# Patient Record
Sex: MALE | Race: WHITE | NOT HISPANIC OR LATINO | URBAN - METROPOLITAN AREA
[De-identification: names, ages, dates, MRNs, and addresses within clinical notes are randomized per-mention and may not be internally consistent; named-entity substitution may affect disease eponyms.]

---

## 2017-02-17 ENCOUNTER — OUTPATIENT (OUTPATIENT)
Dept: OUTPATIENT SERVICES | Facility: HOSPITAL | Age: 82
LOS: 1 days | Discharge: HOME | End: 2017-02-17

## 2017-06-27 DIAGNOSIS — R53.83 OTHER FATIGUE: ICD-10-CM

## 2017-06-27 DIAGNOSIS — A64 UNSPECIFIED SEXUALLY TRANSMITTED DISEASE: ICD-10-CM

## 2017-06-27 DIAGNOSIS — N41.0 ACUTE PROSTATITIS: ICD-10-CM

## 2017-06-27 DIAGNOSIS — E03.9 HYPOTHYROIDISM, UNSPECIFIED: ICD-10-CM

## 2017-06-27 DIAGNOSIS — D64.9 ANEMIA, UNSPECIFIED: ICD-10-CM

## 2017-06-27 DIAGNOSIS — E78.2 MIXED HYPERLIPIDEMIA: ICD-10-CM

## 2018-07-09 ENCOUNTER — OUTPATIENT (OUTPATIENT)
Dept: OUTPATIENT SERVICES | Facility: HOSPITAL | Age: 83
LOS: 1 days | Discharge: HOME | End: 2018-07-09

## 2018-07-09 DIAGNOSIS — J84.112 IDIOPATHIC PULMONARY FIBROSIS: ICD-10-CM

## 2019-02-15 ENCOUNTER — OUTPATIENT (OUTPATIENT)
Dept: OUTPATIENT SERVICES | Facility: HOSPITAL | Age: 84
LOS: 1 days | Discharge: HOME | End: 2019-02-15

## 2019-02-15 DIAGNOSIS — Z00.01 ENCOUNTER FOR GENERAL ADULT MEDICAL EXAMINATION WITH ABNORMAL FINDINGS: ICD-10-CM

## 2019-02-22 ENCOUNTER — OUTPATIENT (OUTPATIENT)
Dept: OUTPATIENT SERVICES | Facility: HOSPITAL | Age: 84
LOS: 1 days | Discharge: HOME | End: 2019-02-22

## 2019-02-22 DIAGNOSIS — N13.8 OTHER OBSTRUCTIVE AND REFLUX UROPATHY: ICD-10-CM

## 2019-02-22 DIAGNOSIS — N40.1 BENIGN PROSTATIC HYPERPLASIA WITH LOWER URINARY TRACT SYMPTOMS: ICD-10-CM

## 2019-09-19 ENCOUNTER — EMERGENCY (EMERGENCY)
Facility: HOSPITAL | Age: 84
LOS: 0 days | Discharge: LEFT BEFORE DISCHARGE | End: 2019-09-20
Attending: EMERGENCY MEDICINE | Admitting: EMERGENCY MEDICINE
Payer: MEDICARE

## 2019-09-19 VITALS
SYSTOLIC BLOOD PRESSURE: 136 MMHG | RESPIRATION RATE: 20 BRPM | TEMPERATURE: 98 F | HEART RATE: 99 BPM | DIASTOLIC BLOOD PRESSURE: 77 MMHG | OXYGEN SATURATION: 97 %

## 2019-09-19 VITALS
RESPIRATION RATE: 19 BRPM | SYSTOLIC BLOOD PRESSURE: 150 MMHG | OXYGEN SATURATION: 97 % | TEMPERATURE: 96 F | DIASTOLIC BLOOD PRESSURE: 77 MMHG | HEART RATE: 84 BPM

## 2019-09-19 DIAGNOSIS — S42.101A FRACTURE OF UNSPECIFIED PART OF SCAPULA, RIGHT SHOULDER, INITIAL ENCOUNTER FOR CLOSED FRACTURE: ICD-10-CM

## 2019-09-19 DIAGNOSIS — S20.411A ABRASION OF RIGHT BACK WALL OF THORAX, INITIAL ENCOUNTER: ICD-10-CM

## 2019-09-19 DIAGNOSIS — M25.519 PAIN IN UNSPECIFIED SHOULDER: ICD-10-CM
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
## 2019-09-19 DIAGNOSIS — J18.9 PNEUMONIA, UNSPECIFIED ORGANISM: ICD-10-CM
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
## 2019-09-19 DIAGNOSIS — S22.31XA FRACTURE OF ONE RIB, RIGHT SIDE, INITIAL ENCOUNTER FOR CLOSED FRACTURE: ICD-10-CM
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
## 2019-09-19 DIAGNOSIS — S50.312A ABRASION OF LEFT ELBOW, INITIAL ENCOUNTER: ICD-10-CM

## 2019-09-19 LAB
ALBUMIN SERPL ELPH-MCNC: 4.2 G/DL — SIGNIFICANT CHANGE UP (ref 3.5–5.2)
ALP SERPL-CCNC: 65 U/L — SIGNIFICANT CHANGE UP (ref 30–115)
ALT FLD-CCNC: 15 U/L — SIGNIFICANT CHANGE UP (ref 0–41)
ANION GAP SERPL CALC-SCNC: 11 MMOL/L — SIGNIFICANT CHANGE UP (ref 7–14)
APTT BLD: 26.7 SEC — LOW (ref 27–39.2)
AST SERPL-CCNC: 26 U/L — SIGNIFICANT CHANGE UP (ref 0–41)
BASOPHILS # BLD AUTO: 0.09 K/UL — SIGNIFICANT CHANGE UP (ref 0–0.2)
BASOPHILS NFR BLD AUTO: 0.5 % — SIGNIFICANT CHANGE UP (ref 0–1)
BILIRUB SERPL-MCNC: 0.4 MG/DL — SIGNIFICANT CHANGE UP (ref 0.2–1.2)
BUN SERPL-MCNC: 26 MG/DL — HIGH (ref 10–20)
CALCIUM SERPL-MCNC: 9.1 MG/DL — SIGNIFICANT CHANGE UP (ref 8.5–10.1)
CHLORIDE SERPL-SCNC: 97 MMOL/L — LOW (ref 98–110)
CO2 SERPL-SCNC: 23 MMOL/L — SIGNIFICANT CHANGE UP (ref 17–32)
CREAT SERPL-MCNC: 1.4 MG/DL — SIGNIFICANT CHANGE UP (ref 0.7–1.5)
EOSINOPHIL # BLD AUTO: 0.08 K/UL — SIGNIFICANT CHANGE UP (ref 0–0.7)
EOSINOPHIL NFR BLD AUTO: 0.4 % — SIGNIFICANT CHANGE UP (ref 0–8)
ETHANOL SERPL-MCNC: <10 MG/DL — SIGNIFICANT CHANGE UP
GLUCOSE SERPL-MCNC: 188 MG/DL — HIGH (ref 70–99)
HCT VFR BLD CALC: 34.7 % — LOW (ref 42–52)
HGB BLD-MCNC: 11.3 G/DL — LOW (ref 14–18)
IMM GRANULOCYTES NFR BLD AUTO: 0.9 % — HIGH (ref 0.1–0.3)
INR BLD: 1.1 RATIO — SIGNIFICANT CHANGE UP (ref 0.65–1.3)
LACTATE SERPL-SCNC: 2.3 MMOL/L — HIGH (ref 0.5–2.2)
LIDOCAIN IGE QN: 29 U/L — SIGNIFICANT CHANGE UP (ref 7–60)
LYMPHOCYTES # BLD AUTO: 0.67 K/UL — LOW (ref 1.2–3.4)
LYMPHOCYTES # BLD AUTO: 3.5 % — LOW (ref 20.5–51.1)
MCHC RBC-ENTMCNC: 27.3 PG — SIGNIFICANT CHANGE UP (ref 27–31)
MCHC RBC-ENTMCNC: 32.6 G/DL — SIGNIFICANT CHANGE UP (ref 32–37)
MCV RBC AUTO: 83.8 FL — SIGNIFICANT CHANGE UP (ref 80–94)
MONOCYTES # BLD AUTO: 1.09 K/UL — HIGH (ref 0.1–0.6)
MONOCYTES NFR BLD AUTO: 5.7 % — SIGNIFICANT CHANGE UP (ref 1.7–9.3)
NEUTROPHILS # BLD AUTO: 17.11 K/UL — HIGH (ref 1.4–6.5)
NEUTROPHILS NFR BLD AUTO: 89 % — HIGH (ref 42.2–75.2)
NRBC # BLD: 0 /100 WBCS — SIGNIFICANT CHANGE UP (ref 0–0)
PLATELET # BLD AUTO: 199 K/UL — SIGNIFICANT CHANGE UP (ref 130–400)
POTASSIUM SERPL-MCNC: 5.1 MMOL/L — HIGH (ref 3.5–5)
POTASSIUM SERPL-SCNC: 5.1 MMOL/L — HIGH (ref 3.5–5)
PROT SERPL-MCNC: 7.6 G/DL — SIGNIFICANT CHANGE UP (ref 6–8)
PROTHROM AB SERPL-ACNC: 12.6 SEC — SIGNIFICANT CHANGE UP (ref 9.95–12.87)
RBC # BLD: 4.14 M/UL — LOW (ref 4.7–6.1)
RBC # FLD: 15.7 % — HIGH (ref 11.5–14.5)
SODIUM SERPL-SCNC: 131 MMOL/L — LOW (ref 135–146)
TROPONIN T SERPL-MCNC: <0.01 NG/ML — SIGNIFICANT CHANGE UP
WBC # BLD: 19.21 K/UL — HIGH (ref 4.8–10.8)
WBC # FLD AUTO: 19.21 K/UL — HIGH (ref 4.8–10.8)

## 2019-09-19 PROCEDURE — 74177 CT ABD & PELVIS W/CONTRAST: CPT | Mod: 26

## 2019-09-19 PROCEDURE — 99282 EMERGENCY DEPT VISIT SF MDM: CPT

## 2019-09-19 PROCEDURE — 99284 EMERGENCY DEPT VISIT MOD MDM: CPT

## 2019-09-19 PROCEDURE — 71101 X-RAY EXAM UNILAT RIBS/CHEST: CPT | Mod: 26,RT

## 2019-09-19 PROCEDURE — 71260 CT THORAX DX C+: CPT | Mod: 26

## 2019-09-19 PROCEDURE — 72125 CT NECK SPINE W/O DYE: CPT | Mod: 26

## 2019-09-19 PROCEDURE — 70450 CT HEAD/BRAIN W/O DYE: CPT | Mod: 26

## 2019-09-19 PROCEDURE — 72170 X-RAY EXAM OF PELVIS: CPT | Mod: 26

## 2019-09-19 RX ORDER — AZITHROMYCIN 500 MG/1
500 TABLET, FILM COATED ORAL ONCE
Refills: 0 | Status: COMPLETED | OUTPATIENT
Start: 2019-09-19 | End: 2019-09-19

## 2019-09-19 RX ORDER — PROTHROMBIN COMPLEX CONCENTRATE (HUMAN) 25.5; 16.5; 24; 22; 22; 26 [IU]/ML; [IU]/ML; [IU]/ML; [IU]/ML; [IU]/ML; [IU]/ML
1500 POWDER, FOR SOLUTION INTRAVENOUS ONCE
Refills: 0 | Status: DISCONTINUED | OUTPATIENT
Start: 2019-09-19 | End: 2019-09-19

## 2019-09-19 RX ORDER — CEFEPIME 1 G/1
2000 INJECTION, POWDER, FOR SOLUTION INTRAMUSCULAR; INTRAVENOUS ONCE
Refills: 0 | Status: COMPLETED | OUTPATIENT
Start: 2019-09-19 | End: 2019-09-19

## 2019-09-19 RX ADMIN — AZITHROMYCIN 255 MILLIGRAM(S): 500 TABLET, FILM COATED ORAL at 20:40

## 2019-09-19 RX ADMIN — CEFEPIME 100 MILLIGRAM(S): 1 INJECTION, POWDER, FOR SOLUTION INTRAMUSCULAR; INTRAVENOUS at 20:32

## 2019-09-19 NOTE — ED PROVIDER NOTE - PROVIDER TOKENS
PROVIDER:[TOKEN:[[id number]],FOLLOWUP:[7-10 Days]],FREE:[LAST:[guanacoiner],FIRST:[yann],PHONE:[(   )    -],FAX:[(   )    -],FOLLOWUP:[7-10 Days]]

## 2019-09-19 NOTE — ED PROVIDER NOTE - REFUSAL OF SERVICE, MDM
I have discussed the risks, benefits and alternatives (including the possibility of worsening of disease, pain, permanent disability, and/or death) with the patient and his/her family (if available).  The patient voices understanding of these risks, benefits, and alternatives and still wishes to sign out against medical advice.

## 2019-09-19 NOTE — ED PROVIDER NOTE - NSFOLLOWUPINSTRUCTIONS_ED_ALL_ED_FT
Scapular Fracture  Image   A scapular fracture is a break in the large, triangular bone behind your shoulder (shoulder blade or scapula). This bone makes up the socket joint of your shoulder.    The scapula is well protected by muscles, so scapular fractures are unusual injuries. They often involve a lot of force. People who have a scapular fracture often have other injuries as well. These may be injuries to the lung, spine, head, shoulder, or ribs.    What are the causes?  Common causes of this condition include:  A fall from a great height.  A car or motorcycle accident.  A heavy, direct blow to the scapula.  What are the signs or symptoms?  The main symptom of a scapular fracture is severe pain when you try to move your arm. Other signs and symptoms include:  Swelling behind the shoulder.  Bruising.  Holding the arm still and close to the body.  How is this diagnosed?  This condition may be diagnosed based on:  Your symptoms and the details of a recent injury.  A physical exam.  X-ray or CT scan to confirm the diagnosis and to check for other injuries.  How is this treated?  This condition may be treated with:  Immobilization. Your arm is put in a sling. A support bandage may be wrapped around your chest. The health care provider will explain how to move your shoulder for the first week after your injury in order to prevent pain and stiffness. The sling can be removed as your movement increases and your pain decreases.  Physical therapy. A physical therapist will teach you exercises to stretch and strengthen your shoulder. The goal is to keep your shoulder from getting stiff or frozen. You may need to do these exercises for 6–12 months.  Surgery. You may need surgery if the bone pieces are out of place (displaced fracture). You may also need surgery if the fracture causes the bone to be deformed. In this case, the broken scapula will be put back into position and held in place with a surgical plate and screws. Surgery is rarely done for this condition.  Follow these instructions at home:  Image   Medicines     Take over-the-counter and prescription medicines only as told by your health care provider.  Do not drive or use heavy machinery while taking prescription pain medicine.  If you have a splint and a wrap:     Wear the splint and the wrap as told by your health care provider. Remove them only as told by your health care provider.  Loosen them if your fingers or toes tingle, become numb, or turn cold and blue.  Keep them clean.  If they are not waterproof:  Do not let them get wet.  Cover them with a watertight covering when you take a bath or a shower.  Managing pain, stiffness, and swelling     Apply ice to the back of your shoulder:  If you have a removable splint or wrap, remove it as told by your health care provider.  Put ice in a plastic bag.  Place a towel between your skin and the bag.  Leave the ice on for 20 minutes, 2–3 times per day.  Do not lift anything that is heavier than 10 lbs. (4.5 kg), or the limit that your health care provider tells you, until he or she says that it is safe.  Avoid activities that make your symptoms worse for 4–6 weeks, or as long as directed.  General instructions     Ask your health care provider when it is safe for you to drive.  Do not use any products that contain nicotine or tobacco, such as cigarettes and e-cigarettes. These can delay bone healing. If you need help quitting, ask your health care provider.  Drink enough fluid to keep your urine pale yellow.  Do physical therapy exercises as told by your health care provider.  Return to your normal activities as told by your health care provider. Ask your health care provider what activities are safe for you.  Keep all follow-up visits as told by your health care provider. This is important.  Contact a health care provider if:  You have pain that is not relieved by medicine.  You are unable to do your physical therapy because of pain or stiffness.  Get help right away if:  You are short of breath.  You cough up blood.  You cannot move your arm or your fingers.  Summary  A scapular fracture is a break in the large, triangular bone behind your shoulder (shoulder blade or scapula).  The scapula is well protected by muscles, so scapular fractures are unusual injuries. They often involve a lot of force.  The main symptom of a scapular fracture is severe pain when you try to move your arm.  Immobilization, physical therapy, and surgery are used to treat this injury. Surgery is rarely done.  Follow your health care provider's instructions on taking medicines, using a wrap and splint, putting ice on the injured area, and resting from regular activities.  This information is not intended to replace advice given to you by your health care provider. Make sure you discuss any questions you have with your health care provider.    Fall Prevention in the Home, Adult  Falls can cause injuries. They can happen to people of all ages. There are many things you can do to make your home safe and to help prevent falls. Ask for help when making these changes, if needed.    What actions can I take to prevent falls?  General Instructions     Use good lighting in all rooms. Replace any light bulbs that burn out.  Turn on the lights when you go into a dark area. Use night-lights.  Keep items that you use often in easy-to-reach places. Lower the shelves around your home if necessary.  Set up your furniture so you have a clear path. Avoid moving your furniture around.  Do not have throw rugs and other things on the floor that can make you trip.  Avoid walking on wet floors.  If any of your floors are uneven, fix them.  Add color or contrast paint or tape to clearly belinda and help you see:  Any grab bars or handrails.  First and last steps of stairways.  Where the edge of each step is.  If you use a stepladder:  Make sure that it is fully opened. Do not climb a closed stepladder.  Make sure that both sides of the stepladder are locked into place.  Ask someone to hold the stepladder for you while you use it.  If there are any pets around you, be aware of where they are.  What can I do in the bathroom?     Image Image   Keep the floor dry. Clean up any water that spills onto the floor as soon as it happens.  Remove soap buildup in the tub or shower regularly.  Use non-skid mats or decals on the floor of the tub or shower.  Attach bath mats securely with double-sided, non-slip rug tape.  If you need to sit down in the shower, use a plastic, non-slip stool.  Install grab bars by the toilet and in the tub and shower. Do not use towel bars as grab bars.  What can I do in the bedroom?     Make sure that you have a light by your bed that is easy to reach.  Do not use any sheets or blankets that are too big for your bed. They should not hang down onto the floor.  Have a firm chair that has side arms. You can use this for support while you get dressed.  What can I do in the kitchen?     Clean up any spills right away.  If you need to reach something above you, use a strong step stool that has a grab bar.  Keep electrical cords out of the way.  Do not use floor polish or wax that makes floors slippery. If you must use wax, use non-skid floor wax.  What can I do with my stairs?     Do not leave any items on the stairs.  Make sure that you have a light switch at the top of the stairs and the bottom of the stairs. If you do not have them, ask someone to add them for you.  Make sure that there are handrails on both sides of the stairs, and use them. Fix handrails that are broken or loose. Make sure that handrails are as long as the stairways.  Install non-slip stair treads on all stairs in your home.  Avoid having throw rugs at the top or bottom of the stairs. If you do have throw rugs, attach them to the floor with carpet tape.  Choose a carpet that does not hide the edge of the steps on the stairway.  Check any carpeting to make sure that it is firmly attached to the stairs. Fix any carpet that is loose or worn.  What can I do on the outside of my home?     Use bright outdoor lighting.  Regularly fix the edges of walkways and driveways and fix any cracks.  Remove anything that might make you trip as you walk through a door, such as a raised step or threshold.  Trim any bushes or trees on the path to your home.  Regularly check to see if handrails are loose or broken. Make sure that both sides of any steps have handrails.  Install guardrails along the edges of any raised decks and porches.  Clear walking paths of anything that might make someone trip, such as tools or rocks.  Have any leaves, snow, or ice cleared regularly.  Use sand or salt on walking paths during winter.  Clean up any spills in your garage right away. This includes grease or oil spills.  What other actions can I take?     Wear shoes that:  Have a low heel. Do not wear high heels.  Have rubber bottoms.  Are comfortable and fit you well.  Are closed at the toe. Do not wear open-toe sandals.  Use tools that help you move around (mobility aids) if they are needed. These include:  Canes.  Walkers.  Scooters.  Crutches.  Review your medicines with your doctor. Some medicines can make you feel dizzy. This can increase your chance of falling.  Ask your doctor what other things you can do to help prevent falls.    Where to find more information  Centers for Disease Control and PreventionGABE: https://cdc.gov  [employer] [place] [employer] Aging: https://ya1nlpe.mark.nih.gov  Contact a doctor if:  You are afraid of falling at home.  You feel weak, drowsy, or dizzy at home.  You fall at home.  Summary  There are many simple things that you can do to make your home safe and to help prevent falls.  Ways to make your home safe include removing tripping hazards and installing grab bars in the bathroom.  Ask for help when making these changes in your home.  This information is not intended to replace advice given to you by your health care provider. Make sure you discuss any questions you have with your health care provider.    Community-Acquired Pneumonia, Adult  ImagePneumonia is an infection of the lungs. One type of pneumonia can happen while a person is in a hospital. A different type can happen when a person is not in a hospital (community-acquired pneumonia). It is easy for this kind to spread from person to person. It can spread to you if you breathe near an infected person who coughs or sneezes. Some symptoms include:  A dry cough.  A wet (productive) cough.  Fever.  Sweating.  Chest pain.  Follow these instructions at home:  Take over-the-counter and prescription medicines only as told by your doctor.  Only take cough medicine if you are losing sleep.  If you were prescribed an antibiotic medicine, take it as told by your doctor. Do not stop taking the antibiotic even if you start to feel better.  Sleep with your head and neck raised (elevated). You can do this by putting a few pillows under your head, or you can sleep in a recliner.  Do not use tobacco products. These include cigarettes, chewing tobacco, and e-cigarettes. If you need help quitting, ask your doctor.  Drink enough water to keep your pee (urine) clear or pale yellow.  A shot (vaccine) can help prevent pneumonia. Shots are often suggested for:  People older than 65 years of age.  People older than 19 years of age:  Who are having cancer treatment.  Who have long-term (chronic) lung disease.  Who have problems with their body's defense system (immune system).  You may also prevent pneumonia if you take these actions:  Get the flu (influenza) shot every year.  Go to the dentist as often as told.  Wash your hands often. If soap and water are not available, use hand .  Contact a doctor if:  You have a fever.  You lose sleep because your cough medicine does not help.  Get help right away if:  You are short of breath and it gets worse.  You have more chest pain.  Your sickness gets worse. This is very serious if:  You are an older adult.  Your body's defense system is weak.  You cough up blood.  This information is not intended to replace advice given to you by your health care provider. Make sure you discuss any questions you have with your health care provider.

## 2019-09-19 NOTE — ED PROVIDER NOTE - ATTENDING CONTRIBUTION TO CARE
87m w HTN reports fall from ladder a few hours ago w injury to R scapula. Pain is sharp, moderate, constant, no radiating, worse w moving RUE. [facility] and had XR's showing scapula & rib fx so sent in for eval. No head/neck injury, no LOC. No use of blood thinners. Patient in usual state of health prior. No other injury, no other complaints.    Review of Systems  Constitutional:  No fever or chills.   Eyes:  Negative.   ENMT:  No nasal congestion, discharge, or throat pain.   Cardiac:  No chest pain, syncope, or edema.  Respiratory:  No dyspnea, wheezing, or cough. No hemoptysis.  GI:  No vomiting, diarrhea, or abdominal pain. No melena or hematochezia.  :  No dysuria or hematuria.   Musculoskeletal:  See HPI  Skin:  No skin rash, jaundice, or lesions.   Neuro:  No headache, dizziness, loss of sensation, or focal weakness.  No change in mental status.     Physical Exam  General: Awake, alert, NAD, WDWN, NCAT, no skull/facial tender, no step-offs, no raccoon/villa, non-toxic appearing  Eyes: PERRL, EOMI, no icterus, lids and conjunctivae are normal  ENT: External inspection normal, pink/moist membranes, pharynx normal  CV: S1S2, regular rate and rhythm, no murmur/gallops/rubs, no JVD, 2+ pulses b/l, no edema/cords/homans, warm/well-perfused  Respiratory: Normal respiratory rate/effort, no respiratory distress, normal voice, speaking full sentences, lungs clear to auscultation b/l, no wheezing/rales/rhonchi, no retractions, no stridor  Abdomen: Soft abdomen, no tender/distended/guarding/rebound, no CVA tender  Musculoskeletal: FROM all 4 extremities, N/V intact, pelvis stable, no TLS spinal tender/deform/step-offs, R upper back/scapula tender, no other latia tender/deform, stable gait  Neck: FROM neck, supple, no meningismus, trachea midline, no JVD, no cspine tender/step-offs  Integumentary: Color normal for race, warm and dry, no rash. L elbow abrasion. R upper back linear abrasion  Neuro: Oriented x3, CN 2-12 grossly intact, normal motor, normal sensory, normal gait  Psych: Oriented x3, mood normal, affect normal     87m w fall and scapular fracture. nontoxic appearing, n/v intact. No use of blood thinners. --Labs, EKG, XR's, CT's, will d/w Trauma, observe/re-assess 87m w HTN reports fall from ladder a few hours ago w injury to R scapula. Pain is sharp, moderate, constant, no radiating, worse w moving RUE. [facility] and had XR's showing scapula & rib fx so sent in for eval. No head/neck injury, no LOC. No use of blood thinners. Patient in usual state of health prior. No other injury, no other complaints.    Review of Systems  Constitutional:  No fever or chills.   Eyes:  Negative.   ENMT:  No nasal congestion, discharge, or throat pain.   Cardiac:  No chest pain, syncope, or edema.  Respiratory:  No dyspnea, wheezing, or cough. No hemoptysis.  GI:  No vomiting, diarrhea, or abdominal pain. No melena or hematochezia.  :  No dysuria or hematuria.   Musculoskeletal:  See HPI  Skin:  No skin rash, jaundice, or lesions.   Neuro:  No headache, dizziness, loss of sensation, or focal weakness.  No change in mental status.     Physical Exam  General: Awake, alert, NAD, WDWN, NCAT, no skull/facial tender, no step-offs, no raccoon/villa, non-toxic appearing  Eyes: PERRL, EOMI, no icterus, lids and conjunctivae are normal  ENT: External inspection normal, pink/moist membranes, pharynx normal  CV: S1S2, regular rate and rhythm, no murmur/gallops/rubs, no JVD, 2+ pulses b/l, no edema/cords/homans, warm/well-perfused  Respiratory: Normal respiratory rate/effort, no respiratory distress, normal voice, speaking full sentences, lungs clear to auscultation b/l, no wheezing/rales/rhonchi, no retractions, no stridor  Abdomen: Soft abdomen, no tender/distended/guarding/rebound, no CVA tender  Musculoskeletal: FROM all 4 extremities, N/V intact, pelvis stable, no TLS spinal tender/deform/step-offs, R upper back/scapula tender, no chest wall deform/flail/crepitus, no other latia tender/deform, stable gait  Neck: FROM neck, supple, no meningismus, trachea midline, no JVD, no cspine tender/step-offs  Integumentary: Color normal for race, warm and dry, no rash. L elbow abrasion. R upper back linear abrasion  Neuro: Oriented x3, CN 2-12 grossly intact, normal motor, normal sensory, normal gait  Psych: Oriented x3, mood normal, affect normal     87m w fall and scapular fracture. nontoxic appearing, n/v intact. No use of blood thinners. --Labs, EKG, XR's, CT's, will d/w Trauma, observe/re-assess

## 2019-09-19 NOTE — ED PROVIDER NOTE - CLINICAL SUMMARY MEDICAL DECISION MAKING FREE TEXT BOX
87m w fall and scapular fracture. nontoxic appearing, n/v intact. No use of blood thinners. Labs, EKG, & imaging reviewed. Sling applied. Abx given. Care d/w Ortho and Trauma, cleared for admit to Medicine for treatment of PNA. Pt requesting to go home and wishes to leave AMA. Patient is awake/alert/interactive with normal mental status and normal neurologic function. Patient reports no SI/HI and demonstrates normal thought processes w no evidence of intoxication, delirium, delusions, or hallucinations. Patient requesting to leave against medical advice at this time. Advised patient of potential risks of leaving AMA which include potential death or disability or worsening PNA. Attempted to convince patient to stay and continue work up/treatment and patient refused. Patient has capacity to make medical decisions at this time and will be signed out AMA. Pt advised regarding symptomatic/supportive care, importance of PMD/Ortho f/u, and symptoms to prompt ED return. Copy of results given to patient.

## 2019-09-19 NOTE — ED PROVIDER NOTE - PATIENT PORTAL LINK FT
You can access the FollowMyHealth Patient Portal offered by [facility] by registering at the following website: [email or web address]. By joining [email or web address]’s FollowMyHealth portal, you will also be able to view your health information using other applications (apps) compatible with our system.

## 2019-09-19 NOTE — ED PROVIDER NOTE - NS ED ROS FT
Constitutional:  See HPI.   Eyes:  No visual changes, eye pain or discharge.  ENMT:  No hearing changes, pain, discharge or infections. No neck pain or stiffness.  Cardiac:  +Right sided rib pain, No chest pain, SOB or edema. No chest pain with exertion.  Respiratory:  No cough or respiratory distress. No hemoptysis.  GI:  No nausea, vomiting, diarrhea, abdominal pain.  :  No dysuria, frequency, hematuria  MS:  +Right shoulder pain, No joint pain or back pain.  Neuro:  No LOC. No headache or weakness.    Skin:  No skin rash.  Except as in HPI, all other review of systems is negative

## 2019-09-19 NOTE — ED PROVIDER NOTE - PHYSICAL EXAMINATION
CONSTITUTIONAL: Well-developed; well-nourished; in no acute distress.   SKIN: warm, dry  HEAD: Normocephalic; atraumatic.  EYES: PERRL, EOMI, no conjunctival erythema  ENT: No nasal discharge; airway clear.  NECK: Supple; non tender.  CARD: Tenderness in right rib cage, Tenderness upon compression of the thoracic wall, S1, S2 normal; no murmurs, gallops, or rubs. Regular rate and rhythm.   RESP: No wheezes, rales or rhonchi.  ABD: soft ntnd  EXT: Tenderness of the rear right scapula, Decreased ROM of theright scapula, otherwise, Normal cap refill. Normal ROM.  No clubbing, cyanosis or edema.   LYMPH: No acute cervical adenopathy.  NEURO: Alert, oriented, grossly unremarkable  PSYCH: Cooperative, appropriate.

## 2019-09-19 NOTE — ED PROVIDER NOTE - CARE PLAN
Principal Discharge DX:	Scapula fracture  Secondary Diagnosis:	Fall  Secondary Diagnosis:	Pneumonia Principal Discharge DX:	Scapula fracture  Secondary Diagnosis:	Fall  Secondary Diagnosis:	Pneumonia  Secondary Diagnosis:	Closed fracture of one rib of right side, initial encounter

## 2019-09-19 NOTE — ED PROVIDER NOTE - PROGRESS NOTE DETAILS
surgery called when patient presented to the ed ortho evaluated patient, follow up with Dr. Tim outpatient Given infiltrate seen in the CXR, pt was advice admission for IV abx. Pt states that he would like to go home. The patient wishes to leave against medical advice.  I have discussed the risks, benefits and alternatives (including the possibility of worsening of disease, pain, permanent disability, and/or death) with the patient and his/her family (if available).  The patient voices understanding of these risks, benefits, and alternatives and still wishes to sign out against medical advice.  The patient is awake, alert, oriented  x 3 and has demonstrated capacity to refuse/direct care.  I have advised the patient that they can and should return immediately should they develop any worse/different/additional symptoms, or if they change their mind and want to continue their care. Discussed with patient that he will still need to follow up with PMD and Ortho. Pt understands and agrees and will sign AMA.

## 2019-09-19 NOTE — CONSULT NOTE ADULT - SUBJECTIVE AND OBJECTIVE BOX
TRAUMA ACTIVATION LEVEL:  CONSULT    MECHANISM OF INJURY:   [X] Fall	    GCS: 15 	E: 4	V: 5	M: 6    HPI:  87yM  s/p from ladder 4ft, onto back. -HT, -LOC, -AC. c/o right upper back pain and difficulty raising his right arm. patient went to urgent care center where Xray demonstrated a Rt scapular fx.     PAST MEDICAL & SURGICAL HISTORY:    Allergies: No Known Allergies    ROS: 10-system review is otherwise negative except HPI above.      Primary Survey:    A - airway intact  B - bilateral breath sounds and good chest rise  C - palpable pulses in all extremities  D - GCS 15 on arrival, GARCIA  Exposure obtained    Vital Signs Last 24 Hrs  T(C): 36.4 (19 Sep 2019 16:37), Max: 36.4 (19 Sep 2019 16:37)  T(F): 97.6 (19 Sep 2019 16:37), Max: 97.6 (19 Sep 2019 16:37)  HR: 99 (19 Sep 2019 16:37) (99 - 99)  BP: 136/77 (19 Sep 2019 16:37) (136/77 - 136/77)  RR: 20 (19 Sep 2019 16:37) (20 - 20)  SpO2: 97% (19 Sep 2019 16:37) (97% - 97%)    Secondary Survey:   General: NAD  HEENT: Normocephalic, atraumatic,, PEERLA. no scalp lacerations   Neck: Soft, midline trachea. no c-spine tenderness  Chest: No chest wall tenderness, no subcutaneous emphysema   Cardiac: S1, S2, RRR  Respiratory: Bilateral breath sounds, clear and equal bilaterally  Abdomen: Soft, non-distended, non-tender, no rebound.  Groin: Normal appearing, pelvis stable   Ext: Palpable Radial b/l UE, b/l DP palpable in LE. + difficulty raising Rt hand above head.  Back:  Rt upper back pain, linear bruise noted, no bleeding. No midline TTP, No palpable runoff/stepoff/deformity    Labs:  CAPILLARY BLOOD GLUCOSE               11.3   19.21 )-----------( 199      ( 19 Sep 2019 16:54 )             34.7       Auto Immature Granulocyte %: 0.9 % (09-19-19 @ 16:54)  Auto Neutrophil %: 89.0 % (09-19-19 @ 16:54)    09-19    131<L>  |  97<L>  |  26<H>  ----------------------------<  188<H>  5.1<H>   |  23  |  1.4    Calcium, Total Serum: 9.1 mg/dL (09-19-19 @ 16:54)    LFTs:             7.6  | 0.4  | 26       ------------------[65      ( 19 Sep 2019 16:54 )  4.2  | x    | 15          Lipase:29       Lactate, Blood: 2.3 mmol/L (09-19-19 @ 16:54)    Coags:     12.60  ----< 1.10    ( 19 Sep 2019 16:54 )     26.7     CARDIAC MARKERS ( 19 Sep 2019 16:54 )  x     / <0.01 ng/mL / x     / x     / x        Alcohol, Blood: <10 mg/dL (09-19-19 @ 16:54)  Alcohol, Blood: <10 mg/dL (09-19-19 @ 16:54)    RADIOLOGY & ADDITIONAL STUDIES:  Pending  ---------------------------------------------------------------------------------------  ASSESSMENT:  87yM  s/p from ladder 4ft, onto back. -HT, -LOC, -AC. c/o right upper back pain and difficulty raising his right arm. patient went to urgent care center where Xray demonstrated a Rt scapular fx.  Trauma assessment in ED: ABCs intact , GCS 15 , AAOx3 , with physical exam findings, imaging, and labs as documented above.    PLAN:   - CXR, Pelvic Xray, Xray of Right humerus elbow  - Trauma labs (CBC, BMP, Coags, T&S, UA)  - Pan Scan (CT head, C-spine, Chest, Abd/pelvis) due to RONALDO. TRAUMA ACTIVATION LEVEL:  CONSULT    MECHANISM OF INJURY:   [X] Fall	    GCS: 15 	E: 4	V: 5	M: 6    HPI:  87yM  s/p from ladder 4ft, onto back. -HT, -LOC, -AC. c/o right upper back pain and difficulty raising his right arm. patient went to urgent care center where Xray demonstrated a Rt scapular fx.     PAST MEDICAL & SURGICAL HISTORY:    Allergies: No Known Allergies    ROS: 10-system review is otherwise negative except HPI above.      Primary Survey:    A - airway intact  B - bilateral breath sounds and good chest rise  C - palpable pulses in all extremities  D - GCS 15 on arrival, GARCIA  Exposure obtained    Vital Signs Last 24 Hrs  T(C): 36.4 (19 Sep 2019 16:37), Max: 36.4 (19 Sep 2019 16:37)  T(F): 97.6 (19 Sep 2019 16:37), Max: 97.6 (19 Sep 2019 16:37)  HR: 99 (19 Sep 2019 16:37) (99 - 99)  BP: 136/77 (19 Sep 2019 16:37) (136/77 - 136/77)  RR: 20 (19 Sep 2019 16:37) (20 - 20)  SpO2: 97% (19 Sep 2019 16:37) (97% - 97%)    Secondary Survey:   General: NAD  HEENT: Normocephalic, atraumatic,, PEERLA. no scalp lacerations   Neck: Soft, midline trachea. no c-spine tenderness  Chest: No chest wall tenderness, no subcutaneous emphysema   Cardiac: S1, S2, RRR  Respiratory: Bilateral breath sounds, clear and equal bilaterally  Abdomen: Soft, non-distended, non-tender, no rebound.  Groin: Normal appearing, pelvis stable   Ext: Palpable Radial b/l UE, b/l DP palpable in LE. + difficulty raising Rt hand above head.  Back:  Rt upper back pain, linear bruise noted, no bleeding. No midline TTP, No palpable runoff/stepoff/deformity    Labs:  CAPILLARY BLOOD GLUCOSE               11.3   19.21 )-----------( 199      ( 19 Sep 2019 16:54 )             34.7       Auto Immature Granulocyte %: 0.9 % (09-19-19 @ 16:54)  Auto Neutrophil %: 89.0 % (09-19-19 @ 16:54)    09-19    131<L>  |  97<L>  |  26<H>  ----------------------------<  188<H>  5.1<H>   |  23  |  1.4    Calcium, Total Serum: 9.1 mg/dL (09-19-19 @ 16:54)    LFTs:             7.6  | 0.4  | 26       ------------------[65      ( 19 Sep 2019 16:54 )  4.2  | x    | 15          Lipase:29       Lactate, Blood: 2.3 mmol/L (09-19-19 @ 16:54)    Coags:     12.60  ----< 1.10    ( 19 Sep 2019 16:54 )     26.7     CARDIAC MARKERS ( 19 Sep 2019 16:54 )  x     / <0.01 ng/mL / x     / x     / x        Alcohol, Blood: <10 mg/dL (09-19-19 @ 16:54)  Alcohol, Blood: <10 mg/dL (09-19-19 @ 16:54)      RADIOLOGY & ADDITIONAL STUDIES:    < from: Xray Pelvis AP only (09.19.19 @ 18:32) >  IMPRESSION:  Osteopenia without acute distress fracture.  < end of copied text >    < from: CT Head No Cont (09.19.19 @ 19:40) >  IMPRESSION:  No CT evidence for acute intracranialpathology.  < end of copied text >    < from: CT Cervical Spine No Cont (09.19.19 @ 19:40) >  IMPRESSION:  No acute fracture or significant subluxation of the cervical spine.  < end of copied text >    < from: CT Chest w/ IV Cont (09.19.19 @ 19:41) >  IMPRESSION:  Complicated displaced right scapular fracture. Acute fracture of the   right posterior fifth rib.  Otherwise, no evidence of acute intra-abdominal/pelvic traumatic   injury/pathology.  < end of copied text >    < from: CT Abdomen and Pelvis w/ IV Cont (09.19.19 @ 19:41) >  IMPRESSION:  Complicated displaced right scapular fracture. Acute fracture of the   right posterior fifth rib.  Otherwise, no evidence of acute intra-abdominal/pelvic traumatic   injury/pathology.  < end of copied text >

## 2019-09-19 NOTE — ED PROVIDER NOTE - CARE PROVIDER_API CALL
Graham Tim)  Orthopaedic Surgery  [address] [place] [address]  Phone: [phone]  Fax: [phone]  Follow Up Time: 7-10 Days    yann jung  Phone: (   )    -  Fax: (   )    -  Follow Up Time: 7-10 Days

## 2019-09-19 NOTE — ED PROVIDER NOTE - CARE PROVIDERS DIRECT ADDRESSES
,[email or web address]

## 2019-09-19 NOTE — ED PROVIDER NOTE - OBJECTIVE STATEMENT
87 y.o. M with no significant displaced shoulder fracture and multiple rib fractures sent in from urgent care s/p fall 2 hours ago off a latter, denies LOC, no head trauma, no nausea/vomitting/chestpain/SOB. Pt + decreased ROM of right shoulder, no blood thinners.

## 2019-09-19 NOTE — ED ADULT NURSE REASSESSMENT NOTE - NS ED NURSE REASSESS COMMENT FT1
pt is aaox3, nad, respirations easy and regular, skin is warm, dry, and normal in color, pt right arm is in sling, family is @ bedside

## 2019-09-19 NOTE — ED ADULT NURSE NOTE - OBJECTIVE STATEMENT
Pt presents to the ED with c/o falling off ladder on his back x 3 hours ago. Pt states he was on the 4th step. Pt denies LOC.

## 2019-09-19 NOTE — CONSULT NOTE ADULT - ASSESSMENT
ASSESSMENT:  87yM  s/p from ladder 4ft, onto back. -HT, -LOC, -AC. c/o right upper back pain and difficulty raising his right arm. patient went to urgent care center where Xray demonstrated a Rt scapular fx.  Trauma assessment in ED: ABCs intact , GCS 15 , AAOx3 , with physical exam findings, imaging, and labs as documented above.    Trauma workup significant for Right scapula fracture and right 5th rib fracture  patient pulling >1500 on incentive spirometer    per orthopedics patient may go home with a sling and follow up with dr whaley in the office    no further trauma intervention  continue use of incentive spirometer    d/w Dr. Davis ASSESSMENT:  87yM  s/p from ladder 4ft, onto back. -HT, -LOC, -AC. c/o right upper back pain and difficulty raising his right arm. patient went to urgent care center where Xray demonstrated a Rt scapular fx.  Trauma assessment in ED: ABCs intact , GCS 15 , AAOx3 , with physical exam findings, imaging, and labs as documented above.    Trauma workup significant for Right scapula fracture and right 5th rib fracture  patient pulling >1500 on incentive spirometer    per orthopedics patient may go home with a sling and follow up with dr whaley in the office    no further trauma intervention  continue use of incentive spirometer    d/w Dr. Davis    Senior Trauma Resident Note  Airway intact  Bilateral Breath Sounds  Palpable pulses in 4 ext  GCS 15, PERRL, GARCIA  hemodynamicall stable  No Subq emphysema, abdominal tenderness,  or pelvic instability   FAST neg  Ct findings as above  RT scapular fx - ortho recs sling and o/p follow up, 5 th rib fx - 1500 IS, mild pain  pt can get OTC pain control  pt can be cleared from trauma perspective for outpatient f/u   Plan as above d/w Dr. aDvis

## 2019-09-19 NOTE — ED ADULT NURSE NOTE - CAS ED AMA FORM SIGNED YN
pt wants to leave AMA and follow up with Ortho, MD spoke with pt, explained risks of leaving AMA up to and including injury, illness, and death, pt understands these risks and still wants to leave, pt instructed to return to ed for new or worsening symptoms/Yes

## 2019-09-20 RX ORDER — CEFPODOXIME PROXETIL 100 MG
1 TABLET ORAL
Qty: 20 | Refills: 0
Start: 2019-09-20 | End: 2019-09-29

## 2019-09-20 RX ORDER — CLARITHROMYCIN 500 MG
1 TABLET ORAL
Qty: 4 | Refills: 0
Start: 2019-09-20 | End: 2019-09-23

## 2021-01-01 ENCOUNTER — APPOINTMENT (OUTPATIENT)
Age: 86
End: 2021-01-01
Payer: MEDICARE

## 2021-01-01 ENCOUNTER — TRANSCRIPTION ENCOUNTER (OUTPATIENT)
Age: 86
End: 2021-01-01

## 2021-01-01 VITALS
HEART RATE: 51 BPM | SYSTOLIC BLOOD PRESSURE: 110 MMHG | HEIGHT: 65 IN | DIASTOLIC BLOOD PRESSURE: 72 MMHG | OXYGEN SATURATION: 95 % | BODY MASS INDEX: 29.99 KG/M2 | RESPIRATION RATE: 14 BRPM | WEIGHT: 180 LBS

## 2021-01-01 PROCEDURE — 99213 OFFICE O/P EST LOW 20 MIN: CPT

## 2021-11-23 PROBLEM — I10 ESSENTIAL (PRIMARY) HYPERTENSION: Chronic | Status: ACTIVE | Noted: 2019-09-20

## 2021-12-16 PROBLEM — Z00.00 ENCOUNTER FOR PREVENTIVE HEALTH EXAMINATION: Status: ACTIVE | Noted: 2021-01-01

## 2021-12-18 NOTE — ASSESSMENT
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  Now reconsidering\par Now using home O2 with exertion

## 2021-12-18 NOTE — HISTORY OF PRESENT ILLNESS
[Follow-Up - Routine Clinic] : a routine clinic follow-up of [Dyspnea on Exertion] : dyspnea on exertion [Dyspnea at Rest] : no dyspnea at rest [Dry Cough] : dry cough [Productive Cough] : no productive cough [Wheezing] : no wheezing [Hemoptysis] : no hemoptysis [Currently Experiencing] : The patient is currently experiencing symptoms. [None] : The patient is not currently being treated for this problem [Shortness of Breath] : Shortness of Breath

## 2022-01-01 ENCOUNTER — APPOINTMENT (OUTPATIENT)
Age: 87
End: 2022-01-01
Payer: MEDICARE

## 2022-01-01 ENCOUNTER — TRANSCRIPTION ENCOUNTER (OUTPATIENT)
Age: 87
End: 2022-01-01

## 2022-01-01 ENCOUNTER — EMERGENCY (EMERGENCY)
Facility: HOSPITAL | Age: 87
LOS: 0 days | Discharge: NURSING, REHAB OR LONG-TERM CARE | End: 2022-05-14
Attending: EMERGENCY MEDICINE | Admitting: EMERGENCY MEDICINE
Payer: MEDICARE

## 2022-01-01 ENCOUNTER — RESULT CHARGE (OUTPATIENT)
Age: 87
End: 2022-01-01

## 2022-01-01 ENCOUNTER — INPATIENT (INPATIENT)
Facility: HOSPITAL | Age: 87
LOS: 7 days | Discharge: NURSING, REHAB OR LONG-TERM CARE | End: 2022-04-01
Attending: STUDENT IN AN ORGANIZED HEALTH CARE EDUCATION/TRAINING PROGRAM | Admitting: STUDENT IN AN ORGANIZED HEALTH CARE EDUCATION/TRAINING PROGRAM
Payer: MEDICARE

## 2022-01-01 ENCOUNTER — APPOINTMENT (OUTPATIENT)
Dept: CARDIOLOGY | Facility: CLINIC | Age: 87
End: 2022-01-01

## 2022-01-01 ENCOUNTER — INPATIENT (INPATIENT)
Facility: HOSPITAL | Age: 87
LOS: 17 days | Discharge: NURSING, REHAB OR LONG-TERM CARE | End: 2022-01-22
Attending: INTERNAL MEDICINE | Admitting: INTERNAL MEDICINE
Payer: MEDICARE

## 2022-01-01 ENCOUNTER — APPOINTMENT (OUTPATIENT)
Age: 87
End: 2022-01-01

## 2022-01-01 ENCOUNTER — INPATIENT (INPATIENT)
Facility: HOSPITAL | Age: 87
LOS: 11 days | Discharge: NURSING, REHAB OR LONG-TERM CARE | End: 2022-03-08
Attending: INTERNAL MEDICINE | Admitting: INTERNAL MEDICINE
Payer: MEDICARE

## 2022-01-01 ENCOUNTER — INPATIENT (INPATIENT)
Facility: HOSPITAL | Age: 87
LOS: 1 days | End: 2022-07-30
Attending: INTERNAL MEDICINE | Admitting: INTERNAL MEDICINE

## 2022-01-01 ENCOUNTER — APPOINTMENT (OUTPATIENT)
Dept: CARDIOLOGY | Facility: CLINIC | Age: 87
End: 2022-01-01
Payer: MEDICARE

## 2022-01-01 ENCOUNTER — INPATIENT (INPATIENT)
Facility: HOSPITAL | Age: 87
LOS: 12 days | Discharge: NURSING, REHAB OR LONG-TERM CARE | End: 2022-07-23
Attending: INTERNAL MEDICINE | Admitting: INTERNAL MEDICINE

## 2022-01-01 VITALS
WEIGHT: 160.06 LBS | OXYGEN SATURATION: 88 % | RESPIRATION RATE: 24 BRPM | SYSTOLIC BLOOD PRESSURE: 122 MMHG | DIASTOLIC BLOOD PRESSURE: 81 MMHG | TEMPERATURE: 95 F | HEIGHT: 64 IN | HEART RATE: 95 BPM

## 2022-01-01 VITALS
SYSTOLIC BLOOD PRESSURE: 102 MMHG | TEMPERATURE: 96 F | HEART RATE: 96 BPM | DIASTOLIC BLOOD PRESSURE: 63 MMHG | RESPIRATION RATE: 18 BRPM | OXYGEN SATURATION: 100 %

## 2022-01-01 VITALS
TEMPERATURE: 97 F | RESPIRATION RATE: 18 BRPM | SYSTOLIC BLOOD PRESSURE: 108 MMHG | HEIGHT: 64 IN | OXYGEN SATURATION: 100 % | WEIGHT: 160.06 LBS | HEART RATE: 86 BPM | DIASTOLIC BLOOD PRESSURE: 64 MMHG

## 2022-01-01 VITALS
WEIGHT: 180 LBS | OXYGEN SATURATION: 87 % | BODY MASS INDEX: 29.95 KG/M2 | HEART RATE: 91 BPM | SYSTOLIC BLOOD PRESSURE: 100 MMHG | RESPIRATION RATE: 14 BRPM | DIASTOLIC BLOOD PRESSURE: 60 MMHG

## 2022-01-01 VITALS
SYSTOLIC BLOOD PRESSURE: 164 MMHG | HEART RATE: 62 BPM | DIASTOLIC BLOOD PRESSURE: 71 MMHG | OXYGEN SATURATION: 90 % | RESPIRATION RATE: 21 BRPM

## 2022-01-01 VITALS
HEIGHT: 65 IN | SYSTOLIC BLOOD PRESSURE: 140 MMHG | DIASTOLIC BLOOD PRESSURE: 80 MMHG | HEART RATE: 72 BPM | OXYGEN SATURATION: 98 % | BODY MASS INDEX: 29.99 KG/M2 | RESPIRATION RATE: 12 BRPM | WEIGHT: 180 LBS

## 2022-01-01 VITALS
DIASTOLIC BLOOD PRESSURE: 64 MMHG | RESPIRATION RATE: 18 BRPM | OXYGEN SATURATION: 95 % | TEMPERATURE: 96 F | SYSTOLIC BLOOD PRESSURE: 129 MMHG | HEART RATE: 99 BPM

## 2022-01-01 VITALS
SYSTOLIC BLOOD PRESSURE: 123 MMHG | RESPIRATION RATE: 18 BRPM | HEART RATE: 95 BPM | OXYGEN SATURATION: 100 % | TEMPERATURE: 97 F | DIASTOLIC BLOOD PRESSURE: 79 MMHG

## 2022-01-01 VITALS
DIASTOLIC BLOOD PRESSURE: 81 MMHG | OXYGEN SATURATION: 97 % | RESPIRATION RATE: 22 BRPM | HEIGHT: 64 IN | WEIGHT: 160.06 LBS | TEMPERATURE: 98 F | HEART RATE: 107 BPM | SYSTOLIC BLOOD PRESSURE: 124 MMHG

## 2022-01-01 VITALS
SYSTOLIC BLOOD PRESSURE: 106 MMHG | RESPIRATION RATE: 18 BRPM | DIASTOLIC BLOOD PRESSURE: 71 MMHG | TEMPERATURE: 96 F | HEART RATE: 73 BPM | OXYGEN SATURATION: 98 %

## 2022-01-01 VITALS
HEART RATE: 122 BPM | WEIGHT: 154.98 LBS | DIASTOLIC BLOOD PRESSURE: 52 MMHG | HEIGHT: 64.02 IN | TEMPERATURE: 98 F | RESPIRATION RATE: 26 BRPM | OXYGEN SATURATION: 97 % | SYSTOLIC BLOOD PRESSURE: 102 MMHG

## 2022-01-01 VITALS
SYSTOLIC BLOOD PRESSURE: 109 MMHG | DIASTOLIC BLOOD PRESSURE: 57 MMHG | TEMPERATURE: 96 F | HEART RATE: 71 BPM | OXYGEN SATURATION: 97 % | RESPIRATION RATE: 18 BRPM

## 2022-01-01 VITALS
WEIGHT: 180 LBS | BODY MASS INDEX: 29.99 KG/M2 | HEART RATE: 67 BPM | HEIGHT: 65 IN | SYSTOLIC BLOOD PRESSURE: 120 MMHG | TEMPERATURE: 97.5 F | DIASTOLIC BLOOD PRESSURE: 70 MMHG

## 2022-01-01 VITALS
DIASTOLIC BLOOD PRESSURE: 85 MMHG | SYSTOLIC BLOOD PRESSURE: 147 MMHG | TEMPERATURE: 100 F | WEIGHT: 179.9 LBS | HEART RATE: 92 BPM | OXYGEN SATURATION: 99 % | RESPIRATION RATE: 24 BRPM

## 2022-01-01 DIAGNOSIS — I50.23 ACUTE ON CHRONIC SYSTOLIC (CONGESTIVE) HEART FAILURE: ICD-10-CM

## 2022-01-01 DIAGNOSIS — R06.02 SHORTNESS OF BREATH: ICD-10-CM

## 2022-01-01 DIAGNOSIS — N40.0 BENIGN PROSTATIC HYPERPLASIA WITHOUT LOWER URINARY TRACT SYMPTOMS: ICD-10-CM

## 2022-01-01 DIAGNOSIS — J96.21 ACUTE AND CHRONIC RESPIRATORY FAILURE WITH HYPOXIA: ICD-10-CM

## 2022-01-01 DIAGNOSIS — G89.11 ACUTE PAIN DUE TO TRAUMA: ICD-10-CM

## 2022-01-01 DIAGNOSIS — I11.0 HYPERTENSIVE HEART DISEASE WITH HEART FAILURE: ICD-10-CM

## 2022-01-01 DIAGNOSIS — E87.6 HYPOKALEMIA: ICD-10-CM

## 2022-01-01 DIAGNOSIS — I10 ESSENTIAL (PRIMARY) HYPERTENSION: ICD-10-CM

## 2022-01-01 DIAGNOSIS — I50.22 CHRONIC SYSTOLIC (CONGESTIVE) HEART FAILURE: ICD-10-CM

## 2022-01-01 DIAGNOSIS — H91.90 UNSPECIFIED HEARING LOSS, UNSPECIFIED EAR: ICD-10-CM

## 2022-01-01 DIAGNOSIS — K59.00 CONSTIPATION, UNSPECIFIED: ICD-10-CM

## 2022-01-01 DIAGNOSIS — R79.1 ABNORMAL COAGULATION PROFILE: ICD-10-CM

## 2022-01-01 DIAGNOSIS — J84.9 INTERSTITIAL PULMONARY DISEASE, UNSPECIFIED: ICD-10-CM

## 2022-01-01 DIAGNOSIS — I27.20 PULMONARY HYPERTENSION, UNSPECIFIED: ICD-10-CM

## 2022-01-01 DIAGNOSIS — K21.9 GASTRO-ESOPHAGEAL REFLUX DISEASE WITHOUT ESOPHAGITIS: ICD-10-CM

## 2022-01-01 DIAGNOSIS — R31.9 HEMATURIA, UNSPECIFIED: ICD-10-CM

## 2022-01-01 DIAGNOSIS — Z87.09 PERSONAL HISTORY OF OTHER DISEASES OF THE RESPIRATORY SYSTEM: ICD-10-CM

## 2022-01-01 DIAGNOSIS — D64.9 ANEMIA, UNSPECIFIED: ICD-10-CM

## 2022-01-01 DIAGNOSIS — G47.00 INSOMNIA, UNSPECIFIED: ICD-10-CM

## 2022-01-01 DIAGNOSIS — I13.0 HYPERTENSIVE HEART AND CHRONIC KIDNEY DISEASE WITH HEART FAILURE AND STAGE 1 THROUGH STAGE 4 CHRONIC KIDNEY DISEASE, OR UNSPECIFIED CHRONIC KIDNEY DISEASE: ICD-10-CM

## 2022-01-01 DIAGNOSIS — R91.1 SOLITARY PULMONARY NODULE: ICD-10-CM

## 2022-01-01 DIAGNOSIS — F41.9 ANXIETY DISORDER, UNSPECIFIED: ICD-10-CM

## 2022-01-01 DIAGNOSIS — N40.1 BENIGN PROSTATIC HYPERPLASIA WITH LOWER URINARY TRACT SYMPTOMS: ICD-10-CM

## 2022-01-01 DIAGNOSIS — Z99.81 DEPENDENCE ON SUPPLEMENTAL OXYGEN: ICD-10-CM

## 2022-01-01 DIAGNOSIS — Z71.89 OTHER SPECIFIED COUNSELING: ICD-10-CM

## 2022-01-01 DIAGNOSIS — I48.91 UNSPECIFIED ATRIAL FIBRILLATION: ICD-10-CM

## 2022-01-01 DIAGNOSIS — K92.1 MELENA: ICD-10-CM

## 2022-01-01 DIAGNOSIS — I50.20 UNSPECIFIED SYSTOLIC (CONGESTIVE) HEART FAILURE: ICD-10-CM

## 2022-01-01 DIAGNOSIS — M25.512 PAIN IN LEFT SHOULDER: ICD-10-CM

## 2022-01-01 DIAGNOSIS — Z51.5 ENCOUNTER FOR PALLIATIVE CARE: ICD-10-CM

## 2022-01-01 DIAGNOSIS — I08.1 RHEUMATIC DISORDERS OF BOTH MITRAL AND TRICUSPID VALVES: ICD-10-CM

## 2022-01-01 DIAGNOSIS — I95.9 HYPOTENSION, UNSPECIFIED: ICD-10-CM

## 2022-01-01 DIAGNOSIS — Z87.891 PERSONAL HISTORY OF NICOTINE DEPENDENCE: ICD-10-CM

## 2022-01-01 DIAGNOSIS — R00.0 TACHYCARDIA, UNSPECIFIED: ICD-10-CM

## 2022-01-01 DIAGNOSIS — J44.9 CHRONIC OBSTRUCTIVE PULMONARY DISEASE, UNSPECIFIED: ICD-10-CM

## 2022-01-01 DIAGNOSIS — R06.00 DYSPNEA, UNSPECIFIED: ICD-10-CM

## 2022-01-01 DIAGNOSIS — R59.0 LOCALIZED ENLARGED LYMPH NODES: ICD-10-CM

## 2022-01-01 DIAGNOSIS — E83.42 HYPOMAGNESEMIA: ICD-10-CM

## 2022-01-01 DIAGNOSIS — R45.1 RESTLESSNESS AND AGITATION: ICD-10-CM

## 2022-01-01 DIAGNOSIS — C34.30 MALIGNANT NEOPLASM OF LOWER LOBE, UNSPECIFIED BRONCHUS OR LUNG: ICD-10-CM

## 2022-01-01 DIAGNOSIS — E16.2 HYPOGLYCEMIA, UNSPECIFIED: ICD-10-CM

## 2022-01-01 DIAGNOSIS — Z79.01 LONG TERM (CURRENT) USE OF ANTICOAGULANTS: ICD-10-CM

## 2022-01-01 DIAGNOSIS — I25.10 ATHEROSCLEROTIC HEART DISEASE OF NATIVE CORONARY ARTERY W/OUT ANGINA PECTORIS: ICD-10-CM

## 2022-01-01 DIAGNOSIS — N18.9 CHRONIC KIDNEY DISEASE, UNSPECIFIED: ICD-10-CM

## 2022-01-01 DIAGNOSIS — Z86.16 PERSONAL HISTORY OF COVID-19: ICD-10-CM

## 2022-01-01 DIAGNOSIS — I48.20 CHRONIC ATRIAL FIBRILLATION, UNSPECIFIED: ICD-10-CM

## 2022-01-01 DIAGNOSIS — R14.0 ABDOMINAL DISTENSION (GASEOUS): ICD-10-CM
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
## 2022-01-01 DIAGNOSIS — U07.1 COVID-19: ICD-10-CM

## 2022-01-01 DIAGNOSIS — I48.0 PAROXYSMAL ATRIAL FIBRILLATION: ICD-10-CM

## 2022-01-01 DIAGNOSIS — D72.829 ELEVATED WHITE BLOOD CELL COUNT, UNSPECIFIED: ICD-10-CM
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
## 2022-01-01 DIAGNOSIS — I50.9 HEART FAILURE, UNSPECIFIED: ICD-10-CM

## 2022-01-01 DIAGNOSIS — R33.8 OTHER RETENTION OF URINE: ICD-10-CM

## 2022-01-01 DIAGNOSIS — I34.0 NONRHEUMATIC MITRAL (VALVE) INSUFFICIENCY: ICD-10-CM
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
## 2022-01-01 DIAGNOSIS — I77.819 AORTIC ECTASIA, UNSPECIFIED SITE: ICD-10-CM

## 2022-01-01 DIAGNOSIS — R55 SYNCOPE AND COLLAPSE: ICD-10-CM

## 2022-01-01 DIAGNOSIS — Z79.52 LONG TERM (CURRENT) USE OF SYSTEMIC STEROIDS: ICD-10-CM

## 2022-01-01 LAB
-  AMIKACIN: SIGNIFICANT CHANGE UP
-  AMPICILLIN: SIGNIFICANT CHANGE UP
-  AZTREONAM: SIGNIFICANT CHANGE UP
-  CEFEPIME: SIGNIFICANT CHANGE UP
-  CEFTAZIDIME/AVIBACTAM: SIGNIFICANT CHANGE UP
-  CEFTAZIDIME: SIGNIFICANT CHANGE UP
-  CEFTOLOZANE/TAZOBACTAM: SIGNIFICANT CHANGE UP
-  CIPROFLOXACIN: SIGNIFICANT CHANGE UP
-  CIPROFLOXACIN: SIGNIFICANT CHANGE UP
-  GENTAMICIN: SIGNIFICANT CHANGE UP
-  IMIPENEM: SIGNIFICANT CHANGE UP
-  LEVOFLOXACIN: SIGNIFICANT CHANGE UP
-  LEVOFLOXACIN: SIGNIFICANT CHANGE UP
-  MEROPENEM: SIGNIFICANT CHANGE UP
-  NITROFURANTOIN: SIGNIFICANT CHANGE UP
-  PIPERACILLIN/TAZOBACTAM: SIGNIFICANT CHANGE UP
-  TETRACYCLINE: SIGNIFICANT CHANGE UP
-  TOBRAMYCIN: SIGNIFICANT CHANGE UP
-  VANCOMYCIN: SIGNIFICANT CHANGE UP
A1C WITH ESTIMATED AVERAGE GLUCOSE RESULT: 5.3 % — SIGNIFICANT CHANGE UP (ref 4–5.6)
ALBUMIN SERPL ELPH-MCNC: 2.4 G/DL — LOW (ref 3.5–5.2)
ALBUMIN SERPL ELPH-MCNC: 2.5 G/DL — LOW (ref 3.5–5.2)
ALBUMIN SERPL ELPH-MCNC: 2.7 G/DL — LOW (ref 3.5–5.2)
ALBUMIN SERPL ELPH-MCNC: 2.8 G/DL — LOW (ref 3.5–5.2)
ALBUMIN SERPL ELPH-MCNC: 2.9 G/DL — LOW (ref 3.5–5.2)
ALBUMIN SERPL ELPH-MCNC: 3 G/DL — LOW (ref 3.5–5.2)
ALBUMIN SERPL ELPH-MCNC: 3.1 G/DL — LOW (ref 3.5–5.2)
ALBUMIN SERPL ELPH-MCNC: 3.2 G/DL — LOW (ref 3.5–5.2)
ALBUMIN SERPL ELPH-MCNC: 3.3 G/DL — LOW (ref 3.5–5.2)
ALBUMIN SERPL ELPH-MCNC: 3.4 G/DL — LOW (ref 3.5–5.2)
ALBUMIN SERPL ELPH-MCNC: 3.5 G/DL — SIGNIFICANT CHANGE UP (ref 3.5–5.2)
ALBUMIN SERPL ELPH-MCNC: 3.6 G/DL — SIGNIFICANT CHANGE UP (ref 3.5–5.2)
ALBUMIN SERPL ELPH-MCNC: 3.7 G/DL — SIGNIFICANT CHANGE UP (ref 3.5–5.2)
ALBUMIN SERPL ELPH-MCNC: 3.8 G/DL — SIGNIFICANT CHANGE UP (ref 3.5–5.2)
ALBUMIN SERPL ELPH-MCNC: 4.3 G/DL — SIGNIFICANT CHANGE UP (ref 3.5–5.2)
ALP SERPL-CCNC: 53 U/L — SIGNIFICANT CHANGE UP (ref 30–115)
ALP SERPL-CCNC: 55 U/L — SIGNIFICANT CHANGE UP (ref 30–115)
ALP SERPL-CCNC: 56 U/L — SIGNIFICANT CHANGE UP (ref 30–115)
ALP SERPL-CCNC: 56 U/L — SIGNIFICANT CHANGE UP (ref 30–115)
ALP SERPL-CCNC: 57 U/L — SIGNIFICANT CHANGE UP (ref 30–115)
ALP SERPL-CCNC: 57 U/L — SIGNIFICANT CHANGE UP (ref 30–115)
ALP SERPL-CCNC: 58 U/L — SIGNIFICANT CHANGE UP (ref 30–115)
ALP SERPL-CCNC: 59 U/L — SIGNIFICANT CHANGE UP (ref 30–115)
ALP SERPL-CCNC: 60 U/L — SIGNIFICANT CHANGE UP (ref 30–115)
ALP SERPL-CCNC: 60 U/L — SIGNIFICANT CHANGE UP (ref 30–115)
ALP SERPL-CCNC: 61 U/L — SIGNIFICANT CHANGE UP (ref 30–115)
ALP SERPL-CCNC: 62 U/L — SIGNIFICANT CHANGE UP (ref 30–115)
ALP SERPL-CCNC: 63 U/L — SIGNIFICANT CHANGE UP (ref 30–115)
ALP SERPL-CCNC: 64 U/L — SIGNIFICANT CHANGE UP (ref 30–115)
ALP SERPL-CCNC: 65 U/L — SIGNIFICANT CHANGE UP (ref 30–115)
ALP SERPL-CCNC: 66 U/L — SIGNIFICANT CHANGE UP (ref 30–115)
ALP SERPL-CCNC: 67 U/L — SIGNIFICANT CHANGE UP (ref 30–115)
ALP SERPL-CCNC: 67 U/L — SIGNIFICANT CHANGE UP (ref 30–115)
ALP SERPL-CCNC: 68 U/L — SIGNIFICANT CHANGE UP (ref 30–115)
ALP SERPL-CCNC: 69 U/L — SIGNIFICANT CHANGE UP (ref 30–115)
ALP SERPL-CCNC: 70 U/L — SIGNIFICANT CHANGE UP (ref 30–115)
ALP SERPL-CCNC: 70 U/L — SIGNIFICANT CHANGE UP (ref 30–115)
ALP SERPL-CCNC: 71 U/L — SIGNIFICANT CHANGE UP (ref 30–115)
ALP SERPL-CCNC: 72 U/L — SIGNIFICANT CHANGE UP (ref 30–115)
ALP SERPL-CCNC: 72 U/L — SIGNIFICANT CHANGE UP (ref 30–115)
ALP SERPL-CCNC: 73 U/L — SIGNIFICANT CHANGE UP (ref 30–115)
ALP SERPL-CCNC: 74 U/L — SIGNIFICANT CHANGE UP (ref 30–115)
ALP SERPL-CCNC: 75 U/L — SIGNIFICANT CHANGE UP (ref 30–115)
ALP SERPL-CCNC: 75 U/L — SIGNIFICANT CHANGE UP (ref 30–115)
ALP SERPL-CCNC: 76 U/L — SIGNIFICANT CHANGE UP (ref 30–115)
ALP SERPL-CCNC: 77 U/L — SIGNIFICANT CHANGE UP (ref 30–115)
ALP SERPL-CCNC: 78 U/L — SIGNIFICANT CHANGE UP (ref 30–115)
ALP SERPL-CCNC: 80 U/L — SIGNIFICANT CHANGE UP (ref 30–115)
ALT FLD-CCNC: 10 U/L — SIGNIFICANT CHANGE UP (ref 0–41)
ALT FLD-CCNC: 11 U/L — SIGNIFICANT CHANGE UP (ref 0–41)
ALT FLD-CCNC: 13 U/L — SIGNIFICANT CHANGE UP (ref 0–41)
ALT FLD-CCNC: 14 U/L — SIGNIFICANT CHANGE UP (ref 0–41)
ALT FLD-CCNC: 15 U/L — SIGNIFICANT CHANGE UP (ref 0–41)
ALT FLD-CCNC: 16 U/L — SIGNIFICANT CHANGE UP (ref 0–41)
ALT FLD-CCNC: 17 U/L — SIGNIFICANT CHANGE UP (ref 0–41)
ALT FLD-CCNC: 18 U/L — SIGNIFICANT CHANGE UP (ref 0–41)
ALT FLD-CCNC: 19 U/L — SIGNIFICANT CHANGE UP (ref 0–41)
ALT FLD-CCNC: 19 U/L — SIGNIFICANT CHANGE UP (ref 0–41)
ALT FLD-CCNC: 20 U/L — SIGNIFICANT CHANGE UP (ref 0–41)
ALT FLD-CCNC: 21 U/L — SIGNIFICANT CHANGE UP (ref 0–41)
ALT FLD-CCNC: 22 U/L — SIGNIFICANT CHANGE UP (ref 0–41)
ALT FLD-CCNC: 24 U/L — SIGNIFICANT CHANGE UP (ref 0–41)
ALT FLD-CCNC: 25 U/L — SIGNIFICANT CHANGE UP (ref 0–41)
ALT FLD-CCNC: 26 U/L — SIGNIFICANT CHANGE UP (ref 0–41)
ALT FLD-CCNC: 27 U/L — SIGNIFICANT CHANGE UP (ref 0–41)
ALT FLD-CCNC: 30 U/L — SIGNIFICANT CHANGE UP (ref 0–41)
ALT FLD-CCNC: 30 U/L — SIGNIFICANT CHANGE UP (ref 0–41)
ALT FLD-CCNC: 32 U/L — SIGNIFICANT CHANGE UP (ref 0–41)
ALT FLD-CCNC: 35 U/L — SIGNIFICANT CHANGE UP (ref 0–41)
ANION GAP SERPL CALC-SCNC: 10 MMOL/L — SIGNIFICANT CHANGE UP (ref 7–14)
ANION GAP SERPL CALC-SCNC: 11 MMOL/L — SIGNIFICANT CHANGE UP (ref 7–14)
ANION GAP SERPL CALC-SCNC: 12 MMOL/L — SIGNIFICANT CHANGE UP (ref 7–14)
ANION GAP SERPL CALC-SCNC: 13 MMOL/L — SIGNIFICANT CHANGE UP (ref 7–14)
ANION GAP SERPL CALC-SCNC: 14 MMOL/L — SIGNIFICANT CHANGE UP (ref 7–14)
ANION GAP SERPL CALC-SCNC: 15 MMOL/L — HIGH (ref 7–14)
ANION GAP SERPL CALC-SCNC: 16 MMOL/L — HIGH (ref 7–14)
ANION GAP SERPL CALC-SCNC: 19 MMOL/L — HIGH (ref 7–14)
ANION GAP SERPL CALC-SCNC: 4 MMOL/L — LOW (ref 7–14)
ANION GAP SERPL CALC-SCNC: 5 MMOL/L — LOW (ref 7–14)
ANION GAP SERPL CALC-SCNC: 6 MMOL/L — LOW (ref 7–14)
ANION GAP SERPL CALC-SCNC: 7 MMOL/L — SIGNIFICANT CHANGE UP (ref 7–14)
ANION GAP SERPL CALC-SCNC: 8 MMOL/L — SIGNIFICANT CHANGE UP (ref 7–14)
ANION GAP SERPL CALC-SCNC: 8 MMOL/L — SIGNIFICANT CHANGE UP (ref 7–14)
ANION GAP SERPL CALC-SCNC: 9 MMOL/L — SIGNIFICANT CHANGE UP (ref 7–14)
ANISOCYTOSIS BLD QL: SLIGHT — SIGNIFICANT CHANGE UP
ANISOCYTOSIS BLD QL: SLIGHT — SIGNIFICANT CHANGE UP
APPEARANCE UR: ABNORMAL
APPEARANCE UR: ABNORMAL
APPEARANCE UR: CLEAR — SIGNIFICANT CHANGE UP
APTT BLD: 19.2 SEC — CRITICAL LOW (ref 27–39.2)
APTT BLD: 28.4 SEC — SIGNIFICANT CHANGE UP (ref 27–39.2)
APTT BLD: 30.2 SEC — SIGNIFICANT CHANGE UP (ref 27–39.2)
APTT BLD: 30.7 SEC — SIGNIFICANT CHANGE UP (ref 27–39.2)
APTT BLD: 32.5 SEC — SIGNIFICANT CHANGE UP (ref 27–39.2)
APTT BLD: 39.2 SEC — SIGNIFICANT CHANGE UP (ref 27–39.2)
APTT BLD: 39.4 SEC — HIGH (ref 27–39.2)
APTT BLD: 39.4 SEC — HIGH (ref 27–39.2)
APTT BLD: 41.2 SEC — HIGH (ref 27–39.2)
APTT BLD: 44.1 SEC — HIGH (ref 27–39.2)
AST SERPL-CCNC: 11 U/L — SIGNIFICANT CHANGE UP (ref 0–41)
AST SERPL-CCNC: 11 U/L — SIGNIFICANT CHANGE UP (ref 0–41)
AST SERPL-CCNC: 13 U/L — SIGNIFICANT CHANGE UP (ref 0–41)
AST SERPL-CCNC: 14 U/L — SIGNIFICANT CHANGE UP (ref 0–41)
AST SERPL-CCNC: 15 U/L — SIGNIFICANT CHANGE UP (ref 0–41)
AST SERPL-CCNC: 16 U/L — SIGNIFICANT CHANGE UP (ref 0–41)
AST SERPL-CCNC: 17 U/L — SIGNIFICANT CHANGE UP (ref 0–41)
AST SERPL-CCNC: 17 U/L — SIGNIFICANT CHANGE UP (ref 0–41)
AST SERPL-CCNC: 18 U/L — SIGNIFICANT CHANGE UP (ref 0–41)
AST SERPL-CCNC: 20 U/L — SIGNIFICANT CHANGE UP (ref 0–41)
AST SERPL-CCNC: 21 U/L — SIGNIFICANT CHANGE UP (ref 0–41)
AST SERPL-CCNC: 21 U/L — SIGNIFICANT CHANGE UP (ref 0–41)
AST SERPL-CCNC: 25 U/L — SIGNIFICANT CHANGE UP (ref 0–41)
AST SERPL-CCNC: 25 U/L — SIGNIFICANT CHANGE UP (ref 0–41)
AST SERPL-CCNC: 28 U/L — SIGNIFICANT CHANGE UP (ref 0–41)
AST SERPL-CCNC: 28 U/L — SIGNIFICANT CHANGE UP (ref 0–41)
AST SERPL-CCNC: 30 U/L — SIGNIFICANT CHANGE UP (ref 0–41)
AST SERPL-CCNC: 30 U/L — SIGNIFICANT CHANGE UP (ref 0–41)
AST SERPL-CCNC: 39 U/L — SIGNIFICANT CHANGE UP (ref 0–41)
BACTERIA # UR AUTO: ABNORMAL
BACTERIA # UR AUTO: ABNORMAL
BACTERIA # UR AUTO: NEGATIVE — SIGNIFICANT CHANGE UP
BASE EXCESS BLDA CALC-SCNC: -0.3 MMOL/L — SIGNIFICANT CHANGE UP (ref -2–3)
BASE EXCESS BLDA CALC-SCNC: 1.7 MMOL/L — SIGNIFICANT CHANGE UP (ref -2–3)
BASE EXCESS BLDV CALC-SCNC: 0.8 MMOL/L — SIGNIFICANT CHANGE UP (ref -2–3)
BASE EXCESS BLDV CALC-SCNC: 2.4 MMOL/L — SIGNIFICANT CHANGE UP (ref -2–3)
BASE EXCESS BLDV CALC-SCNC: 2.5 MMOL/L — SIGNIFICANT CHANGE UP (ref -2–3)
BASE EXCESS BLDV CALC-SCNC: 4.2 MMOL/L — HIGH (ref -2–3)
BASE EXCESS BLDV CALC-SCNC: 6.7 MMOL/L — HIGH (ref -2–3)
BASE EXCESS BLDV CALC-SCNC: 7 MMOL/L — HIGH (ref -2–3)
BASOPHILS # BLD AUTO: 0 K/UL — SIGNIFICANT CHANGE UP (ref 0–0.2)
BASOPHILS # BLD AUTO: 0 K/UL — SIGNIFICANT CHANGE UP (ref 0–0.2)
BASOPHILS # BLD AUTO: 0.01 K/UL — SIGNIFICANT CHANGE UP (ref 0–0.2)
BASOPHILS # BLD AUTO: 0.01 K/UL — SIGNIFICANT CHANGE UP (ref 0–0.2)
BASOPHILS # BLD AUTO: 0.02 K/UL — SIGNIFICANT CHANGE UP (ref 0–0.2)
BASOPHILS # BLD AUTO: 0.03 K/UL — SIGNIFICANT CHANGE UP (ref 0–0.2)
BASOPHILS # BLD AUTO: 0.04 K/UL — SIGNIFICANT CHANGE UP (ref 0–0.2)
BASOPHILS # BLD AUTO: 0.04 K/UL — SIGNIFICANT CHANGE UP (ref 0–0.2)
BASOPHILS # BLD AUTO: 0.05 K/UL — SIGNIFICANT CHANGE UP (ref 0–0.2)
BASOPHILS # BLD AUTO: 0.07 K/UL — SIGNIFICANT CHANGE UP (ref 0–0.2)
BASOPHILS # BLD AUTO: 0.08 K/UL — SIGNIFICANT CHANGE UP (ref 0–0.2)
BASOPHILS # BLD AUTO: 0.09 K/UL — SIGNIFICANT CHANGE UP (ref 0–0.2)
BASOPHILS # BLD AUTO: 0.09 K/UL — SIGNIFICANT CHANGE UP (ref 0–0.2)
BASOPHILS # BLD AUTO: 0.1 K/UL — SIGNIFICANT CHANGE UP (ref 0–0.2)
BASOPHILS # BLD AUTO: 0.11 K/UL — SIGNIFICANT CHANGE UP (ref 0–0.2)
BASOPHILS # BLD AUTO: 0.11 K/UL — SIGNIFICANT CHANGE UP (ref 0–0.2)
BASOPHILS # BLD AUTO: 0.12 K/UL — SIGNIFICANT CHANGE UP (ref 0–0.2)
BASOPHILS # BLD AUTO: 0.13 K/UL — SIGNIFICANT CHANGE UP (ref 0–0.2)
BASOPHILS # BLD AUTO: 0.15 K/UL — SIGNIFICANT CHANGE UP (ref 0–0.2)
BASOPHILS # BLD AUTO: 0.16 K/UL — SIGNIFICANT CHANGE UP (ref 0–0.2)
BASOPHILS # BLD AUTO: 0.2 K/UL — SIGNIFICANT CHANGE UP (ref 0–0.2)
BASOPHILS NFR BLD AUTO: 0 % — SIGNIFICANT CHANGE UP (ref 0–1)
BASOPHILS NFR BLD AUTO: 0 % — SIGNIFICANT CHANGE UP (ref 0–1)
BASOPHILS NFR BLD AUTO: 0.1 % — SIGNIFICANT CHANGE UP (ref 0–1)
BASOPHILS NFR BLD AUTO: 0.2 % — SIGNIFICANT CHANGE UP (ref 0–1)
BASOPHILS NFR BLD AUTO: 0.3 % — SIGNIFICANT CHANGE UP (ref 0–1)
BASOPHILS NFR BLD AUTO: 0.3 % — SIGNIFICANT CHANGE UP (ref 0–1)
BASOPHILS NFR BLD AUTO: 0.7 % — SIGNIFICANT CHANGE UP (ref 0–1)
BASOPHILS NFR BLD AUTO: 0.9 % — SIGNIFICANT CHANGE UP (ref 0–1)
BASOPHILS NFR BLD AUTO: 0.9 % — SIGNIFICANT CHANGE UP (ref 0–1)
BASOPHILS NFR BLD AUTO: 1.2 % — HIGH (ref 0–1)
BASOPHILS NFR BLD AUTO: 1.2 % — HIGH (ref 0–1)
BASOPHILS NFR BLD AUTO: 1.3 % — HIGH (ref 0–1)
BASOPHILS NFR BLD AUTO: 1.3 % — HIGH (ref 0–1)
BASOPHILS NFR BLD AUTO: 1.4 % — HIGH (ref 0–1)
BASOPHILS NFR BLD AUTO: 1.5 % — HIGH (ref 0–1)
BASOPHILS NFR BLD AUTO: 1.5 % — HIGH (ref 0–1)
BASOPHILS NFR BLD AUTO: 1.6 % — HIGH (ref 0–1)
BASOPHILS NFR BLD AUTO: 1.7 % — HIGH (ref 0–1)
BASOPHILS NFR BLD AUTO: 1.8 % — HIGH (ref 0–1)
BASOPHILS NFR BLD AUTO: 1.9 % — HIGH (ref 0–1)
BASOPHILS NFR BLD AUTO: 1.9 % — HIGH (ref 0–1)
BASOPHILS NFR BLD AUTO: 2 % — HIGH (ref 0–1)
BASOPHILS NFR BLD AUTO: 2.1 % — HIGH (ref 0–1)
BASOPHILS NFR BLD AUTO: 2.1 % — HIGH (ref 0–1)
BILIRUB DIRECT SERPL-MCNC: 0.2 MG/DL — SIGNIFICANT CHANGE UP (ref 0–0.3)
BILIRUB INDIRECT FLD-MCNC: 0.5 MG/DL — SIGNIFICANT CHANGE UP (ref 0.2–1.2)
BILIRUB SERPL-MCNC: 0.3 MG/DL — SIGNIFICANT CHANGE UP (ref 0.2–1.2)
BILIRUB SERPL-MCNC: 0.5 MG/DL — SIGNIFICANT CHANGE UP (ref 0.2–1.2)
BILIRUB SERPL-MCNC: 0.6 MG/DL — SIGNIFICANT CHANGE UP (ref 0.2–1.2)
BILIRUB SERPL-MCNC: 0.7 MG/DL — SIGNIFICANT CHANGE UP (ref 0.2–1.2)
BILIRUB SERPL-MCNC: 0.8 MG/DL — SIGNIFICANT CHANGE UP (ref 0.2–1.2)
BILIRUB SERPL-MCNC: 0.9 MG/DL — SIGNIFICANT CHANGE UP (ref 0.2–1.2)
BILIRUB SERPL-MCNC: 0.9 MG/DL — SIGNIFICANT CHANGE UP (ref 0.2–1.2)
BILIRUB SERPL-MCNC: 1 MG/DL — SIGNIFICANT CHANGE UP (ref 0.2–1.2)
BILIRUB SERPL-MCNC: 1.1 MG/DL — SIGNIFICANT CHANGE UP (ref 0.2–1.2)
BILIRUB SERPL-MCNC: 1.2 MG/DL — SIGNIFICANT CHANGE UP (ref 0.2–1.2)
BILIRUB SERPL-MCNC: 1.3 MG/DL — HIGH (ref 0.2–1.2)
BILIRUB SERPL-MCNC: 1.3 MG/DL — HIGH (ref 0.2–1.2)
BILIRUB SERPL-MCNC: 1.5 MG/DL — HIGH (ref 0.2–1.2)
BILIRUB SERPL-MCNC: 1.7 MG/DL — HIGH (ref 0.2–1.2)
BILIRUB UR-MCNC: NEGATIVE — SIGNIFICANT CHANGE UP
BLD GP AB SCN SERPL QL: SIGNIFICANT CHANGE UP
BLD GP AB SCN SERPL QL: SIGNIFICANT CHANGE UP
BUN SERPL-MCNC: 17 MG/DL — SIGNIFICANT CHANGE UP (ref 10–20)
BUN SERPL-MCNC: 19 MG/DL — SIGNIFICANT CHANGE UP (ref 10–20)
BUN SERPL-MCNC: 21 MG/DL — HIGH (ref 10–20)
BUN SERPL-MCNC: 22 MG/DL — HIGH (ref 10–20)
BUN SERPL-MCNC: 22 MG/DL — HIGH (ref 10–20)
BUN SERPL-MCNC: 23 MG/DL — HIGH (ref 10–20)
BUN SERPL-MCNC: 24 MG/DL — HIGH (ref 10–20)
BUN SERPL-MCNC: 25 MG/DL — HIGH (ref 10–20)
BUN SERPL-MCNC: 26 MG/DL — HIGH (ref 10–20)
BUN SERPL-MCNC: 27 MG/DL — HIGH (ref 10–20)
BUN SERPL-MCNC: 27 MG/DL — HIGH (ref 10–20)
BUN SERPL-MCNC: 29 MG/DL — HIGH (ref 10–20)
BUN SERPL-MCNC: 30 MG/DL — HIGH (ref 10–20)
BUN SERPL-MCNC: 31 MG/DL — HIGH (ref 10–20)
BUN SERPL-MCNC: 32 MG/DL — HIGH (ref 10–20)
BUN SERPL-MCNC: 33 MG/DL — HIGH (ref 10–20)
BUN SERPL-MCNC: 33 MG/DL — HIGH (ref 10–20)
BUN SERPL-MCNC: 34 MG/DL — HIGH (ref 10–20)
BUN SERPL-MCNC: 34 MG/DL — HIGH (ref 10–20)
BUN SERPL-MCNC: 35 MG/DL — HIGH (ref 10–20)
BUN SERPL-MCNC: 36 MG/DL — HIGH (ref 10–20)
BUN SERPL-MCNC: 37 MG/DL — HIGH (ref 10–20)
BUN SERPL-MCNC: 38 MG/DL — HIGH (ref 10–20)
BUN SERPL-MCNC: 41 MG/DL — HIGH (ref 10–20)
BUN SERPL-MCNC: 42 MG/DL — HIGH (ref 10–20)
BUN SERPL-MCNC: 43 MG/DL — HIGH (ref 10–20)
BUN SERPL-MCNC: 44 MG/DL — HIGH (ref 10–20)
BUN SERPL-MCNC: 45 MG/DL — HIGH (ref 10–20)
BUN SERPL-MCNC: 45 MG/DL — HIGH (ref 10–20)
BUN SERPL-MCNC: 46 MG/DL — HIGH (ref 10–20)
BUN SERPL-MCNC: 47 MG/DL — HIGH (ref 10–20)
BUN SERPL-MCNC: 48 MG/DL — HIGH (ref 10–20)
BUN SERPL-MCNC: 50 MG/DL — HIGH (ref 10–20)
BUN SERPL-MCNC: 51 MG/DL — HIGH (ref 10–20)
CA-I SERPL-SCNC: 1.09 MMOL/L — LOW (ref 1.15–1.33)
CA-I SERPL-SCNC: 1.11 MMOL/L — LOW (ref 1.15–1.33)
CA-I SERPL-SCNC: 1.13 MMOL/L — LOW (ref 1.15–1.33)
CA-I SERPL-SCNC: 1.13 MMOL/L — LOW (ref 1.15–1.33)
CA-I SERPL-SCNC: 1.15 MMOL/L — SIGNIFICANT CHANGE UP (ref 1.15–1.33)
CA-I SERPL-SCNC: 1.17 MMOL/L — SIGNIFICANT CHANGE UP (ref 1.15–1.33)
CALCIUM SERPL-MCNC: 8.1 MG/DL — LOW (ref 8.5–10.1)
CALCIUM SERPL-MCNC: 8.2 MG/DL — LOW (ref 8.5–10.1)
CALCIUM SERPL-MCNC: 8.2 MG/DL — LOW (ref 8.5–10.1)
CALCIUM SERPL-MCNC: 8.3 MG/DL — LOW (ref 8.5–10.1)
CALCIUM SERPL-MCNC: 8.4 MG/DL — LOW (ref 8.5–10.1)
CALCIUM SERPL-MCNC: 8.5 MG/DL — SIGNIFICANT CHANGE UP (ref 8.5–10.1)
CALCIUM SERPL-MCNC: 8.6 MG/DL — SIGNIFICANT CHANGE UP (ref 8.5–10.1)
CALCIUM SERPL-MCNC: 8.7 MG/DL — SIGNIFICANT CHANGE UP (ref 8.5–10.1)
CALCIUM SERPL-MCNC: 8.8 MG/DL — SIGNIFICANT CHANGE UP (ref 8.5–10.1)
CALCIUM SERPL-MCNC: 8.8 MG/DL — SIGNIFICANT CHANGE UP (ref 8.5–10.1)
CALCIUM SERPL-MCNC: 8.9 MG/DL — SIGNIFICANT CHANGE UP (ref 8.5–10.1)
CALCIUM SERPL-MCNC: 8.9 MG/DL — SIGNIFICANT CHANGE UP (ref 8.5–10.1)
CALCIUM SERPL-MCNC: 9 MG/DL — SIGNIFICANT CHANGE UP (ref 8.5–10.1)
CALCIUM SERPL-MCNC: 9.1 MG/DL — SIGNIFICANT CHANGE UP (ref 8.5–10.1)
CALCIUM SERPL-MCNC: 9.2 MG/DL — SIGNIFICANT CHANGE UP (ref 8.5–10.1)
CHLORIDE SERPL-SCNC: 100 MMOL/L — SIGNIFICANT CHANGE UP (ref 98–110)
CHLORIDE SERPL-SCNC: 101 MMOL/L — SIGNIFICANT CHANGE UP (ref 98–110)
CHLORIDE SERPL-SCNC: 102 MMOL/L — SIGNIFICANT CHANGE UP (ref 98–110)
CHLORIDE SERPL-SCNC: 88 MMOL/L — LOW (ref 98–110)
CHLORIDE SERPL-SCNC: 90 MMOL/L — LOW (ref 98–110)
CHLORIDE SERPL-SCNC: 90 MMOL/L — LOW (ref 98–110)
CHLORIDE SERPL-SCNC: 91 MMOL/L — LOW (ref 98–110)
CHLORIDE SERPL-SCNC: 91 MMOL/L — LOW (ref 98–110)
CHLORIDE SERPL-SCNC: 92 MMOL/L — LOW (ref 98–110)
CHLORIDE SERPL-SCNC: 93 MMOL/L — LOW (ref 98–110)
CHLORIDE SERPL-SCNC: 94 MMOL/L — LOW (ref 98–110)
CHLORIDE SERPL-SCNC: 95 MMOL/L — LOW (ref 98–110)
CHLORIDE SERPL-SCNC: 96 MMOL/L — LOW (ref 98–110)
CHLORIDE SERPL-SCNC: 97 MMOL/L — LOW (ref 98–110)
CHLORIDE SERPL-SCNC: 98 MMOL/L — SIGNIFICANT CHANGE UP (ref 98–110)
CHLORIDE SERPL-SCNC: 99 MMOL/L — SIGNIFICANT CHANGE UP (ref 98–110)
CHOLEST SERPL-MCNC: 127 MG/DL — SIGNIFICANT CHANGE UP
CK MB CFR SERPL CALC: 4.9 NG/ML — SIGNIFICANT CHANGE UP (ref 0.6–6.3)
CK SERPL-CCNC: 46 U/L — SIGNIFICANT CHANGE UP (ref 0–225)
CO2 SERPL-SCNC: 20 MMOL/L — SIGNIFICANT CHANGE UP (ref 17–32)
CO2 SERPL-SCNC: 21 MMOL/L — SIGNIFICANT CHANGE UP (ref 17–32)
CO2 SERPL-SCNC: 22 MMOL/L — SIGNIFICANT CHANGE UP (ref 17–32)
CO2 SERPL-SCNC: 23 MMOL/L — SIGNIFICANT CHANGE UP (ref 17–32)
CO2 SERPL-SCNC: 24 MMOL/L — SIGNIFICANT CHANGE UP (ref 17–32)
CO2 SERPL-SCNC: 25 MMOL/L — SIGNIFICANT CHANGE UP (ref 17–32)
CO2 SERPL-SCNC: 26 MMOL/L — SIGNIFICANT CHANGE UP (ref 17–32)
CO2 SERPL-SCNC: 27 MMOL/L — SIGNIFICANT CHANGE UP (ref 17–32)
CO2 SERPL-SCNC: 28 MMOL/L — SIGNIFICANT CHANGE UP (ref 17–32)
CO2 SERPL-SCNC: 29 MMOL/L — SIGNIFICANT CHANGE UP (ref 17–32)
CO2 SERPL-SCNC: 29 MMOL/L — SIGNIFICANT CHANGE UP (ref 17–32)
CO2 SERPL-SCNC: 30 MMOL/L — SIGNIFICANT CHANGE UP (ref 17–32)
CO2 SERPL-SCNC: 31 MMOL/L — SIGNIFICANT CHANGE UP (ref 17–32)
CO2 SERPL-SCNC: 32 MMOL/L — SIGNIFICANT CHANGE UP (ref 17–32)
CO2 SERPL-SCNC: 32 MMOL/L — SIGNIFICANT CHANGE UP (ref 17–32)
CO2 SERPL-SCNC: 33 MMOL/L — HIGH (ref 17–32)
CO2 SERPL-SCNC: 34 MMOL/L — HIGH (ref 17–32)
CO2 SERPL-SCNC: 34 MMOL/L — HIGH (ref 17–32)
CO2 SERPL-SCNC: 35 MMOL/L — HIGH (ref 17–32)
CO2 SERPL-SCNC: 36 MMOL/L — HIGH (ref 17–32)
CO2 SERPL-SCNC: 37 MMOL/L — HIGH (ref 17–32)
CO2 SERPL-SCNC: 40 MMOL/L — HIGH (ref 17–32)
COLOR SPEC: SIGNIFICANT CHANGE UP
COLOR SPEC: YELLOW — SIGNIFICANT CHANGE UP
COLOR SPEC: YELLOW — SIGNIFICANT CHANGE UP
COMMENT - URINE: SIGNIFICANT CHANGE UP
CORTIS AM PEAK SERPL-MCNC: 6.5 UG/DL — SIGNIFICANT CHANGE UP (ref 6–18.4)
CREAT SERPL-MCNC: 0.9 MG/DL — SIGNIFICANT CHANGE UP (ref 0.7–1.5)
CREAT SERPL-MCNC: 0.9 MG/DL — SIGNIFICANT CHANGE UP (ref 0.7–1.5)
CREAT SERPL-MCNC: 1 MG/DL — SIGNIFICANT CHANGE UP (ref 0.7–1.5)
CREAT SERPL-MCNC: 1.1 MG/DL — SIGNIFICANT CHANGE UP (ref 0.7–1.5)
CREAT SERPL-MCNC: 1.2 MG/DL — SIGNIFICANT CHANGE UP (ref 0.7–1.5)
CREAT SERPL-MCNC: 1.3 MG/DL — SIGNIFICANT CHANGE UP (ref 0.7–1.5)
CREAT SERPL-MCNC: 1.4 MG/DL — SIGNIFICANT CHANGE UP (ref 0.7–1.5)
CREAT SERPL-MCNC: 1.5 MG/DL — SIGNIFICANT CHANGE UP (ref 0.7–1.5)
CREAT SERPL-MCNC: 1.6 MG/DL — HIGH (ref 0.7–1.5)
CRP SERPL-MCNC: 50 MG/L — HIGH
CRP SERPL-MCNC: 53 MG/L — HIGH
CULTURE RESULTS: SIGNIFICANT CHANGE UP
D DIMER BLD IA.RAPID-MCNC: 191 NG/ML DDU — SIGNIFICANT CHANGE UP (ref 0–230)
D DIMER BLD IA.RAPID-MCNC: 278 NG/ML DDU — HIGH (ref 0–230)
D DIMER BLD IA.RAPID-MCNC: 293 NG/ML DDU — HIGH (ref 0–230)
D DIMER BLD IA.RAPID-MCNC: 735 NG/ML DDU — HIGH (ref 0–230)
DIFF PNL FLD: ABNORMAL
EGFR: 41 ML/MIN/1.73M2 — LOW
EGFR: 44 ML/MIN/1.73M2 — LOW
EGFR: 48 ML/MIN/1.73M2 — LOW
EGFR: 53 ML/MIN/1.73M2 — LOW
EGFR: 58 ML/MIN/1.73M2 — LOW
EGFR: 64 ML/MIN/1.73M2 — SIGNIFICANT CHANGE UP
EGFR: 72 ML/MIN/1.73M2 — SIGNIFICANT CHANGE UP
EGFR: 82 ML/MIN/1.73M2 — SIGNIFICANT CHANGE UP
EOSINOPHIL # BLD AUTO: 0 K/UL — SIGNIFICANT CHANGE UP (ref 0–0.7)
EOSINOPHIL # BLD AUTO: 0.01 K/UL — SIGNIFICANT CHANGE UP (ref 0–0.7)
EOSINOPHIL # BLD AUTO: 0.01 K/UL — SIGNIFICANT CHANGE UP (ref 0–0.7)
EOSINOPHIL # BLD AUTO: 0.02 K/UL — SIGNIFICANT CHANGE UP (ref 0–0.7)
EOSINOPHIL # BLD AUTO: 0.02 K/UL — SIGNIFICANT CHANGE UP (ref 0–0.7)
EOSINOPHIL # BLD AUTO: 0.04 K/UL — SIGNIFICANT CHANGE UP (ref 0–0.7)
EOSINOPHIL # BLD AUTO: 0.07 K/UL — SIGNIFICANT CHANGE UP (ref 0–0.7)
EOSINOPHIL # BLD AUTO: 0.09 K/UL — SIGNIFICANT CHANGE UP (ref 0–0.7)
EOSINOPHIL # BLD AUTO: 0.1 K/UL — SIGNIFICANT CHANGE UP (ref 0–0.7)
EOSINOPHIL # BLD AUTO: 0.17 K/UL — SIGNIFICANT CHANGE UP (ref 0–0.7)
EOSINOPHIL # BLD AUTO: 0.19 K/UL — SIGNIFICANT CHANGE UP (ref 0–0.7)
EOSINOPHIL # BLD AUTO: 0.21 K/UL — SIGNIFICANT CHANGE UP (ref 0–0.7)
EOSINOPHIL # BLD AUTO: 0.23 K/UL — SIGNIFICANT CHANGE UP (ref 0–0.7)
EOSINOPHIL # BLD AUTO: 0.26 K/UL — SIGNIFICANT CHANGE UP (ref 0–0.7)
EOSINOPHIL # BLD AUTO: 0.26 K/UL — SIGNIFICANT CHANGE UP (ref 0–0.7)
EOSINOPHIL # BLD AUTO: 0.27 K/UL — SIGNIFICANT CHANGE UP (ref 0–0.7)
EOSINOPHIL # BLD AUTO: 0.27 K/UL — SIGNIFICANT CHANGE UP (ref 0–0.7)
EOSINOPHIL # BLD AUTO: 0.28 K/UL — SIGNIFICANT CHANGE UP (ref 0–0.7)
EOSINOPHIL # BLD AUTO: 0.31 K/UL — SIGNIFICANT CHANGE UP (ref 0–0.7)
EOSINOPHIL # BLD AUTO: 0.35 K/UL — SIGNIFICANT CHANGE UP (ref 0–0.7)
EOSINOPHIL # BLD AUTO: 0.36 K/UL — SIGNIFICANT CHANGE UP (ref 0–0.7)
EOSINOPHIL # BLD AUTO: 0.4 K/UL — SIGNIFICANT CHANGE UP (ref 0–0.7)
EOSINOPHIL # BLD AUTO: 0.4 K/UL — SIGNIFICANT CHANGE UP (ref 0–0.7)
EOSINOPHIL # BLD AUTO: 0.41 K/UL — SIGNIFICANT CHANGE UP (ref 0–0.7)
EOSINOPHIL # BLD AUTO: 0.44 K/UL — SIGNIFICANT CHANGE UP (ref 0–0.7)
EOSINOPHIL # BLD AUTO: 0.44 K/UL — SIGNIFICANT CHANGE UP (ref 0–0.7)
EOSINOPHIL # BLD AUTO: 0.46 K/UL — SIGNIFICANT CHANGE UP (ref 0–0.7)
EOSINOPHIL # BLD AUTO: 0.53 K/UL — SIGNIFICANT CHANGE UP (ref 0–0.7)
EOSINOPHIL # BLD AUTO: 0.56 K/UL — SIGNIFICANT CHANGE UP (ref 0–0.7)
EOSINOPHIL # BLD AUTO: 0.56 K/UL — SIGNIFICANT CHANGE UP (ref 0–0.7)
EOSINOPHIL # BLD AUTO: 0.57 K/UL — SIGNIFICANT CHANGE UP (ref 0–0.7)
EOSINOPHIL # BLD AUTO: 0.57 K/UL — SIGNIFICANT CHANGE UP (ref 0–0.7)
EOSINOPHIL # BLD AUTO: 0.59 K/UL — SIGNIFICANT CHANGE UP (ref 0–0.7)
EOSINOPHIL # BLD AUTO: 0.6 K/UL — SIGNIFICANT CHANGE UP (ref 0–0.7)
EOSINOPHIL # BLD AUTO: 0.67 K/UL — SIGNIFICANT CHANGE UP (ref 0–0.7)
EOSINOPHIL NFR BLD AUTO: 0 % — SIGNIFICANT CHANGE UP (ref 0–8)
EOSINOPHIL NFR BLD AUTO: 0.1 % — SIGNIFICANT CHANGE UP (ref 0–8)
EOSINOPHIL NFR BLD AUTO: 0.2 % — SIGNIFICANT CHANGE UP (ref 0–8)
EOSINOPHIL NFR BLD AUTO: 0.6 % — SIGNIFICANT CHANGE UP (ref 0–8)
EOSINOPHIL NFR BLD AUTO: 0.6 % — SIGNIFICANT CHANGE UP (ref 0–8)
EOSINOPHIL NFR BLD AUTO: 0.8 % — SIGNIFICANT CHANGE UP (ref 0–8)
EOSINOPHIL NFR BLD AUTO: 1.7 % — SIGNIFICANT CHANGE UP (ref 0–8)
EOSINOPHIL NFR BLD AUTO: 1.7 % — SIGNIFICANT CHANGE UP (ref 0–8)
EOSINOPHIL NFR BLD AUTO: 1.8 % — SIGNIFICANT CHANGE UP (ref 0–8)
EOSINOPHIL NFR BLD AUTO: 1.9 % — SIGNIFICANT CHANGE UP (ref 0–8)
EOSINOPHIL NFR BLD AUTO: 1.9 % — SIGNIFICANT CHANGE UP (ref 0–8)
EOSINOPHIL NFR BLD AUTO: 2.1 % — SIGNIFICANT CHANGE UP (ref 0–8)
EOSINOPHIL NFR BLD AUTO: 2.2 % — SIGNIFICANT CHANGE UP (ref 0–8)
EOSINOPHIL NFR BLD AUTO: 2.2 % — SIGNIFICANT CHANGE UP (ref 0–8)
EOSINOPHIL NFR BLD AUTO: 2.5 % — SIGNIFICANT CHANGE UP (ref 0–8)
EOSINOPHIL NFR BLD AUTO: 2.9 % — SIGNIFICANT CHANGE UP (ref 0–8)
EOSINOPHIL NFR BLD AUTO: 3.2 % — SIGNIFICANT CHANGE UP (ref 0–8)
EOSINOPHIL NFR BLD AUTO: 3.6 % — SIGNIFICANT CHANGE UP (ref 0–8)
EOSINOPHIL NFR BLD AUTO: 4.1 % — SIGNIFICANT CHANGE UP (ref 0–8)
EOSINOPHIL NFR BLD AUTO: 5.3 % — SIGNIFICANT CHANGE UP (ref 0–8)
EOSINOPHIL NFR BLD AUTO: 6.6 % — SIGNIFICANT CHANGE UP (ref 0–8)
EOSINOPHIL NFR BLD AUTO: 6.8 % — SIGNIFICANT CHANGE UP (ref 0–8)
EOSINOPHIL NFR BLD AUTO: 7 % — SIGNIFICANT CHANGE UP (ref 0–8)
EOSINOPHIL NFR BLD AUTO: 7 % — SIGNIFICANT CHANGE UP (ref 0–8)
EOSINOPHIL NFR BLD AUTO: 7.4 % — SIGNIFICANT CHANGE UP (ref 0–8)
EOSINOPHIL NFR BLD AUTO: 7.6 % — SIGNIFICANT CHANGE UP (ref 0–8)
EOSINOPHIL NFR BLD AUTO: 8.1 % — HIGH (ref 0–8)
EOSINOPHIL NFR BLD AUTO: 8.2 % — HIGH (ref 0–8)
EOSINOPHIL NFR BLD AUTO: 8.7 % — HIGH (ref 0–8)
EOSINOPHIL NFR BLD AUTO: 9.1 % — HIGH (ref 0–8)
EOSINOPHIL NFR BLD AUTO: 9.3 % — HIGH (ref 0–8)
EOSINOPHIL NFR BLD AUTO: 9.5 % — HIGH (ref 0–8)
EPI CELLS # UR: 0 /HPF — SIGNIFICANT CHANGE UP (ref 0–5)
EPI CELLS # UR: 1 /HPF — SIGNIFICANT CHANGE UP (ref 0–5)
EPI CELLS # UR: 9 /HPF — HIGH (ref 0–5)
ESTIMATED AVERAGE GLUCOSE: 105 MG/DL — SIGNIFICANT CHANGE UP (ref 68–114)
FERRITIN SERPL-MCNC: 228 NG/ML — SIGNIFICANT CHANGE UP (ref 30–400)
FERRITIN SERPL-MCNC: 354 NG/ML — SIGNIFICANT CHANGE UP (ref 30–400)
FLUAV AG NPH QL: SIGNIFICANT CHANGE UP
FLUAV AG NPH QL: SIGNIFICANT CHANGE UP
FLUBV AG NPH QL: SIGNIFICANT CHANGE UP
FLUBV AG NPH QL: SIGNIFICANT CHANGE UP
FOLATE SERPL-MCNC: 5.9 NG/ML — SIGNIFICANT CHANGE UP
FUNGITELL: <31 PG/ML — SIGNIFICANT CHANGE UP
GALACTOMANNAN AG SERPL-ACNC: 0.04 INDEX — SIGNIFICANT CHANGE UP (ref 0–0.49)
GAS PNL BLDA: SIGNIFICANT CHANGE UP
GAS PNL BLDA: SIGNIFICANT CHANGE UP
GAS PNL BLDV: 125 MMOL/L — LOW (ref 136–145)
GAS PNL BLDV: 128 MMOL/L — LOW (ref 136–145)
GAS PNL BLDV: 130 MMOL/L — LOW (ref 136–145)
GAS PNL BLDV: 130 MMOL/L — LOW (ref 136–145)
GAS PNL BLDV: 132 MMOL/L — LOW (ref 136–145)
GAS PNL BLDV: 139 MMOL/L — SIGNIFICANT CHANGE UP (ref 136–145)
GAS PNL BLDV: SIGNIFICANT CHANGE UP
GIANT PLATELETS BLD QL SMEAR: PRESENT — SIGNIFICANT CHANGE UP
GLUCOSE BLDC GLUCOMTR-MCNC: 137 MG/DL — HIGH (ref 70–99)
GLUCOSE BLDC GLUCOMTR-MCNC: 186 MG/DL — HIGH (ref 70–99)
GLUCOSE BLDC GLUCOMTR-MCNC: 273 MG/DL — HIGH (ref 70–99)
GLUCOSE SERPL-MCNC: 100 MG/DL — HIGH (ref 70–99)
GLUCOSE SERPL-MCNC: 100 MG/DL — HIGH (ref 70–99)
GLUCOSE SERPL-MCNC: 103 MG/DL — HIGH (ref 70–99)
GLUCOSE SERPL-MCNC: 104 MG/DL — HIGH (ref 70–99)
GLUCOSE SERPL-MCNC: 109 MG/DL — HIGH (ref 70–99)
GLUCOSE SERPL-MCNC: 110 MG/DL — HIGH (ref 70–99)
GLUCOSE SERPL-MCNC: 112 MG/DL — HIGH (ref 70–99)
GLUCOSE SERPL-MCNC: 113 MG/DL — HIGH (ref 70–99)
GLUCOSE SERPL-MCNC: 117 MG/DL — HIGH (ref 70–99)
GLUCOSE SERPL-MCNC: 117 MG/DL — HIGH (ref 70–99)
GLUCOSE SERPL-MCNC: 119 MG/DL — HIGH (ref 70–99)
GLUCOSE SERPL-MCNC: 125 MG/DL — HIGH (ref 70–99)
GLUCOSE SERPL-MCNC: 127 MG/DL — HIGH (ref 70–99)
GLUCOSE SERPL-MCNC: 128 MG/DL — HIGH (ref 70–99)
GLUCOSE SERPL-MCNC: 130 MG/DL — HIGH (ref 70–99)
GLUCOSE SERPL-MCNC: 130 MG/DL — HIGH (ref 70–99)
GLUCOSE SERPL-MCNC: 133 MG/DL — HIGH (ref 70–99)
GLUCOSE SERPL-MCNC: 136 MG/DL — HIGH (ref 70–99)
GLUCOSE SERPL-MCNC: 138 MG/DL — HIGH (ref 70–99)
GLUCOSE SERPL-MCNC: 141 MG/DL — HIGH (ref 70–99)
GLUCOSE SERPL-MCNC: 141 MG/DL — HIGH (ref 70–99)
GLUCOSE SERPL-MCNC: 145 MG/DL — HIGH (ref 70–99)
GLUCOSE SERPL-MCNC: 148 MG/DL — HIGH (ref 70–99)
GLUCOSE SERPL-MCNC: 158 MG/DL — HIGH (ref 70–99)
GLUCOSE SERPL-MCNC: 158 MG/DL — HIGH (ref 70–99)
GLUCOSE SERPL-MCNC: 162 MG/DL — HIGH (ref 70–99)
GLUCOSE SERPL-MCNC: 162 MG/DL — HIGH (ref 70–99)
GLUCOSE SERPL-MCNC: 164 MG/DL — HIGH (ref 70–99)
GLUCOSE SERPL-MCNC: 165 MG/DL — HIGH (ref 70–99)
GLUCOSE SERPL-MCNC: 166 MG/DL — HIGH (ref 70–99)
GLUCOSE SERPL-MCNC: 169 MG/DL — HIGH (ref 70–99)
GLUCOSE SERPL-MCNC: 184 MG/DL — HIGH (ref 70–99)
GLUCOSE SERPL-MCNC: 68 MG/DL — LOW (ref 70–99)
GLUCOSE SERPL-MCNC: 70 MG/DL — SIGNIFICANT CHANGE UP (ref 70–99)
GLUCOSE SERPL-MCNC: 73 MG/DL — SIGNIFICANT CHANGE UP (ref 70–99)
GLUCOSE SERPL-MCNC: 75 MG/DL — SIGNIFICANT CHANGE UP (ref 70–99)
GLUCOSE SERPL-MCNC: 76 MG/DL — SIGNIFICANT CHANGE UP (ref 70–99)
GLUCOSE SERPL-MCNC: 78 MG/DL — SIGNIFICANT CHANGE UP (ref 70–99)
GLUCOSE SERPL-MCNC: 83 MG/DL — SIGNIFICANT CHANGE UP (ref 70–99)
GLUCOSE SERPL-MCNC: 85 MG/DL — SIGNIFICANT CHANGE UP (ref 70–99)
GLUCOSE SERPL-MCNC: 85 MG/DL — SIGNIFICANT CHANGE UP (ref 70–99)
GLUCOSE SERPL-MCNC: 86 MG/DL — SIGNIFICANT CHANGE UP (ref 70–99)
GLUCOSE SERPL-MCNC: 87 MG/DL — SIGNIFICANT CHANGE UP (ref 70–99)
GLUCOSE SERPL-MCNC: 88 MG/DL — SIGNIFICANT CHANGE UP (ref 70–99)
GLUCOSE SERPL-MCNC: 88 MG/DL — SIGNIFICANT CHANGE UP (ref 70–99)
GLUCOSE SERPL-MCNC: 89 MG/DL — SIGNIFICANT CHANGE UP (ref 70–99)
GLUCOSE SERPL-MCNC: 90 MG/DL — SIGNIFICANT CHANGE UP (ref 70–99)
GLUCOSE SERPL-MCNC: 91 MG/DL — SIGNIFICANT CHANGE UP (ref 70–99)
GLUCOSE SERPL-MCNC: 92 MG/DL — SIGNIFICANT CHANGE UP (ref 70–99)
GLUCOSE SERPL-MCNC: 93 MG/DL — SIGNIFICANT CHANGE UP (ref 70–99)
GLUCOSE SERPL-MCNC: 96 MG/DL — SIGNIFICANT CHANGE UP (ref 70–99)
GLUCOSE UR QL: NEGATIVE — SIGNIFICANT CHANGE UP
GLUCOSE UR QL: NEGATIVE — SIGNIFICANT CHANGE UP
GLUCOSE UR QL: SIGNIFICANT CHANGE UP
HAPTOGLOB SERPL-MCNC: 221 MG/DL — HIGH (ref 34–200)
HCO3 BLDA-SCNC: 24 MMOL/L — SIGNIFICANT CHANGE UP (ref 21–28)
HCO3 BLDA-SCNC: 27 MMOL/L — SIGNIFICANT CHANGE UP (ref 21–28)
HCO3 BLDV-SCNC: 27 MMOL/L — SIGNIFICANT CHANGE UP (ref 22–29)
HCO3 BLDV-SCNC: 28 MMOL/L — SIGNIFICANT CHANGE UP (ref 22–29)
HCO3 BLDV-SCNC: 28 MMOL/L — SIGNIFICANT CHANGE UP (ref 22–29)
HCO3 BLDV-SCNC: 30 MMOL/L — HIGH (ref 22–29)
HCO3 BLDV-SCNC: 32 MMOL/L — HIGH (ref 22–29)
HCO3 BLDV-SCNC: 34 MMOL/L — HIGH (ref 22–29)
HCT VFR BLD CALC: 22.6 % — LOW (ref 42–52)
HCT VFR BLD CALC: 29.2 % — LOW (ref 42–52)
HCT VFR BLD CALC: 29.7 % — LOW (ref 42–52)
HCT VFR BLD CALC: 30 % — LOW (ref 42–52)
HCT VFR BLD CALC: 30.1 % — LOW (ref 42–52)
HCT VFR BLD CALC: 30.2 % — LOW (ref 42–52)
HCT VFR BLD CALC: 30.9 % — LOW (ref 42–52)
HCT VFR BLD CALC: 31.1 % — LOW (ref 42–52)
HCT VFR BLD CALC: 31.2 % — LOW (ref 42–52)
HCT VFR BLD CALC: 32.2 % — LOW (ref 42–52)
HCT VFR BLD CALC: 32.4 % — LOW (ref 42–52)
HCT VFR BLD CALC: 32.9 % — LOW (ref 42–52)
HCT VFR BLD CALC: 32.9 % — LOW (ref 42–52)
HCT VFR BLD CALC: 33.1 % — LOW (ref 42–52)
HCT VFR BLD CALC: 33.3 % — LOW (ref 42–52)
HCT VFR BLD CALC: 33.4 % — LOW (ref 42–52)
HCT VFR BLD CALC: 33.5 % — LOW (ref 42–52)
HCT VFR BLD CALC: 33.6 % — LOW (ref 42–52)
HCT VFR BLD CALC: 33.8 % — LOW (ref 42–52)
HCT VFR BLD CALC: 34 % — LOW (ref 42–52)
HCT VFR BLD CALC: 34.6 % — LOW (ref 42–52)
HCT VFR BLD CALC: 34.6 % — LOW (ref 42–52)
HCT VFR BLD CALC: 34.8 % — LOW (ref 42–52)
HCT VFR BLD CALC: 34.9 % — LOW (ref 42–52)
HCT VFR BLD CALC: 34.9 % — LOW (ref 42–52)
HCT VFR BLD CALC: 35 % — LOW (ref 42–52)
HCT VFR BLD CALC: 35 % — LOW (ref 42–52)
HCT VFR BLD CALC: 35.2 % — LOW (ref 42–52)
HCT VFR BLD CALC: 35.3 % — LOW (ref 42–52)
HCT VFR BLD CALC: 35.3 % — LOW (ref 42–52)
HCT VFR BLD CALC: 35.4 % — LOW (ref 42–52)
HCT VFR BLD CALC: 35.4 % — LOW (ref 42–52)
HCT VFR BLD CALC: 35.6 % — LOW (ref 42–52)
HCT VFR BLD CALC: 35.8 % — LOW (ref 42–52)
HCT VFR BLD CALC: 35.9 % — LOW (ref 42–52)
HCT VFR BLD CALC: 36.3 % — LOW (ref 42–52)
HCT VFR BLD CALC: 36.4 % — LOW (ref 42–52)
HCT VFR BLD CALC: 36.4 % — LOW (ref 42–52)
HCT VFR BLD CALC: 36.5 % — LOW (ref 42–52)
HCT VFR BLD CALC: 36.5 % — LOW (ref 42–52)
HCT VFR BLD CALC: 36.6 % — LOW (ref 42–52)
HCT VFR BLD CALC: 36.7 % — LOW (ref 42–52)
HCT VFR BLD CALC: 36.9 % — LOW (ref 42–52)
HCT VFR BLD CALC: 37.4 % — LOW (ref 42–52)
HCT VFR BLD CALC: 37.5 % — LOW (ref 42–52)
HCT VFR BLD CALC: 37.6 % — LOW (ref 42–52)
HCT VFR BLD CALC: 37.7 % — LOW (ref 42–52)
HCT VFR BLD CALC: 37.8 % — LOW (ref 42–52)
HCT VFR BLD CALC: 37.9 % — LOW (ref 42–52)
HCT VFR BLD CALC: 38.8 % — LOW (ref 42–52)
HCT VFR BLD CALC: 39.1 % — LOW (ref 42–52)
HCT VFR BLD CALC: 39.2 % — LOW (ref 42–52)
HCT VFR BLD CALC: 40.3 % — LOW (ref 42–52)
HCT VFR BLD CALC: 41.6 % — LOW (ref 42–52)
HCT VFR BLDA CALC: 29 % — LOW (ref 39–51)
HCT VFR BLDA CALC: 31 % — LOW (ref 39–51)
HCT VFR BLDA CALC: 36 % — LOW (ref 39–51)
HCT VFR BLDA CALC: 38 % — LOW (ref 39–51)
HCT VFR BLDA CALC: 49 % — SIGNIFICANT CHANGE UP (ref 39–51)
HDLC SERPL-MCNC: 45 MG/DL — SIGNIFICANT CHANGE UP
HEPARIN-PF4 AB RESULT: <0.6 U/ML — SIGNIFICANT CHANGE UP (ref 0–0.9)
HGB BLD CALC-MCNC: 10.3 G/DL — LOW (ref 12.6–17.4)
HGB BLD CALC-MCNC: 12.1 G/DL — LOW (ref 12.6–17.4)
HGB BLD CALC-MCNC: 12.8 G/DL — SIGNIFICANT CHANGE UP (ref 12.6–17.4)
HGB BLD CALC-MCNC: 16.3 G/DL — SIGNIFICANT CHANGE UP (ref 12.6–17.4)
HGB BLD CALC-MCNC: 9.6 G/DL — LOW (ref 12.6–17.4)
HGB BLD-MCNC: 10.2 G/DL — LOW (ref 14–18)
HGB BLD-MCNC: 10.2 G/DL — LOW (ref 14–18)
HGB BLD-MCNC: 10.3 G/DL — LOW (ref 14–18)
HGB BLD-MCNC: 10.4 G/DL — LOW (ref 14–18)
HGB BLD-MCNC: 10.5 G/DL — LOW (ref 14–18)
HGB BLD-MCNC: 10.7 G/DL — LOW (ref 14–18)
HGB BLD-MCNC: 10.7 G/DL — LOW (ref 14–18)
HGB BLD-MCNC: 10.8 G/DL — LOW (ref 14–18)
HGB BLD-MCNC: 10.8 G/DL — LOW (ref 14–18)
HGB BLD-MCNC: 10.9 G/DL — LOW (ref 14–18)
HGB BLD-MCNC: 10.9 G/DL — LOW (ref 14–18)
HGB BLD-MCNC: 11 G/DL — LOW (ref 14–18)
HGB BLD-MCNC: 11.1 G/DL — LOW (ref 14–18)
HGB BLD-MCNC: 11.2 G/DL — LOW (ref 14–18)
HGB BLD-MCNC: 11.3 G/DL — LOW (ref 14–18)
HGB BLD-MCNC: 11.3 G/DL — LOW (ref 14–18)
HGB BLD-MCNC: 11.4 G/DL — LOW (ref 14–18)
HGB BLD-MCNC: 11.4 G/DL — LOW (ref 14–18)
HGB BLD-MCNC: 11.5 G/DL — LOW (ref 14–18)
HGB BLD-MCNC: 11.6 G/DL — LOW (ref 14–18)
HGB BLD-MCNC: 11.7 G/DL — LOW (ref 14–18)
HGB BLD-MCNC: 11.7 G/DL — LOW (ref 14–18)
HGB BLD-MCNC: 11.8 G/DL — LOW (ref 14–18)
HGB BLD-MCNC: 11.8 G/DL — LOW (ref 14–18)
HGB BLD-MCNC: 11.9 G/DL — LOW (ref 14–18)
HGB BLD-MCNC: 12.1 G/DL — LOW (ref 14–18)
HGB BLD-MCNC: 12.2 G/DL — LOW (ref 14–18)
HGB BLD-MCNC: 12.5 G/DL — LOW (ref 14–18)
HGB BLD-MCNC: 12.7 G/DL — LOW (ref 14–18)
HGB BLD-MCNC: 12.8 G/DL — LOW (ref 14–18)
HGB BLD-MCNC: 12.9 G/DL — LOW (ref 14–18)
HGB BLD-MCNC: 13.1 G/DL — LOW (ref 14–18)
HGB BLD-MCNC: 6.8 G/DL — CRITICAL LOW (ref 14–18)
HGB BLD-MCNC: 9.4 G/DL — LOW (ref 14–18)
HGB BLD-MCNC: 9.4 G/DL — LOW (ref 14–18)
HGB BLD-MCNC: 9.6 G/DL — LOW (ref 14–18)
HGB BLD-MCNC: 9.7 G/DL — LOW (ref 14–18)
HGB BLD-MCNC: 9.8 G/DL — LOW (ref 14–18)
HGB BLD-MCNC: 9.8 G/DL — LOW (ref 14–18)
HGB BLD-MCNC: 9.9 G/DL — LOW (ref 14–18)
HOROWITZ INDEX BLDA+IHG-RTO: 100 — SIGNIFICANT CHANGE UP
HYALINE CASTS # UR AUTO: 0 /LPF — SIGNIFICANT CHANGE UP (ref 0–7)
HYALINE CASTS # UR AUTO: 11 /LPF — HIGH (ref 0–7)
HYALINE CASTS # UR AUTO: 52 /LPF — HIGH (ref 0–7)
HYPOCHROMIA BLD QL: SLIGHT — SIGNIFICANT CHANGE UP
IMM GRANULOCYTES NFR BLD AUTO: 0.3 % — SIGNIFICANT CHANGE UP (ref 0.1–0.3)
IMM GRANULOCYTES NFR BLD AUTO: 0.5 % — HIGH (ref 0.1–0.3)
IMM GRANULOCYTES NFR BLD AUTO: 0.5 % — HIGH (ref 0.1–0.3)
IMM GRANULOCYTES NFR BLD AUTO: 0.6 % — HIGH (ref 0.1–0.3)
IMM GRANULOCYTES NFR BLD AUTO: 0.6 % — HIGH (ref 0.1–0.3)
IMM GRANULOCYTES NFR BLD AUTO: 0.7 % — HIGH (ref 0.1–0.3)
IMM GRANULOCYTES NFR BLD AUTO: 0.8 % — HIGH (ref 0.1–0.3)
IMM GRANULOCYTES NFR BLD AUTO: 0.9 % — HIGH (ref 0.1–0.3)
IMM GRANULOCYTES NFR BLD AUTO: 1 % — HIGH (ref 0.1–0.3)
IMM GRANULOCYTES NFR BLD AUTO: 1.1 % — HIGH (ref 0.1–0.3)
IMM GRANULOCYTES NFR BLD AUTO: 1.2 % — HIGH (ref 0.1–0.3)
IMM GRANULOCYTES NFR BLD AUTO: 1.3 % — HIGH (ref 0.1–0.3)
IMM GRANULOCYTES NFR BLD AUTO: 1.3 % — HIGH (ref 0.1–0.3)
IMM GRANULOCYTES NFR BLD AUTO: 1.4 % — HIGH (ref 0.1–0.3)
IMM GRANULOCYTES NFR BLD AUTO: 1.5 % — HIGH (ref 0.1–0.3)
IMM GRANULOCYTES NFR BLD AUTO: 1.6 % — HIGH (ref 0.1–0.3)
IMM GRANULOCYTES NFR BLD AUTO: 1.7 % — HIGH (ref 0.1–0.3)
IMM GRANULOCYTES NFR BLD AUTO: 1.7 % — HIGH (ref 0.1–0.3)
IMM GRANULOCYTES NFR BLD AUTO: 1.8 % — HIGH (ref 0.1–0.3)
IMM GRANULOCYTES NFR BLD AUTO: 2 % — HIGH (ref 0.1–0.3)
IMM GRANULOCYTES NFR BLD AUTO: 2.1 % — HIGH (ref 0.1–0.3)
IMM GRANULOCYTES NFR BLD AUTO: 2.2 % — HIGH (ref 0.1–0.3)
IMM GRANULOCYTES NFR BLD AUTO: 2.2 % — HIGH (ref 0.1–0.3)
INR BLD: 1.39 RATIO — HIGH (ref 0.65–1.3)
INR BLD: 1.56 RATIO — HIGH (ref 0.65–1.3)
INR BLD: 1.69 RATIO — HIGH (ref 0.65–1.3)
INR BLD: 1.73 RATIO — HIGH (ref 0.65–1.3)
INR BLD: 1.77 RATIO — HIGH (ref 0.65–1.3)
INR BLD: 1.79 RATIO — HIGH (ref 0.65–1.3)
INR BLD: 2.12 RATIO — HIGH (ref 0.65–1.3)
INR BLD: 2.16 RATIO — HIGH (ref 0.65–1.3)
INR BLD: 2.18 RATIO — HIGH (ref 0.65–1.3)
INR BLD: 3.07 RATIO — HIGH (ref 0.65–1.3)
INR BLD: 3.15 RATIO — HIGH (ref 0.65–1.3)
INR BLD: 3.28 RATIO — HIGH (ref 0.65–1.3)
INR BLD: 3.29 RATIO — HIGH (ref 0.65–1.3)
INR BLD: 3.3 RATIO — HIGH (ref 0.65–1.3)
INR BLD: 3.9 RATIO — HIGH (ref 0.65–1.3)
INR BLD: 4 RATIO — HIGH (ref 0.65–1.3)
INR BLD: 4.36 RATIO — HIGH (ref 0.65–1.3)
KETONES UR-MCNC: NEGATIVE — SIGNIFICANT CHANGE UP
LACTATE BLDV-MCNC: 1.2 MMOL/L — SIGNIFICANT CHANGE UP (ref 0.5–2)
LACTATE BLDV-MCNC: 1.7 MMOL/L — SIGNIFICANT CHANGE UP (ref 0.5–2)
LACTATE BLDV-MCNC: 1.9 MMOL/L — SIGNIFICANT CHANGE UP (ref 0.5–2)
LACTATE BLDV-MCNC: 2.3 MMOL/L — HIGH (ref 0.5–2)
LACTATE BLDV-MCNC: 3.6 MMOL/L — HIGH (ref 0.5–2)
LACTATE BLDV-MCNC: 4.6 MMOL/L — CRITICAL HIGH (ref 0.5–2)
LACTATE SERPL-SCNC: 1.5 MMOL/L — SIGNIFICANT CHANGE UP (ref 0.7–2)
LACTATE SERPL-SCNC: 1.9 MMOL/L — SIGNIFICANT CHANGE UP (ref 0.7–2)
LACTATE SERPL-SCNC: 2.4 MMOL/L — HIGH (ref 0.7–2)
LDH SERPL L TO P-CCNC: 226 U/L — SIGNIFICANT CHANGE UP (ref 50–242)
LEUKOCYTE ESTERASE UR-ACNC: ABNORMAL
LEUKOCYTE ESTERASE UR-ACNC: ABNORMAL
LEUKOCYTE ESTERASE UR-ACNC: NEGATIVE — SIGNIFICANT CHANGE UP
LIPID PNL WITH DIRECT LDL SERPL: 69 MG/DL — SIGNIFICANT CHANGE UP
LYMPHOCYTES # BLD AUTO: 0.31 K/UL — LOW (ref 1.2–3.4)
LYMPHOCYTES # BLD AUTO: 0.64 K/UL — LOW (ref 1.2–3.4)
LYMPHOCYTES # BLD AUTO: 0.77 K/UL — LOW (ref 1.2–3.4)
LYMPHOCYTES # BLD AUTO: 0.77 K/UL — LOW (ref 1.2–3.4)
LYMPHOCYTES # BLD AUTO: 0.78 K/UL — LOW (ref 1.2–3.4)
LYMPHOCYTES # BLD AUTO: 0.89 K/UL — LOW (ref 1.2–3.4)
LYMPHOCYTES # BLD AUTO: 0.92 K/UL — LOW (ref 1.2–3.4)
LYMPHOCYTES # BLD AUTO: 0.94 K/UL — LOW (ref 1.2–3.4)
LYMPHOCYTES # BLD AUTO: 0.95 K/UL — LOW (ref 1.2–3.4)
LYMPHOCYTES # BLD AUTO: 0.96 K/UL — LOW (ref 1.2–3.4)
LYMPHOCYTES # BLD AUTO: 0.99 K/UL — LOW (ref 1.2–3.4)
LYMPHOCYTES # BLD AUTO: 0.99 K/UL — LOW (ref 1.2–3.4)
LYMPHOCYTES # BLD AUTO: 1 K/UL — LOW (ref 1.2–3.4)
LYMPHOCYTES # BLD AUTO: 1.04 K/UL — LOW (ref 1.2–3.4)
LYMPHOCYTES # BLD AUTO: 1.05 K/UL — LOW (ref 1.2–3.4)
LYMPHOCYTES # BLD AUTO: 1.06 K/UL — LOW (ref 1.2–3.4)
LYMPHOCYTES # BLD AUTO: 1.07 K/UL — LOW (ref 1.2–3.4)
LYMPHOCYTES # BLD AUTO: 1.16 K/UL — LOW (ref 1.2–3.4)
LYMPHOCYTES # BLD AUTO: 1.21 K/UL — SIGNIFICANT CHANGE UP (ref 1.2–3.4)
LYMPHOCYTES # BLD AUTO: 1.27 K/UL — SIGNIFICANT CHANGE UP (ref 1.2–3.4)
LYMPHOCYTES # BLD AUTO: 1.3 K/UL — SIGNIFICANT CHANGE UP (ref 1.2–3.4)
LYMPHOCYTES # BLD AUTO: 1.3 K/UL — SIGNIFICANT CHANGE UP (ref 1.2–3.4)
LYMPHOCYTES # BLD AUTO: 1.33 K/UL — SIGNIFICANT CHANGE UP (ref 1.2–3.4)
LYMPHOCYTES # BLD AUTO: 1.35 K/UL — SIGNIFICANT CHANGE UP (ref 1.2–3.4)
LYMPHOCYTES # BLD AUTO: 1.39 K/UL — SIGNIFICANT CHANGE UP (ref 1.2–3.4)
LYMPHOCYTES # BLD AUTO: 1.43 K/UL — SIGNIFICANT CHANGE UP (ref 1.2–3.4)
LYMPHOCYTES # BLD AUTO: 1.46 K/UL — SIGNIFICANT CHANGE UP (ref 1.2–3.4)
LYMPHOCYTES # BLD AUTO: 1.46 K/UL — SIGNIFICANT CHANGE UP (ref 1.2–3.4)
LYMPHOCYTES # BLD AUTO: 1.47 K/UL — SIGNIFICANT CHANGE UP (ref 1.2–3.4)
LYMPHOCYTES # BLD AUTO: 1.51 K/UL — SIGNIFICANT CHANGE UP (ref 1.2–3.4)
LYMPHOCYTES # BLD AUTO: 1.52 K/UL — SIGNIFICANT CHANGE UP (ref 1.2–3.4)
LYMPHOCYTES # BLD AUTO: 1.54 K/UL — SIGNIFICANT CHANGE UP (ref 1.2–3.4)
LYMPHOCYTES # BLD AUTO: 1.56 K/UL — SIGNIFICANT CHANGE UP (ref 1.2–3.4)
LYMPHOCYTES # BLD AUTO: 1.59 K/UL — SIGNIFICANT CHANGE UP (ref 1.2–3.4)
LYMPHOCYTES # BLD AUTO: 1.69 K/UL — SIGNIFICANT CHANGE UP (ref 1.2–3.4)
LYMPHOCYTES # BLD AUTO: 11.6 % — LOW (ref 20.5–51.1)
LYMPHOCYTES # BLD AUTO: 11.7 % — LOW (ref 20.5–51.1)
LYMPHOCYTES # BLD AUTO: 11.8 % — LOW (ref 20.5–51.1)
LYMPHOCYTES # BLD AUTO: 12.1 % — LOW (ref 20.5–51.1)
LYMPHOCYTES # BLD AUTO: 12.7 % — LOW (ref 20.5–51.1)
LYMPHOCYTES # BLD AUTO: 13.1 % — LOW (ref 20.5–51.1)
LYMPHOCYTES # BLD AUTO: 13.4 % — LOW (ref 20.5–51.1)
LYMPHOCYTES # BLD AUTO: 13.7 % — LOW (ref 20.5–51.1)
LYMPHOCYTES # BLD AUTO: 14 % — LOW (ref 20.5–51.1)
LYMPHOCYTES # BLD AUTO: 14.6 % — LOW (ref 20.5–51.1)
LYMPHOCYTES # BLD AUTO: 15.1 % — LOW (ref 20.5–51.1)
LYMPHOCYTES # BLD AUTO: 15.3 % — LOW (ref 20.5–51.1)
LYMPHOCYTES # BLD AUTO: 15.6 % — LOW (ref 20.5–51.1)
LYMPHOCYTES # BLD AUTO: 15.7 % — LOW (ref 20.5–51.1)
LYMPHOCYTES # BLD AUTO: 16 % — LOW (ref 20.5–51.1)
LYMPHOCYTES # BLD AUTO: 16 % — LOW (ref 20.5–51.1)
LYMPHOCYTES # BLD AUTO: 16.3 % — LOW (ref 20.5–51.1)
LYMPHOCYTES # BLD AUTO: 16.4 % — LOW (ref 20.5–51.1)
LYMPHOCYTES # BLD AUTO: 16.6 % — LOW (ref 20.5–51.1)
LYMPHOCYTES # BLD AUTO: 16.8 % — LOW (ref 20.5–51.1)
LYMPHOCYTES # BLD AUTO: 17.5 % — LOW (ref 20.5–51.1)
LYMPHOCYTES # BLD AUTO: 2.1 % — LOW (ref 20.5–51.1)
LYMPHOCYTES # BLD AUTO: 2.3 % — LOW (ref 20.5–51.1)
LYMPHOCYTES # BLD AUTO: 2.39 K/UL — SIGNIFICANT CHANGE UP (ref 1.2–3.4)
LYMPHOCYTES # BLD AUTO: 2.41 K/UL — SIGNIFICANT CHANGE UP (ref 1.2–3.4)
LYMPHOCYTES # BLD AUTO: 2.43 K/UL — SIGNIFICANT CHANGE UP (ref 1.2–3.4)
LYMPHOCYTES # BLD AUTO: 20.6 % — SIGNIFICANT CHANGE UP (ref 20.5–51.1)
LYMPHOCYTES # BLD AUTO: 23.6 % — SIGNIFICANT CHANGE UP (ref 20.5–51.1)
LYMPHOCYTES # BLD AUTO: 24 % — SIGNIFICANT CHANGE UP (ref 20.5–51.1)
LYMPHOCYTES # BLD AUTO: 28.2 % — SIGNIFICANT CHANGE UP (ref 20.5–51.1)
LYMPHOCYTES # BLD AUTO: 3.4 % — LOW (ref 20.5–51.1)
LYMPHOCYTES # BLD AUTO: 3.5 % — LOW (ref 20.5–51.1)
LYMPHOCYTES # BLD AUTO: 3.7 % — LOW (ref 20.5–51.1)
LYMPHOCYTES # BLD AUTO: 3.8 % — LOW (ref 20.5–51.1)
LYMPHOCYTES # BLD AUTO: 4.4 % — LOW (ref 20.5–51.1)
LYMPHOCYTES # BLD AUTO: 6.3 % — LOW (ref 20.5–51.1)
LYMPHOCYTES # BLD AUTO: 7.2 % — LOW (ref 20.5–51.1)
LYMPHOCYTES # BLD AUTO: 8.4 % — LOW (ref 20.5–51.1)
LYMPHOCYTES # BLD AUTO: 8.9 % — LOW (ref 20.5–51.1)
LYMPHOCYTES # BLD AUTO: 9 % — LOW (ref 20.5–51.1)
LYMPHOCYTES # BLD AUTO: 9.7 % — LOW (ref 20.5–51.1)
LYMPHOCYTES # BLD AUTO: 9.8 % — LOW (ref 20.5–51.1)
LYMPHOCYTES # BLD AUTO: 9.9 % — LOW (ref 20.5–51.1)
MACROCYTES BLD QL: SLIGHT — SIGNIFICANT CHANGE UP
MAGNESIUM SERPL-MCNC: 1.5 MG/DL — LOW (ref 1.8–2.4)
MAGNESIUM SERPL-MCNC: 1.5 MG/DL — LOW (ref 1.8–2.4)
MAGNESIUM SERPL-MCNC: 1.6 MG/DL — LOW (ref 1.8–2.4)
MAGNESIUM SERPL-MCNC: 1.7 MG/DL — LOW (ref 1.8–2.4)
MAGNESIUM SERPL-MCNC: 1.8 MG/DL — SIGNIFICANT CHANGE UP (ref 1.8–2.4)
MAGNESIUM SERPL-MCNC: 1.9 MG/DL — SIGNIFICANT CHANGE UP (ref 1.8–2.4)
MAGNESIUM SERPL-MCNC: 2 MG/DL — SIGNIFICANT CHANGE UP (ref 1.8–2.4)
MAGNESIUM SERPL-MCNC: 2.1 MG/DL — SIGNIFICANT CHANGE UP (ref 1.8–2.4)
MAGNESIUM SERPL-MCNC: 2.2 MG/DL — SIGNIFICANT CHANGE UP (ref 1.8–2.4)
MANUAL SMEAR VERIFICATION: SIGNIFICANT CHANGE UP
MANUAL SMEAR VERIFICATION: SIGNIFICANT CHANGE UP
MCHC RBC-ENTMCNC: 27.6 PG — SIGNIFICANT CHANGE UP (ref 27–31)
MCHC RBC-ENTMCNC: 27.7 PG — SIGNIFICANT CHANGE UP (ref 27–31)
MCHC RBC-ENTMCNC: 27.7 PG — SIGNIFICANT CHANGE UP (ref 27–31)
MCHC RBC-ENTMCNC: 27.8 PG — SIGNIFICANT CHANGE UP (ref 27–31)
MCHC RBC-ENTMCNC: 27.8 PG — SIGNIFICANT CHANGE UP (ref 27–31)
MCHC RBC-ENTMCNC: 27.9 PG — SIGNIFICANT CHANGE UP (ref 27–31)
MCHC RBC-ENTMCNC: 27.9 PG — SIGNIFICANT CHANGE UP (ref 27–31)
MCHC RBC-ENTMCNC: 28 PG — SIGNIFICANT CHANGE UP (ref 27–31)
MCHC RBC-ENTMCNC: 28.2 PG — SIGNIFICANT CHANGE UP (ref 27–31)
MCHC RBC-ENTMCNC: 28.3 PG — SIGNIFICANT CHANGE UP (ref 27–31)
MCHC RBC-ENTMCNC: 28.4 PG — SIGNIFICANT CHANGE UP (ref 27–31)
MCHC RBC-ENTMCNC: 28.5 PG — SIGNIFICANT CHANGE UP (ref 27–31)
MCHC RBC-ENTMCNC: 28.5 PG — SIGNIFICANT CHANGE UP (ref 27–31)
MCHC RBC-ENTMCNC: 28.6 PG — SIGNIFICANT CHANGE UP (ref 27–31)
MCHC RBC-ENTMCNC: 28.7 PG — SIGNIFICANT CHANGE UP (ref 27–31)
MCHC RBC-ENTMCNC: 28.7 PG — SIGNIFICANT CHANGE UP (ref 27–31)
MCHC RBC-ENTMCNC: 28.8 PG — SIGNIFICANT CHANGE UP (ref 27–31)
MCHC RBC-ENTMCNC: 28.9 PG — SIGNIFICANT CHANGE UP (ref 27–31)
MCHC RBC-ENTMCNC: 29.1 PG — SIGNIFICANT CHANGE UP (ref 27–31)
MCHC RBC-ENTMCNC: 29.2 PG — SIGNIFICANT CHANGE UP (ref 27–31)
MCHC RBC-ENTMCNC: 29.3 PG — SIGNIFICANT CHANGE UP (ref 27–31)
MCHC RBC-ENTMCNC: 29.5 PG — SIGNIFICANT CHANGE UP (ref 27–31)
MCHC RBC-ENTMCNC: 29.6 PG — SIGNIFICANT CHANGE UP (ref 27–31)
MCHC RBC-ENTMCNC: 29.7 PG — SIGNIFICANT CHANGE UP (ref 27–31)
MCHC RBC-ENTMCNC: 29.8 PG — SIGNIFICANT CHANGE UP (ref 27–31)
MCHC RBC-ENTMCNC: 30.1 G/DL — LOW (ref 32–37)
MCHC RBC-ENTMCNC: 30.3 PG — SIGNIFICANT CHANGE UP (ref 27–31)
MCHC RBC-ENTMCNC: 30.4 PG — SIGNIFICANT CHANGE UP (ref 27–31)
MCHC RBC-ENTMCNC: 31 G/DL — LOW (ref 32–37)
MCHC RBC-ENTMCNC: 31 G/DL — LOW (ref 32–37)
MCHC RBC-ENTMCNC: 31.2 G/DL — LOW (ref 32–37)
MCHC RBC-ENTMCNC: 31.4 G/DL — LOW (ref 32–37)
MCHC RBC-ENTMCNC: 31.5 G/DL — LOW (ref 32–37)
MCHC RBC-ENTMCNC: 31.6 G/DL — LOW (ref 32–37)
MCHC RBC-ENTMCNC: 31.7 G/DL — LOW (ref 32–37)
MCHC RBC-ENTMCNC: 31.8 G/DL — LOW (ref 32–37)
MCHC RBC-ENTMCNC: 31.9 G/DL — LOW (ref 32–37)
MCHC RBC-ENTMCNC: 32 G/DL — SIGNIFICANT CHANGE UP (ref 32–37)
MCHC RBC-ENTMCNC: 32.1 G/DL — SIGNIFICANT CHANGE UP (ref 32–37)
MCHC RBC-ENTMCNC: 32.2 G/DL — SIGNIFICANT CHANGE UP (ref 32–37)
MCHC RBC-ENTMCNC: 32.3 G/DL — SIGNIFICANT CHANGE UP (ref 32–37)
MCHC RBC-ENTMCNC: 32.4 G/DL — SIGNIFICANT CHANGE UP (ref 32–37)
MCHC RBC-ENTMCNC: 32.5 G/DL — SIGNIFICANT CHANGE UP (ref 32–37)
MCHC RBC-ENTMCNC: 32.5 G/DL — SIGNIFICANT CHANGE UP (ref 32–37)
MCHC RBC-ENTMCNC: 32.6 G/DL — SIGNIFICANT CHANGE UP (ref 32–37)
MCHC RBC-ENTMCNC: 32.7 G/DL — SIGNIFICANT CHANGE UP (ref 32–37)
MCHC RBC-ENTMCNC: 32.7 G/DL — SIGNIFICANT CHANGE UP (ref 32–37)
MCHC RBC-ENTMCNC: 33 G/DL — SIGNIFICANT CHANGE UP (ref 32–37)
MCHC RBC-ENTMCNC: 33 G/DL — SIGNIFICANT CHANGE UP (ref 32–37)
MCHC RBC-ENTMCNC: 33.8 G/DL — SIGNIFICANT CHANGE UP (ref 32–37)
MCV RBC AUTO: 87.1 FL — SIGNIFICANT CHANGE UP (ref 80–94)
MCV RBC AUTO: 87.6 FL — SIGNIFICANT CHANGE UP (ref 80–94)
MCV RBC AUTO: 87.7 FL — SIGNIFICANT CHANGE UP (ref 80–94)
MCV RBC AUTO: 87.8 FL — SIGNIFICANT CHANGE UP (ref 80–94)
MCV RBC AUTO: 87.8 FL — SIGNIFICANT CHANGE UP (ref 80–94)
MCV RBC AUTO: 87.9 FL — SIGNIFICANT CHANGE UP (ref 80–94)
MCV RBC AUTO: 87.9 FL — SIGNIFICANT CHANGE UP (ref 80–94)
MCV RBC AUTO: 88 FL — SIGNIFICANT CHANGE UP (ref 80–94)
MCV RBC AUTO: 88 FL — SIGNIFICANT CHANGE UP (ref 80–94)
MCV RBC AUTO: 88.1 FL — SIGNIFICANT CHANGE UP (ref 80–94)
MCV RBC AUTO: 88.3 FL — SIGNIFICANT CHANGE UP (ref 80–94)
MCV RBC AUTO: 88.6 FL — SIGNIFICANT CHANGE UP (ref 80–94)
MCV RBC AUTO: 88.7 FL — SIGNIFICANT CHANGE UP (ref 80–94)
MCV RBC AUTO: 88.9 FL — SIGNIFICANT CHANGE UP (ref 80–94)
MCV RBC AUTO: 89 FL — SIGNIFICANT CHANGE UP (ref 80–94)
MCV RBC AUTO: 89.1 FL — SIGNIFICANT CHANGE UP (ref 80–94)
MCV RBC AUTO: 89.1 FL — SIGNIFICANT CHANGE UP (ref 80–94)
MCV RBC AUTO: 89.2 FL — SIGNIFICANT CHANGE UP (ref 80–94)
MCV RBC AUTO: 89.6 FL — SIGNIFICANT CHANGE UP (ref 80–94)
MCV RBC AUTO: 89.7 FL — SIGNIFICANT CHANGE UP (ref 80–94)
MCV RBC AUTO: 90.1 FL — SIGNIFICANT CHANGE UP (ref 80–94)
MCV RBC AUTO: 90.1 FL — SIGNIFICANT CHANGE UP (ref 80–94)
MCV RBC AUTO: 90.3 FL — SIGNIFICANT CHANGE UP (ref 80–94)
MCV RBC AUTO: 90.3 FL — SIGNIFICANT CHANGE UP (ref 80–94)
MCV RBC AUTO: 90.4 FL — SIGNIFICANT CHANGE UP (ref 80–94)
MCV RBC AUTO: 90.4 FL — SIGNIFICANT CHANGE UP (ref 80–94)
MCV RBC AUTO: 90.6 FL — SIGNIFICANT CHANGE UP (ref 80–94)
MCV RBC AUTO: 90.8 FL — SIGNIFICANT CHANGE UP (ref 80–94)
MCV RBC AUTO: 90.9 FL — SIGNIFICANT CHANGE UP (ref 80–94)
MCV RBC AUTO: 91.1 FL — SIGNIFICANT CHANGE UP (ref 80–94)
MCV RBC AUTO: 91.2 FL — SIGNIFICANT CHANGE UP (ref 80–94)
MCV RBC AUTO: 91.2 FL — SIGNIFICANT CHANGE UP (ref 80–94)
MCV RBC AUTO: 91.3 FL — SIGNIFICANT CHANGE UP (ref 80–94)
MCV RBC AUTO: 91.3 FL — SIGNIFICANT CHANGE UP (ref 80–94)
MCV RBC AUTO: 91.5 FL — SIGNIFICANT CHANGE UP (ref 80–94)
MCV RBC AUTO: 91.6 FL — SIGNIFICANT CHANGE UP (ref 80–94)
MCV RBC AUTO: 91.7 FL — SIGNIFICANT CHANGE UP (ref 80–94)
MCV RBC AUTO: 91.7 FL — SIGNIFICANT CHANGE UP (ref 80–94)
MCV RBC AUTO: 91.8 FL — SIGNIFICANT CHANGE UP (ref 80–94)
MCV RBC AUTO: 92 FL — SIGNIFICANT CHANGE UP (ref 80–94)
MCV RBC AUTO: 92.1 FL — SIGNIFICANT CHANGE UP (ref 80–94)
MCV RBC AUTO: 92.2 FL — SIGNIFICANT CHANGE UP (ref 80–94)
MCV RBC AUTO: 92.3 FL — SIGNIFICANT CHANGE UP (ref 80–94)
MCV RBC AUTO: 92.7 FL — SIGNIFICANT CHANGE UP (ref 80–94)
MCV RBC AUTO: 93.5 FL — SIGNIFICANT CHANGE UP (ref 80–94)
MCV RBC AUTO: 93.8 FL — SIGNIFICANT CHANGE UP (ref 80–94)
MCV RBC AUTO: 94.3 FL — HIGH (ref 80–94)
METHOD TYPE: SIGNIFICANT CHANGE UP
METHOD TYPE: SIGNIFICANT CHANGE UP
MONOCYTES # BLD AUTO: 0.29 K/UL — SIGNIFICANT CHANGE UP (ref 0.1–0.6)
MONOCYTES # BLD AUTO: 0.39 K/UL — SIGNIFICANT CHANGE UP (ref 0.1–0.6)
MONOCYTES # BLD AUTO: 0.4 K/UL — SIGNIFICANT CHANGE UP (ref 0.1–0.6)
MONOCYTES # BLD AUTO: 0.41 K/UL — SIGNIFICANT CHANGE UP (ref 0.1–0.6)
MONOCYTES # BLD AUTO: 0.44 K/UL — SIGNIFICANT CHANGE UP (ref 0.1–0.6)
MONOCYTES # BLD AUTO: 0.51 K/UL — SIGNIFICANT CHANGE UP (ref 0.1–0.6)
MONOCYTES # BLD AUTO: 0.59 K/UL — SIGNIFICANT CHANGE UP (ref 0.1–0.6)
MONOCYTES # BLD AUTO: 0.59 K/UL — SIGNIFICANT CHANGE UP (ref 0.1–0.6)
MONOCYTES # BLD AUTO: 0.66 K/UL — HIGH (ref 0.1–0.6)
MONOCYTES # BLD AUTO: 0.67 K/UL — HIGH (ref 0.1–0.6)
MONOCYTES # BLD AUTO: 0.7 K/UL — HIGH (ref 0.1–0.6)
MONOCYTES # BLD AUTO: 0.74 K/UL — HIGH (ref 0.1–0.6)
MONOCYTES # BLD AUTO: 0.75 K/UL — HIGH (ref 0.1–0.6)
MONOCYTES # BLD AUTO: 0.75 K/UL — HIGH (ref 0.1–0.6)
MONOCYTES # BLD AUTO: 0.78 K/UL — HIGH (ref 0.1–0.6)
MONOCYTES # BLD AUTO: 0.79 K/UL — HIGH (ref 0.1–0.6)
MONOCYTES # BLD AUTO: 0.83 K/UL — HIGH (ref 0.1–0.6)
MONOCYTES # BLD AUTO: 0.83 K/UL — HIGH (ref 0.1–0.6)
MONOCYTES # BLD AUTO: 0.84 K/UL — HIGH (ref 0.1–0.6)
MONOCYTES # BLD AUTO: 0.85 K/UL — HIGH (ref 0.1–0.6)
MONOCYTES # BLD AUTO: 0.91 K/UL — HIGH (ref 0.1–0.6)
MONOCYTES # BLD AUTO: 0.94 K/UL — HIGH (ref 0.1–0.6)
MONOCYTES # BLD AUTO: 0.95 K/UL — HIGH (ref 0.1–0.6)
MONOCYTES # BLD AUTO: 0.97 K/UL — HIGH (ref 0.1–0.6)
MONOCYTES # BLD AUTO: 0.98 K/UL — HIGH (ref 0.1–0.6)
MONOCYTES # BLD AUTO: 1 K/UL — HIGH (ref 0.1–0.6)
MONOCYTES # BLD AUTO: 1.01 K/UL — HIGH (ref 0.1–0.6)
MONOCYTES # BLD AUTO: 1.07 K/UL — HIGH (ref 0.1–0.6)
MONOCYTES # BLD AUTO: 1.09 K/UL — HIGH (ref 0.1–0.6)
MONOCYTES # BLD AUTO: 1.09 K/UL — HIGH (ref 0.1–0.6)
MONOCYTES # BLD AUTO: 1.1 K/UL — HIGH (ref 0.1–0.6)
MONOCYTES # BLD AUTO: 1.1 K/UL — HIGH (ref 0.1–0.6)
MONOCYTES # BLD AUTO: 1.15 K/UL — HIGH (ref 0.1–0.6)
MONOCYTES # BLD AUTO: 1.16 K/UL — HIGH (ref 0.1–0.6)
MONOCYTES # BLD AUTO: 1.19 K/UL — HIGH (ref 0.1–0.6)
MONOCYTES # BLD AUTO: 1.23 K/UL — HIGH (ref 0.1–0.6)
MONOCYTES # BLD AUTO: 1.26 K/UL — HIGH (ref 0.1–0.6)
MONOCYTES # BLD AUTO: 1.34 K/UL — HIGH (ref 0.1–0.6)
MONOCYTES NFR BLD AUTO: 1.4 % — LOW (ref 1.7–9.3)
MONOCYTES NFR BLD AUTO: 1.7 % — SIGNIFICANT CHANGE UP (ref 1.7–9.3)
MONOCYTES NFR BLD AUTO: 10 % — HIGH (ref 1.7–9.3)
MONOCYTES NFR BLD AUTO: 10.3 % — HIGH (ref 1.7–9.3)
MONOCYTES NFR BLD AUTO: 11.1 % — HIGH (ref 1.7–9.3)
MONOCYTES NFR BLD AUTO: 11.7 % — HIGH (ref 1.7–9.3)
MONOCYTES NFR BLD AUTO: 12.2 % — HIGH (ref 1.7–9.3)
MONOCYTES NFR BLD AUTO: 12.4 % — HIGH (ref 1.7–9.3)
MONOCYTES NFR BLD AUTO: 12.6 % — HIGH (ref 1.7–9.3)
MONOCYTES NFR BLD AUTO: 12.7 % — HIGH (ref 1.7–9.3)
MONOCYTES NFR BLD AUTO: 13 % — HIGH (ref 1.7–9.3)
MONOCYTES NFR BLD AUTO: 13.2 % — HIGH (ref 1.7–9.3)
MONOCYTES NFR BLD AUTO: 14.3 % — HIGH (ref 1.7–9.3)
MONOCYTES NFR BLD AUTO: 14.4 % — HIGH (ref 1.7–9.3)
MONOCYTES NFR BLD AUTO: 14.6 % — HIGH (ref 1.7–9.3)
MONOCYTES NFR BLD AUTO: 14.7 % — HIGH (ref 1.7–9.3)
MONOCYTES NFR BLD AUTO: 15.5 % — HIGH (ref 1.7–9.3)
MONOCYTES NFR BLD AUTO: 16 % — HIGH (ref 1.7–9.3)
MONOCYTES NFR BLD AUTO: 2.2 % — SIGNIFICANT CHANGE UP (ref 1.7–9.3)
MONOCYTES NFR BLD AUTO: 2.7 % — SIGNIFICANT CHANGE UP (ref 1.7–9.3)
MONOCYTES NFR BLD AUTO: 2.9 % — SIGNIFICANT CHANGE UP (ref 1.7–9.3)
MONOCYTES NFR BLD AUTO: 2.9 % — SIGNIFICANT CHANGE UP (ref 1.7–9.3)
MONOCYTES NFR BLD AUTO: 3.5 % — SIGNIFICANT CHANGE UP (ref 1.7–9.3)
MONOCYTES NFR BLD AUTO: 3.5 % — SIGNIFICANT CHANGE UP (ref 1.7–9.3)
MONOCYTES NFR BLD AUTO: 4.8 % — SIGNIFICANT CHANGE UP (ref 1.7–9.3)
MONOCYTES NFR BLD AUTO: 5.2 % — SIGNIFICANT CHANGE UP (ref 1.7–9.3)
MONOCYTES NFR BLD AUTO: 5.8 % — SIGNIFICANT CHANGE UP (ref 1.7–9.3)
MONOCYTES NFR BLD AUTO: 6 % — SIGNIFICANT CHANGE UP (ref 1.7–9.3)
MONOCYTES NFR BLD AUTO: 6.3 % — SIGNIFICANT CHANGE UP (ref 1.7–9.3)
MONOCYTES NFR BLD AUTO: 6.6 % — SIGNIFICANT CHANGE UP (ref 1.7–9.3)
MONOCYTES NFR BLD AUTO: 7.3 % — SIGNIFICANT CHANGE UP (ref 1.7–9.3)
MONOCYTES NFR BLD AUTO: 7.4 % — SIGNIFICANT CHANGE UP (ref 1.7–9.3)
MONOCYTES NFR BLD AUTO: 7.9 % — SIGNIFICANT CHANGE UP (ref 1.7–9.3)
MONOCYTES NFR BLD AUTO: 8 % — SIGNIFICANT CHANGE UP (ref 1.7–9.3)
MONOCYTES NFR BLD AUTO: 8.2 % — SIGNIFICANT CHANGE UP (ref 1.7–9.3)
MONOCYTES NFR BLD AUTO: 8.3 % — SIGNIFICANT CHANGE UP (ref 1.7–9.3)
MONOCYTES NFR BLD AUTO: 8.8 % — SIGNIFICANT CHANGE UP (ref 1.7–9.3)
MONOCYTES NFR BLD AUTO: 8.8 % — SIGNIFICANT CHANGE UP (ref 1.7–9.3)
MONOCYTES NFR BLD AUTO: 9.9 % — HIGH (ref 1.7–9.3)
MONOCYTES NFR BLD AUTO: 9.9 % — HIGH (ref 1.7–9.3)
MRSA PCR RESULT.: NEGATIVE — SIGNIFICANT CHANGE UP
MRSA PCR RESULT.: NEGATIVE — SIGNIFICANT CHANGE UP
MYELOCYTES NFR BLD: 0.9 % — HIGH (ref 0–0)
NEUTROPHILS # BLD AUTO: 10.23 K/UL — HIGH (ref 1.4–6.5)
NEUTROPHILS # BLD AUTO: 10.51 K/UL — HIGH (ref 1.4–6.5)
NEUTROPHILS # BLD AUTO: 10.65 K/UL — HIGH (ref 1.4–6.5)
NEUTROPHILS # BLD AUTO: 10.94 K/UL — HIGH (ref 1.4–6.5)
NEUTROPHILS # BLD AUTO: 12.64 K/UL — HIGH (ref 1.4–6.5)
NEUTROPHILS # BLD AUTO: 12.66 K/UL — HIGH (ref 1.4–6.5)
NEUTROPHILS # BLD AUTO: 13.39 K/UL — HIGH (ref 1.4–6.5)
NEUTROPHILS # BLD AUTO: 17.45 K/UL — HIGH (ref 1.4–6.5)
NEUTROPHILS # BLD AUTO: 18.86 K/UL — HIGH (ref 1.4–6.5)
NEUTROPHILS # BLD AUTO: 19.62 K/UL — HIGH (ref 1.4–6.5)
NEUTROPHILS # BLD AUTO: 20.51 K/UL — HIGH (ref 1.4–6.5)
NEUTROPHILS # BLD AUTO: 21.04 K/UL — HIGH (ref 1.4–6.5)
NEUTROPHILS # BLD AUTO: 28.16 K/UL — HIGH (ref 1.4–6.5)
NEUTROPHILS # BLD AUTO: 28.2 K/UL — HIGH (ref 1.4–6.5)
NEUTROPHILS # BLD AUTO: 3.07 K/UL — SIGNIFICANT CHANGE UP (ref 1.4–6.5)
NEUTROPHILS # BLD AUTO: 3.14 K/UL — SIGNIFICANT CHANGE UP (ref 1.4–6.5)
NEUTROPHILS # BLD AUTO: 3.3 K/UL — SIGNIFICANT CHANGE UP (ref 1.4–6.5)
NEUTROPHILS # BLD AUTO: 3.68 K/UL — SIGNIFICANT CHANGE UP (ref 1.4–6.5)
NEUTROPHILS # BLD AUTO: 3.72 K/UL — SIGNIFICANT CHANGE UP (ref 1.4–6.5)
NEUTROPHILS # BLD AUTO: 3.76 K/UL — SIGNIFICANT CHANGE UP (ref 1.4–6.5)
NEUTROPHILS # BLD AUTO: 4.16 K/UL — SIGNIFICANT CHANGE UP (ref 1.4–6.5)
NEUTROPHILS # BLD AUTO: 4.28 K/UL — SIGNIFICANT CHANGE UP (ref 1.4–6.5)
NEUTROPHILS # BLD AUTO: 4.32 K/UL — SIGNIFICANT CHANGE UP (ref 1.4–6.5)
NEUTROPHILS # BLD AUTO: 4.33 K/UL — SIGNIFICANT CHANGE UP (ref 1.4–6.5)
NEUTROPHILS # BLD AUTO: 4.62 K/UL — SIGNIFICANT CHANGE UP (ref 1.4–6.5)
NEUTROPHILS # BLD AUTO: 4.66 K/UL — SIGNIFICANT CHANGE UP (ref 1.4–6.5)
NEUTROPHILS # BLD AUTO: 4.8 K/UL — SIGNIFICANT CHANGE UP (ref 1.4–6.5)
NEUTROPHILS # BLD AUTO: 5.51 K/UL — SIGNIFICANT CHANGE UP (ref 1.4–6.5)
NEUTROPHILS # BLD AUTO: 6.51 K/UL — HIGH (ref 1.4–6.5)
NEUTROPHILS # BLD AUTO: 7.12 K/UL — HIGH (ref 1.4–6.5)
NEUTROPHILS # BLD AUTO: 7.62 K/UL — HIGH (ref 1.4–6.5)
NEUTROPHILS # BLD AUTO: 7.74 K/UL — HIGH (ref 1.4–6.5)
NEUTROPHILS # BLD AUTO: 7.84 K/UL — HIGH (ref 1.4–6.5)
NEUTROPHILS # BLD AUTO: 7.91 K/UL — HIGH (ref 1.4–6.5)
NEUTROPHILS # BLD AUTO: 7.98 K/UL — HIGH (ref 1.4–6.5)
NEUTROPHILS # BLD AUTO: 8.53 K/UL — HIGH (ref 1.4–6.5)
NEUTROPHILS # BLD AUTO: 9.63 K/UL — HIGH (ref 1.4–6.5)
NEUTROPHILS # BLD AUTO: 9.81 K/UL — HIGH (ref 1.4–6.5)
NEUTROPHILS # BLD AUTO: 9.84 K/UL — HIGH (ref 1.4–6.5)
NEUTROPHILS # BLD AUTO: 9.84 K/UL — HIGH (ref 1.4–6.5)
NEUTROPHILS NFR BLD AUTO: 51.3 % — SIGNIFICANT CHANGE UP (ref 42.2–75.2)
NEUTROPHILS NFR BLD AUTO: 51.8 % — SIGNIFICANT CHANGE UP (ref 42.2–75.2)
NEUTROPHILS NFR BLD AUTO: 54.1 % — SIGNIFICANT CHANGE UP (ref 42.2–75.2)
NEUTROPHILS NFR BLD AUTO: 58.9 % — SIGNIFICANT CHANGE UP (ref 42.2–75.2)
NEUTROPHILS NFR BLD AUTO: 59.1 % — SIGNIFICANT CHANGE UP (ref 42.2–75.2)
NEUTROPHILS NFR BLD AUTO: 59.6 % — SIGNIFICANT CHANGE UP (ref 42.2–75.2)
NEUTROPHILS NFR BLD AUTO: 60.1 % — SIGNIFICANT CHANGE UP (ref 42.2–75.2)
NEUTROPHILS NFR BLD AUTO: 60.9 % — SIGNIFICANT CHANGE UP (ref 42.2–75.2)
NEUTROPHILS NFR BLD AUTO: 61.2 % — SIGNIFICANT CHANGE UP (ref 42.2–75.2)
NEUTROPHILS NFR BLD AUTO: 61.3 % — SIGNIFICANT CHANGE UP (ref 42.2–75.2)
NEUTROPHILS NFR BLD AUTO: 61.3 % — SIGNIFICANT CHANGE UP (ref 42.2–75.2)
NEUTROPHILS NFR BLD AUTO: 62.5 % — SIGNIFICANT CHANGE UP (ref 42.2–75.2)
NEUTROPHILS NFR BLD AUTO: 64 % — SIGNIFICANT CHANGE UP (ref 42.2–75.2)
NEUTROPHILS NFR BLD AUTO: 64.1 % — SIGNIFICANT CHANGE UP (ref 42.2–75.2)
NEUTROPHILS NFR BLD AUTO: 72 % — SIGNIFICANT CHANGE UP (ref 42.2–75.2)
NEUTROPHILS NFR BLD AUTO: 72.1 % — SIGNIFICANT CHANGE UP (ref 42.2–75.2)
NEUTROPHILS NFR BLD AUTO: 72.1 % — SIGNIFICANT CHANGE UP (ref 42.2–75.2)
NEUTROPHILS NFR BLD AUTO: 72.3 % — SIGNIFICANT CHANGE UP (ref 42.2–75.2)
NEUTROPHILS NFR BLD AUTO: 72.6 % — SIGNIFICANT CHANGE UP (ref 42.2–75.2)
NEUTROPHILS NFR BLD AUTO: 72.6 % — SIGNIFICANT CHANGE UP (ref 42.2–75.2)
NEUTROPHILS NFR BLD AUTO: 72.7 % — SIGNIFICANT CHANGE UP (ref 42.2–75.2)
NEUTROPHILS NFR BLD AUTO: 74.8 % — SIGNIFICANT CHANGE UP (ref 42.2–75.2)
NEUTROPHILS NFR BLD AUTO: 74.9 % — SIGNIFICANT CHANGE UP (ref 42.2–75.2)
NEUTROPHILS NFR BLD AUTO: 75.9 % — HIGH (ref 42.2–75.2)
NEUTROPHILS NFR BLD AUTO: 77.1 % — HIGH (ref 42.2–75.2)
NEUTROPHILS NFR BLD AUTO: 78 % — HIGH (ref 42.2–75.2)
NEUTROPHILS NFR BLD AUTO: 78.4 % — HIGH (ref 42.2–75.2)
NEUTROPHILS NFR BLD AUTO: 78.6 % — HIGH (ref 42.2–75.2)
NEUTROPHILS NFR BLD AUTO: 80.1 % — HIGH (ref 42.2–75.2)
NEUTROPHILS NFR BLD AUTO: 82.9 % — HIGH (ref 42.2–75.2)
NEUTROPHILS NFR BLD AUTO: 85.1 % — HIGH (ref 42.2–75.2)
NEUTROPHILS NFR BLD AUTO: 86.1 % — HIGH (ref 42.2–75.2)
NEUTROPHILS NFR BLD AUTO: 88.8 % — HIGH (ref 42.2–75.2)
NEUTROPHILS NFR BLD AUTO: 89.3 % — HIGH (ref 42.2–75.2)
NEUTROPHILS NFR BLD AUTO: 91.9 % — HIGH (ref 42.2–75.2)
NEUTROPHILS NFR BLD AUTO: 92 % — HIGH (ref 42.2–75.2)
NEUTROPHILS NFR BLD AUTO: 92.8 % — HIGH (ref 42.2–75.2)
NEUTROPHILS NFR BLD AUTO: 92.8 % — HIGH (ref 42.2–75.2)
NEUTROPHILS NFR BLD AUTO: 93.5 % — HIGH (ref 42.2–75.2)
NEUTROPHILS NFR BLD AUTO: 93.9 % — HIGH (ref 42.2–75.2)
NITRITE UR-MCNC: NEGATIVE — SIGNIFICANT CHANGE UP
NON HDL CHOLESTEROL: 82 MG/DL — SIGNIFICANT CHANGE UP
NRBC # BLD: 0 /100 WBCS — SIGNIFICANT CHANGE UP (ref 0–0)
NT-PROBNP SERPL-SCNC: 1708 PG/ML — HIGH (ref 0–300)
NT-PROBNP SERPL-SCNC: 2670 PG/ML — HIGH (ref 0–300)
NT-PROBNP SERPL-SCNC: 2711 PG/ML — HIGH (ref 0–300)
NT-PROBNP SERPL-SCNC: 2765 PG/ML — HIGH (ref 0–300)
NT-PROBNP SERPL-SCNC: 2892 PG/ML — HIGH (ref 0–300)
NT-PROBNP SERPL-SCNC: 5836 PG/ML — HIGH (ref 0–300)
NT-PROBNP SERPL-SCNC: 6168 PG/ML — HIGH (ref 0–300)
ORGANISM # SPEC MICROSCOPIC CNT: SIGNIFICANT CHANGE UP
OVALOCYTES BLD QL SMEAR: SLIGHT — SIGNIFICANT CHANGE UP
PCO2 BLDA: 38 MMHG — SIGNIFICANT CHANGE UP (ref 35–48)
PCO2 BLDA: 42 MMHG — SIGNIFICANT CHANGE UP (ref 35–48)
PCO2 BLDV: 46 MMHG — SIGNIFICANT CHANGE UP (ref 42–55)
PCO2 BLDV: 46 MMHG — SIGNIFICANT CHANGE UP (ref 42–55)
PCO2 BLDV: 47 MMHG — SIGNIFICANT CHANGE UP (ref 42–55)
PCO2 BLDV: 48 MMHG — SIGNIFICANT CHANGE UP (ref 42–55)
PCO2 BLDV: 51 MMHG — SIGNIFICANT CHANGE UP (ref 42–55)
PCO2 BLDV: 53 MMHG — SIGNIFICANT CHANGE UP (ref 42–55)
PF4 HEPARIN CMPLX AB SER-ACNC: NEGATIVE — SIGNIFICANT CHANGE UP
PH BLDA: 7.41 — SIGNIFICANT CHANGE UP (ref 7.35–7.45)
PH BLDA: 7.41 — SIGNIFICANT CHANGE UP (ref 7.35–7.45)
PH BLDV: 7.37 — SIGNIFICANT CHANGE UP (ref 7.32–7.43)
PH BLDV: 7.38 — SIGNIFICANT CHANGE UP (ref 7.32–7.43)
PH BLDV: 7.38 — SIGNIFICANT CHANGE UP (ref 7.32–7.43)
PH BLDV: 7.39 — SIGNIFICANT CHANGE UP (ref 7.32–7.43)
PH BLDV: 7.41 — SIGNIFICANT CHANGE UP (ref 7.32–7.43)
PH BLDV: 7.44 — HIGH (ref 7.32–7.43)
PH UR: 6 — SIGNIFICANT CHANGE UP (ref 5–8)
PH UR: 6 — SIGNIFICANT CHANGE UP (ref 5–8)
PH UR: 7.5 — SIGNIFICANT CHANGE UP (ref 5–8)
PHOSPHATE SERPL-MCNC: 2.4 MG/DL — SIGNIFICANT CHANGE UP (ref 2.1–4.9)
PHOSPHATE SERPL-MCNC: 2.7 MG/DL — SIGNIFICANT CHANGE UP (ref 2.1–4.9)
PHOSPHATE SERPL-MCNC: 4.2 MG/DL — SIGNIFICANT CHANGE UP (ref 2.1–4.9)
PLAT MORPH BLD: ABNORMAL
PLAT MORPH BLD: NORMAL — SIGNIFICANT CHANGE UP
PLATELET # BLD AUTO: 103 K/UL — LOW (ref 130–400)
PLATELET # BLD AUTO: 107 K/UL — LOW (ref 130–400)
PLATELET # BLD AUTO: 107 K/UL — LOW (ref 130–400)
PLATELET # BLD AUTO: 108 K/UL — LOW (ref 130–400)
PLATELET # BLD AUTO: 112 K/UL — LOW (ref 130–400)
PLATELET # BLD AUTO: 112 K/UL — LOW (ref 130–400)
PLATELET # BLD AUTO: 118 K/UL — LOW (ref 130–400)
PLATELET # BLD AUTO: 122 K/UL — LOW (ref 130–400)
PLATELET # BLD AUTO: 129 K/UL — LOW (ref 130–400)
PLATELET # BLD AUTO: 137 K/UL — SIGNIFICANT CHANGE UP (ref 130–400)
PLATELET # BLD AUTO: 138 K/UL — SIGNIFICANT CHANGE UP (ref 130–400)
PLATELET # BLD AUTO: 140 K/UL — SIGNIFICANT CHANGE UP (ref 130–400)
PLATELET # BLD AUTO: 141 K/UL — SIGNIFICANT CHANGE UP (ref 130–400)
PLATELET # BLD AUTO: 142 K/UL — SIGNIFICANT CHANGE UP (ref 130–400)
PLATELET # BLD AUTO: 142 K/UL — SIGNIFICANT CHANGE UP (ref 130–400)
PLATELET # BLD AUTO: 143 K/UL — SIGNIFICANT CHANGE UP (ref 130–400)
PLATELET # BLD AUTO: 145 K/UL — SIGNIFICANT CHANGE UP (ref 130–400)
PLATELET # BLD AUTO: 146 K/UL — SIGNIFICANT CHANGE UP (ref 130–400)
PLATELET # BLD AUTO: 147 K/UL — SIGNIFICANT CHANGE UP (ref 130–400)
PLATELET # BLD AUTO: 155 K/UL — SIGNIFICANT CHANGE UP (ref 130–400)
PLATELET # BLD AUTO: 155 K/UL — SIGNIFICANT CHANGE UP (ref 130–400)
PLATELET # BLD AUTO: 157 K/UL — SIGNIFICANT CHANGE UP (ref 130–400)
PLATELET # BLD AUTO: 157 K/UL — SIGNIFICANT CHANGE UP (ref 130–400)
PLATELET # BLD AUTO: 158 K/UL — SIGNIFICANT CHANGE UP (ref 130–400)
PLATELET # BLD AUTO: 162 K/UL — SIGNIFICANT CHANGE UP (ref 130–400)
PLATELET # BLD AUTO: 163 K/UL — SIGNIFICANT CHANGE UP (ref 130–400)
PLATELET # BLD AUTO: 165 K/UL — SIGNIFICANT CHANGE UP (ref 130–400)
PLATELET # BLD AUTO: 165 K/UL — SIGNIFICANT CHANGE UP (ref 130–400)
PLATELET # BLD AUTO: 167 K/UL — SIGNIFICANT CHANGE UP (ref 130–400)
PLATELET # BLD AUTO: 170 K/UL — SIGNIFICANT CHANGE UP (ref 130–400)
PLATELET # BLD AUTO: 175 K/UL — SIGNIFICANT CHANGE UP (ref 130–400)
PLATELET # BLD AUTO: 176 K/UL — SIGNIFICANT CHANGE UP (ref 130–400)
PLATELET # BLD AUTO: 177 K/UL — SIGNIFICANT CHANGE UP (ref 130–400)
PLATELET # BLD AUTO: 182 K/UL — SIGNIFICANT CHANGE UP (ref 130–400)
PLATELET # BLD AUTO: 192 K/UL — SIGNIFICANT CHANGE UP (ref 130–400)
PLATELET # BLD AUTO: 198 K/UL — SIGNIFICANT CHANGE UP (ref 130–400)
PLATELET # BLD AUTO: 202 K/UL — SIGNIFICANT CHANGE UP (ref 130–400)
PLATELET # BLD AUTO: 206 K/UL — SIGNIFICANT CHANGE UP (ref 130–400)
PLATELET # BLD AUTO: 210 K/UL — SIGNIFICANT CHANGE UP (ref 130–400)
PLATELET # BLD AUTO: 222 K/UL — SIGNIFICANT CHANGE UP (ref 130–400)
PLATELET # BLD AUTO: 228 K/UL — SIGNIFICANT CHANGE UP (ref 130–400)
PLATELET # BLD AUTO: 230 K/UL — SIGNIFICANT CHANGE UP (ref 130–400)
PLATELET # BLD AUTO: 233 K/UL — SIGNIFICANT CHANGE UP (ref 130–400)
PLATELET # BLD AUTO: 235 K/UL — SIGNIFICANT CHANGE UP (ref 130–400)
PLATELET # BLD AUTO: 238 K/UL — SIGNIFICANT CHANGE UP (ref 130–400)
PLATELET # BLD AUTO: 238 K/UL — SIGNIFICANT CHANGE UP (ref 130–400)
PLATELET # BLD AUTO: 244 K/UL — SIGNIFICANT CHANGE UP (ref 130–400)
PLATELET # BLD AUTO: 249 K/UL — SIGNIFICANT CHANGE UP (ref 130–400)
PLATELET # BLD AUTO: 250 K/UL — SIGNIFICANT CHANGE UP (ref 130–400)
PLATELET # BLD AUTO: 250 K/UL — SIGNIFICANT CHANGE UP (ref 130–400)
PLATELET # BLD AUTO: 252 K/UL — SIGNIFICANT CHANGE UP (ref 130–400)
PLATELET # BLD AUTO: 260 K/UL — SIGNIFICANT CHANGE UP (ref 130–400)
PLATELET # BLD AUTO: 265 K/UL — SIGNIFICANT CHANGE UP (ref 130–400)
PLATELET # BLD AUTO: 287 K/UL — SIGNIFICANT CHANGE UP (ref 130–400)
PLATELET # BLD AUTO: 292 K/UL — SIGNIFICANT CHANGE UP (ref 130–400)
PLATELET # BLD AUTO: 308 K/UL — SIGNIFICANT CHANGE UP (ref 130–400)
PO2 BLDA: 50 MMHG — CRITICAL LOW (ref 83–108)
PO2 BLDA: 82 MMHG — LOW (ref 83–108)
PO2 BLDV: 21 MMHG — SIGNIFICANT CHANGE UP
PO2 BLDV: 23 MMHG — SIGNIFICANT CHANGE UP
PO2 BLDV: 26 MMHG — SIGNIFICANT CHANGE UP
PO2 BLDV: 28 MMHG — SIGNIFICANT CHANGE UP
PO2 BLDV: 34 MMHG — SIGNIFICANT CHANGE UP
PO2 BLDV: 44 MMHG — SIGNIFICANT CHANGE UP
POIKILOCYTOSIS BLD QL AUTO: SLIGHT — SIGNIFICANT CHANGE UP
POLYCHROMASIA BLD QL SMEAR: SIGNIFICANT CHANGE UP
POLYCHROMASIA BLD QL SMEAR: SLIGHT — SIGNIFICANT CHANGE UP
POTASSIUM BLDV-SCNC: 3.3 MMOL/L — LOW (ref 3.5–5.1)
POTASSIUM BLDV-SCNC: 3.5 MMOL/L — SIGNIFICANT CHANGE UP (ref 3.5–5.1)
POTASSIUM BLDV-SCNC: 3.9 MMOL/L — SIGNIFICANT CHANGE UP (ref 3.5–5.1)
POTASSIUM BLDV-SCNC: 4 MMOL/L — SIGNIFICANT CHANGE UP (ref 3.5–5.1)
POTASSIUM BLDV-SCNC: 4.8 MMOL/L — SIGNIFICANT CHANGE UP (ref 3.5–5.1)
POTASSIUM BLDV-SCNC: 4.8 MMOL/L — SIGNIFICANT CHANGE UP (ref 3.5–5.1)
POTASSIUM SERPL-MCNC: 3 MMOL/L — LOW (ref 3.5–5)
POTASSIUM SERPL-MCNC: 3.1 MMOL/L — LOW (ref 3.5–5)
POTASSIUM SERPL-MCNC: 3.3 MMOL/L — LOW (ref 3.5–5)
POTASSIUM SERPL-MCNC: 3.4 MMOL/L — LOW (ref 3.5–5)
POTASSIUM SERPL-MCNC: 3.5 MMOL/L — SIGNIFICANT CHANGE UP (ref 3.5–5)
POTASSIUM SERPL-MCNC: 3.6 MMOL/L — SIGNIFICANT CHANGE UP (ref 3.5–5)
POTASSIUM SERPL-MCNC: 3.7 MMOL/L — SIGNIFICANT CHANGE UP (ref 3.5–5)
POTASSIUM SERPL-MCNC: 3.8 MMOL/L — SIGNIFICANT CHANGE UP (ref 3.5–5)
POTASSIUM SERPL-MCNC: 3.8 MMOL/L — SIGNIFICANT CHANGE UP (ref 3.5–5)
POTASSIUM SERPL-MCNC: 3.9 MMOL/L — SIGNIFICANT CHANGE UP (ref 3.5–5)
POTASSIUM SERPL-MCNC: 4 MMOL/L — SIGNIFICANT CHANGE UP (ref 3.5–5)
POTASSIUM SERPL-MCNC: 4 MMOL/L — SIGNIFICANT CHANGE UP (ref 3.5–5)
POTASSIUM SERPL-MCNC: 4.1 MMOL/L — SIGNIFICANT CHANGE UP (ref 3.5–5)
POTASSIUM SERPL-MCNC: 4.1 MMOL/L — SIGNIFICANT CHANGE UP (ref 3.5–5)
POTASSIUM SERPL-MCNC: 4.2 MMOL/L — SIGNIFICANT CHANGE UP (ref 3.5–5)
POTASSIUM SERPL-MCNC: 4.3 MMOL/L — SIGNIFICANT CHANGE UP (ref 3.5–5)
POTASSIUM SERPL-MCNC: 4.4 MMOL/L — SIGNIFICANT CHANGE UP (ref 3.5–5)
POTASSIUM SERPL-MCNC: 4.5 MMOL/L — SIGNIFICANT CHANGE UP (ref 3.5–5)
POTASSIUM SERPL-MCNC: 4.6 MMOL/L — SIGNIFICANT CHANGE UP (ref 3.5–5)
POTASSIUM SERPL-MCNC: 4.7 MMOL/L — SIGNIFICANT CHANGE UP (ref 3.5–5)
POTASSIUM SERPL-MCNC: 4.8 MMOL/L — SIGNIFICANT CHANGE UP (ref 3.5–5)
POTASSIUM SERPL-MCNC: 4.8 MMOL/L — SIGNIFICANT CHANGE UP (ref 3.5–5)
POTASSIUM SERPL-MCNC: 4.9 MMOL/L — SIGNIFICANT CHANGE UP (ref 3.5–5)
POTASSIUM SERPL-MCNC: 4.9 MMOL/L — SIGNIFICANT CHANGE UP (ref 3.5–5)
POTASSIUM SERPL-MCNC: 5.1 MMOL/L — HIGH (ref 3.5–5)
POTASSIUM SERPL-SCNC: 3 MMOL/L — LOW (ref 3.5–5)
POTASSIUM SERPL-SCNC: 3.1 MMOL/L — LOW (ref 3.5–5)
POTASSIUM SERPL-SCNC: 3.3 MMOL/L — LOW (ref 3.5–5)
POTASSIUM SERPL-SCNC: 3.4 MMOL/L — LOW (ref 3.5–5)
POTASSIUM SERPL-SCNC: 3.5 MMOL/L — SIGNIFICANT CHANGE UP (ref 3.5–5)
POTASSIUM SERPL-SCNC: 3.6 MMOL/L — SIGNIFICANT CHANGE UP (ref 3.5–5)
POTASSIUM SERPL-SCNC: 3.7 MMOL/L — SIGNIFICANT CHANGE UP (ref 3.5–5)
POTASSIUM SERPL-SCNC: 3.8 MMOL/L — SIGNIFICANT CHANGE UP (ref 3.5–5)
POTASSIUM SERPL-SCNC: 3.8 MMOL/L — SIGNIFICANT CHANGE UP (ref 3.5–5)
POTASSIUM SERPL-SCNC: 3.9 MMOL/L — SIGNIFICANT CHANGE UP (ref 3.5–5)
POTASSIUM SERPL-SCNC: 4 MMOL/L — SIGNIFICANT CHANGE UP (ref 3.5–5)
POTASSIUM SERPL-SCNC: 4 MMOL/L — SIGNIFICANT CHANGE UP (ref 3.5–5)
POTASSIUM SERPL-SCNC: 4.1 MMOL/L — SIGNIFICANT CHANGE UP (ref 3.5–5)
POTASSIUM SERPL-SCNC: 4.1 MMOL/L — SIGNIFICANT CHANGE UP (ref 3.5–5)
POTASSIUM SERPL-SCNC: 4.2 MMOL/L — SIGNIFICANT CHANGE UP (ref 3.5–5)
POTASSIUM SERPL-SCNC: 4.3 MMOL/L — SIGNIFICANT CHANGE UP (ref 3.5–5)
POTASSIUM SERPL-SCNC: 4.4 MMOL/L — SIGNIFICANT CHANGE UP (ref 3.5–5)
POTASSIUM SERPL-SCNC: 4.5 MMOL/L — SIGNIFICANT CHANGE UP (ref 3.5–5)
POTASSIUM SERPL-SCNC: 4.6 MMOL/L — SIGNIFICANT CHANGE UP (ref 3.5–5)
POTASSIUM SERPL-SCNC: 4.7 MMOL/L — SIGNIFICANT CHANGE UP (ref 3.5–5)
POTASSIUM SERPL-SCNC: 4.8 MMOL/L — SIGNIFICANT CHANGE UP (ref 3.5–5)
POTASSIUM SERPL-SCNC: 4.8 MMOL/L — SIGNIFICANT CHANGE UP (ref 3.5–5)
POTASSIUM SERPL-SCNC: 4.9 MMOL/L — SIGNIFICANT CHANGE UP (ref 3.5–5)
POTASSIUM SERPL-SCNC: 4.9 MMOL/L — SIGNIFICANT CHANGE UP (ref 3.5–5)
POTASSIUM SERPL-SCNC: 5.1 MMOL/L — HIGH (ref 3.5–5)
PROCALCITONIN SERPL-MCNC: 0.04 NG/ML — SIGNIFICANT CHANGE UP (ref 0.02–0.1)
PROCALCITONIN SERPL-MCNC: 0.04 NG/ML — SIGNIFICANT CHANGE UP (ref 0.02–0.1)
PROCALCITONIN SERPL-MCNC: 0.08 NG/ML — SIGNIFICANT CHANGE UP (ref 0.02–0.1)
PROCALCITONIN SERPL-MCNC: 0.13 NG/ML — HIGH (ref 0.02–0.1)
PROCALCITONIN SERPL-MCNC: 3.18 NG/ML — HIGH (ref 0.02–0.1)
PROT SERPL-MCNC: 4.9 G/DL — LOW (ref 6–8)
PROT SERPL-MCNC: 5 G/DL — LOW (ref 6–8)
PROT SERPL-MCNC: 5.1 G/DL — LOW (ref 6–8)
PROT SERPL-MCNC: 5.2 G/DL — LOW (ref 6–8)
PROT SERPL-MCNC: 5.2 G/DL — LOW (ref 6–8)
PROT SERPL-MCNC: 5.3 G/DL — LOW (ref 6–8)
PROT SERPL-MCNC: 5.4 G/DL — LOW (ref 6–8)
PROT SERPL-MCNC: 5.4 G/DL — LOW (ref 6–8)
PROT SERPL-MCNC: 5.5 G/DL — LOW (ref 6–8)
PROT SERPL-MCNC: 5.5 G/DL — LOW (ref 6–8)
PROT SERPL-MCNC: 5.6 G/DL — LOW (ref 6–8)
PROT SERPL-MCNC: 5.6 G/DL — LOW (ref 6–8)
PROT SERPL-MCNC: 5.7 G/DL — LOW (ref 6–8)
PROT SERPL-MCNC: 5.7 G/DL — LOW (ref 6–8)
PROT SERPL-MCNC: 5.8 G/DL — LOW (ref 6–8)
PROT SERPL-MCNC: 6 G/DL — SIGNIFICANT CHANGE UP (ref 6–8)
PROT SERPL-MCNC: 6.2 G/DL — SIGNIFICANT CHANGE UP (ref 6–8)
PROT SERPL-MCNC: 6.3 G/DL — SIGNIFICANT CHANGE UP (ref 6–8)
PROT SERPL-MCNC: 6.3 G/DL — SIGNIFICANT CHANGE UP (ref 6–8)
PROT SERPL-MCNC: 6.4 G/DL — SIGNIFICANT CHANGE UP (ref 6–8)
PROT SERPL-MCNC: 6.5 G/DL — SIGNIFICANT CHANGE UP (ref 6–8)
PROT SERPL-MCNC: 6.6 G/DL — SIGNIFICANT CHANGE UP (ref 6–8)
PROT SERPL-MCNC: 6.7 G/DL — SIGNIFICANT CHANGE UP (ref 6–8)
PROT SERPL-MCNC: 6.8 G/DL — SIGNIFICANT CHANGE UP (ref 6–8)
PROT SERPL-MCNC: 6.9 G/DL — SIGNIFICANT CHANGE UP (ref 6–8)
PROT SERPL-MCNC: 7 G/DL — SIGNIFICANT CHANGE UP (ref 6–8)
PROT SERPL-MCNC: 7.1 G/DL — SIGNIFICANT CHANGE UP (ref 6–8)
PROT SERPL-MCNC: 7.2 G/DL — SIGNIFICANT CHANGE UP (ref 6–8)
PROT SERPL-MCNC: 7.5 G/DL — SIGNIFICANT CHANGE UP (ref 6–8)
PROT SERPL-MCNC: 7.8 G/DL — SIGNIFICANT CHANGE UP (ref 6–8)
PROT UR-MCNC: ABNORMAL
PROT UR-MCNC: ABNORMAL
PROT UR-MCNC: NEGATIVE — SIGNIFICANT CHANGE UP
PROTHROM AB SERPL-ACNC: 15.9 SEC — HIGH (ref 9.95–12.87)
PROTHROM AB SERPL-ACNC: 17.9 SEC — HIGH (ref 9.95–12.87)
PROTHROM AB SERPL-ACNC: 19.3 SEC — HIGH (ref 9.95–12.87)
PROTHROM AB SERPL-ACNC: 19.8 SEC — HIGH (ref 9.95–12.87)
PROTHROM AB SERPL-ACNC: 20.2 SEC — HIGH (ref 9.95–12.87)
PROTHROM AB SERPL-ACNC: 20.5 SEC — HIGH (ref 9.95–12.87)
PROTHROM AB SERPL-ACNC: 24.2 SEC — HIGH (ref 9.95–12.87)
PROTHROM AB SERPL-ACNC: 24.6 SEC — HIGH (ref 9.95–12.87)
PROTHROM AB SERPL-ACNC: 24.9 SEC — HIGH (ref 9.95–12.87)
PROTHROM AB SERPL-ACNC: 34.9 SEC — HIGH (ref 9.95–12.87)
PROTHROM AB SERPL-ACNC: 35.8 SEC — HIGH (ref 9.95–12.87)
PROTHROM AB SERPL-ACNC: 37.3 SEC — HIGH (ref 9.95–12.87)
PROTHROM AB SERPL-ACNC: 37.4 SEC — HIGH (ref 9.95–12.87)
PROTHROM AB SERPL-ACNC: 37.5 SEC — HIGH (ref 9.95–12.87)
PROTHROM AB SERPL-ACNC: >40 SEC — HIGH (ref 9.95–12.87)
RAPID RVP RESULT: SIGNIFICANT CHANGE UP
RBC # BLD: 2.41 M/UL — LOW (ref 4.7–6.1)
RBC # BLD: 3.18 M/UL — LOW (ref 4.7–6.1)
RBC # BLD: 3.3 M/UL — LOW (ref 4.7–6.1)
RBC # BLD: 3.35 M/UL — LOW (ref 4.7–6.1)
RBC # BLD: 3.37 M/UL — LOW (ref 4.7–6.1)
RBC # BLD: 3.39 M/UL — LOW (ref 4.7–6.1)
RBC # BLD: 3.39 M/UL — LOW (ref 4.7–6.1)
RBC # BLD: 3.4 M/UL — LOW (ref 4.7–6.1)
RBC # BLD: 3.41 M/UL — LOW (ref 4.7–6.1)
RBC # BLD: 3.49 M/UL — LOW (ref 4.7–6.1)
RBC # BLD: 3.5 M/UL — LOW (ref 4.7–6.1)
RBC # BLD: 3.54 M/UL — LOW (ref 4.7–6.1)
RBC # BLD: 3.59 M/UL — LOW (ref 4.7–6.1)
RBC # BLD: 3.63 M/UL — LOW (ref 4.7–6.1)
RBC # BLD: 3.65 M/UL — LOW (ref 4.7–6.1)
RBC # BLD: 3.65 M/UL — LOW (ref 4.7–6.1)
RBC # BLD: 3.66 M/UL — LOW (ref 4.7–6.1)
RBC # BLD: 3.71 M/UL — LOW (ref 4.7–6.1)
RBC # BLD: 3.72 M/UL — LOW (ref 4.7–6.1)
RBC # BLD: 3.77 M/UL — LOW (ref 4.7–6.1)
RBC # BLD: 3.79 M/UL — LOW (ref 4.7–6.1)
RBC # BLD: 3.82 M/UL — LOW (ref 4.7–6.1)
RBC # BLD: 3.83 M/UL — LOW (ref 4.7–6.1)
RBC # BLD: 3.84 M/UL — LOW (ref 4.7–6.1)
RBC # BLD: 3.86 M/UL — LOW (ref 4.7–6.1)
RBC # BLD: 3.87 M/UL — LOW (ref 4.7–6.1)
RBC # BLD: 3.9 M/UL — LOW (ref 4.7–6.1)
RBC # BLD: 3.92 M/UL — LOW (ref 4.7–6.1)
RBC # BLD: 3.92 M/UL — LOW (ref 4.7–6.1)
RBC # BLD: 3.93 M/UL — LOW (ref 4.7–6.1)
RBC # BLD: 3.94 M/UL — LOW (ref 4.7–6.1)
RBC # BLD: 3.96 M/UL — LOW (ref 4.7–6.1)
RBC # BLD: 3.99 M/UL — LOW (ref 4.7–6.1)
RBC # BLD: 4.01 M/UL — LOW (ref 4.7–6.1)
RBC # BLD: 4.05 M/UL — LOW (ref 4.7–6.1)
RBC # BLD: 4.05 M/UL — LOW (ref 4.7–6.1)
RBC # BLD: 4.09 M/UL — LOW (ref 4.7–6.1)
RBC # BLD: 4.1 M/UL — LOW (ref 4.7–6.1)
RBC # BLD: 4.12 M/UL — LOW (ref 4.7–6.1)
RBC # BLD: 4.13 M/UL — LOW (ref 4.7–6.1)
RBC # BLD: 4.15 M/UL — LOW (ref 4.7–6.1)
RBC # BLD: 4.15 M/UL — LOW (ref 4.7–6.1)
RBC # BLD: 4.16 M/UL — LOW (ref 4.7–6.1)
RBC # BLD: 4.18 M/UL — LOW (ref 4.7–6.1)
RBC # BLD: 4.23 M/UL — LOW (ref 4.7–6.1)
RBC # BLD: 4.24 M/UL — LOW (ref 4.7–6.1)
RBC # BLD: 4.26 M/UL — LOW (ref 4.7–6.1)
RBC # BLD: 4.26 M/UL — LOW (ref 4.7–6.1)
RBC # BLD: 4.29 M/UL — LOW (ref 4.7–6.1)
RBC # BLD: 4.5 M/UL — LOW (ref 4.7–6.1)
RBC # BLD: 4.58 M/UL — LOW (ref 4.7–6.1)
RBC # BLD: 4.68 M/UL — LOW (ref 4.7–6.1)
RBC # FLD: 13.7 % — SIGNIFICANT CHANGE UP (ref 11.5–14.5)
RBC # FLD: 13.8 % — SIGNIFICANT CHANGE UP (ref 11.5–14.5)
RBC # FLD: 13.9 % — SIGNIFICANT CHANGE UP (ref 11.5–14.5)
RBC # FLD: 13.9 % — SIGNIFICANT CHANGE UP (ref 11.5–14.5)
RBC # FLD: 14 % — SIGNIFICANT CHANGE UP (ref 11.5–14.5)
RBC # FLD: 14.2 % — SIGNIFICANT CHANGE UP (ref 11.5–14.5)
RBC # FLD: 14.3 % — SIGNIFICANT CHANGE UP (ref 11.5–14.5)
RBC # FLD: 14.4 % — SIGNIFICANT CHANGE UP (ref 11.5–14.5)
RBC # FLD: 14.4 % — SIGNIFICANT CHANGE UP (ref 11.5–14.5)
RBC # FLD: 14.5 % — SIGNIFICANT CHANGE UP (ref 11.5–14.5)
RBC # FLD: 14.5 % — SIGNIFICANT CHANGE UP (ref 11.5–14.5)
RBC # FLD: 14.8 % — HIGH (ref 11.5–14.5)
RBC # FLD: 15.4 % — HIGH (ref 11.5–14.5)
RBC # FLD: 15.4 % — HIGH (ref 11.5–14.5)
RBC # FLD: 15.5 % — HIGH (ref 11.5–14.5)
RBC # FLD: 15.6 % — HIGH (ref 11.5–14.5)
RBC # FLD: 15.6 % — HIGH (ref 11.5–14.5)
RBC # FLD: 15.7 % — HIGH (ref 11.5–14.5)
RBC # FLD: 15.8 % — HIGH (ref 11.5–14.5)
RBC # FLD: 15.9 % — HIGH (ref 11.5–14.5)
RBC # FLD: 16 % — HIGH (ref 11.5–14.5)
RBC # FLD: 16 % — HIGH (ref 11.5–14.5)
RBC # FLD: 16.1 % — HIGH (ref 11.5–14.5)
RBC # FLD: 16.2 % — HIGH (ref 11.5–14.5)
RBC # FLD: 16.3 % — HIGH (ref 11.5–14.5)
RBC # FLD: 16.4 % — HIGH (ref 11.5–14.5)
RBC # FLD: 17.2 % — HIGH (ref 11.5–14.5)
RBC # FLD: 17.3 % — HIGH (ref 11.5–14.5)
RBC # FLD: 17.8 % — HIGH (ref 11.5–14.5)
RBC # FLD: 17.8 % — HIGH (ref 11.5–14.5)
RBC # FLD: 18 % — HIGH (ref 11.5–14.5)
RBC # FLD: 18.1 % — HIGH (ref 11.5–14.5)
RBC # FLD: 18.2 % — HIGH (ref 11.5–14.5)
RBC # FLD: 18.4 % — HIGH (ref 11.5–14.5)
RBC # FLD: 18.5 % — HIGH (ref 11.5–14.5)
RBC BLD AUTO: ABNORMAL
RBC BLD AUTO: ABNORMAL
RBC CASTS # UR COMP ASSIST: 12 /HPF — HIGH (ref 0–4)
RBC CASTS # UR COMP ASSIST: 4 /HPF — SIGNIFICANT CHANGE UP (ref 0–4)
RBC CASTS # UR COMP ASSIST: 8 /HPF — HIGH (ref 0–4)
RSV RNA NPH QL NAA+NON-PROBE: DETECTED
RSV RNA NPH QL NAA+NON-PROBE: SIGNIFICANT CHANGE UP
SAO2 % BLDA: 83 % — LOW (ref 94–98)
SAO2 % BLDA: 97 % — SIGNIFICANT CHANGE UP (ref 94–98)
SAO2 % BLDV: 27.2 % — SIGNIFICANT CHANGE UP
SAO2 % BLDV: 33.4 % — SIGNIFICANT CHANGE UP
SAO2 % BLDV: 33.9 % — SIGNIFICANT CHANGE UP
SAO2 % BLDV: 47.9 % — SIGNIFICANT CHANGE UP
SAO2 % BLDV: 67.6 % — SIGNIFICANT CHANGE UP
SARS-COV-2 RNA SPEC QL NAA+PROBE: DETECTED
SARS-COV-2 RNA SPEC QL NAA+PROBE: SIGNIFICANT CHANGE UP
SODIUM SERPL-SCNC: 128 MMOL/L — LOW (ref 135–146)
SODIUM SERPL-SCNC: 129 MMOL/L — LOW (ref 135–146)
SODIUM SERPL-SCNC: 131 MMOL/L — LOW (ref 135–146)
SODIUM SERPL-SCNC: 131 MMOL/L — LOW (ref 135–146)
SODIUM SERPL-SCNC: 132 MMOL/L — LOW (ref 135–146)
SODIUM SERPL-SCNC: 133 MMOL/L — LOW (ref 135–146)
SODIUM SERPL-SCNC: 134 MMOL/L — LOW (ref 135–146)
SODIUM SERPL-SCNC: 135 MMOL/L — SIGNIFICANT CHANGE UP (ref 135–146)
SODIUM SERPL-SCNC: 136 MMOL/L — SIGNIFICANT CHANGE UP (ref 135–146)
SODIUM SERPL-SCNC: 137 MMOL/L — SIGNIFICANT CHANGE UP (ref 135–146)
SODIUM SERPL-SCNC: 138 MMOL/L — SIGNIFICANT CHANGE UP (ref 135–146)
SODIUM SERPL-SCNC: 139 MMOL/L — SIGNIFICANT CHANGE UP (ref 135–146)
SODIUM SERPL-SCNC: 140 MMOL/L — SIGNIFICANT CHANGE UP (ref 135–146)
SODIUM SERPL-SCNC: 140 MMOL/L — SIGNIFICANT CHANGE UP (ref 135–146)
SODIUM SERPL-SCNC: 141 MMOL/L — SIGNIFICANT CHANGE UP (ref 135–146)
SODIUM SERPL-SCNC: 145 MMOL/L — SIGNIFICANT CHANGE UP (ref 135–146)
SP GR SPEC: 1.01 — SIGNIFICANT CHANGE UP (ref 1.01–1.03)
SP GR SPEC: 1.02 — SIGNIFICANT CHANGE UP (ref 1.01–1.03)
SP GR SPEC: 1.02 — SIGNIFICANT CHANGE UP (ref 1.01–1.03)
SPECIMEN SOURCE: SIGNIFICANT CHANGE UP
TRIGL SERPL-MCNC: 68 MG/DL — SIGNIFICANT CHANGE UP
TROPONIN T SERPL-MCNC: 0.02 NG/ML — HIGH
TROPONIN T SERPL-MCNC: 0.03 NG/ML — CRITICAL HIGH
TROPONIN T SERPL-MCNC: 0.03 NG/ML — CRITICAL HIGH
TROPONIN T SERPL-MCNC: 0.05 NG/ML — CRITICAL HIGH
TROPONIN T SERPL-MCNC: 0.09 NG/ML — CRITICAL HIGH
TROPONIN T SERPL-MCNC: <0.01 NG/ML — SIGNIFICANT CHANGE UP
TSH SERPL-MCNC: 0.34 UIU/ML — SIGNIFICANT CHANGE UP (ref 0.27–4.2)
TSH SERPL-MCNC: 0.7 UIU/ML — SIGNIFICANT CHANGE UP (ref 0.27–4.2)
UROBILINOGEN FLD QL: ABNORMAL
UROBILINOGEN FLD QL: SIGNIFICANT CHANGE UP
UROBILINOGEN FLD QL: SIGNIFICANT CHANGE UP
VARIANT LYMPHS # BLD: 0.9 % — SIGNIFICANT CHANGE UP (ref 0–5)
VIT B1 SERPL-MCNC: 122.4 NMOL/L — SIGNIFICANT CHANGE UP (ref 66.5–200)
VIT B12 SERPL-MCNC: 794 PG/ML — SIGNIFICANT CHANGE UP (ref 232–1245)
WBC # BLD: 10.17 K/UL — SIGNIFICANT CHANGE UP (ref 4.8–10.8)
WBC # BLD: 10.39 K/UL — SIGNIFICANT CHANGE UP (ref 4.8–10.8)
WBC # BLD: 10.57 K/UL — SIGNIFICANT CHANGE UP (ref 4.8–10.8)
WBC # BLD: 10.66 K/UL — SIGNIFICANT CHANGE UP (ref 4.8–10.8)
WBC # BLD: 10.78 K/UL — SIGNIFICANT CHANGE UP (ref 4.8–10.8)
WBC # BLD: 10.98 K/UL — HIGH (ref 4.8–10.8)
WBC # BLD: 11.16 K/UL — HIGH (ref 4.8–10.8)
WBC # BLD: 11.24 K/UL — HIGH (ref 4.8–10.8)
WBC # BLD: 11.87 K/UL — HIGH (ref 4.8–10.8)
WBC # BLD: 11.96 K/UL — HIGH (ref 4.8–10.8)
WBC # BLD: 12.48 K/UL — HIGH (ref 4.8–10.8)
WBC # BLD: 12.49 K/UL — HIGH (ref 4.8–10.8)
WBC # BLD: 12.53 K/UL — HIGH (ref 4.8–10.8)
WBC # BLD: 12.57 K/UL — HIGH (ref 4.8–10.8)
WBC # BLD: 12.62 K/UL — HIGH (ref 4.8–10.8)
WBC # BLD: 12.63 K/UL — HIGH (ref 4.8–10.8)
WBC # BLD: 12.64 K/UL — HIGH (ref 4.8–10.8)
WBC # BLD: 13.64 K/UL — HIGH (ref 4.8–10.8)
WBC # BLD: 13.79 K/UL — HIGH (ref 4.8–10.8)
WBC # BLD: 14.1 K/UL — HIGH (ref 4.8–10.8)
WBC # BLD: 14.57 K/UL — HIGH (ref 4.8–10.8)
WBC # BLD: 14.75 K/UL — HIGH (ref 4.8–10.8)
WBC # BLD: 15.08 K/UL — HIGH (ref 4.8–10.8)
WBC # BLD: 15.18 K/UL — HIGH (ref 4.8–10.8)
WBC # BLD: 15.28 K/UL — HIGH (ref 4.8–10.8)
WBC # BLD: 15.47 K/UL — HIGH (ref 4.8–10.8)
WBC # BLD: 15.77 K/UL — HIGH (ref 4.8–10.8)
WBC # BLD: 17.95 K/UL — HIGH (ref 4.8–10.8)
WBC # BLD: 18.56 K/UL — HIGH (ref 4.8–10.8)
WBC # BLD: 19.09 K/UL — HIGH (ref 4.8–10.8)
WBC # BLD: 20.34 K/UL — HIGH (ref 4.8–10.8)
WBC # BLD: 20.52 K/UL — HIGH (ref 4.8–10.8)
WBC # BLD: 20.99 K/UL — HIGH (ref 4.8–10.8)
WBC # BLD: 22.86 K/UL — HIGH (ref 4.8–10.8)
WBC # BLD: 22.94 K/UL — HIGH (ref 4.8–10.8)
WBC # BLD: 29.99 K/UL — HIGH (ref 4.8–10.8)
WBC # BLD: 30.71 K/UL — HIGH (ref 4.8–10.8)
WBC # BLD: 5.99 K/UL — SIGNIFICANT CHANGE UP (ref 4.8–10.8)
WBC # BLD: 6.06 K/UL — SIGNIFICANT CHANGE UP (ref 4.8–10.8)
WBC # BLD: 6.09 K/UL — SIGNIFICANT CHANGE UP (ref 4.8–10.8)
WBC # BLD: 6.25 K/UL — SIGNIFICANT CHANGE UP (ref 4.8–10.8)
WBC # BLD: 6.26 K/UL — SIGNIFICANT CHANGE UP (ref 4.8–10.8)
WBC # BLD: 6.3 K/UL — SIGNIFICANT CHANGE UP (ref 4.8–10.8)
WBC # BLD: 6.8 K/UL — SIGNIFICANT CHANGE UP (ref 4.8–10.8)
WBC # BLD: 6.85 K/UL — SIGNIFICANT CHANGE UP (ref 4.8–10.8)
WBC # BLD: 7.06 K/UL — SIGNIFICANT CHANGE UP (ref 4.8–10.8)
WBC # BLD: 7.23 K/UL — SIGNIFICANT CHANGE UP (ref 4.8–10.8)
WBC # BLD: 7.24 K/UL — SIGNIFICANT CHANGE UP (ref 4.8–10.8)
WBC # BLD: 7.4 K/UL — SIGNIFICANT CHANGE UP (ref 4.8–10.8)
WBC # BLD: 7.49 K/UL — SIGNIFICANT CHANGE UP (ref 4.8–10.8)
WBC # BLD: 7.58 K/UL — SIGNIFICANT CHANGE UP (ref 4.8–10.8)
WBC # BLD: 7.59 K/UL — SIGNIFICANT CHANGE UP (ref 4.8–10.8)
WBC # BLD: 8.62 K/UL — SIGNIFICANT CHANGE UP (ref 4.8–10.8)
WBC # BLD: 9.04 K/UL — SIGNIFICANT CHANGE UP (ref 4.8–10.8)
WBC # BLD: 9.34 K/UL — SIGNIFICANT CHANGE UP (ref 4.8–10.8)
WBC # BLD: 9.82 K/UL — SIGNIFICANT CHANGE UP (ref 4.8–10.8)
WBC # FLD AUTO: 10.17 K/UL — SIGNIFICANT CHANGE UP (ref 4.8–10.8)
WBC # FLD AUTO: 10.39 K/UL — SIGNIFICANT CHANGE UP (ref 4.8–10.8)
WBC # FLD AUTO: 10.57 K/UL — SIGNIFICANT CHANGE UP (ref 4.8–10.8)
WBC # FLD AUTO: 10.66 K/UL — SIGNIFICANT CHANGE UP (ref 4.8–10.8)
WBC # FLD AUTO: 10.78 K/UL — SIGNIFICANT CHANGE UP (ref 4.8–10.8)
WBC # FLD AUTO: 10.98 K/UL — HIGH (ref 4.8–10.8)
WBC # FLD AUTO: 11.16 K/UL — HIGH (ref 4.8–10.8)
WBC # FLD AUTO: 11.24 K/UL — HIGH (ref 4.8–10.8)
WBC # FLD AUTO: 11.87 K/UL — HIGH (ref 4.8–10.8)
WBC # FLD AUTO: 11.96 K/UL — HIGH (ref 4.8–10.8)
WBC # FLD AUTO: 12.48 K/UL — HIGH (ref 4.8–10.8)
WBC # FLD AUTO: 12.49 K/UL — HIGH (ref 4.8–10.8)
WBC # FLD AUTO: 12.53 K/UL — HIGH (ref 4.8–10.8)
WBC # FLD AUTO: 12.57 K/UL — HIGH (ref 4.8–10.8)
WBC # FLD AUTO: 12.62 K/UL — HIGH (ref 4.8–10.8)
WBC # FLD AUTO: 12.63 K/UL — HIGH (ref 4.8–10.8)
WBC # FLD AUTO: 12.64 K/UL — HIGH (ref 4.8–10.8)
WBC # FLD AUTO: 13.64 K/UL — HIGH (ref 4.8–10.8)
WBC # FLD AUTO: 13.79 K/UL — HIGH (ref 4.8–10.8)
WBC # FLD AUTO: 14.1 K/UL — HIGH (ref 4.8–10.8)
WBC # FLD AUTO: 14.57 K/UL — HIGH (ref 4.8–10.8)
WBC # FLD AUTO: 14.75 K/UL — HIGH (ref 4.8–10.8)
WBC # FLD AUTO: 15.08 K/UL — HIGH (ref 4.8–10.8)
WBC # FLD AUTO: 15.18 K/UL — HIGH (ref 4.8–10.8)
WBC # FLD AUTO: 15.28 K/UL — HIGH (ref 4.8–10.8)
WBC # FLD AUTO: 15.47 K/UL — HIGH (ref 4.8–10.8)
WBC # FLD AUTO: 15.77 K/UL — HIGH (ref 4.8–10.8)
WBC # FLD AUTO: 17.95 K/UL — HIGH (ref 4.8–10.8)
WBC # FLD AUTO: 18.56 K/UL — HIGH (ref 4.8–10.8)
WBC # FLD AUTO: 19.09 K/UL — HIGH (ref 4.8–10.8)
WBC # FLD AUTO: 20.34 K/UL — HIGH (ref 4.8–10.8)
WBC # FLD AUTO: 20.52 K/UL — HIGH (ref 4.8–10.8)
WBC # FLD AUTO: 20.99 K/UL — HIGH (ref 4.8–10.8)
WBC # FLD AUTO: 22.86 K/UL — HIGH (ref 4.8–10.8)
WBC # FLD AUTO: 22.94 K/UL — HIGH (ref 4.8–10.8)
WBC # FLD AUTO: 29.99 K/UL — HIGH (ref 4.8–10.8)
WBC # FLD AUTO: 30.71 K/UL — HIGH (ref 4.8–10.8)
WBC # FLD AUTO: 5.99 K/UL — SIGNIFICANT CHANGE UP (ref 4.8–10.8)
WBC # FLD AUTO: 6.06 K/UL — SIGNIFICANT CHANGE UP (ref 4.8–10.8)
WBC # FLD AUTO: 6.09 K/UL — SIGNIFICANT CHANGE UP (ref 4.8–10.8)
WBC # FLD AUTO: 6.25 K/UL — SIGNIFICANT CHANGE UP (ref 4.8–10.8)
WBC # FLD AUTO: 6.26 K/UL — SIGNIFICANT CHANGE UP (ref 4.8–10.8)
WBC # FLD AUTO: 6.3 K/UL — SIGNIFICANT CHANGE UP (ref 4.8–10.8)
WBC # FLD AUTO: 6.8 K/UL — SIGNIFICANT CHANGE UP (ref 4.8–10.8)
WBC # FLD AUTO: 6.85 K/UL — SIGNIFICANT CHANGE UP (ref 4.8–10.8)
WBC # FLD AUTO: 7.06 K/UL — SIGNIFICANT CHANGE UP (ref 4.8–10.8)
WBC # FLD AUTO: 7.23 K/UL — SIGNIFICANT CHANGE UP (ref 4.8–10.8)
WBC # FLD AUTO: 7.24 K/UL — SIGNIFICANT CHANGE UP (ref 4.8–10.8)
WBC # FLD AUTO: 7.4 K/UL — SIGNIFICANT CHANGE UP (ref 4.8–10.8)
WBC # FLD AUTO: 7.49 K/UL — SIGNIFICANT CHANGE UP (ref 4.8–10.8)
WBC # FLD AUTO: 7.58 K/UL — SIGNIFICANT CHANGE UP (ref 4.8–10.8)
WBC # FLD AUTO: 7.59 K/UL — SIGNIFICANT CHANGE UP (ref 4.8–10.8)
WBC # FLD AUTO: 8.62 K/UL — SIGNIFICANT CHANGE UP (ref 4.8–10.8)
WBC # FLD AUTO: 9.04 K/UL — SIGNIFICANT CHANGE UP (ref 4.8–10.8)
WBC # FLD AUTO: 9.34 K/UL — SIGNIFICANT CHANGE UP (ref 4.8–10.8)
WBC # FLD AUTO: 9.82 K/UL — SIGNIFICANT CHANGE UP (ref 4.8–10.8)
WBC UR QL: 0 /HPF — SIGNIFICANT CHANGE UP (ref 0–5)
WBC UR QL: 131 /HPF — HIGH (ref 0–5)
WBC UR QL: 31 /HPF — HIGH (ref 0–5)

## 2022-01-01 PROCEDURE — 71045 X-RAY EXAM CHEST 1 VIEW: CPT | Mod: 26,77

## 2022-01-01 PROCEDURE — 99233 SBSQ HOSP IP/OBS HIGH 50: CPT

## 2022-01-01 PROCEDURE — 99232 SBSQ HOSP IP/OBS MODERATE 35: CPT

## 2022-01-01 PROCEDURE — 99291 CRITICAL CARE FIRST HOUR: CPT

## 2022-01-01 PROCEDURE — 99221 1ST HOSP IP/OBS SF/LOW 40: CPT | Mod: GC

## 2022-01-01 PROCEDURE — 93010 ELECTROCARDIOGRAM REPORT: CPT

## 2022-01-01 PROCEDURE — 99223 1ST HOSP IP/OBS HIGH 75: CPT

## 2022-01-01 PROCEDURE — 93306 TTE W/DOPPLER COMPLETE: CPT | Mod: 26

## 2022-01-01 PROCEDURE — 93970 EXTREMITY STUDY: CPT | Mod: 26

## 2022-01-01 PROCEDURE — 71045 X-RAY EXAM CHEST 1 VIEW: CPT | Mod: 26

## 2022-01-01 PROCEDURE — 70450 CT HEAD/BRAIN W/O DYE: CPT | Mod: 26,MA

## 2022-01-01 PROCEDURE — 93000 ELECTROCARDIOGRAM COMPLETE: CPT

## 2022-01-01 PROCEDURE — 99214 OFFICE O/P EST MOD 30 MIN: CPT

## 2022-01-01 PROCEDURE — 99222 1ST HOSP IP/OBS MODERATE 55: CPT

## 2022-01-01 PROCEDURE — 99285 EMERGENCY DEPT VISIT HI MDM: CPT | Mod: FS,CS

## 2022-01-01 PROCEDURE — 73030 X-RAY EXAM OF SHOULDER: CPT | Mod: 26,LT

## 2022-01-01 PROCEDURE — 76604 US EXAM CHEST: CPT | Mod: 26,GC

## 2022-01-01 PROCEDURE — 99291 CRITICAL CARE FIRST HOUR: CPT | Mod: FT,CS,25

## 2022-01-01 PROCEDURE — 99239 HOSP IP/OBS DSCHRG MGMT >30: CPT

## 2022-01-01 PROCEDURE — 71250 CT THORAX DX C-: CPT | Mod: 26

## 2022-01-01 PROCEDURE — 99285 EMERGENCY DEPT VISIT HI MDM: CPT

## 2022-01-01 PROCEDURE — 99285 EMERGENCY DEPT VISIT HI MDM: CPT | Mod: CS,25,GC

## 2022-01-01 PROCEDURE — 99291 CRITICAL CARE FIRST HOUR: CPT | Mod: CS

## 2022-01-01 PROCEDURE — 95816 EEG AWAKE AND DROWSY: CPT | Mod: 26

## 2022-01-01 PROCEDURE — 93010 ELECTROCARDIOGRAM REPORT: CPT | Mod: 77

## 2022-01-01 PROCEDURE — 99284 EMERGENCY DEPT VISIT MOD MDM: CPT | Mod: FS

## 2022-01-01 PROCEDURE — 99213 OFFICE O/P EST LOW 20 MIN: CPT

## 2022-01-01 PROCEDURE — 36556 INSERT NON-TUNNEL CV CATH: CPT

## 2022-01-01 PROCEDURE — 99222 1ST HOSP IP/OBS MODERATE 55: CPT | Mod: GC

## 2022-01-01 RX ORDER — CHOLECALCIFEROL (VITAMIN D3) 125 MCG
1000 CAPSULE ORAL DAILY
Refills: 0 | Status: DISCONTINUED | OUTPATIENT
Start: 2022-01-01 | End: 2022-01-01

## 2022-01-01 RX ORDER — DILTIAZEM HCL 120 MG
120 CAPSULE, EXT RELEASE 24 HR ORAL DAILY
Refills: 0 | Status: DISCONTINUED | OUTPATIENT
Start: 2022-01-01 | End: 2022-01-01

## 2022-01-01 RX ORDER — METOPROLOL TARTRATE 50 MG
50 TABLET ORAL EVERY 8 HOURS
Refills: 0 | Status: DISCONTINUED | OUTPATIENT
Start: 2022-01-01 | End: 2022-01-01

## 2022-01-01 RX ORDER — METOPROLOL TARTRATE 50 MG
50 TABLET ORAL EVERY 12 HOURS
Refills: 0 | Status: DISCONTINUED | OUTPATIENT
Start: 2022-01-01 | End: 2022-01-01

## 2022-01-01 RX ORDER — IPRATROPIUM/ALBUTEROL SULFATE 18-103MCG
3 AEROSOL WITH ADAPTER (GRAM) INHALATION ONCE
Refills: 0 | Status: COMPLETED | OUTPATIENT
Start: 2022-01-01 | End: 2022-01-01

## 2022-01-01 RX ORDER — FERROUS SULFATE 325(65) MG
1 TABLET ORAL
Qty: 30 | Refills: 0
Start: 2022-01-01 | End: 2022-08-17

## 2022-01-01 RX ORDER — MIDAZOLAM HYDROCHLORIDE 1 MG/ML
2 INJECTION, SOLUTION INTRAMUSCULAR; INTRAVENOUS ONCE
Refills: 0 | Status: DISCONTINUED | OUTPATIENT
Start: 2022-01-01 | End: 2022-01-01

## 2022-01-01 RX ORDER — IPRATROPIUM/ALBUTEROL SULFATE 18-103MCG
3 AEROSOL WITH ADAPTER (GRAM) INHALATION EVERY 6 HOURS
Refills: 0 | Status: DISCONTINUED | OUTPATIENT
Start: 2022-01-01 | End: 2022-01-01

## 2022-01-01 RX ORDER — AMLODIPINE BESYLATE AND BENAZEPRIL HYDROCHLORIDE 10; 20 MG/1; MG/1
1 CAPSULE ORAL
Qty: 0 | Refills: 0 | DISCHARGE

## 2022-01-01 RX ORDER — HYDROMORPHONE HYDROCHLORIDE 2 MG/ML
0.5 INJECTION INTRAMUSCULAR; INTRAVENOUS; SUBCUTANEOUS ONCE
Refills: 0 | Status: DISCONTINUED | OUTPATIENT
Start: 2022-01-01 | End: 2022-01-01

## 2022-01-01 RX ORDER — LISINOPRIL 2.5 MG/1
10 TABLET ORAL DAILY
Refills: 0 | Status: DISCONTINUED | OUTPATIENT
Start: 2022-01-01 | End: 2022-01-01

## 2022-01-01 RX ORDER — FAMOTIDINE 10 MG/ML
20 INJECTION INTRAVENOUS DAILY
Refills: 0 | Status: DISCONTINUED | OUTPATIENT
Start: 2022-01-01 | End: 2022-01-01

## 2022-01-01 RX ORDER — MAGNESIUM SULFATE 500 MG/ML
2 VIAL (ML) INJECTION ONCE
Refills: 0 | Status: COMPLETED | OUTPATIENT
Start: 2022-01-01 | End: 2022-01-01

## 2022-01-01 RX ORDER — FUROSEMIDE 40 MG
1 TABLET ORAL
Qty: 30 | Refills: 0
Start: 2022-01-01 | End: 2022-08-17

## 2022-01-01 RX ORDER — METOPROLOL TARTRATE 50 MG
100 TABLET ORAL THREE TIMES A DAY
Refills: 0 | Status: DISCONTINUED | OUTPATIENT
Start: 2022-01-01 | End: 2022-01-01

## 2022-01-01 RX ORDER — APIXABAN 2.5 MG/1
5 TABLET, FILM COATED ORAL
Refills: 0 | Status: DISCONTINUED | OUTPATIENT
Start: 2022-01-01 | End: 2022-01-01

## 2022-01-01 RX ORDER — ENOXAPARIN SODIUM 100 MG/ML
70 INJECTION SUBCUTANEOUS EVERY 12 HOURS
Refills: 0 | Status: DISCONTINUED | OUTPATIENT
Start: 2022-01-01 | End: 2022-01-01

## 2022-01-01 RX ORDER — SENNA PLUS 8.6 MG/1
2 TABLET ORAL AT BEDTIME
Refills: 0 | Status: DISCONTINUED | OUTPATIENT
Start: 2022-01-01 | End: 2022-01-01

## 2022-01-01 RX ORDER — PANTOPRAZOLE SODIUM 20 MG/1
40 TABLET, DELAYED RELEASE ORAL
Refills: 0 | Status: DISCONTINUED | OUTPATIENT
Start: 2022-01-01 | End: 2022-01-01

## 2022-01-01 RX ORDER — FINASTERIDE 5 MG/1
5 TABLET, FILM COATED ORAL DAILY
Refills: 0 | Status: DISCONTINUED | OUTPATIENT
Start: 2022-01-01 | End: 2022-01-01

## 2022-01-01 RX ORDER — CHLORHEXIDINE GLUCONATE 213 G/1000ML
1 SOLUTION TOPICAL
Refills: 0 | Status: DISCONTINUED | OUTPATIENT
Start: 2022-01-01 | End: 2022-01-01

## 2022-01-01 RX ORDER — APIXABAN 2.5 MG/1
1 TABLET, FILM COATED ORAL
Qty: 60 | Refills: 0
Start: 2022-01-01 | End: 2022-01-01

## 2022-01-01 RX ORDER — POTASSIUM CHLORIDE 20 MEQ
40 PACKET (EA) ORAL ONCE
Refills: 0 | Status: COMPLETED | OUTPATIENT
Start: 2022-01-01 | End: 2022-01-01

## 2022-01-01 RX ORDER — CEFEPIME 1 G/1
2000 INJECTION, POWDER, FOR SOLUTION INTRAMUSCULAR; INTRAVENOUS EVERY 12 HOURS
Refills: 0 | Status: DISCONTINUED | OUTPATIENT
Start: 2022-01-01 | End: 2022-01-01

## 2022-01-01 RX ORDER — FUROSEMIDE 40 MG
40 TABLET ORAL EVERY 12 HOURS
Refills: 0 | Status: DISCONTINUED | OUTPATIENT
Start: 2022-01-01 | End: 2022-01-01

## 2022-01-01 RX ORDER — APIXABAN 2.5 MG/1
5 TABLET, FILM COATED ORAL EVERY 12 HOURS
Refills: 0 | Status: DISCONTINUED | OUTPATIENT
Start: 2022-01-01 | End: 2022-01-01

## 2022-01-01 RX ORDER — CEFEPIME 1 G/1
2000 INJECTION, POWDER, FOR SOLUTION INTRAMUSCULAR; INTRAVENOUS EVERY 24 HOURS
Refills: 0 | Status: DISCONTINUED | OUTPATIENT
Start: 2022-01-01 | End: 2022-01-01

## 2022-01-01 RX ORDER — PREGABALIN 225 MG/1
1000 CAPSULE ORAL DAILY
Refills: 0 | Status: DISCONTINUED | OUTPATIENT
Start: 2022-01-01 | End: 2022-01-01

## 2022-01-01 RX ORDER — LANOLIN ALCOHOL/MO/W.PET/CERES
10 CREAM (GRAM) TOPICAL AT BEDTIME
Refills: 0 | Status: DISCONTINUED | OUTPATIENT
Start: 2022-01-01 | End: 2022-01-01

## 2022-01-01 RX ORDER — WARFARIN SODIUM 2.5 MG/1
5 TABLET ORAL ONCE
Refills: 0 | Status: COMPLETED | OUTPATIENT
Start: 2022-01-01 | End: 2022-01-01

## 2022-01-01 RX ORDER — LANOLIN ALCOHOL/MO/W.PET/CERES
10 CREAM (GRAM) TOPICAL ONCE
Refills: 0 | Status: COMPLETED | OUTPATIENT
Start: 2022-01-01 | End: 2022-01-01

## 2022-01-01 RX ORDER — LISINOPRIL 2.5 MG/1
1 TABLET ORAL
Qty: 0 | Refills: 0 | DISCHARGE
Start: 2022-01-01

## 2022-01-01 RX ORDER — INFLUENZA VIRUS VACCINE 15; 15; 15; 15 UG/.5ML; UG/.5ML; UG/.5ML; UG/.5ML
0.7 SUSPENSION INTRAMUSCULAR ONCE
Refills: 0 | Status: DISCONTINUED | OUTPATIENT
Start: 2022-01-01 | End: 2022-01-01

## 2022-01-01 RX ORDER — SODIUM CHLORIDE 9 MG/ML
500 INJECTION INTRAMUSCULAR; INTRAVENOUS; SUBCUTANEOUS ONCE
Refills: 0 | Status: COMPLETED | OUTPATIENT
Start: 2022-01-01 | End: 2022-01-01

## 2022-01-01 RX ORDER — ZOLPIDEM TARTRATE 10 MG/1
5 TABLET ORAL ONCE
Refills: 0 | Status: DISCONTINUED | OUTPATIENT
Start: 2022-01-01 | End: 2022-01-01

## 2022-01-01 RX ORDER — ASCORBIC ACID 60 MG
500 TABLET,CHEWABLE ORAL DAILY
Refills: 0 | Status: DISCONTINUED | OUTPATIENT
Start: 2022-01-01 | End: 2022-01-01

## 2022-01-01 RX ORDER — SIMETHICONE 80 MG/1
80 TABLET, CHEWABLE ORAL EVERY 8 HOURS
Refills: 0 | Status: DISCONTINUED | OUTPATIENT
Start: 2022-01-01 | End: 2022-01-01

## 2022-01-01 RX ORDER — DIPHENHYDRAMINE HCL 50 MG
25 CAPSULE ORAL ONCE
Refills: 0 | Status: COMPLETED | OUTPATIENT
Start: 2022-01-01 | End: 2022-01-01

## 2022-01-01 RX ORDER — IPRATROPIUM/ALBUTEROL SULFATE 18-103MCG
3 AEROSOL WITH ADAPTER (GRAM) INHALATION
Refills: 0 | Status: DISCONTINUED | OUTPATIENT
Start: 2022-01-01 | End: 2022-01-01

## 2022-01-01 RX ORDER — POLYETHYLENE GLYCOL 3350 17 G/17G
17 POWDER, FOR SOLUTION ORAL AT BEDTIME
Refills: 0 | Status: DISCONTINUED | OUTPATIENT
Start: 2022-01-01 | End: 2022-01-01

## 2022-01-01 RX ORDER — WARFARIN SODIUM 2.5 MG/1
2.5 TABLET ORAL ONCE
Refills: 0 | Status: COMPLETED | OUTPATIENT
Start: 2022-01-01 | End: 2022-01-01

## 2022-01-01 RX ORDER — TAMSULOSIN HYDROCHLORIDE 0.4 MG/1
0.4 CAPSULE ORAL AT BEDTIME
Refills: 0 | Status: DISCONTINUED | OUTPATIENT
Start: 2022-01-01 | End: 2022-01-01

## 2022-01-01 RX ORDER — DILTIAZEM HCL 120 MG
1 CAPSULE, EXT RELEASE 24 HR ORAL
Qty: 30 | Refills: 0
Start: 2022-01-01 | End: 2022-08-17

## 2022-01-01 RX ORDER — FUROSEMIDE 40 MG
1 TABLET ORAL
Qty: 0 | Refills: 0 | DISCHARGE
Start: 2022-01-01

## 2022-01-01 RX ORDER — FUROSEMIDE 40 MG
20 TABLET ORAL DAILY
Refills: 0 | Status: DISCONTINUED | OUTPATIENT
Start: 2022-01-01 | End: 2022-01-01

## 2022-01-01 RX ORDER — SODIUM CHLORIDE 9 MG/ML
1000 INJECTION, SOLUTION INTRAVENOUS
Refills: 0 | Status: DISCONTINUED | OUTPATIENT
Start: 2022-01-01 | End: 2022-01-01

## 2022-01-01 RX ORDER — FUROSEMIDE 40 MG
40 TABLET ORAL ONCE
Refills: 0 | Status: COMPLETED | OUTPATIENT
Start: 2022-01-01 | End: 2022-01-01

## 2022-01-01 RX ORDER — SIMETHICONE 80 MG/1
80 TABLET, CHEWABLE ORAL ONCE
Refills: 0 | Status: COMPLETED | OUTPATIENT
Start: 2022-01-01 | End: 2022-01-01

## 2022-01-01 RX ORDER — HALOPERIDOL DECANOATE 100 MG/ML
5 INJECTION INTRAMUSCULAR ONCE
Refills: 0 | Status: DISCONTINUED | OUTPATIENT
Start: 2022-01-01 | End: 2022-01-01

## 2022-01-01 RX ORDER — WARFARIN SODIUM 2.5 MG/1
2 TABLET ORAL ONCE
Refills: 0 | Status: COMPLETED | OUTPATIENT
Start: 2022-01-01 | End: 2022-01-01

## 2022-01-01 RX ORDER — FUROSEMIDE 40 MG
40 TABLET ORAL DAILY
Refills: 0 | Status: DISCONTINUED | OUTPATIENT
Start: 2022-01-01 | End: 2022-01-01

## 2022-01-01 RX ORDER — BUMETANIDE 0.25 MG/ML
1 INJECTION INTRAMUSCULAR; INTRAVENOUS
Refills: 0 | Status: DISCONTINUED | OUTPATIENT
Start: 2022-01-01 | End: 2022-01-01

## 2022-01-01 RX ORDER — METOPROLOL TARTRATE 50 MG
50 TABLET ORAL
Refills: 0 | Status: DISCONTINUED | OUTPATIENT
Start: 2022-01-01 | End: 2022-01-01

## 2022-01-01 RX ORDER — LANOLIN ALCOHOL/MO/W.PET/CERES
5 CREAM (GRAM) TOPICAL AT BEDTIME
Refills: 0 | Status: DISCONTINUED | OUTPATIENT
Start: 2022-01-01 | End: 2022-01-01

## 2022-01-01 RX ORDER — CEFEPIME 1 G/1
2000 INJECTION, POWDER, FOR SOLUTION INTRAMUSCULAR; INTRAVENOUS ONCE
Refills: 0 | Status: COMPLETED | OUTPATIENT
Start: 2022-01-01 | End: 2022-01-01

## 2022-01-01 RX ORDER — METOPROLOL TARTRATE 50 MG
25 TABLET ORAL ONCE
Refills: 0 | Status: COMPLETED | OUTPATIENT
Start: 2022-01-01 | End: 2022-01-01

## 2022-01-01 RX ORDER — HYDROMORPHONE HYDROCHLORIDE 2 MG/ML
2 INJECTION INTRAMUSCULAR; INTRAVENOUS; SUBCUTANEOUS EVERY 6 HOURS
Refills: 0 | Status: DISCONTINUED | OUTPATIENT
Start: 2022-01-01 | End: 2022-01-01

## 2022-01-01 RX ORDER — METOPROLOL TARTRATE 50 MG
12.5 TABLET ORAL
Refills: 0 | Status: DISCONTINUED | OUTPATIENT
Start: 2022-01-01 | End: 2022-01-01

## 2022-01-01 RX ORDER — FERROUS SULFATE 325(65) MG
325 TABLET ORAL DAILY
Refills: 0 | Status: DISCONTINUED | OUTPATIENT
Start: 2022-01-01 | End: 2022-01-01

## 2022-01-01 RX ORDER — OMEPRAZOLE 10 MG/1
1 CAPSULE, DELAYED RELEASE ORAL
Qty: 0 | Refills: 0 | DISCHARGE

## 2022-01-01 RX ORDER — PREGABALIN 225 MG/1
0 CAPSULE ORAL
Qty: 0 | Refills: 1 | DISCHARGE

## 2022-01-01 RX ORDER — AZITHROMYCIN 500 MG/1
500 TABLET, FILM COATED ORAL EVERY 24 HOURS
Refills: 0 | Status: DISCONTINUED | OUTPATIENT
Start: 2022-01-01 | End: 2022-01-01

## 2022-01-01 RX ORDER — ACETAMINOPHEN 500 MG
650 TABLET ORAL EVERY 6 HOURS
Refills: 0 | Status: DISCONTINUED | OUTPATIENT
Start: 2022-01-01 | End: 2022-01-01

## 2022-01-01 RX ORDER — DILTIAZEM HCL 120 MG
1 CAPSULE, EXT RELEASE 24 HR ORAL
Qty: 0 | Refills: 0 | DISCHARGE
Start: 2022-01-01

## 2022-01-01 RX ORDER — FUROSEMIDE 40 MG
20 TABLET ORAL ONCE
Refills: 0 | Status: COMPLETED | OUTPATIENT
Start: 2022-01-01 | End: 2022-01-01

## 2022-01-01 RX ORDER — MIDODRINE HYDROCHLORIDE 2.5 MG/1
5 TABLET ORAL ONCE
Refills: 0 | Status: COMPLETED | OUTPATIENT
Start: 2022-01-01 | End: 2022-01-01

## 2022-01-01 RX ORDER — FINASTERIDE 5 MG/1
1 TABLET, FILM COATED ORAL
Qty: 30 | Refills: 0
Start: 2022-01-01 | End: 2022-08-21

## 2022-01-01 RX ORDER — ENOXAPARIN SODIUM 100 MG/ML
80 INJECTION SUBCUTANEOUS
Refills: 0 | Status: DISCONTINUED | OUTPATIENT
Start: 2022-01-01 | End: 2022-01-01

## 2022-01-01 RX ORDER — METOPROLOL TARTRATE 50 MG
12.5 TABLET ORAL ONCE
Refills: 0 | Status: COMPLETED | OUTPATIENT
Start: 2022-01-01 | End: 2022-01-01

## 2022-01-01 RX ORDER — LANOLIN ALCOHOL/MO/W.PET/CERES
5 CREAM (GRAM) TOPICAL ONCE
Refills: 0 | Status: COMPLETED | OUTPATIENT
Start: 2022-01-01 | End: 2022-01-01

## 2022-01-01 RX ORDER — AMOXICILLIN 250 MG/5ML
1 SUSPENSION, RECONSTITUTED, ORAL (ML) ORAL
Qty: 14 | Refills: 0
Start: 2022-01-01 | End: 2022-01-01

## 2022-01-01 RX ORDER — HEPARIN SODIUM 5000 [USP'U]/ML
5000 INJECTION INTRAVENOUS; SUBCUTANEOUS EVERY 8 HOURS
Refills: 0 | Status: DISCONTINUED | OUTPATIENT
Start: 2022-01-01 | End: 2022-01-01

## 2022-01-01 RX ORDER — OMEPRAZOLE 10 MG/1
0 CAPSULE, DELAYED RELEASE ORAL
Qty: 0 | Refills: 0 | DISCHARGE

## 2022-01-01 RX ORDER — ALPRAZOLAM 0.25 MG
0.5 TABLET ORAL ONCE
Refills: 0 | Status: DISCONTINUED | OUTPATIENT
Start: 2022-01-01 | End: 2022-01-01

## 2022-01-01 RX ORDER — POLYETHYLENE GLYCOL 3350 17 G/17G
17 POWDER, FOR SOLUTION ORAL DAILY
Refills: 0 | Status: DISCONTINUED | OUTPATIENT
Start: 2022-01-01 | End: 2022-01-01

## 2022-01-01 RX ORDER — RIVAROXABAN 15 MG-20MG
1 KIT ORAL
Qty: 30 | Refills: 0
Start: 2022-01-01 | End: 2022-01-01

## 2022-01-01 RX ORDER — SODIUM CHLORIDE 9 MG/ML
500 INJECTION, SOLUTION INTRAVENOUS ONCE
Refills: 0 | Status: COMPLETED | OUTPATIENT
Start: 2022-01-01 | End: 2022-01-01

## 2022-01-01 RX ORDER — METOPROLOL TARTRATE 50 MG
25 TABLET ORAL EVERY 8 HOURS
Refills: 0 | Status: DISCONTINUED | OUTPATIENT
Start: 2022-01-01 | End: 2022-01-01

## 2022-01-01 RX ORDER — FINASTERIDE 5 MG/1
1 TABLET, FILM COATED ORAL
Qty: 0 | Refills: 1 | DISCHARGE

## 2022-01-01 RX ORDER — CHLORHEXIDINE GLUCONATE 213 G/1000ML
1 SOLUTION TOPICAL DAILY
Refills: 0 | Status: DISCONTINUED | OUTPATIENT
Start: 2022-01-01 | End: 2022-01-01

## 2022-01-01 RX ORDER — MAGNESIUM SULFATE 500 MG/ML
2 VIAL (ML) INJECTION
Refills: 0 | Status: COMPLETED | OUTPATIENT
Start: 2022-01-01 | End: 2022-01-01

## 2022-01-01 RX ORDER — WARFARIN SODIUM 2.5 MG/1
5 TABLET ORAL ONCE
Refills: 0 | Status: DISCONTINUED | OUTPATIENT
Start: 2022-01-01 | End: 2022-01-01

## 2022-01-01 RX ORDER — METOPROLOL TARTRATE 50 MG
75 TABLET ORAL THREE TIMES A DAY
Refills: 0 | Status: DISCONTINUED | OUTPATIENT
Start: 2022-01-01 | End: 2022-01-01

## 2022-01-01 RX ORDER — METOPROLOL TARTRATE 50 MG
25 TABLET ORAL
Refills: 0 | Status: DISCONTINUED | OUTPATIENT
Start: 2022-01-01 | End: 2022-01-01

## 2022-01-01 RX ORDER — FUROSEMIDE 40 MG
60 TABLET ORAL
Refills: 0 | Status: DISCONTINUED | OUTPATIENT
Start: 2022-01-01 | End: 2022-01-01

## 2022-01-01 RX ORDER — FUROSEMIDE 40 MG
1 TABLET ORAL
Qty: 0 | Refills: 0 | DISCHARGE

## 2022-01-01 RX ORDER — TAMSULOSIN HYDROCHLORIDE 0.4 MG/1
1 CAPSULE ORAL
Qty: 30 | Refills: 0
Start: 2022-01-01 | End: 2022-08-21

## 2022-01-01 RX ORDER — ONDANSETRON 8 MG/1
4 TABLET, FILM COATED ORAL EVERY 8 HOURS
Refills: 0 | Status: DISCONTINUED | OUTPATIENT
Start: 2022-01-01 | End: 2022-01-01

## 2022-01-01 RX ORDER — FERROUS SULFATE 325(65) MG
0 TABLET ORAL
Qty: 0 | Refills: 0 | DISCHARGE

## 2022-01-01 RX ORDER — TAMSULOSIN HYDROCHLORIDE 0.4 MG/1
1 CAPSULE ORAL
Qty: 0 | Refills: 0 | DISCHARGE

## 2022-01-01 RX ORDER — METOPROLOL TARTRATE 50 MG
1 TABLET ORAL
Qty: 90 | Refills: 0
Start: 2022-01-01 | End: 2022-08-17

## 2022-01-01 RX ORDER — LACTULOSE 10 G/15ML
20 SOLUTION ORAL ONCE
Refills: 0 | Status: COMPLETED | OUTPATIENT
Start: 2022-01-01 | End: 2022-01-01

## 2022-01-01 RX ORDER — CEFEPIME 1 G/1
2000 INJECTION, POWDER, FOR SOLUTION INTRAMUSCULAR; INTRAVENOUS EVERY 8 HOURS
Refills: 0 | Status: DISCONTINUED | OUTPATIENT
Start: 2022-01-01 | End: 2022-01-01

## 2022-01-01 RX ORDER — BACITRACIN ZINC 500 UNIT/G
1 OINTMENT IN PACKET (EA) TOPICAL
Refills: 0 | Status: DISCONTINUED | OUTPATIENT
Start: 2022-01-01 | End: 2022-01-01

## 2022-01-01 RX ORDER — MIDODRINE HYDROCHLORIDE 2.5 MG/1
5 TABLET ORAL EVERY 8 HOURS
Refills: 0 | Status: DISCONTINUED | OUTPATIENT
Start: 2022-01-01 | End: 2022-01-01

## 2022-01-01 RX ORDER — FINASTERIDE 5 MG/1
1 TABLET, FILM COATED ORAL
Qty: 30 | Refills: 1
Start: 2022-01-01 | End: 2022-09-20

## 2022-01-01 RX ORDER — ALPRAZOLAM 0.25 MG
0.25 TABLET ORAL EVERY 6 HOURS
Refills: 0 | Status: DISCONTINUED | OUTPATIENT
Start: 2022-01-01 | End: 2022-01-01

## 2022-01-01 RX ORDER — FUROSEMIDE 40 MG
40 TABLET ORAL
Refills: 0 | Status: DISCONTINUED | OUTPATIENT
Start: 2022-01-01 | End: 2022-01-01

## 2022-01-01 RX ORDER — AMLODIPINE BESYLATE 2.5 MG/1
5 TABLET ORAL DAILY
Refills: 0 | Status: DISCONTINUED | OUTPATIENT
Start: 2022-01-01 | End: 2022-01-01

## 2022-01-01 RX ORDER — LANOLIN ALCOHOL/MO/W.PET/CERES
3 CREAM (GRAM) TOPICAL AT BEDTIME
Refills: 0 | Status: DISCONTINUED | OUTPATIENT
Start: 2022-01-01 | End: 2022-01-01

## 2022-01-01 RX ORDER — POTASSIUM CHLORIDE 20 MEQ
20 PACKET (EA) ORAL ONCE
Refills: 0 | Status: COMPLETED | OUTPATIENT
Start: 2022-01-01 | End: 2022-01-01

## 2022-01-01 RX ORDER — FUROSEMIDE 40 MG
60 TABLET ORAL ONCE
Refills: 0 | Status: COMPLETED | OUTPATIENT
Start: 2022-01-01 | End: 2022-01-01

## 2022-01-01 RX ORDER — MORPHINE SULFATE 50 MG/1
6 CAPSULE, EXTENDED RELEASE ORAL
Qty: 100 | Refills: 0 | Status: DISCONTINUED | OUTPATIENT
Start: 2022-01-01 | End: 2022-01-01

## 2022-01-01 RX ORDER — LIDOCAINE 4 G/100G
1 CREAM TOPICAL
Qty: 0 | Refills: 0 | DISCHARGE
Start: 2022-01-01

## 2022-01-01 RX ORDER — ALBUTEROL 90 UG/1
1 AEROSOL, METERED ORAL EVERY 8 HOURS
Refills: 0 | Status: DISCONTINUED | OUTPATIENT
Start: 2022-01-01 | End: 2022-01-01

## 2022-01-01 RX ORDER — FUROSEMIDE 40 MG
60 TABLET ORAL DAILY
Refills: 0 | Status: DISCONTINUED | OUTPATIENT
Start: 2022-01-01 | End: 2022-01-01

## 2022-01-01 RX ORDER — POTASSIUM CHLORIDE 20 MEQ
40 PACKET (EA) ORAL EVERY 4 HOURS
Refills: 0 | Status: COMPLETED | OUTPATIENT
Start: 2022-01-01 | End: 2022-01-01

## 2022-01-01 RX ORDER — NINTEDANIB 100 MG/1
100 CAPSULE ORAL TWICE DAILY
Qty: 60 | Refills: 3 | Status: ACTIVE | COMMUNITY
Start: 2022-01-01 | End: 1900-01-01

## 2022-01-01 RX ORDER — HYDROMORPHONE HYDROCHLORIDE 2 MG/ML
1 INJECTION INTRAMUSCULAR; INTRAVENOUS; SUBCUTANEOUS
Qty: 0 | Refills: 0 | DISCHARGE
Start: 2022-01-01

## 2022-01-01 RX ORDER — PREGABALIN 225 MG/1
1 CAPSULE ORAL
Qty: 0 | Refills: 1 | DISCHARGE

## 2022-01-01 RX ORDER — MINERAL OIL
30 OIL (ML) MISCELLANEOUS ONCE
Refills: 0 | Status: COMPLETED | OUTPATIENT
Start: 2022-01-01 | End: 2022-01-01

## 2022-01-01 RX ORDER — OMEPRAZOLE 10 MG/1
1 CAPSULE, DELAYED RELEASE ORAL
Qty: 30 | Refills: 0
Start: 2022-01-01 | End: 2022-08-17

## 2022-01-01 RX ORDER — APIXABAN 2.5 MG/1
1 TABLET, FILM COATED ORAL
Qty: 0 | Refills: 0 | DISCHARGE

## 2022-01-01 RX ORDER — POLYETHYLENE GLYCOL 3350 17 G/17G
17 POWDER, FOR SOLUTION ORAL
Refills: 0 | Status: DISCONTINUED | OUTPATIENT
Start: 2022-01-01 | End: 2022-01-01

## 2022-01-01 RX ORDER — VANCOMYCIN HCL 1 G
1000 VIAL (EA) INTRAVENOUS EVERY 24 HOURS
Refills: 0 | Status: DISCONTINUED | OUTPATIENT
Start: 2022-01-01 | End: 2022-01-01

## 2022-01-01 RX ORDER — FERROUS SULFATE 325(65) MG
1 TABLET ORAL
Qty: 0 | Refills: 0 | DISCHARGE

## 2022-01-01 RX ORDER — TAMSULOSIN HYDROCHLORIDE 0.4 MG/1
0 CAPSULE ORAL
Qty: 0 | Refills: 0 | DISCHARGE

## 2022-01-01 RX ORDER — AZITHROMYCIN 500 MG/1
500 TABLET, FILM COATED ORAL ONCE
Refills: 0 | Status: COMPLETED | OUTPATIENT
Start: 2022-01-01 | End: 2022-01-01

## 2022-01-01 RX ORDER — NOREPINEPHRINE BITARTRATE/D5W 8 MG/250ML
0.1 PLASTIC BAG, INJECTION (ML) INTRAVENOUS
Qty: 8 | Refills: 0 | Status: DISCONTINUED | OUTPATIENT
Start: 2022-01-01 | End: 2022-01-01

## 2022-01-01 RX ORDER — SODIUM CHLORIDE 9 MG/ML
250 INJECTION, SOLUTION INTRAVENOUS ONCE
Refills: 0 | Status: COMPLETED | OUTPATIENT
Start: 2022-01-01 | End: 2022-01-01

## 2022-01-01 RX ORDER — SODIUM CHLORIDE 0.65 %
1 AEROSOL, SPRAY (ML) NASAL
Refills: 0 | Status: DISCONTINUED | OUTPATIENT
Start: 2022-01-01 | End: 2022-01-01

## 2022-01-01 RX ORDER — LIDOCAINE 4 G/100G
1 CREAM TOPICAL DAILY
Refills: 0 | Status: DISCONTINUED | OUTPATIENT
Start: 2022-01-01 | End: 2022-01-01

## 2022-01-01 RX ORDER — DILTIAZEM HCL 120 MG
30 CAPSULE, EXT RELEASE 24 HR ORAL EVERY 6 HOURS
Refills: 0 | Status: DISCONTINUED | OUTPATIENT
Start: 2022-01-01 | End: 2022-01-01

## 2022-01-01 RX ORDER — DEXMEDETOMIDINE HYDROCHLORIDE IN 0.9% SODIUM CHLORIDE 4 UG/ML
0.2 INJECTION INTRAVENOUS
Qty: 400 | Refills: 0 | Status: DISCONTINUED | OUTPATIENT
Start: 2022-01-01 | End: 2022-01-01

## 2022-01-01 RX ORDER — TRAZODONE HCL 50 MG
25 TABLET ORAL ONCE
Refills: 0 | Status: COMPLETED | OUTPATIENT
Start: 2022-01-01 | End: 2022-01-01

## 2022-01-01 RX ORDER — WARFARIN SODIUM 2.5 MG/1
4 TABLET ORAL ONCE
Refills: 0 | Status: COMPLETED | OUTPATIENT
Start: 2022-01-01 | End: 2022-01-01

## 2022-01-01 RX ORDER — POTASSIUM CHLORIDE 20 MEQ
20 PACKET (EA) ORAL ONCE
Refills: 0 | Status: DISCONTINUED | OUTPATIENT
Start: 2022-01-01 | End: 2022-01-01

## 2022-01-01 RX ORDER — DILTIAZEM HCL 120 MG
60 CAPSULE, EXT RELEASE 24 HR ORAL ONCE
Refills: 0 | Status: DISCONTINUED | OUTPATIENT
Start: 2022-01-01 | End: 2022-01-01

## 2022-01-01 RX ORDER — ZOLPIDEM TARTRATE 10 MG/1
5 TABLET ORAL AT BEDTIME
Refills: 0 | Status: DISCONTINUED | OUTPATIENT
Start: 2022-01-01 | End: 2022-01-01

## 2022-01-01 RX ORDER — DILTIAZEM HCL 120 MG
120 CAPSULE, EXT RELEASE 24 HR ORAL ONCE
Refills: 0 | Status: COMPLETED | OUTPATIENT
Start: 2022-01-01 | End: 2022-01-01

## 2022-01-01 RX ORDER — ALPRAZOLAM 0.25 MG
0.25 TABLET ORAL ONCE
Refills: 0 | Status: DISCONTINUED | OUTPATIENT
Start: 2022-01-01 | End: 2022-01-01

## 2022-01-01 RX ORDER — METOPROLOL TARTRATE 50 MG
1 TABLET ORAL
Qty: 0 | Refills: 0 | DISCHARGE
Start: 2022-01-01

## 2022-01-01 RX ORDER — FUROSEMIDE 40 MG
10 TABLET ORAL
Qty: 500 | Refills: 0 | Status: DISCONTINUED | OUTPATIENT
Start: 2022-01-01 | End: 2022-01-01

## 2022-01-01 RX ORDER — FINASTERIDE 5 MG/1
0 TABLET, FILM COATED ORAL
Qty: 0 | Refills: 1 | DISCHARGE

## 2022-01-01 RX ORDER — MIDODRINE HYDROCHLORIDE 2.5 MG/1
5 TABLET ORAL THREE TIMES A DAY
Refills: 0 | Status: DISCONTINUED | OUTPATIENT
Start: 2022-01-01 | End: 2022-01-01

## 2022-01-01 RX ORDER — VANCOMYCIN HCL 1 G
1000 VIAL (EA) INTRAVENOUS ONCE
Refills: 0 | Status: COMPLETED | OUTPATIENT
Start: 2022-01-01 | End: 2022-01-01

## 2022-01-01 RX ORDER — FUROSEMIDE 40 MG
0 TABLET ORAL
Qty: 0 | Refills: 0 | DISCHARGE

## 2022-01-01 RX ORDER — MORPHINE SULFATE 50 MG/1
2 CAPSULE, EXTENDED RELEASE ORAL ONCE
Refills: 0 | Status: DISCONTINUED | OUTPATIENT
Start: 2022-01-01 | End: 2022-01-01

## 2022-01-01 RX ORDER — AMLODIPINE BESYLATE AND BENAZEPRIL HYDROCHLORIDE 10; 20 MG/1; MG/1
0 CAPSULE ORAL
Qty: 0 | Refills: 0 | DISCHARGE

## 2022-01-01 RX ORDER — CEFEPIME 1 G/1
INJECTION, POWDER, FOR SOLUTION INTRAMUSCULAR; INTRAVENOUS
Refills: 0 | Status: DISCONTINUED | OUTPATIENT
Start: 2022-01-01 | End: 2022-01-01

## 2022-01-01 RX ORDER — METOPROLOL TARTRATE 50 MG
50 TABLET ORAL ONCE
Refills: 0 | Status: COMPLETED | OUTPATIENT
Start: 2022-01-01 | End: 2022-01-01

## 2022-01-01 RX ORDER — MORPHINE SULFATE 50 MG/1
6 CAPSULE, EXTENDED RELEASE ORAL
Refills: 0 | Status: DISCONTINUED | OUTPATIENT
Start: 2022-01-01 | End: 2022-01-01

## 2022-01-01 RX ORDER — ROBINUL 0.2 MG/ML
0.2 INJECTION INTRAMUSCULAR; INTRAVENOUS EVERY 6 HOURS
Refills: 0 | Status: DISCONTINUED | OUTPATIENT
Start: 2022-01-01 | End: 2022-01-01

## 2022-01-01 RX ADMIN — Medication 60 MILLIGRAM(S): at 18:45

## 2022-01-01 RX ADMIN — PANTOPRAZOLE SODIUM 40 MILLIGRAM(S): 20 TABLET, DELAYED RELEASE ORAL at 05:50

## 2022-01-01 RX ADMIN — Medication 40 MILLIGRAM(S): at 13:07

## 2022-01-01 RX ADMIN — Medication 120 MILLIGRAM(S): at 06:03

## 2022-01-01 RX ADMIN — Medication 12.5 MILLIGRAM(S): at 06:37

## 2022-01-01 RX ADMIN — Medication 120 MILLIGRAM(S): at 05:59

## 2022-01-01 RX ADMIN — Medication 10 MILLIGRAM(S): at 22:09

## 2022-01-01 RX ADMIN — Medication 100 MILLIGRAM(S): at 21:48

## 2022-01-01 RX ADMIN — Medication 1 APPLICATION(S): at 05:32

## 2022-01-01 RX ADMIN — Medication 0.5 MILLIGRAM(S): at 02:32

## 2022-01-01 RX ADMIN — FINASTERIDE 5 MILLIGRAM(S): 5 TABLET, FILM COATED ORAL at 13:15

## 2022-01-01 RX ADMIN — Medication 100 MILLIGRAM(S): at 06:05

## 2022-01-01 RX ADMIN — PREGABALIN 1000 MICROGRAM(S): 225 CAPSULE ORAL at 12:32

## 2022-01-01 RX ADMIN — FINASTERIDE 5 MILLIGRAM(S): 5 TABLET, FILM COATED ORAL at 13:23

## 2022-01-01 RX ADMIN — PREGABALIN 1000 MICROGRAM(S): 225 CAPSULE ORAL at 11:03

## 2022-01-01 RX ADMIN — Medication 120 MILLIGRAM(S): at 05:47

## 2022-01-01 RX ADMIN — Medication 120 MILLIGRAM(S): at 05:51

## 2022-01-01 RX ADMIN — FINASTERIDE 5 MILLIGRAM(S): 5 TABLET, FILM COATED ORAL at 11:21

## 2022-01-01 RX ADMIN — POLYETHYLENE GLYCOL 3350 17 GRAM(S): 17 POWDER, FOR SOLUTION ORAL at 12:09

## 2022-01-01 RX ADMIN — FINASTERIDE 5 MILLIGRAM(S): 5 TABLET, FILM COATED ORAL at 12:01

## 2022-01-01 RX ADMIN — TAMSULOSIN HYDROCHLORIDE 0.4 MILLIGRAM(S): 0.4 CAPSULE ORAL at 21:46

## 2022-01-01 RX ADMIN — Medication 10 MILLIGRAM(S): at 21:51

## 2022-01-01 RX ADMIN — TAMSULOSIN HYDROCHLORIDE 0.4 MILLIGRAM(S): 0.4 CAPSULE ORAL at 22:44

## 2022-01-01 RX ADMIN — Medication 5 MILLIGRAM(S): at 22:34

## 2022-01-01 RX ADMIN — Medication 5 MILLIGRAM(S): at 21:58

## 2022-01-01 RX ADMIN — Medication 500 MILLIGRAM(S): at 13:06

## 2022-01-01 RX ADMIN — Medication 40 MILLIGRAM(S): at 01:00

## 2022-01-01 RX ADMIN — Medication 325 MILLIGRAM(S): at 11:46

## 2022-01-01 RX ADMIN — PANTOPRAZOLE SODIUM 40 MILLIGRAM(S): 20 TABLET, DELAYED RELEASE ORAL at 05:44

## 2022-01-01 RX ADMIN — Medication 100 MILLIGRAM(S): at 16:57

## 2022-01-01 RX ADMIN — Medication 50 MILLIGRAM(S): at 17:57

## 2022-01-01 RX ADMIN — CHLORHEXIDINE GLUCONATE 1 APPLICATION(S): 213 SOLUTION TOPICAL at 06:22

## 2022-01-01 RX ADMIN — APIXABAN 5 MILLIGRAM(S): 2.5 TABLET, FILM COATED ORAL at 17:33

## 2022-01-01 RX ADMIN — Medication 500 MILLIGRAM(S): at 13:15

## 2022-01-01 RX ADMIN — FINASTERIDE 5 MILLIGRAM(S): 5 TABLET, FILM COATED ORAL at 11:30

## 2022-01-01 RX ADMIN — Medication 10 MILLIGRAM(S): at 22:34

## 2022-01-01 RX ADMIN — Medication 10 MILLIGRAM(S): at 00:11

## 2022-01-01 RX ADMIN — Medication 30 MILLIGRAM(S): at 23:02

## 2022-01-01 RX ADMIN — Medication 5 MILLIGRAM(S): at 22:08

## 2022-01-01 RX ADMIN — Medication 50 MILLIGRAM(S): at 14:35

## 2022-01-01 RX ADMIN — LISINOPRIL 10 MILLIGRAM(S): 2.5 TABLET ORAL at 05:33

## 2022-01-01 RX ADMIN — SIMETHICONE 80 MILLIGRAM(S): 80 TABLET, CHEWABLE ORAL at 21:43

## 2022-01-01 RX ADMIN — Medication 100 MILLIGRAM(S): at 21:49

## 2022-01-01 RX ADMIN — Medication 40 MILLIGRAM(S): at 05:16

## 2022-01-01 RX ADMIN — Medication 3 MILLILITER(S): at 05:13

## 2022-01-01 RX ADMIN — Medication 100 MILLIGRAM(S): at 06:21

## 2022-01-01 RX ADMIN — Medication 40 MILLIGRAM(S): at 13:16

## 2022-01-01 RX ADMIN — FINASTERIDE 5 MILLIGRAM(S): 5 TABLET, FILM COATED ORAL at 11:25

## 2022-01-01 RX ADMIN — PANTOPRAZOLE SODIUM 40 MILLIGRAM(S): 20 TABLET, DELAYED RELEASE ORAL at 05:12

## 2022-01-01 RX ADMIN — FINASTERIDE 5 MILLIGRAM(S): 5 TABLET, FILM COATED ORAL at 14:20

## 2022-01-01 RX ADMIN — Medication 10 MILLIGRAM(S): at 21:52

## 2022-01-01 RX ADMIN — Medication 100 MILLIGRAM(S): at 13:55

## 2022-01-01 RX ADMIN — AMLODIPINE BESYLATE 5 MILLIGRAM(S): 2.5 TABLET ORAL at 05:37

## 2022-01-01 RX ADMIN — Medication 500 MILLIGRAM(S): at 11:46

## 2022-01-01 RX ADMIN — PANTOPRAZOLE SODIUM 40 MILLIGRAM(S): 20 TABLET, DELAYED RELEASE ORAL at 06:09

## 2022-01-01 RX ADMIN — Medication 325 MILLIGRAM(S): at 12:55

## 2022-01-01 RX ADMIN — Medication 40 MILLIGRAM(S): at 06:09

## 2022-01-01 RX ADMIN — CEFEPIME 100 MILLIGRAM(S): 1 INJECTION, POWDER, FOR SOLUTION INTRAMUSCULAR; INTRAVENOUS at 13:55

## 2022-01-01 RX ADMIN — Medication 40 MILLIGRAM(S): at 06:06

## 2022-01-01 RX ADMIN — LISINOPRIL 10 MILLIGRAM(S): 2.5 TABLET ORAL at 06:24

## 2022-01-01 RX ADMIN — Medication 1000 UNIT(S): at 11:42

## 2022-01-01 RX ADMIN — TAMSULOSIN HYDROCHLORIDE 0.4 MILLIGRAM(S): 0.4 CAPSULE ORAL at 22:07

## 2022-01-01 RX ADMIN — Medication 0.25 MILLIGRAM(S): at 14:15

## 2022-01-01 RX ADMIN — Medication 120 MILLIGRAM(S): at 06:47

## 2022-01-01 RX ADMIN — Medication 40 MILLIGRAM(S): at 05:38

## 2022-01-01 RX ADMIN — AMLODIPINE BESYLATE 5 MILLIGRAM(S): 2.5 TABLET ORAL at 05:46

## 2022-01-01 RX ADMIN — Medication 60 MILLIGRAM(S): at 17:24

## 2022-01-01 RX ADMIN — Medication 325 MILLIGRAM(S): at 11:24

## 2022-01-01 RX ADMIN — SENNA PLUS 2 TABLET(S): 8.6 TABLET ORAL at 21:32

## 2022-01-01 RX ADMIN — Medication 5 MILLIGRAM(S): at 23:14

## 2022-01-01 RX ADMIN — Medication 40 MILLIGRAM(S): at 05:12

## 2022-01-01 RX ADMIN — APIXABAN 5 MILLIGRAM(S): 2.5 TABLET, FILM COATED ORAL at 17:24

## 2022-01-01 RX ADMIN — Medication 60 MILLIGRAM(S): at 05:42

## 2022-01-01 RX ADMIN — AZITHROMYCIN 255 MILLIGRAM(S): 500 TABLET, FILM COATED ORAL at 03:35

## 2022-01-01 RX ADMIN — CHLORHEXIDINE GLUCONATE 1 APPLICATION(S): 213 SOLUTION TOPICAL at 05:24

## 2022-01-01 RX ADMIN — AMLODIPINE BESYLATE 5 MILLIGRAM(S): 2.5 TABLET ORAL at 06:17

## 2022-01-01 RX ADMIN — PREGABALIN 1000 MICROGRAM(S): 225 CAPSULE ORAL at 11:25

## 2022-01-01 RX ADMIN — Medication 10 MILLIGRAM(S): at 23:14

## 2022-01-01 RX ADMIN — Medication 1000 UNIT(S): at 11:45

## 2022-01-01 RX ADMIN — Medication 1 TABLET(S): at 11:43

## 2022-01-01 RX ADMIN — Medication 3 MILLILITER(S): at 08:42

## 2022-01-01 RX ADMIN — Medication 3 MILLILITER(S): at 08:21

## 2022-01-01 RX ADMIN — Medication 60 MILLIGRAM(S): at 23:07

## 2022-01-01 RX ADMIN — Medication 75 MILLIGRAM(S): at 14:45

## 2022-01-01 RX ADMIN — Medication 3 MILLILITER(S): at 13:43

## 2022-01-01 RX ADMIN — PREGABALIN 1000 MICROGRAM(S): 225 CAPSULE ORAL at 13:16

## 2022-01-01 RX ADMIN — AMLODIPINE BESYLATE 5 MILLIGRAM(S): 2.5 TABLET ORAL at 06:41

## 2022-01-01 RX ADMIN — Medication 10 MILLIGRAM(S): at 00:00

## 2022-01-01 RX ADMIN — Medication 10 MILLIGRAM(S): at 22:28

## 2022-01-01 RX ADMIN — Medication 100 MILLIGRAM(S): at 05:15

## 2022-01-01 RX ADMIN — POLYETHYLENE GLYCOL 3350 17 GRAM(S): 17 POWDER, FOR SOLUTION ORAL at 11:28

## 2022-01-01 RX ADMIN — Medication 25 GRAM(S): at 06:33

## 2022-01-01 RX ADMIN — Medication 30 MILLIGRAM(S): at 17:28

## 2022-01-01 RX ADMIN — Medication 60 MILLIGRAM(S): at 05:43

## 2022-01-01 RX ADMIN — Medication 100 MILLIGRAM(S): at 21:39

## 2022-01-01 RX ADMIN — FINASTERIDE 5 MILLIGRAM(S): 5 TABLET, FILM COATED ORAL at 14:04

## 2022-01-01 RX ADMIN — LISINOPRIL 10 MILLIGRAM(S): 2.5 TABLET ORAL at 06:25

## 2022-01-01 RX ADMIN — Medication 3 MILLILITER(S): at 08:36

## 2022-01-01 RX ADMIN — AMLODIPINE BESYLATE 5 MILLIGRAM(S): 2.5 TABLET ORAL at 05:49

## 2022-01-01 RX ADMIN — Medication 120 MILLIGRAM(S): at 05:45

## 2022-01-01 RX ADMIN — SIMETHICONE 80 MILLIGRAM(S): 80 TABLET, CHEWABLE ORAL at 14:15

## 2022-01-01 RX ADMIN — Medication 5 MG/HR: at 09:18

## 2022-01-01 RX ADMIN — Medication 100 MILLIGRAM(S): at 05:25

## 2022-01-01 RX ADMIN — FINASTERIDE 5 MILLIGRAM(S): 5 TABLET, FILM COATED ORAL at 11:02

## 2022-01-01 RX ADMIN — Medication 3 MILLILITER(S): at 16:10

## 2022-01-01 RX ADMIN — Medication 10 MILLIGRAM(S): at 23:45

## 2022-01-01 RX ADMIN — FINASTERIDE 5 MILLIGRAM(S): 5 TABLET, FILM COATED ORAL at 12:12

## 2022-01-01 RX ADMIN — LISINOPRIL 10 MILLIGRAM(S): 2.5 TABLET ORAL at 06:04

## 2022-01-01 RX ADMIN — Medication 100 MILLIGRAM(S): at 00:00

## 2022-01-01 RX ADMIN — SENNA PLUS 2 TABLET(S): 8.6 TABLET ORAL at 21:51

## 2022-01-01 RX ADMIN — Medication 325 MILLIGRAM(S): at 11:44

## 2022-01-01 RX ADMIN — WARFARIN SODIUM 5 MILLIGRAM(S): 2.5 TABLET ORAL at 21:51

## 2022-01-01 RX ADMIN — SIMETHICONE 80 MILLIGRAM(S): 80 TABLET, CHEWABLE ORAL at 05:44

## 2022-01-01 RX ADMIN — Medication 1 APPLICATION(S): at 17:14

## 2022-01-01 RX ADMIN — Medication 500 MILLIGRAM(S): at 13:17

## 2022-01-01 RX ADMIN — Medication 1 APPLICATION(S): at 18:09

## 2022-01-01 RX ADMIN — LISINOPRIL 10 MILLIGRAM(S): 2.5 TABLET ORAL at 06:00

## 2022-01-01 RX ADMIN — Medication 25 GRAM(S): at 09:54

## 2022-01-01 RX ADMIN — LISINOPRIL 10 MILLIGRAM(S): 2.5 TABLET ORAL at 05:37

## 2022-01-01 RX ADMIN — APIXABAN 5 MILLIGRAM(S): 2.5 TABLET, FILM COATED ORAL at 18:05

## 2022-01-01 RX ADMIN — APIXABAN 5 MILLIGRAM(S): 2.5 TABLET, FILM COATED ORAL at 06:04

## 2022-01-01 RX ADMIN — APIXABAN 5 MILLIGRAM(S): 2.5 TABLET, FILM COATED ORAL at 05:46

## 2022-01-01 RX ADMIN — Medication 1 APPLICATION(S): at 05:37

## 2022-01-01 RX ADMIN — APIXABAN 5 MILLIGRAM(S): 2.5 TABLET, FILM COATED ORAL at 18:14

## 2022-01-01 RX ADMIN — FAMOTIDINE 20 MILLIGRAM(S): 10 INJECTION INTRAVENOUS at 11:06

## 2022-01-01 RX ADMIN — Medication 120 MILLIGRAM(S): at 06:00

## 2022-01-01 RX ADMIN — Medication 100 MILLIGRAM(S): at 05:36

## 2022-01-01 RX ADMIN — Medication 1000 UNIT(S): at 11:21

## 2022-01-01 RX ADMIN — Medication 25 MILLIGRAM(S): at 18:22

## 2022-01-01 RX ADMIN — AMLODIPINE BESYLATE 5 MILLIGRAM(S): 2.5 TABLET ORAL at 05:10

## 2022-01-01 RX ADMIN — Medication 650 MILLIGRAM(S): at 00:30

## 2022-01-01 RX ADMIN — PREGABALIN 1000 MICROGRAM(S): 225 CAPSULE ORAL at 11:11

## 2022-01-01 RX ADMIN — FINASTERIDE 5 MILLIGRAM(S): 5 TABLET, FILM COATED ORAL at 13:18

## 2022-01-01 RX ADMIN — Medication 40 MILLIGRAM(S): at 13:44

## 2022-01-01 RX ADMIN — SENNA PLUS 2 TABLET(S): 8.6 TABLET ORAL at 21:42

## 2022-01-01 RX ADMIN — Medication 40 MILLIGRAM(S): at 06:21

## 2022-01-01 RX ADMIN — Medication 50 MILLIGRAM(S): at 06:16

## 2022-01-01 RX ADMIN — Medication 120 MILLIGRAM(S): at 05:43

## 2022-01-01 RX ADMIN — Medication 5 MILLIGRAM(S): at 00:10

## 2022-01-01 RX ADMIN — FINASTERIDE 5 MILLIGRAM(S): 5 TABLET, FILM COATED ORAL at 11:44

## 2022-01-01 RX ADMIN — APIXABAN 5 MILLIGRAM(S): 2.5 TABLET, FILM COATED ORAL at 17:14

## 2022-01-01 RX ADMIN — Medication 60 MILLIGRAM(S): at 17:47

## 2022-01-01 RX ADMIN — POLYETHYLENE GLYCOL 3350 17 GRAM(S): 17 POWDER, FOR SOLUTION ORAL at 17:33

## 2022-01-01 RX ADMIN — TAMSULOSIN HYDROCHLORIDE 0.4 MILLIGRAM(S): 0.4 CAPSULE ORAL at 21:49

## 2022-01-01 RX ADMIN — SIMETHICONE 80 MILLIGRAM(S): 80 TABLET, CHEWABLE ORAL at 22:00

## 2022-01-01 RX ADMIN — Medication 120 MILLIGRAM(S): at 05:33

## 2022-01-01 RX ADMIN — Medication 100 MILLIGRAM(S): at 13:12

## 2022-01-01 RX ADMIN — APIXABAN 5 MILLIGRAM(S): 2.5 TABLET, FILM COATED ORAL at 06:32

## 2022-01-01 RX ADMIN — PREGABALIN 1000 MICROGRAM(S): 225 CAPSULE ORAL at 14:12

## 2022-01-01 RX ADMIN — Medication 10 MILLIGRAM(S): at 22:27

## 2022-01-01 RX ADMIN — CHLORHEXIDINE GLUCONATE 1 APPLICATION(S): 213 SOLUTION TOPICAL at 14:41

## 2022-01-01 RX ADMIN — CEFEPIME 100 MILLIGRAM(S): 1 INJECTION, POWDER, FOR SOLUTION INTRAMUSCULAR; INTRAVENOUS at 06:03

## 2022-01-01 RX ADMIN — SENNA PLUS 2 TABLET(S): 8.6 TABLET ORAL at 22:08

## 2022-01-01 RX ADMIN — Medication 5 MILLIGRAM(S): at 01:11

## 2022-01-01 RX ADMIN — Medication 60 MILLIGRAM(S): at 12:15

## 2022-01-01 RX ADMIN — Medication 40 MILLIGRAM(S): at 06:25

## 2022-01-01 RX ADMIN — Medication 40 MILLIGRAM(S): at 17:52

## 2022-01-01 RX ADMIN — Medication 13.1 MICROGRAM(S)/KG/MIN: at 07:01

## 2022-01-01 RX ADMIN — ZOLPIDEM TARTRATE 5 MILLIGRAM(S): 10 TABLET ORAL at 00:20

## 2022-01-01 RX ADMIN — Medication 60 MILLIGRAM(S): at 05:06

## 2022-01-01 RX ADMIN — FAMOTIDINE 20 MILLIGRAM(S): 10 INJECTION INTRAVENOUS at 11:42

## 2022-01-01 RX ADMIN — TAMSULOSIN HYDROCHLORIDE 0.4 MILLIGRAM(S): 0.4 CAPSULE ORAL at 22:09

## 2022-01-01 RX ADMIN — APIXABAN 5 MILLIGRAM(S): 2.5 TABLET, FILM COATED ORAL at 18:00

## 2022-01-01 RX ADMIN — SIMETHICONE 80 MILLIGRAM(S): 80 TABLET, CHEWABLE ORAL at 22:26

## 2022-01-01 RX ADMIN — APIXABAN 5 MILLIGRAM(S): 2.5 TABLET, FILM COATED ORAL at 05:12

## 2022-01-01 RX ADMIN — FINASTERIDE 5 MILLIGRAM(S): 5 TABLET, FILM COATED ORAL at 11:42

## 2022-01-01 RX ADMIN — PREGABALIN 1000 MICROGRAM(S): 225 CAPSULE ORAL at 12:09

## 2022-01-01 RX ADMIN — Medication 325 MILLIGRAM(S): at 11:34

## 2022-01-01 RX ADMIN — PREGABALIN 1000 MICROGRAM(S): 225 CAPSULE ORAL at 13:05

## 2022-01-01 RX ADMIN — Medication 25 GRAM(S): at 12:33

## 2022-01-01 RX ADMIN — APIXABAN 5 MILLIGRAM(S): 2.5 TABLET, FILM COATED ORAL at 05:44

## 2022-01-01 RX ADMIN — Medication 25 GRAM(S): at 11:33

## 2022-01-01 RX ADMIN — Medication 10 MILLIGRAM(S): at 05:47

## 2022-01-01 RX ADMIN — Medication 60 MILLIGRAM(S): at 06:33

## 2022-01-01 RX ADMIN — Medication 50 MILLIGRAM(S): at 21:47

## 2022-01-01 RX ADMIN — Medication 100 MILLIGRAM(S): at 14:21

## 2022-01-01 RX ADMIN — PREGABALIN 1000 MICROGRAM(S): 225 CAPSULE ORAL at 14:20

## 2022-01-01 RX ADMIN — FINASTERIDE 5 MILLIGRAM(S): 5 TABLET, FILM COATED ORAL at 13:11

## 2022-01-01 RX ADMIN — Medication 40 MILLIGRAM(S): at 05:49

## 2022-01-01 RX ADMIN — Medication 120 MILLIGRAM(S): at 05:24

## 2022-01-01 RX ADMIN — Medication 325 MILLIGRAM(S): at 13:01

## 2022-01-01 RX ADMIN — SODIUM CHLORIDE 500 MILLILITER(S): 9 INJECTION INTRAMUSCULAR; INTRAVENOUS; SUBCUTANEOUS at 03:34

## 2022-01-01 RX ADMIN — APIXABAN 5 MILLIGRAM(S): 2.5 TABLET, FILM COATED ORAL at 17:51

## 2022-01-01 RX ADMIN — Medication 40 MILLIGRAM(S): at 06:08

## 2022-01-01 RX ADMIN — ZOLPIDEM TARTRATE 5 MILLIGRAM(S): 10 TABLET ORAL at 21:06

## 2022-01-01 RX ADMIN — Medication 1000 UNIT(S): at 12:50

## 2022-01-01 RX ADMIN — LISINOPRIL 10 MILLIGRAM(S): 2.5 TABLET ORAL at 06:16

## 2022-01-01 RX ADMIN — LISINOPRIL 10 MILLIGRAM(S): 2.5 TABLET ORAL at 06:43

## 2022-01-01 RX ADMIN — Medication 10 MILLIGRAM(S): at 21:38

## 2022-01-01 RX ADMIN — Medication 30 MILLIGRAM(S): at 00:56

## 2022-01-01 RX ADMIN — Medication 100 MILLIGRAM(S): at 16:15

## 2022-01-01 RX ADMIN — Medication 10 MILLIGRAM(S): at 19:37

## 2022-01-01 RX ADMIN — Medication 30 MILLIGRAM(S): at 18:26

## 2022-01-01 RX ADMIN — Medication 60 MILLIGRAM(S): at 06:01

## 2022-01-01 RX ADMIN — ZOLPIDEM TARTRATE 5 MILLIGRAM(S): 10 TABLET ORAL at 00:58

## 2022-01-01 RX ADMIN — PANTOPRAZOLE SODIUM 40 MILLIGRAM(S): 20 TABLET, DELAYED RELEASE ORAL at 06:05

## 2022-01-01 RX ADMIN — Medication 40 MILLIGRAM(S): at 21:13

## 2022-01-01 RX ADMIN — Medication 1000 UNIT(S): at 12:30

## 2022-01-01 RX ADMIN — Medication 20 MILLIGRAM(S): at 11:42

## 2022-01-01 RX ADMIN — Medication 5 MILLIGRAM(S): at 21:41

## 2022-01-01 RX ADMIN — Medication 0.5 MILLIGRAM(S): at 01:00

## 2022-01-01 RX ADMIN — Medication 325 MILLIGRAM(S): at 12:16

## 2022-01-01 RX ADMIN — APIXABAN 5 MILLIGRAM(S): 2.5 TABLET, FILM COATED ORAL at 05:50

## 2022-01-01 RX ADMIN — ENOXAPARIN SODIUM 80 MILLIGRAM(S): 100 INJECTION SUBCUTANEOUS at 17:16

## 2022-01-01 RX ADMIN — Medication 10 MILLIGRAM(S): at 06:22

## 2022-01-01 RX ADMIN — PANTOPRAZOLE SODIUM 40 MILLIGRAM(S): 20 TABLET, DELAYED RELEASE ORAL at 05:34

## 2022-01-01 RX ADMIN — Medication 10 MILLIGRAM(S): at 21:42

## 2022-01-01 RX ADMIN — SIMETHICONE 80 MILLIGRAM(S): 80 TABLET, CHEWABLE ORAL at 06:08

## 2022-01-01 RX ADMIN — FINASTERIDE 5 MILLIGRAM(S): 5 TABLET, FILM COATED ORAL at 11:58

## 2022-01-01 RX ADMIN — Medication 25 GRAM(S): at 15:23

## 2022-01-01 RX ADMIN — Medication 120 MILLIGRAM(S): at 06:08

## 2022-01-01 RX ADMIN — Medication 100 MILLIGRAM(S): at 17:50

## 2022-01-01 RX ADMIN — Medication 20 MILLIGRAM(S): at 05:54

## 2022-01-01 RX ADMIN — SENNA PLUS 2 TABLET(S): 8.6 TABLET ORAL at 21:38

## 2022-01-01 RX ADMIN — PANTOPRAZOLE SODIUM 40 MILLIGRAM(S): 20 TABLET, DELAYED RELEASE ORAL at 05:11

## 2022-01-01 RX ADMIN — PREGABALIN 1000 MICROGRAM(S): 225 CAPSULE ORAL at 11:12

## 2022-01-01 RX ADMIN — Medication 60 MILLIGRAM(S): at 06:47

## 2022-01-01 RX ADMIN — Medication 20 MILLIGRAM(S): at 06:36

## 2022-01-01 RX ADMIN — Medication 1 APPLICATION(S): at 17:19

## 2022-01-01 RX ADMIN — Medication 100 MILLIGRAM(S): at 05:34

## 2022-01-01 RX ADMIN — Medication 100 MILLIGRAM(S): at 14:56

## 2022-01-01 RX ADMIN — Medication 1 APPLICATION(S): at 17:22

## 2022-01-01 RX ADMIN — FAMOTIDINE 20 MILLIGRAM(S): 10 INJECTION INTRAVENOUS at 13:15

## 2022-01-01 RX ADMIN — Medication 100 MILLIGRAM(S): at 13:45

## 2022-01-01 RX ADMIN — CHLORHEXIDINE GLUCONATE 1 APPLICATION(S): 213 SOLUTION TOPICAL at 05:55

## 2022-01-01 RX ADMIN — PANTOPRAZOLE SODIUM 40 MILLIGRAM(S): 20 TABLET, DELAYED RELEASE ORAL at 06:37

## 2022-01-01 RX ADMIN — FINASTERIDE 5 MILLIGRAM(S): 5 TABLET, FILM COATED ORAL at 11:43

## 2022-01-01 RX ADMIN — Medication 100 MILLIGRAM(S): at 21:31

## 2022-01-01 RX ADMIN — Medication 100 MILLIGRAM(S): at 21:45

## 2022-01-01 RX ADMIN — Medication 1 APPLICATION(S): at 18:12

## 2022-01-01 RX ADMIN — LISINOPRIL 10 MILLIGRAM(S): 2.5 TABLET ORAL at 05:48

## 2022-01-01 RX ADMIN — Medication 40 MILLIGRAM(S): at 06:22

## 2022-01-01 RX ADMIN — POLYETHYLENE GLYCOL 3350 17 GRAM(S): 17 POWDER, FOR SOLUTION ORAL at 17:11

## 2022-01-01 RX ADMIN — Medication 325 MILLIGRAM(S): at 14:11

## 2022-01-01 RX ADMIN — Medication 1 APPLICATION(S): at 23:16

## 2022-01-01 RX ADMIN — Medication 10 MILLIGRAM(S): at 21:28

## 2022-01-01 RX ADMIN — Medication 500 MILLIGRAM(S): at 11:10

## 2022-01-01 RX ADMIN — Medication 25 MILLIGRAM(S): at 22:37

## 2022-01-01 RX ADMIN — POLYETHYLENE GLYCOL 3350 17 GRAM(S): 17 POWDER, FOR SOLUTION ORAL at 18:05

## 2022-01-01 RX ADMIN — PREGABALIN 1000 MICROGRAM(S): 225 CAPSULE ORAL at 12:16

## 2022-01-01 RX ADMIN — Medication 25 GRAM(S): at 22:50

## 2022-01-01 RX ADMIN — Medication 3 MILLILITER(S): at 20:57

## 2022-01-01 RX ADMIN — Medication 120 MILLIGRAM(S): at 06:25

## 2022-01-01 RX ADMIN — Medication 40 MILLIGRAM(S): at 05:58

## 2022-01-01 RX ADMIN — APIXABAN 5 MILLIGRAM(S): 2.5 TABLET, FILM COATED ORAL at 05:49

## 2022-01-01 RX ADMIN — Medication 40 MILLIGRAM(S): at 05:43

## 2022-01-01 RX ADMIN — PANTOPRAZOLE SODIUM 40 MILLIGRAM(S): 20 TABLET, DELAYED RELEASE ORAL at 05:49

## 2022-01-01 RX ADMIN — Medication 1000 UNIT(S): at 12:32

## 2022-01-01 RX ADMIN — Medication 50 MILLIGRAM(S): at 17:28

## 2022-01-01 RX ADMIN — Medication 100 MILLIGRAM(S): at 06:09

## 2022-01-01 RX ADMIN — CHLORHEXIDINE GLUCONATE 1 APPLICATION(S): 213 SOLUTION TOPICAL at 05:50

## 2022-01-01 RX ADMIN — Medication 100 MILLIGRAM(S): at 14:35

## 2022-01-01 RX ADMIN — Medication 30 MILLIGRAM(S): at 08:41

## 2022-01-01 RX ADMIN — Medication 120 MILLIGRAM(S): at 06:33

## 2022-01-01 RX ADMIN — Medication 40 MILLIGRAM(S): at 05:23

## 2022-01-01 RX ADMIN — MIDAZOLAM HYDROCHLORIDE 2 MILLIGRAM(S): 1 INJECTION, SOLUTION INTRAMUSCULAR; INTRAVENOUS at 03:16

## 2022-01-01 RX ADMIN — APIXABAN 5 MILLIGRAM(S): 2.5 TABLET, FILM COATED ORAL at 05:37

## 2022-01-01 RX ADMIN — Medication 40 MILLIGRAM(S): at 05:37

## 2022-01-01 RX ADMIN — Medication 10 MILLIGRAM(S): at 21:06

## 2022-01-01 RX ADMIN — Medication 100 MILLIGRAM(S): at 22:03

## 2022-01-01 RX ADMIN — APIXABAN 5 MILLIGRAM(S): 2.5 TABLET, FILM COATED ORAL at 17:02

## 2022-01-01 RX ADMIN — MORPHINE SULFATE 2 MILLIGRAM(S): 50 CAPSULE, EXTENDED RELEASE ORAL at 12:42

## 2022-01-01 RX ADMIN — LISINOPRIL 10 MILLIGRAM(S): 2.5 TABLET ORAL at 06:37

## 2022-01-01 RX ADMIN — Medication 1 APPLICATION(S): at 17:16

## 2022-01-01 RX ADMIN — Medication 20 MILLIGRAM(S): at 17:01

## 2022-01-01 RX ADMIN — FINASTERIDE 5 MILLIGRAM(S): 5 TABLET, FILM COATED ORAL at 21:52

## 2022-01-01 RX ADMIN — ROBINUL 0.2 MILLIGRAM(S): 0.2 INJECTION INTRAMUSCULAR; INTRAVENOUS at 14:21

## 2022-01-01 RX ADMIN — Medication 5 MILLIGRAM(S): at 21:45

## 2022-01-01 RX ADMIN — SIMETHICONE 80 MILLIGRAM(S): 80 TABLET, CHEWABLE ORAL at 05:49

## 2022-01-01 RX ADMIN — FINASTERIDE 5 MILLIGRAM(S): 5 TABLET, FILM COATED ORAL at 12:09

## 2022-01-01 RX ADMIN — Medication 20 MILLIGRAM(S): at 18:22

## 2022-01-01 RX ADMIN — Medication 40 MILLIGRAM(S): at 05:17

## 2022-01-01 RX ADMIN — Medication 60 MILLIGRAM(S): at 23:55

## 2022-01-01 RX ADMIN — Medication 100 MILLIGRAM(S): at 13:14

## 2022-01-01 RX ADMIN — Medication 100 MILLIGRAM(S): at 13:57

## 2022-01-01 RX ADMIN — Medication 1 APPLICATION(S): at 11:53

## 2022-01-01 RX ADMIN — SODIUM CHLORIDE 500 MILLILITER(S): 9 INJECTION INTRAMUSCULAR; INTRAVENOUS; SUBCUTANEOUS at 04:22

## 2022-01-01 RX ADMIN — Medication 1 TABLET(S): at 12:41

## 2022-01-01 RX ADMIN — Medication 325 MILLIGRAM(S): at 11:32

## 2022-01-01 RX ADMIN — Medication 100 MILLIGRAM(S): at 21:53

## 2022-01-01 RX ADMIN — TAMSULOSIN HYDROCHLORIDE 0.4 MILLIGRAM(S): 0.4 CAPSULE ORAL at 21:51

## 2022-01-01 RX ADMIN — Medication 40 MILLIGRAM(S): at 06:16

## 2022-01-01 RX ADMIN — Medication 1 APPLICATION(S): at 06:08

## 2022-01-01 RX ADMIN — PANTOPRAZOLE SODIUM 40 MILLIGRAM(S): 20 TABLET, DELAYED RELEASE ORAL at 06:06

## 2022-01-01 RX ADMIN — AMLODIPINE BESYLATE 5 MILLIGRAM(S): 2.5 TABLET ORAL at 06:37

## 2022-01-01 RX ADMIN — Medication 60 MILLIGRAM(S): at 05:48

## 2022-01-01 RX ADMIN — PREGABALIN 1000 MICROGRAM(S): 225 CAPSULE ORAL at 13:11

## 2022-01-01 RX ADMIN — FINASTERIDE 5 MILLIGRAM(S): 5 TABLET, FILM COATED ORAL at 12:36

## 2022-01-01 RX ADMIN — PANTOPRAZOLE SODIUM 40 MILLIGRAM(S): 20 TABLET, DELAYED RELEASE ORAL at 06:02

## 2022-01-01 RX ADMIN — APIXABAN 5 MILLIGRAM(S): 2.5 TABLET, FILM COATED ORAL at 06:01

## 2022-01-01 RX ADMIN — Medication 1 TABLET(S): at 12:29

## 2022-01-01 RX ADMIN — SENNA PLUS 2 TABLET(S): 8.6 TABLET ORAL at 22:44

## 2022-01-01 RX ADMIN — APIXABAN 5 MILLIGRAM(S): 2.5 TABLET, FILM COATED ORAL at 05:47

## 2022-01-01 RX ADMIN — Medication 100 MILLIGRAM(S): at 14:53

## 2022-01-01 RX ADMIN — Medication 3 MILLILITER(S): at 14:57

## 2022-01-01 RX ADMIN — FINASTERIDE 5 MILLIGRAM(S): 5 TABLET, FILM COATED ORAL at 12:41

## 2022-01-01 RX ADMIN — Medication 120 MILLIGRAM(S): at 06:13

## 2022-01-01 RX ADMIN — Medication 10 MILLIGRAM(S): at 22:01

## 2022-01-01 RX ADMIN — BUMETANIDE 1 MILLIGRAM(S): 0.25 INJECTION INTRAMUSCULAR; INTRAVENOUS at 06:22

## 2022-01-01 RX ADMIN — Medication 30 MILLIGRAM(S): at 20:17

## 2022-01-01 RX ADMIN — MIDODRINE HYDROCHLORIDE 5 MILLIGRAM(S): 2.5 TABLET ORAL at 17:11

## 2022-01-01 RX ADMIN — FAMOTIDINE 20 MILLIGRAM(S): 10 INJECTION INTRAVENOUS at 11:44

## 2022-01-01 RX ADMIN — CHLORHEXIDINE GLUCONATE 1 APPLICATION(S): 213 SOLUTION TOPICAL at 05:33

## 2022-01-01 RX ADMIN — TAMSULOSIN HYDROCHLORIDE 0.4 MILLIGRAM(S): 0.4 CAPSULE ORAL at 22:36

## 2022-01-01 RX ADMIN — ZOLPIDEM TARTRATE 5 MILLIGRAM(S): 10 TABLET ORAL at 00:35

## 2022-01-01 RX ADMIN — Medication 10 MILLIGRAM(S): at 21:31

## 2022-01-01 RX ADMIN — Medication 40 MILLIGRAM(S): at 05:10

## 2022-01-01 RX ADMIN — Medication 100 MILLIGRAM(S): at 13:07

## 2022-01-01 RX ADMIN — Medication 40 MILLIGRAM(S): at 17:43

## 2022-01-01 RX ADMIN — Medication 100 MILLIGRAM(S): at 05:50

## 2022-01-01 RX ADMIN — ENOXAPARIN SODIUM 80 MILLIGRAM(S): 100 INJECTION SUBCUTANEOUS at 06:38

## 2022-01-01 RX ADMIN — APIXABAN 5 MILLIGRAM(S): 2.5 TABLET, FILM COATED ORAL at 17:50

## 2022-01-01 RX ADMIN — LISINOPRIL 10 MILLIGRAM(S): 2.5 TABLET ORAL at 05:54

## 2022-01-01 RX ADMIN — FINASTERIDE 5 MILLIGRAM(S): 5 TABLET, FILM COATED ORAL at 11:03

## 2022-01-01 RX ADMIN — PANTOPRAZOLE SODIUM 40 MILLIGRAM(S): 20 TABLET, DELAYED RELEASE ORAL at 06:21

## 2022-01-01 RX ADMIN — APIXABAN 5 MILLIGRAM(S): 2.5 TABLET, FILM COATED ORAL at 17:21

## 2022-01-01 RX ADMIN — TAMSULOSIN HYDROCHLORIDE 0.4 MILLIGRAM(S): 0.4 CAPSULE ORAL at 22:40

## 2022-01-01 RX ADMIN — Medication 40 MILLIGRAM(S): at 06:01

## 2022-01-01 RX ADMIN — Medication 100 MILLIGRAM(S): at 05:18

## 2022-01-01 RX ADMIN — APIXABAN 5 MILLIGRAM(S): 2.5 TABLET, FILM COATED ORAL at 06:09

## 2022-01-01 RX ADMIN — Medication 325 MILLIGRAM(S): at 14:05

## 2022-01-01 RX ADMIN — ZOLPIDEM TARTRATE 5 MILLIGRAM(S): 10 TABLET ORAL at 23:16

## 2022-01-01 RX ADMIN — Medication 25 MILLIGRAM(S): at 05:15

## 2022-01-01 RX ADMIN — Medication 325 MILLIGRAM(S): at 11:57

## 2022-01-01 RX ADMIN — PREGABALIN 1000 MICROGRAM(S): 225 CAPSULE ORAL at 11:20

## 2022-01-01 RX ADMIN — Medication 40 MILLIGRAM(S): at 05:45

## 2022-01-01 RX ADMIN — Medication 5 MILLIGRAM(S): at 22:00

## 2022-01-01 RX ADMIN — Medication 500 MILLIGRAM(S): at 12:51

## 2022-01-01 RX ADMIN — Medication 325 MILLIGRAM(S): at 11:43

## 2022-01-01 RX ADMIN — Medication 100 MILLIGRAM(S): at 05:53

## 2022-01-01 RX ADMIN — CEFEPIME 100 MILLIGRAM(S): 1 INJECTION, POWDER, FOR SOLUTION INTRAMUSCULAR; INTRAVENOUS at 18:15

## 2022-01-01 RX ADMIN — Medication 1000 UNIT(S): at 11:09

## 2022-01-01 RX ADMIN — Medication 60 MILLIGRAM(S): at 06:42

## 2022-01-01 RX ADMIN — Medication 60 MILLIGRAM(S): at 05:44

## 2022-01-01 RX ADMIN — PREGABALIN 1000 MICROGRAM(S): 225 CAPSULE ORAL at 11:54

## 2022-01-01 RX ADMIN — Medication 120 MILLIGRAM(S): at 06:09

## 2022-01-01 RX ADMIN — APIXABAN 5 MILLIGRAM(S): 2.5 TABLET, FILM COATED ORAL at 17:12

## 2022-01-01 RX ADMIN — PREGABALIN 1000 MICROGRAM(S): 225 CAPSULE ORAL at 11:27

## 2022-01-01 RX ADMIN — TAMSULOSIN HYDROCHLORIDE 0.4 MILLIGRAM(S): 0.4 CAPSULE ORAL at 22:27

## 2022-01-01 RX ADMIN — Medication 500 MILLIGRAM(S): at 12:30

## 2022-01-01 RX ADMIN — Medication 100 MILLIGRAM(S): at 05:48

## 2022-01-01 RX ADMIN — POLYETHYLENE GLYCOL 3350 17 GRAM(S): 17 POWDER, FOR SOLUTION ORAL at 21:37

## 2022-01-01 RX ADMIN — Medication 125 MILLIGRAM(S): at 05:13

## 2022-01-01 RX ADMIN — Medication 75 MILLIGRAM(S): at 21:41

## 2022-01-01 RX ADMIN — PANTOPRAZOLE SODIUM 40 MILLIGRAM(S): 20 TABLET, DELAYED RELEASE ORAL at 05:47

## 2022-01-01 RX ADMIN — Medication 1 APPLICATION(S): at 11:26

## 2022-01-01 RX ADMIN — CHLORHEXIDINE GLUCONATE 1 APPLICATION(S): 213 SOLUTION TOPICAL at 06:25

## 2022-01-01 RX ADMIN — Medication 120 MILLIGRAM(S): at 05:44

## 2022-01-01 RX ADMIN — Medication 60 MILLIGRAM(S): at 17:35

## 2022-01-01 RX ADMIN — PREGABALIN 1000 MICROGRAM(S): 225 CAPSULE ORAL at 11:45

## 2022-01-01 RX ADMIN — Medication 120 MILLIGRAM(S): at 05:37

## 2022-01-01 RX ADMIN — Medication 100 MILLIGRAM(S): at 05:43

## 2022-01-01 RX ADMIN — Medication 40 MILLIGRAM(S): at 13:57

## 2022-01-01 RX ADMIN — Medication 325 MILLIGRAM(S): at 11:28

## 2022-01-01 RX ADMIN — PREGABALIN 1000 MICROGRAM(S): 225 CAPSULE ORAL at 11:06

## 2022-01-01 RX ADMIN — Medication 100 MILLIGRAM(S): at 21:06

## 2022-01-01 RX ADMIN — Medication 60 MILLIGRAM(S): at 18:14

## 2022-01-01 RX ADMIN — Medication 100 MILLIGRAM(S): at 22:45

## 2022-01-01 RX ADMIN — Medication 10 MILLIGRAM(S): at 23:38

## 2022-01-01 RX ADMIN — Medication 100 MILLIGRAM(S): at 21:47

## 2022-01-01 RX ADMIN — Medication 100 MILLIGRAM(S): at 22:01

## 2022-01-01 RX ADMIN — FINASTERIDE 5 MILLIGRAM(S): 5 TABLET, FILM COATED ORAL at 12:50

## 2022-01-01 RX ADMIN — Medication 100 MILLIGRAM(S): at 13:09

## 2022-01-01 RX ADMIN — Medication 20 MILLIGRAM(S): at 05:11

## 2022-01-01 RX ADMIN — Medication 3 MILLILITER(S): at 14:49

## 2022-01-01 RX ADMIN — Medication 100 MILLIGRAM(S): at 21:28

## 2022-01-01 RX ADMIN — CHLORHEXIDINE GLUCONATE 1 APPLICATION(S): 213 SOLUTION TOPICAL at 06:44

## 2022-01-01 RX ADMIN — Medication 0.5 MILLIGRAM(S): at 22:22

## 2022-01-01 RX ADMIN — POLYETHYLENE GLYCOL 3350 17 GRAM(S): 17 POWDER, FOR SOLUTION ORAL at 12:35

## 2022-01-01 RX ADMIN — Medication 12.5 MILLIGRAM(S): at 17:12

## 2022-01-01 RX ADMIN — Medication 1 APPLICATION(S): at 17:52

## 2022-01-01 RX ADMIN — Medication 100 MILLIGRAM(S): at 13:16

## 2022-01-01 RX ADMIN — Medication 100 MILLIGRAM(S): at 21:55

## 2022-01-01 RX ADMIN — CHLORHEXIDINE GLUCONATE 1 APPLICATION(S): 213 SOLUTION TOPICAL at 05:54

## 2022-01-01 RX ADMIN — LISINOPRIL 10 MILLIGRAM(S): 2.5 TABLET ORAL at 06:17

## 2022-01-01 RX ADMIN — Medication 25 MILLIGRAM(S): at 05:49

## 2022-01-01 RX ADMIN — POLYETHYLENE GLYCOL 3350 17 GRAM(S): 17 POWDER, FOR SOLUTION ORAL at 11:03

## 2022-01-01 RX ADMIN — CHLORHEXIDINE GLUCONATE 1 APPLICATION(S): 213 SOLUTION TOPICAL at 06:08

## 2022-01-01 RX ADMIN — Medication 1000 UNIT(S): at 13:06

## 2022-01-01 RX ADMIN — Medication 100 MILLIGRAM(S): at 06:15

## 2022-01-01 RX ADMIN — Medication 50 MILLIGRAM(S): at 05:22

## 2022-01-01 RX ADMIN — APIXABAN 5 MILLIGRAM(S): 2.5 TABLET, FILM COATED ORAL at 06:13

## 2022-01-01 RX ADMIN — Medication 120 MILLIGRAM(S): at 05:53

## 2022-01-01 RX ADMIN — Medication 1000 UNIT(S): at 13:16

## 2022-01-01 RX ADMIN — Medication 650 MILLIGRAM(S): at 23:13

## 2022-01-01 RX ADMIN — CHLORHEXIDINE GLUCONATE 1 APPLICATION(S): 213 SOLUTION TOPICAL at 05:15

## 2022-01-01 RX ADMIN — Medication 10 MILLIGRAM(S): at 18:17

## 2022-01-01 RX ADMIN — Medication 1000 UNIT(S): at 11:46

## 2022-01-01 RX ADMIN — PANTOPRAZOLE SODIUM 40 MILLIGRAM(S): 20 TABLET, DELAYED RELEASE ORAL at 05:16

## 2022-01-01 RX ADMIN — FINASTERIDE 5 MILLIGRAM(S): 5 TABLET, FILM COATED ORAL at 13:06

## 2022-01-01 RX ADMIN — Medication 1000 UNIT(S): at 13:11

## 2022-01-01 RX ADMIN — APIXABAN 5 MILLIGRAM(S): 2.5 TABLET, FILM COATED ORAL at 17:11

## 2022-01-01 RX ADMIN — Medication 20 MILLIGRAM(S): at 05:50

## 2022-01-01 RX ADMIN — APIXABAN 5 MILLIGRAM(S): 2.5 TABLET, FILM COATED ORAL at 18:03

## 2022-01-01 RX ADMIN — Medication 10 MILLIGRAM(S): at 23:04

## 2022-01-01 RX ADMIN — Medication 100 MILLIGRAM(S): at 21:50

## 2022-01-01 RX ADMIN — Medication 10 MILLIGRAM(S): at 21:47

## 2022-01-01 RX ADMIN — Medication 10 MILLIGRAM(S): at 21:37

## 2022-01-01 RX ADMIN — Medication 500 MILLIGRAM(S): at 11:21

## 2022-01-01 RX ADMIN — APIXABAN 5 MILLIGRAM(S): 2.5 TABLET, FILM COATED ORAL at 05:23

## 2022-01-01 RX ADMIN — FINASTERIDE 5 MILLIGRAM(S): 5 TABLET, FILM COATED ORAL at 12:52

## 2022-01-01 RX ADMIN — Medication 325 MILLIGRAM(S): at 16:12

## 2022-01-01 RX ADMIN — APIXABAN 5 MILLIGRAM(S): 2.5 TABLET, FILM COATED ORAL at 17:19

## 2022-01-01 RX ADMIN — Medication 100 MILLIGRAM(S): at 05:45

## 2022-01-01 RX ADMIN — WARFARIN SODIUM 2.5 MILLIGRAM(S): 2.5 TABLET ORAL at 22:44

## 2022-01-01 RX ADMIN — Medication 10 MILLIGRAM(S): at 22:14

## 2022-01-01 RX ADMIN — Medication 325 MILLIGRAM(S): at 12:01

## 2022-01-01 RX ADMIN — CHLORHEXIDINE GLUCONATE 1 APPLICATION(S): 213 SOLUTION TOPICAL at 06:37

## 2022-01-01 RX ADMIN — SIMETHICONE 80 MILLIGRAM(S): 80 TABLET, CHEWABLE ORAL at 16:57

## 2022-01-01 RX ADMIN — TAMSULOSIN HYDROCHLORIDE 0.4 MILLIGRAM(S): 0.4 CAPSULE ORAL at 22:03

## 2022-01-01 RX ADMIN — Medication 20 MILLIGRAM(S): at 05:59

## 2022-01-01 RX ADMIN — PREGABALIN 1000 MICROGRAM(S): 225 CAPSULE ORAL at 11:30

## 2022-01-01 RX ADMIN — Medication 100 MILLIGRAM(S): at 22:27

## 2022-01-01 RX ADMIN — LISINOPRIL 10 MILLIGRAM(S): 2.5 TABLET ORAL at 05:15

## 2022-01-01 RX ADMIN — PANTOPRAZOLE SODIUM 40 MILLIGRAM(S): 20 TABLET, DELAYED RELEASE ORAL at 05:23

## 2022-01-01 RX ADMIN — SIMETHICONE 80 MILLIGRAM(S): 80 TABLET, CHEWABLE ORAL at 13:53

## 2022-01-01 RX ADMIN — Medication 3 MILLILITER(S): at 00:21

## 2022-01-01 RX ADMIN — Medication 10 MILLIGRAM(S): at 00:08

## 2022-01-01 RX ADMIN — Medication 100 MILLIGRAM(S): at 05:39

## 2022-01-01 RX ADMIN — APIXABAN 5 MILLIGRAM(S): 2.5 TABLET, FILM COATED ORAL at 05:19

## 2022-01-01 RX ADMIN — Medication 40 MILLIGRAM(S): at 05:44

## 2022-01-01 RX ADMIN — Medication 100 MILLIGRAM(S): at 13:40

## 2022-01-01 RX ADMIN — Medication 100 MILLIGRAM(S): at 21:51

## 2022-01-01 RX ADMIN — Medication 100 MILLIGRAM(S): at 05:57

## 2022-01-01 RX ADMIN — POLYETHYLENE GLYCOL 3350 17 GRAM(S): 17 POWDER, FOR SOLUTION ORAL at 18:17

## 2022-01-01 RX ADMIN — Medication 120 MILLIGRAM(S): at 05:15

## 2022-01-01 RX ADMIN — SIMETHICONE 80 MILLIGRAM(S): 80 TABLET, CHEWABLE ORAL at 13:45

## 2022-01-01 RX ADMIN — LACTULOSE 20 GRAM(S): 10 SOLUTION ORAL at 10:15

## 2022-01-01 RX ADMIN — FINASTERIDE 5 MILLIGRAM(S): 5 TABLET, FILM COATED ORAL at 13:05

## 2022-01-01 RX ADMIN — Medication 5 MILLIGRAM(S): at 21:49

## 2022-01-01 RX ADMIN — Medication 40 MILLIGRAM(S): at 06:37

## 2022-01-01 RX ADMIN — FINASTERIDE 5 MILLIGRAM(S): 5 TABLET, FILM COATED ORAL at 11:34

## 2022-01-01 RX ADMIN — APIXABAN 5 MILLIGRAM(S): 2.5 TABLET, FILM COATED ORAL at 17:46

## 2022-01-01 RX ADMIN — Medication 40 MILLIEQUIVALENT(S): at 11:32

## 2022-01-01 RX ADMIN — PREGABALIN 1000 MICROGRAM(S): 225 CAPSULE ORAL at 11:42

## 2022-01-01 RX ADMIN — ENOXAPARIN SODIUM 70 MILLIGRAM(S): 100 INJECTION SUBCUTANEOUS at 05:06

## 2022-01-01 RX ADMIN — WARFARIN SODIUM 2.5 MILLIGRAM(S): 2.5 TABLET ORAL at 21:58

## 2022-01-01 RX ADMIN — Medication 250 MILLIGRAM(S): at 06:11

## 2022-01-01 RX ADMIN — FINASTERIDE 5 MILLIGRAM(S): 5 TABLET, FILM COATED ORAL at 12:16

## 2022-01-01 RX ADMIN — FINASTERIDE 5 MILLIGRAM(S): 5 TABLET, FILM COATED ORAL at 11:09

## 2022-01-01 RX ADMIN — Medication 120 MILLIGRAM(S): at 05:50

## 2022-01-01 RX ADMIN — Medication 1 APPLICATION(S): at 23:08

## 2022-01-01 RX ADMIN — APIXABAN 5 MILLIGRAM(S): 2.5 TABLET, FILM COATED ORAL at 06:22

## 2022-01-01 RX ADMIN — Medication 40 MILLIGRAM(S): at 05:47

## 2022-01-01 RX ADMIN — Medication 10 MILLIGRAM(S): at 22:31

## 2022-01-01 RX ADMIN — Medication 100 MILLIGRAM(S): at 05:59

## 2022-01-01 RX ADMIN — MORPHINE SULFATE 2 MILLIGRAM(S): 50 CAPSULE, EXTENDED RELEASE ORAL at 12:40

## 2022-01-01 RX ADMIN — Medication 120 MILLIGRAM(S): at 05:16

## 2022-01-01 RX ADMIN — SODIUM CHLORIDE 500 MILLILITER(S): 9 INJECTION, SOLUTION INTRAVENOUS at 05:14

## 2022-01-01 RX ADMIN — SENNA PLUS 2 TABLET(S): 8.6 TABLET ORAL at 22:32

## 2022-01-01 RX ADMIN — Medication 100 MILLIGRAM(S): at 06:04

## 2022-01-01 RX ADMIN — APIXABAN 5 MILLIGRAM(S): 2.5 TABLET, FILM COATED ORAL at 17:18

## 2022-01-01 RX ADMIN — Medication 40 MILLIGRAM(S): at 13:24

## 2022-01-01 RX ADMIN — LISINOPRIL 10 MILLIGRAM(S): 2.5 TABLET ORAL at 06:36

## 2022-01-01 RX ADMIN — Medication 40 MILLIGRAM(S): at 05:34

## 2022-01-01 RX ADMIN — TAMSULOSIN HYDROCHLORIDE 0.4 MILLIGRAM(S): 0.4 CAPSULE ORAL at 21:27

## 2022-01-01 RX ADMIN — TAMSULOSIN HYDROCHLORIDE 0.4 MILLIGRAM(S): 0.4 CAPSULE ORAL at 21:55

## 2022-01-01 RX ADMIN — APIXABAN 5 MILLIGRAM(S): 2.5 TABLET, FILM COATED ORAL at 17:27

## 2022-01-01 RX ADMIN — TAMSULOSIN HYDROCHLORIDE 0.4 MILLIGRAM(S): 0.4 CAPSULE ORAL at 21:38

## 2022-01-01 RX ADMIN — Medication 120 MILLIGRAM(S): at 06:15

## 2022-01-01 RX ADMIN — Medication 60 MILLIGRAM(S): at 06:41

## 2022-01-01 RX ADMIN — APIXABAN 5 MILLIGRAM(S): 2.5 TABLET, FILM COATED ORAL at 17:36

## 2022-01-01 RX ADMIN — APIXABAN 5 MILLIGRAM(S): 2.5 TABLET, FILM COATED ORAL at 12:13

## 2022-01-01 RX ADMIN — Medication 100 MILLIGRAM(S): at 13:00

## 2022-01-01 RX ADMIN — Medication 40 MILLIGRAM(S): at 15:58

## 2022-01-01 RX ADMIN — Medication 100 MILLIGRAM(S): at 22:41

## 2022-01-01 RX ADMIN — POLYETHYLENE GLYCOL 3350 17 GRAM(S): 17 POWDER, FOR SOLUTION ORAL at 06:23

## 2022-01-01 RX ADMIN — Medication 325 MILLIGRAM(S): at 13:23

## 2022-01-01 RX ADMIN — Medication 325 MILLIGRAM(S): at 12:51

## 2022-01-01 RX ADMIN — Medication 500 MILLIGRAM(S): at 13:11

## 2022-01-01 RX ADMIN — SIMETHICONE 80 MILLIGRAM(S): 80 TABLET, CHEWABLE ORAL at 13:05

## 2022-01-01 RX ADMIN — LISINOPRIL 10 MILLIGRAM(S): 2.5 TABLET ORAL at 05:22

## 2022-01-01 RX ADMIN — SIMETHICONE 80 MILLIGRAM(S): 80 TABLET, CHEWABLE ORAL at 22:09

## 2022-01-01 RX ADMIN — SODIUM CHLORIDE 150 MILLILITER(S): 9 INJECTION, SOLUTION INTRAVENOUS at 06:13

## 2022-01-01 RX ADMIN — PREGABALIN 1000 MICROGRAM(S): 225 CAPSULE ORAL at 11:09

## 2022-01-01 RX ADMIN — APIXABAN 5 MILLIGRAM(S): 2.5 TABLET, FILM COATED ORAL at 05:51

## 2022-01-01 RX ADMIN — Medication 100 MILLIGRAM(S): at 16:49

## 2022-01-01 RX ADMIN — Medication 100 MILLIGRAM(S): at 06:32

## 2022-01-01 RX ADMIN — Medication 100 MILLIGRAM(S): at 06:22

## 2022-01-01 RX ADMIN — Medication 500 MILLIGRAM(S): at 13:01

## 2022-01-01 RX ADMIN — Medication 100 MILLIGRAM(S): at 06:26

## 2022-01-01 RX ADMIN — Medication 60 MILLIGRAM(S): at 17:29

## 2022-01-01 RX ADMIN — PANTOPRAZOLE SODIUM 40 MILLIGRAM(S): 20 TABLET, DELAYED RELEASE ORAL at 05:51

## 2022-01-01 RX ADMIN — Medication 3 MILLILITER(S): at 11:07

## 2022-01-01 RX ADMIN — Medication 25 MILLIGRAM(S): at 22:28

## 2022-01-01 RX ADMIN — FAMOTIDINE 20 MILLIGRAM(S): 10 INJECTION INTRAVENOUS at 12:50

## 2022-01-01 RX ADMIN — FAMOTIDINE 20 MILLIGRAM(S): 10 INJECTION INTRAVENOUS at 12:31

## 2022-01-01 RX ADMIN — Medication 60 MILLIGRAM(S): at 17:19

## 2022-01-01 RX ADMIN — Medication 100 MILLIGRAM(S): at 05:51

## 2022-01-01 RX ADMIN — Medication 100 MILLIGRAM(S): at 06:01

## 2022-01-01 RX ADMIN — FINASTERIDE 5 MILLIGRAM(S): 5 TABLET, FILM COATED ORAL at 11:12

## 2022-01-01 RX ADMIN — Medication 5 MILLIGRAM(S): at 22:41

## 2022-01-01 RX ADMIN — Medication 100 MILLIGRAM(S): at 05:42

## 2022-01-01 RX ADMIN — TAMSULOSIN HYDROCHLORIDE 0.4 MILLIGRAM(S): 0.4 CAPSULE ORAL at 22:33

## 2022-01-01 RX ADMIN — Medication 100 MILLIGRAM(S): at 22:33

## 2022-01-01 RX ADMIN — CHLORHEXIDINE GLUCONATE 1 APPLICATION(S): 213 SOLUTION TOPICAL at 06:02

## 2022-01-01 RX ADMIN — FINASTERIDE 5 MILLIGRAM(S): 5 TABLET, FILM COATED ORAL at 13:02

## 2022-01-01 RX ADMIN — FINASTERIDE 5 MILLIGRAM(S): 5 TABLET, FILM COATED ORAL at 12:31

## 2022-01-01 RX ADMIN — Medication 1000 UNIT(S): at 12:51

## 2022-01-01 RX ADMIN — Medication 325 MILLIGRAM(S): at 12:42

## 2022-01-01 RX ADMIN — Medication 100 MILLIGRAM(S): at 21:52

## 2022-01-01 RX ADMIN — Medication 10 MILLIGRAM(S): at 05:22

## 2022-01-01 RX ADMIN — AMLODIPINE BESYLATE 5 MILLIGRAM(S): 2.5 TABLET ORAL at 06:18

## 2022-01-01 RX ADMIN — CHLORHEXIDINE GLUCONATE 1 APPLICATION(S): 213 SOLUTION TOPICAL at 05:37

## 2022-01-01 RX ADMIN — Medication 1 APPLICATION(S): at 18:06

## 2022-01-01 RX ADMIN — Medication 1 APPLICATION(S): at 06:01

## 2022-01-01 RX ADMIN — Medication 100 MILLIGRAM(S): at 22:07

## 2022-01-01 RX ADMIN — APIXABAN 5 MILLIGRAM(S): 2.5 TABLET, FILM COATED ORAL at 05:58

## 2022-01-01 RX ADMIN — Medication 100 MILLIGRAM(S): at 14:11

## 2022-01-01 RX ADMIN — Medication 40 MILLIGRAM(S): at 05:15

## 2022-01-01 RX ADMIN — Medication 40 MILLIGRAM(S): at 05:24

## 2022-01-01 RX ADMIN — Medication 40 MILLIGRAM(S): at 06:15

## 2022-01-01 RX ADMIN — Medication 5 MILLIGRAM(S): at 21:51

## 2022-01-01 RX ADMIN — Medication 40 MILLIEQUIVALENT(S): at 12:02

## 2022-01-01 RX ADMIN — Medication 12.5 MILLIGRAM(S): at 08:24

## 2022-01-01 RX ADMIN — PANTOPRAZOLE SODIUM 40 MILLIGRAM(S): 20 TABLET, DELAYED RELEASE ORAL at 05:25

## 2022-01-01 RX ADMIN — PANTOPRAZOLE SODIUM 40 MILLIGRAM(S): 20 TABLET, DELAYED RELEASE ORAL at 06:22

## 2022-01-01 RX ADMIN — Medication 10 MILLIGRAM(S): at 00:13

## 2022-01-01 RX ADMIN — APIXABAN 5 MILLIGRAM(S): 2.5 TABLET, FILM COATED ORAL at 05:16

## 2022-01-01 RX ADMIN — Medication 3 MILLILITER(S): at 21:49

## 2022-01-01 RX ADMIN — Medication 1 TABLET(S): at 11:34

## 2022-01-01 RX ADMIN — Medication 50 MILLIGRAM(S): at 15:42

## 2022-01-01 RX ADMIN — Medication 25 MILLIGRAM(S): at 22:06

## 2022-01-01 RX ADMIN — Medication 120 MILLIGRAM(S): at 06:23

## 2022-01-01 RX ADMIN — Medication 100 MILLIGRAM(S): at 22:08

## 2022-01-01 RX ADMIN — Medication 1 APPLICATION(S): at 17:42

## 2022-01-01 RX ADMIN — Medication 10 MILLIGRAM(S): at 21:48

## 2022-01-01 RX ADMIN — APIXABAN 5 MILLIGRAM(S): 2.5 TABLET, FILM COATED ORAL at 20:10

## 2022-01-01 RX ADMIN — ZOLPIDEM TARTRATE 5 MILLIGRAM(S): 10 TABLET ORAL at 21:57

## 2022-01-01 RX ADMIN — PANTOPRAZOLE SODIUM 40 MILLIGRAM(S): 20 TABLET, DELAYED RELEASE ORAL at 06:26

## 2022-01-01 RX ADMIN — APIXABAN 5 MILLIGRAM(S): 2.5 TABLET, FILM COATED ORAL at 06:40

## 2022-01-01 RX ADMIN — Medication 100 MILLIGRAM(S): at 20:11

## 2022-01-01 RX ADMIN — Medication 100 MILLIGRAM(S): at 13:41

## 2022-01-01 RX ADMIN — Medication 1 APPLICATION(S): at 05:55

## 2022-01-01 RX ADMIN — Medication 20 MILLIGRAM(S): at 06:24

## 2022-01-01 RX ADMIN — Medication 40 MILLIGRAM(S): at 06:31

## 2022-01-01 RX ADMIN — Medication 60 MILLIGRAM(S): at 05:49

## 2022-01-01 RX ADMIN — ENOXAPARIN SODIUM 80 MILLIGRAM(S): 100 INJECTION SUBCUTANEOUS at 18:22

## 2022-01-01 RX ADMIN — LISINOPRIL 10 MILLIGRAM(S): 2.5 TABLET ORAL at 05:43

## 2022-01-01 RX ADMIN — Medication 60 MILLIGRAM(S): at 04:44

## 2022-01-01 RX ADMIN — Medication 25 MILLIGRAM(S): at 17:44

## 2022-01-01 RX ADMIN — Medication 100 MILLIGRAM(S): at 05:37

## 2022-01-01 RX ADMIN — APIXABAN 5 MILLIGRAM(S): 2.5 TABLET, FILM COATED ORAL at 06:26

## 2022-01-01 RX ADMIN — Medication 40 MILLIGRAM(S): at 05:52

## 2022-01-01 RX ADMIN — Medication 100 MILLIGRAM(S): at 05:54

## 2022-01-01 RX ADMIN — PANTOPRAZOLE SODIUM 40 MILLIGRAM(S): 20 TABLET, DELAYED RELEASE ORAL at 05:15

## 2022-01-01 RX ADMIN — Medication 60 MILLIGRAM(S): at 17:51

## 2022-01-01 RX ADMIN — Medication 3 MILLILITER(S): at 15:45

## 2022-01-01 RX ADMIN — CHLORHEXIDINE GLUCONATE 1 APPLICATION(S): 213 SOLUTION TOPICAL at 05:45

## 2022-01-01 RX ADMIN — APIXABAN 5 MILLIGRAM(S): 2.5 TABLET, FILM COATED ORAL at 05:43

## 2022-01-01 RX ADMIN — Medication 60 MILLIGRAM(S): at 17:14

## 2022-01-01 RX ADMIN — Medication 5 MILLIGRAM(S): at 23:37

## 2022-01-01 RX ADMIN — AMLODIPINE BESYLATE 5 MILLIGRAM(S): 2.5 TABLET ORAL at 05:22

## 2022-01-01 RX ADMIN — Medication 3 MILLILITER(S): at 15:57

## 2022-01-01 RX ADMIN — Medication 40 MILLIGRAM(S): at 06:26

## 2022-01-01 RX ADMIN — APIXABAN 5 MILLIGRAM(S): 2.5 TABLET, FILM COATED ORAL at 17:57

## 2022-01-01 RX ADMIN — PREGABALIN 1000 MICROGRAM(S): 225 CAPSULE ORAL at 13:15

## 2022-01-01 RX ADMIN — CHLORHEXIDINE GLUCONATE 1 APPLICATION(S): 213 SOLUTION TOPICAL at 05:49

## 2022-01-01 RX ADMIN — FINASTERIDE 5 MILLIGRAM(S): 5 TABLET, FILM COATED ORAL at 12:55

## 2022-01-01 RX ADMIN — POLYETHYLENE GLYCOL 3350 17 GRAM(S): 17 POWDER, FOR SOLUTION ORAL at 11:05

## 2022-01-01 RX ADMIN — Medication 75 MILLIGRAM(S): at 06:19

## 2022-01-01 RX ADMIN — BUMETANIDE 1 MILLIGRAM(S): 0.25 INJECTION INTRAMUSCULAR; INTRAVENOUS at 16:11

## 2022-01-01 RX ADMIN — Medication 40 MILLIGRAM(S): at 14:00

## 2022-01-01 RX ADMIN — Medication 3 MILLILITER(S): at 08:41

## 2022-01-01 RX ADMIN — Medication 1 TABLET(S): at 12:01

## 2022-01-01 RX ADMIN — Medication 1 APPLICATION(S): at 06:23

## 2022-01-01 RX ADMIN — Medication 50 MILLIGRAM(S): at 21:58

## 2022-01-01 RX ADMIN — Medication 50 MILLIGRAM(S): at 13:26

## 2022-01-01 RX ADMIN — PREGABALIN 1000 MICROGRAM(S): 225 CAPSULE ORAL at 11:34

## 2022-01-01 RX ADMIN — FINASTERIDE 5 MILLIGRAM(S): 5 TABLET, FILM COATED ORAL at 11:11

## 2022-01-01 RX ADMIN — Medication 1 APPLICATION(S): at 17:08

## 2022-01-01 RX ADMIN — Medication 25 GRAM(S): at 03:40

## 2022-01-01 RX ADMIN — PANTOPRAZOLE SODIUM 40 MILLIGRAM(S): 20 TABLET, DELAYED RELEASE ORAL at 06:32

## 2022-01-01 RX ADMIN — Medication 60 MILLIGRAM(S): at 09:44

## 2022-01-01 RX ADMIN — APIXABAN 5 MILLIGRAM(S): 2.5 TABLET, FILM COATED ORAL at 17:56

## 2022-01-01 RX ADMIN — LISINOPRIL 10 MILLIGRAM(S): 2.5 TABLET ORAL at 05:55

## 2022-01-01 RX ADMIN — APIXABAN 5 MILLIGRAM(S): 2.5 TABLET, FILM COATED ORAL at 17:22

## 2022-01-01 RX ADMIN — Medication 50 MILLIGRAM(S): at 05:33

## 2022-01-01 RX ADMIN — PANTOPRAZOLE SODIUM 40 MILLIGRAM(S): 20 TABLET, DELAYED RELEASE ORAL at 08:50

## 2022-01-01 RX ADMIN — Medication 325 MILLIGRAM(S): at 11:01

## 2022-01-01 RX ADMIN — LISINOPRIL 10 MILLIGRAM(S): 2.5 TABLET ORAL at 06:18

## 2022-01-01 RX ADMIN — Medication 100 MILLIGRAM(S): at 13:01

## 2022-01-01 RX ADMIN — Medication 325 MILLIGRAM(S): at 11:12

## 2022-01-01 RX ADMIN — FINASTERIDE 5 MILLIGRAM(S): 5 TABLET, FILM COATED ORAL at 12:14

## 2022-01-01 RX ADMIN — Medication 325 MILLIGRAM(S): at 12:15

## 2022-01-01 RX ADMIN — Medication 40 MILLIGRAM(S): at 04:58

## 2022-01-01 RX ADMIN — APIXABAN 5 MILLIGRAM(S): 2.5 TABLET, FILM COATED ORAL at 17:41

## 2022-01-01 RX ADMIN — Medication 1000 UNIT(S): at 11:05

## 2022-01-01 RX ADMIN — PREGABALIN 1000 MICROGRAM(S): 225 CAPSULE ORAL at 14:05

## 2022-01-01 RX ADMIN — Medication 30 MILLIGRAM(S): at 16:58

## 2022-01-01 RX ADMIN — Medication 120 MILLIGRAM(S): at 06:18

## 2022-01-01 RX ADMIN — Medication 1 APPLICATION(S): at 13:23

## 2022-01-01 RX ADMIN — PANTOPRAZOLE SODIUM 40 MILLIGRAM(S): 20 TABLET, DELAYED RELEASE ORAL at 05:37

## 2022-01-01 RX ADMIN — Medication 100 MILLIGRAM(S): at 22:09

## 2022-01-01 RX ADMIN — Medication 3 MILLILITER(S): at 13:21

## 2022-01-01 RX ADMIN — FINASTERIDE 5 MILLIGRAM(S): 5 TABLET, FILM COATED ORAL at 12:29

## 2022-01-01 RX ADMIN — Medication 20 MILLIGRAM(S): at 20:10

## 2022-01-01 RX ADMIN — TAMSULOSIN HYDROCHLORIDE 0.4 MILLIGRAM(S): 0.4 CAPSULE ORAL at 21:45

## 2022-01-01 RX ADMIN — Medication 1 MILLIGRAM(S): at 14:21

## 2022-01-01 RX ADMIN — PANTOPRAZOLE SODIUM 40 MILLIGRAM(S): 20 TABLET, DELAYED RELEASE ORAL at 06:41

## 2022-01-01 RX ADMIN — Medication 60 MILLIGRAM(S): at 17:17

## 2022-01-01 RX ADMIN — MIDODRINE HYDROCHLORIDE 5 MILLIGRAM(S): 2.5 TABLET ORAL at 11:20

## 2022-01-01 RX ADMIN — Medication 60 MILLIGRAM(S): at 05:19

## 2022-01-01 RX ADMIN — TAMSULOSIN HYDROCHLORIDE 0.4 MILLIGRAM(S): 0.4 CAPSULE ORAL at 21:47

## 2022-01-01 RX ADMIN — Medication 40 MILLIEQUIVALENT(S): at 11:45

## 2022-01-01 RX ADMIN — Medication 30 MILLIGRAM(S): at 23:48

## 2022-01-01 RX ADMIN — Medication 0.25 MILLIGRAM(S): at 22:26

## 2022-01-01 RX ADMIN — APIXABAN 5 MILLIGRAM(S): 2.5 TABLET, FILM COATED ORAL at 17:47

## 2022-01-01 RX ADMIN — Medication 60 MILLIGRAM(S): at 17:45

## 2022-01-01 RX ADMIN — BUMETANIDE 1 MILLIGRAM(S): 0.25 INJECTION INTRAMUSCULAR; INTRAVENOUS at 14:43

## 2022-01-01 RX ADMIN — AMLODIPINE BESYLATE 5 MILLIGRAM(S): 2.5 TABLET ORAL at 05:33

## 2022-01-01 RX ADMIN — PANTOPRAZOLE SODIUM 40 MILLIGRAM(S): 20 TABLET, DELAYED RELEASE ORAL at 05:48

## 2022-01-01 RX ADMIN — Medication 100 MILLIGRAM(S): at 15:03

## 2022-01-01 RX ADMIN — Medication 10 MILLIGRAM(S): at 21:58

## 2022-01-01 RX ADMIN — Medication 5 MILLIGRAM(S): at 21:38

## 2022-01-01 RX ADMIN — Medication 100 MILLIGRAM(S): at 22:35

## 2022-01-01 RX ADMIN — Medication 40 MILLIGRAM(S): at 17:51

## 2022-01-01 RX ADMIN — PREGABALIN 1000 MICROGRAM(S): 225 CAPSULE ORAL at 12:37

## 2022-01-01 RX ADMIN — Medication 10 MILLIGRAM(S): at 23:51

## 2022-01-01 RX ADMIN — SENNA PLUS 2 TABLET(S): 8.6 TABLET ORAL at 22:28

## 2022-01-01 RX ADMIN — TAMSULOSIN HYDROCHLORIDE 0.4 MILLIGRAM(S): 0.4 CAPSULE ORAL at 21:48

## 2022-01-01 RX ADMIN — PREGABALIN 1000 MICROGRAM(S): 225 CAPSULE ORAL at 12:41

## 2022-01-01 RX ADMIN — Medication 50 MILLIGRAM(S): at 21:51

## 2022-01-01 RX ADMIN — Medication 100 MILLIGRAM(S): at 22:30

## 2022-01-01 RX ADMIN — Medication 100 MILLIGRAM(S): at 21:35

## 2022-01-01 RX ADMIN — Medication 60 MILLIGRAM(S): at 05:16

## 2022-01-01 RX ADMIN — TAMSULOSIN HYDROCHLORIDE 0.4 MILLIGRAM(S): 0.4 CAPSULE ORAL at 21:40

## 2022-01-01 RX ADMIN — FINASTERIDE 5 MILLIGRAM(S): 5 TABLET, FILM COATED ORAL at 12:43

## 2022-01-01 RX ADMIN — ZOLPIDEM TARTRATE 5 MILLIGRAM(S): 10 TABLET ORAL at 21:46

## 2022-01-01 RX ADMIN — Medication 40 MILLIGRAM(S): at 21:56

## 2022-01-01 RX ADMIN — Medication 325 MILLIGRAM(S): at 12:37

## 2022-01-01 RX ADMIN — Medication 500 MILLIGRAM(S): at 11:42

## 2022-01-01 RX ADMIN — PREGABALIN 1000 MICROGRAM(S): 225 CAPSULE ORAL at 11:37

## 2022-01-01 RX ADMIN — FAMOTIDINE 20 MILLIGRAM(S): 10 INJECTION INTRAVENOUS at 11:45

## 2022-01-01 RX ADMIN — TAMSULOSIN HYDROCHLORIDE 0.4 MILLIGRAM(S): 0.4 CAPSULE ORAL at 22:14

## 2022-01-01 RX ADMIN — Medication 120 MILLIGRAM(S): at 05:11

## 2022-01-01 RX ADMIN — Medication 1 APPLICATION(S): at 17:15

## 2022-01-01 RX ADMIN — FAMOTIDINE 20 MILLIGRAM(S): 10 INJECTION INTRAVENOUS at 11:21

## 2022-01-01 RX ADMIN — PREGABALIN 1000 MICROGRAM(S): 225 CAPSULE ORAL at 13:23

## 2022-01-01 RX ADMIN — PREGABALIN 1000 MICROGRAM(S): 225 CAPSULE ORAL at 12:56

## 2022-01-01 RX ADMIN — APIXABAN 5 MILLIGRAM(S): 2.5 TABLET, FILM COATED ORAL at 19:36

## 2022-01-01 RX ADMIN — ZOLPIDEM TARTRATE 5 MILLIGRAM(S): 10 TABLET ORAL at 23:45

## 2022-01-01 RX ADMIN — Medication 75 MILLIGRAM(S): at 14:15

## 2022-01-01 RX ADMIN — Medication 12.5 MILLIGRAM(S): at 09:54

## 2022-01-01 RX ADMIN — Medication 50 MILLIGRAM(S): at 14:29

## 2022-01-01 RX ADMIN — Medication 30 MILLIGRAM(S): at 05:46

## 2022-01-01 RX ADMIN — Medication 100 MILLIGRAM(S): at 13:59

## 2022-01-01 RX ADMIN — Medication 100 MILLIGRAM(S): at 14:06

## 2022-01-01 RX ADMIN — POLYETHYLENE GLYCOL 3350 17 GRAM(S): 17 POWDER, FOR SOLUTION ORAL at 05:23

## 2022-01-01 RX ADMIN — Medication 100 MILLIGRAM(S): at 05:11

## 2022-01-01 RX ADMIN — Medication 100 MILLIGRAM(S): at 05:32

## 2022-01-01 RX ADMIN — Medication 1000 UNIT(S): at 12:55

## 2022-01-01 RX ADMIN — ENOXAPARIN SODIUM 80 MILLIGRAM(S): 100 INJECTION SUBCUTANEOUS at 05:15

## 2022-01-01 RX ADMIN — TAMSULOSIN HYDROCHLORIDE 0.4 MILLIGRAM(S): 0.4 CAPSULE ORAL at 21:05

## 2022-01-01 RX ADMIN — Medication 3 MILLILITER(S): at 21:26

## 2022-01-01 RX ADMIN — PANTOPRAZOLE SODIUM 40 MILLIGRAM(S): 20 TABLET, DELAYED RELEASE ORAL at 06:23

## 2022-01-01 RX ADMIN — Medication 25 MILLIGRAM(S): at 14:08

## 2022-01-01 RX ADMIN — Medication 40 MILLIGRAM(S): at 16:49

## 2022-01-01 RX ADMIN — FINASTERIDE 5 MILLIGRAM(S): 5 TABLET, FILM COATED ORAL at 11:32

## 2022-01-01 RX ADMIN — WARFARIN SODIUM 4 MILLIGRAM(S): 2.5 TABLET ORAL at 21:41

## 2022-01-01 RX ADMIN — LISINOPRIL 10 MILLIGRAM(S): 2.5 TABLET ORAL at 06:33

## 2022-01-01 RX ADMIN — BUMETANIDE 1 MILLIGRAM(S): 0.25 INJECTION INTRAMUSCULAR; INTRAVENOUS at 13:41

## 2022-01-01 RX ADMIN — PANTOPRAZOLE SODIUM 40 MILLIGRAM(S): 20 TABLET, DELAYED RELEASE ORAL at 06:40

## 2022-01-01 RX ADMIN — PANTOPRAZOLE SODIUM 40 MILLIGRAM(S): 20 TABLET, DELAYED RELEASE ORAL at 08:46

## 2022-01-01 RX ADMIN — Medication 40 MILLIGRAM(S): at 05:46

## 2022-01-01 RX ADMIN — PANTOPRAZOLE SODIUM 40 MILLIGRAM(S): 20 TABLET, DELAYED RELEASE ORAL at 06:04

## 2022-01-01 RX ADMIN — Medication 100 MILLIGRAM(S): at 05:47

## 2022-01-01 RX ADMIN — ENOXAPARIN SODIUM 70 MILLIGRAM(S): 100 INJECTION SUBCUTANEOUS at 18:45

## 2022-01-01 RX ADMIN — Medication 325 MILLIGRAM(S): at 12:38

## 2022-01-01 RX ADMIN — FINASTERIDE 5 MILLIGRAM(S): 5 TABLET, FILM COATED ORAL at 11:06

## 2022-01-01 RX ADMIN — WARFARIN SODIUM 2.5 MILLIGRAM(S): 2.5 TABLET ORAL at 22:09

## 2022-01-01 RX ADMIN — TAMSULOSIN HYDROCHLORIDE 0.4 MILLIGRAM(S): 0.4 CAPSULE ORAL at 22:01

## 2022-01-01 RX ADMIN — Medication 1 APPLICATION(S): at 06:37

## 2022-01-01 RX ADMIN — Medication 40 MILLIGRAM(S): at 05:48

## 2022-01-01 RX ADMIN — Medication 60 MILLIGRAM(S): at 17:41

## 2022-01-01 RX ADMIN — Medication 100 MILLIGRAM(S): at 14:14

## 2022-01-01 RX ADMIN — Medication 10 MILLIGRAM(S): at 22:44

## 2022-01-01 RX ADMIN — PREGABALIN 1000 MICROGRAM(S): 225 CAPSULE ORAL at 13:06

## 2022-01-01 RX ADMIN — Medication 40 MILLIGRAM(S): at 06:03

## 2022-01-01 RX ADMIN — Medication 5 MILLIGRAM(S): at 00:13

## 2022-01-01 RX ADMIN — MIDODRINE HYDROCHLORIDE 5 MILLIGRAM(S): 2.5 TABLET ORAL at 13:06

## 2022-01-01 RX ADMIN — Medication 40 MILLIGRAM(S): at 05:51

## 2022-01-01 RX ADMIN — CHLORHEXIDINE GLUCONATE 1 APPLICATION(S): 213 SOLUTION TOPICAL at 06:15

## 2022-01-01 RX ADMIN — FAMOTIDINE 20 MILLIGRAM(S): 10 INJECTION INTRAVENOUS at 13:11

## 2022-01-01 RX ADMIN — Medication 100 MILLIGRAM(S): at 22:36

## 2022-01-01 RX ADMIN — AMLODIPINE BESYLATE 5 MILLIGRAM(S): 2.5 TABLET ORAL at 05:15

## 2022-01-01 RX ADMIN — Medication 120 MILLIGRAM(S): at 06:04

## 2022-01-01 RX ADMIN — Medication 5 MILLIGRAM(S): at 22:07

## 2022-01-01 RX ADMIN — Medication 100 MILLIGRAM(S): at 21:11

## 2022-01-01 RX ADMIN — Medication 100 MILLIGRAM(S): at 16:26

## 2022-01-01 RX ADMIN — Medication 10 MILLIGRAM(S): at 21:41

## 2022-01-01 RX ADMIN — APIXABAN 5 MILLIGRAM(S): 2.5 TABLET, FILM COATED ORAL at 05:24

## 2022-01-01 RX ADMIN — Medication 325 MILLIGRAM(S): at 12:41

## 2022-01-01 RX ADMIN — Medication 1 TABLET(S): at 11:37

## 2022-01-01 RX ADMIN — Medication 1 TABLET(S): at 11:01

## 2022-01-01 RX ADMIN — LISINOPRIL 10 MILLIGRAM(S): 2.5 TABLET ORAL at 06:41

## 2022-01-01 RX ADMIN — APIXABAN 5 MILLIGRAM(S): 2.5 TABLET, FILM COATED ORAL at 05:11

## 2022-01-01 RX ADMIN — PREGABALIN 1000 MICROGRAM(S): 225 CAPSULE ORAL at 12:30

## 2022-01-01 RX ADMIN — Medication 1 APPLICATION(S): at 11:20

## 2022-01-01 RX ADMIN — Medication 5 MILLIGRAM(S): at 21:48

## 2022-01-01 RX ADMIN — Medication 40 MILLIEQUIVALENT(S): at 14:00

## 2022-01-01 RX ADMIN — PREGABALIN 1000 MICROGRAM(S): 225 CAPSULE ORAL at 12:02

## 2022-01-01 RX ADMIN — Medication 120 MILLIGRAM(S): at 05:57

## 2022-01-01 RX ADMIN — Medication 1 APPLICATION(S): at 05:44

## 2022-01-01 RX ADMIN — Medication 1 APPLICATION(S): at 17:30

## 2022-01-01 RX ADMIN — Medication 1000 UNIT(S): at 13:01

## 2022-01-01 RX ADMIN — PANTOPRAZOLE SODIUM 40 MILLIGRAM(S): 20 TABLET, DELAYED RELEASE ORAL at 06:14

## 2022-01-01 RX ADMIN — Medication 120 MILLIGRAM(S): at 05:40

## 2022-01-01 RX ADMIN — Medication 1 TABLET(S): at 11:45

## 2022-01-01 RX ADMIN — Medication 40 MILLIGRAM(S): at 15:45

## 2022-01-01 RX ADMIN — Medication 30 MILLIGRAM(S): at 05:23

## 2022-01-01 RX ADMIN — Medication 40 MILLIGRAM(S): at 18:49

## 2022-01-01 RX ADMIN — Medication 25 GRAM(S): at 14:04

## 2022-01-01 RX ADMIN — PREGABALIN 1000 MICROGRAM(S): 225 CAPSULE ORAL at 11:58

## 2022-01-01 RX ADMIN — Medication 3 MILLILITER(S): at 04:23

## 2022-01-01 RX ADMIN — Medication 325 MILLIGRAM(S): at 13:11

## 2022-01-01 RX ADMIN — Medication 1000 UNIT(S): at 13:15

## 2022-01-01 RX ADMIN — PREGABALIN 1000 MICROGRAM(S): 225 CAPSULE ORAL at 11:44

## 2022-01-01 RX ADMIN — Medication 60 MILLIGRAM(S): at 10:10

## 2022-01-01 RX ADMIN — Medication 100 MILLIGRAM(S): at 14:08

## 2022-01-01 RX ADMIN — LISINOPRIL 10 MILLIGRAM(S): 2.5 TABLET ORAL at 05:10

## 2022-01-01 RX ADMIN — MORPHINE SULFATE 6 MILLIGRAM(S): 50 CAPSULE, EXTENDED RELEASE ORAL at 14:21

## 2022-01-01 RX ADMIN — PREGABALIN 1000 MICROGRAM(S): 225 CAPSULE ORAL at 12:51

## 2022-01-01 RX ADMIN — Medication 10 MILLIGRAM(S): at 23:24

## 2022-01-01 RX ADMIN — Medication 30 MILLILITER(S): at 22:32

## 2022-01-01 RX ADMIN — Medication 1 APPLICATION(S): at 17:11

## 2022-01-01 RX ADMIN — TAMSULOSIN HYDROCHLORIDE 0.4 MILLIGRAM(S): 0.4 CAPSULE ORAL at 23:06

## 2022-01-01 RX ADMIN — Medication 50 MILLIGRAM(S): at 17:02

## 2022-01-01 RX ADMIN — Medication 100 MILLIGRAM(S): at 06:36

## 2022-01-01 RX ADMIN — Medication 10 MILLIGRAM(S): at 21:32

## 2022-01-01 RX ADMIN — ZOLPIDEM TARTRATE 5 MILLIGRAM(S): 10 TABLET ORAL at 23:41

## 2022-01-01 RX ADMIN — PREGABALIN 1000 MICROGRAM(S): 225 CAPSULE ORAL at 13:02

## 2022-01-01 RX ADMIN — TAMSULOSIN HYDROCHLORIDE 0.4 MILLIGRAM(S): 0.4 CAPSULE ORAL at 22:12

## 2022-01-01 RX ADMIN — Medication 3 MILLILITER(S): at 12:16

## 2022-01-01 RX ADMIN — CEFEPIME 100 MILLIGRAM(S): 1 INJECTION, POWDER, FOR SOLUTION INTRAMUSCULAR; INTRAVENOUS at 03:34

## 2022-01-01 RX ADMIN — Medication 3 MILLILITER(S): at 13:27

## 2022-01-01 RX ADMIN — Medication 325 MILLIGRAM(S): at 14:21

## 2022-01-01 RX ADMIN — Medication 100 MILLIGRAM(S): at 06:14

## 2022-01-01 RX ADMIN — Medication 1 TABLET(S): at 13:15

## 2022-01-01 RX ADMIN — PANTOPRAZOLE SODIUM 40 MILLIGRAM(S): 20 TABLET, DELAYED RELEASE ORAL at 06:47

## 2022-01-01 RX ADMIN — LISINOPRIL 10 MILLIGRAM(S): 2.5 TABLET ORAL at 05:49

## 2022-01-01 RX ADMIN — FINASTERIDE 5 MILLIGRAM(S): 5 TABLET, FILM COATED ORAL at 12:35

## 2022-01-01 RX ADMIN — Medication 100 MILLIGRAM(S): at 06:24

## 2022-01-01 RX ADMIN — AMLODIPINE BESYLATE 5 MILLIGRAM(S): 2.5 TABLET ORAL at 05:52

## 2022-01-01 RX ADMIN — Medication 100 MILLIGRAM(S): at 12:13

## 2022-01-01 RX ADMIN — Medication 40 MILLIGRAM(S): at 05:50

## 2022-01-01 RX ADMIN — Medication 100 MILLIGRAM(S): at 05:23

## 2022-01-01 RX ADMIN — TAMSULOSIN HYDROCHLORIDE 0.4 MILLIGRAM(S): 0.4 CAPSULE ORAL at 22:30

## 2022-01-01 RX ADMIN — WARFARIN SODIUM 2.5 MILLIGRAM(S): 2.5 TABLET ORAL at 21:47

## 2022-01-01 RX ADMIN — FINASTERIDE 5 MILLIGRAM(S): 5 TABLET, FILM COATED ORAL at 11:46

## 2022-01-01 RX ADMIN — Medication 3 MILLILITER(S): at 07:32

## 2022-01-01 RX ADMIN — Medication 100 MILLIGRAM(S): at 15:19

## 2022-01-01 RX ADMIN — Medication 1 APPLICATION(S): at 06:17

## 2022-01-01 RX ADMIN — Medication 5 MILLIGRAM(S): at 21:47

## 2022-01-01 RX ADMIN — Medication 60 MILLIGRAM(S): at 05:45

## 2022-01-01 RX ADMIN — Medication 10 MILLIGRAM(S): at 22:13

## 2022-01-01 RX ADMIN — FINASTERIDE 5 MILLIGRAM(S): 5 TABLET, FILM COATED ORAL at 11:27

## 2022-01-01 RX ADMIN — Medication 3 MILLILITER(S): at 14:21

## 2022-01-01 RX ADMIN — APIXABAN 5 MILLIGRAM(S): 2.5 TABLET, FILM COATED ORAL at 18:17

## 2022-01-01 RX ADMIN — BUMETANIDE 1 MILLIGRAM(S): 0.25 INJECTION INTRAMUSCULAR; INTRAVENOUS at 05:47

## 2022-01-01 RX ADMIN — Medication 30 MILLIGRAM(S): at 11:12

## 2022-01-01 RX ADMIN — CEFEPIME 100 MILLIGRAM(S): 1 INJECTION, POWDER, FOR SOLUTION INTRAMUSCULAR; INTRAVENOUS at 17:38

## 2022-01-01 RX ADMIN — Medication 60 MILLIGRAM(S): at 02:29

## 2022-01-01 RX ADMIN — Medication 125 MILLIGRAM(S): at 04:22

## 2022-01-01 RX ADMIN — TAMSULOSIN HYDROCHLORIDE 0.4 MILLIGRAM(S): 0.4 CAPSULE ORAL at 21:50

## 2022-01-01 RX ADMIN — APIXABAN 5 MILLIGRAM(S): 2.5 TABLET, FILM COATED ORAL at 06:21

## 2022-01-01 RX ADMIN — PANTOPRAZOLE SODIUM 40 MILLIGRAM(S): 20 TABLET, DELAYED RELEASE ORAL at 05:42

## 2022-01-01 RX ADMIN — Medication 120 MILLIGRAM(S): at 05:23

## 2022-01-01 RX ADMIN — Medication 325 MILLIGRAM(S): at 12:13

## 2022-01-01 RX ADMIN — Medication 500 MILLIGRAM(S): at 11:07

## 2022-01-01 RX ADMIN — Medication 100 MILLIGRAM(S): at 06:43

## 2022-01-01 RX ADMIN — SIMETHICONE 80 MILLIGRAM(S): 80 TABLET, CHEWABLE ORAL at 06:10

## 2022-01-01 RX ADMIN — Medication 1 APPLICATION(S): at 05:34

## 2022-01-01 RX ADMIN — Medication 20 MILLIGRAM(S): at 06:15

## 2022-01-01 RX ADMIN — Medication 650 MILLIGRAM(S): at 00:46

## 2022-01-01 RX ADMIN — Medication 500 MILLIGRAM(S): at 12:02

## 2022-01-01 RX ADMIN — PREGABALIN 1000 MICROGRAM(S): 225 CAPSULE ORAL at 13:19

## 2022-01-01 RX ADMIN — FINASTERIDE 5 MILLIGRAM(S): 5 TABLET, FILM COATED ORAL at 11:01

## 2022-01-01 RX ADMIN — FINASTERIDE 5 MILLIGRAM(S): 5 TABLET, FILM COATED ORAL at 11:54

## 2022-01-01 RX ADMIN — FINASTERIDE 5 MILLIGRAM(S): 5 TABLET, FILM COATED ORAL at 11:37

## 2022-01-01 RX ADMIN — Medication 500 MILLIGRAM(S): at 11:44

## 2022-01-01 RX ADMIN — Medication 150 GRAM(S): at 14:57

## 2022-01-01 RX ADMIN — BUMETANIDE 1 MILLIGRAM(S): 0.25 INJECTION INTRAMUSCULAR; INTRAVENOUS at 14:56

## 2022-01-01 RX ADMIN — Medication 100 MILLIGRAM(S): at 14:37

## 2022-01-01 RX ADMIN — Medication 25 MILLIGRAM(S): at 00:01

## 2022-01-01 RX ADMIN — PREGABALIN 1000 MICROGRAM(S): 225 CAPSULE ORAL at 11:33

## 2022-01-01 RX ADMIN — Medication 30 MILLIGRAM(S): at 13:06

## 2022-01-01 RX ADMIN — LISINOPRIL 10 MILLIGRAM(S): 2.5 TABLET ORAL at 06:46

## 2022-01-01 RX ADMIN — Medication 5 MILLIGRAM(S): at 22:44

## 2022-01-01 RX ADMIN — LISINOPRIL 10 MILLIGRAM(S): 2.5 TABLET ORAL at 05:23

## 2022-01-01 RX ADMIN — FINASTERIDE 5 MILLIGRAM(S): 5 TABLET, FILM COATED ORAL at 12:32

## 2022-01-01 RX ADMIN — APIXABAN 5 MILLIGRAM(S): 2.5 TABLET, FILM COATED ORAL at 06:16

## 2022-01-01 RX ADMIN — Medication 10 MILLIGRAM(S): at 06:00

## 2022-01-01 RX ADMIN — CEFEPIME 100 MILLIGRAM(S): 1 INJECTION, POWDER, FOR SOLUTION INTRAMUSCULAR; INTRAVENOUS at 05:49

## 2022-01-01 RX ADMIN — Medication 40 MILLIGRAM(S): at 06:05

## 2022-01-01 RX ADMIN — PREGABALIN 1000 MICROGRAM(S): 225 CAPSULE ORAL at 12:13

## 2022-01-01 RX ADMIN — APIXABAN 5 MILLIGRAM(S): 2.5 TABLET, FILM COATED ORAL at 17:28

## 2022-01-01 RX ADMIN — Medication 120 MILLIGRAM(S): at 12:13

## 2022-01-01 RX ADMIN — Medication 40 MILLIGRAM(S): at 06:19

## 2022-01-01 RX ADMIN — Medication 60 MILLIGRAM(S): at 06:04

## 2022-01-01 RX ADMIN — Medication 50 MILLIGRAM(S): at 05:47

## 2022-01-01 RX ADMIN — Medication 12.5 MILLIGRAM(S): at 06:41

## 2022-01-01 RX ADMIN — Medication 60 MILLIGRAM(S): at 17:36

## 2022-01-01 RX ADMIN — Medication 120 MILLIGRAM(S): at 06:43

## 2022-01-01 RX ADMIN — PREGABALIN 1000 MICROGRAM(S): 225 CAPSULE ORAL at 11:01

## 2022-01-01 RX ADMIN — LISINOPRIL 10 MILLIGRAM(S): 2.5 TABLET ORAL at 05:32

## 2022-01-01 RX ADMIN — Medication 10 MILLIGRAM(S): at 23:06

## 2022-01-01 RX ADMIN — Medication 100 MILLIGRAM(S): at 21:44

## 2022-01-01 RX ADMIN — BUMETANIDE 1 MILLIGRAM(S): 0.25 INJECTION INTRAMUSCULAR; INTRAVENOUS at 05:23

## 2022-01-01 RX ADMIN — PREGABALIN 1000 MICROGRAM(S): 225 CAPSULE ORAL at 16:11

## 2022-01-01 RX ADMIN — Medication 50 MILLIGRAM(S): at 05:10

## 2022-01-01 RX ADMIN — LISINOPRIL 10 MILLIGRAM(S): 2.5 TABLET ORAL at 05:53

## 2022-01-01 RX ADMIN — TAMSULOSIN HYDROCHLORIDE 0.4 MILLIGRAM(S): 0.4 CAPSULE ORAL at 21:39

## 2022-01-01 RX ADMIN — Medication 50 MILLIEQUIVALENT(S): at 07:26

## 2022-01-01 RX ADMIN — Medication 10 MILLIGRAM(S): at 21:45

## 2022-01-01 RX ADMIN — Medication 100 MILLIGRAM(S): at 22:32

## 2022-01-01 RX ADMIN — FAMOTIDINE 20 MILLIGRAM(S): 10 INJECTION INTRAVENOUS at 13:02

## 2022-01-01 RX ADMIN — APIXABAN 5 MILLIGRAM(S): 2.5 TABLET, FILM COATED ORAL at 05:10

## 2022-01-01 RX ADMIN — Medication 100 MILLIGRAM(S): at 21:38

## 2022-01-01 RX ADMIN — CHLORHEXIDINE GLUCONATE 1 APPLICATION(S): 213 SOLUTION TOPICAL at 05:35

## 2022-01-01 RX ADMIN — CHLORHEXIDINE GLUCONATE 1 APPLICATION(S): 213 SOLUTION TOPICAL at 06:49

## 2022-01-01 RX ADMIN — APIXABAN 5 MILLIGRAM(S): 2.5 TABLET, FILM COATED ORAL at 06:00

## 2022-01-01 RX ADMIN — Medication 100 MILLIGRAM(S): at 13:15

## 2022-01-01 RX ADMIN — Medication 25 MILLIGRAM(S): at 12:14

## 2022-01-01 RX ADMIN — PREGABALIN 1000 MICROGRAM(S): 225 CAPSULE ORAL at 12:15

## 2022-01-01 RX ADMIN — Medication 40 MILLIGRAM(S): at 11:03

## 2022-01-01 RX ADMIN — Medication 325 MILLIGRAM(S): at 11:38

## 2022-01-01 RX ADMIN — Medication 100 MILLIGRAM(S): at 06:00

## 2022-01-01 RX ADMIN — FAMOTIDINE 20 MILLIGRAM(S): 10 INJECTION INTRAVENOUS at 11:16

## 2022-01-01 RX ADMIN — TAMSULOSIN HYDROCHLORIDE 0.4 MILLIGRAM(S): 0.4 CAPSULE ORAL at 22:08

## 2022-01-01 RX ADMIN — Medication 1 APPLICATION(S): at 06:09

## 2022-01-01 RX ADMIN — Medication 25 GRAM(S): at 13:56

## 2022-01-01 RX ADMIN — Medication 10 MILLIGRAM(S): at 16:49

## 2022-01-01 RX ADMIN — Medication 10 MILLIGRAM(S): at 00:54

## 2022-01-01 RX ADMIN — FINASTERIDE 5 MILLIGRAM(S): 5 TABLET, FILM COATED ORAL at 11:24

## 2022-01-01 RX ADMIN — PREGABALIN 1000 MICROGRAM(S): 225 CAPSULE ORAL at 12:50

## 2022-01-01 RX ADMIN — Medication 1 APPLICATION(S): at 13:06

## 2022-01-01 RX ADMIN — Medication 120 MILLIGRAM(S): at 06:35

## 2022-01-01 RX ADMIN — Medication 40 MILLIGRAM(S): at 05:25

## 2022-01-01 RX ADMIN — WARFARIN SODIUM 2 MILLIGRAM(S): 2.5 TABLET ORAL at 22:01

## 2022-01-01 RX ADMIN — FINASTERIDE 5 MILLIGRAM(S): 5 TABLET, FILM COATED ORAL at 18:16

## 2022-01-01 RX ADMIN — SIMETHICONE 80 MILLIGRAM(S): 80 TABLET, CHEWABLE ORAL at 22:27

## 2022-01-01 RX ADMIN — PREGABALIN 1000 MICROGRAM(S): 225 CAPSULE ORAL at 11:21

## 2022-01-01 RX ADMIN — Medication 10 MILLIGRAM(S): at 22:08

## 2022-01-01 RX ADMIN — Medication 40 MILLIGRAM(S): at 09:00

## 2022-01-01 RX ADMIN — Medication 100 MILLIGRAM(S): at 05:16

## 2022-01-01 RX ADMIN — Medication 120 MILLIGRAM(S): at 14:27

## 2022-01-01 RX ADMIN — Medication 500 MILLIGRAM(S): at 12:33

## 2022-01-01 RX ADMIN — AMLODIPINE BESYLATE 5 MILLIGRAM(S): 2.5 TABLET ORAL at 05:23

## 2022-01-01 RX ADMIN — DEXMEDETOMIDINE HYDROCHLORIDE IN 0.9% SODIUM CHLORIDE 3.5 MICROGRAM(S)/KG/HR: 4 INJECTION INTRAVENOUS at 19:36

## 2022-01-01 RX ADMIN — SODIUM CHLORIDE 750 MILLILITER(S): 9 INJECTION, SOLUTION INTRAVENOUS at 18:47

## 2022-01-01 RX ADMIN — APIXABAN 5 MILLIGRAM(S): 2.5 TABLET, FILM COATED ORAL at 06:03

## 2022-01-01 RX ADMIN — Medication 120 MILLIGRAM(S): at 06:24

## 2022-01-01 RX ADMIN — TAMSULOSIN HYDROCHLORIDE 0.4 MILLIGRAM(S): 0.4 CAPSULE ORAL at 21:37

## 2022-01-01 RX ADMIN — Medication 10 MILLIGRAM(S): at 13:57

## 2022-01-01 RX ADMIN — APIXABAN 5 MILLIGRAM(S): 2.5 TABLET, FILM COATED ORAL at 05:33

## 2022-01-01 RX ADMIN — APIXABAN 5 MILLIGRAM(S): 2.5 TABLET, FILM COATED ORAL at 05:41

## 2022-01-01 RX ADMIN — Medication 120 MILLIGRAM(S): at 06:21

## 2022-01-01 RX ADMIN — CHLORHEXIDINE GLUCONATE 1 APPLICATION(S): 213 SOLUTION TOPICAL at 06:34

## 2022-01-01 RX ADMIN — Medication 10 MILLIGRAM(S): at 05:52

## 2022-01-01 RX ADMIN — Medication 500 MILLIGRAM(S): at 12:55

## 2022-01-01 RX ADMIN — Medication 10 MILLIGRAM(S): at 21:39

## 2022-01-01 RX ADMIN — Medication 3 MILLILITER(S): at 09:52

## 2022-01-01 RX ADMIN — APIXABAN 5 MILLIGRAM(S): 2.5 TABLET, FILM COATED ORAL at 18:07

## 2022-01-01 RX ADMIN — TAMSULOSIN HYDROCHLORIDE 0.4 MILLIGRAM(S): 0.4 CAPSULE ORAL at 21:58

## 2022-01-04 NOTE — ED PROVIDER NOTE - NS ED ROS FT
CONSTITUTIONAL: Negatve   SKIN: Negatve   HEAD: Negatve   EYES: Negatve   ENT: Negatve   NECK: Negatve   CARD:Negatve   RESP:see hpi  ABD: Negatve   EXT: Negatve  LYMPH: Negatve   NEURO: Negatve   PSYCH: Negatve

## 2022-01-04 NOTE — ED ADULT TRIAGE NOTE - CHIEF COMPLAINT QUOTE
PT BIBA for difficulty breathing. PT c/o SOB especially while laying down & cough with bloody sputum, home O2 2L NC. PT denies runny nose, or fevers. PT states recent lung nodule dx.

## 2022-01-04 NOTE — ED ADULT NURSE NOTE - CHIEF COMPLAINT
[FreeTextEntry1] : Here for follow-up visit.  Has complaints of lower urinary tract symptoms including urinary frequency, urgency and weak urinary stream.  Has been on tamsulosin 0.4 mg once daily.  At the last visit oxybutynin ER 10 mg added.  He took the medication for some time but did not find it effective.  He continues have significant nocturia.  No gross hematuria, dysuria or signs symptoms of urinary tract infection.  Denies abdominal pain or flank pain.  No other significant changes medical history.
The patient is a 89y Male complaining of shortness of breath.

## 2022-01-04 NOTE — H&P ADULT - NSHPSOCIALHISTORY_GEN_ALL_CORE
Substance Use (street drugs): ( x ) never used  (  ) other:  Tobacco Usage:  (  ) never smoked   ( X  ) former smoker - quit [id number]   (   ) current smoker  (     ) pack year  Alcohol Usage: occasional

## 2022-01-04 NOTE — H&P ADULT - HISTORY OF PRESENT ILLNESS
88 yo M with PMH of ILD on home O2 at 2lpm, HTN, BPH, GERD, presented to the ED with c/o worsening shortness of breath and leg swelling for 1 week. Patient states that he saw his PCP recently who started him on lasix, but he hasn't started taking the meds yet. Today he experienced worsening of shortness of breath even at rest, he had to use upto 5lpm O2 at home, which prompted him to come to ED. He denies any fever, chills, abdominal pain, nausea, vomiting, diarrhea. He is not vaccinated against COVID. He recently had outpatient CT chest which showed a pulmonary nodule for which he is following up with Dr. Womack.   In the ED he was in afib with RVR, chest x-ray showed bilateral patchy opacities, BNP was 2765. He was given 40mg IV lasix in the ED with improvement in oxygenation.   Patient being admitted for management of new onset afib and fluid overload.

## 2022-01-04 NOTE — CONSULT NOTE ADULT - SUBJECTIVE AND OBJECTIVE BOX
Patient is a 89y old  Male who presents with a chief complaint of Shortness of breath (04 Jan 2022 04:50)    HPI:  88 yo M with PMH of ILD on home O2 at 2lpm, HTN, BPH, GERD, presented to the ED with c/o worsening shortness of breath and leg swelling for 1 week. Patient states that he saw his PCP recently who started him on lasix, but he hasn't started taking the meds yet. Today he experienced worsening of shortness of breath even at rest, he had to use upto 5lpm O2 at home, which prompted him to come to ED. He denies any fever, chills, abdominal pain, nausea, vomiting, diarrhea. He is not vaccinated against COVID. He recently had outpatient CT chest which showed a pulmonary nodule for which he is following up with Dr. Womack.   In the ED he was in afib with RVR, chest x-ray showed bilateral patchy opacities, BNP was 2765. He was given 40mg IV lasix in the ED with improvement in oxygenation.   Patient being admitted for management of new onset afib and fluid overload.  (04 Jan 2022 04:50)      EP consulted for new onset AF. Denies any palpiations, lightheadedness, dizziness.     REVIEW OF SYSTEMS    [x] A ten-point review of systems was otherwise negative except as noted.  [ ] Due to altered mental status/intubation, subjective information were not able to be obtained from the patient. History was obtained, to the extent possible, from review of the chart and collateral sources of information.      PAST MEDICAL & SURGICAL HISTORY:  Hypertension    GERD (gastroesophageal reflux disease)    Lung interstitial disease    BPH (benign prostatic hyperplasia)        Home Medications:  AMLODIPINE-BENAZEPRIL 5-10 MG:  (04 Jan 2022 05:09)  FERROUS SULFATE 325 MG TABLET:  (04 Jan 2022 05:09)  FINASTERIDE 5 MG TABLET:  (04 Jan 2022 05:09)  FUROSEMIDE 20MG TAB:  (04 Jan 2022 05:09)  OMEPRAZOLE DR 40MG CAP:  (04 Jan 2022 05:09)  TAMSULOSIN HCL 0.4 MG CAPSULE:  (04 Jan 2022 05:09)  VITAMIN B-12 1,000 MCG TABLET:  (04 Jan 2022 05:09)      Allergies:    No Known Allergies      FAMILY HISTORY:      SOCIAL HISTORY:  CIGARETTES: former  ALCOHOL: occasional    MEDICATIONS  (STANDING):  amLODIPine   Tablet 5 milliGRAM(s) Oral daily  cyanocobalamin 1000 MICROGram(s) Oral daily  enoxaparin Injectable 80 milliGRAM(s) SubCutaneous two times a day  ferrous    sulfate 325 milliGRAM(s) Oral daily  finasteride 5 milliGRAM(s) Oral daily  furosemide   Injectable 40 milliGRAM(s) IV Push daily  lisinopril 10 milliGRAM(s) Oral daily  metoprolol tartrate 12.5 milliGRAM(s) Oral two times a day  pantoprazole    Tablet 40 milliGRAM(s) Oral before breakfast  tamsulosin 0.4 milliGRAM(s) Oral at bedtime    MEDICATIONS  (PRN):      Vital Signs Last 24 Hrs  T(C): 36.5 (04 Jan 2022 06:39), Max: 37.5 (04 Jan 2022 00:05)  T(F): 97.7 (04 Jan 2022 06:39), Max: 99.5 (04 Jan 2022 00:05)  HR: 100 (04 Jan 2022 06:39) (92 - 109)  BP: 148/85 (04 Jan 2022 06:39) (123/80 - 148/85)  BP(mean): --  RR: 24 (04 Jan 2022 00:05) (24 - 24)  SpO2: 97% (04 Jan 2022 08:28) (89% - 99%)    PHYSICAL EXAM:    GENERAL: Elderly male, In no apparent distress, well nourished, and hydrated.  HEENT: [place], atruamatic  HEART: Irregular rate and rhythm; No murmurs, rubs, or gallops.  PULMONARY: Bibasilar rales  ABDOMEN: Soft, Nontender, Nondistended; Bowel sounds present  EXTREMITIES: 2+ pitting edema b/l LE up to knees 2+ Peripheral Pulses, No clubbing, cyanosis  NEUROLOGICAL: AO x3, GARCIA, speech clear    INTERPRETATION OF TELEMETRY:  BPM    ECG: < from: 12 Lead ECG (01.04.22 @ 02:38) >  Ventricular Rate 94 BPM    Atrial Rate 66 BPM    QRS Duration 76 ms    Q-T Interval 334 ms    QTC Calculation(Bazett) 417 ms    R Axis 91 degrees    T Axis -11 degrees    Diagnosis Line Atrial fibrillation  Rightward axis  Possible Inferior infarct, age undetermined  Cannot rule out Anterior infarct , age undetermined  Abnormal ECG    < end of copied text >      I&O's Detail      LABS:                        12.7   10.57 )-----------( 157      ( 04 Jan 2022 01:14 )             39.1     01-04    128<L>  |  91<L>  |  19  ----------------------------<  100<H>  4.9   |  21  |  1.0    Ca    9.1      04 Jan 2022 01:14    TPro  7.5  /  Alb  4.3  /  TBili  1.3<H>  /  DBili  x   /  AST  18  /  ALT  15  /  AlkPhos  78  01-04    CARDIAC MARKERS ( 04 Jan 2022 01:14 )  x     / <0.01 ng/mL / x     / x     / x          PT/INR - ( 04 Jan 2022 01:14 )   PT: 15.90 sec;   INR: 1.39 ratio         PTT - ( 04 Jan 2022 01:14 )  PTT:30.2 sec  BNPSerum Pro-Brain Natriuretic Peptide: 2765 pg/mL (01-04 @ 01:14)        I&O's Detail      RADIOLOGY:  < from: Xray Chest 1 View- PORTABLE-Urgent (Xray Chest 1 View- PORTABLE-Urgent .) (01.04.22 @ 01:59) >  Findings:    Support devices: None.    Cardiac/mediastinum/hilum: Magnified    Lung parenchyma/Pleura: Bilateral opacities. No pneumothorax is seen.    Skeleton/soft tissues: Degenerative changes of the axial skeleton with a   severe dextroscoliosis as well as degenerative changes of both shoulders.   Two metallic anchors overlie the right shoulder.    Impression:    Low lung volume.    Bilateral opacities.    Bones as above.    < end of copied text >

## 2022-01-04 NOTE — ED ADULT NURSE NOTE - NSIMPLEMENTINTERV_GEN_ALL_ED
Implemented All Fall with Harm Risk Interventions:  [place] to call system. Call bell, personal items and telephone within reach. Instruct patient to call for assistance. Room bathroom lighting operational. Non-slip footwear when patient is off stretcher. Physically safe environment: no spills, clutter or unnecessary equipment. Stretcher in lowest position, wheels locked, appropriate side rails in place. Provide visual cue, wrist band, yellow gown, etc. Monitor gait and stability. Monitor for mental status changes and reorient to person, place, and time. Review medications for side effects contributing to fall risk. Reinforce activity limits and safety measures with patient and family. Provide visual clues: red socks.

## 2022-01-04 NOTE — PATIENT PROFILE ADULT - FALL HARM RISK - HARM RISK INTERVENTIONS

## 2022-01-04 NOTE — H&P ADULT - NSHPPHYSICALEXAM_GEN_ALL_CORE
General: No acute distress, currently satting 96% on 3lpm; Pallor (-), Icterus (-), Cyanosis (-), Clubbing (-)  HEENT: Normocephalic, atraumatic, PERRLA, EOMI  PULM: Bilaterally equal and clear breath sounds, wheeze (-), rubs (-), crackles (+)  CVS: irregular, Normal S1 and S2, murmurs (-), rubs (-), gallops (-)   GI: Soft, nondistended, nontender, BS +  MSK: bilateral LE pitting Edema (+), no muscle, bone or joint tenderness noted  SKIN: Warm and well perfused  NEURO:  Alert and Oriented x 3, hearing impaired, No gross focal neurological deficit noted

## 2022-01-04 NOTE — H&P ADULT - NSICDXPASTMEDICALHX_GEN_ALL_CORE_FT
PAST MEDICAL HISTORY:  BPH (benign prostatic hyperplasia)     GERD (gastroesophageal reflux disease)     Hypertension     Lung interstitial disease

## 2022-01-04 NOTE — ED PROVIDER NOTE - CLINICAL SUMMARY MEDICAL DECISION MAKING FREE TEXT BOX
shortness of breath.   labs, imaging, O2, ekg, cardiac monitoring, supportive care shortness of breath.   labs, imaging, O2, ekg, cardiac monitoring, supportive care  new onset afib  admitted to hospital.

## 2022-01-04 NOTE — CONSULT NOTE ADULT - ASSESSMENT
88 yo M with PMH of ILD on home O2 at 2lpm, HTN, BPH, GERD, not vaccinated against COVID, pulm nodule - follows Jocy, who presented to the ED with c/o worsening shortness of breath and leg swelling for 1 week. In the ED he was in afib with RVR. Admitted with fluid overload and new onset AF.     Impression:  New onset AF RVR, CHADSVASC at least 3 (age, HTN, HF)  Hx HTN  Hx GERD  Hx lung nodule ; ILD (home O2)    Plan:  - Recommend EFREN/DCCV once euvolemic, Please c/s cardiology   - Recommend Eliquis 5 mg PO Q 12 for stroke prevention  - Increase Lopressor as BP tolerates, HR >100  - Hold off on any amio d/t underlying ILD   - Can consider cardizem PENDING echo  - Echo  - Check TSH  - Cont tele while admitted  - Will fu in AM

## 2022-01-04 NOTE — ED ADULT NURSE NOTE - OBJECTIVE STATEMENT
Pt using O2 machine at home c/o increasing SOB 2x days. Pt unvaccinated for Covid-19. On assessment pt has bl/le edema. Pt notes bloody sputum. Audible wheezing present. Pt reports he had mechanical fall 1 week ago. Bruising present on right arm.

## 2022-01-04 NOTE — ED PROVIDER NOTE - OBJECTIVE STATEMENT
88 yo m with interstitial lung disease, on home O2 (2-4LMP), c/o worsening sob, swollen legs and bloody cough. no fever or chills. not covid vaccinated. no cp, ab pain, n, v, d. pt had recent Ct showing pulmonary nodule and has f/u with Dr Sandra.

## 2022-01-04 NOTE — ED PROVIDER NOTE - PHYSICAL EXAMINATION
VITAL SIGNS: I have reviewed nursing notes and confirm.  CONSTITUTIONAL: Well-developed; well-nourished; in no acute distress.  SKIN: Skin exam is warm and dry, no acute rash.  HEAD: Normocephalic; atraumatic.  EYES: PERRL, EOM intact; conjunctiva and sclera clear.  ENT: No nasal discharge; airway clear.   NECK: Supple; non tender.  CARD: irreg/tachy.  RESP: b/l ronchi, moving air. tachypneic. no assessory use. speaking half sentences.  ABD: Normal bowel sounds; soft; non-distended; non-tender;  EXT: Normal ROM. No cyanosis .  b/l LE pitting edema.  LYMPH: No acute adenopathy.  NEURO: Alert. Grossly unremarkable. No focal deficits.  PSYCH: Cooperative, appropriate.

## 2022-01-04 NOTE — ED PROVIDER NOTE - NSICDXPASTMEDICALHX_GEN_ALL_CORE_FT
PAST MEDICAL HISTORY:  GERD (gastroesophageal reflux disease)     Hypertension     Lung interstitial disease

## 2022-01-04 NOTE — ED PROVIDER NOTE - PROGRESS NOTE DETAILS
HR on monitor 90s and steady. still in afib. afib on monitor 130s.  no hx in chart of afib, pt doesn't recall being dx (not a great historian). not on anti coag.

## 2022-01-04 NOTE — H&P ADULT - ASSESSMENT
88 yo M with PMH of ILD on home O2 at 2lpm, HTN, BPH, GERD, presented to the ED with c/o worsening shortness of breath and leg swelling for 1 week. Patient states that he saw his PCP recently who started him on lasix.     # Acute on chronic hypoxic respiratory failure  # Fluid overload  - Shortness of breath, LE swelling  - CXR - bilateral interstitial opacities  - BNP 2765  - BNP - 2765 (01-04-22 @ 01:14)  - Check TTE  - Lasix 40mg IV OD  - supplemental O2, titrate down as toelrated  - start on metoprolol tartarate 12.mg BID   - continue with ACEi  - Trend Trops   - Strict I/O, daily weight, fluid restriction  - Monitor electrolytes Keep K > 4, Mg >2    # New onset Afib  - CHADSVASC - 3  - Start on Therapeutic lovenox, Beta blocker  - EP eval  - Check Echo, TSH, Trend trops  - Telemetry monitoring      # ILD  # Pulmonary Nodule  - continue with supplemental O2  - Echo to check for pulmonary HTN  - OP pulm Follow up    # HTN  - on amlodipine-benazepril at home  - continue with amlodipine  - Lisinopril while inpatient    # BPH  - continue with tamsulosin, finasteride    # GERD  - continue with PPI    # Diet: DASH/TLC, fluid restriction  # Activity: IAT  # GI PPX: PPI  # DVT PPX: Lovenox  # Full Code

## 2022-01-04 NOTE — H&P ADULT - NSHPLABSRESULTS_GEN_ALL_CORE
(01-04 @ 01:14)                      12.7  10.57 )-----------( 157                 39.1    Neutrophils = 7.91 (74.8%)  Lymphocytes = 0.94 (8.9%)  Eosinophils = 0.23 (2.2%)  Basophils = 0.10 (0.9%)  Monocytes = 1.34 (12.7%)  Bands = --%    01-04    128<L>  |  91<L>  |  19  ----------------------------<  100<H>  4.9   |  21  |  1.0    Ca    9.1      04 Jan 2022 01:14    TPro  7.5  /  Alb  4.3  /  TBili  1.3<H>  /  DBili  x   /  AST  18  /  ALT  15  /  AlkPhos  78  01-04    ( 04 Jan 2022 01:14 )   PT: 15.90 sec;   INR: 1.39 ratio;       PTT:30.2 sec  CARDIAC MARKERS ( 04 Jan 2022 01:14 )  Trop <0.01 ng/mL / CK x     / CKMB x           RVP:    Venous Blood Gas:  01-04 @ 02:06  7.37/46/21/27/33.9  VBG Lactate: 1.20

## 2022-01-04 NOTE — H&P ADULT - ATTENDING COMMENTS
Pt seen and examined at bedside this morning.    Pt presented for SOB, progressing over several days. He has a history of ILD, unclear etiology, no occupational exposure. Pt found to be volume overloaded as well as in new Afib with RVR. Pt is planned for DCCV once euvolemic. HAs LE edema and is on amlodipine but suspect more CHF than medication side effect.  Follow echo, c/w metoprolol for rate control, adjust to keep HR <110. Pt requires continued telemetry. Obtain CT chest done last [facility]. Pt at episodes of blood streaked sputum over last few days. Could be due to nodule vs mucosal irritation. monitor for now. Check serum and urine osm for hyponatremia etiology. Has nodule so could be SIADH. Water restriction for now, c/w lasix.     #Progress Note Handoff:  Pending (specify):  CT scan record, euvolemia, DCCV  Family discussion: Spoke with pt and son at bedside regarding above.   Disposition: Home___/SNF___/Other________/Unknown at this time________

## 2022-01-05 NOTE — PROGRESS NOTE ADULT - SUBJECTIVE AND OBJECTIVE BOX
NNEKA JOSEPH 89y Male  MRN#: [id number]   CODE STATUS: FULL    Hospital Day: 1d    Pt is currently admitted with the primary diagnosis of acute on chronic respiratory failure.    SUBJECTIVE  HPI: 89 yo M with PMH of ILD on home O2 at 2lpm, HTN, BPH, GERD, presented to the ED with c/o worsening shortness of breath and leg swelling for 1 week. Patient states that he saw his PCP recently who started him on lasix, but he hasn't started taking the meds yet. Today he experienced worsening of shortness of breath even at rest, he had to use upto 5lpm O2 at home, which prompted him to come to ED. He denies any fever, chills, abdominal pain, nausea, vomiting, diarrhea. He is not vaccinated against COVID. He recently had outpatient CT chest which showed a pulmonary nodule for which he is following up with Dr. Womack.     ED Course: Pt was in afib with RVR, chest x-ray showed bilateral patchy opacities, BNP was 2765. He was given 40mg IV lasix in the ED with improvement in oxygenation. Patient being admitted for management of new onset afib and fluid overload.     Hospital Course:  Patient seen and evaluated by EP.  Recommend cardio consult for possible EFREN/DCCV.      Overnight events     Subjective complaints     Present Today:   - Joel:  No [  ], Yes [   ] : Indication:     - Type of IV Access:       .. CVC/Piccline:  No [  ], Yes [   ] : Indication:       .. Midline: No [  ], Yes [   ] : Indication:                                             ----------------------------------------------------------  OBJECTIVE  PAST MEDICAL & SURGICAL HISTORY  Hypertension    GERD (gastroesophageal reflux disease)    Lung interstitial disease    BPH (benign prostatic hyperplasia)    No significant past surgical history                                              -----------------------------------------------------------  ALLERGIES:  No Known Allergies                                            ------------------------------------------------------------    HOME MEDICATIONS  Home Medications:  AMLODIPINE-BENAZEPRIL 5-10 MG:  (04 Jan 2022 05:09)  FERROUS SULFATE 325 MG TABLET:  (04 Jan 2022 05:09)  FINASTERIDE 5 MG TABLET:  (04 Jan 2022 05:09)  FUROSEMIDE 20MG TAB:  (04 Jan 2022 05:09)  OMEPRAZOLE DR 40MG CAP:  (04 Jan 2022 05:09)  TAMSULOSIN HCL 0.4 MG CAPSULE:  (04 Jan 2022 05:09)  VITAMIN B-12 1,000 MCG TABLET:  (04 Jan 2022 05:09)                           MEDICATIONS:  STANDING MEDICATIONS  amLODIPine   Tablet 5 milliGRAM(s) Oral daily  ascorbic acid 500 milliGRAM(s) Oral daily  cholecalciferol 1000 Unit(s) Oral daily  cyanocobalamin 1000 MICROGram(s) Oral daily  enoxaparin Injectable 80 milliGRAM(s) SubCutaneous two times a day  ferrous    sulfate 325 milliGRAM(s) Oral daily  finasteride 5 milliGRAM(s) Oral daily  furosemide   Injectable 40 milliGRAM(s) IV Push daily  furosemide   Injectable 20 milliGRAM(s) IV Push once  lisinopril 10 milliGRAM(s) Oral daily  melatonin 10 milliGRAM(s) Oral at bedtime  melatonin 10 milliGRAM(s) Oral at bedtime  metoprolol tartrate 25 milliGRAM(s) Oral two times a day  pantoprazole    Tablet 40 milliGRAM(s) Oral before breakfast  tamsulosin 0.4 milliGRAM(s) Oral at bedtime    PRN MEDICATIONS                                            ------------------------------------------------------------  VITAL SIGNS: Last 24 Hours  T(C): 36.2 (05 Jan 2022 04:34), Max: 36.4 (04 Jan 2022 13:42)  T(F): 97.2 (05 Jan 2022 04:34), Max: 97.6 (04 Jan 2022 13:42)  HR: 107 (05 Jan 2022 04:34) (94 - 107)  BP: 139/72 (05 Jan 2022 04:34) (109/64 - 139/72)  BP(mean): --  RR: 18 (05 Jan 2022 04:34) (18 - 18)  SpO2: 95% (04 Jan 2022 19:43) (95% - 95%)      01-04-22 @ 07:01  -  01-05-22 @ 07:00  --------------------------------------------------------  IN: 780 mL / OUT: 900 mL / NET: -120 mL    01-05-22 @ 07:01  -  01-05-22 @ 13:39  --------------------------------------------------------  IN: 300 mL / OUT: 450 mL / NET: -150 mL                                             --------------------------------------------------------------  LABS:                        12.8   8.62  )-----------( 145      ( 05 Jan 2022 04:30 )             38.8     01-05    132<L>  |  93<L>  |  24<H>  ----------------------------<  83  4.7   |  20  |  1.1    Ca    8.6      05 Jan 2022 04:30  Mg     1.9     01-05    TPro  7.1  /  Alb  3.8  /  TBili  1.7<H>  /  DBili  x   /  AST  18  /  ALT  13  /  AlkPhos  72  01-05    PT/INR - ( 04 Jan 2022 01:14 )   PT: 15.90 sec;   INR: 1.39 ratio         PTT - ( 04 Jan 2022 01:14 )  PTT:30.2 sec      Troponin T, Serum: <0.01 ng/mL (01-04-22 @ 16:00)          CARDIAC MARKERS ( 04 Jan 2022 16:00 )  x     / <0.01 ng/mL / x     / x     / x      CARDIAC MARKERS ( 04 Jan 2022 11:00 )  x     / <0.01 ng/mL / x     / x     / x      CARDIAC MARKERS ( 04 Jan 2022 01:14 )  x     / <0.01 ng/mL / x     / x     / x                                                  -------------------------------------------------------------  RADIOLOGY:                                            --------------------------------------------------------------    PHYSICAL EXAM:  General:   HEENT:  LUNGS:  HEART:  ABDOMEN:  EXT:  NEURO:  SKIN:                                           --------------------------------------------------------------    ASSESSMENT & PLAN    Past medical history and hospital course                                                                                                           ----------------------------------------------------  # DVT prophylaxis     # GI prophylaxis     # Diet     # Activity Score (AM-PAC)    # Code status     # Disposition                                                                              --------------------------------------------------------    # Handoff      NNEKA JOSEPH 89y Male  MRN#: [id number]   CODE STATUS: FULL    Hospital Day: 1d    Pt is currently admitted with the primary diagnosis of acute on chronic respiratory failure.    SUBJECTIVE  HPI: 88 yo M with PMH of ILD on home O2 at 2lpm, HTN, BPH, GERD, presented to the ED with c/o worsening shortness of breath and leg swelling for 1 week. Patient states that he saw his PCP recently who started him on lasix, but he hasn't started taking the meds yet. Today he experienced worsening of shortness of breath even at rest, he had to use upto 5lpm O2 at home, which prompted him to come to ED. He denies any fever, chills, abdominal pain, nausea, vomiting, diarrhea. He is not vaccinated against COVID. He recently had outpatient CT chest which showed a pulmonary nodule for which he is following up with Dr. Womack.     ED Course: Pt was in afib with RVR, chest x-ray showed bilateral patchy opacities, BNP was 2765. He was given 40mg IV lasix in the ED with improvement in oxygenation. Patient being admitted for management of new onset afib and fluid overload.     Hospital Course:  Patient seen and evaluated by EP.  Recommend cardio consult for possible EFREN/DCCV.      Overnight events:  No acute events overnight.    Subjective complaints:  Patient seen and examined at bedside.  Sitting up independently having breakfast.  Denies any complaints this AM including SOB, chest pain, or palpitations.    Present Today:   - Joel:  No [ X ], Yes [   ] : Indication:     - Type of IV Access:       .. CVC/Piccline:  No [ X ], Yes [   ] : Indication:       .. Midline: No [ X ], Yes [   ] : Indication:                                             ----------------------------------------------------------  OBJECTIVE  PAST MEDICAL & SURGICAL HISTORY  Hypertension    GERD (gastroesophageal reflux disease)    Lung interstitial disease    BPH (benign prostatic hyperplasia)    No significant past surgical history                                              -----------------------------------------------------------  ALLERGIES:  No Known Allergies                                            ------------------------------------------------------------    HOME MEDICATIONS  Home Medications:  AMLODIPINE-BENAZEPRIL 5-10 MG:  (04 Jan 2022 05:09)  FERROUS SULFATE 325 MG TABLET:  (04 Jan 2022 05:09)  FINASTERIDE 5 MG TABLET:  (04 Jan 2022 05:09)  FUROSEMIDE 20MG TAB:  (04 Jan 2022 05:09)  OMEPRAZOLE DR 40MG CAP:  (04 Jan 2022 05:09)  TAMSULOSIN HCL 0.4 MG CAPSULE:  (04 Jan 2022 05:09)  VITAMIN B-12 1,000 MCG TABLET:  (04 Jan 2022 05:09)                           MEDICATIONS:  STANDING MEDICATIONS  amLODIPine   Tablet 5 milliGRAM(s) Oral daily  ascorbic acid 500 milliGRAM(s) Oral daily  cholecalciferol 1000 Unit(s) Oral daily  cyanocobalamin 1000 MICROGram(s) Oral daily  enoxaparin Injectable 80 milliGRAM(s) SubCutaneous two times a day  ferrous    sulfate 325 milliGRAM(s) Oral daily  finasteride 5 milliGRAM(s) Oral daily  furosemide   Injectable 40 milliGRAM(s) IV Push daily  furosemide   Injectable 20 milliGRAM(s) IV Push once  lisinopril 10 milliGRAM(s) Oral daily  melatonin 10 milliGRAM(s) Oral at bedtime  melatonin 10 milliGRAM(s) Oral at bedtime  metoprolol tartrate 25 milliGRAM(s) Oral two times a day  pantoprazole    Tablet 40 milliGRAM(s) Oral before breakfast  tamsulosin 0.4 milliGRAM(s) Oral at bedtime    PRN MEDICATIONS                                            ------------------------------------------------------------  VITAL SIGNS: Last 24 Hours  T(C): 36.2 (05 Jan 2022 04:34), Max: 36.4 (04 Jan 2022 13:42)  T(F): 97.2 (05 Jan 2022 04:34), Max: 97.6 (04 Jan 2022 13:42)  HR: 107 (05 Jan 2022 04:34) (94 - 107)  BP: 139/72 (05 Jan 2022 04:34) (109/64 - 139/72)  BP(mean): --  RR: 18 (05 Jan 2022 04:34) (18 - 18)  SpO2: 95% (04 Jan 2022 19:43) (95% - 95%)      01-04-22 @ 07:01  -  01-05-22 @ 07:00  --------------------------------------------------------  IN: 780 mL / OUT: 900 mL / NET: -120 mL    01-05-22 @ 07:01  -  01-05-22 @ 13:39  --------------------------------------------------------  IN: 300 mL / OUT: 450 mL / NET: -150 mL                                             --------------------------------------------------------------  LABS:                        12.8   8.62  )-----------( 145      ( 05 Jan 2022 04:30 )             38.8     01-05    132<L>  |  93<L>  |  24<H>  ----------------------------<  83  4.7   |  20  |  1.1    Ca    8.6      05 Jan 2022 04:30  Mg     1.9     01-05    TPro  7.1  /  Alb  3.8  /  TBili  1.7<H>  /  DBili  x   /  AST  18  /  ALT  13  /  AlkPhos  72  01-05    PT/INR - ( 04 Jan 2022 01:14 )   PT: 15.90 sec;   INR: 1.39 ratio         PTT - ( 04 Jan 2022 01:14 )  PTT:30.2 sec      Troponin T, Serum: <0.01 ng/mL (01-04-22 @ 16:00)          CARDIAC MARKERS ( 04 Jan 2022 16:00 )  x     / <0.01 ng/mL / x     / x     / x      CARDIAC MARKERS ( 04 Jan 2022 11:00 )  x     / <0.01 ng/mL / x     / x     / x      CARDIAC MARKERS ( 04 Jan 2022 01:14 )  x     / <0.01 ng/mL / x     / x     / x                                                  -------------------------------------------------------------  RADIOLOGY:  ACC: [id number] EXAM:  XR CHEST 1 VIEW, 01/04/2022    Impression:  Stable diffuse interstitial opacities, suggesting underlying interstitial lung disease. No pneumothorax.                                            --------------------------------------------------------------    PHYSICAL EXAM:  General: sitting up in bed independently, eating breakfast, NAD  HEENT: nontraumatic, neck supple, conjunctiva clear  LUNGS: CTA, no cough or increased WOB, on 3L NC   HEART: irregular rate and rhythm  ABDOMEN: soft, nontender, nondistended  EXT: moves all extremities  NEURO: AAOx3, CN grossly intact  SKIN: 1+ pitting LE edema, no erythema                                           --------------------------------------------------------------    ASSESSMENT & PLAN  88 yo M with PMH of ILD on home O2 at 2lpm, HTN, BPH, GERD, presented to the ED with c/o worsening shortness of breath and leg swelling for 1 week. Patient states that he saw his PCP recently who started him on lasix.     # Acute on chronic hypoxic respiratory failure  # Fluid overload  > presented with shortness of breath, LE swelling  > CXR - bilateral interstitial opacities  > BNP 2765  > TTE: EF 40-45%, mod TR and MR, severe pHTN  > 1/4: increased lopressor from 12.5mg BID to 25mg BID  > 1/5: gave 2nd dose of IV lasix  - c/w Lasix 40mg IV OD  - supplemental O2, titrate down as tolerated (on 3L, on 2L @ home)  - c/w metoprolol tartarate 25mg BID   - continue with ACEi  - Strict I/O, daily weight, fluid restriction  - Monitor electrolytes Keep K > 4, Mg >2    # New onset Afib  > CHADSVASC - 3  > per EP recs consult cardio for possible EFREN/DCCV  > TSH 0.70  - c/w therapeutic lovenox and metoprolol  - f/u pricing of eliquis at pharmacy ([facility])  - Telemetry monitoring    #left shoulder pain, 2/2 recent fall  > full active ROM of LUE on exam  - f/u left shoulder xray    # ILD  # Pulmonary Nodule  > CT chest prior to admission: 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, new mediastinal adenopathy, left pleural effusion, cardiomegaly with dilated ascending and descending aorta  > TTE: EF 40-45%, mod TR and MR, severe pHTN  - continue with supplemental O2  - OP pulm Follow up    # HTN  - on amlodipine-benazepril at home  - continue with amlodipine  - Lisinopril while inpatient    # BPH  - continue with tamsulosin, finasteride    # GERD  - continue with PPI                                                                              ----------------------------------------------------  # DVT prophylaxis: therapeutic Lovenox  # GI prophylaxis: protonix  # Diet: DASH/TLC  # Activity Score (AM-PAC): IAT  # Code status: FULL  # Disposition: from home, acute for now, pending cardio recs for possible EFREN/DCCV                                                                             --------------------------------------------------------    # Handoff   - f/u cardio recs for possible EFREN/DCCV  - f/u pricing of eliquis at pharmacy ([facility])  - f/u left shoulder xray  - OP pulm Follow up

## 2022-01-05 NOTE — CONSULT NOTE ADULT - ASSESSMENT
89 yo M with PMH of ILD on home O2 at 2lpm, HTN, BPH, GERD, not vaccinated against COVID, pulm nodule - follows [facility], who presented to the ED with c/o worsening shortness of breath and leg swelling for 1 week. In the ED he was in afib with RVR. Admitted with fluid overload and new onset AF.     #New onset Atrial Fibrillation in RVR   - CHADSVASC- 3   - Eliquis 5mg BID  - c/w lopressor 25mg BID, can increase if BP toelrated   - hold amnio due to underlying ILD  - hbA1c, lipid profile, TSH  - ECHO: EF 40-45% 89 yo M with PMH of ILD on home O2 at 2lpm, HTN, BPH, GERD, not vaccinated against COVID, pulm nodule - follows [facility], who presented to the ED with c/o worsening shortness of breath and leg swelling for 1 week. In the ED he was in afib with RVR. Admitted with fluid overload and new onset AF.     #New onset Atrial Fibrillation in RVR - Rate controlled   - CHADSVASC- 3   - Eliquis 5mg BID  - increase Metoprolol tartrate to 25mg TID, if BP allows   - hold amnio due to underlying ILD  - hbA1c, lipid profile, TSH  - ECHO: EF 40-45%  - Will consider EFREN when euvolemic   - Please consult pulmonary due to ILD requiring increased Oxygen, tachypneic  88 yo M with PMH of ILD on home O2, HTN, BPH, GERD, not vaccinated c/o worsening shortness of breath and leg swelling for 1 week. In the ED he was in afib with RVR. Admitted with fluid overload and new onset AF.     #New onset Atrial Fibrillation with RVR  - CHADSVASC- 3   - Eliquis 5mg BID  - increase Metoprolol tartrate to 25mg TID  - hold amnio due to underlying ILD  - Will consider EFREN when euvolemic   - Please consult pulmonary due to ILD requiring increased oxygen

## 2022-01-05 NOTE — CONSULT NOTE ADULT - SUBJECTIVE AND OBJECTIVE BOX
Date of Admission: 01-04-22    CHIEF COMPLAINT:     HISTORY OF PRESENT ILLNESS: 88 yo M with PMH of ILD on home O2 at 2lpm, HTN, BPH, GERD, presented to the ED with c/o worsening shortness of breath and leg swelling for 1 week. Patient states that he saw his PCP recently who started him on lasix, but he hasn't started taking the meds yet. Today he experienced worsening of shortness of breath even at rest, he had to use upto 5lpm O2 at home, which prompted him to come to ED. He denies any fever, chills, abdominal pain, nausea, vomiting, diarrhea. He is not vaccinated against COVID. He recently had outpatient CT chest which showed a pulmonary nodule for which he is following up with Dr. Womack.   In the ED he was in afib with RVR, chest x-ray showed bilateral patchy opacities, BNP was 2765. He was given 40mg IV lasix in the ED with improvement in oxygenation.   Patient being admitted for management of new onset afib and fluid overload.     Interval Hx  - admitted to tele unit   - EP on board   - Started on AC     PAST MEDICAL & SURGICAL HISTORY  Hypertension    GERD (gastroesophageal reflux disease)    Lung interstitial disease    BPH (benign prostatic hyperplasia)    No significant past surgical history        FAMILY HISTORY:  FAMILY HISTORY:  No pertinent family history in first degree relatives        SOCIAL HISTORY:  []smoker  []Alcohol  []Drug    ROS:  Negative except as mentioned in HPI    ALLERGIES:  No Known Allergies      MEDICATIONS:  MEDICATIONS  (STANDING):  amLODIPine   Tablet 5 milliGRAM(s) Oral daily  ascorbic acid 500 milliGRAM(s) Oral daily  cholecalciferol 1000 Unit(s) Oral daily  cyanocobalamin 1000 MICROGram(s) Oral daily  enoxaparin Injectable 80 milliGRAM(s) SubCutaneous two times a day  ferrous    sulfate 325 milliGRAM(s) Oral daily  finasteride 5 milliGRAM(s) Oral daily  furosemide   Injectable 40 milliGRAM(s) IV Push daily  furosemide   Injectable 20 milliGRAM(s) IV Push once  lisinopril 10 milliGRAM(s) Oral daily  melatonin 10 milliGRAM(s) Oral at bedtime  melatonin 10 milliGRAM(s) Oral at bedtime  metoprolol tartrate 25 milliGRAM(s) Oral two times a day  pantoprazole    Tablet 40 milliGRAM(s) Oral before breakfast  tamsulosin 0.4 milliGRAM(s) Oral at bedtime    MEDICATIONS  (PRN):      HOME MEDICATIONS:  Home Medications:  AMLODIPINE-BENAZEPRIL 5-10 MG:  (04 Jan 2022 05:09)  FERROUS SULFATE 325 MG TABLET:  (04 Jan 2022 05:09)  FINASTERIDE 5 MG TABLET:  (04 Jan 2022 05:09)  FUROSEMIDE 20MG TAB:  (04 Jan 2022 05:09)  OMEPRAZOLE DR 40MG CAP:  (04 Jan 2022 05:09)  TAMSULOSIN HCL 0.4 MG CAPSULE:  (04 Jan 2022 05:09)  VITAMIN B-12 1,000 MCG TABLET:  (04 Jan 2022 05:09)      VITALS:   T(F): 97.2 (01-05 @ 04:34), Max: 99.5 (01-04 @ 00:05)  HR: 107 (01-05 @ 04:34) (92 - 109)  BP: 139/72 (01-05 @ 04:34) (109/64 - 148/85)  BP(mean): --  RR: 18 (01-05 @ 04:34) (18 - 24)  SpO2: 95% (01-04 @ 19:43) (89% - 99%)    I&O's Summary    04 Jan 2022 07:01  -  05 Jan 2022 07:00  --------------------------------------------------------  IN: 780 mL / OUT: 900 mL / NET: -120 mL    05 Jan 2022 07:01  -  05 Jan 2022 13:58  --------------------------------------------------------  IN: 300 mL / OUT: 450 mL / NET: -150 mL        PHYSICAL EXAM:  GEN: Not in acute distress  HEENT: NCAT, PERRL, EOMI  LUNGS: Clear to auscultation bilaterally   CARDIOVASCULAR: RRR, S1/S2 present, no murmurs, rubs or gallops, no JVD, + PP bilaterally  ABD: Soft, non-tender, non-distended  EXT: No TONY  SKIN: Intact  NEURO: AAOx3    LABS:                        12.8   8.62  )-----------( 145      ( 05 Jan 2022 04:30 )             38.8     01-05    132<L>  |  93<L>  |  24<H>  ----------------------------<  83  4.7   |  20  |  1.1    Ca    8.6      05 Jan 2022 04:30  Mg     1.9     01-05    TPro  7.1  /  Alb  3.8  /  TBili  1.7<H>  /  DBili  x   /  AST  18  /  ALT  13  /  AlkPhos  72  01-05    PT/INR - ( 04 Jan 2022 01:14 )   PT: 15.90 sec;   INR: 1.39 ratio         PTT - ( 04 Jan 2022 01:14 )  PTT:30.2 sec  Troponin T, Serum: <0.01 ng/mL (01-04-22 @ 16:00)    Troponin <0.01, CKMB --, CK --/ 01-04-22 @ 16:00  Troponin <0.01, CKMB --, CK --/ 01-04-22 @ 11:00  Troponin <0.01, CKMB --, CK --/ 01-04-22 @ 01:14    Serum Pro-Brain Natriuretic Peptide: 2765 pg/mL (01-04-22 @ 01:14)      Hemoglobin A1C   Thyroid      RADIOLOGY:  -CXR:    < from: Xray Chest 1 View AP/PA (01.04.22 @ 08:30) >    Stable diffuse interstitial opacities, suggesting underlying interstitial   lung disease. No pneumothorax.    < end of copied text >    -TTE:  < from: TTE Echo Complete w/o Contrast w/ Doppler (01.04.22 @ 08:59) >   1. Left ventricular ejection fraction, by visual estimation, is 40 to   45%.   2. Mildly decreased global left ventricular systolic function.   3. Mildly increased LV wall thickness.   4. The left ventricular diastolic function could not be assessed in this   study.   5. Mild thickening of the anterior and posterior mitral valve leaflets.   6. Mild to moderate mitral valve regurgitation.   7. Mild-moderate tricuspid regurgitation.   8. Estimated pulmonary artery systolic pressure is 60.7 mmHg assuming a   right atrial pressure of 15 mmHg, which is consistent with severe   pulmonary hypertension.   9. Mildly enlarged left atrium.  10. LA volume Index is 39.9 ml/m² ml/m2.  11. Mildly enlarged right atrium.    < end of copied text >      ECG:  < from: 12 Lead ECG (01.04.22 @ 07:50) >  Diagnosis Line Atrial fibrillation with rapid ventricular response  PossibleInferior infarct , age undetermined  Cannot rule out Anterior infarct , age undetermined  Abnormal ECG    < end of copied text >     Date of Admission: 01-04-22    CHIEF COMPLAINT:     HISTORY OF PRESENT ILLNESS: 88 yo M with PMH of ILD on home O2 at 2lpm, HTN, BPH, GERD, presented to the ED with c/o worsening shortness of breath and leg swelling for 1 week. Patient states that he saw his PCP recently who started him on lasix, but he hasn't started taking the meds yet. Today he experienced worsening of shortness of breath even at rest, he had to use upto 5lpm O2 at home, which prompted him to come to ED. He denies any fever, chills, abdominal pain, nausea, vomiting, diarrhea. He is not vaccinated against COVID. He recently had outpatient CT chest which showed a pulmonary nodule for which he is following up with Dr. Womack.   In the ED he was in afib with RVR, chest x-ray showed bilateral patchy opacities, BNP was 2765. He was given 40mg IV lasix in the ED with improvement in oxygenation.   Patient being admitted for management of new onset afib and fluid overload.     Interval Hx  - admitted to tele unit   - EP on board   - Started on AC     PAST MEDICAL & SURGICAL HISTORY  Hypertension    GERD (gastroesophageal reflux disease)    Lung interstitial disease    BPH (benign prostatic hyperplasia)    No significant past surgical history        FAMILY HISTORY:  FAMILY HISTORY:  No pertinent family history in first degree relatives        SOCIAL HISTORY:  []smoker  []Alcohol  []Drug    ROS:  Negative except as mentioned in HPI    ALLERGIES:  No Known Allergies      MEDICATIONS:  MEDICATIONS  (STANDING):  amLODIPine   Tablet 5 milliGRAM(s) Oral daily  ascorbic acid 500 milliGRAM(s) Oral daily  cholecalciferol 1000 Unit(s) Oral daily  cyanocobalamin 1000 MICROGram(s) Oral daily  enoxaparin Injectable 80 milliGRAM(s) SubCutaneous two times a day  ferrous    sulfate 325 milliGRAM(s) Oral daily  finasteride 5 milliGRAM(s) Oral daily  furosemide   Injectable 40 milliGRAM(s) IV Push daily  furosemide   Injectable 20 milliGRAM(s) IV Push once  lisinopril 10 milliGRAM(s) Oral daily  melatonin 10 milliGRAM(s) Oral at bedtime  melatonin 10 milliGRAM(s) Oral at bedtime  metoprolol tartrate 25 milliGRAM(s) Oral two times a day  pantoprazole    Tablet 40 milliGRAM(s) Oral before breakfast  tamsulosin 0.4 milliGRAM(s) Oral at bedtime    MEDICATIONS  (PRN):      HOME MEDICATIONS:  Home Medications:  AMLODIPINE-BENAZEPRIL 5-10 MG:  (04 Jan 2022 05:09)  FERROUS SULFATE 325 MG TABLET:  (04 Jan 2022 05:09)  FINASTERIDE 5 MG TABLET:  (04 Jan 2022 05:09)  FUROSEMIDE 20MG TAB:  (04 Jan 2022 05:09)  OMEPRAZOLE DR 40MG CAP:  (04 Jan 2022 05:09)  TAMSULOSIN HCL 0.4 MG CAPSULE:  (04 Jan 2022 05:09)  VITAMIN B-12 1,000 MCG TABLET:  (04 Jan 2022 05:09)      VITALS:   T(F): 97.2 (01-05 @ 04:34), Max: 99.5 (01-04 @ 00:05)  HR: 107 (01-05 @ 04:34) (92 - 109)  BP: 139/72 (01-05 @ 04:34) (109/64 - 148/85)  BP(mean): --  RR: 18 (01-05 @ 04:34) (18 - 24)  SpO2: 95% (01-04 @ 19:43) (89% - 99%)    I&O's Summary    04 Jan 2022 07:01  -  05 Jan 2022 07:00  --------------------------------------------------------  IN: 780 mL / OUT: 900 mL / NET: -120 mL    05 Jan 2022 07:01  -  05 Jan 2022 13:58  --------------------------------------------------------  IN: 300 mL / OUT: 450 mL / NET: -150 mL        PHYSICAL EXAM:  GEN: Not in acute distress  HEENT: NCAT, PERRL, EOMI  LUNGS: Clear to auscultation bilaterally   CARDIOVASCULAR: RRR, S1/S2 present, no murmurs, rubs or gallops, no JVD, + PP bilaterally  ABD: Soft, non-tender, non-distended  EXT: 2+ edema LE B/L   SKIN: Intact  NEURO: AAOx3    LABS:                        12.8   8.62  )-----------( 145      ( 05 Jan 2022 04:30 )             38.8     01-05    132<L>  |  93<L>  |  24<H>  ----------------------------<  83  4.7   |  20  |  1.1    Ca    8.6      05 Jan 2022 04:30  Mg     1.9     01-05    TPro  7.1  /  Alb  3.8  /  TBili  1.7<H>  /  DBili  x   /  AST  18  /  ALT  13  /  AlkPhos  72  01-05    PT/INR - ( 04 Jan 2022 01:14 )   PT: 15.90 sec;   INR: 1.39 ratio         PTT - ( 04 Jan 2022 01:14 )  PTT:30.2 sec  Troponin T, Serum: <0.01 ng/mL (01-04-22 @ 16:00)    Troponin <0.01, CKMB --, CK --/ 01-04-22 @ 16:00  Troponin <0.01, CKMB --, CK --/ 01-04-22 @ 11:00  Troponin <0.01, CKMB --, CK --/ 01-04-22 @ 01:14    Serum Pro-Brain Natriuretic Peptide: 2765 pg/mL (01-04-22 @ 01:14)      Hemoglobin A1C   Thyroid      RADIOLOGY:  -CXR:    < from: Xray Chest 1 View AP/PA (01.04.22 @ 08:30) >    Stable diffuse interstitial opacities, suggesting underlying interstitial   lung disease. No pneumothorax.    < end of copied text >    -TTE:  < from: TTE Echo Complete w/o Contrast w/ Doppler (01.04.22 @ 08:59) >   1. Left ventricular ejection fraction, by visual estimation, is 40 to   45%.   2. Mildly decreased global left ventricular systolic function.   3. Mildly increased LV wall thickness.   4. The left ventricular diastolic function could not be assessed in this   study.   5. Mild thickening of the anterior and posterior mitral valve leaflets.   6. Mild to moderate mitral valve regurgitation.   7. Mild-moderate tricuspid regurgitation.   8. Estimated pulmonary artery systolic pressure is 60.7 mmHg assuming a   right atrial pressure of 15 mmHg, which is consistent with severe   pulmonary hypertension.   9. Mildly enlarged left atrium.  10. LA volume Index is 39.9 ml/m² ml/m2.  11. Mildly enlarged right atrium.    < end of copied text >      ECG:  < from: 12 Lead ECG (01.04.22 @ 07:50) >  Diagnosis Line Atrial fibrillation with rapid ventricular response  PossibleInferior infarct , age undetermined  Cannot rule out Anterior infarct , age undetermined  Abnormal ECG    < end of copied text >     HISTORY OF PRESENT ILLNESS: 89 yo M with PMH of ILD on home O2 at 2lpm, HTN, BPH, GERD, presented to the ED with c/o worsening shortness of breath and leg swelling for 1 week. Patient states that he saw his PCP recently who started him on lasix, but he hasn't started taking the meds yet. Today he experienced worsening of shortness of breath even at rest, he had to use upto 5lpm O2 at home, which prompted him to come to ED. He denies any fever, chills, abdominal pain, nausea, vomiting, diarrhea. He is not vaccinated against COVID. He recently had outpatient CT chest which showed a pulmonary nodule for which he is following up with Dr. Womack.   In the ED he was in afib with RVR, chest x-ray showed bilateral patchy opacities, BNP was 2765. He was given 40mg IV lasix in the ED with improvement in oxygenation.   Patient being admitted for management of new onset afib and fluid overload.     Interval Hx  - admitted to tele unit   - EP on board   - Started on AC       PAST MEDICAL & SURGICAL HISTORY  Hypertension    GERD (gastroesophageal reflux disease)    Lung interstitial disease    BPH (benign prostatic hyperplasia)    No significant past surgical history      No pertinent family history of premature CAD in first degree relatives  SOCIAL HISTORY: Denies EtOH, smoking or drug use      ALLERGIES:  No Known Allergies      MEDICATIONS:  MEDICATIONS  (STANDING):  amLODIPine   Tablet 5 milliGRAM(s) Oral daily  ascorbic acid 500 milliGRAM(s) Oral daily  cholecalciferol 1000 Unit(s) Oral daily  cyanocobalamin 1000 MICROGram(s) Oral daily  enoxaparin Injectable 80 milliGRAM(s) SubCutaneous two times a day  ferrous    sulfate 325 milliGRAM(s) Oral daily  finasteride 5 milliGRAM(s) Oral daily  furosemide   Injectable 40 milliGRAM(s) IV Push daily  furosemide   Injectable 20 milliGRAM(s) IV Push once  lisinopril 10 milliGRAM(s) Oral daily  melatonin 10 milliGRAM(s) Oral at bedtime  melatonin 10 milliGRAM(s) Oral at bedtime  metoprolol tartrate 25 milliGRAM(s) Oral two times a day  pantoprazole    Tablet 40 milliGRAM(s) Oral before breakfast  tamsulosin 0.4 milliGRAM(s) Oral at bedtime      HOME MEDICATIONS:  Home Medications:  AMLODIPINE-BENAZEPRIL 5-10 MG:  (04 Jan 2022 05:09)  FERROUS SULFATE 325 MG TABLET:  (04 Jan 2022 05:09)  FINASTERIDE 5 MG TABLET:  (04 Jan 2022 05:09)  FUROSEMIDE 20MG TAB:  (04 Jan 2022 05:09)  OMEPRAZOLE DR 40MG CAP:  (04 Jan 2022 05:09)  TAMSULOSIN HCL 0.4 MG CAPSULE:  (04 Jan 2022 05:09)  VITAMIN B-12 1,000 MCG TABLET:  (04 Jan 2022 05:09)      REVIEW OF SYSTEMS:  CONSTITUTIONAL: No fever, weight loss, fatigue  NECK: No pain or stiffness  RESPIRATORY: See HPI  CARDIOVASCULAR: See HPI  GASTROINTESTINAL: No abdominal/epigastric pain, nausea, vomiting, hematemesis, diarrhea, constipation, melena or hematochezia  GENITOURINARY: No dysuria, frequency, hematuria, incontinence  NEUROLOGICAL: No headaches, memory loss, loss of strength, numbness, tremors  SKIN: No itching, burning, rashes, lesions   ENDOCRINE: No heat/cold intolerance or hair loss  MUSCULOSKELETAL: No joint pain or swelling  HEME/LYMPH: No easy bruising or bleeding gums    VITALS:   T(F): 97.2 (01-05 @ 04:34), Max: 99.5 (01-04 @ 00:05)  HR: 107 (01-05 @ 04:34) (92 - 109)  BP: 139/72 (01-05 @ 04:34) (109/64 - 148/85)  BP(mean): --  RR: 18 (01-05 @ 04:34) (18 - 24)  SpO2: 95% (01-04 @ 19:43) (89% - 99%)    I&O's Summary    04 Jan 2022 07:01  -  05 Jan 2022 07:00  --------------------------------------------------------  IN: 780 mL / OUT: 900 mL / NET: -120 mL    05 Jan 2022 07:01  -  05 Jan 2022 13:58  --------------------------------------------------------  IN: 300 mL / OUT: 450 mL / NET: -150 mL        PHYSICAL EXAM:  GEN: tachypneic  HEENT: NCAT, EOMI  LUNGS: +crackles  CARDIOVASCULAR: irregular, tachycardic  ABD: Soft, non-tender, non-distended  EXT: 2+ LE edema  SKIN: Intact  NEURO: AAOx3      LABS:                        12.8   8.62  )-----------( 145      ( 05 Jan 2022 04:30 )             38.8     01-05    132<L>  |  93<L>  |  24<H>  ----------------------------<  83  4.7   |  20  |  1.1    Ca    8.6      05 Jan 2022 04:30  Mg     1.9     01-05    TPro  7.1  /  Alb  3.8  /  TBili  1.7<H>  /  DBili  x   /  AST  18  /  ALT  13  /  AlkPhos  72  01-05    PT/INR - ( 04 Jan 2022 01:14 )   PT: 15.90 sec;   INR: 1.39 ratio    PTT - ( 04 Jan 2022 01:14 )  PTT:30.2 sec    Troponin T, Serum: <0.01 ng/mL (01-04-22 @ 16:00)    Troponin <0.01, CKMB --, CK --/ 01-04-22 @ 16:00  Troponin <0.01, CKMB --, CK --/ 01-04-22 @ 11:00  Troponin <0.01, CKMB --, CK --/ 01-04-22 @ 01:14    Serum Pro-Brain Natriuretic Peptide: 2765 pg/mL (01-04-22 @ 01:14)        RADIOLOGY:    < from: Xray Chest 1 View AP/PA (01.04.22 @ 08:30) >    Stable diffuse interstitial opacities, suggesting underlying interstitial   lung disease. No pneumothorax.    < end of copied text >      < from: TTE Echo Complete w/o Contrast w/ Doppler (01.04.22 @ 08:59) >   1. Left ventricular ejection fraction, by visual estimation, is 40 to   45%.   2. Mildly decreased global left ventricular systolic function.   3. Mildly increased LV wall thickness.   4. The left ventricular diastolic function could not be assessed in this   study.   5. Mild thickening of the anterior and posterior mitral valve leaflets.   6. Mild to moderate mitral valve regurgitation.   7. Mild-moderate tricuspid regurgitation.   8. Estimated pulmonary artery systolic pressure is 60.7 mmHg assuming a   right atrial pressure of 15 mmHg, which is consistent with severe   pulmonary hypertension.   9. Mildly enlarged left atrium.  10. LA volume Index is 39.9 ml/m² ml/m2.  11. Mildly enlarged right atrium.    < end of copied text >        < from: 12 Lead ECG (01.04.22 @ 07:50) >  Diagnosis Line Atrial fibrillation with rapid ventricular response  Possible Inferior infarct , age undetermined  Cannot rule out Anterior infarct , age undetermined  Abnormal ECG

## 2022-01-05 NOTE — PROGRESS NOTE ADULT - ASSESSMENT
89 yo M with PMH of ILD on home O2 at 2lpm, HTN, BPH, GERD, presented to the ED with c/o worsening shortness of breath and leg swelling for 1 week. Patient states that he saw his PCP recently who started him on lasix.     A/P:   New Paroxysmal Atrial Fibrillation with Rapid Ventricular response:   Continue with Lopressor and Lovenox.   Continue with IV diuresis. Cardiology consult for possible cardioversion once euvolemic.     Acute on chronic hypoxic respiratory failure  Acute HFmrEF:   Severe Pulmonary HTN:   SOB, leg edema 1+, Pro-BNP 2765  CXR chronic looking bilateral interstitial opacities  Echo showed LVEF 40-45%, mild to mod MR, mild to mod TR, severe pulmonary HTN.   Continue Lasix 40mg IV daily, will give extra dose today and evaluate daily bases.   Low sodium diet,     Chronic Respiratory Failure:   Interstitial lung disease:     Left shoulder pain, 2/2 recent fall  full active ROM of LUE on exam  Send Left shoulder Xray.     HTN: Continue Amlodipine and Lisinopril while inpatient.  BPH: continue with tamsulosin, finasteride    GERD  continue with PPI    DVT prophylaxis: therapeutic Lovenox

## 2022-01-05 NOTE — DISCHARGE NOTE NURSING/CASE MANAGEMENT/SOCIAL WORK - PATIENT PORTAL LINK FT
You can access the FollowMyHealth Patient Portal offered by [facility] by registering at the following website: [email or web address]. By joining [facility]’s FollowMyHealth portal, you will also be able to view your health information using other applications (apps) compatible with our system.

## 2022-01-05 NOTE — PROGRESS NOTE ADULT - SUBJECTIVE AND OBJECTIVE BOX
OPAL NNEKA  89y  Male      Patient is a 89y old  Male who presents with a chief complaint of Shortness of breath (05 Jan 2022 13:58)      INTERVAL HPI/OVERNIGHT EVENTS:  He is still with SOB and tachycardia.   Vital Signs Last 24 Hrs  T(C): 35.1 (05 Jan 2022 14:44), Max: 36.2 (05 Jan 2022 04:34)  T(F): 95.2 (05 Jan 2022 14:44), Max: 97.2 (05 Jan 2022 04:34)  HR: 64 (05 Jan 2022 14:44) (64 - 107)  BP: 110/70 (05 Jan 2022 14:44) (110/70 - 139/72)  BP(mean): --  RR: 18 (05 Jan 2022 14:44) (18 - 18)  SpO2: 97% (05 Jan 2022 08:53) (95% - 97%)      01-04-22 @ 07:01  -  01-05-22 @ 07:00  --------------------------------------------------------  IN: 780 mL / OUT: 900 mL / NET: -120 mL    01-05-22 @ 07:01  -  01-05-22 @ 19:36  --------------------------------------------------------  IN: 850 mL / OUT: 575 mL / NET: 275 mL            Consultant(s) Notes Reviewed:  [x ] YES  [ ] NO          MEDICATIONS  (STANDING):  amLODIPine   Tablet 5 milliGRAM(s) Oral daily  ascorbic acid 500 milliGRAM(s) Oral daily  cholecalciferol 1000 Unit(s) Oral daily  cyanocobalamin 1000 MICROGram(s) Oral daily  enoxaparin Injectable 80 milliGRAM(s) SubCutaneous two times a day  ferrous    sulfate 325 milliGRAM(s) Oral daily  finasteride 5 milliGRAM(s) Oral daily  furosemide   Injectable 40 milliGRAM(s) IV Push daily  lisinopril 10 milliGRAM(s) Oral daily  melatonin 10 milliGRAM(s) Oral at bedtime  melatonin 10 milliGRAM(s) Oral at bedtime  metoprolol tartrate 25 milliGRAM(s) Oral two times a day  pantoprazole    Tablet 40 milliGRAM(s) Oral before breakfast  tamsulosin 0.4 milliGRAM(s) Oral at bedtime    MEDICATIONS  (PRN):      LABS                          12.8   8.62  )-----------( 145      ( 05 Jan 2022 04:30 )             38.8     01-05    132<L>  |  93<L>  |  24<H>  ----------------------------<  83  4.7   |  20  |  1.1    Ca    8.6      05 Jan 2022 04:30  Mg     1.9     01-05    TPro  7.1  /  Alb  3.8  /  TBili  1.7<H>  /  DBili  x   /  AST  18  /  ALT  13  /  AlkPhos  72  01-05        PT/INR - ( 04 Jan 2022 01:14 )   PT: 15.90 sec;   INR: 1.39 ratio         PTT - ( 04 Jan 2022 01:14 )  PTT:30.2 sec  Lactate Trend    CARDIAC MARKERS ( 04 Jan 2022 16:00 )  x     / <0.01 ng/mL / x     / x     / x      CARDIAC MARKERS ( 04 Jan 2022 11:00 )  x     / <0.01 ng/mL / x     / x     / x      CARDIAC MARKERS ( 04 Jan 2022 01:14 )  x     / <0.01 ng/mL / x     / x     / x          CAPILLARY BLOOD GLUCOSE            RADIOLOGY & ADDITIONAL TESTS:    Imaging Personally Reviewed:  [ ] YES  [ ] NO    HEALTH ISSUES - PROBLEM Dx:          PHYSICAL EXAM:  GENERAL: mild respiratory failure, well-developed.  HEAD:  Atraumatic, Normocephalic.  EYES: EOMI, PERRLA, conjunctiva and sclera clear.  NECK: Supple, No JVD.  CHEST/LUNG: bilateral fine rales.  HEART: Irregular rate and rhythm; S1 S2.   ABDOMEN: Soft, Nontender, Nondistended; Bowel sounds present.  EXTREMITIES:  2+ Peripheral Pulses, No clubbing, cyanosis, or edema.  PSYCH: AAOx3.  NEUROLOGY: non-focal.  SKIN: No rashes or lesions.

## 2022-01-05 NOTE — DISCHARGE NOTE NURSING/CASE MANAGEMENT/SOCIAL WORK - NSDCPEFALRISK_GEN_ALL_CORE
For information on Fall & Injury Prevention, visit: [email or web address] OR  [email or web address] OR  https://www.cdc.gov/steadi/patient.html

## 2022-01-06 NOTE — CONSULT NOTE ADULT - SUBJECTIVE AND OBJECTIVE BOX
Patient is a 89y old  Male who presents with a chief complaint of Shortness of breath (05 Jan 2022 17:20)      HPI:  88 yo M with PMH of ILD on home O2 at 2lpm, HTN, BPH, GERD, presented to the ED with c/o worsening shortness of breath and leg swelling for 1 week. Patient states that he saw his PCP recently who started him on lasix, but he hasn't started taking the meds yet. Today he experienced worsening of shortness of breath even at rest, he had to use upto 5lpm O2 at home, which prompted him to come to ED. He denies any fever, chills, abdominal pain, nausea, vomiting, diarrhea. He is not vaccinated against COVID. He recently had outpatient CT chest which showed a pulmonary nodule for which he is following up with Dr. Womack.   In the ED he was in afib with RVR, chest x-ray showed bilateral patchy opacities, BNP was 2765. He was given 40mg IV lasix in the ED with improvement in oxygenation.   Patient being admitted for management of new onset afib and fluid overload.  (04 Jan 2022 04:50)    Social Hx: ex smoker quitin 1985 40 py    PAST MEDICAL & SURGICAL HISTORY:  Hypertension    GERD (gastroesophageal reflux disease)    Lung interstitial disease    BPH (benign prostatic hyperplasia)    No significant past surgical history        FAMILY HISTORY:  No pertinent family history in first degree relatives    .  No cardiovascular or pulmonary family history     REVIEW OF SYSTEMS:    All ROS are negative except per HPI     Allergies    No Known Allergies    Intolerances          PHYSICAL EXAM  Vital Signs Last 24 Hrs  T(C): 35.7 (06 Jan 2022 04:57), Max: 37.5 (05 Jan 2022 20:15)  T(F): 96.3 (06 Jan 2022 04:57), Max: 99.5 (05 Jan 2022 20:15)  HR: 92 (06 Jan 2022 04:57) (64 - 106)  BP: 127/76 (06 Jan 2022 04:57) (110/70 - 127/76)  BP(mean): --  RR: 18 (06 Jan 2022 04:57) (18 - 20)  SpO2: 98% (06 Jan 2022 04:57) (97% - 98%)    CONSTITUTIONAL:  In NAD    ENT:   Airway patent,   No thrush    EYES:   Clear bilaterally,   pupils equal,   round and reactive to light.    CARDIAC:   Normal rate,   irregular rhythm.    2+ edema      CAROTID:   normal systolic impulse  no bruits    RESPIRATORY:   Bibasilar crackles  No wheezing  Normal chest expansion  Not tachypneic,  No use of accessory muscles    GASTROINTESTINAL:  Abdomen soft,   non-tender,   no guarding,   + BS    MUSCULOSKELETAL:   range of motion is not limited,  no clubbing, cyanosis    NEUROLOGICAL:   Alert and oriented   no motor deficits.    SKIN:   Skin normal color for race,   No evidence of rash.    PSYCHIATRIC:   normal mood and affect.   no apparent risk to self or others.          LABS:                          12.8   8.62  )-----------( 145      ( 05 Jan 2022 04:30 )             38.8                                               01-05    132<L>  |  93<L>  |  24<H>  ----------------------------<  83  4.7   |  20  |  1.1    Ca    8.6      05 Jan 2022 04:30  Mg     1.9     01-05    TPro  7.1  /  Alb  3.8  /  TBili  1.7<H>  /  DBili  x   /  AST  18  /  ALT  13  /  AlkPhos  72  01-05                                                 CARDIAC MARKERS ( 04 Jan 2022 16:00 )  x     / <0.01 ng/mL / x     / x     / x      CARDIAC MARKERS ( 04 Jan 2022 11:00 )  x     / <0.01 ng/mL / x     / x     / x                                                LIVER FUNCTIONS - ( 05 Jan 2022 04:30 )  Alb: 3.8 g/dL / Pro: 7.1 g/dL / ALK PHOS: 72 U/L / ALT: 13 U/L / AST: 18 U/L / GGT: x                                                                                                MEDICATIONS  (STANDING):  amLODIPine   Tablet 5 milliGRAM(s) Oral daily  apixaban 5 milliGRAM(s) Oral every 12 hours  ascorbic acid 500 milliGRAM(s) Oral daily  cholecalciferol 1000 Unit(s) Oral daily  cyanocobalamin 1000 MICROGram(s) Oral daily  ferrous    sulfate 325 milliGRAM(s) Oral daily  finasteride 5 milliGRAM(s) Oral daily  furosemide   Injectable 40 milliGRAM(s) IV Push daily  lisinopril 10 milliGRAM(s) Oral daily  melatonin 10 milliGRAM(s) Oral at bedtime  melatonin 10 milliGRAM(s) Oral at bedtime  metoprolol tartrate 25 milliGRAM(s) Oral every 8 hours  pantoprazole    Tablet 40 milliGRAM(s) Oral before breakfast  tamsulosin 0.4 milliGRAM(s) Oral at bedtime    MEDICATIONS  (PRN):      X-Rays reviewed:    CXR interpreted by me: Bilateral interstitial opacities

## 2022-01-06 NOTE — DIETITIAN INITIAL EVALUATION ADULT. - OTHER INFO
pertinent medical information:   --89 yo M with PMH of ILD on home O2 at 2lpm, HTN, BPH, GERD, presented to the ED with c/o worsening shortness of breath and leg swelling for 1 week. Patient being admitted for management of new onset afib and fluid overload.  #HFrEF in exacerbation - - Strict I/O, daily weight, fluid restriction  #New LLL lung mass with mediastinal LN on a recent outside CT     pertinent subjective information: Pt reports eating most of his meal tray. had almost 100% of BF. No chewing/swallowing difficulty reported.

## 2022-01-06 NOTE — DIETITIAN INITIAL EVALUATION ADULT. - OTHER CALCULATIONS
using ABW 81.6kg; energy: 1298kcal/day (MSJ 1.0 AF -- d/t age + obese BMI); Protein: 74-82g/day (.9-1.0g/kg); fluid: 1500mL per LIP ( pt on fluid rest)

## 2022-01-06 NOTE — PROGRESS NOTE ADULT - SUBJECTIVE AND OBJECTIVE BOX
NNEKA JOSEPH 89y Male  MRN#: [id number]   CODE STATUS: FULL    Hospital Day: 2d    Pt is currently admitted with the primary diagnosis of acute on chronic respiratory failure.    SUBJECTIVE  HPI: 88 yo M with PMH of ILD on home O2 at 2lpm, HTN, BPH, GERD, presented to the ED with c/o worsening shortness of breath and leg swelling for 1 week. Patient states that he saw his PCP recently who started him on lasix, but he hasn't started taking the meds yet. Today he experienced worsening of shortness of breath even at rest, he had to use upto 5lpm O2 at home, which prompted him to come to ED. He denies any fever, chills, abdominal pain, nausea, vomiting, diarrhea. He is not vaccinated against COVID. He recently had outpatient CT chest which showed a pulmonary nodule for which he is following up with Dr. Womack.     ED Course: Pt was in afib with RVR, chest x-ray showed bilateral patchy opacities, BNP was 2765. He was given 40mg IV lasix in the ED with improvement in oxygenation. Patient being admitted for management of new onset afib and fluid overload.     Hospital Course:  Patient seen and evaluated by EP.  Recommend cardio consult for possible EFREN/DCCV.  Pulmonary consulted for recs in setting of ILD.    Overnight events:  Patient had 4 beats of NSVT, asymptomatic.    Subjective complaints:  Patient seen and examined at bedside.  No new complains this AM.    Present Today:   - Joel:  No [ X ], Yes [   ] : Indication:     - Type of IV Access:       .. CVC/Piccline:  No [ X ], Yes [   ] : Indication:       .. Midline: No [ X ], Yes [   ] : Indication:                                             ----------------------------------------------------------  OBJECTIVE  PAST MEDICAL & SURGICAL HISTORY  Hypertension    GERD (gastroesophageal reflux disease)    Lung interstitial disease    BPH (benign prostatic hyperplasia)    No significant past surgical history                                              -----------------------------------------------------------  ALLERGIES:  No Known Allergies                                            ------------------------------------------------------------    HOME MEDICATIONS  Home Medications:  AMLODIPINE-BENAZEPRIL 5-10 MG:  (04 Jan 2022 05:09)  FERROUS SULFATE 325 MG TABLET:  (04 Jan 2022 05:09)  FINASTERIDE 5 MG TABLET:  (04 Jan 2022 05:09)  FUROSEMIDE 20MG TAB:  (04 Jan 2022 05:09)  OMEPRAZOLE DR 40MG CAP:  (04 Jan 2022 05:09)  TAMSULOSIN HCL 0.4 MG CAPSULE:  (04 Jan 2022 05:09)  VITAMIN B-12 1,000 MCG TABLET:  (04 Jan 2022 05:09)                           MEDICATIONS:  STANDING MEDICATIONS  amLODIPine   Tablet 5 milliGRAM(s) Oral daily  apixaban 5 milliGRAM(s) Oral every 12 hours  ascorbic acid 500 milliGRAM(s) Oral daily  cholecalciferol 1000 Unit(s) Oral daily  cyanocobalamin 1000 MICROGram(s) Oral daily  ferrous    sulfate 325 milliGRAM(s) Oral daily  finasteride 5 milliGRAM(s) Oral daily  furosemide   Injectable 40 milliGRAM(s) IV Push daily  lisinopril 10 milliGRAM(s) Oral daily  melatonin 10 milliGRAM(s) Oral at bedtime  melatonin 10 milliGRAM(s) Oral at bedtime  metoprolol tartrate 25 milliGRAM(s) Oral every 8 hours  pantoprazole    Tablet 40 milliGRAM(s) Oral before breakfast  tamsulosin 0.4 milliGRAM(s) Oral at bedtime    PRN MEDICATIONS                                            ------------------------------------------------------------  VITAL SIGNS: Last 24 Hours  T(C): 36.2 (06 Jan 2022 13:52), Max: 37.5 (05 Jan 2022 20:15)  T(F): 97.1 (06 Jan 2022 13:52), Max: 99.5 (05 Jan 2022 20:15)  HR: 99 (06 Jan 2022 13:52) (64 - 106)  BP: 110/62 (06 Jan 2022 13:52) (110/62 - 127/76)  BP(mean): --  RR: 18 (06 Jan 2022 13:52) (18 - 20)  SpO2: 98% (06 Jan 2022 04:57) (98% - 98%)      01-05-22 @ 07:01  -  01-06-22 @ 07:00  --------------------------------------------------------  IN: 1210 mL / OUT: 1245 mL / NET: -35 mL    01-06-22 @ 07:01  -  01-06-22 @ 14:03  --------------------------------------------------------  IN: 590 mL / OUT: 726 mL / NET: -136 mL                                             --------------------------------------------------------------  LABS:                        11.5   7.49  )-----------( 138      ( 06 Jan 2022 08:20 )             34.9     01-06    133<L>  |  97<L>  |  35<H>  ----------------------------<  91  4.2   |  22  |  1.3    Ca    8.4<L>      06 Jan 2022 08:20  Mg     1.8     01-06    TPro  6.5  /  Alb  3.6  /  TBili  1.1  /  DBili  x   /  AST  18  /  ALT  14  /  AlkPhos  64  01-06      CARDIAC MARKERS ( 04 Jan 2022 16:00 )  x     / <0.01 ng/mL / x     / x     / x                                                  -------------------------------------------------------------  RADIOLOGY:  ACC: [id number] EXAM:  XR CHEST 1 VIEW, 01/04/2022    Impression:  Stable diffuse interstitial opacities, suggesting underlying interstitial lung disease. No pneumothorax.                                            --------------------------------------------------------------    PHYSICAL EXAM:  General: sitting up in bed independently, eating breakfast, NAD  HEENT: nontraumatic, neck supple, conjunctiva clear  LUNGS: CTA, no cough or increased WOB, on 3L NC   HEART: irregular rate and rhythm  ABDOMEN: soft, nontender, nondistended  EXT: moves all extremities  NEURO: AAOx3, CN grossly intact  SKIN: 1+ pitting LE edema, no erythema                                           --------------------------------------------------------------    ASSESSMENT & PLAN  88 yo M with PMH of ILD on home O2 at 2lpm, HTN, BPH, GERD, presented to the ED with c/o worsening shortness of breath and leg swelling for 1 week. Patient states that he saw his PCP recently who started him on lasix.     # Acute on chronic hypoxic respiratory failure  # Fluid overload  > presented with shortness of breath, LE swelling  > CXR - bilateral interstitial opacities  > BNP 2765  > TTE: EF 40-45%, mod TR and MR, severe pHTN  > 1/4: increased lopressor from 12.5mg BID to 25mg BID  > 1/5: gave 2nd dose of IV lasix  - c/w Lasix 40mg IV OD  - supplemental O2, titrate down as tolerated (on 3L, on 2L @ home)  - c/w metoprolol tartarate 25mg BID   - continue with ACEi  - Strict I/O, daily weight, fluid restriction  - Monitor electrolytes Keep K > 4, Mg >2    # New onset Afib  > CHADSVASC - 3  > per EP recs, consult cardio for possible EFREN/DCCV  > per Cardio, c/w lasix 40mg IV qd, metoprolol 25mg q8h, eliquis 5mg BID, avoid amio, consult pulm for ILD  > TSH 0.70  > 1/6: dc'd Lovenox and started on eliquis per cardio recs  > Eliquis pricing at pt pharmacy ([facility]) is $286/month  - c/w eliquis for now, metoprolol, and lasix  - Telemetry monitoring  - possible EFREN/DCCV with cardio after diuresis    #left shoulder pain, 2/2 recent fall  > full active ROM of LUE on exam  > no acute fracture on xray  - lidocaine patch PRN for pain    # ILD  # Pulmonary Nodule  > CT chest prior to admission: 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, new mediastinal adenopathy, left pleural effusion, cardiomegaly with dilated ascending and descending aorta  > TTE: EF 40-45%, mod TR and MR, severe pHTN  > per pulm, SpO2 target >92%, HOB at 45 degrees, aspiration precautions  - continue with supplemental O2 and wean to home 2L NC as tolerated  - OP pulm Follow up    # HTN  - on amlodipine-benazepril at home  - continue with amlodipine  - Lisinopril while inpatient    # BPH  - continue with tamsulosin, finasteride    # GERD  - continue with PPI                                                                              ----------------------------------------------------  # DVT prophylaxis: therapeutic Lovenox  # GI prophylaxis: protonix  # Diet: DASH/TLC  # Activity Score (AM-PAC): IAT  # Code status: FULL  # Disposition: from home, acute for now, pending possible EFREN/DCCV with cardio s/p diuresis  #POC: Elkin (son) [phone]                                                                             --------------------------------------------------------    # Handoff   - cardio following for possible EFREN/DCCV s/p diuresis  - f/u AC recs on discharge  - OP pulm Follow up

## 2022-01-06 NOTE — DIETITIAN INITIAL EVALUATION ADULT. - PERSON TAUGHT/METHOD
Pt is very hard of hearing, attempted to educate pt n Heart healthy/CF diet however, wasn't very interested in the education and asked "is this some bullshit." However, tried explaining importance of low na + fluid rest and left pt w NCM handouts regarding diet/verbal instruction/patient instructed

## 2022-01-06 NOTE — DIETITIAN INITIAL EVALUATION ADULT. - PERTINENT MEDS FT
MEDICATIONS  (STANDING):  amLODIPine   Tablet 5 milliGRAM(s) Oral daily  apixaban 5 milliGRAM(s) Oral every 12 hours  ascorbic acid 500 milliGRAM(s) Oral daily  cholecalciferol 1000 Unit(s) Oral daily  cyanocobalamin 1000 MICROGram(s) Oral daily  ferrous    sulfate 325 milliGRAM(s) Oral daily  finasteride 5 milliGRAM(s) Oral daily  furosemide   Injectable 40 milliGRAM(s) IV Push daily  lisinopril 10 milliGRAM(s) Oral daily  melatonin 10 milliGRAM(s) Oral at bedtime  melatonin 10 milliGRAM(s) Oral at bedtime  metoprolol tartrate 25 milliGRAM(s) Oral every 8 hours  pantoprazole    Tablet 40 milliGRAM(s) Oral before breakfast  tamsulosin 0.4 milliGRAM(s) Oral at bedtime

## 2022-01-06 NOTE — CONSULT NOTE ADULT - ATTENDING COMMENTS
Diuresis  Cardio c/s  Echo  TSH  Rate control with BB for now  EFREN/DCCV when near-euvolemic  OAC
Impression:    HFrEF in exacerbation  New-onset Afib with RVR  Severe PHTN  Moderate MR  HO ILD on home O2 - not in exacerbation  New LLL lung mass with mediastinal LN on a recent outside CT    PLan as outlined above
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 Sandra  - Currently elevated risk for anesthesia/intubation  - Will consider EFREN/DCCV after adequate diuresis

## 2022-01-06 NOTE — CONSULT NOTE ADULT - ASSESSMENT
Impression:    HFrEF in exacerbation  New-onset Afib with RVR  Severe PHTN  Moderate MR  HO ILD on home O2 - not in exacerbation  LLL lung mass detected on CT     Plan:  Rate control  Cardiology considering electrical cardioversion  Maximize cardiac therapy for CHF  Supplemental O2 target Spo2 > 92  HOB at 45  Aspiration precautions  DVT ppx on Eliquis   Impression:    HFrEF in exacerbation  New-onset Afib with RVR  Severe PHTN  Moderate MR  HO ILD on home O2 - not in exacerbation  New LLL lung mass with mediastinal LN on a recent outside CT     Plan:  Rate control  Cardiology considering electrical cardioversion  Maximize cardiac therapy and volume status   Supplemental O2 target Spo2 > 92  HOB at 45  Aspiration precautions  DVT ppx on Eliquis  OP pulm follow up   Wean O2 as tolerated

## 2022-01-06 NOTE — DIETITIAN INITIAL EVALUATION ADULT. - NAME AND PHONE
Nutrition Intervention:meals and snacks, Nutrition education; Nutrition Monitoring:diet order,energy intake,body composition,NFPF

## 2022-01-06 NOTE — DIETITIAN INITIAL EVALUATION ADULT. - ORAL INTAKE PTA/DIET HISTORY
Pt reports good po/appetite PTA. Hard of hearing, RD had to repeat q's many times. No wt loss reported. UBW: 80kg vs. wt at admit: 81.6kg -- stable. Pt reports gaining wt, could be d/t volume overload. Takes Vit D, C, Fish oil to prevent common colds. NKFA. No cul/rel or personal food rest/prefs.

## 2022-01-06 NOTE — PROGRESS NOTE ADULT - ASSESSMENT
89 yo M with PMH of ILD on home O2 at 2lpm, HTN, BPH, GERD, presented to the ED with c/o worsening shortness of breath and leg swelling for 1 week. Patient states that he saw his PCP recently who started him on lasix.     A/P:   New Paroxysmal Atrial Fibrillation with Rapid Ventricular response:   Continue with Lopressor and Lovenox.   Continue with IV diuresis. Cardiology plan for possible cardioversion once euvolemic.     Acute on chronic hypoxic respiratory failure  Acute HFmrEF:   Severe Pulmonary HTN:   SOB, leg edema 1+, Pro-BNP 2765  CXR chronic looking bilateral interstitial opacities  Echo showed LVEF 40-45%, mild to mod MR, mild to mod TR, severe pulmonary HTN.   Continue Lasix 40mg IV daily.  Low sodium diet,     Chronic Respiratory Failure:   Interstitial lung disease:   continue nasal canula.  Pulmonary consult.     Left shoulder pain, 2/2 recent fall  full active ROM of LUE on exam  Left shoulder Xray showed no acute fracture.     HTN: Continue Amlodipine and Lisinopril.  BPH: continue with tamsulosin, finasteride    GERD  continue with PPI    DVT prophylaxis: therapeutic Lovenox  #Progress Note Handoff:  Pending (specify):  improving tachycardia, EFREN/cardioversion.   Family discussion:  Disposition: Home.

## 2022-01-06 NOTE — PROGRESS NOTE ADULT - SUBJECTIVE AND OBJECTIVE BOX
OPAL NNEKA  89y  Male      Patient is a 89y old  Male who presents with a chief complaint of Shortness of breath (06 Jan 2022 14:03)      INTERVAL HPI/OVERNIGHT EVENTS:  He is still with SOB, leg edema.   Vital Signs Last 24 Hrs  T(C): 36.2 (06 Jan 2022 13:52), Max: 37.5 (05 Jan 2022 20:15)  T(F): 97.1 (06 Jan 2022 13:52), Max: 99.5 (05 Jan 2022 20:15)  HR: 99 (06 Jan 2022 13:52) (92 - 106)  BP: 110/62 (06 Jan 2022 13:52) (110/62 - 127/76)  BP(mean): --  RR: 18 (06 Jan 2022 13:52) (18 - 20)  SpO2: 98% (06 Jan 2022 04:57) (98% - 98%)      01-05-22 @ 07:01  -  01-06-22 @ 07:00  --------------------------------------------------------  IN: 1210 mL / OUT: 1245 mL / NET: -35 mL    01-06-22 @ 07:01  -  01-06-22 @ 16:50  --------------------------------------------------------  IN: 590 mL / OUT: 726 mL / NET: -136 mL            Consultant(s) Notes Reviewed:  [x ] YES  [ ] NO          MEDICATIONS  (STANDING):  amLODIPine   Tablet 5 milliGRAM(s) Oral daily  apixaban 5 milliGRAM(s) Oral every 12 hours  ascorbic acid 500 milliGRAM(s) Oral daily  cholecalciferol 1000 Unit(s) Oral daily  cyanocobalamin 1000 MICROGram(s) Oral daily  ferrous    sulfate 325 milliGRAM(s) Oral daily  finasteride 5 milliGRAM(s) Oral daily  furosemide   Injectable 40 milliGRAM(s) IV Push daily  lisinopril 10 milliGRAM(s) Oral daily  melatonin 10 milliGRAM(s) Oral at bedtime  melatonin 10 milliGRAM(s) Oral at bedtime  metoprolol tartrate 25 milliGRAM(s) Oral every 8 hours  pantoprazole    Tablet 40 milliGRAM(s) Oral before breakfast  tamsulosin 0.4 milliGRAM(s) Oral at bedtime    MEDICATIONS  (PRN):  lidocaine   4% Patch 1 Patch Transdermal daily PRN pain      LABS                          11.5   7.49  )-----------( 138      ( 06 Jan 2022 08:20 )             34.9     01-06    133<L>  |  97<L>  |  35<H>  ----------------------------<  91  4.2   |  22  |  1.3    Ca    8.4<L>      06 Jan 2022 08:20  Mg     1.8     01-06    TPro  6.5  /  Alb  3.6  /  TBili  1.1  /  DBili  x   /  AST  18  /  ALT  14  /  AlkPhos  64  01-06          Lactate Trend        CAPILLARY BLOOD GLUCOSE            RADIOLOGY & ADDITIONAL TESTS:    Imaging Personally Reviewed:  [ ] YES  [ ] NO    HEALTH ISSUES - PROBLEM Dx:          PHYSICAL EXAM:  GENERAL: mild respiratory failure, well-developed.  HEAD:  Atraumatic, Normocephalic.  EYES: EOMI, PERRLA, conjunctiva and sclera clear.  NECK: Supple, No JVD.  CHEST/LUNG: bilateral fine rales.  HEART: Irregular rate and rhythm; S1 S2.   ABDOMEN: Soft, Nontender, Nondistended; Bowel sounds present.  EXTREMITIES:  2+ Peripheral Pulses, No clubbing, cyanosis, or edema.  PSYCH: AAOx3.  NEUROLOGY: non-focal.  SKIN: No rashes or lesions.

## 2022-01-07 NOTE — PROGRESS NOTE ADULT - ASSESSMENT
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  ILD with productive cough and severe Pulmonary HTN  LLL lung mass with mediastinal lymphadenopathy on outpatient CT  Elevated risk for anesthesia/intubation    HR 160s this morning now <110 at rest  Pulmonary consult appreciated  SOB improved    Recommend:  - Rate control strategy is preferred over rhythm control (cardioversion) due to advanced age, ILD and elevated risk of anesthesia/intubation  - Continue Metoprolol 50 mg BID and consider increasing to TID  - Recall EP for any further recommendations  - Consider changing to oral Lasix 40 mg daily tomorrow  - Avoid Amiodarone and Digoxin  - Eliquis 5 mg BID    Discussed with primary team.

## 2022-01-07 NOTE — PROGRESS NOTE ADULT - SUBJECTIVE AND OBJECTIVE BOX
OPAL NNEKA  89y  Male      Patient is a 89y old  Male who presents with a chief complaint of Shortness of breath (07 Jan 2022 16:18)      INTERVAL HPI/OVERNIGHT EVENTS:  He is still with SOB, no chest pain.   Vital Signs Last 24 Hrs  T(C): 36 (07 Jan 2022 13:09), Max: 36.5 (07 Jan 2022 04:22)  T(F): 96.8 (07 Jan 2022 13:09), Max: 97.7 (07 Jan 2022 04:22)  HR: 113 (07 Jan 2022 17:51) (95 - 113)  BP: 110/59 (07 Jan 2022 17:51) (108/64 - 124/63)  BP(mean): --  RR: 20 (07 Jan 2022 13:09) (18 - 20)  SpO2: 95% (06 Jan 2022 19:55) (95% - 95%)      01-06-22 @ 07:01  -  01-07-22 @ 07:00  --------------------------------------------------------  IN: 790 mL / OUT: 1556 mL / NET: -766 mL    01-07-22 @ 07:01  -  01-07-22 @ 19:27  --------------------------------------------------------  IN: 0 mL / OUT: 500 mL / NET: -500 mL            Consultant(s) Notes Reviewed:  [x ] YES  [ ] NO          MEDICATIONS  (STANDING):  amLODIPine   Tablet 5 milliGRAM(s) Oral daily  apixaban 5 milliGRAM(s) Oral every 12 hours  ascorbic acid 500 milliGRAM(s) Oral daily  cholecalciferol 1000 Unit(s) Oral daily  cyanocobalamin 1000 MICROGram(s) Oral daily  ferrous    sulfate 325 milliGRAM(s) Oral daily  finasteride 5 milliGRAM(s) Oral daily  furosemide    Tablet 40 milliGRAM(s) Oral daily  lisinopril 10 milliGRAM(s) Oral daily  melatonin 10 milliGRAM(s) Oral at bedtime  metoprolol tartrate 50 milliGRAM(s) Oral every 12 hours  pantoprazole    Tablet 40 milliGRAM(s) Oral before breakfast  tamsulosin 0.4 milliGRAM(s) Oral at bedtime    MEDICATIONS  (PRN):  lidocaine   4% Patch 1 Patch Transdermal daily PRN pain      LABS                          12.2   7.06  )-----------( 157      ( 07 Jan 2022 06:44 )             37.7     01-07    134<L>  |  97<L>  |  36<H>  ----------------------------<  100<H>  4.2   |  22  |  1.1    Ca    9.1      07 Jan 2022 06:44  Mg     2.0     01-07    TPro  7.1  /  Alb  3.7  /  TBili  1.2  /  DBili  x   /  AST  20  /  ALT  18  /  AlkPhos  71  01-07          Lactate Trend        CAPILLARY BLOOD GLUCOSE            RADIOLOGY & ADDITIONAL TESTS:    Imaging Personally Reviewed:  [ ] YES  [ ] NO    HEALTH ISSUES - PROBLEM Dx:          PHYSICAL EXAM:  GENERAL: mild respiratory failure, well-developed.  HEAD:  Atraumatic, Normocephalic.  EYES: EOMI, PERRLA, conjunctiva and sclera clear.  NECK: Supple, No JVD.  CHEST/LUNG: bilateral fine rales.  HEART: Irregular rate and rhythm; S1 S2.   ABDOMEN: Soft, Nontender, Nondistended; Bowel sounds present.  EXTREMITIES:  2+ Peripheral Pulses, No clubbing, cyanosis, or edema.  PSYCH: AAOx3.  NEUROLOGY: non-focal.  SKIN: No rashes or lesions.

## 2022-01-07 NOTE — PROGRESS NOTE ADULT - SUBJECTIVE AND OBJECTIVE BOX
NNEKA JOSEPH 89y Male  MRN#: [id number]   CODE STATUS: FULL    Hospital Day: 3d    Pt is currently admitted with the primary diagnosis of acute on chronic respiratory failure.    SUBJECTIVE  HPI: 88 yo M with PMH of ILD on home O2 at 2lpm, HTN, BPH, GERD, presented to the ED with c/o worsening shortness of breath and leg swelling for 1 week. Patient states that he saw his PCP recently who started him on lasix, but he hasn't started taking the meds yet. Today he experienced worsening of shortness of breath even at rest, he had to use upto 5lpm O2 at home, which prompted him to come to ED. He denies any fever, chills, abdominal pain, nausea, vomiting, diarrhea. He is not vaccinated against COVID. He recently had outpatient CT chest which showed a pulmonary nodule for which he is following up with Dr. Womack.     ED Course: Pt was in afib with RVR, chest x-ray showed bilateral patchy opacities, BNP was 2765. He was given 40mg IV lasix in the ED with improvement in oxygenation. Patient being admitted for management of new onset afib and fluid overload.     Hospital Course:  Patient seen and evaluated by EP.  Recommend cardio consult for possible EFREN/DCCV.  Pulmonary consulted for recs in setting of ILD.    Overnight events:  Patient tachycardic to 113 o/n.     Subjective complaints:  Patient seen and examined at bedside.  Endorses feeling like his "heart was racing" overnight.  States the feeling has resolved.  Denies any other complaints this AM.    Present Today:   - Joel:  No [ X ], Yes [   ] : Indication:     - Type of IV Access:       .. CVC/Piccline:  No [ X ], Yes [   ] : Indication:       .. Midline: No [ X ], Yes [   ] : Indication:                                             ----------------------------------------------------------  OBJECTIVE  PAST MEDICAL & SURGICAL HISTORY  Hypertension    GERD (gastroesophageal reflux disease)    Lung interstitial disease    BPH (benign prostatic hyperplasia)    No significant past surgical history                                              -----------------------------------------------------------  ALLERGIES:  No Known Allergies                                            ------------------------------------------------------------    HOME MEDICATIONS  Home Medications:  AMLODIPINE-BENAZEPRIL 5-10 MG:  (04 Jan 2022 05:09)  FERROUS SULFATE 325 MG TABLET:  (04 Jan 2022 05:09)  FINASTERIDE 5 MG TABLET:  (04 Jan 2022 05:09)  FUROSEMIDE 20MG TAB:  (04 Jan 2022 05:09)  OMEPRAZOLE DR 40MG CAP:  (04 Jan 2022 05:09)  TAMSULOSIN HCL 0.4 MG CAPSULE:  (04 Jan 2022 05:09)  VITAMIN B-12 1,000 MCG TABLET:  (04 Jan 2022 05:09)                           MEDICATIONS:  STANDING MEDICATIONS  amLODIPine   Tablet 5 milliGRAM(s) Oral daily  apixaban 5 milliGRAM(s) Oral every 12 hours  ascorbic acid 500 milliGRAM(s) Oral daily  cholecalciferol 1000 Unit(s) Oral daily  cyanocobalamin 1000 MICROGram(s) Oral daily  ferrous    sulfate 325 milliGRAM(s) Oral daily  finasteride 5 milliGRAM(s) Oral daily  furosemide   Injectable 40 milliGRAM(s) IV Push daily  lisinopril 10 milliGRAM(s) Oral daily  melatonin 10 milliGRAM(s) Oral at bedtime  metoprolol tartrate 25 milliGRAM(s) Oral once  metoprolol tartrate 50 milliGRAM(s) Oral every 12 hours  pantoprazole    Tablet 40 milliGRAM(s) Oral before breakfast  tamsulosin 0.4 milliGRAM(s) Oral at bedtime    PRN MEDICATIONS  lidocaine   4% Patch 1 Patch Transdermal daily PRN                                            ------------------------------------------------------------  VITAL SIGNS: Last 24 Hours  T(C): 36.5 (07 Jan 2022 04:22), Max: 36.5 (07 Jan 2022 04:22)  T(F): 97.7 (07 Jan 2022 04:22), Max: 97.7 (07 Jan 2022 04:22)  HR: 98 (07 Jan 2022 04:41) (95 - 113)  BP: 124/63 (07 Jan 2022 04:22) (110/62 - 124/73)  BP(mean): --  RR: 18 (07 Jan 2022 04:22) (18 - 18)  SpO2: 95% (06 Jan 2022 19:55) (95% - 95%)      01-06-22 @ 07:01  -  01-07-22 @ 07:00  --------------------------------------------------------  IN: 790 mL / OUT: 1556 mL / NET: -766 mL    01-07-22 @ 07:01  -  01-07-22 @ 12:52  --------------------------------------------------------  IN: 0 mL / OUT: 500 mL / NET: -500 mL                                             --------------------------------------------------------------  LABS:                        12.2   7.06  )-----------( 157      ( 07 Jan 2022 06:44 )             37.7     01-07    134<L>  |  97<L>  |  36<H>  ----------------------------<  100<H>  4.2   |  22  |  1.1    Ca    9.1      07 Jan 2022 06:44  Mg     2.0     01-07    TPro  7.1  /  Alb  3.7  /  TBili  1.2  /  DBili  x   /  AST  20  /  ALT  18  /  AlkPhos  71  01-07                                              -------------------------------------------------------------  RADIOLOGY:  ACC: [id number] EXAM:  XR CHEST 1 VIEW, 01/04/2022    Impression:  Stable diffuse interstitial opacities, suggesting underlying interstitial lung disease. No pneumothorax.                                            --------------------------------------------------------------    PHYSICAL EXAM:  General: lying in bed, NAD  HEENT: nontraumatic, neck supple, conjunctiva clear  LUNGS: CTA, no cough or increased WOB, on 4L NC   HEART: irregular rate and rhythm  ABDOMEN: soft, nontender, nondistended  EXT: moves all extremities  NEURO: AAOx3, CN grossly intact  SKIN: 1+ pitting LE edema, no erythema                                           --------------------------------------------------------------    ASSESSMENT & PLAN  88 yo M with PMH of ILD on home O2 at 2lpm, HTN, BPH, GERD, presented to the ED with c/o worsening shortness of breath and leg swelling for 1 week. Patient states that he saw his PCP recently who started him on lasix.     # Acute on chronic hypoxic respiratory failure  # Fluid overload  > presented with shortness of breath, LE swelling  > CXR - bilateral interstitial opacities  > BNP 2765, d-dimer 735  > TTE: EF 40-45%, mod TR and MR, severe pHTN  > 1/4: increased lopressor from 12.5mg BID to 25mg BID  > 1/5: gave 2nd dose of IV lasix  > 1/7: increased metoprolol to 50mg BID  - c/w Lasix 40mg IV OD  - supplemental O2, titrate down as tolerated (on 4L, on 2L @ home)  - c/w metoprolol tartrate 50mg BID   - f/u LE duplex  - continue with ACEi  - Strict I/O, daily weight, fluid restriction  - Monitor electrolytes Keep K > 4, Mg >2    # New onset Afib  > CHADSVASC - 3  > per EP recs, consult cardio for possible EFREN/DCCV  > per Cardio, c/w lasix 40mg IV qd, metoprolol 25mg q8h, eliquis 5mg BID, avoid amio, consult pulm for ILD  > TSH 0.70  > 1/6: dc'd Lovenox and started on eliquis per cardio recs  > Eliquis pricing at pt pharmacy ([facility]) is $286/month  - c/w eliquis for now, metoprolol, and lasix  - Telemetry monitoring  - possible EFREN/DCCV with cardio after diuresis    #left shoulder pain, 2/2 recent fall  > full active ROM of LUE on exam  > no acute fracture on xray  - lidocaine patch PRN for pain    # ILD  # Pulmonary Nodule  > CT chest prior to admission: 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, new mediastinal adenopathy, left pleural effusion, cardiomegaly with dilated ascending and descending aorta  > TTE: EF 40-45%, mod TR and MR, severe pHTN  > per pulm, SpO2 target >92%, HOB at 45 degrees, aspiration precautions  - continue with supplemental O2 and wean to home 2L NC as tolerated  - OP pulm Follow up    # HTN  - on amlodipine-benazepril at home  - continue with amlodipine  - Lisinopril while inpatient    # BPH  - continue with tamsulosin, finasteride    # GERD  - continue with PPI                                                                              ----------------------------------------------------  # DVT prophylaxis: therapeutic Lovenox  # GI prophylaxis: protonix  # Diet: DASH/TLC  # Activity Score (AM-PAC): IAT  # Code status: FULL  # Disposition: from home, acute for now, pending possible EFREN/DCCV with cardio s/p diuresis  # POC: Elkin (son) [phone]                                                                             --------------------------------------------------------    # Handoff   - cardio following for possible EFREN/DCCV s/p diuresis  - f/u LE duplex  - f/u AC recs on discharge  - OP pulm Follow up

## 2022-01-07 NOTE — PROGRESS NOTE ADULT - SUBJECTIVE AND OBJECTIVE BOX
Stable overnight      MEDICATIONS  (STANDING):  amLODIPine   Tablet 5 milliGRAM(s) Oral daily  apixaban 5 milliGRAM(s) Oral every 12 hours  ascorbic acid 500 milliGRAM(s) Oral daily  cholecalciferol 1000 Unit(s) Oral daily  cyanocobalamin 1000 MICROGram(s) Oral daily  ferrous    sulfate 325 milliGRAM(s) Oral daily  finasteride 5 milliGRAM(s) Oral daily  furosemide   Injectable 40 milliGRAM(s) IV Push daily  lisinopril 10 milliGRAM(s) Oral daily  melatonin 10 milliGRAM(s) Oral at bedtime  metoprolol tartrate 50 milliGRAM(s) Oral every 12 hours  pantoprazole    Tablet 40 milliGRAM(s) Oral before breakfast  tamsulosin 0.4 milliGRAM(s) Oral at bedtime        PHYSICAL EXAM:  Vital Signs Last 24 Hrs  T(C): 36 (07 Jan 2022 13:09), Max: 36.5 (07 Jan 2022 04:22)  T(F): 96.8 (07 Jan 2022 13:09), Max: 97.7 (07 Jan 2022 04:22)  HR: 99 (07 Jan 2022 13:09) (95 - 113)  BP: 108/64 (07 Jan 2022 13:09) (108/64 - 124/73)  BP(mean): --  RR: 20 (07 Jan 2022 13:09) (18 - 20)  SpO2: 95% (06 Jan 2022 19:55) (95% - 95%)    General: NAD, AAOx3  HEENT: NCAT, EOMI  Neck: supple, no JVD  CV: irregular, no murmurs   Lungs: CTA bilaterally, no wheeze, rales or rhonchi  Abdomen: soft, NT/ND, +BS  Extremities: warm, no clubbing, cyanosis or edema  Neurologic: Nonfocal                            12.2   7.06  )-----------( 157      ( 07 Jan 2022 06:44 )             37.7         01-07    134<L>  |  97<L>  |  36<H>  ----------------------------<  100<H>  4.2   |  22  |  1.1    Ca    9.1      07 Jan 2022 06:44  Mg     2.0     01-07    TPro  7.1  /  Alb  3.7  /  TBili  1.2  /  DBili  x   /  AST  20  /  ALT  18  /  AlkPhos  71  01-07

## 2022-01-07 NOTE — PROGRESS NOTE ADULT - ASSESSMENT
88 yo M with PMH of ILD on home O2 at 2lpm, HTN, BPH, GERD, presented to the ED with c/o worsening shortness of breath and leg swelling for 1 week. Patient states that he saw his PCP recently who started him on lasix.     A/P:   New Paroxysmal Atrial Fibrillation with Rapid Ventricular response:   Continue with Lopressor , dose increased o 50mg BID, continue Eliquis.   Continue with IV diuresis.     Acute on chronic hypoxic respiratory failure  Acute HFmrEF:   Severe Pulmonary HTN:   SOB, leg edema 1+, Pro-BNP 2765  CXR chronic looking bilateral interstitial opacities  Echo showed LVEF 40-45%, mild to mod MR, mild to mod TR, severe pulmonary HTN.   Continue Lasix 40mg IV daily.  Low sodium diet,     Chronic Respiratory Failure:   Interstitial lung disease:   continue nasal canula.  Pulmonary follow up.     Left shoulder pain, 2/2 recent fall  full active ROM of LUE on exam  Left shoulder Xray showed no acute fracture.     HTN: Continue Amlodipine and Lisinopril.  BPH: continue with tamsulosin, finasteride    GERD  continue with PPI    DVT prophylaxis: therapeutic Lovenox  #Progress Note Handoff:  Pending (specify):  improving tachycardia, volume overload.   Family discussion:  Disposition: Home.

## 2022-01-08 NOTE — PROGRESS NOTE ADULT - SUBJECTIVE AND OBJECTIVE BOX
OPALNNEKA  89y  Male      Patient is a 89y old  Male who presents with a chief complaint of Shortness of breath (07 Jan 2022 19:25)      INTERVAL HPI/OVERNIGHT EVENTS:  He is still with SOB, no chest pain.   Vital Signs Last 24 Hrs  T(C): 36.4 (08 Jan 2022 12:46), Max: 37 (07 Jan 2022 20:06)  T(F): 97.6 (08 Jan 2022 12:46), Max: 98.6 (07 Jan 2022 20:06)  HR: 90 (08 Jan 2022 18:13) (69 - 125)  BP: 125/63 (08 Jan 2022 17:00) (118/75 - 126/61)  BP(mean): --  RR: 20 (08 Jan 2022 17:00) (18 - 25)  SpO2: 94% (08 Jan 2022 08:28) (94% - 94%)      01-07-22 @ 07:01  -  01-08-22 @ 07:00  --------------------------------------------------------  IN: 350 mL / OUT: 1000 mL / NET: -650 mL    01-08-22 @ 07:01  -  01-08-22 @ 19:19  --------------------------------------------------------  IN: 540 mL / OUT: 1151 mL / NET: -611 mL            Consultant(s) Notes Reviewed:  [x ] YES  [ ] NO          MEDICATIONS  (STANDING):  amLODIPine   Tablet 5 milliGRAM(s) Oral daily  apixaban 5 milliGRAM(s) Oral every 12 hours  ascorbic acid 500 milliGRAM(s) Oral daily  cholecalciferol 1000 Unit(s) Oral daily  cyanocobalamin 1000 MICROGram(s) Oral daily  diltiazem    Tablet 30 milliGRAM(s) Oral every 6 hours  ferrous    sulfate 325 milliGRAM(s) Oral daily  finasteride 5 milliGRAM(s) Oral daily  furosemide    Tablet 40 milliGRAM(s) Oral daily  lisinopril 10 milliGRAM(s) Oral daily  melatonin 10 milliGRAM(s) Oral at bedtime  metoprolol tartrate 50 milliGRAM(s) Oral every 12 hours  pantoprazole    Tablet 40 milliGRAM(s) Oral before breakfast  tamsulosin 0.4 milliGRAM(s) Oral at bedtime    MEDICATIONS  (PRN):  lidocaine   4% Patch 1 Patch Transdermal daily PRN pain      LABS                          11.3   6.80  )-----------( 142      ( 08 Jan 2022 05:30 )             34.6     01-08    136  |  101  |  32<H>  ----------------------------<  104<H>  4.3   |  20  |  1.0    Ca    8.4<L>      08 Jan 2022 05:30  Mg     1.9     01-08    TPro  6.3  /  Alb  3.1<L>  /  TBili  1.0  /  DBili  x   /  AST  25  /  ALT  21  /  AlkPhos  68  01-08          Lactate Trend        CAPILLARY BLOOD GLUCOSE            RADIOLOGY & ADDITIONAL TESTS:    Imaging Personally Reviewed:  [ ] YES  [ ] NO    HEALTH ISSUES - PROBLEM Dx:          PHYSICAL EXAM:  GENERAL: mild respiratory failure, well-developed.  HEAD:  Atraumatic, Normocephalic.  EYES: EOMI, PERRLA, conjunctiva and sclera clear.  NECK: Supple, No JVD.  CHEST/LUNG: bilateral fine rales.  HEART: Irregular rate and rhythm; S1 S2.   ABDOMEN: Soft, Nontender, Nondistended; Bowel sounds present.  EXTREMITIES:  2+ Peripheral Pulses, No clubbing, cyanosis, or edema.  PSYCH: AAOx3.  NEUROLOGY: non-focal.  SKIN: No rashes or lesions.

## 2022-01-08 NOTE — PROGRESS NOTE ADULT - ASSESSMENT
89 yo M with PMH of ILD on home O2 at 2lpm, HTN, BPH, GERD, presented to the ED with c/o worsening shortness of breath and leg swelling for 1 week. Patient states that he saw his PCP recently who started him on lasix.     A/P:   New Paroxysmal Atrial Fibrillation with Rapid Ventricular response:   Continue with Lopressor , dose increased o 50mg BID, continue Eliquis.   Start on Cardizem 30mg 6 hrs. EP follow up.   Continue with IV diuresis.     Acute on chronic hypoxic respiratory failure  Acute HFmrEF:   Severe Pulmonary HTN:   SOB, leg edema 1+, Pro-BNP 2765  CXR chronic looking bilateral interstitial opacities  Echo showed LVEF 40-45%, mild to mod MR, mild to mod TR, severe pulmonary HTN.   Continue Lasix 40mg IV daily.  Low sodium diet,     Chronic Respiratory Failure:   Interstitial lung disease:   continue nasal canula.  Pulmonary follow up.     Left shoulder pain, 2/2 recent fall  full active ROM of LUE on exam  Left shoulder Xray showed no acute fracture.     HTN: Continue Amlodipine and Lisinopril.  BPH: continue with tamsulosin, finasteride    GERD  continue with PPI    DVT prophylaxis: therapeutic Lovenox  #Progress Note Handoff:  Pending (specify):  improving tachycardia, volume overload.   Family discussion:  Disposition: Home.

## 2022-01-09 NOTE — PROGRESS NOTE ADULT - ASSESSMENT
89 yo M with PMH of ILD on home O2 at 2lpm, HTN, BPH, GERD, presented to the ED with c/o worsening shortness of breath and leg swelling for 1 week. Patient states that he saw his PCP recently who started him on lasix.     A/P:   New Paroxysmal Atrial Fibrillation with Rapid Ventricular response:   Continue with Lopressor , dose increased o 50mg BID, continue Eliquis.   Start on Cardizem 30mg 6 hrs. EP follow up.     Acute on chronic hypoxic respiratory failure  Acute HFmrEF:   Severe Pulmonary HTN:   SOB, leg edema 1+, Pro-BNP 2765  CXR chronic looking bilateral interstitial opacities  Echo showed LVEF 40-45%, mild to mod MR, mild to mod TR, severe pulmonary HTN.   Continue Lasix 40mg po daily.  Low sodium diet,     Chronic Respiratory Failure:   Interstitial lung disease:   continue nasal canula.  Pulmonary follow up.     Left shoulder pain, 2/2 recent fall  full active ROM of LUE on exam  Left shoulder Xray showed no acute fracture.     HTN: Continue Amlodipine and Lisinopril.  BPH: continue with tamsulosin, finasteride    GERD  continue with PPI    DVT prophylaxis: therapeutic Lovenox  #Progress Note Handoff:  Pending (specify):  improving tachycardia, volume overload.   Family discussion:  Disposition: Home.

## 2022-01-09 NOTE — PROGRESS NOTE ADULT - SUBJECTIVE AND OBJECTIVE BOX
NNEKA JOSEPH 89y Male  MRN#: [id number]   CODE STATUS: FULL    Hospital Day: 5d    Pt is currently admitted with the primary diagnosis of acute on chronic respiratory failure.    SUBJECTIVE  HPI: 88 yo M with PMH of ILD on home O2 at 2lpm, HTN, BPH, GERD, presented to the ED with c/o worsening shortness of breath and leg swelling for 1 week. Patient states that he saw his PCP recently who started him on lasix, but he hasn't started taking the meds yet. Today he experienced worsening of shortness of breath even at rest, he had to use upto 5lpm O2 at home, which prompted him to come to ED. He denies any fever, chills, abdominal pain, nausea, vomiting, diarrhea. He is not vaccinated against COVID. He recently had outpatient CT chest which showed a pulmonary nodule for which he is following up with Dr. Womack.     ED Course: Pt was in afib with RVR, chest x-ray showed bilateral patchy opacities, BNP was 2765. He was given 40mg IV lasix in the ED with improvement in oxygenation. Patient being admitted for management of new onset afib and fluid overload.     Hospital Course:  Patient seen and evaluated by EP.  Recommend cardio consult for possible EFREN/DCCV.  Per cardio, recommend rate control strategy over rhythm control (cardioversion) due to advanced age, ILD, and elevated risk of anesthesia/intubation.  Pulmonary consulted for recs in setting of ILD.    Overnight events:  No acute events overnight.    Subjective complaints:  Patient seen and examined at bedside.    Present Today:   - Joel:  No [ X ], Yes [   ] : Indication:     - Type of IV Access:       .. CVC/Piccline:  No [ X ], Yes [   ] : Indication:       .. Midline: No [ X ], Yes [   ] : Indication:                                             ----------------------------------------------------------  OBJECTIVE  PAST MEDICAL & SURGICAL HISTORY  Hypertension    GERD (gastroesophageal reflux disease)    Lung interstitial disease    BPH (benign prostatic hyperplasia)    No significant past surgical history                                              -----------------------------------------------------------  ALLERGIES:  No Known Allergies                                            ------------------------------------------------------------    HOME MEDICATIONS  Home Medications:  AMLODIPINE-BENAZEPRIL 5-10 MG:  (04 Jan 2022 05:09)  FERROUS SULFATE 325 MG TABLET:  (04 Jan 2022 05:09)  FINASTERIDE 5 MG TABLET:  (04 Jan 2022 05:09)  FUROSEMIDE 20MG TAB:  (04 Jan 2022 05:09)  OMEPRAZOLE DR 40MG CAP:  (04 Jan 2022 05:09)  TAMSULOSIN HCL 0.4 MG CAPSULE:  (04 Jan 2022 05:09)  VITAMIN B-12 1,000 MCG TABLET:  (04 Jan 2022 05:09)                           MEDICATIONS:  STANDING MEDICATIONS  amLODIPine   Tablet 5 milliGRAM(s) Oral daily  apixaban 5 milliGRAM(s) Oral every 12 hours  ascorbic acid 500 milliGRAM(s) Oral daily  cholecalciferol 1000 Unit(s) Oral daily  cyanocobalamin 1000 MICROGram(s) Oral daily  diltiazem    Tablet 30 milliGRAM(s) Oral every 6 hours  ferrous    sulfate 325 milliGRAM(s) Oral daily  finasteride 5 milliGRAM(s) Oral daily  furosemide    Tablet 40 milliGRAM(s) Oral daily  lisinopril 10 milliGRAM(s) Oral daily  melatonin 10 milliGRAM(s) Oral at bedtime  metoprolol tartrate 50 milliGRAM(s) Oral every 12 hours  pantoprazole    Tablet 40 milliGRAM(s) Oral before breakfast  tamsulosin 0.4 milliGRAM(s) Oral at bedtime    PRN MEDICATIONS  lidocaine   4% Patch 1 Patch Transdermal daily PRN                                            ------------------------------------------------------------  VITAL SIGNS: Last 24 Hours  T(C): 36.7 (08 Jan 2022 20:17), Max: 36.7 (08 Jan 2022 20:17)  T(F): 98.1 (08 Jan 2022 20:17), Max: 98.1 (08 Jan 2022 20:17)  HR: 97 (09 Jan 2022 00:27) (69 - 125)  BP: 119/64 (09 Jan 2022 00:27) (117/73 - 126/61)  BP(mean): --  RR: 19 (08 Jan 2022 20:17) (18 - 20)  SpO2: 93% (08 Jan 2022 20:10) (93% - 94%)      01-07-22 @ 07:01  -  01-08-22 @ 07:00  --------------------------------------------------------  IN: 350 mL / OUT: 1000 mL / NET: -650 mL    01-08-22 @ 07:01  -  01-09-22 @ 01:24  --------------------------------------------------------  IN: 540 mL / OUT: 1876 mL / NET: -1336 mL                                             --------------------------------------------------------------  LABS:                        11.3   6.80  )-----------( 142      ( 08 Jan 2022 05:30 )             34.6     01-08    136  |  101  |  32<H>  ----------------------------<  104<H>  4.3   |  20  |  1.0    Ca    8.4<L>      08 Jan 2022 05:30  Mg     1.9     01-08    TPro  6.3  /  Alb  3.1<L>  /  TBili  1.0  /  DBili  x   /  AST  25  /  ALT  21  /  AlkPhos  68  01-08                                                -------------------------------------------------------------  RADIOLOGY:  ACC: [id number] EXAM:  DUPLEX SCAN EXT VEINS LOWER BI, 01/07/2022    ******PRELIMINARY REPORT******    Impression:  No evidence of deep venous thrombosis or superficial thrombophlebitis in the bilateral lower extremities.      ACC: [id number] EXAM:  XR SHOULDER COMP MIN 2V LT, 01/05/2022    FINDINGS/IMPRESSION:  No gross acute fracture or dislocation. Mild degenerative change in the acromioclavicular and glenohumeral joints. Osteopenia.  Diffuse interstitial opacities in the visualized lungs.      ACC: [id number] EXAM:  XR CHEST 1 VIEW, 01/04/2022    Impression:  Stable diffuse interstitial opacities, suggesting underlying interstitial lung disease. No pneumothorax.                                            --------------------------------------------------------------    PHYSICAL EXAM:  General: lying in bed, NAD  HEENT: nontraumatic, neck supple, conjunctiva clear  LUNGS: CTA, no cough or increased WOB, on 3L NC   HEART: irregular rate and rhythm  ABDOMEN: soft, nontender, nondistended  EXT: moves all extremities  NEURO: AAOx3, CN grossly intact  SKIN: 1+ pitting LE edema, no erythema                                           --------------------------------------------------------------    ASSESSMENT & PLAN  88 yo M with PMH of ILD on home O2 at 2lpm, HTN, BPH, GERD, presented to the ED with c/o worsening shortness of breath and leg swelling for 1 week. Patient states that he saw his PCP recently who started him on lasix.     # Acute on chronic hypoxic respiratory failure  # Fluid overload  > presented with shortness of breath, LE swelling  > CXR - bilateral interstitial opacities  > BNP 2765, d-dimer 735  > TTE: EF 40-45%, mod TR and MR, severe pHTN  > 1/4: increased lopressor from 12.5mg BID to 25mg BID  > 1/5: gave 2nd dose of IV lasix  > 1/7: increased metoprolol to 50mg BID  > 1/8: LE duplex negative - prelim read  - c/w Lasix 40mg PO qD  - supplemental O2, titrate down as tolerated (on 3L, on 2L @ home)  - c/w metoprolol tartrate 50mg BID - consider incr to TID per cardio  - continue with ACEi  - Strict I/O, daily weight, fluid restriction  - Monitor electrolytes Keep K > 4, Mg >2    # New onset Afib  > CHADSVASC - 3  > per EP recs, consult cardio for possible EFREN/DCCV  > per Cardio, c/w lasix 40mg PO qd, metoprolol 50mg BID (can incr to q8h), eliquis 5mg BID, avoid amio and digoxin, high risk for cardioversion, pursue medical management  > TSH 0.70  > 1/6: dc'd Lovenox and started on eliquis per cardio recs  > Eliquis pricing at pt pharmacy ([facility] is $286/month  - c/w eliquis for now, metoprolol, and lasix  - Telemetry monitoring  - f/u with EP for any further medication recs    #left shoulder pain, 2/2 recent fall  > full active ROM of LUE on exam  > no acute fracture on xray  - lidocaine patch PRN for pain    # ILD  # Pulmonary Nodule  > CT chest prior to admission: 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, new mediastinal adenopathy, left pleural effusion, cardiomegaly with dilated ascending and descending aorta  > TTE: EF 40-45%, mod TR and MR, severe pHTN  > per pulm, SpO2 target >92%, HOB at 45 degrees, aspiration precautions  - continue with supplemental O2 and wean to home 2L NC as tolerated  - OP pulm Follow up    # HTN  - on amlodipine-benazepril at home  - continue with amlodipine  - Lisinopril while inpatient    # BPH  - continue with tamsulosin, finasteride    # GERD  - continue with PPI                                                                              ----------------------------------------------------  # DVT prophylaxis: therapeutic eliquis  # GI prophylaxis: protonix  # Diet: DASH/TLC  # Activity Score (AM-PAC): IAT  # Code status: FULL  # Disposition: from home, acute for now, pending rate control and EP medication recs  # POC: Elkin (chavez) [phone]                                                                             --------------------------------------------------------    # Handoff   - f/u with EP for any further medication recs  - f/u LE duplex final read  - f/u AC recs on discharge  - OP pulm Follow up   NNEKA JOSEPH 89y Male  MRN#: [id number]   CODE STATUS: FULL    Hospital Day: 5d    Pt is currently admitted with the primary diagnosis of acute on chronic respiratory failure.    SUBJECTIVE  HPI: 88 yo M with PMH of ILD on home O2 at 2lpm, HTN, BPH, GERD, presented to the ED with c/o worsening shortness of breath and leg swelling for 1 week. Patient states that he saw his PCP recently who started him on lasix, but he hasn't started taking the meds yet. Today he experienced worsening of shortness of breath even at rest, he had to use upto 5lpm O2 at home, which prompted him to come to ED. He denies any fever, chills, abdominal pain, nausea, vomiting, diarrhea. He is not vaccinated against COVID. He recently had outpatient CT chest which showed a pulmonary nodule for which he is following up with Dr. Womack.     ED Course: Pt was in afib with RVR, chest x-ray showed bilateral patchy opacities, BNP was 2765. He was given 40mg IV lasix in the ED with improvement in oxygenation. Patient being admitted for management of new onset afib and fluid overload.     Hospital Course:  Patient seen and evaluated by EP.  Recommend cardio consult for possible EFREN/DCCV.  Per cardio, recommend rate control strategy over rhythm control (cardioversion) due to advanced age, ILD, and elevated risk of anesthesia/intubation.  Pulmonary consulted for recs in setting of ILD.    Overnight events:  No acute events overnight.    Subjective complaints:  Patient seen and examined at bedside.  Patient denies any complaints this AM.    Present Today:   - Joel:  No [ X ], Yes [   ] : Indication:     - Type of IV Access:       .. CVC/Piccline:  No [ X ], Yes [   ] : Indication:       .. Midline: No [ X ], Yes [   ] : Indication:                                             ----------------------------------------------------------  OBJECTIVE  PAST MEDICAL & SURGICAL HISTORY  Hypertension    GERD (gastroesophageal reflux disease)    Lung interstitial disease    BPH (benign prostatic hyperplasia)    No significant past surgical history                                              -----------------------------------------------------------  ALLERGIES:  No Known Allergies                                            ------------------------------------------------------------    HOME MEDICATIONS  Home Medications:  AMLODIPINE-BENAZEPRIL 5-10 MG:  (04 Jan 2022 05:09)  FERROUS SULFATE 325 MG TABLET:  (04 Jan 2022 05:09)  FINASTERIDE 5 MG TABLET:  (04 Jan 2022 05:09)  FUROSEMIDE 20MG TAB:  (04 Jan 2022 05:09)  OMEPRAZOLE DR 40MG CAP:  (04 Jan 2022 05:09)  TAMSULOSIN HCL 0.4 MG CAPSULE:  (04 Jan 2022 05:09)  VITAMIN B-12 1,000 MCG TABLET:  (04 Jan 2022 05:09)                           MEDICATIONS:  STANDING MEDICATIONS  amLODIPine   Tablet 5 milliGRAM(s) Oral daily  apixaban 5 milliGRAM(s) Oral every 12 hours  ascorbic acid 500 milliGRAM(s) Oral daily  cholecalciferol 1000 Unit(s) Oral daily  cyanocobalamin 1000 MICROGram(s) Oral daily  diltiazem    Tablet 30 milliGRAM(s) Oral every 6 hours  ferrous    sulfate 325 milliGRAM(s) Oral daily  finasteride 5 milliGRAM(s) Oral daily  furosemide    Tablet 40 milliGRAM(s) Oral daily  lisinopril 10 milliGRAM(s) Oral daily  melatonin 10 milliGRAM(s) Oral at bedtime  metoprolol tartrate 50 milliGRAM(s) Oral every 12 hours  pantoprazole    Tablet 40 milliGRAM(s) Oral before breakfast  tamsulosin 0.4 milliGRAM(s) Oral at bedtime    PRN MEDICATIONS  lidocaine   4% Patch 1 Patch Transdermal daily PRN                                            ------------------------------------------------------------  VITAL SIGNS: Last 24 Hours  T(C): 36.7 (08 Jan 2022 20:17), Max: 36.7 (08 Jan 2022 20:17)  T(F): 98.1 (08 Jan 2022 20:17), Max: 98.1 (08 Jan 2022 20:17)  HR: 97 (09 Jan 2022 00:27) (69 - 125)  BP: 119/64 (09 Jan 2022 00:27) (117/73 - 126/61)  BP(mean): --  RR: 19 (08 Jan 2022 20:17) (18 - 20)  SpO2: 93% (08 Jan 2022 20:10) (93% - 94%)      01-07-22 @ 07:01  -  01-08-22 @ 07:00  --------------------------------------------------------  IN: 350 mL / OUT: 1000 mL / NET: -650 mL    01-08-22 @ 07:01  -  01-09-22 @ 01:24  --------------------------------------------------------  IN: 540 mL / OUT: 1876 mL / NET: -1336 mL                                             --------------------------------------------------------------  LABS:                        11.3   6.80  )-----------( 142      ( 08 Jan 2022 05:30 )             34.6     01-08    136  |  101  |  32<H>  ----------------------------<  104<H>  4.3   |  20  |  1.0    Ca    8.4<L>      08 Jan 2022 05:30  Mg     1.9     01-08    TPro  6.3  /  Alb  3.1<L>  /  TBili  1.0  /  DBili  x   /  AST  25  /  ALT  21  /  AlkPhos  68  01-08                                                -------------------------------------------------------------  RADIOLOGY:  ACC: [id number] EXAM:  DUPLEX SCAN EXT VEINS LOWER BI, 01/07/2022    ******PRELIMINARY REPORT******    Impression:  No evidence of deep venous thrombosis or superficial thrombophlebitis in the bilateral lower extremities.      ACC: [id number] EXAM:  XR SHOULDER COMP MIN 2V LT, 01/05/2022    FINDINGS/IMPRESSION:  No gross acute fracture or dislocation. Mild degenerative change in the acromioclavicular and glenohumeral joints. Osteopenia.  Diffuse interstitial opacities in the visualized lungs.      ACC: [id number] EXAM:  XR CHEST 1 VIEW, 01/04/2022    Impression:  Stable diffuse interstitial opacities, suggesting underlying interstitial lung disease. No pneumothorax.                                            --------------------------------------------------------------    PHYSICAL EXAM:  General: lying in bed, NAD  HEENT: nontraumatic, neck supple, conjunctiva clear  LUNGS: CTA, no cough or increased WOB, on 3L NC   HEART: tachycardic, irregular rhythm  ABDOMEN: soft, nontender, nondistended  EXT: moves all extremities  NEURO: AAOx3, CN grossly intact  SKIN: 1+ pitting LE edema, no erythema                                           --------------------------------------------------------------    ASSESSMENT & PLAN  88 yo M with PMH of ILD on home O2 at 2lpm, HTN, BPH, GERD, presented to the ED with c/o worsening shortness of breath and leg swelling for 1 week. Patient states that he saw his PCP recently who started him on lasix.     # Acute on chronic hypoxic respiratory failure  # Fluid overload  > presented with shortness of breath, LE swelling  > CXR - bilateral interstitial opacities  > BNP 2765, d-dimer 735  > TTE: EF 40-45%, mod TR and MR, severe pHTN  > 1/4: increased lopressor from 12.5mg BID to 25mg BID  > 1/5: gave 2nd dose of IV lasix  > 1/7: increased metoprolol to 50mg BID  > 1/8: LE duplex negative - prelim read  - c/w Lasix 40mg PO qD  - supplemental O2, titrate down as tolerated (on 3L, on 2L @ home)  - c/w metoprolol tartrate 50mg BID - consider incr to TID per cardio  - continue with ACEi  - Strict I/O, daily weight, fluid restriction  - Monitor electrolytes Keep K > 4, Mg >2    # New onset Afib  > CHADSVASC - 3  > per EP recs, consult cardio for possible EFREN/DCCV  > per Cardio, c/w lasix 40mg PO qd, metoprolol 50mg BID (can incr to q8h), eliquis 5mg BID, avoid amio and digoxin, high risk for cardioversion, pursue medical management  > TSH 0.70  > 1/6: dc'd Lovenox and started on eliquis per cardio recs  > [facility] is [place]286[facility]month  - c/w eliquis for now, metoprolol, and lasix  - Telemetry monitoring  - f/u with EP for any further medication recs    #left shoulder pain, 2/2 recent fall  > full active ROM of LUE on exam  > no acute fracture on xray  - lidocaine patch PRN for pain    # ILD  # Pulmonary Nodule  > CT chest prior to admission: 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, new mediastinal adenopathy, left pleural effusion, cardiomegaly with dilated ascending and descending aorta  > TTE: EF 40-45%, mod TR and MR, severe pHTN  > per pulm, SpO2 target >92%, HOB at 45 degrees, aspiration precautions  - continue with supplemental O2 and wean to home 2L NC as tolerated  - OP pulm Follow up    # HTN  - on amlodipine-benazepril at home  - continue with amlodipine  - Lisinopril while inpatient    # BPH  - continue with tamsulosin, finasteride    # GERD  - continue with PPI                                                                              ----------------------------------------------------  # DVT prophylaxis: therapeutic eliquis  # GI prophylaxis: protonix  # Diet: DASH/TLC  # Activity Score (AM-PAC): IAT  # Code status: FULL  # Disposition: from home, acute for now, pending rate control and EP medication recs  # POC: Elkin (chavez) [phone]                                                                             --------------------------------------------------------    # Handoff   - f/u with EP for any further medication recs  - f/u LE duplex final read  - f/u AC recs on discharge  - OP pulm Follow up

## 2022-01-09 NOTE — PROGRESS NOTE ADULT - SUBJECTIVE AND OBJECTIVE BOX
NNEKA JOSEPH  89y  Male      Patient is a 89y old  Male who presents with a chief complaint of Shortness of breath (09 Jan 2022 01:23)      INTERVAL HPI/OVERNIGHT EVENTS:  he feels better,  Vital Signs Last 24 Hrs  T(C): 36.3 (09 Jan 2022 13:04), Max: 36.7 (08 Jan 2022 20:17)  T(F): 97.3 (09 Jan 2022 13:04), Max: 98.1 (08 Jan 2022 20:17)  HR: 101 (09 Jan 2022 13:04) (90 - 120)  BP: 109/56 (09 Jan 2022 13:04) (109/56 - 125/63)  BP(mean): --  RR: 18 (09 Jan 2022 13:04) (18 - 20)  SpO2: 93% (08 Jan 2022 20:10) (93% - 93%)      01-08-22 @ 07:01  -  01-09-22 @ 07:00  --------------------------------------------------------  IN: 540 mL / OUT: 1976 mL / NET: -1436 mL    01-09-22 @ 07:01  -  01-09-22 @ 13:39  --------------------------------------------------------  IN: 220 mL / OUT: 750 mL / NET: -530 mL            Consultant(s) Notes Reviewed:  [x ] YES  [ ] NO          MEDICATIONS  (STANDING):  amLODIPine   Tablet 5 milliGRAM(s) Oral daily  apixaban 5 milliGRAM(s) Oral every 12 hours  ascorbic acid 500 milliGRAM(s) Oral daily  cholecalciferol 1000 Unit(s) Oral daily  cyanocobalamin 1000 MICROGram(s) Oral daily  diltiazem    Tablet 30 milliGRAM(s) Oral every 6 hours  ferrous    sulfate 325 milliGRAM(s) Oral daily  finasteride 5 milliGRAM(s) Oral daily  furosemide    Tablet 40 milliGRAM(s) Oral daily  lisinopril 10 milliGRAM(s) Oral daily  melatonin 10 milliGRAM(s) Oral at bedtime  metoprolol tartrate 50 milliGRAM(s) Oral every 12 hours  pantoprazole    Tablet 40 milliGRAM(s) Oral before breakfast  tamsulosin 0.4 milliGRAM(s) Oral at bedtime    MEDICATIONS  (PRN):  lidocaine   4% Patch 1 Patch Transdermal daily PRN pain      LABS                          11.2   6.30  )-----------( 167      ( 09 Jan 2022 04:30 )             34.8     01-09    136  |  99  |  32<H>  ----------------------------<  90  4.2   |  22  |  0.9    Ca    8.6      09 Jan 2022 04:30  Mg     2.0     01-09    TPro  6.5  /  Alb  3.1<L>  /  TBili  1.0  /  DBili  x   /  AST  21  /  ALT  24  /  AlkPhos  70  01-09          Lactate Trend        CAPILLARY BLOOD GLUCOSE            RADIOLOGY & ADDITIONAL TESTS:    Imaging Personally Reviewed:  [ ] YES  [ ] NO    HEALTH ISSUES - PROBLEM Dx:          PHYSICAL EXAM:  GENERAL: mild respiratory failure, well-developed.  HEAD:  Atraumatic, Normocephalic.  EYES: EOMI, PERRLA, conjunctiva and sclera clear.  NECK: Supple, No JVD.  CHEST/LUNG: bilateral fine rales.  HEART: Irregular rate and rhythm; S1 S2.   ABDOMEN: Soft, Nontender, Nondistended; Bowel sounds present.  EXTREMITIES:  2+ Peripheral Pulses, No clubbing, cyanosis, or edema.  PSYCH: AAOx3.  NEUROLOGY: non-focal.  SKIN: No rashes or lesions.

## 2022-01-10 PROBLEM — J84.9 INTERSTITIAL PULMONARY DISEASE, UNSPECIFIED: Chronic | Status: ACTIVE | Noted: 2022-01-01

## 2022-01-10 PROBLEM — K21.9 GASTRO-ESOPHAGEAL REFLUX DISEASE WITHOUT ESOPHAGITIS: Chronic | Status: ACTIVE | Noted: 2022-01-01

## 2022-01-10 PROBLEM — N40.0 BENIGN PROSTATIC HYPERPLASIA WITHOUT LOWER URINARY TRACT SYMPTOMS: Chronic | Status: ACTIVE | Noted: 2022-01-01

## 2022-01-10 NOTE — OCCUPATIONAL THERAPY INITIAL EVALUATION ADULT - LIVES WITH, PROFILE
Pt lives in ranch home, son lives in basement, wife presently a resident in snf.  +shower stall with grab bars, +raised toilet seat with arms/children

## 2022-01-10 NOTE — PROGRESS NOTE ADULT - SUBJECTIVE AND OBJECTIVE BOX
NNEKA JOSEPH 89y Male  MRN#: [id number]   CODE STATUS: FULL    Hospital Day: 6d    Pt is currently admitted with the primary diagnosis of acute on chronic respiratory failure.    SUBJECTIVE  HPI: 89 yo M with PMH of ILD on home O2 at 2lpm, HTN, BPH, GERD, presented to the ED with c/o worsening shortness of breath and leg swelling for 1 week. Patient states that he saw his PCP recently who started him on lasix, but he hasn't started taking the meds yet. Today he experienced worsening of shortness of breath even at rest, he had to use upto 5lpm O2 at home, which prompted him to come to ED. He denies any fever, chills, abdominal pain, nausea, vomiting, diarrhea. He is not vaccinated against COVID. He recently had outpatient CT chest which showed a pulmonary nodule for which he is following up with Dr. Womack.     ED Course: Pt was in afib with RVR, chest x-ray showed bilateral patchy opacities, BNP was 2765. He was given 40mg IV lasix in the ED with improvement in oxygenation. Patient being admitted for management of new onset afib and fluid overload.     Hospital Course:  Patient seen and evaluated by EP.  Recommend cardio consult for possible EFREN/DCCV.  Per cardio, recommend rate control strategy over rhythm control (cardioversion) due to advanced age, ILD, and elevated risk of anesthesia/intubation.  Pulmonary consulted for recs in setting of ILD.    Overnight events:  Patient had 15 beats NSVT overnight.  Asymptomatic.    Subjective complaints:  Patient seen and examined at bedside.  No new complaints this AM.    Present Today:   - Joel:  No [ X ], Yes [   ] : Indication:     - Type of IV Access:       .. CVC/Piccline:  No [ X ], Yes [   ] : Indication:       .. Midline: No [ X ], Yes [   ] : Indication:                                             ----------------------------------------------------------  OBJECTIVE  PAST MEDICAL & SURGICAL HISTORY  Hypertension    GERD (gastroesophageal reflux disease)    Lung interstitial disease    BPH (benign prostatic hyperplasia)    No significant past surgical history                                              -----------------------------------------------------------  ALLERGIES:  No Known Allergies                                            ------------------------------------------------------------    HOME MEDICATIONS  Home Medications:  AMLODIPINE-BENAZEPRIL 5-10 MG:  (04 Jan 2022 05:09)  FERROUS SULFATE 325 MG TABLET:  (04 Jan 2022 05:09)  FINASTERIDE 5 MG TABLET:  (04 Jan 2022 05:09)  FUROSEMIDE 20MG TAB:  (04 Jan 2022 05:09)  OMEPRAZOLE DR 40MG CAP:  (04 Jan 2022 05:09)  TAMSULOSIN HCL 0.4 MG CAPSULE:  (04 Jan 2022 05:09)  VITAMIN B-12 1,000 MCG TABLET:  (04 Jan 2022 05:09)                           MEDICATIONS:  STANDING MEDICATIONS  amLODIPine   Tablet 5 milliGRAM(s) Oral daily  apixaban 5 milliGRAM(s) Oral every 12 hours  ascorbic acid 500 milliGRAM(s) Oral daily  cholecalciferol 1000 Unit(s) Oral daily  cyanocobalamin 1000 MICROGram(s) Oral daily  diltiazem    Tablet 30 milliGRAM(s) Oral every 6 hours  famotidine    Tablet 20 milliGRAM(s) Oral daily  ferrous    sulfate 325 milliGRAM(s) Oral daily  finasteride 5 milliGRAM(s) Oral daily  furosemide    Tablet 40 milliGRAM(s) Oral daily  lisinopril 10 milliGRAM(s) Oral daily  melatonin 10 milliGRAM(s) Oral at bedtime  melatonin 5 milliGRAM(s) Oral at bedtime  metoprolol tartrate 50 milliGRAM(s) Oral every 12 hours  tamsulosin 0.4 milliGRAM(s) Oral at bedtime    PRN MEDICATIONS  lidocaine   4% Patch 1 Patch Transdermal daily PRN                                            ------------------------------------------------------------  VITAL SIGNS: Last 24 Hours  T(C): 36.1 (10 Norris 2022 05:05), Max: 36.1 (10 Norris 2022 05:05)  T(F): 96.9 (10 Norris 2022 05:05), Max: 96.9 (10 Norris 2022 05:05)  HR: 91 (10 Norris 2022 08:19) (91 - 104)  BP: 117/69 (10 Norris 2022 05:05) (94/58 - 117/69)  BP(mean): --  RR: 19 (09 Jan 2022 22:53) (19 - 20)  SpO2: 100% (10 Norris 2022 08:19) (100% - 100%)      01-09-22 @ 07:01  -  01-10-22 @ 07:00  --------------------------------------------------------  IN: 340 mL / OUT: 1625 mL / NET: -1285 mL    01-10-22 @ 07:01  -  01-10-22 @ 13:08  --------------------------------------------------------  IN: 330 mL / OUT: 100 mL / NET: 230 mL                                             --------------------------------------------------------------  LABS:                        10.9   7.24  )-----------( 165      ( 10 Norris 2022 05:35 )             33.8     01-10    137  |  100  |  33<H>  ----------------------------<  91  4.2   |  21  |  1.1    Ca    8.8      10 Norris 2022 05:35  Mg     1.9     01-10    TPro  6.4  /  Alb  3.1<L>  /  TBili  0.7  /  DBili  x   /  AST  30  /  ALT  35  /  AlkPhos  70  01-10                                              -------------------------------------------------------------  RADIOLOGY:  ACC: [id number] EXAM:  DUPLEX SCAN EXT VEINS LOWER BI, 01/07/2022    Impression:  No evidence of deep venous thrombosis or superficial thrombophlebitis in the bilateral lower extremities.      ACC: [id number] EXAM:  XR SHOULDER COMP MIN 2V LT, 01/05/2022    FINDINGS/IMPRESSION:  No gross acute fracture or dislocation. Mild degenerative change in the acromioclavicular and glenohumeral joints. Osteopenia.  Diffuse interstitial opacities in the visualized lungs.      ACC: [id number] EXAM:  XR CHEST 1 VIEW, 01/04/2022    Impression:  Stable diffuse interstitial opacities, suggesting underlying interstitial lung disease. No pneumothorax.                                            --------------------------------------------------------------    PHYSICAL EXAM:  General: lying in bed, NAD  HEENT: nontraumatic, neck supple, conjunctiva clear  LUNGS: CTA, no cough or increased WOB, on 2L NC   HEART: tachycardic, irregular rhythm  ABDOMEN: soft, nontender, nondistended  EXT: moves all extremities  NEURO: AAOx3, CN grossly intact  SKIN: 1+ pitting LE edema, no erythema                                           --------------------------------------------------------------    ASSESSMENT & PLAN  89 yo M with PMH of ILD on home O2 at 2lpm, HTN, BPH, GERD, presented to the ED with c/o worsening shortness of breath and leg swelling for 1 week. Patient states that he saw his PCP recently who started him on lasix.     # Acute on chronic hypoxic respiratory failure  # Fluid overload  > presented with shortness of breath, LE swelling  > CXR - bilateral interstitial opacities  > BNP 2765, d-dimer 735  > TTE: EF 40-45%, mod TR and MR, severe pHTN  > 1/4: increased lopressor from 12.5mg BID to 25mg BID  > 1/5: gave 2nd dose of IV lasix  > 1/7: increased metoprolol to 50mg BID  > 1/8: LE duplex negative  > 1/10: increased metoprolol to 50mg q8h per cardio recs  - c/w Lasix 40mg PO qD  - on baseline supplemental O2 (2L NC)  - c/w metoprolol tartrate 50mg TID   - continue with ACEi  - Strict I/O, daily weight, fluid restriction  - Monitor electrolytes Keep K > 4, Mg >2    # New onset Afib  > CHADSVASC - 3  > per EP recs, consult cardio for possible EFREN/DCCV  > per Cardio, c/w lasix 40mg PO qd, metoprolol 50mg BID (can incr to q8h), eliquis 5mg BID, avoid amio and digoxin, high risk for cardioversion, pursue medical management  > TSH 0.70  > 1/6: dc'd Lovenox and started on eliquis per cardio recs  > 1/10: per EP rec metoprolol 50mg q8h  > Eliquis pricing at pt pharmacy ([facility]) is $286/month, pricing out xarelto  - c/w eliquis for now, metoprolol, and lasix  - Telemetry monitoring    #left shoulder pain, 2/2 recent fall  > full active ROM of LUE on exam  > no acute fracture on xray  - lidocaine patch PRN for pain  - OT following     # ILD  # Pulmonary Nodule  > CT chest prior to admission: 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, new mediastinal adenopathy, left pleural effusion, cardiomegaly with dilated ascending and descending aorta  > TTE: EF 40-45%, mod TR and MR, severe pHTN  > per pulm, SpO2 target >92%, HOB at 45 degrees, aspiration precautions  - continue with supplemental O2, on home 2L NC   - OP pulm Follow up    # HTN  - on amlodipine-benazepril at home  - continue with amlodipine  - Lisinopril while inpatient    # BPH  - continue with tamsulosin, finasteride    # GERD  - continue with PPI                                                                              ----------------------------------------------------  # DVT prophylaxis: therapeutic eliquis  # GI prophylaxis: protonix  # Diet: DASH/TLC  # Activity Score (AM-PAC): IAT  # Code status: FULL  # Disposition: from home, acute for now, pending rate control and EP medication recs  # POC: Elkin (chavez) [phone]                                                                             --------------------------------------------------------    # Handoff   - f/u AC recs on discharge  - OP pulm Follow up

## 2022-01-10 NOTE — OCCUPATIONAL THERAPY INITIAL EVALUATION ADULT - PATIENT PROFILE REVIEW, REHAB EVAL
OT reviewed pt's chart prior to evaluation. Pt agreeable to OT evaluation/yes
OT IE completed 10:00-10:30 am. Chart thoroughly reviewed prior to OT eval/yes

## 2022-01-10 NOTE — PROGRESS NOTE ADULT - ASSESSMENT
88 yo M with PMH of ILD on home O2 at 2lpm, HTN, BPH, GERD, presented to the ED with c/o worsening shortness of breath and leg swelling for 1 week. Patient states that he saw his PCP recently who started him on Lasix     A/P:   New Paroxysmal Atrial Fibrillation with Rapid Ventricular response:   Continue with Lopressor , dose increased o 50mg TID, .   Continue Cardizem 30mg 6 hrs. EP follow up.  Start on Coumadin, Eliquis is expensive for the patient.      Acute on chronic hypoxic respiratory failure  Acute HFmrEF:   Severe Pulmonary HTN:   SOB, leg edema 1+, Pro-BNP 2765  CXR chronic looking bilateral interstitial opacities  Echo showed LVEF 40-45%, mild to mod MR, mild to mod TR, severe pulmonary HTN.   Continue Lasix 40mg po daily.  Low sodium diet,     Chronic Respiratory Failure:   Interstitial lung disease:   continue nasal canula.  Pulmonary follow up.     Left shoulder pain, 2/2 recent fall  full active ROM of LUE on exam  Left shoulder Xray showed no acute fracture.     HTN: Continue Amlodipine and Lisinopril.  BPH: continue with tamsulosin, finasteride    GERD  continue with PPI    DVT prophylaxis: therapeutic Lovenox  #Progress Note Handoff:  Pending (specify):  improving tachycardia, volume overload.   Family discussion:  Disposition: Home vs STR.

## 2022-01-10 NOTE — OCCUPATIONAL THERAPY INITIAL EVALUATION ADULT - GENERAL OBSERVATIONS, REHAB EVAL
Pt encountered sitting in Bedside chair, +chair alarm, +2LO2 nc, +IV lock, +tele, +call bell, HR 72.  Pt left as received in NAD

## 2022-01-10 NOTE — OCCUPATIONAL THERAPY INITIAL EVALUATION ADULT - PERTINENT HX OF CURRENT PROBLEM, REHAB EVAL
88 yo M with PMH of ILD on home O2 at 2lpm, HTN, BPH, GERD, presented to the ED with c/o worsening shortness of breath and leg swelling for 1 week. Patient states that he saw his PCP recently who started him on lasix. Pt also reports recent fall where her hurt is L shoulder

## 2022-01-10 NOTE — OCCUPATIONAL THERAPY INITIAL EVALUATION ADULT - ADL RETRAINING, OT EVAL
Pt will peform LB ADL with AE and min assist by discharge.  Pt will perform UB dressing with min assist by discharge

## 2022-01-10 NOTE — PROGRESS NOTE ADULT - SUBJECTIVE AND OBJECTIVE BOX
NNEKA JOSEPH  89y  Male      Patient is a 89y old  Male who presents with a chief complaint of Shortness of breath (10 Norris 2022 13:07)      INTERVAL HPI/OVERNIGHT EVENTS:  still with AFIB with RVR on telemetry, no new complaints.   Vital Signs Last 24 Hrs  T(C): 36.1 (10 Norris 2022 14:47), Max: 36.1 (10 Norris 2022 05:05)  T(F): 97 (10 Norris 2022 14:47), Max: 97 (10 Norris 2022 14:47)  HR: 94 (10 Norris 2022 16:11) (80 - 104)  BP: 103/55 (10 Norris 2022 16:11) (89/51 - 117/69)  BP(mean): --  RR: 18 (10 Norris 2022 14:47) (18 - 20)  SpO2: 100% (10 Norris 2022 08:19) (100% - 100%)      01-09-22 @ 07:01  -  01-10-22 @ 07:00  --------------------------------------------------------  IN: 340 mL / OUT: 1625 mL / NET: -1285 mL    01-10-22 @ 07:01  -  01-10-22 @ 17:18  --------------------------------------------------------  IN: 570 mL / OUT: 200 mL / NET: 370 mL            Consultant(s) Notes Reviewed:  [x ] YES  [ ] NO          MEDICATIONS  (STANDING):  amLODIPine   Tablet 5 milliGRAM(s) Oral daily  ascorbic acid 500 milliGRAM(s) Oral daily  cholecalciferol 1000 Unit(s) Oral daily  cyanocobalamin 1000 MICROGram(s) Oral daily  diltiazem    Tablet 30 milliGRAM(s) Oral every 6 hours  famotidine    Tablet 20 milliGRAM(s) Oral daily  ferrous    sulfate 325 milliGRAM(s) Oral daily  finasteride 5 milliGRAM(s) Oral daily  furosemide    Tablet 40 milliGRAM(s) Oral daily  lisinopril 10 milliGRAM(s) Oral daily  melatonin 10 milliGRAM(s) Oral at bedtime  melatonin 5 milliGRAM(s) Oral at bedtime  metoprolol tartrate 50 milliGRAM(s) Oral every 8 hours  tamsulosin 0.4 milliGRAM(s) Oral at bedtime  warfarin 5 milliGRAM(s) Oral once    MEDICATIONS  (PRN):  lidocaine   4% Patch 1 Patch Transdermal daily PRN pain      LABS                          10.9   7.24  )-----------( 165      ( 10 Norris 2022 05:35 )             33.8     01-10    137  |  100  |  33<H>  ----------------------------<  91  4.2   |  21  |  1.1    Ca    8.8      10 Norris 2022 05:35  Mg     1.9     01-10    TPro  6.4  /  Alb  3.1<L>  /  TBili  0.7  /  DBili  x   /  AST  30  /  ALT  35  /  AlkPhos  70  01-10          Lactate Trend        CAPILLARY BLOOD GLUCOSE            RADIOLOGY & ADDITIONAL TESTS:    Imaging Personally Reviewed:  [ ] YES  [ ] NO    HEALTH ISSUES - PROBLEM Dx:          PHYSICAL EXAM:  GENERAL: mild respiratory failure, well-developed.  HEAD:  Atraumatic, Normocephalic.  EYES: EOMI, PERRLA, conjunctiva and sclera clear.  NECK: Supple, No JVD.  CHEST/LUNG: bilateral fine rales.  HEART: Irregular rate and rhythm; S1 S2.   ABDOMEN: Soft, Nontender, Nondistended; Bowel sounds present.  EXTREMITIES:  2+ Peripheral Pulses, No clubbing, cyanosis, or edema.  PSYCH: AAOx3.  NEUROLOGY: non-focal.  SKIN: No rashes or lesions.

## 2022-01-10 NOTE — OCCUPATIONAL THERAPY INITIAL EVALUATION ADULT - RANGE OF MOTION EXAMINATION, UPPER EXTREMITY
RUE AROM wfl's except shoulder ~20* flexion due to h/o rtc sx.  LUE AROM wfl's except shoulder ~10* flexion s/p recent fall and injury

## 2022-01-11 NOTE — PROGRESS NOTE ADULT - SUBJECTIVE AND OBJECTIVE BOX
OPALNNEKA  89y  Male      Patient is a 89y old  Male who presents with a chief complaint of Shortness of breath (11 Jan 2022 10:34)      INTERVAL HPI/OVERNIGHT EVENTS:  HR is better controlled.   Vital Signs Last 24 Hrs  T(C): 35.9 (11 Jan 2022 20:25), Max: 37.2 (11 Jan 2022 13:52)  T(F): 96.6 (11 Jan 2022 20:25), Max: 98.9 (11 Jan 2022 13:52)  HR: 77 (11 Jan 2022 20:25) (77 - 100)  BP: 106/69 (11 Jan 2022 20:25) (106/69 - 128/60)  BP(mean): --  RR: 18 (11 Jan 2022 20:25) (18 - 18)  SpO2: 96% (11 Jan 2022 07:56) (96% - 96%)      01-10-22 @ 07:01  - 01-11-22 @ 07:00  --------------------------------------------------------  IN: 1188 mL / OUT: 1820 mL / NET: -632 mL    01-11-22 @ 07:01  - 01-11-22 @ 20:38  --------------------------------------------------------  IN: 980 mL / OUT: 900 mL / NET: 80 mL            Consultant(s) Notes Reviewed:  [x ] YES  [ ] NO          MEDICATIONS  (STANDING):  amLODIPine   Tablet 5 milliGRAM(s) Oral daily  ascorbic acid 500 milliGRAM(s) Oral daily  chlorhexidine 4% Liquid 1 Application(s) Topical <User Schedule>  cholecalciferol 1000 Unit(s) Oral daily  cyanocobalamin 1000 MICROGram(s) Oral daily  famotidine    Tablet 20 milliGRAM(s) Oral daily  ferrous    sulfate 325 milliGRAM(s) Oral daily  finasteride 5 milliGRAM(s) Oral daily  furosemide    Tablet 40 milliGRAM(s) Oral daily  lisinopril 10 milliGRAM(s) Oral daily  melatonin 10 milliGRAM(s) Oral at bedtime  melatonin 5 milliGRAM(s) Oral at bedtime  metoprolol tartrate 50 milliGRAM(s) Oral every 8 hours  tamsulosin 0.4 milliGRAM(s) Oral at bedtime  warfarin 2.5 milliGRAM(s) Oral once    MEDICATIONS  (PRN):  lidocaine   4% Patch 1 Patch Transdermal daily PRN pain      LABS                          10.8   6.85  )-----------( 165      ( 11 Jan 2022 07:14 )             33.5     01-11    134<L>  |  100  |  35<H>  ----------------------------<  93  4.5   |  23  |  1.1    Ca    8.5      11 Jan 2022 07:14  Mg     1.7     01-11    TPro  6.5  /  Alb  3.1<L>  /  TBili  0.7  /  DBili  x   /  AST  21  /  ALT  30  /  AlkPhos  69  01-11        PT/INR - ( 11 Jan 2022 07:14 )   PT: 20.50 sec;   INR: 1.79 ratio           Lactate Trend        CAPILLARY BLOOD GLUCOSE            RADIOLOGY & ADDITIONAL TESTS:    Imaging Personally Reviewed:  [ ] YES  [ ] NO    HEALTH ISSUES - PROBLEM Dx:          PHYSICAL EXAM:  GENERAL: mild respiratory failure, well-developed.  HEAD:  Atraumatic, Normocephalic.  EYES: EOMI, PERRLA, conjunctiva and sclera clear.  NECK: Supple, No JVD.  CHEST/LUNG: bilateral fine rales.  HEART: Irregular rate and rhythm; S1 S2.   ABDOMEN: Soft, Nontender, Nondistended; Bowel sounds present.  EXTREMITIES:  2+ Peripheral Pulses, No clubbing, cyanosis, or edema.  PSYCH: AAOx3.  NEUROLOGY: non-focal.  SKIN: No rashes or lesions.

## 2022-01-11 NOTE — PROGRESS NOTE ADULT - ASSESSMENT
88 yo M with PMH of ILD on home O2 at 2lpm, HTN, BPH, GERD, presented to the ED with c/o worsening shortness of breath and leg swelling for 1 week. Patient states that he saw his PCP recently who started him on Lasix     A/P:   New Paroxysmal Atrial Fibrillation with Rapid Ventricular response:   Continue with Lopressor , dose increased o 50mg TID, .   Continue Cardizem 120mg daily. EP follow up.  Started on Coumadin, Eliquis is expensive for the patient.  No need for heparin bridging.     Acute on chronic hypoxic respiratory failure  Acute HFmrEF:   Severe Pulmonary HTN:   SOB, leg edema 1+, Pro-BNP 2765  CXR chronic looking bilateral interstitial opacities  Echo showed LVEF 40-45%, mild to mod MR, mild to mod TR, severe pulmonary HTN.   Continue Lasix 40mg po daily.  Low sodium diet,     Chronic Respiratory Failure:   Interstitial lung disease:   continue nasal canula.  Pulmonary follow up outpatient.      Left shoulder pain, 2/2 recent fall  full active ROM of LUE on exam  Left shoulder Xray showed no acute fracture. Continue OT.     HTN: Continue Amlodipine and Lisinopril.  BPH: continue with tamsulosin, finasteride    GERD  continue with PPI    DVT prophylaxis: therapeutic Lovenox  #Progress Note Handoff:  Pending (specify):  improving tachycardia.   Family discussion:  Disposition: STR in 24 hrs, pending authorization.

## 2022-01-11 NOTE — PHARMACOTHERAPY INTERVENTION NOTE - COMMENTS
Pt started on Eliquis for new onset Afib.  Due to cost switched to warfarin.  Recommended team have family contact insurance to see price after the medicare part D deductible is reached.    89yMale      Indication: Afib  INR Goal: 2-3  Home Dose: new start   Bridge Therapy: n/a      amLODIPine   Tablet 5 milliGRAM(s) Oral daily  ascorbic acid 500 milliGRAM(s) Oral daily  chlorhexidine 4% Liquid 1 Application(s) Topical <User Schedule>  cholecalciferol 1000 Unit(s) Oral daily  cyanocobalamin 1000 MICROGram(s) Oral daily  famotidine    Tablet 20 milliGRAM(s) Oral daily  ferrous    sulfate 325 milliGRAM(s) Oral daily  finasteride 5 milliGRAM(s) Oral daily  furosemide    Tablet 40 milliGRAM(s) Oral daily  lidocaine   4% Patch 1 Patch Transdermal daily PRN  lisinopril 10 milliGRAM(s) Oral daily  melatonin 10 milliGRAM(s) Oral at bedtime  melatonin 5 milliGRAM(s) Oral at bedtime  metoprolol tartrate 50 milliGRAM(s) Oral every 8 hours  tamsulosin 0.4 milliGRAM(s) Oral at bedtime  warfarin 5 milliGRAM(s) Oral once              Date---------------INR-----------------Dose  1/4-----------------1.39  on eliquis  1/10----------------X ------------------5 mg  1/10---------------1.79    For warfarin dosing based on patients age (89 yO) and baseline INR of 1.39 recommended reducing dose to a maximum of 2.5 mg PO x 1 and monitoring INR very closely.

## 2022-01-11 NOTE — PROGRESS NOTE ADULT - SUBJECTIVE AND OBJECTIVE BOX
NNEKA JOSEPH 89y Male  MRN#: [id number]   CODE STATUS: FULL    Hospital Day: 7d    Pt is currently admitted with the primary diagnosis of cute on chronic respiratory failure.    SUBJECTIVE  HPI: 88 yo M with PMH of ILD on home O2 at 2lpm, HTN, BPH, GERD, presented to the ED with c/o worsening shortness of breath and leg swelling for 1 week. Patient states that he saw his PCP recently who started him on lasix, but he hasn't started taking the meds yet. Today he experienced worsening of shortness of breath even at rest, he had to use upto 5lpm O2 at home, which prompted him to come to ED. He denies any fever, chills, abdominal pain, nausea, vomiting, diarrhea. He is not vaccinated against COVID. He recently had outpatient CT chest which showed a pulmonary nodule for which he is following up with Dr. Womack.     ED Course: Pt was in afib with RVR, chest x-ray showed bilateral patchy opacities, BNP was 2765. He was given 40mg IV lasix in the ED with improvement in oxygenation. Patient being admitted for management of new onset afib and fluid overload.     Hospital Course:  Patient seen and evaluated by EP.  Recommend cardio consult for possible EFREN/DCCV.  Per cardio, recommend rate control strategy over rhythm control (cardioversion) due to advanced age, ILD, and elevated risk of anesthesia/intubation.      Overnight events:   No acute events overnight.    Subjective complaints:  Patient seen and examined at bedside.  Denies any complaints this AM.  Saturating 100% on 2L NC (on 2L NC at home). Discussed wearing O2 PRN as he would at home.      Present Today:   - Joel:  No [ X ], Yes [   ] : Indication:     - Type of IV Access:       .. CVC/Piccline:  No [ X ], Yes [   ] : Indication:       .. Midline: No [ X ], Yes [   ] : Indication:                                             ----------------------------------------------------------  OBJECTIVE  PAST MEDICAL & SURGICAL HISTORY  Hypertension    GERD (gastroesophageal reflux disease)    Lung interstitial disease    BPH (benign prostatic hyperplasia)    No significant past surgical history                                              -----------------------------------------------------------  ALLERGIES:  No Known Allergies                                            ------------------------------------------------------------    HOME MEDICATIONS  Home Medications:  AMLODIPINE-BENAZEPRIL 5-10 MG:  (04 Jan 2022 05:09)  FERROUS SULFATE 325 MG TABLET:  (04 Jan 2022 05:09)  FINASTERIDE 5 MG TABLET:  (04 Jan 2022 05:09)  FUROSEMIDE 20MG TAB:  (04 Jan 2022 05:09)  OMEPRAZOLE DR 40MG CAP:  (04 Jan 2022 05:09)  TAMSULOSIN HCL 0.4 MG CAPSULE:  (04 Jan 2022 05:09)  VITAMIN B-12 1,000 MCG TABLET:  (04 Jan 2022 05:09)                           MEDICATIONS:  STANDING MEDICATIONS  amLODIPine   Tablet 5 milliGRAM(s) Oral daily  ascorbic acid 500 milliGRAM(s) Oral daily  chlorhexidine 4% Liquid 1 Application(s) Topical <User Schedule>  cholecalciferol 1000 Unit(s) Oral daily  cyanocobalamin 1000 MICROGram(s) Oral daily  diltiazem    milliGRAM(s) Oral once  famotidine    Tablet 20 milliGRAM(s) Oral daily  ferrous    sulfate 325 milliGRAM(s) Oral daily  finasteride 5 milliGRAM(s) Oral daily  furosemide    Tablet 40 milliGRAM(s) Oral daily  lisinopril 10 milliGRAM(s) Oral daily  melatonin 10 milliGRAM(s) Oral at bedtime  melatonin 5 milliGRAM(s) Oral at bedtime  metoprolol tartrate 50 milliGRAM(s) Oral every 8 hours  tamsulosin 0.4 milliGRAM(s) Oral at bedtime  warfarin 5 milliGRAM(s) Oral once    PRN MEDICATIONS  lidocaine   4% Patch 1 Patch Transdermal daily PRN                                            ------------------------------------------------------------  VITAL SIGNS: Last 24 Hours  T(C): 36.4 (11 Jan 2022 05:43), Max: 36.4 (11 Jan 2022 05:43)  T(F): 97.6 (11 Jan 2022 05:43), Max: 97.6 (11 Jan 2022 05:43)  HR: 82 (11 Jan 2022 07:56) (80 - 100)  BP: 112/69 (11 Jan 2022 05:43) (89/51 - 128/60)  BP(mean): --  RR: 18 (11 Jan 2022 05:43) (18 - 18)  SpO2: 96% (11 Jan 2022 07:56) (96% - 96%)      01-10-22 @ 07:01  -  01-11-22 @ 07:00  --------------------------------------------------------  IN: 1188 mL / OUT: 1820 mL / NET: -632 mL    01-11-22 @ 07:01  -  01-11-22 @ 10:34  --------------------------------------------------------  IN: 500 mL / OUT: 250 mL / NET: 250 mL                                             --------------------------------------------------------------  LABS:                        10.8   6.85  )-----------( 165      ( 11 Jan 2022 07:14 )             33.5     01-11    134<L>  |  100  |  35<H>  ----------------------------<  93  4.5   |  23  |  1.1    Ca    8.5      11 Jan 2022 07:14  Mg     1.7     01-11    TPro  6.5  /  Alb  3.1<L>  /  TBili  0.7  /  DBili  x   /  AST  21  /  ALT  30  /  AlkPhos  69  01-11    PT/INR - ( 11 Jan 2022 07:14 )   PT: 20.50 sec;   INR: 1.79 ratio                                                   -------------------------------------------------------------  RADIOLOGY:  ACC: [id number] EXAM:  DUPLEX SCAN EXT VEINS LOWER BI, 01/07/2022    Impression:  No evidence of deep venous thrombosis or superficial thrombophlebitis in the bilateral lower extremities.      ACC: [id number] EXAM:  XR SHOULDER COMP MIN 2V LT, 01/05/2022    FINDINGS/IMPRESSION:  No gross acute fracture or dislocation. Mild degenerative change in the acromioclavicular and glenohumeral joints. Osteopenia.  Diffuse interstitial opacities in the visualized lungs.      ACC: [id number] EXAM:  XR CHEST 1 VIEW, 01/04/2022    Impression:  Stable diffuse interstitial opacities, suggesting underlying interstitial lung disease. No pneumothorax.                                            --------------------------------------------------------------    PHYSICAL EXAM:  General: lying in bed, NAD  HEENT: nontraumatic, neck supple, conjunctiva clear  LUNGS: CTA, no cough or increased WOB, on 2L NC   HEART: tachycardic, irregular rhythm  ABDOMEN: soft, nontender, nondistended  EXT: moves all extremities  NEURO: AAOx3, CN grossly intact  SKIN: 1+ pitting LE edema, no erythema                                           --------------------------------------------------------------    ASSESSMENT & PLAN  88 yo M with PMH of ILD on home O2 at 2lpm, HTN, BPH, GERD, presented to the ED with c/o worsening shortness of breath and leg swelling for 1 week. Patient states that he saw his PCP recently who started him on lasix.     # Acute on chronic hypoxic respiratory failure  # Fluid overload  > presented with shortness of breath, LE swelling  > CXR - bilateral interstitial opacities  > BNP 2765, d-dimer 735  > TTE: EF 40-45%, mod TR and MR, severe pHTN  > 1/4: increased lopressor from 12.5mg BID to 25mg BID  > 1/5: gave 2nd dose of IV lasix  > 1/7: increased metoprolol to 50mg BID  > 1/8: LE duplex negative  > 1/10: increased metoprolol to 50mg q8h per cardio recs  - c/w Lasix 40mg PO qD  - on baseline supplemental O2 (2L NC)  - c/w metoprolol tartrate 50mg TID   - continue with ACEi  - Strict I/O, daily weight, fluid restriction  - Monitor electrolytes Keep K > 4, Mg >2    # New onset Afib  > CHADSVASC - 3  > per EP recs, consult cardio for possible EFREN/DCCV  > per Cardio, c/w lasix 40mg PO qd, metoprolol 50mg BID (can incr to q8h), eliquis 5mg BID, avoid amio and digoxin, high risk for cardioversion, pursue medical management  > TSH 0.70  > 1/6: dc'd Lovenox and started on eliquis per cardio recs  > 1/10: per EP rec metoprolol 50mg q8h, switched from eliquis to coumadin 5mg  > Eliquis and Xarelto pricing at pt pharmacy ([facility]) is $286/month  - c/w metoprolol and lasix  - c/w coumadin, check daily INR  - Telemetry monitoring    #left shoulder pain, 2/2 recent fall  > full active ROM of LUE on exam  > no acute fracture on xray  - lidocaine patch and heating packs PRN for pain   - OT following     # ILD  # Pulmonary Nodule  > CT chest prior to admission: 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, new mediastinal adenopathy, left pleural effusion, cardiomegaly with dilated ascending and descending aorta  > TTE: EF 40-45%, mod TR and MR, severe pHTN  > per pulm, SpO2 target >92%, HOB at 45 degrees, aspiration precautions  - continue with supplemental O2, on home 2L NC   - OP pulm Follow up    # HTN  - on amlodipine-benazepril at home  - continue with amlodipine  - Lisinopril while inpatient    # BPH  - continue with tamsulosin, finasteride    # GERD  - continue with PPI                                                                              ----------------------------------------------------  # DVT prophylaxis: therapeutic coumadin  # GI prophylaxis: protonix  # Diet: DASH/TLC  # Activity Score (AM-PAC): IAT  # Code status: FULL  # Disposition: from home, dc tomorrow to SNF pending placement and insur auth  # POC: Elkin teague) [phone] - updated 1/11                                                                             --------------------------------------------------------    # Handoff   - c/w coumadin, check daily INR  - OP pulm Follow up  - plan to dc on coumadin, cardizem 120mg qD, and metoprolol succinate 150mg qD

## 2022-01-12 NOTE — PROGRESS NOTE ADULT - ASSESSMENT
89 yo M PMHx of ILD on home O2 at 2lpm, HTN, BPH, GERD, presented to the ED with c/o worsening shortness of breath and leg swelling for 1 week prior to presentation. Patient states that he saw his PCP recently who started him on Lasix. Pt found to have new onset afib with RVR as well as lung mass on outpt CT scan.    New Paroxysmal Atrial Fibrillation with Rapid Ventricular response:   -rate controlled  -c/w cardizem, lopressor  -c/w coumadin,     Acute on chronic hypoxic respiratory failure  Acute HFmrEF  Severe Pulmonary HTN  -Pro-BNP 2765  -pt remains with LE edema. Restart IV lasix, elevate legs, compression socks/ace wraps  -CXR chronic looking bilateral interstitial opacities  -Echo showed LVEF 40-45%, mild to mod MR, mild to mod TR, severe pulmonary HTN.   -Low sodium diet,     Chronic Respiratory Failure:   Interstitial lung disease:   LLL mass, mediastinal lymphadenopathy  -continue nasal canula.  -Pulmonary follow up outpatient for biopsy/further work up    Left shoulder pain, 2/2 recent fall  full active ROM of LUE on exam  Left shoulder Xray showed no acute fracture. Continue OT.     HTN: Continue Amlodipine and Lisinopril.  BPH: continue with tamsulosin, finasteride    GERD  continue with PPI    DVT prophylaxis: therapeutic Lovenox  #Progress Note Handoff:  Pending (specify):  nj  Family discussion: discussed IV lasix with pt  Disposition: STR in 24 hrs, pending authorization.

## 2022-01-12 NOTE — PROGRESS NOTE ADULT - SUBJECTIVE AND OBJECTIVE BOX
CHIEF COMPLAINT:    Patient is a 89y old  Male who presents with a chief complaint of Shortness of breath     INTERVAL HPI/OVERNIGHT EVENTS:    Patient seen and examined at bedside. No acute overnight events occurred.    ROS: Reports LE edema. All other systems are negative.    Vital Signs:    T(F): 96.4 (22 @ 13:17), Max: 96.7 (22 @ 04:51)  HR: 94 (22 @ 13:17) (77 - 95)  BP: 114/64 (22 @ 13:17) (106/69 - 127/69)  RR: 17 (22 @ 13:17) (17 - 18)  SpO2: 96% (22 @ 09:07) (96% - 97%)  I&O's Summary    2022 07:01  -  2022 07:00  --------------------------------------------------------  IN: 1460 mL / OUT: 1400 mL / NET: 60 mL    2022 07:01  -  2022 15:44  --------------------------------------------------------  IN: 700 mL / OUT: 1125 mL / NET: -425 mL      Daily     Daily Weight in k.3 (2022 04:51)  CAPILLARY BLOOD GLUCOSE          PHYSICAL EXAM:  GENERAL:  NAD  SKIN: No rashes or lesions  HEENT: Atraumatic. Normocephalic. Anicteric  NECK:  No JVD.   PULMONARY: Clear to ausculation bilaterally. No wheezing. No rales  CVS: Normal S1, S2. Regular rate and rhythm. No murmurs.  ABDOMEN/GI: Soft, Nontender, Nondistended; Bowel sounds are present  EXTREMITIES:  2+ pitting edema to bilateral lower calves  NEUROLOGIC:  No motor deficit.  PSYCH: Alert & oriented x 3, normal affect      LABS:                        11.9   6.26  )-----------( 175      ( 2022 04:30 )             37.5         136  |  100  |  34<H>  ----------------------------<  85  4.9   |  26  |  1.2    Ca    8.9      2022 04:30  Mg     2.1         TPro  6.9  /  Alb  3.2<L>  /  TBili  0.6  /  DBili  x   /  AST  20  /  ALT  27  /  AlkPhos  72      PT/INR - ( 2022 04:30 )   PT: 19.30 sec;   INR: 1.69 ratio         Serum Pro-Brain Natriuretic Peptide: 1708 pg/mL (22 @ 08:20)    RADIOLOGY & ADDITIONAL TESTS:  Imaging or report Personally Reviewed:  [ ] YES  [ ] NO    Telemetry reviewed independently    Medications:  Standing  amLODIPine   Tablet 5 milliGRAM(s) Oral daily  ascorbic acid 500 milliGRAM(s) Oral daily  chlorhexidine 4% Liquid 1 Application(s) Topical <User Schedule>  cholecalciferol 1000 Unit(s) Oral daily  cyanocobalamin 1000 MICROGram(s) Oral daily  diltiazem    milliGRAM(s) Oral daily  famotidine    Tablet 20 milliGRAM(s) Oral daily  ferrous    sulfate 325 milliGRAM(s) Oral daily  finasteride 5 milliGRAM(s) Oral daily  furosemide   Injectable 40 milliGRAM(s) IV Push once  lisinopril 10 milliGRAM(s) Oral daily  melatonin 10 milliGRAM(s) Oral at bedtime  melatonin 5 milliGRAM(s) Oral at bedtime  metoprolol tartrate 50 milliGRAM(s) Oral every 8 hours  tamsulosin 0.4 milliGRAM(s) Oral at bedtime  warfarin 2.5 milliGRAM(s) Oral once    PRN Meds  acetaminophen     Tablet .. 650 milliGRAM(s) Oral every 6 hours PRN  lidocaine   4% Patch 1 Patch Transdermal daily PRN      Case discussed with resident  Care discussed with pt

## 2022-01-12 NOTE — PROGRESS NOTE ADULT - SUBJECTIVE AND OBJECTIVE BOX
NNEKA JOSEPH 89y Male  MRN#: [id number]   CODE STATUS: FULL    Hospital Day: 8d    Pt is currently admitted with the primary diagnosis of acute on chronic respiratory failure.    SUBJECTIVE  HPI: 89 yo M with PMH of ILD on home O2 at 2lpm, HTN, BPH, GERD, presented to the ED with c/o worsening shortness of breath and leg swelling for 1 week. Patient states that he saw his PCP recently who started him on lasix, but he hasn't started taking the meds yet. Today he experienced worsening of shortness of breath even at rest, he had to use upto 5lpm O2 at home, which prompted him to come to ED. He denies any fever, chills, abdominal pain, nausea, vomiting, diarrhea. He is not vaccinated against COVID. He recently had outpatient CT chest which showed a pulmonary nodule for which he is following up with Dr. Womack.     ED Course: Pt was in afib with RVR, chest x-ray showed bilateral patchy opacities, BNP was 2765. He was given 40mg IV lasix in the ED with improvement in oxygenation. Patient being admitted for management of new onset afib and fluid overload.     Hospital Course:  Patient seen and evaluated by EP.  Recommend cardio consult for possible EFREN/DCCV.  Per cardio, recommend rate control strategy over rhythm control (cardioversion) due to advanced age, ILD, and elevated risk of anesthesia/intubation.  Per pulmonology, patient to follow-up outpatient regarding recent LLL mass seen on CT chest.  HR now 70-90s on metoprolol 150mg qD and cardizem 120mg qD.      Overnight events:  No acute events overnight.    Subjective complaints:  Patient seen and examined at bedside. Patient anxious about being discharged.  He feels his condition is still acute and that he will not receive proper care at SNF.  He is also very distressed that he is not having the DCCV and that he now has to take rate control and blood thinner medications indefinitely    Present Today:   - Joel:  No [ X ], Yes [   ] : Indication:     - Type of IV Access:       .. CVC/Piccline:  No [ X ], Yes [   ] : Indication:       .. Midline: No [ X ], Yes [   ] : Indication:                                             ----------------------------------------------------------  OBJECTIVE  PAST MEDICAL & SURGICAL HISTORY  Hypertension    GERD (gastroesophageal reflux disease)    Lung interstitial disease    BPH (benign prostatic hyperplasia)    No significant past surgical history                                              -----------------------------------------------------------  ALLERGIES:  No Known Allergies                                            ------------------------------------------------------------    HOME MEDICATIONS  Home Medications:  AMLODIPINE-BENAZEPRIL 5-10 MG:  (04 Jan 2022 05:09)  FERROUS SULFATE 325 MG TABLET:  (04 Jan 2022 05:09)  FINASTERIDE 5 MG TABLET:  (04 Jan 2022 05:09)  FUROSEMIDE 20MG TAB:  (04 Jan 2022 05:09)  OMEPRAZOLE DR 40MG CAP:  (04 Jan 2022 05:09)  TAMSULOSIN HCL 0.4 MG CAPSULE:  (04 Jan 2022 05:09)  VITAMIN B-12 1,000 MCG TABLET:  (04 Jan 2022 05:09)                           MEDICATIONS:  STANDING MEDICATIONS  amLODIPine   Tablet 5 milliGRAM(s) Oral daily  ascorbic acid 500 milliGRAM(s) Oral daily  chlorhexidine 4% Liquid 1 Application(s) Topical <User Schedule>  cholecalciferol 1000 Unit(s) Oral daily  cyanocobalamin 1000 MICROGram(s) Oral daily  diltiazem    milliGRAM(s) Oral daily  famotidine    Tablet 20 milliGRAM(s) Oral daily  ferrous    sulfate 325 milliGRAM(s) Oral daily  finasteride 5 milliGRAM(s) Oral daily  furosemide    Tablet 40 milliGRAM(s) Oral daily  lisinopril 10 milliGRAM(s) Oral daily  melatonin 10 milliGRAM(s) Oral at bedtime  melatonin 5 milliGRAM(s) Oral at bedtime  metoprolol tartrate 50 milliGRAM(s) Oral every 8 hours  tamsulosin 0.4 milliGRAM(s) Oral at bedtime  warfarin 2.5 milliGRAM(s) Oral once    PRN MEDICATIONS  acetaminophen     Tablet .. 650 milliGRAM(s) Oral every 6 hours PRN  lidocaine   4% Patch 1 Patch Transdermal daily PRN                                            ------------------------------------------------------------  VITAL SIGNS: Last 24 Hours  T(C): 35.9 (12 Jan 2022 04:51), Max: 37.2 (11 Jan 2022 13:52)  T(F): 96.7 (12 Jan 2022 04:51), Max: 98.9 (11 Jan 2022 13:52)  HR: 95 (12 Jan 2022 04:51) (77 - 98)  BP: 127/69 (12 Jan 2022 04:51) (106/69 - 127/69)  BP(mean): --  RR: 18 (12 Jan 2022 04:51) (18 - 18)  SpO2: 96% (12 Jan 2022 09:07) (96% - 97%)      01-11-22 @ 07:01 - 01-12-22 @ 07:00  --------------------------------------------------------  IN: 1460 mL / OUT: 1400 mL / NET: 60 mL    01-12-22 @ 07:01  -  01-12-22 @ 11:17  --------------------------------------------------------  IN: 400 mL / OUT: 750 mL / NET: -350 mL                                             --------------------------------------------------------------  LABS:                        11.9   6.26  )-----------( 175      ( 12 Jan 2022 04:30 )             37.5     01-12    136  |  100  |  34<H>  ----------------------------<  85  4.9   |  26  |  1.2    Ca    8.9      12 Jan 2022 04:30  Mg     2.1     01-12    TPro  6.9  /  Alb  3.2<L>  /  TBili  0.6  /  DBili  x   /  AST  20  /  ALT  27  /  AlkPhos  72  01-12    PT/INR - ( 12 Jan 2022 04:30 )   PT: 19.30 sec;   INR: 1.69 ratio                                                   -------------------------------------------------------------  RADIOLOGY:  ACC: [id number] EXAM:  DUPLEX SCAN EXT VEINS LOWER BI, 01/07/2022    Impression:  No evidence of deep venous thrombosis or superficial thrombophlebitis in the bilateral lower extremities.      ACC: [id number] EXAM:  XR SHOULDER COMP MIN 2V LT, 01/05/2022    FINDINGS/IMPRESSION:  No gross acute fracture or dislocation. Mild degenerative change in the acromioclavicular and glenohumeral joints. Osteopenia.  Diffuse interstitial opacities in the visualized lungs.      ACC: [id number] EXAM:  XR CHEST 1 VIEW, 01/04/2022    Impression:  Stable diffuse interstitial opacities, suggesting underlying interstitial lung disease. No pneumothorax.                                            --------------------------------------------------------------    PHYSICAL EXAM:  General: lying in bed, anxious  HEENT: nontraumatic, neck supple, conjunctiva clear  LUNGS: CTA, no cough or increased WOB, on 2L NC   HEART: tachycardic, irregular rhythm  ABDOMEN: soft, nontender, nondistended  EXT: moves all extremities  NEURO: AAOx3, CN grossly intact  SKIN: no LE edema, no erythema                                           --------------------------------------------------------------    ASSESSMENT & PLAN  89 yo M with PMH of ILD on home O2 at 2lpm, HTN, BPH, GERD, presented to the ED with c/o worsening shortness of breath and leg swelling for 1 week. Patient states that he saw his PCP recently who started him on lasix.     # Acute on chronic hypoxic respiratory failure  # Fluid overload  > presented with shortness of breath, LE swelling  > CXR - bilateral interstitial opacities  > BNP 2765, d-dimer 735  > TTE: EF 40-45%, mod TR and MR, severe pHTN  > 1/4: increased lopressor from 12.5mg BID to 25mg BID  > 1/5: gave 2nd dose of IV lasix  > 1/7: increased metoprolol to 50mg BID  > 1/8: LE duplex negative  > 1/10: increased metoprolol to 50mg q8h per cardio recs   > 1/11: started on cardizem 120mg qD  - c/w Lasix 40mg PO qD  - on baseline supplemental O2 (2L NC)  - c/w metoprolol tartrate 50mg TID, plan to dc on metoprolol ER 150mg qD  - c/w cardizem daily  - continue with ACEi  - Strict I/O, daily weight, fluid restriction  - Monitor electrolytes Keep K > 4, Mg >2    # New onset Afib  > CHADSVASC - 3  > per EP recs, consult cardio for possible EFREN/DCCV  > per Cardio, c/w lasix 40mg PO qd, metoprolol 50mg BID (can incr to q8h), eliquis 5mg BID, avoid amio and digoxin, high risk for cardioversion, pursue medical management  > TSH 0.70  > 1/6: dc'd Lovenox and started on eliquis per cardio recs  > 1/10: per EP rec metoprolol 50mg q8h, switched from eliquis to coumadin 5mg  > 1/11: INR 1.79, given 2.5mg coumadin per clinic recs  > 1/12: INR 1.69, ordered 2.5mg coumadin  > Eliquis and Xarelto pricing at pt pharmacy ([facility]) is $286/month  - c/w metoprolol and lasix  - c/w coumadin, check daily INR (goal 2-3)  - Telemetry monitoring    #left shoulder pain, 2/2 recent fall  > full active ROM of LUE on exam  > no acute fracture on xray  - lidocaine patch and heating packs PRN for pain   - OT following     # ILD  # Pulmonary Nodule  > CT chest prior to admission: 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, new mediastinal adenopathy, left pleural effusion, cardiomegaly with dilated ascending and descending aorta  > TTE: EF 40-45%, mod TR and MR, severe pHTN  > per pulm, SpO2 target >92%, HOB at 45 degrees, aspiration precautions  - continue with supplemental O2, on home 2L NC   - OP pulm Follow up re: 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    # HTN  - on amlodipine-benazepril at home  - continue with amlodipine  - Lisinopril while inpatient    # BPH  - continue with tamsulosin, finasteride    # GERD  - continue with PPI                                                                              ----------------------------------------------------  # DVT prophylaxis: therapeutic coumadin  # GI prophylaxis: protonix  # Diet: DASH/TLC  # Activity Score (AM-PAC): IAT  # Code status: FULL  # Disposition: from home, dc to SNF pending placement and insur auth, anticipated for danny, 1/13  # POC: Elkin (son) [phone] - updated 1/11                                                                             --------------------------------------------------------    # Handoff   - c/w coumadin, check daily INR (goal 2-3)  - OP pulm Follow up  - plan to dc on coumadin, cardizem 120mg qD, and metoprolol succinate 150mg qD

## 2022-01-13 NOTE — PROGRESS NOTE ADULT - SUBJECTIVE AND OBJECTIVE BOX
CHIEF COMPLAINT:    Patient is a 89y old  Male who presents with a chief complaint of Shortness of breath     INTERVAL HPI/OVERNIGHT EVENTS:    Patient seen and examined at bedside. No acute overnight events occurred.    ROS: REports that his heart rate is too high, had b/l LE edema. All other systems are negative.     Vital Signs:    T(F): 98.1 (22 @ 05:04), Max: 98.1 (22 @ 05:04)  HR: 99 (22 @ 05:04) (80 - 99)  BP: 134/63 (22 @ 05:04) (106/55 - 134/63)  RR: 18 (22 @ 05:04) (18 - 18)  SpO2: 96% (22 @ 05:04) (96% - 96%)  I&O's Summary    2022 07:01  -  2022 07:00  --------------------------------------------------------  IN: 1060 mL / OUT: 2100 mL / NET: -1040 mL    2022 07:01  -  2022 13:25  --------------------------------------------------------  IN: 380 mL / OUT: 1430 mL / NET: -1050 mL      Daily     Daily Weight in k.8 (2022 05:04)  CAPILLARY BLOOD GLUCOSE          PHYSICAL EXAM:  GENERAL:  NAD  SKIN: No rashes or lesions  HEENT: Atraumatic. Normocephalic. Anicteric  NECK:  No JVD.   PULMONARY: Clear to ausculation bilaterally. No wheezing. No rales  CVS: Normal S1, S2. Regular rate and rhythm. No murmurs.  ABDOMEN/GI: Soft, Nontender, Nondistended; Bowel sounds are present  EXTREMITIES:  b/l 2+ pitting edema to bilateral lower calves  NEUROLOGIC:  No motor deficit.  PSYCH: Alert & oriented x 3, normal affect    Consultant(s) Notes Reviewed:  [x ] YES  [ ] NO  Care Discussed with Consultants/Other Providers [ x] YES  [ ] NO    LABS:                        11.9   7.59  )-----------( 198      ( 2022 04:30 )             36.5         136  |  97<L>  |  35<H>  ----------------------------<  88  4.6   |  23  |  1.3    Ca    8.7      2022 04:30  Mg     1.7         TPro  7.2  /  Alb  3.5  /  TBili  0.6  /  DBili  x   /  AST  18  /  ALT  26  /  AlkPhos  76      PT/INR - ( 2022 04:30 )   PT: 20.20 sec;   INR: 1.77 ratio                   RADIOLOGY & ADDITIONAL TESTS:  Imaging or report Personally Reviewed:  [ ] YES  [ ] NO    Telemetry reviewed independently - afib with RVR on monitor today    Medications:  Standing  amLODIPine   Tablet 5 milliGRAM(s) Oral daily  ascorbic acid 500 milliGRAM(s) Oral daily  BACItracin   Ointment 1 Application(s) Topical two times a day  chlorhexidine 4% Liquid 1 Application(s) Topical <User Schedule>  cholecalciferol 1000 Unit(s) Oral daily  cyanocobalamin 1000 MICROGram(s) Oral daily  diltiazem    milliGRAM(s) Oral daily  famotidine    Tablet 20 milliGRAM(s) Oral daily  ferrous    sulfate 325 milliGRAM(s) Oral daily  finasteride 5 milliGRAM(s) Oral daily  furosemide   Injectable 40 milliGRAM(s) IV Push every 12 hours  lisinopril 10 milliGRAM(s) Oral daily  magnesium sulfate  IVPB 2 Gram(s) IV Intermittent every 2 hours  melatonin 10 milliGRAM(s) Oral at bedtime  melatonin 5 milliGRAM(s) Oral at bedtime  metoprolol tartrate 75 milliGRAM(s) Oral three times a day  tamsulosin 0.4 milliGRAM(s) Oral at bedtime  warfarin 4 milliGRAM(s) Oral once    PRN Meds  acetaminophen     Tablet .. 650 milliGRAM(s) Oral every 6 hours PRN  lidocaine   4% Patch 1 Patch Transdermal daily PRN      Case discussed with resident  Care discussed with pt

## 2022-01-13 NOTE — PROGRESS NOTE ADULT - SUBJECTIVE AND OBJECTIVE BOX
NNEKA JOSEPH 89y Male  MRN#: [id number]   CODE STATUS: FULL    Hospital Day: 9d    Pt is currently admitted with the primary diagnosis of acute on chronic respiratory failure.    SUBJECTIVE  HPI: 88 yo M with PMH of ILD on home O2 at 2lpm, HTN, BPH, GERD, presented to the ED with c/o worsening shortness of breath and leg swelling for 1 week. Patient states that he saw his PCP recently who started him on lasix, but he hasn't started taking the meds yet. Today he experienced worsening of shortness of breath even at rest, he had to use upto 5lpm O2 at home, which prompted him to come to ED. He denies any fever, chills, abdominal pain, nausea, vomiting, diarrhea. He is not vaccinated against COVID. He recently had outpatient CT chest which showed a pulmonary nodule for which he is following up with Dr. Womack.     ED Course: Pt was in afib with RVR, chest x-ray showed bilateral patchy opacities, BNP was 2765. He was given 40mg IV lasix in the ED with improvement in oxygenation. Patient being admitted for management of new onset afib and fluid overload.     Hospital Course:  Patient seen and evaluated by EP.  Recommend cardio consult for possible EFREN/DCCV.  Per cardio, recommend rate control strategy over rhythm control (cardioversion) due to advanced age, ILD, and elevated risk of anesthesia/intubation.  Per pulmonology, patient to follow-up outpatient regarding recent LLL mass seen on CT chest.  HR now 70-90s on metoprolol 150mg qD and cardizem 120mg qD.      Overnight events:  No acute events overnight.  Patient had nosebleed from continuous NC use and given bacitracin.    Subjective complaints:   Patient seen and examined at bedside.  Patient remains very frustrated about his condition despite repeated reassurances that his HR is wnl given his new onset a fib.      Present Today:   - Joel:  No [ X ], Yes [   ] : Indication:     - Type of IV Access:       .. CVC/Piccline:  No [ X ], Yes [   ] : Indication:       .. Midline: No [ X ], Yes [   ] : Indication:                                             ----------------------------------------------------------  OBJECTIVE  PAST MEDICAL & SURGICAL HISTORY  Hypertension    GERD (gastroesophageal reflux disease)    Lung interstitial disease    BPH (benign prostatic hyperplasia)    No significant past surgical history                                              -----------------------------------------------------------  ALLERGIES:  No Known Allergies                                            ------------------------------------------------------------    HOME MEDICATIONS  Home Medications:  AMLODIPINE-BENAZEPRIL 5-10 MG:  (04 Jan 2022 05:09)  FERROUS SULFATE 325 MG TABLET:  (04 Jan 2022 05:09)  FINASTERIDE 5 MG TABLET:  (04 Jan 2022 05:09)  FUROSEMIDE 20MG TAB:  (04 Jan 2022 05:09)  OMEPRAZOLE DR 40MG CAP:  (04 Jan 2022 05:09)  TAMSULOSIN HCL 0.4 MG CAPSULE:  (04 Jan 2022 05:09)  VITAMIN B-12 1,000 MCG TABLET:  (04 Jan 2022 05:09)                           MEDICATIONS:  STANDING MEDICATIONS  amLODIPine   Tablet 5 milliGRAM(s) Oral daily  ascorbic acid 500 milliGRAM(s) Oral daily  BACItracin   Ointment 1 Application(s) Topical two times a day  chlorhexidine 4% Liquid 1 Application(s) Topical <User Schedule>  cholecalciferol 1000 Unit(s) Oral daily  cyanocobalamin 1000 MICROGram(s) Oral daily  diltiazem    milliGRAM(s) Oral daily  famotidine    Tablet 20 milliGRAM(s) Oral daily  ferrous    sulfate 325 milliGRAM(s) Oral daily  finasteride 5 milliGRAM(s) Oral daily  furosemide   Injectable 40 milliGRAM(s) IV Push every 12 hours  lisinopril 10 milliGRAM(s) Oral daily  magnesium sulfate  IVPB 2 Gram(s) IV Intermittent every 2 hours  melatonin 10 milliGRAM(s) Oral at bedtime  melatonin 5 milliGRAM(s) Oral at bedtime  metoprolol tartrate 75 milliGRAM(s) Oral three times a day  tamsulosin 0.4 milliGRAM(s) Oral at bedtime  warfarin 4 milliGRAM(s) Oral once    PRN MEDICATIONS  acetaminophen     Tablet .. 650 milliGRAM(s) Oral every 6 hours PRN  lidocaine   4% Patch 1 Patch Transdermal daily PRN                                            ------------------------------------------------------------  VITAL SIGNS: Last 24 Hours  T(C): 36.7 (13 Jan 2022 05:04), Max: 36.7 (13 Jan 2022 05:04)  T(F): 98.1 (13 Jan 2022 05:04), Max: 98.1 (13 Jan 2022 05:04)  HR: 99 (13 Jan 2022 05:04) (80 - 99)  BP: 134/63 (13 Jan 2022 05:04) (106/55 - 134/63)  BP(mean): --  RR: 18 (13 Jan 2022 05:04) (17 - 18)  SpO2: 96% (13 Jan 2022 05:04) (96% - 96%)      01-12-22 @ 07:01  -  01-13-22 @ 07:00  --------------------------------------------------------  IN: 1060 mL / OUT: 2100 mL / NET: -1040 mL    01-13-22 @ 07:01  -  01-13-22 @ 12:22  --------------------------------------------------------  IN: 330 mL / OUT: 1430 mL / NET: -1100 mL                                             --------------------------------------------------------------  LABS:                        11.9   7.59  )-----------( 198      ( 13 Jan 2022 04:30 )             36.5     01-13    136  |  97<L>  |  35<H>  ----------------------------<  88  4.6   |  23  |  1.3    Ca    8.7      13 Jan 2022 04:30  Mg     1.7     01-13    TPro  7.2  /  Alb  3.5  /  TBili  0.6  /  DBili  x   /  AST  18  /  ALT  26  /  AlkPhos  76  01-13    PT/INR - ( 13 Jan 2022 04:30 )   PT: 20.20 sec;   INR: 1.77 ratio                                                 -------------------------------------------------------------  RADIOLOGY:  ACC: [id number] EXAM:  DUPLEX SCAN EXT VEINS LOWER BI, 01/07/2022    Impression:  No evidence of deep venous thrombosis or superficial thrombophlebitis in the bilateral lower extremities.      ACC: [id number] EXAM:  XR SHOULDER COMP MIN 2V LT, 01/05/2022    FINDINGS/IMPRESSION:  No gross acute fracture or dislocation. Mild degenerative change in the acromioclavicular and glenohumeral joints. Osteopenia.  Diffuse interstitial opacities in the visualized lungs.      ACC: [id number] EXAM:  XR CHEST 1 VIEW, 01/04/2022    Impression:  Stable diffuse interstitial opacities, suggesting underlying interstitial lung disease. No pneumothorax.                                            --------------------------------------------------------------    PHYSICAL EXAM:  General: lying in bed, frustrated  HEENT: nontraumatic, neck supple, conjunctiva clear  LUNGS: CTA, no cough or increased WOB, on 2L NC   HEART: RRR, S1/S2  ABDOMEN: soft, nontender, nondistended  EXT: moves all extremities  NEURO: AAOx3, CN grossly intact  SKIN: bilateral LE edema, no erythema                                           --------------------------------------------------------------    ASSESSMENT & PLAN  88 yo M with PMH of ILD on home O2 at 2lpm, HTN, BPH, GERD, presented to the ED with c/o worsening shortness of breath and leg swelling for 1 week. Patient states that he saw his PCP recently who started him on lasix.     # Acute on chronic hypoxic respiratory failure  # Fluid overload  > presented with shortness of breath, LE swelling  > CXR - bilateral interstitial opacities  > BNP 2765, d-dimer 735  > TTE: EF 40-45%, mod TR and MR, severe pHTN  > 1/4: increased lopressor from 12.5mg BID to 25mg BID  > 1/5: gave 2nd dose of IV lasix  > 1/7: increased metoprolol to 50mg BID  > 1/8: LE duplex negative  > 1/10: increased metoprolol to 50mg q8h per cardio recs   > 1/11: started on cardizem 120mg qD  > 1/13: increased metoprolol to 75mg TID  - c/w Lasix 40mg PO qD  - on baseline supplemental O2 (2L NC)  - c/w metoprolol tartrate 75mg TID  - c/w cardizem daily  - continue with ACEi  - Strict I/O, daily weight, fluid restriction  - Monitor electrolytes Keep K > 4, Mg >2    # New onset Afib  > CHADSVASC - 3  > per EP recs, consult cardio for possible EFREN/DCCV  > per Cardio, c/w lasix 40mg PO qd, metoprolol 50mg BID (can incr to q8h), eliquis 5mg BID, avoid amio and digoxin, high risk for cardioversion, pursue medical management  > TSH 0.70  > 1/6: dc'd Lovenox and started on eliquis per cardio recs  > 1/10: per EP rec metoprolol 50mg q8h, switched from eliquis to coumadin 5mg  > 1/11: INR 1.79, given 2.5mg coumadin per clinic recs  > 1/12: INR 1.69, ordered 2.5mg coumadin  > 1/13: INR 1.77, ordered 4mg coumadin  > Eliquis and Xarelto pricing at pt pharmacy ([facility]) is $286/month  - c/w metoprolol and lasix  - c/w coumadin, check daily INR (goal 2-3)  - Telemetry monitoring    #left shoulder pain, 2/2 recent fall  > full active ROM of LUE on exam  > no acute fracture on xray  - lidocaine patch and heating packs PRN for pain   - OT following     # ILD  # Pulmonary Nodule  > CT chest prior to admission: 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, new mediastinal adenopathy, left pleural effusion, cardiomegaly with dilated ascending and descending aorta  > TTE: EF 40-45%, mod TR and MR, severe pHTN  > per pulm, SpO2 target >92%, HOB at 45 degrees, aspiration precautions  - continue with supplemental O2, on home 2L NC   - OP pulm Follow up re: 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    # HTN  - on amlodipine-benazepril at home  - continue with amlodipine  - Lisinopril while inpatient    # BPH  - continue with tamsulosin, finasteride    # GERD  - continue with PPI                                                                              ----------------------------------------------------  # DVT prophylaxis: therapeutic coumadin  # GI prophylaxis: protonix  # Diet: DASH/TLC  # Activity Score (AM-PAC): IAT  # Code status: FULL  # Disposition: from home, dc to SNF pending improvement in fluid status, anticipated for danny, 1/14  # POC: Elkin (son) [phone] - updated 1/11                                                                             --------------------------------------------------------    # Handoff   - c/w coumadin, check daily INR (goal 2-3)  - OP pulm Follow up  - plan to dc on coumadin, cardizem 120mg qD, and metoprolol tartrate 75mg TID

## 2022-01-13 NOTE — PROGRESS NOTE ADULT - ASSESSMENT
89 yo M PMHx of ILD on home O2 at 2lpm, HTN, BPH, GERD, presented to the ED with c/o worsening shortness of breath and leg swelling for 1 week prior to presentation. Patient states that he saw his PCP recently who started him on Lasix. Pt found to have new onset afib with RVR as well as lung mass on outpt CT scan.    New Paroxysmal Atrial Fibrillation with Rapid Ventricular response:   -rate uncontrolled today  - lopressor dose increased  - c/w telemetry monitoring  -c/w cardizem, lopressor  -c/w coumadin,     Acute on chronic hypoxic respiratory failure  Acute HFmrEF  Severe Pulmonary HTN  -Pro-BNP 2765  -pt remains with LE edema. Restart IV lasix, elevate legs, compression socks/ace wraps  -CXR chronic looking bilateral interstitial opacities  -Echo showed LVEF 40-45%, mild to mod MR, mild to mod TR, severe pulmonary HTN.   -Low sodium diet,     Chronic Respiratory Failure:   Interstitial lung disease:   LLL mass, mediastinal lymphadenopathy  -continue nasal canula.  -Pulmonary follow up outpatient for biopsy/further work up    Left shoulder pain, 2/2 recent fall  -full active ROM of LUE on exam  -Left shoulder Xray showed no acute fracture. Continue OT.     HTN  - Continue Amlodipine and Lisinopril.    BPH  - continue with tamsulosin, finasteride    GERD  continue with PPI    Hypomagnesmia  - replenished  - follow up AM levels    DVT prophylaxis: therapeutic Lovenox  #Progress Note Handoff:  Pending (specify):  diuresis, rate control  Family discussion: discussed IV lasix with pt  Disposition: STR in 24 hrs, pending authorization.

## 2022-01-14 NOTE — PROGRESS NOTE ADULT - SUBJECTIVE AND OBJECTIVE BOX
CHIEF COMPLAINT:    Patient is a 89y old  Male who presents with a chief complaint of Shortness of breath     INTERVAL HPI/OVERNIGHT EVENTS:    Patient seen and examined at bedside. No acute overnight events occurred.    ROS: Denies SOB, dizziness. All other systems are negative.    Vital Signs:    T(F): 95.6 (22 @ 13:36), Max: 97.7 (22 @ 06:24)  HR: 94 (22 @ 15:07) (85 - 99)  BP: 99/64 (22 @ 15:07) (91/59 - 128/72)  RR: 18 (22 @ 13:36) (18 - 18)  SpO2: 95% (22 @ 08:26) (95% - 97%)  I&O's Summary    2022 07:01  -  2022 07:00  --------------------------------------------------------  IN: 1590 mL / OUT: 2505 mL / NET: -915 mL    2022 07:01  -  2022 15:48  --------------------------------------------------------  IN: 1210 mL / OUT: 1125 mL / NET: 85 mL      Daily     Daily Weight in k.9 (2022 20:28)  CAPILLARY BLOOD GLUCOSE          PHYSICAL EXAM:  GENERAL:  NAD  SKIN: No rashes or lesions  HEENT: Atraumatic. Normocephalic. Anicteric  NECK:  No JVD.   PULMONARY: Clear to ausculation bilaterally. No wheezing. No rales  CVS: Normal S1, S2. Regular rate and rhythm. No murmurs.  ABDOMEN/GI: Soft, Nontender, Nondistended; Bowel sounds are present  EXTREMITIES:  pedal edema bilaterally.   NEUROLOGIC:  No motor deficit.  PSYCH: Alert & oriented x 3, normal affect    Consultant(s) Notes Reviewed:  [x ] YES  [ ] NO  Care Discussed with Consultants/Other Providers [ x] YES  [ ] NO - EP    LABS:                        11.2   6.25  )-----------( 177      ( 2022 04:30 )             34.9     -    138  |  98  |  36<H>  ----------------------------<  90  4.3   |  24  |  1.4    Ca    8.7      2022 04:30  Mg     2.0         TPro  6.7  /  Alb  3.2<L>  /  TBili  0.6  /  DBili  x   /  AST  15  /  ALT  20  /  AlkPhos  67  -    PT/INR - ( 2022 04:30 )   PT: 24.20 sec;   INR: 2.12 ratio                   RADIOLOGY & ADDITIONAL TESTS:  Imaging or report Personally Reviewed:  [ ] YES  [ ] NO    Telemetry reviewed independently - Afib with RVR    Medications:  Standing  amLODIPine   Tablet 5 milliGRAM(s) Oral daily  ascorbic acid 500 milliGRAM(s) Oral daily  BACItracin   Ointment 1 Application(s) Topical two times a day  chlorhexidine 4% Liquid 1 Application(s) Topical <User Schedule>  cholecalciferol 1000 Unit(s) Oral daily  cyanocobalamin 1000 MICROGram(s) Oral daily  diltiazem    milliGRAM(s) Oral daily  famotidine    Tablet 20 milliGRAM(s) Oral daily  ferrous    sulfate 325 milliGRAM(s) Oral daily  finasteride 5 milliGRAM(s) Oral daily  lisinopril 10 milliGRAM(s) Oral daily  melatonin 5 milliGRAM(s) Oral at bedtime  melatonin 10 milliGRAM(s) Oral at bedtime  metoprolol tartrate 100 milliGRAM(s) Oral three times a day  tamsulosin 0.4 milliGRAM(s) Oral at bedtime  warfarin 2.5 milliGRAM(s) Oral once    PRN Meds  acetaminophen     Tablet .. 650 milliGRAM(s) Oral every 6 hours PRN  lidocaine   4% Patch 1 Patch Transdermal daily PRN      Case discussed with resident  Care discussed with pt

## 2022-01-14 NOTE — PROGRESS NOTE ADULT - ASSESSMENT
89 yo M PMHx of ILD on home O2 at 2lpm, HTN, BPH, GERD, presented to the ED with c/o worsening shortness of breath and leg swelling for 1 week prior to presentation. Patient states that he saw his PCP recently who started him on Lasix. Pt found to have new onset afib with RVR as well as lung mass on outpt CT scan.    New Paroxysmal Atrial Fibrillation with Rapid Ventricular response:   -rate remains uncontrolled today  - lopressor dose increased yesterday, increased again to 100 mg TID  - c/w telemetry monitoring  -c/w cardizem, lopressor  -c/w coumadin, INR therapeutic    Acute on chronic hypoxic respiratory failure  Acute HFmrEF  Severe Pulmonary HTN  Interstitial lung disease:   LLL mass, mediastinal lymphadenopathy  -Pro-BNP 2765  -IV lasix restarted on 1/12 but due to hypotension hold lasix  -CXR chronic looking bilateral interstitial opacities  -Echo showed LVEF 40-45%, mild to mod MR, mild to mod TR, severe pulmonary HTN.   -Low sodium diet  -continue nasal canula.  -Pulmonary follow up outpatient for biopsy/further work up    Left shoulder pain, 2/2 recent fall  -full active ROM of LUE on exam  -Left shoulder Xray showed no acute fracture. Continue OT.     HTN  - Continue Amlodipine and Lisinopril.    BPH  - continue with tamsulosin, finasteride    GERD  continue with PPI    Hypomagnesmia  - replenished  - follow up AM levels    DVT prophylaxis: therapeutic Lovenox  #Progress Note Handoff:  Pending (specify):  diuresis, rate control  Family discussion: discussed Irate control  Disposition: STR in 24 hrs, pending authorization.

## 2022-01-14 NOTE — PROGRESS NOTE ADULT - SUBJECTIVE AND OBJECTIVE BOX
INTERVAL HPI/OVERNIGHT EVENTS:  see full consult 1/4  88 yo M with PMH of ILD on home O2 at 2lpm, HTN, BPH, GERD, who presented to the ED with c/o worsening shortness of breath and leg swelling for 1 week. In the ED he was in afib with RVR. Admitted with fluid overload and new onset AF    Patient remained in AF, currently mostly rate controlled with occasional episodes of RVR to 150s  High risk candidate for EFREN/DCCV per anesthesia due to ILD  Patient reevaluated, episodes timing correlated with ambulation, transfer bed to chair, agitation.  currently on Dilt CD 120mg, Metoprolol 75mg q8h  denies palpitations, reports dyspnea with ambulation     MEDICATIONS  (STANDING):  amLODIPine   Tablet 5 milliGRAM(s) Oral daily  ascorbic acid 500 milliGRAM(s) Oral daily  BACItracin   Ointment 1 Application(s) Topical two times a day  chlorhexidine 4% Liquid 1 Application(s) Topical <User Schedule>  cholecalciferol 1000 Unit(s) Oral daily  cyanocobalamin 1000 MICROGram(s) Oral daily  diltiazem    milliGRAM(s) Oral daily  famotidine    Tablet 20 milliGRAM(s) Oral daily  ferrous    sulfate 325 milliGRAM(s) Oral daily  finasteride 5 milliGRAM(s) Oral daily  furosemide   Injectable 40 milliGRAM(s) IV Push every 12 hours  lisinopril 10 milliGRAM(s) Oral daily  melatonin 5 milliGRAM(s) Oral at bedtime  melatonin 10 milliGRAM(s) Oral at bedtime  metoprolol tartrate 75 milliGRAM(s) Oral three times a day  tamsulosin 0.4 milliGRAM(s) Oral at bedtime  warfarin 2.5 milliGRAM(s) Oral once    MEDICATIONS  (PRN):  acetaminophen     Tablet .. 650 milliGRAM(s) Oral every 6 hours PRN Mild Pain (1 - 3)  lidocaine   4% Patch 1 Patch Transdermal daily PRN pain      Allergies    No Known Allergies    Intolerances        REVIEW OF SYSTEMS    [x ] A ten-point review of systems was otherwise negative except as noted.  [ ] Due to altered mental status/intubation, subjective information were not able to be obtained from the patient. History was obtained, to the extent possible, from review of the chart and collateral sources of information.      Vital Signs Last 24 Hrs  T(C): 35.3 (14 Jan 2022 13:36), Max: 36.5 (14 Jan 2022 06:24)  T(F): 95.6 (14 Jan 2022 13:36), Max: 97.7 (14 Jan 2022 06:24)  HR: 89 (14 Jan 2022 14:13) (85 - 99)  BP: 91/59 (14 Jan 2022 14:13) (91/59 - 128/72)  BP(mean): --  RR: 18 (14 Jan 2022 13:36) (18 - 18)  SpO2: 95% (14 Jan 2022 08:26) (95% - 97%)    01-13-22 @ 07:01  -  01-14-22 @ 07:00  --------------------------------------------------------  IN: 1590 mL / OUT: 2505 mL / NET: -915 mL    01-14-22 @ 07:01  - 01-14-22 @ 14:18  --------------------------------------------------------  IN: 1210 mL / OUT: 1125 mL / NET: 85 mL        PHYSICAL EXAM:    GENERAL: In no apparent distress, well nourished, and hydrated.  HEART: IRRegular rate and rhythm; No murmur; NO rubs, or gallops.  PULMONARY: course BS, diffuse rhonchi   Normal expansion/effort. No rales, wheezing bilaterally.  ABDOMEN: Soft, Nontender, Nondistended; Bowel sounds present  EXTREMITIES:  Extremities warm, pink, well-perfused, 2+ Peripheral Pulses, No clubbing, cyanosis, or edema  NEUROLOGICAL: alert & oriented x 3, no focal deficits, PERRLA, EOMI    LABS:                        11.2   6.25  )-----------( 177      ( 14 Jan 2022 04:30 )             34.9     01-14    138  |  98  |  36<H>  ----------------------------<  90  4.3   |  24  |  1.4    Ca    8.7      14 Jan 2022 04:30  Mg     2.0     01-14    TPro  6.7  /  Alb  3.2<L>  /  TBili  0.6  /  DBili  x   /  AST  15  /  ALT  20  /  AlkPhos  67  01-14    PT/INR - ( 14 Jan 2022 04:30 )   PT: 24.20 sec;   INR: 2.12 ratio       Thyroid Stimulating Hormone, Serum: 0.70 uIU/mL (01.04.22 @ 11:00)          COVID-19 PCR: NotDetec (01-13-22 @ 21:51)          RADIOLOGY & ADDITIONAL TESTS:

## 2022-01-14 NOTE — PROGRESS NOTE ADULT - ASSESSMENT
88 yo M with PMH of ILD on home O2 at 2lpm, HTN, BPH, GERD, not vaccinated against COVID, pulm nodule - follows [facility], who presented to the ED with c/o worsening shortness of breath and leg swelling for 1 week. In the ED he was in afib with RVR. Admitted with fluid overload and new onset AF.       New onset AF RVR,   CHADSVASC at least 3 (age, HTN, HF)  lung nodule ; ILD (home O2)  Hx HTN  Hx GERD    con't tele  Increase Diltiazem CD 180mg  OR Metoprolol 100mg q8h  monitor for bradycardia   con't OAC  Maintain electrolytes K>4.0 Mg >2.0  con't diuresis  avoid hypoxemia

## 2022-01-14 NOTE — PROGRESS NOTE ADULT - ATTENDING COMMENTS
Continue Cardizem  considering new onset AF, HF on presentation and limited AAD option available, Prefer cardioversion. ? High risk for sedation for EFREN- may consider CTA- for LA thrombus  Overall it will be difficult to maintain sinus in long-term. May need AVN ablation/PPM if HF recurrences.

## 2022-01-14 NOTE — PROGRESS NOTE ADULT - SUBJECTIVE AND OBJECTIVE BOX
NNEKA JOSEPH 89y Male  MRN#: [id number]   CODE STATUS: FULL    Hospital Day: 10d    Pt is currently admitted with the primary diagnosis of acute on chronic respiratory failure.    SUBJECTIVE  HPI: 88 yo M with PMH of ILD on home O2 at 2lpm, HTN, BPH, GERD, presented to the ED with c/o worsening shortness of breath and leg swelling for 1 week. Patient states that he saw his PCP recently who started him on lasix, but he hasn't started taking the meds yet. Today he experienced worsening of shortness of breath even at rest, he had to use upto 5lpm O2 at home, which prompted him to come to ED. He denies any fever, chills, abdominal pain, nausea, vomiting, diarrhea. He is not vaccinated against COVID. He recently had outpatient CT chest which showed a pulmonary nodule for which he is following up with Dr. Womack.     ED Course: Pt was in afib with RVR, chest x-ray showed bilateral patchy opacities, BNP was 2765. He was given 40mg IV lasix in the ED with improvement in oxygenation. Patient being admitted for management of new onset afib and fluid overload.     Hospital Course:  Patient seen and evaluated by EP.  Recommend cardio consult for possible EFREN/DCCV.  Per cardio, recommend rate control strategy over rhythm control (cardioversion) due to advanced age, ILD, and elevated risk of anesthesia/intubation.  Per pulmonology, patient to follow-up outpatient regarding recent LLL mass seen on CT chest.  Patient continues to show a fib on monitor with HR in 130s on metoprolol 150mg qD and cardizem 120mg qD.  EP re-consulted for medication recommendations.    Overnight events:  No acute events overnight.  HR elevated to 130s o/n.    Subjective complaints:  Patient seen and examined at bedside.  No new complaints this AM.  States his is SOB when ambulating off O2.  Denies any SOB at rest, chest pain, or palpitations.    Present Today:   - Joel:  No [ X ], Yes [   ] : Indication:     - Type of IV Access:       .. CVC/Piccline:  No [ X ], Yes [   ] : Indication:       .. Midline: No [ X ], Yes [   ] : Indication:                                             ----------------------------------------------------------  OBJECTIVE  PAST MEDICAL & SURGICAL HISTORY  Hypertension    GERD (gastroesophageal reflux disease)    Lung interstitial disease    BPH (benign prostatic hyperplasia)    No significant past surgical history                                              -----------------------------------------------------------  ALLERGIES:  No Known Allergies                                            ------------------------------------------------------------    HOME MEDICATIONS  Home Medications:  AMLODIPINE-BENAZEPRIL 5-10 MG:  (04 Jan 2022 05:09)  FERROUS SULFATE 325 MG TABLET:  (04 Jan 2022 05:09)  FINASTERIDE 5 MG TABLET:  (04 Jan 2022 05:09)  FUROSEMIDE 20MG TAB:  (04 Jan 2022 05:09)  OMEPRAZOLE DR 40MG CAP:  (04 Jan 2022 05:09)  TAMSULOSIN HCL 0.4 MG CAPSULE:  (04 Jan 2022 05:09)  VITAMIN B-12 1,000 MCG TABLET:  (04 Jan 2022 05:09)                           MEDICATIONS:  STANDING MEDICATIONS  amLODIPine   Tablet 5 milliGRAM(s) Oral daily  ascorbic acid 500 milliGRAM(s) Oral daily  BACItracin   Ointment 1 Application(s) Topical two times a day  chlorhexidine 4% Liquid 1 Application(s) Topical <User Schedule>  cholecalciferol 1000 Unit(s) Oral daily  cyanocobalamin 1000 MICROGram(s) Oral daily  diltiazem    milliGRAM(s) Oral daily  famotidine    Tablet 20 milliGRAM(s) Oral daily  ferrous    sulfate 325 milliGRAM(s) Oral daily  finasteride 5 milliGRAM(s) Oral daily  lisinopril 10 milliGRAM(s) Oral daily  melatonin 5 milliGRAM(s) Oral at bedtime  melatonin 10 milliGRAM(s) Oral at bedtime  metoprolol tartrate 100 milliGRAM(s) Oral three times a day  tamsulosin 0.4 milliGRAM(s) Oral at bedtime  warfarin 2.5 milliGRAM(s) Oral once    PRN MEDICATIONS  acetaminophen     Tablet .. 650 milliGRAM(s) Oral every 6 hours PRN  lidocaine   4% Patch 1 Patch Transdermal daily PRN                                            ------------------------------------------------------------  VITAL SIGNS: Last 24 Hours  T(C): 35.3 (14 Jan 2022 13:36), Max: 36.5 (14 Jan 2022 06:24)  T(F): 95.6 (14 Jan 2022 13:36), Max: 97.7 (14 Jan 2022 06:24)  HR: 89 (14 Jan 2022 14:13) (85 - 99)  BP: 91/59 (14 Jan 2022 14:13) (91/59 - 128/72)  BP(mean): --  RR: 18 (14 Jan 2022 13:36) (18 - 18)  SpO2: 95% (14 Jan 2022 08:26) (95% - 97%)      01-13-22 @ 07:01  -  01-14-22 @ 07:00  --------------------------------------------------------  IN: 1590 mL / OUT: 2505 mL / NET: -915 mL    01-14-22 @ 07:01  - 01-14-22 @ 15:03  --------------------------------------------------------  IN: 1210 mL / OUT: 1125 mL / NET: 85 mL                                             --------------------------------------------------------------  LABS:                        11.2   6.25  )-----------( 177      ( 14 Jan 2022 04:30 )             34.9     01-14    138  |  98  |  36<H>  ----------------------------<  90  4.3   |  24  |  1.4    Ca    8.7      14 Jan 2022 04:30  Mg     2.0     01-14    TPro  6.7  /  Alb  3.2<L>  /  TBili  0.6  /  DBili  x   /  AST  15  /  ALT  20  /  AlkPhos  67  01-14    PT/INR - ( 14 Jan 2022 04:30 )   PT: 24.20 sec;   INR: 2.12 ratio                                                 -------------------------------------------------------------  RADIOLOGY:  ACC: [id number] EXAM:  DUPLEX SCAN EXT VEINS LOWER BI, 01/07/2022    Impression:  No evidence of deep venous thrombosis or superficial thrombophlebitis in the bilateral lower extremities.      ACC: [id number] EXAM:  XR SHOULDER COMP MIN 2V LT, 01/05/2022    FINDINGS/IMPRESSION:  No gross acute fracture or dislocation. Mild degenerative change in the acromioclavicular and glenohumeral joints. Osteopenia.  Diffuse interstitial opacities in the visualized lungs.      ACC: [id number] EXAM:  XR CHEST 1 VIEW, 01/04/2022    Impression:  Stable diffuse interstitial opacities, suggesting underlying interstitial lung disease. No pneumothorax.                                            --------------------------------------------------------------    PHYSICAL EXAM:  General: lying in bed, NAD  HEENT: nontraumatic, neck supple, conjunctiva clear  LUNGS: CTA, no cough or increased WOB, on 2L NC   HEART: RRR, S1/S2  ABDOMEN: soft, nontender, nondistended  EXT: moves all extremities  NEURO: AAOx3, CN grossly intact  SKIN: bilateral LE edema, no erythema                                           --------------------------------------------------------------    ASSESSMENT & PLAN  88 yo M with PMH of ILD on home O2 at 2lpm, HTN, BPH, GERD, presented to the ED with c/o worsening shortness of breath and leg swelling for 1 week. Patient states that he saw his PCP recently who started him on lasix.     # Acute on chronic hypoxic respiratory failure  # Fluid overload  > presented with shortness of breath, LE swelling  > CXR - bilateral interstitial opacities  > BNP 2765, d-dimer 735  > TTE: EF 40-45%, mod TR and MR, severe pHTN  > 1/4: increased lopressor from 12.5mg BID to 25mg BID  > 1/5: gave 2nd dose of IV lasix  > 1/7: increased metoprolol to 50mg BID  > 1/8: LE duplex negative  > 1/10: increased metoprolol to 50mg q8h per cardio recs   > 1/11: started on cardizem 120mg qD  > 1/13: increased metoprolol to 75mg TID  > 1/14: EP reconsulted for continued a fib, rec incr metoprolol to 100mg TID OR incr cardizem to 180mg qD - incr metoprolol based on recs in setting of low BP  > 1/14: hypotensive episode during ambulation, holding lasix for now, encourage lara stockings when OOB  - holding lasix for now  - on baseline supplemental O2 (2L NC)  - c/w metoprolol tartrate 100mg TID  - c/w cardizem daily  - continue with ACEi  - Strict I/O, daily weight, fluid restriction  - Monitor electrolytes Keep K > 4, Mg >2    # New onset Afib  > CHADSVASC - 3  > per EP recs, consult cardio for possible EFREN/DCCV  > per Cardio, c/w lasix 40mg PO qd - holding for now, metoprolol 50mg BID (can incr to q8h), eliquis 5mg BID, avoid amio and digoxin, high risk for cardioversion, pursue medical management  > TSH 0.70  > 1/6: dc'd Lovenox and started on eliquis per cardio recs  > 1/10: per EP rec metoprolol 50mg q8h, switched from eliquis to coumadin 5mg  > 1/11: INR 1.79, given 2.5mg coumadin per clinic recs  > 1/12: INR 1.69, ordered 2.5mg coumadin  > 1/13: INR 1.77, ordered 4mg coumadin  > 1/14: INR 2.12, ordered 2.5mg coumadin  > Eliquis and Xarelto pricing at [facility] [place] is $286/month  - c/w metoprolol and lasix  - c/w coumadin, check daily INR (goal 2-3)  - Telemetry monitoring    #left shoulder pain, 2/2 recent fall  > full active ROM of LUE on exam  > no acute fracture on xray  - lidocaine patch and heating packs PRN for pain   - OT following     # ILD  # Pulmonary Nodule  > CT chest prior to admission: 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, new mediastinal adenopathy, left pleural effusion, cardiomegaly with dilated ascending and descending aorta  > TTE: EF 40-45%, mod TR and MR, severe pHTN  > per pulm, SpO2 target >92%, HOB at 45 degrees, aspiration precautions  - continue with supplemental O2, on home 2L NC   - OP pulm Follow up re: 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    # HTN  - on amlodipine-benazepril at home  - continue with amlodipine  - Lisinopril while inpatient    # BPH  - continue with tamsulosin, finasteride    # GERD  - continue with PPI                                                                              ----------------------------------------------------  # DVT prophylaxis: therapeutic coumadin  # GI prophylaxis: protonix  # Diet: DASH/TLC  # Activity Score (AM-PAC): IAT  # Code status: FULL  # Disposition: from home, dc to SNF pending improvement in fluid status and improved rate control  # POC: Elkin (son) [phone]                                                                             --------------------------------------------------------    # Handoff   - c/w coumadin, check daily INR (goal 2-3)  - OP pulm Follow up  - plan to dc on coumadin, cardizem 120mg qD, and metoprolol tartrate 100mg TID

## 2022-01-15 NOTE — DISCHARGE NOTE PROVIDER - NSDCPNSUBOBJ_GEN_ALL_CORE
<<<RESIDENT DISCHARGE NOTE>>>     NNEKA JOSEPH  MRN-[id number]    VITAL SIGNS:  T(F): 98 (01-15-22 @ 06:00), Max: 98 (01-15-22 @ 06:00)  HR: 96 (01-15-22 @ 06:00)  BP: 127/70 (01-15-22 @ 06:00)  SpO2: 99% (01-15-22 @ 07:45)      PHYSICAL EXAMINATION:  General: lying in bed, NAD  Head & Neck: nontraumatic, neck supple  Pulmonary: decreased breath sounds, no increased WOB, on 2L NC (baseline)  Cardiovascular: RRR, S1/S2  Gastrointestinal/Abdomen & Pelvis: soft, nontender, nondistended  Neurologic/Motor: AAOx3, CN grossly intact, moves all extremities    TEST RESULTS:                        11.2   6.06  )-----------( 162      ( 15 Norris 2022 06:25 )             35.6       01-15    136  |  99  |  35<H>  ----------------------------<  90  4.4   |  24  |  1.3    Ca    8.6      15 Norris 2022 06:25  Mg     2.0     01-15    TPro  6.8  /  Alb  3.3<L>  /  TBili  0.5  /  DBili  x   /  AST  16  /  ALT  19  /  AlkPhos  67  01-15      FINAL DISCHARGE INTERVIEW:  Resident(s) Present: (Name: Marylou Lema MD), RN Present: (Name: SUE Chávez)    DISCHARGE MEDICATION RECONCILIATION  reviewed with Attending (Name: Lila Alvarado DO)    DISPOSITION:   [  ] Home,    [  ] Home with Visiting Nursing Services,   [  X  ]  SNF/ NH,    [   ] Acute Rehab (4A),   [   ] Other (Specify:_________)

## 2022-01-15 NOTE — PROGRESS NOTE ADULT - SUBJECTIVE AND OBJECTIVE BOX
CHIEF COMPLAINT:    Patient is a 89y old  Male who presents with a chief complaint of Shortness of breath     INTERVAL HPI/OVERNIGHT EVENTS:    Patient seen and examined at bedside. No acute overnight events occurred.    ROS: Denies SOB, palpitations. All other systems are negative.    Vital Signs:    T(F): 96.8 (01-15-22 @ 13:26), Max: 98 (01-15-22 @ 06:00)  HR: 92 (01-15-22 @ 15:01) (80 - 96)  BP: 115/72 (01-15-22 @ 15:01) (87/51 - 127/70)  RR: 18 (01-15-22 @ 07:45) (18 - 18)  SpO2: 99% (01-15-22 @ 07:45) (96% - 99%)  I&O's Summary    2022 07:01  -  15 Norris 2022 07:00  --------------------------------------------------------  IN: 1210 mL / OUT: 1425 mL / NET: -215 mL    15 Norris 2022 07:01  -  15 Norris 2022 16:16  --------------------------------------------------------  IN: 570 mL / OUT: 0 mL / NET: 570 mL      Daily     Daily Weight in k.9 (15 Norris 2022 02:11)  CAPILLARY BLOOD GLUCOSE          PHYSICAL EXAM:  GENERAL:  NAD  SKIN: No rashes or lesions  HEENT: Atraumatic. Normocephalic. Anicteric  NECK:  No JVD.   PULMONARY: Clear to ausculation bilaterally. No wheezing. No rales  CVS: Normal S1, S2. Regular rate and rhythm. No murmurs.  ABDOMEN/GI: Soft, Nontender, Nondistended; Bowel sounds are present  EXTREMITIES: trace pedal edema  NEUROLOGIC:  No motor deficit.  PSYCH: Alert & oriented x 3, normal affect      LABS:                        11.2   6.06  )-----------( 162      ( 15 Norris 2022 06:25 )             35.6     -15    136  |  99  |  35<H>  ----------------------------<  90  4.4   |  24  |  1.3    Ca    8.6      15 Norris 2022 06:25  Mg     2.0     -15    TPro  6.8  /  Alb  3.3<L>  /  TBili  0.5  /  DBili  x   /  AST  16  /  ALT  19  /  AlkPhos  67  -15    PT/INR - ( 15 Norris 2022 06:25 )   PT: 37.30 sec;   INR: 3.28 ratio                   RADIOLOGY & ADDITIONAL TESTS:  Imaging or report Personally Reviewed:  [ ] YES  [ ] NO    Telemetry reviewed independently - no events    Medications:  Standing  amLODIPine   Tablet 5 milliGRAM(s) Oral daily  ascorbic acid 500 milliGRAM(s) Oral daily  BACItracin   Ointment 1 Application(s) Topical two times a day  chlorhexidine 4% Liquid 1 Application(s) Topical <User Schedule>  cholecalciferol 1000 Unit(s) Oral daily  cyanocobalamin 1000 MICROGram(s) Oral daily  diltiazem    milliGRAM(s) Oral daily  famotidine    Tablet 20 milliGRAM(s) Oral daily  ferrous    sulfate 325 milliGRAM(s) Oral daily  finasteride 5 milliGRAM(s) Oral daily  lisinopril 10 milliGRAM(s) Oral daily  melatonin 10 milliGRAM(s) Oral at bedtime  melatonin 5 milliGRAM(s) Oral at bedtime  metoprolol tartrate 100 milliGRAM(s) Oral three times a day  tamsulosin 0.4 milliGRAM(s) Oral at bedtime    PRN Meds  acetaminophen     Tablet .. 650 milliGRAM(s) Oral every 6 hours PRN  lidocaine   4% Patch 1 Patch Transdermal daily PRN      Case discussed with resident  Care discussed with pt

## 2022-01-15 NOTE — DISCHARGE NOTE PROVIDER - PROVIDER TOKENS
PROVIDER:[TOKEN:[[id number]],FOLLOWUP:[2 weeks]],PROVIDER:[TOKEN:[[id number]],FOLLOWUP:[1 month]]

## 2022-01-15 NOTE — DISCHARGE NOTE PROVIDER - NSDCCPCAREPLAN_GEN_ALL_CORE_FT
PRINCIPAL DISCHARGE DIAGNOSIS  Diagnosis: Atrial fibrillation, new onset  Assessment and Plan of Treatment: Atrial Fibrillation  Atrial fibrillation is a type of irregular heartbeat (arrhythmia) where the heart quivers continuously in a chaotic pattern that makes the heart unable to pump blood normally. This can increase the risk for stroke, heart failure, and other heart-related conditions. Atrial fibrillation can be caused by a variety of conditions and may be temporary, intermittent, or permanent. Symptoms include feeling that your heart is beating rapidly or irregularly, chest discomfort, shortness of breath, or dizziness/lightheadedness that may be worse with exertion. Treatment is varied but may involve medication or electrical shock (cardioversion).  SEEK IMMEDIATE MEDICAL CARE IF YOU HAVE ANY OF THE FOLLOWING SYMPTOMS: chest pain, shortness of breath, abdominal pain, sweating, vomiting, blood in vomit/bowel movements/urine, dizziness/lightheadedness, weakness or numbness to face/arm/leg, trouble speaking or understanding, facial droop.        SECONDARY DISCHARGE DIAGNOSES  Diagnosis: Volume overload  Assessment and Plan of Treatment: Congestive Heart Failure (CHF)  Congestive heart failure is a chronic condition in which the heart has trouble pumping blood. In some cases of heart failure, fluid may back up into your lungs or you may have swelling (edema) in your lower legs. There are many causes of heart failure including high blood pressure, coronary artery disease, abnormal heart valves, heart muscle disease, lung disease, diabetes, etc. Symptoms include shortness of breath with activity or when lying flat, cough, swelling of the legs, fatigue, or increased urination during the night.   Treatment is aimed at managing the symptoms of heart failure and may include lifestyle changes, medications, or surgical procedures. Take medicines only as directed by your health care provider and do not stop unless instructed to do so. Eat heart-healthy foods with low or no trans/saturated fats, cholesterol and salt. Weigh yourself every day for early recognition of fluid accumulation.  SEEK IMMEDIATE MEDICAL CARE IF YOU HAVE ANY OF THE FOLLOWING SYMPTOMS: shortness of breath, change in mental status, chest pain, lightheadedness/dizziness/fainting, or worsening of symptoms including not being able to conduct normal physical activity.

## 2022-01-15 NOTE — DISCHARGE NOTE PROVIDER - HOSPITAL COURSE
HPI: 89 yo M with PMH of ILD on home O2 at 2lpm, HTN, BPH, GERD, presented to the ED with c/o worsening shortness of breath and leg swelling for 1 week. Patient states that he saw his PCP recently who started him on lasix, but he hasn't started taking the meds yet. Today he experienced worsening of shortness of breath even at rest, he had to use up to 5lpm O2 at home, which prompted him to come to ED. He denies any fever, chills, abdominal pain, nausea, vomiting, diarrhea. He is not vaccinated against COVID. He recently had outpatient CT chest which showed a pulmonary nodule for which he is following up with Dr. Womack.     ED Course: Pt was in afib with RVR, chest x-ray showed bilateral patchy opacities, BNP was 2765. He was given 40mg IV lasix in the ED with improvement in oxygenation. Patient being admitted for management of new onset afib and fluid overload.     Hospital Course:  Patient seen and evaluated by EP.  Recommend cardio consult for possible EFREN/DCCV.  Per cardio, recommend rate control strategy over rhythm control (cardioversion) due to advanced age, ILD, and elevated risk of anesthesia/intubation.  Per pulmonology, patient to follow-up outpatient regarding recent LLL mass seen on CT chest.  Patient continues to show a fib on monitor with HR in 130s on metoprolol 150mg qD and cardizem 120mg qD.  EP re-consulted for medication recommendations as patient continued to display recurrent a fib on monitor.  Per EP recs, metoprolol ultimately increased to 100mg TID.  HR now ranging 80-96 in past 24hrs and patient deemed medically optimized for discharge to SNF.   HPI:   89 yo M with PMH of ILD on home O2 at 2lpm, HTN, BPH, GERD, presented to the ED with c/o worsening shortness of breath and leg swelling for 1 week. Patient states that he saw his PCP recently who started him on lasix, but he hasn't started taking the meds yet. Today he experienced worsening of shortness of breath even at rest, he had to use up to 5lpm O2 at home, which prompted him to come to ED. He denies any fever, chills, abdominal pain, nausea, vomiting, diarrhea. He is not vaccinated against COVID. He recently had outpatient CT chest which showed a pulmonary nodule for which he is following up with Dr. Womack.     ED Course:   Pt was in afib with RVR, chest x-ray showed bilateral patchy opacities, BNP was 2765. He was given 40mg IV lasix in the ED with improvement in oxygenation. Patient being admitted for management of new onset afib and fluid overload.     Hospital Course:    Patient seen and evaluated by EP.  Recommend cardio consult for possible EFREN/DCCV.  Per cardio, recommend rate control strategy over rhythm control (cardioversion) due to advanced age, ILD, and elevated risk of anesthesia/intubation.  Per pulmonology, patient to follow-up outpatient regarding recent LLL mass seen on CT chest.  Patient continues to show a fib on monitor with HR in 130s on metoprolol 150mg qD and cardizem 120mg qD.  EP re-consulted for medication recommendations as patient continued to display recurrent a fib on monitor.  Per EP recs, metoprolol ultimately increased to 100mg TID.  Pt was also started on coumadin during this hospital admission (Eliquis pricing was high). Hospital course complicated by asymptomatic COVID infection for which the pt had to quarantine for 5 days.    HPI:   89 yo M with PMH of ILD on home O2 at 2lpm, HTN, BPH, GERD, presented to the ED with c/o worsening shortness of breath and leg swelling for 1 week. Patient states that he saw his PCP recently who started him on lasix, but he hasn't started taking the meds yet. Today he experienced worsening of shortness of breath even at rest, he had to use up to 5lpm O2 at home, which prompted him to come to ED. He denies any fever, chills, abdominal pain, nausea, vomiting, diarrhea. He is not vaccinated against COVID. He recently had outpatient CT chest which showed a pulmonary nodule for which he is following up with Dr. Womack.     ED Course:   Pt was in afib with RVR, chest x-ray showed bilateral patchy opacities, BNP was 2765. He was given 40mg IV lasix in the ED with improvement in oxygenation. Patient being admitted for management of new onset afib and fluid overload.     Hospital Course:    Patient seen and evaluated by EP.  Recommend cardio consult for possible EFREN/DCCV.  Per cardio, recommend rate control strategy over rhythm control (cardioversion) due to advanced age, ILD, and elevated risk of anesthesia/intubation.  Per pulmonology, patient to follow-up outpatient regarding recent LLL mass seen on CT chest.  Patient continues to show a fib on monitor with HR in 130s on metoprolol 150mg qD and cardizem 120mg qD.  EP re-consulted for medication recommendations as patient continued to display recurrent a fib on monitor.  Per EP recs, metoprolol ultimately increased to 100mg TID.  Pt was also started on coumadin during this hospital admission (Eliquis pricing was high).   Started on Coumadin 2mg. INR Started to go high. As of today still high at 4.36. Will be holding coumadin until INR drops below 2.5 , then can resume with 2mg and keeping INR target between 2-3.    Hospital course complicated by asymptomatic COVID infection for which the pt had to quarantine for 5 days.

## 2022-01-15 NOTE — DISCHARGE NOTE PROVIDER - NSFOLLOWUPCLINICS_GEN_ALL_ED_FT
[facility]  Coumadin  [address] [place] [address]  Phone: [phone]  Fax:   Follow Up Time: 2 weeks

## 2022-01-15 NOTE — DISCHARGE NOTE PROVIDER - NSDCMRMEDTOKEN_GEN_ALL_CORE_FT
AMLODIPINE-BENAZEPRIL 5-10 M each orally once a day  dilTIAZem 120 mg/24 hours oral capsule, extended release: 1 cap(s) orally once a day  FERROUS SULFATE 325 MG TABLET: 1 each orally once a day  FINASTERIDE 5 MG TABLET: 1 each orally once a day  FUROSEMIDE 20MG TAB: 1 tab(s) orally once a day  Lidocare Pain Relief Patch 4% topical film: Apply topically to left shoulder once a day  Lopressor 100 mg oral tablet: 1 tab(s) orally 3 times a day  OMEPRAZOLE DR 40MG CAP: 1 cap(s) orally once a day  TAMSULOSIN HCL 0.4 MG CAPSULE: 1 each orally once a day (at bedtime)  VITAMIN B-12 1,000 MCG TABLET: 1 each orally once a day   dilTIAZem 120 mg/24 hours oral capsule, extended release: 1 cap(s) orally once a day  FERROUS SULFATE 325 MG TABLET: 1 each orally once a day  FINASTERIDE 5 MG TABLET: 1 each orally once a day  FUROSEMIDE 20MG TAB: 1 tab(s) orally once a day  Lidocare Pain Relief Patch 4% topical film: Apply topically to left shoulder once a day  Lopressor 100 mg oral tablet: 1 tab(s) orally 3 times a day  OMEPRAZOLE DR 40MG CAP: 1 cap(s) orally once a day  TAMSULOSIN HCL 0.4 MG CAPSULE: 1 each orally once a day (at bedtime)  VITAMIN B-12 1,000 MCG TABLET: 1 each orally once a day  Zestril 10 mg oral tablet: 1 tab(s) orally once a day

## 2022-01-15 NOTE — DISCHARGE NOTE PROVIDER - NSDCFUSCHEDAPPT_GEN_ALL_CORE_FT
NNEKA JOSEPH ; 02/02/2022 ; NPP Cardio [address] ; 02/17/2022 ; NPVLADIMIR PULMED [address]

## 2022-01-15 NOTE — DISCHARGE NOTE PROVIDER - CARE PROVIDER_API CALL
Idania Aguero)  Cardiovascular Disease; Internal Medicine  [address] [place] [address] [address]  [place] [address]  Phone: [phone]  Fax: [phone]  Follow Up Time: 2 weeks    Mathieu Womack)  Critical Care Medicine; Pulmonary Disease; Sleep Medicine  [address] [place], [place] [id number]  [place] [address]  Phone: [phone]  Fax: [phone]  Follow Up Time: 1 month

## 2022-01-15 NOTE — PROGRESS NOTE ADULT - ASSESSMENT
88 yo M PMHx of ILD on home O2 at 2lpm, HTN, BPH, GERD, presented to the ED with c/o worsening shortness of breath and leg swelling for 1 week prior to presentation. Patient states that he saw his PCP recently who started him on Lasix. Pt found to have new onset afib with RVR as well as lung mass on outpt CT scan.    New Paroxysmal Atrial Fibrillation with Rapid Ventricular response:   - controlled  - c/w cardizem, lopressor  - INR supratherapeutic, hold coumadin  - dc telemetry    Acute on chronic hypoxic respiratory failure  Acute HFmrEF  Severe Pulmonary HTN  Interstitial lung disease:   LLL mass, mediastinal lymphadenopathy  -Pro-BNP 2765  -hold lasix, resume on dc  -CXR chronic looking bilateral interstitial opacities  -Echo showed LVEF 40-45%, mild to mod MR, mild to mod TR, severe pulmonary HTN.   -Low sodium diet  -continue nasal canula.  -Pulmonary follow up outpatient for biopsy/further work up    Left shoulder pain, 2/2 recent fall  -full active ROM of LUE on exam  -Left shoulder Xray showed no acute fracture. Continue OT.     HTN  - Continue Amlodipine and Lisinopril.    BPH  - continue with tamsulosin, finasteride    GERD  continue with PPI    Hypomagnesmia  - replenished  - follow up AM levels    DVT prophylaxis: therapeutic Lovenox  #Progress Note Handoff:  Pending (specify):  placement  Family discussion: spoke with son via phone-aware of rate control, pending bed at [facility] for 1/17  Disposition: SNF on 1/17

## 2022-01-15 NOTE — PROGRESS NOTE ADULT - SUBJECTIVE AND OBJECTIVE BOX
NNEKA JOSEPH 89y Male  MRN#: [id number]   CODE STATUS: FULL    Hospital Day: 11d    Pt is currently admitted with the primary diagnosis of acute on chronic respiratory failure.    SUBJECTIVE  HPI: 88 yo M with PMH of ILD on home O2 at 2lpm, HTN, BPH, GERD, presented to the ED with c/o worsening shortness of breath and leg swelling for 1 week. Patient states that he saw his PCP recently who started him on lasix, but he hasn't started taking the meds yet. Today he experienced worsening of shortness of breath even at rest, he had to use upto 5lpm O2 at home, which prompted him to come to ED. He denies any fever, chills, abdominal pain, nausea, vomiting, diarrhea. He is not vaccinated against COVID. He recently had outpatient CT chest which showed a pulmonary nodule for which he is following up with Dr. Womack.     ED Course: Pt was in afib with RVR, chest x-ray showed bilateral patchy opacities, BNP was 2765. He was given 40mg IV lasix in the ED with improvement in oxygenation. Patient being admitted for management of new onset afib and fluid overload.     Hospital Course:  Patient seen and evaluated by EP.  Recommend cardio consult for possible EFREN/DCCV.  Per cardio, recommend rate control strategy over rhythm control (cardioversion) due to advanced age, ILD, and elevated risk of anesthesia/intubation.  Per pulmonology, patient to follow-up outpatient regarding recent LLL mass seen on CT chest.  Patient continues to show a fib on monitor with HR in 130s on metoprolol 150mg qD and cardizem 120mg qD.  EP re-consulted for medication recommendations.  Increased metoprolol to 100mg TID.  Patient converted and is now deemed medically optimized for discharge to SNF with outpatient follow-up for possible cardioversion as outpatient.  However, no bed availability at SNF so patient will remain admitted until Monday.    Overnight events:  No acute events overnight.    Subjective complaints:  Patient seen and examined at bedside.  No new complaints this AM.    Present Today:   - Wisam:  No [ X ], Yes [   ] : Indication:     - Type of IV Access:       .. CVC/Piccline:  No [ X ], Yes [   ] : Indication:       .. Midline: No [ X ], Yes [   ] : Indication:                                             ----------------------------------------------------------  OBJECTIVE  PAST MEDICAL & SURGICAL HISTORY  Hypertension    GERD (gastroesophageal reflux disease)    Lung interstitial disease    BPH (benign prostatic hyperplasia)    No significant past surgical history                                              -----------------------------------------------------------  ALLERGIES:  No Known Allergies                                            ------------------------------------------------------------    HOME MEDICATIONS  Home Medications:  AMLODIPINE-BENAZEPRIL 5-10 M each orally once a day (15 Norris 2022 11:38)  dilTIAZem 120 mg/24 hours oral capsule, extended release: 1 cap(s) orally once a day (15 Norris 2022 11:38)  FERROUS SULFATE 325 MG TABLET: 1 each orally once a day (15 Norris 2022 11:38)  FINASTERIDE 5 MG TABLET: 1 each orally once a day (15 Norris 2022 11:38)  FUROSEMIDE 20MG TAB: 1 tab(s) orally once a day (15 Norris 2022 11:38)  Lidocare Pain Relief Patch 4% topical film: Apply topically to left shoulder once a day (15 Norris 2022 11:38)  Lopressor 100 mg oral tablet: 1 tab(s) orally 3 times a day (15 Norris 2022 11:38)  OMEPRAZOLE DR 40MG CAP: 1 cap(s) orally once a day (15 Norris 2022 11:38)  TAMSULOSIN HCL 0.4 MG CAPSULE: 1 each orally once a day (at bedtime) (15 Norris 2022 11:38)  VITAMIN B-12 1,000 MCG TABLET: 1 each orally once a day (15 Norris 2022 11:38)                           MEDICATIONS:  STANDING MEDICATIONS  amLODIPine   Tablet 5 milliGRAM(s) Oral daily  ascorbic acid 500 milliGRAM(s) Oral daily  BACItracin   Ointment 1 Application(s) Topical two times a day  chlorhexidine 4% Liquid 1 Application(s) Topical <User Schedule>  cholecalciferol 1000 Unit(s) Oral daily  cyanocobalamin 1000 MICROGram(s) Oral daily  diltiazem    milliGRAM(s) Oral daily  famotidine    Tablet 20 milliGRAM(s) Oral daily  ferrous    sulfate 325 milliGRAM(s) Oral daily  finasteride 5 milliGRAM(s) Oral daily  lisinopril 10 milliGRAM(s) Oral daily  melatonin 10 milliGRAM(s) Oral at bedtime  melatonin 5 milliGRAM(s) Oral at bedtime  metoprolol tartrate 100 milliGRAM(s) Oral three times a day  tamsulosin 0.4 milliGRAM(s) Oral at bedtime    PRN MEDICATIONS  acetaminophen     Tablet .. 650 milliGRAM(s) Oral every 6 hours PRN  lidocaine   4% Patch 1 Patch Transdermal daily PRN                                            ------------------------------------------------------------  VITAL SIGNS: Last 24 Hours  T(C): 36.7 (15 Norris 2022 06:00), Max: 36.7 (15 Norris 2022 06:00)  T(F): 98 (15 Norris 2022 06:00), Max: 98 (15 Norris 2022 06:00)  HR: 96 (15 Norris 2022 06:00) (80 - 96)  BP: 127/70 (15 Norris 2022 06:00) (91/59 - 127/70)  BP(mean): --  RR: 18 (15 Norris 2022 07:45) (18 - 18)  SpO2: 99% (15 Norris 2022 07:45) (96% - 99%)      22 @ 07:01  -  01-15-22 @ 07:00  --------------------------------------------------------  IN: 1210 mL / OUT: 1425 mL / NET: -215 mL    01-15-22 @ 07:01  -  01-15-22 @ 13:02  --------------------------------------------------------  IN: 330 mL / OUT: 0 mL / NET: 330 mL                                             --------------------------------------------------------------  LABS:                        11.2   6.06  )-----------( 162      ( 15 Norris 2022 06:25 )             35.6     01-15    136  |  99  |  35<H>  ----------------------------<  90  4.4   |  24  |  1.3    Ca    8.6      15 Norris 2022 06:25  Mg     2.0     01-15    TPro  6.8  /  Alb  3.3<L>  /  TBili  0.5  /  DBili  x   /  AST  16  /  ALT  19  /  AlkPhos  67  01-15    PT/INR - ( 15 Norris 2022 06:25 )   PT: 37.30 sec;   INR: 3.28 ratio                                                 -------------------------------------------------------------  RADIOLOGY:  ACC: [id number] EXAM:  DUPLEX SCAN EXT VEINS LOWER BI, 2022    Impression:  No evidence of deep venous thrombosis or superficial thrombophlebitis in the bilateral lower extremities.      ACC: [id number] EXAM:  XR SHOULDER COMP MIN 2V LT, 2022    FINDINGS/IMPRESSION:  No gross acute fracture or dislocation. Mild degenerative change in the acromioclavicular and glenohumeral joints. Osteopenia.  Diffuse interstitial opacities in the visualized lungs.      ACC: [id number] EXAM:  XR CHEST 1 VIEW, 2022    Impression:  Stable diffuse interstitial opacities, suggesting underlying interstitial lung disease. No pneumothorax.                                            --------------------------------------------------------------    PHYSICAL EXAM:  General: lying in bed, NAD  HEENT: nontraumatic, neck supple, conjunctiva clear  LUNGS: CTA, no cough or increased WOB, on 2L NC   HEART: RRR, S1/S2  ABDOMEN: soft, nontender, nondistended  EXT: moves all extremities  NEURO: AAOx3, CN grossly intact  SKIN: bilateral LE edema, no erythema                                           --------------------------------------------------------------    ASSESSMENT & PLAN  88 yo M with PMH of ILD on home O2 at 2lpm, HTN, BPH, GERD, presented to the ED with c/o worsening shortness of breath and leg swelling for 1 week. Patient states that he saw his PCP recently who started him on lasix.     # Acute on chronic hypoxic respiratory failure  # Fluid overload  > presented with shortness of breath, LE swelling  > CXR - bilateral interstitial opacities  > BNP 2765, d-dimer 735  > TTE: EF 40-45%, mod TR and MR, severe pHTN  > : increased lopressor from 12.5mg BID to 25mg BID  > : gave 2nd dose of IV lasix  > : increased metoprolol to 50mg BID  > : LE duplex negative  > 1/10: increased metoprolol to 50mg q8h per cardio recs   > : started on cardizem 120mg qD  > : increased metoprolol to 75mg TID  > : EP reconsulted for continued a fib, rec incr metoprolol to 100mg TID OR incr cardizem to 180mg qD - incr metoprolol based on recs in setting of low BP  > : hypotensive episode during ambulation, holding lasix for now, encourage lara stockings when OOB  - holding lasix for now  - on baseline supplemental O2 (2L NC)  - c/w metoprolol tartrate 100mg TID  - c/w cardizem daily  - continue with ACEi  - Strict I/O, daily weight, fluid restriction  - Monitor electrolytes Keep K > 4, Mg >2    # New onset Afib  > CHADSVASC - 3  > per EP recs, consult cardio for possible EFREN/DCCV  > per Cardio, c/w lasix 40mg PO qd - holding for now, metoprolol 50mg BID (can incr to q8h), eliquis 5mg BID, avoid amio and digoxin, high risk for cardioversion, pursue medical management  > TSH 0.70  > : dc'd Lovenox and started on eliquis per cardio recs  > 1/10: per EP rec metoprolol 50mg q8h, switched from eliquis to coumadin 5mg  > : INR 1.79, given 2.5mg coumadin per clinic recs  > : INR 1.69, ordered 2.5mg coumadin  > : INR 1.77, ordered 4mg coumadin  > : INR 2.12, ordered 2.5mg coumadin  > 1/15: INR 3.28  > Eliquis and Xarelto pricing at pt pharmacy ([facility]) is $286/month  - c/w metoprolol and lasix  - c/w coumadin, check daily INR (goal 2-3)  - Telemetry monitoring    #left shoulder pain, 2/2 recent fall  > full active ROM of LUE on exam  > no acute fracture on xray  - lidocaine patch and heating packs PRN for pain   - OT following     # ILD  # Pulmonary Nodule  > CT chest prior to admission: 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, new mediastinal adenopathy, left pleural effusion, cardiomegaly with dilated ascending and descending aorta  > TTE: EF 40-45%, mod TR and MR, severe pHTN  > per pulm, SpO2 target >92%, HOB at 45 degrees, aspiration precautions  - continue with supplemental O2, on home 2L NC   - OP pulm Follow up re: 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    # HTN  - on amlodipine-benazepril at home  - continue with amlodipine  - Lisinopril while inpatient    # BPH  - continue with tamsulosin, finasteride    # GERD  - continue with PPI                                                                              ----------------------------------------------------  # DVT prophylaxis: therapeutic coumadin  # GI prophylaxis: protonix  # Diet: DASH/TLC  # Activity Score (AM-PAC): IAT  # Code status: FULL  # Disposition: from home, dc to SNF pending bed availability  # POC: [place] (chavez) [phone]                                                                             --------------------------------------------------------    # Handoff   - c/w coumadin, check daily INR (goal 2-3)  - OP pulm Follow up  - plan to dc on coumadin, cardizem 120mg qD, and metoprolol tartrate 100mg TID

## 2022-01-16 NOTE — PROGRESS NOTE ADULT - SUBJECTIVE AND OBJECTIVE BOX
CHIEF COMPLAINT:    Patient is a 89y old  Male who presents with a chief complaint of Shortness of breath (15 Norris 2022 16:16)      INTERVAL HPI/OVERNIGHT EVENTS:    Patient seen and examined at bedside. No acute overnight events occurred.    ROS: Denies SOB. All other systems are negative.    Vital Signs:    T(F): 96.6 (22 @ 04:40), Max: 97 (01-15-22 @ 21:46)  HR: 101 (22 @ 04:40) (87 - 101)  BP: 124/76 (22 @ 04:40) (87/51 - 124/76)  RR: 17 (22 @ 04:40) (17 - 18)  SpO2: 95% (22 @ 08:30) (92% - 95%)  I&O's Summary    15 Norris 2022 07:  -  2022 07:00  --------------------------------------------------------  IN: 750 mL / OUT: 480 mL / NET: 270 mL    2022 07:01  -  2022 12:58  --------------------------------------------------------  IN: 210 mL / OUT: 100 mL / NET: 110 mL      Daily     Daily Weight in k.4 (2022 04:40)  CAPILLARY BLOOD GLUCOSE          PHYSICAL EXAM:  GENERAL:  NAD  SKIN: No rashes or lesions  HEENT: Atraumatic. Normocephalic. Anicteric  NECK:  No JVD.   PULMONARY: Clear to ausculation bilaterally. No wheezing. No rales  CVS: Normal S1, S2. Regular rate and rhythm. No murmurs.  ABDOMEN/GI: Soft, Nontender, Nondistended; Bowel sounds are present  EXTREMITIES:  Trace pedal edema bilaterally  NEUROLOGIC:  No motor deficit.  PSYCH: Alert & oriented x 3, normal affect        LABS:                        11.2   6.06  )-----------( 162      ( 15 Norris 2022 06:25 )             35.6     01-15    136  |  99  |  35<H>  ----------------------------<  90  4.4   |  24  |  1.3    Ca    8.6      15 Norris 2022 06:25  Mg     2.0     -15    TPro  6.8  /  Alb  3.3<L>  /  TBili  0.5  /  DBili  x   /  AST  16  /  ALT  19  /  AlkPhos  67  01-15    PT/INR - ( 2022 06:12 )   PT: >40.00 sec;   INR: 4.00 ratio                   RADIOLOGY & ADDITIONAL TESTS:  Imaging or report Personally Reviewed:  [ ] YES  [ ] NO    Telemetry reviewed independently - afib-controlled    Medications:  Standing  amLODIPine   Tablet 5 milliGRAM(s) Oral daily  ascorbic acid 500 milliGRAM(s) Oral daily  BACItracin   Ointment 1 Application(s) Topical two times a day  chlorhexidine 4% Liquid 1 Application(s) Topical <User Schedule>  cholecalciferol 1000 Unit(s) Oral daily  cyanocobalamin 1000 MICROGram(s) Oral daily  diltiazem    milliGRAM(s) Oral daily  famotidine    Tablet 20 milliGRAM(s) Oral daily  ferrous    sulfate 325 milliGRAM(s) Oral daily  finasteride 5 milliGRAM(s) Oral daily  lisinopril 10 milliGRAM(s) Oral daily  melatonin 10 milliGRAM(s) Oral at bedtime  melatonin 5 milliGRAM(s) Oral at bedtime  metoprolol tartrate 100 milliGRAM(s) Oral three times a day  tamsulosin 0.4 milliGRAM(s) Oral at bedtime    PRN Meds  acetaminophen     Tablet .. 650 milliGRAM(s) Oral every 6 hours PRN  lidocaine   4% Patch 1 Patch Transdermal daily PRN

## 2022-01-16 NOTE — PROGRESS NOTE ADULT - ASSESSMENT
88 yo M PMHx of ILD on home O2 at 2lpm, HTN, BPH, GERD, presented to the ED with c/o worsening shortness of breath and leg swelling for 1 week prior to presentation. Patient states that he saw his PCP recently who started him on Lasix. Pt found to have new onset afib with RVR as well as lung mass on outpt CT scan.    New Paroxysmal Atrial Fibrillation with Rapid Ventricular response:   - controlled  - c/w cardizem, lopressor  - INR supratherapeutic, continue to hold coumadin  - dc telemetry    Acute on chronic hypoxic respiratory failure  Acute HFmrEF  Severe Pulmonary HTN  Interstitial lung disease:   LLL mass, mediastinal lymphadenopathy  -Pro-BNP 2765  -hold lasix, resume on dc  -CXR chronic looking bilateral interstitial opacities  -Echo showed LVEF 40-45%, mild to mod MR, mild to mod TR, severe pulmonary HTN.   -Low sodium diet  -continue nasal canula.  -Pulmonary follow up outpatient for biopsy/further work up    Left shoulder pain, 2/2 recent fall  -full active ROM of LUE on exam  -Left shoulder Xray showed no acute fracture. Continue OT.     HTN  - Continue Amlodipine and Lisinopril.    BPH  - continue with tamsulosin, finasteride    GERD  continue with PPI    Hypomagnesmia  - replenished  - follow up AM levels    DVT prophylaxis: therapeutic Lovenox  #Progress Note Handoff:  Pending (specify):  placement  Family discussion: family aware pending discharge tomorrow when bed available  Disposition: SNF on 1/17

## 2022-01-17 NOTE — PROGRESS NOTE ADULT - SUBJECTIVE AND OBJECTIVE BOX
CHIEF COMPLAINT:    Patient is a 89y old  Male who presents with a chief complaint of Shortness of breath (2022 12:58)      INTERVAL HPI/OVERNIGHT EVENTS:    Patient seen and examined at bedside. No acute overnight events occurred.    ROS: All other systems are negative.    Vital Signs:    T(F): 97.3 (22 @ 12:55), Max: 97.8 (22 @ 21:57)  HR: 65 (22 @ 12:55) (65 - 92)  BP: 117/76 (22 @ 12:55) (104/72 - 129/77)  RR: 18 (22 @ 12:55) (18 - 18)  SpO2: --  I&O's Summary    2022 07:01  -  2022 07:00  --------------------------------------------------------  IN: 730 mL / OUT: 1010 mL / NET: -280 mL    2022 07:01  -  2022 13:32  --------------------------------------------------------  IN: 210 mL / OUT: 376 mL / NET: -166 mL      Daily     Daily Weight in k.1 (2022 04:37)  CAPILLARY BLOOD GLUCOSE          PHYSICAL EXAM:  GENERAL:  NAD  SKIN: No rashes or lesions  HEENT: Atraumatic. Normocephalic. Anicteric  NECK:  No JVD.   PULMONARY: Clear to ausculation bilaterally. No wheezing. No rales  CVS: Normal S1, S2. Regular rate and rhythm. No murmurs.  ABDOMEN/GI: Soft, Nontender, Nondistended; Bowel sounds are present  EXTREMITIES:  No edema B/L LE.  NEUROLOGIC:  No motor deficit.  PSYCH: Alert & oriented x 3, normal affect    Consultant(s) Notes Reviewed:  [x ] YES  [ ] NO  Care Discussed with Consultants/Other Providers [ x] YES  [ ] NO    LABS:          PT/INR - ( 2022 06:20 )   PT: >40.00 sec;   INR: 3.90 ratio                   RADIOLOGY & ADDITIONAL TESTS:      Medications:  Standing  ascorbic acid 500 milliGRAM(s) Oral daily  BACItracin   Ointment 1 Application(s) Topical two times a day  chlorhexidine 4% Liquid 1 Application(s) Topical <User Schedule>  cholecalciferol 1000 Unit(s) Oral daily  cyanocobalamin 1000 MICROGram(s) Oral daily  diltiazem    milliGRAM(s) Oral daily  famotidine    Tablet 20 milliGRAM(s) Oral daily  ferrous    sulfate 325 milliGRAM(s) Oral daily  finasteride 5 milliGRAM(s) Oral daily  furosemide    Tablet 20 milliGRAM(s) Oral daily  lisinopril 10 milliGRAM(s) Oral daily  melatonin 5 milliGRAM(s) Oral at bedtime  melatonin 10 milliGRAM(s) Oral at bedtime  metoprolol tartrate 100 milliGRAM(s) Oral three times a day  tamsulosin 0.4 milliGRAM(s) Oral at bedtime    PRN Meds  acetaminophen     Tablet .. 650 milliGRAM(s) Oral every 6 hours PRN  lidocaine   4% Patch 1 Patch Transdermal daily PRN      Case discussed with resident  Care discussed with pt

## 2022-01-17 NOTE — PROGRESS NOTE ADULT - ASSESSMENT
89 yo M PMHx of ILD on home O2 at 2lpm, HTN, BPH, GERD, presented to the ED with c/o worsening shortness of breath and leg swelling for 1 week prior to presentation. Patient states that he saw his PCP recently who started him on Lasix. Pt found to have new onset afib with RVR as well as lung mass on outpt CT scan.    New Paroxysmal Atrial Fibrillation with Rapid Ventricular response:   - controlled  - c/w cardizem, lopressor  - INR supratherapeutic but downtrending, continue to hold coumadin     Acute on chronic hypoxic respiratory failure  Acute HFmrEF  Severe Pulmonary HTN  Interstitial lung disease:   LLL mass, mediastinal lymphadenopathy  -Pro-BNP 2765  -hold lasix, resume on dc  -CXR chronic looking bilateral interstitial opacities  -Echo showed LVEF 40-45%, mild to mod MR, mild to mod TR, severe pulmonary HTN.   -Low sodium diet  -continue nasal canula.  -Pulmonary follow up outpatient for biopsy/further work up    Left shoulder pain, 2/2 recent fall  -full active ROM of LUE on exam  -Left shoulder Xray showed no acute fracture. Continue OT.     HTN  - Continue Amlodipine and Lisinopril.    BPH  - continue with tamsulosin, finasteride    GERD  continue with PPI    Hypomagnesmia  - replenished  - follow up AM levels    DVT prophylaxis: therapeutic Lovenox  #Progress Note Handoff:  Pending (specify):  placement  Family discussion: pt aware bed not available until tomorrow  Disposition: SNF tomorrow

## 2022-01-17 NOTE — PROGRESS NOTE ADULT - SUBJECTIVE AND OBJECTIVE BOX
CHIEF COMPLAINT:  Patient is a 89y old  Male who presents with a chief complaint of Shortness of breath (17 Jan 2022 13:32)      INTERVAL HISTORY/OVERNIGHT EVENTS:  1/17  -no acute events overnight, pt pending placement    ======================  MEDICATIONS:  ascorbic acid 500 milliGRAM(s) Oral daily  BACItracin   Ointment 1 Application(s) Topical two times a day  chlorhexidine 4% Liquid 1 Application(s) Topical <User Schedule>  cholecalciferol 1000 Unit(s) Oral daily  cyanocobalamin 1000 MICROGram(s) Oral daily  diltiazem    milliGRAM(s) Oral daily  famotidine    Tablet 20 milliGRAM(s) Oral daily  ferrous    sulfate 325 milliGRAM(s) Oral daily  finasteride 5 milliGRAM(s) Oral daily  furosemide    Tablet 20 milliGRAM(s) Oral daily  lisinopril 10 milliGRAM(s) Oral daily  melatonin 5 milliGRAM(s) Oral at bedtime  melatonin 10 milliGRAM(s) Oral at bedtime  metoprolol tartrate 100 milliGRAM(s) Oral three times a day  tamsulosin 0.4 milliGRAM(s) Oral at bedtime    DRIPS:    PRN:       ======================  PHYSICAL EXAMINATION:  GEN:  nad.   HEENT:  eomi. ncat  PULM:  b/l bs.  clear.  no wheezing. no crackles or rales.   CARD: regular. s1, s2.  no murmurs.   ABD: +bs. ntnd  EXT:  no new rashes.  no pitting edema b/l .   NEURO: alert oriented, moves all extremities  ======================  OBJECTIVE:        VS:  T(F): 97.3 (01-17 @ 12:55), Max: 97.8 (01-16 @ 21:57)  HR: 65 (01-17 @ 12:55) (65 - 92)  BP: 117/76 (01-17 @ 12:55) (104/72 - 129/77)  RR: 18 (01-17 @ 12:55) (18 - 18)  SpO2: --  CVP(mm Hg): --  CO: --  CI: --  PA: --  PCWP: --    I/O:      01-14 @ 07:01  -  01-15 @ 07:00  --------------------------------------------------------  IN: 1210 mL / OUT: 1425 mL / NET: -215 mL    01-15 @ 07:01  -  01-16 @ 07:00  --------------------------------------------------------  IN: 750 mL / OUT: 480 mL / NET: 270 mL    01-16 @ 07:01  -  01-17 @ 07:00  --------------------------------------------------------  IN: 730 mL / OUT: 1010 mL / NET: -280 mL    01-17 @ 07:01  -  01-17 @ 15:30  --------------------------------------------------------  IN: 460 mL / OUT: 376 mL / NET: 84 mL        Weight trend:      ======================    LABS:              PT/INR - ( 17 Jan 2022 06:20 )   PT: >40.00 sec;   INR: 3.90 ratio                       Cultures:

## 2022-01-17 NOTE — PROGRESS NOTE ADULT - ASSESSMENT
89 yo M with PMH of ILD on home O2 at 2lpm, HTN, BPH, GERD, presented to the ED with c/o worsening shortness of breath and leg swelling for 1 week. Patient states that he saw his PCP recently who started him on lasix.     # Acute on chronic hypoxic respiratory failure  # Fluid overload  > presented with shortness of breath, LE swelling  > CXR - bilateral interstitial opacities  > BNP 2765, d-dimer 735  > TTE: EF 40-45%, mod TR and MR, severe pHTN  > 1/4: increased lopressor from 12.5mg BID to 25mg BID  > 1/5: gave 2nd dose of IV lasix  > 1/7: increased metoprolol to 50mg BID  > 1/8: LE duplex negative  > 1/10: increased metoprolol to 50mg q8h per cardio recs   > 1/11: started on cardizem 120mg qD  > 1/13: increased metoprolol to 75mg TID  > 1/14: EP reconsulted for continued a fib, rec incr metoprolol to 100mg TID OR incr cardizem to 180mg qD - incr metoprolol based on recs in setting of low BP  > 1/14: hypotensive episode during ambulation, holding lasix for now, encourage lara stockings when OOB  - holding lasix for now  - on baseline supplemental O2 (2L NC)  - c/w metoprolol tartrate 100mg TID  - c/w cardizem daily  - continue with ACEi  - Strict I/O, daily weight, fluid restriction  - Monitor electrolytes Keep K > 4, Mg >2    # New onset Afib  > CHADSVASC - 3  > per EP recs, consult cardio for possible EFREN/DCCV  > per Cardio, c/w lasix 40mg PO qd - holding for now, metoprolol 50mg BID (can incr to q8h), eliquis 5mg BID, avoid amio and digoxin, high risk for cardioversion, pursue medical management  > TSH 0.70  > 1/6: dc'd Lovenox and started on eliquis per cardio recs  > 1/10: per EP rec metoprolol 50mg q8h, switched from eliquis to coumadin 5mg  > 1/11: INR 1.79, given 2.5mg coumadin per clinic recs  > 1/12: INR 1.69, ordered 2.5mg coumadin  > 1/13: INR 1.77, ordered 4mg coumadin  > 1/14: INR 2.12, ordered 2.5mg coumadin  > 1/15: INR 3.28  >1/17: holding coumadin still  > Eliquis and Xarelto pricing at pt pharmacy ([facility]) is $286/month  - c/w metoprolol and lasix      #left shoulder pain, 2/2 recent fall  > full active ROM of LUE on exam  > no acute fracture on xray  - lidocaine patch and heating packs PRN for pain   - OT following     # ILD  # Pulmonary Nodule  > CT chest prior to admission: 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, new mediastinal adenopathy, left pleural effusion, cardiomegaly with dilated ascending and descending aorta  > TTE: EF 40-45%, mod TR and MR, severe pHTN  > per pulm, SpO2 target >92%, HOB at 45 degrees, aspiration precautions  - continue with supplemental O2, on home 2L NC   - OP pulm Follow up re: 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

## 2022-01-18 NOTE — PROGRESS NOTE ADULT - SUBJECTIVE AND OBJECTIVE BOX
NNEKA JOSEPH 89y Male      Patient is a 89y old  Male who presents with a chief complaint of Shortness of breath (18 Jan 2022 09:10)        INTERVAL HPI/OVERNIGHT EVENTS: Pt tested positive for COVID yest. No active complaints. On baseline 2L NC.      PHYSICAL EXAM:  GENERAL: NAD  HEAD:  Normocephalic  EYES:  conjunctiva and sclera clear  ENMT: Moist mucous membranes  NECK: Supple  NERVOUS SYSTEM:  Alert, awake  CHEST/LUNG: Good air exchange bilaterally, no wheeze  HEART: Regular rate and rhythm  No LLE        Vital Signs Last 24 Hrs  T(C): 36.2 (18 Jan 2022 04:25), Max: 36.6 (17 Jan 2022 19:04)  T(F): 97.1 (18 Jan 2022 04:25), Max: 97.8 (17 Jan 2022 19:04)  HR: 103 (18 Jan 2022 04:25) (65 - 103)  BP: 135/83 (18 Jan 2022 04:25) (115/58 - 135/83)  BP(mean): --  RR: 18 (18 Jan 2022 04:25) (18 - 18)  SpO2: 96% (18 Jan 2022 04:25) (95% - 96%)      Consultant(s) Notes Reviewed:  [X] YES  [ ] NO  Care Discussed with Consultants/Other Providers [X] YES  [ ] NO  Imaging Personally Reviewed:  [X] YES  [ ] NO      LABS:                        11.4   5.99  )-----------( 141      ( 18 Jan 2022 08:59 )             35.3           PT/INR - ( 18 Jan 2022 08:59 )   PT: 37.50 sec;   INR: 3.30 ratio         PTT - ( 18 Jan 2022 08:59 )  PTT:39.4 sec      CAPILLARY BLOOD GLUCOSE

## 2022-01-18 NOTE — PROGRESS NOTE ADULT - SUBJECTIVE AND OBJECTIVE BOX
CHIEF COMPLAINT:    Patient is a 89y old  Male who presents with a chief complaint of Shortness of breath    INTERVAL HPI/OVERNIGHT EVENTS:    Patient seen and examined at bedside. No acute overnight events occurred.    ROS: Denies SOB, muscle aches, chest pain. All other systems are negative.    Vital Signs:    T(F): 97.1 (22 @ 04:25), Max: 97.8 (22 @ 19:04)  HR: 103 (22 @ 04:25) (65 - 103)  BP: 135/83 (22 @ 04:25) (115/58 - 135/83)  RR: 18 (22 @ 04:25) (18 - 18)  SpO2: 96% (22 @ 04:25) (95% - 96%)  I&O's Summary    2022 07:01  -  2022 07:00  --------------------------------------------------------  IN: 810 mL / OUT: 576 mL / NET: 234 mL    2022 07:01  -  2022 12:21  --------------------------------------------------------  IN: 0 mL / OUT: 1 mL / NET: -1 mL      Daily     Daily Weight in k.5 (2022 06:00)  CAPILLARY BLOOD GLUCOSE          PHYSICAL EXAM:  GENERAL:  NAD  SKIN: No rashes or lesions  HEENT: Atraumatic. Normocephalic. Anicteric  NECK:  No JVD.   PULMONARY: Clear to ausculation bilaterally. No wheezing. No rales  CVS: Normal S1, S2. Regular rate and rhythm. No murmurs.  ABDOMEN/GI: Soft, Nontender, Nondistended; Bowel sounds are present  EXTREMITIES:  No edema B/L LE.  NEUROLOGIC:  No motor deficit.  PSYCH: Alert & oriented x 3, normal affect      LABS:                        11.4   5.99  )-----------( 141      ( 2022 08:59 )             35.3         136  |  102  |  25<H>  ----------------------------<  91  4.5   |  22  |  1.1    Ca    8.2<L>      2022 08:59    TPro  7.0  /  Alb  3.3<L>  /  TBili  0.5  /  DBili  x   /  AST  17  /  ALT  17  /  AlkPhos  65  -18    PT/INR - ( 2022 08:59 )   PT: 37.50 sec;   INR: 3.30 ratio         PTT - ( 2022 08:59 )  PTT:39.4 sec    RADIOLOGY & ADDITIONAL TESTS:      Medications:  Standing  ascorbic acid 500 milliGRAM(s) Oral daily  BACItracin   Ointment 1 Application(s) Topical two times a day  chlorhexidine 4% Liquid 1 Application(s) Topical <User Schedule>  cholecalciferol 1000 Unit(s) Oral daily  cyanocobalamin 1000 MICROGram(s) Oral daily  diltiazem    milliGRAM(s) Oral daily  famotidine    Tablet 20 milliGRAM(s) Oral daily  ferrous    sulfate 325 milliGRAM(s) Oral daily  finasteride 5 milliGRAM(s) Oral daily  furosemide    Tablet 20 milliGRAM(s) Oral daily  lisinopril 10 milliGRAM(s) Oral daily  melatonin 10 milliGRAM(s) Oral at bedtime  melatonin 5 milliGRAM(s) Oral at bedtime  metoprolol tartrate 100 milliGRAM(s) Oral three times a day  tamsulosin 0.4 milliGRAM(s) Oral at bedtime    PRN Meds  acetaminophen     Tablet .. 650 milliGRAM(s) Oral every 6 hours PRN  lidocaine   4% Patch 1 Patch Transdermal daily PRN      Case discussed with resident  Care discussed with pt

## 2022-01-18 NOTE — PROGRESS NOTE ADULT - ASSESSMENT
88 yo M PMHx of ILD on home O2 at 2lpm, HTN, BPH, GERD, presented to the ED with c/o worsening shortness of breath and leg swelling for 1 week prior to presentation. Patient states that he saw his PCP recently who started him on Lasix. Pt found to have new onset afib with RVR as well as lung mass on outpt CT scan. Course complicated by covid positive    New Paroxysmal Atrial Fibrillation with Rapid Ventricular response:   - controlled  - c/w cardizem, lopressor  - INR supratherapeutic but downtrending, continue to hold coumadin     COVID-19  - MILD  - follow inflammatory markers  - Start dexamethasone/Remdisivir if symptoms or increasing o2 requirements develop    Acute on chronic hypoxic respiratory failure  Acute HFmrEF  Severe Pulmonary HTN  Interstitial lung disease:   LLL mass, mediastinal lymphadenopathy  -Pro-BNP 2765  -hold lasix, resume on dc  -CXR chronic looking bilateral interstitial opacities  -Echo showed LVEF 40-45%, mild to mod MR, mild to mod TR, severe pulmonary HTN.   -Low sodium diet  -continue nasal canula.  -Pulmonary follow up outpatient for biopsy/further work up    Left shoulder pain, 2/2 recent fall  -full active ROM of LUE on exam  -Left shoulder Xray showed no acute fracture. Continue OT.     HTN  - Continue Amlodipine and Lisinopril.    BPH  - continue with tamsulosin, finasteride    GERD  continue with PPI    Hypomagnesmia  - replenished  - follow up AM levels    DVT prophylaxis: therapeutic Lovenox  #Progress Note Handoff:  Pending (specify):  quarentine  Family discussion: pt aware he may have to quarentine  Disposition: SNF pending quarentine 88 yo M PMHx of ILD on home O2 at 2lpm, HTN, BPH, GERD, presented to the ED with c/o worsening shortness of breath and leg swelling for 1 week prior to presentation. Patient states that he saw his PCP recently who started him on Lasix. Pt found to have new onset afib with RVR as well as lung mass on outpt CT scan. Course complicated by covid positive    New Paroxysmal Atrial Fibrillation with Rapid Ventricular response:   - controlled  - c/w cardizem, lopressor  - INR supratherapeutic but downtrending, continue to hold coumadin     COVID-19  - asymptomatic  - follow inflammatory markers  - Start dexamethasone/Remdisivir if symptoms or increasing o2 requirements develop    Acute on chronic hypoxic respiratory failure  Acute HFmrEF  Severe Pulmonary HTN  Interstitial lung disease:   LLL mass, mediastinal lymphadenopathy  -Pro-BNP 2765  -hold lasix, resume on dc  -CXR chronic looking bilateral interstitial opacities  -Echo showed LVEF 40-45%, mild to mod MR, mild to mod TR, severe pulmonary HTN.   -Low sodium diet  -continue nasal canula.  -Pulmonary follow up outpatient for biopsy/further work up    Left shoulder pain, 2/2 recent fall  -full active ROM of LUE on exam  -Left shoulder Xray showed no acute fracture. Continue OT.     HTN  - Continue Amlodipine and Lisinopril.    BPH  - continue with tamsulosin, finasteride    GERD  continue with PPI    Hypomagnesmia  - replenished  - follow up AM levels    DVT prophylaxis: therapeutic Lovenox  #Progress Note Handoff:  Pending (specify):  quarentine  Family discussion: pt aware he may have to quarentine  Disposition: SNF pending quarentine

## 2022-01-18 NOTE — PROGRESS NOTE ADULT - ASSESSMENT
88 yo M with PMH of ILD on home O2 at 2lpm, HTN, BPH, GERD, presented to the ED with c/o worsening shortness of breath and leg swelling for 1 week. Patient states that he saw his PCP recently who started him on lasix.     # Acute on chronic hypoxic respiratory failure  # Fluid overload  > presented with shortness of breath, LE swelling  > CXR - bilateral interstitial opacities  > BNP 2765, d-dimer 735  > TTE: EF 40-45%, mod TR and MR, severe pHTN  > 1/4: increased lopressor from 12.5mg BID to 25mg BID  > 1/5: gave 2nd dose of IV lasix  > 1/7: increased metoprolol to 50mg BID  > 1/8: LE duplex negative  > 1/10: increased metoprolol to 50mg q8h per cardio recs   > 1/11: started on cardizem 120mg qD  > 1/13: increased metoprolol to 75mg TID  > 1/14: EP reconsulted for continued a fib, rec incr metoprolol to 100mg TID OR incr cardizem to 180mg qD - incr metoprolol based on recs in setting of low BP  > 1/14: hypotensive episode during ambulation, holding lasix for now, encourage lara stockings when OOB  - lasix 20mg PO OD   - on baseline supplemental O2 (2L NC)  - c/w metoprolol tartrate 100mg TID  - c/w cardizem daily  - continue with ACEi  - Strict I/O, daily weight, fluid restriction  - Monitor electrolytes Keep K > 4, Mg >2    # COVID + on 1/17   - at baseline O2 requirement of 2L   - inflammatory markers: ferritin, procal, crp pending     D-Dimer Assay, Quantitative: 293 ng/mL DDU [0 - 230] (01-18)    D-Dimer Assay, Quantitative: 735 ng/mL DDU [0 - 230] (01-05)        # New onset Afib  > CHADSVASC - 3  > per EP recs, consult cardio for possible EFREN/DCCV  > per Cardio avoid amio and digoxin, high risk for cardioversion, pursue medical management  > TSH 0.70  > 1/6: dc'd Lovenox and started on eliquis per cardio recs  > 1/10: per EP rec metoprolol 50mg q8h, switched from eliquis to coumadin 5mg  > 1/11: INR 1.79, given 2.5mg coumadin per clinic recs  > 1/12: INR 1.69, ordered 2.5mg coumadin  > 1/13: INR 1.77, ordered 4mg coumadin  > 1/14: INR 2.12, ordered 2.5mg coumadin  > 1/15: INR 3.28  >1/17: holding coumadin still  > 1/18: holding coumadin still  > Eliquis and Xarelto pricing at pt pharmacy ([facility]) is $286/month  - c/w metoprolol and lasix      #left shoulder pain, 2/2 recent fall  > full active ROM of LUE on exam  > no acute fracture on xray  - lidocaine patch and heating packs PRN for pain   - OT following     # ILD  # Pulmonary Nodule  > CT chest prior to admission: 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, new mediastinal adenopathy, left pleural effusion, cardiomegaly with dilated ascending and descending aorta  > TTE: EF 40-45%, mod TR and MR, severe pHTN  > per pulm, SpO2 target >92%, HOB at 45 degrees, aspiration precautions  - continue with supplemental O2, on home 2L NC   - OP pulm Follow up re: 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

## 2022-01-19 NOTE — PROGRESS NOTE ADULT - SUBJECTIVE AND OBJECTIVE BOX
NNEKA JOSEPH 89y Male      Patient is a 89y old  Male who presents with a chief complaint of Shortness of breath (19 Jan 2022 09:15)        INTERVAL HPI/OVERNIGHT EVENTS: No acute events overnight.     PHYSICAL EXAM:  GENERAL: NAD  HEAD:  Normocephalic  EYES:  conjunctiva and sclera clear  ENMT: Moist mucous membranes  NECK: Supple  NERVOUS SYSTEM:  Alert, awake  CHEST/LUNG: Good air exchange bilaterally, no wheeze  HEART: Regular rate and rhythm  No LLE         Vital Signs Last 24 Hrs  T(C): 36.2 (19 Jan 2022 05:00), Max: 36.3 (18 Jan 2022 20:00)  T(F): 97.1 (19 Jan 2022 05:00), Max: 97.4 (18 Jan 2022 20:00)  HR: 88 (19 Jan 2022 05:00) (66 - 88)  BP: 136/75 (19 Jan 2022 05:00) (100/59 - 136/75)  BP(mean): --  RR: 18 (19 Jan 2022 05:00) (18 - 18)  SpO2: 97% (19 Jan 2022 05:00) (95% - 97%)      Consultant(s) Notes Reviewed:  [X] YES  [ ] NO  Care Discussed with Consultants/Other Providers [X] YES  [ ] NO  Imaging Personally Reviewed:  [X] YES  [ ] NO      LABS:                        11.5   6.09  )-----------( 137      ( 19 Jan 2022 06:28 )             35.8     01-19    135  |  100  |  22<H>  ----------------------------<  89  4.6   |  23  |  1.0    Ca    8.5      19 Jan 2022 06:28    TPro  7.0  /  Alb  3.1<L>  /  TBili  0.5  /  DBili  x   /  AST  17  /  ALT  18  /  AlkPhos  66  01-19    PT/INR - ( 19 Jan 2022 06:28 )   PT: 35.80 sec;   INR: 3.15 ratio         PTT - ( 19 Jan 2022 06:28 )  PTT:41.2 sec      CAPILLARY BLOOD GLUCOSE

## 2022-01-19 NOTE — PROGRESS NOTE ADULT - ASSESSMENT
89 yo M with PMH of ILD on home O2 at 2lpm, HTN, BPH, GERD, presented to the ED with c/o worsening shortness of breath and leg swelling for 1 week. Patient states that he saw his PCP recently who started him on lasix.     # Acute on chronic hypoxic respiratory failure due to COVID /  Fluid overload  > presented with shortness of breath, LE swelling  > CXR - bilateral interstitial opacities  > BNP 2765, d-dimer 735  > TTE: EF 40-45%, mod TR and MR, severe pHTN  > 1/4: increased lopressor from 12.5mg BID to 25mg BID  > 1/5: gave 2nd dose of IV lasix  > 1/7: increased metoprolol to 50mg BID  > 1/8: LE duplex negative  > 1/10: increased metoprolol to 50mg q8h per cardio recs   > 1/11: started on cardizem 120mg qD  > 1/13: increased metoprolol to 75mg TID  > 1/14: EP reconsulted for continued a fib, rec incr metoprolol to 100mg TID OR incr cardizem to 180mg qD - incr metoprolol based on recs in setting of low BP  > 1/14: hypotensive episode during ambulation, holding lasix for now, encourage lara stockings when OOB  - lasix 20mg PO OD   - on baseline supplemental O2 (2L NC)  - c/w metoprolol tartrate 100mg TID  - c/w cardizem daily  - continue with ACEi  - Strict I/O, daily weight, fluid restriction  - Monitor electrolytes Keep K > 4, Mg >2    # COVID + on 1/17   - at baseline O2 requirement of 2L   - inflammatory markers: ferritin, procal, crp pending     D-Dimer Assay, Quantitative: 293 ng/mL DDU [0 - 230] (01-18)    D-Dimer Assay, Quantitative: 735 ng/mL DDU [0 - 230] (01-05)    # New onset Afib  > CHADSVASC - 3  > per EP recs, consult cardio for possible EFREN/DCCV  > per Cardio avoid amio and digoxin, high risk for cardioversion, pursue medical management  > TSH 0.70  > 1/6: dc'd Lovenox and started on eliquis per cardio recs  > 1/10: per EP rec metoprolol 50mg q8h, switched from eliquis to coumadin 5mg  > 1/11: INR 1.79, given 2.5mg coumadin per clinic recs  > 1/12: INR 1.69, ordered 2.5mg coumadin  > 1/13: INR 1.77, ordered 4mg coumadin  > 1/14: INR 2.12, ordered 2.5mg coumadin  > 1/15: INR 3.28  >1/17: holding coumadin still  > 1/18: holding coumadin still  > 1/19 INR 3.15 --> ordered 2mg coumadin   > Eliquis and Xarelto pricing at pt pharmacy ([facility] is $286/month  - c/w metoprolol and lasix      #left shoulder pain, 2/2 recent fall  > full active ROM of LUE on exam  > no acute fracture on xray  - lidocaine patch and heating packs PRN for pain   - OT following     # ILD  # Pulmonary Nodule  > CT chest prior to admission: 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, new mediastinal adenopathy, left pleural effusion, cardiomegaly with dilated ascending and descending aorta  > TTE: EF 40-45%, mod TR and MR, severe pHTN  > per pulm, SpO2 target >92%, HOB at 45 degrees, aspiration precautions  - continue with supplemental O2, on home 2L NC   - OP pulm Follow up re: 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    # HTN  - on amlodipine-benazepril at home  - continue with amlodipine  - Lisinopril while inpatient    # BPH  - continue with tamsulosin, finasteride    # GERD  - continue with PPI                                                                              ----------------------------------------------------  # DVT prophylaxis: therapeutic coumadin  # GI prophylaxis: protonix  # Diet: DASH/TLC  # Activity Score (AM-PAC): IAT  # Code status: FULL  # Disposition: dc to [facility] pending quarantine till 1/23   # POC: Elkin (son) [phone]

## 2022-01-19 NOTE — PROGRESS NOTE ADULT - ASSESSMENT
88 yo M with PMH of ILD on home O2 at 2lpm, HTN, BPH, GERD, presented to the ED with c/o worsening shortness of breath and leg swelling for 1 week. Patient states that he saw his PCP recently who started him on lasix.     # Acute on chronic hypoxic respiratory failure  # Fluid overload  > presented with shortness of breath, LE swelling  > CXR - bilateral interstitial opacities  > BNP 2765, d-dimer 735  > TTE: EF 40-45%, mod TR and MR, severe pHTN  > 1/4: increased lopressor from 12.5mg BID to 25mg BID  > 1/5: gave 2nd dose of IV lasix  > 1/7: increased metoprolol to 50mg BID  > 1/8: LE duplex negative  > 1/10: increased metoprolol to 50mg q8h per cardio recs   > 1/11: started on cardizem 120mg qD  > 1/13: increased metoprolol to 75mg TID  > 1/14: EP reconsulted for continued a fib, rec incr metoprolol to 100mg TID OR incr cardizem to 180mg qD - incr metoprolol based on recs in setting of low BP  > 1/14: hypotensive episode during ambulation, holding lasix for now, encourage lara stockings when OOB  - lasix 20mg PO OD   - on baseline supplemental O2 (2L NC)  - c/w metoprolol tartrate 100mg TID  - c/w cardizem daily  - continue with ACEi  - Strict I/O, daily weight, fluid restriction  - Monitor electrolytes Keep K > 4, Mg >2    # COVID + on 1/17   - at baseline O2 requirement of 2L   - inflammatory markers: ferritin, procal, crp pending     D-Dimer Assay, Quantitative: 293 ng/mL DDU [0 - 230] (01-18)    D-Dimer Assay, Quantitative: 735 ng/mL DDU [0 - 230] (01-05)        # New onset Afib  > CHADSVASC - 3  > per EP recs, consult cardio for possible EFREN/DCCV  > per Cardio avoid amio and digoxin, high risk for cardioversion, pursue medical management  > TSH 0.70  > 1/6: dc'd Lovenox and started on eliquis per cardio recs  > 1/10: per EP rec metoprolol 50mg q8h, switched from eliquis to coumadin 5mg  > 1/11: INR 1.79, given 2.5mg coumadin per clinic recs  > 1/12: INR 1.69, ordered 2.5mg coumadin  > 1/13: INR 1.77, ordered 4mg coumadin  > 1/14: INR 2.12, ordered 2.5mg coumadin  > 1/15: INR 3.28  >1/17: holding coumadin still  > 1/18: holding coumadin still  > 1/19 INR 3.15 --> ordered 2mg coumadin   > Eliquis [facility] Xarel[facility]286[facility]  - c/w metoprolol and lasix      #left shoulder pain, 2/2 recent fall  > full active ROM of LUE on exam  > no acute fracture on xray  - lidocaine patch and heating packs PRN for pain   - OT following     # ILD  # Pulmonary Nodule  > CT chest prior to admission: 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, new mediastinal adenopathy, left pleural effusion, cardiomegaly with dilated ascending and descending aorta  > TTE: EF 40-45%, mod TR and MR, severe pHTN  > per pulm, SpO2 target >92%, HOB at 45 degrees, aspiration precautions  - continue with supplemental O2, on home 2L NC   - OP pulm Follow up re: 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

## 2022-01-19 NOTE — PROGRESS NOTE ADULT - SUBJECTIVE AND OBJECTIVE BOX
NNEKA JOSEPH  89y  Male      Patient is a 89y old  Male who presents with a chief complaint of Shortness of breath.            INTERVAL HPI/OVERNIGHT EVENTS:      ******************************* REVIEW OF SYSTEMS:**********************************************    All other review of systems negative    *********************** VITALS ******************************************    T(F): 96.3 (01-19-22 @ 11:50)  HR: 63 (01-19-22 @ 11:50) (63 - 88)  BP: 107/64 (01-19-22 @ 11:50) (107/64 - 136/75)  RR: 18 (01-19-22 @ 11:50) (18 - 18)  SpO2: 93% (01-19-22 @ 11:50) (93% - 97%)    01-18-22 @ 07:01  -  01-19-22 @ 07:00  --------------------------------------------------------  IN: 0 mL / OUT: 51 mL / NET: -51    01-19-22 @ 07:01  -  01-19-22 @ 15:57  --------------------------------------------------------  IN: 0 mL / OUT: 2 mL / NET: -2 mL            01-18-22 @ 07:01  -  01-19-22 @ 07:00  --------------------------------------------------------  IN: 0 mL / OUT: 51 mL / NET: -51 mL    01-19-22 @ 07:01  -  01-19-22 @ 15:57  --------------------------------------------------------  IN: 0 mL / OUT: 2 mL / NET: -2 mL        ******************************** PHYSICAL EXAM:**************************************************  GENERAL: NAD    PSYCH: no agitation, baseline mentation  HEENT:     NERVOUS SYSTEM:  Alert & Oriented X2-3  PULMONARY: NICK, CTA    CARDIOVASCULAR: S1S2 RRR    GI: Soft, NT, ND; BS present.    EXTREMITIES:  2+ Peripheral Pulses, Improving.     LYMPH: No lymphadenopathy noted    SKIN: No rashes or lesions      **************************** LABS *******************************************************                          11.5   6.09  )-----------( 137      ( 19 Jan 2022 06:28 )             35.8     01-19    135  |  100  |  22<H>  ----------------------------<  89  4.6   |  23  |  1.0    Ca    8.5      19 Jan 2022 06:28    TPro  7.0  /  Alb  3.1<L>  /  TBili  0.5  /  DBili  x   /  AST  17  /  ALT  18  /  AlkPhos  66  01-19        PT/INR - ( 19 Jan 2022 06:28 )   PT: 35.80 sec;   INR: 3.15 ratio         PTT - ( 19 Jan 2022 06:28 )  PTT:41.2 sec  Lactate Trend        CAPILLARY BLOOD GLUCOSE              **************************Active Medications *******************************************  No Known Allergies      acetaminophen     Tablet .. 650 milliGRAM(s) Oral every 6 hours PRN  ascorbic acid 500 milliGRAM(s) Oral daily  BACItracin   Ointment 1 Application(s) Topical two times a day  chlorhexidine 4% Liquid 1 Application(s) Topical <User Schedule>  cholecalciferol 1000 Unit(s) Oral daily  cyanocobalamin 1000 MICROGram(s) Oral daily  diltiazem    milliGRAM(s) Oral daily  famotidine    Tablet 20 milliGRAM(s) Oral daily  ferrous    sulfate 325 milliGRAM(s) Oral daily  finasteride 5 milliGRAM(s) Oral daily  furosemide    Tablet 20 milliGRAM(s) Oral daily  lidocaine   4% Patch 1 Patch Transdermal daily PRN  lisinopril 10 milliGRAM(s) Oral daily  melatonin 10 milliGRAM(s) Oral at bedtime  melatonin 5 milliGRAM(s) Oral at bedtime  metoprolol tartrate 100 milliGRAM(s) Oral three times a day  tamsulosin 0.4 milliGRAM(s) Oral at bedtime  warfarin 2 milliGRAM(s) Oral once      ***************************************************  RADIOLOGY & ADDITIONAL TESTS:    Imaging Personally Reviewed:  [ ] YES  [ ] NO    HEALTH ISSUES - PROBLEM Dx:  ILD (interstitial lung disease)    HTN (hypertension)    BPH (benign prostatic hyperplasia)    Chronic GERD

## 2022-01-20 NOTE — PROGRESS NOTE ADULT - ASSESSMENT
89 yo M with PMH of ILD on home O2 at 2lpm, HTN, BPH, GERD, presented to the ED with c/o worsening shortness of breath and leg swelling for 1 week. Patient states that he saw his PCP recently who started him on lasix.     # Acute on chronic hypoxic respiratory failure  # Fluid overload  - presented with shortness of breath, LE swelling  - CXR - bilateral interstitial opacities more prominent on the R   - on baseline supplemental O2 (2L NC)  - BNP 2765, d-dimer 735  - TTE: EF 40-45%, mod TR and MR, severe pHTN  - 1/8: LE duplex negative  - lasix 20mg PO OD     - c/w metoprolol tartrate 100mg TID  - c/w cardizem daily  - continue with ACEi  - Strict I/O, daily weight, fluid restriction  - Monitor electrolytes Keep K > 4, Mg >2    # COVID + on 1/17   - at baseline O2 requirement of 2L   - inflammatory markers: FU   Procalcitonin, Serum: 0.04 ng/mL (01-19)  C-Reactive Protein, Serum: 53 mg/L (01-18)  D-Dimer Assay, Quantitative: 293 ng/mL DDU [0 - 230] (01-18)  D-Dimer Assay, Quantitative: 735 ng/mL DDU [0 - 230] (01-05)  Lactate Dehydrogenase, Serum: 226 U/L [50 - 242] (01-18)        # New onset Afib  > CHADSVASC - 3  > per EP recs, consult cardio for possible EFREN/DCCV  > per Cardio avoid amio and digoxin, high risk for cardioversion, pursue medical management  > TSH 0.70  > 1/6: dc'd Lovenox and started on eliquis per cardio recs  > 1/10: switched from eliquis to coumadin 5mg  > 1/11: INR 1.79, given 2.5mg coumadin per clinic recs  > 1/12: INR 1.69, ordered 2.5mg coumadin  > 1/13: INR 1.77, ordered 4mg coumadin  > 1/14: INR 2.12, ordered 2.5mg coumadin  > 1/15: INR 3.28  > 1/17: holding coumadin   > 1/18: holding coumadin   > 1/19 INR 3.15 --> ordered 2mg coumadin   > 1/20 INR 3.07 --> ordered 2.5mg coumadin   > Eliquis and Xarelto pricing at pt pharmacy [facility] [place] is $286/month  - c/w metoprolol 100mg TID  - c/w cardizem 120mg OD     #left shoulder pain, 2/2 recent fall  > full active ROM of LUE on exam  > no acute fracture on xray  - lidocaine patch and heating packs PRN for pain   - OT following     # ILD  # Pulmonary Nodule  > CT chest prior to admission: 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, new mediastinal adenopathy, left pleural effusion, cardiomegaly with dilated ascending and descending aorta  > TTE: EF 40-45%, mod TR and MR, severe pHTN  > per pulm, SpO2 target >92%, HOB at 45 degrees, aspiration precautions  - continue with supplemental O2, on home 2L NC   - OP pulm Follow up re: 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

## 2022-01-20 NOTE — PROGRESS NOTE ADULT - SUBJECTIVE AND OBJECTIVE BOX
NNEKA JOSEPH 89y Male      Patient is a 89y old  Male who presents with a chief complaint of Shortness of breath (20 Jan 2022 09:10)        INTERVAL HPI/OVERNIGHT EVENTS: No acute events overnight. Pt stable waiting to finish quarantine on 1/23 to be dc'd to SNF.      PHYSICAL EXAM:  GENERAL: NAD  HEAD:  Normocephalic  EYES:  conjunctiva and sclera clear  ENMT: Moist mucous membranes  NECK: Supple  NERVOUS SYSTEM:  Alert, awake  CHEST/LUNG: R course crackles  HEART: Regular rate and rhythm        Vital Signs Last 24 Hrs  T(C): 36.2 (20 Jan 2022 05:00), Max: 36.2 (19 Jan 2022 20:02)  T(F): 97.2 (20 Jan 2022 05:00), Max: 97.2 (19 Jan 2022 20:02)  HR: 99 (20 Jan 2022 05:00) (63 - 99)  BP: 158/85 (20 Jan 2022 05:00) (107/64 - 158/85)  BP(mean): --  RR: 18 (20 Jan 2022 05:00) (18 - 18)  SpO2: 93% (20 Jan 2022 05:00) (93% - 93%)      Consultant(s) Notes Reviewed:  [X] YES  [ ] NO  Care Discussed with Consultants/Other Providers [X] YES  [ ] NO  Imaging Personally Reviewed:  [X] YES  [ ] NO      LABS:                        11.4   7.40  )-----------( 129      ( 20 Jan 2022 07:00 )             35.4     01-20    133<L>  |  99  |  21<H>  ----------------------------<  86  4.2   |  20  |  1.0    Ca    8.3<L>      20 Jan 2022 07:00    TPro  7.0  /  Alb  3.1<L>  /  TBili  0.5  /  DBili  x   /  AST  17  /  ALT  18  /  AlkPhos  66  01-19    PT/INR - ( 20 Jan 2022 07:00 )   PT: 34.90 sec;   INR: 3.07 ratio         PTT - ( 20 Jan 2022 07:00 )  PTT:39.4 sec      CAPILLARY BLOOD GLUCOSE

## 2022-01-20 NOTE — PROGRESS NOTE ADULT - SUBJECTIVE AND OBJECTIVE BOX
NNEKA JOSEPH  89y  Male      Patient is a 89y old  Male who presents with a chief complaint of Shortness of breath.      INTERVAL HPI/OVERNIGHT EVENTS:      ******************************* REVIEW OF SYSTEMS:**********************************************    All other review of systems negative    *********************** VITALS ******************************************    T(F): 96.6 (01-20-22 @ 12:50)  HR: 72 (01-20-22 @ 12:50) (72 - 99)  BP: 90/60 (01-20-22 @ 12:50) (90/60 - 158/85)  RR: 18 (01-20-22 @ 12:50) (18 - 18)  SpO2: 95% (01-20-22 @ 12:50) (93% - 95%)    01-19-22 @ 07:01  -  01-20-22 @ 07:00  --------------------------------------------------------  IN: 0 mL / OUT: 2 mL / NET: -2 mL            01-19-22 @ 07:01  -  01-20-22 @ 07:00  --------------------------------------------------------  IN: 0 mL / OUT: 2 mL / NET: -2 mL        ******************************** PHYSICAL EXAM:**************************************************  GENERAL: NAD    PSYCH: no agitation, baseline mentation  HEENT:     NERVOUS SYSTEM:  Alert & Oriented X3,     PULMONARY: NICK, decreased R>L     CARDIOVASCULAR: S1S2 RRR    GI: Soft, NT, ND; BS present.    EXTREMITIES:  2+ Peripheral Pulses, No clubbing, cyanosis, or edema    LYMPH: No lymphadenopathy noted    SKIN: No rashes or lesions      **************************** LABS *******************************************************                          11.4   7.40  )-----------( 129      ( 20 Jan 2022 07:00 )             35.4     01-20    133<L>  |  99  |  21<H>  ----------------------------<  86  4.2   |  20  |  1.0    Ca    8.3<L>      20 Jan 2022 07:00    TPro  7.0  /  Alb  3.1<L>  /  TBili  0.5  /  DBili  x   /  AST  17  /  ALT  18  /  AlkPhos  66  01-19        PT/INR - ( 20 Jan 2022 07:00 )   PT: 34.90 sec;   INR: 3.07 ratio         PTT - ( 20 Jan 2022 07:00 )  PTT:39.4 sec  Lactate Trend        CAPILLARY BLOOD GLUCOSE              **************************Active Medications *******************************************  No Known Allergies      acetaminophen     Tablet .. 650 milliGRAM(s) Oral every 6 hours PRN  ascorbic acid 500 milliGRAM(s) Oral daily  BACItracin   Ointment 1 Application(s) Topical two times a day  chlorhexidine 4% Liquid 1 Application(s) Topical <User Schedule>  cholecalciferol 1000 Unit(s) Oral daily  cyanocobalamin 1000 MICROGram(s) Oral daily  diltiazem    milliGRAM(s) Oral daily  famotidine    Tablet 20 milliGRAM(s) Oral daily  ferrous    sulfate 325 milliGRAM(s) Oral daily  finasteride 5 milliGRAM(s) Oral daily  furosemide    Tablet 20 milliGRAM(s) Oral daily  lidocaine   4% Patch 1 Patch Transdermal daily PRN  lisinopril 10 milliGRAM(s) Oral daily  melatonin 5 milliGRAM(s) Oral at bedtime  melatonin 10 milliGRAM(s) Oral at bedtime  metoprolol tartrate 100 milliGRAM(s) Oral three times a day  tamsulosin 0.4 milliGRAM(s) Oral at bedtime  warfarin 2.5 milliGRAM(s) Oral once      ***************************************************  RADIOLOGY & ADDITIONAL TESTS:    Imaging Personally Reviewed:  [ ] YES  [ ] NO    HEALTH ISSUES - PROBLEM Dx:  ILD (interstitial lung disease)    HTN (hypertension)    BPH (benign prostatic hyperplasia)    Chronic GERD

## 2022-01-20 NOTE — CHART NOTE - NSCHARTNOTESELECT_GEN_ALL_CORE
COVID Communication Team/Event Note
Event Note

## 2022-01-20 NOTE — CHART NOTE - NSCHARTNOTEFT_GEN_A_CORE
I attended COVID rounds with the Hospitalist and housestaff and called family.    [   ]  I attempted to reach                      but was unable to leave a message.  [   ]  I left a message with   [ x  ]  reached his son, Elkin [phone]          and gave an update on the patient's condition.
I attended COVID rounds with the Hospitalist and housestaff and called family.    [   ]  I attempted to reach                      but was unable to leave a message.  [   ]  I left a message with   [x   ]  reached his son, Elkin, [phone]          and gave an update on the patient's condition.
Pt tested COVID-19 positive. Explained spo2 at baseline so can hold off dexamethasone/remdesivir. Pt states he wouldn't take these meds even if he qualified. He inquired about invermectin, I explained that is not a treatment for COVID-19. I drew curtain to help isolate patient roommate but pt almita curtain back open. He was agreeable to wear mask. Son at bedside was aware
I attended COVID rounds with the Hospitalist and housestaff and called family.    [   ]  I attempted to reach                      but was unable to leave a message.  [   ]  I left a message with   [  x ]  reached  Elkin         and gave an update on the patient's condition. Plan is for STR at [facility] when quarantine is over.

## 2022-01-20 NOTE — PROGRESS NOTE ADULT - ASSESSMENT
89 yo M with PMH of ILD on home O2 at 2lpm, HTN, BPH, GERD, presented to the ED with c/o worsening shortness of breath and leg swelling for 1 week. Patient states that he saw his PCP recently who started him on lasix.     # Acute on chronic hypoxic respiratory failure due to COVID /  Fluid overload  > presented with shortness of breath, LE swelling  > CXR - bilateral interstitial opacities  > BNP 2765, d-dimer 735  - c/w Lasix 20mg   - Strict I/O, daily weight, fluid restriction  - Monitor electrolytes Keep K > 4, Mg >2   COVID + on 1/17   - at baseline O2 requirement of 2L     # New onset Afib  > CHADSVASC - 3  > per EP recs, consult cardio for possible EFREN/DCCV  > per Cardio avoid amio and digoxin, high risk for cardioversion, pursue medical management  > TSH 0.70  > 1/15: INR 3.28  > 1/19 INR 3.15 --> ordered 2mg coumadin   > Eliquis and Xarelto pricing at pt [facility] [facility] is $286/month  - c/w metoprolol and lasix    #left shoulder pain, 2/2 recent fall  > full active ROM of LUE on exam  > no acute fracture on xray  - lidocaine patch and heating packs PRN for pain   - OT following     # ILD  # Pulmonary Nodule  > CT chest prior to admission: 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, new mediastinal adenopathy, left pleural effusion, cardiomegaly with dilated ascending and descending aorta  > TTE: EF 40-45%, mod TR and MR, severe pHTN  - continue with supplemental O2, on home 2L NC   - OP pulm Follow up re: 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

## 2022-01-21 NOTE — PROGRESS NOTE ADULT - SUBJECTIVE AND OBJECTIVE BOX
NNEKA JOSEPH  89y  Male      Patient is a 89y old  Male who presents with a chief complaint of Shortness of breath.      INTERVAL HPI/OVERNIGHT EVENTS:      ******************************* REVIEW OF SYSTEMS:**********************************************    All other review of systems negative    *********************** VITALS ******************************************    T(F): 97.8 (01-21-22 @ 13:13)  HR: 84 (01-21-22 @ 13:13) (80 - 100)  BP: 97/63 (01-21-22 @ 13:13) (92/55 - 136/82)  RR: 18 (01-21-22 @ 13:13) (18 - 18)  SpO2: 94% (01-21-22 @ 13:13) (94% - 97%)    01-20-22 @ 07:01  -  01-21-22 @ 07:00  --------------------------------------------------------  IN: 0 mL / OUT: 550 mL / NET: -550 mL            01-20-22 @ 07:01  -  01-21-22 @ 07:00  --------------------------------------------------------  IN: 0 mL / OUT: 550 mL / NET: -550 mL        ******************************** PHYSICAL EXAM:**************************************************  GENERAL: NAD    PSYCH: no agitation, baseline mentation  HEENT:     NERVOUS SYSTEM:  Alert & Oriented X3,  PULMONARY: NICK, CTA    CARDIOVASCULAR: S1S2 RRR    GI: Soft, NT, ND; BS present.    EXTREMITIES:  2+ Peripheral Pulses, No clubbing, cyanosis, or edema    LYMPH: No lymphadenopathy noted    SKIN: No rashes or lesions      **************************** LABS *******************************************************                          11.9   7.23  )-----------( 143      ( 21 Jan 2022 04:30 )             36.4     01-21    133<L>  |  99  |  17  ----------------------------<  91  4.4   |  21  |  1.0    Ca    8.5      21 Jan 2022 04:30          PT/INR - ( 21 Jan 2022 04:30 )   PT: 37.40 sec;   INR: 3.29 ratio         PTT - ( 21 Jan 2022 04:30 )  PTT:39.2 sec  Lactate Trend        CAPILLARY BLOOD GLUCOSE              **************************Active Medications *******************************************  No Known Allergies      acetaminophen     Tablet .. 650 milliGRAM(s) Oral every 6 hours PRN  ascorbic acid 500 milliGRAM(s) Oral daily  BACItracin   Ointment 1 Application(s) Topical two times a day  chlorhexidine 4% Liquid 1 Application(s) Topical <User Schedule>  cholecalciferol 1000 Unit(s) Oral daily  cyanocobalamin 1000 MICROGram(s) Oral daily  diltiazem    milliGRAM(s) Oral daily  famotidine    Tablet 20 milliGRAM(s) Oral daily  ferrous    sulfate 325 milliGRAM(s) Oral daily  finasteride 5 milliGRAM(s) Oral daily  furosemide    Tablet 20 milliGRAM(s) Oral daily  lidocaine   4% Patch 1 Patch Transdermal daily PRN  lisinopril 10 milliGRAM(s) Oral daily  melatonin 5 milliGRAM(s) Oral at bedtime  melatonin 10 milliGRAM(s) Oral at bedtime  metoprolol tartrate 100 milliGRAM(s) Oral three times a day  tamsulosin 0.4 milliGRAM(s) Oral at bedtime      ***************************************************  RADIOLOGY & ADDITIONAL TESTS:    Imaging Personally Reviewed:  [ ] YES  [ ] NO    HEALTH ISSUES - PROBLEM Dx:  ILD (interstitial lung disease)    HTN (hypertension)    BPH (benign prostatic hyperplasia)    Chronic GERD

## 2022-01-21 NOTE — PROGRESS NOTE ADULT - ASSESSMENT
89 yo M with PMH of ILD on home O2 at 2lpm, HTN, BPH, GERD, presented to the ED with c/o worsening shortness of breath and leg swelling for 1 week. Patient states that he saw his PCP recently who started him on lasix.     # Acute on chronic hypoxic respiratory failure  # Fluid overload  - presented with shortness of breath, LE swelling  - CXR - bilateral interstitial opacities more prominent on the R   - on baseline supplemental O2 (2L NC)  - BNP 2765, d-dimer 735  - TTE: EF 40-45%, mod TR and MR, severe pHTN  - 1/8: LE duplex negative  - lasix 20mg PO OD   - c/w metoprolol tartrate 100mg TID  - c/w cardizem daily  - continue with ACEi  - Strict I/O, daily weight, fluid restriction  - Monitor electrolytes Keep K > 4, Mg >2    # COVID + on 1/17   - at baseline O2 requirement of 2L   - inflammatory markers: FU   Procalcitonin, Serum: 0.04 ng/mL (01-19)  C-Reactive Protein, Serum: 53 mg/L (01-18)  D-Dimer Assay, Quantitative: 293 ng/mL DDU [0 - 230] (01-18)  D-Dimer Assay, Quantitative: 735 ng/mL DDU [0 - 230] (01-05)  Lactate Dehydrogenase, Serum: 226 U/L [50 - 242] (01-18)        # New onset Afib  > CHADSVASC - 3  > per EP recs, consult cardio for possible EFREN/DCCV  > per Cardio avoid amio and digoxin, high risk for cardioversion, pursue medical management  > TSH 0.70  > 1/6: dc'd Lovenox and started on eliquis per cardio recs  > 1/10: switched from eliquis to coumadin 5mg  > 1/11: INR 1.79, given 2.5mg coumadin per clinic recs  > 1/12: INR 1.69, ordered 2.5mg coumadin  > 1/13: INR 1.77, ordered 4mg coumadin  > 1/14: INR 2.12, ordered 2.5mg coumadin  > 1/15: INR 3.28  > 1/17: holding coumadin   > 1/18: holding coumadin   > 1/19 INR 3.15 --> ordered 2mg coumadin   > 1/20 INR 3.07 --> ordered 2.5mg coumadin   < 1/21 INR 3.29 --> skip coumadin   > Eliquis and Xarelto pricing at pt pharmacy ([facility] is $286/month  - c/w metoprolol 100mg TID  - c/w cardizem 120mg OD     #left shoulder pain, 2/2 recent fall  > full active ROM of LUE on exam  > no acute fracture on xray  - lidocaine patch and heating packs PRN for pain   - OT following     # ILD  # Pulmonary Nodule  > CT chest prior to admission: 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, new mediastinal adenopathy, left pleural effusion, cardiomegaly with dilated ascending and descending aorta  > TTE: EF 40-45%, mod TR and MR, severe pHTN  > per pulm, SpO2 target >92%, HOB at 45 degrees, aspiration precautions  - continue with supplemental O2, on home 2L NC   - OP pulm Follow up re: 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    # HTN  - on amlodipine-benazepril at home  - continue with amlodipine  - Lisinopril while inpatient    # BPH  - continue with tamsulosin, finasteride    # GERD  - continue with PPI                                                                              ----------------------------------------------------  # DVT prophylaxis: therapeutic coumadin  # GI prophylaxis: protonix  # Diet: DASH/TLC  # Activity Score (AM-PAC): IAT  # Code status: FULL  # Disposition: dc to SNF pending quarantine   # POC: Elkin (son) [phone]

## 2022-01-21 NOTE — PROGRESS NOTE ADULT - SUBJECTIVE AND OBJECTIVE BOX
NNEKA JOSEPH 89y Male      Patient is a 89y old  Male who presents with a chief complaint of Shortness of breath (21 Jan 2022 09:28)        INTERVAL HPI/OVERNIGHT EVENTS: No acute events overnight. Patient was seen and evaluated at the bedside. The patient denies pain. Vitals stable. Patient denies fever/chills, chest pain, shortness of breath, abdominal pain, headaches, nausea/vomiting, and diarrhea/constipation.      PHYSICAL EXAM:  GENERAL: NAD  HEAD:  Normocephalic  EYES:  conjunctiva and sclera clear  ENMT: Moist mucous membranes  NECK: Supple  NERVOUS SYSTEM:  Alert, awake  CHEST/LUNG: Good air exchange bilaterally, no wheeze  HEART: Regular rate and rhythm        Vital Signs Last 24 Hrs  T(C): 36.6 (21 Jan 2022 13:13), Max: 36.6 (21 Jan 2022 13:13)  T(F): 97.8 (21 Jan 2022 13:13), Max: 97.8 (21 Jan 2022 13:13)  HR: 84 (21 Jan 2022 13:13) (80 - 100)  BP: 97/63 (21 Jan 2022 13:13) (92/55 - 136/82)  BP(mean): --  RR: 18 (21 Jan 2022 13:13) (18 - 18)  SpO2: 94% (21 Jan 2022 13:13) (94% - 97%)      Consultant(s) Notes Reviewed:  [X] YES  [ ] NO  Care Discussed with Consultants/Other Providers [X] YES  [ ] NO  Imaging Personally Reviewed:  [X] YES  [ ] NO      LABS:                        11.9   7.23  )-----------( 143      ( 21 Jan 2022 04:30 )             36.4     01-21    133<L>  |  99  |  17  ----------------------------<  91  4.4   |  21  |  1.0    Ca    8.5      21 Jan 2022 04:30      PT/INR - ( 21 Jan 2022 04:30 )   PT: 37.40 sec;   INR: 3.29 ratio         PTT - ( 21 Jan 2022 04:30 )  PTT:39.2 sec      CAPILLARY BLOOD GLUCOSE

## 2022-01-21 NOTE — PROGRESS NOTE ADULT - ASSESSMENT
88 yo M with PMH of ILD on home O2 at 2lpm, HTN, BPH, GERD, presented to the ED with c/o worsening shortness of breath and leg swelling for 1 week. Patient states that he saw his PCP recently who started him on lasix.     # Acute on chronic hypoxic respiratory failure due to COVID /  Fluid overload  > presented with shortness of breath, LE swelling  > CXR - bilateral interstitial opacities  > BNP 2765, d-dimer 735  - c/w Lasix 20mg   - Strict I/O, daily weight, fluid restriction  - Monitor electrolytes Keep K > 4, Mg >2   COVID + on 1/17   - at baseline O2 requirement of 2L     # New onset Afib  > CHADSVASC - 3  > per EP recs, consult cardio for possible EFREN/DCCV  > per Cardio avoid amio and digoxin, high risk for cardioversion, pursue medical management  > TSH 0.70  > 1/15: INR 3.28  >  INR 3.15 --> 3.07 >3.29      Please hold coumadin till INR Drops below 2.5   - c/w metoprolol and lasix    #left shoulder pain, 2/2 recent fall  > full active ROM of LUE on exam  > no acute fracture on xray  - lidocaine patch and heating packs PRN for pain   - OT following     # ILD  # Pulmonary Nodule  > CT chest prior to admission: 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, new mediastinal adenopathy, left pleural effusion, cardiomegaly with dilated ascending and descending aorta  > TTE: EF 40-45%, mod TR and MR, severe pHTN  - continue with supplemental O2, on home 2L NC   - OP pulm Follow up re: 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

## 2022-01-22 NOTE — PROGRESS NOTE ADULT - PROVIDER SPECIALTY LIST ADULT
Internal Medicine
Electrophysiology
Hospitalist
Internal Medicine
Hospitalist
Internal Medicine
Cardiology
Internal Medicine
Internal Medicine

## 2022-01-22 NOTE — PROGRESS NOTE ADULT - REASON FOR ADMISSION

## 2022-01-22 NOTE — PROGRESS NOTE ADULT - SUBJECTIVE AND OBJECTIVE BOX
NNEKA JOSEPH  89y  Male      Patient is a 89y old  Male who presents with a chief complaint of Shortness of breath.      INTERVAL HPI/OVERNIGHT EVENTS:      ******************************* REVIEW OF SYSTEMS:**********************************************    All other review of systems negative    *********************** VITALS ******************************************    T(F): 96.3 (01-22-22 @ 12:10)  HR: 72 (01-22-22 @ 12:10) (66 - 103)  BP: 106/59 (01-22-22 @ 12:10) (86/59 - 135/68)  RR: 16 (01-22-22 @ 12:10) (16 - 18)  SpO2: 96% (01-22-22 @ 12:10) (90% - 96%)    01-21-22 @ 07:01  -  01-22-22 @ 07:00  --------------------------------------------------------  IN: 240 mL / OUT: 650 mL / NET: -410 mL    01-22-22 @ 07:01  -  01-22-22 @ 12:56  --------------------------------------------------------  IN: 0 mL / OUT: 250 mL / NET: -250 mL            01-21-22 @ 07:01  -  01-22-22 @ 07:00  --------------------------------------------------------  IN: 240 mL / OUT: 650 mL / NET: -410 mL    01-22-22 @ 07:01  -  01-22-22 @ 12:56  --------------------------------------------------------  IN: 0 mL / OUT: 250 mL / NET: -250 mL        ******************************** PHYSICAL EXAM:**************************************************  GENERAL: NAD    PSYCH: no agitation, baseline mentation  HEENT:     NERVOUS SYSTEM:  Alert & Oriented X3, MS  5/5 B/L  UE and LE ; Sensory intact    PULMONARY: NICK, CTA    CARDIOVASCULAR: S1S2 RRR    GI: Soft, NT, ND; BS present.    EXTREMITIES:  2+ Peripheral Pulses, No clubbing, cyanosis, or edema    LYMPH: No lymphadenopathy noted    SKIN: No rashes or lesions      **************************** LABS *******************************************************                          11.9   7.58  )-----------( 155      ( 22 Jan 2022 04:30 )             37.4     01-22    134<L>  |  98  |  22<H>  ----------------------------<  164<H>  4.4   |  22  |  1.2    Ca    8.7      22 Jan 2022 04:30          PT/INR - ( 22 Jan 2022 04:30 )   PT: >40.00 sec;   INR: 4.36 ratio         PTT - ( 22 Jan 2022 04:30 )  PTT:44.1 sec  Lactate Trend        CAPILLARY BLOOD GLUCOSE              **************************Active Medications *******************************************  No Known Allergies      acetaminophen     Tablet .. 650 milliGRAM(s) Oral every 6 hours PRN  ascorbic acid 500 milliGRAM(s) Oral daily  BACItracin   Ointment 1 Application(s) Topical two times a day  chlorhexidine 4% Liquid 1 Application(s) Topical <User Schedule>  cholecalciferol 1000 Unit(s) Oral daily  cyanocobalamin 1000 MICROGram(s) Oral daily  diltiazem    milliGRAM(s) Oral daily  famotidine    Tablet 20 milliGRAM(s) Oral daily  ferrous    sulfate 325 milliGRAM(s) Oral daily  finasteride 5 milliGRAM(s) Oral daily  furosemide    Tablet 20 milliGRAM(s) Oral daily  lidocaine   4% Patch 1 Patch Transdermal daily PRN  lisinopril 10 milliGRAM(s) Oral daily  melatonin 5 milliGRAM(s) Oral at bedtime  melatonin 10 milliGRAM(s) Oral at bedtime  metoprolol tartrate 100 milliGRAM(s) Oral three times a day  tamsulosin 0.4 milliGRAM(s) Oral at bedtime      ***************************************************  RADIOLOGY & ADDITIONAL TESTS:    Imaging Personally Reviewed:  [ ] YES  [ ] NO    HEALTH ISSUES - PROBLEM Dx:  ILD (interstitial lung disease)    HTN (hypertension)    BPH (benign prostatic hyperplasia)    Chronic GERD           NNEKA JOSEPH  89y  Male      Patient is a 89y old  Male who presents with a chief complaint of Shortness of breath.      INTERVAL HPI/OVERNIGHT EVENTS:      ******************************* REVIEW OF SYSTEMS:**********************************************    All other review of systems negative    *********************** VITALS ******************************************    T(F): 96.3 (01-22-22 @ 12:10)  HR: 72 (01-22-22 @ 12:10) (66 - 103)  BP: 106/59 (01-22-22 @ 12:10) (86/59 - 135/68)  RR: 16 (01-22-22 @ 12:10) (16 - 18)  SpO2: 96% (01-22-22 @ 12:10) (90% - 96%)    01-21-22 @ 07:01  -  01-22-22 @ 07:00  --------------------------------------------------------  IN: 240 mL / OUT: 650 mL / NET: -410 mL    01-22-22 @ 07:01  -  01-22-22 @ 12:56  --------------------------------------------------------  IN: 0 mL / OUT: 250 mL / NET: -250 mL            01-21-22 @ 07:01  -  01-22-22 @ 07:00  --------------------------------------------------------  IN: 240 mL / OUT: 650 mL / NET: -410 mL    01-22-22 @ 07:01  -  01-22-22 @ 12:56  --------------------------------------------------------  IN: 0 mL / OUT: 250 mL / NET: -250 mL        ******************************** PHYSICAL EXAM:**************************************************  GENERAL: NAD    PSYCH: no agitation, baseline mentation  HEENT:     NERVOUS SYSTEM:  Alert & Oriented X3,    PULMONARY: NICK, CTA    CARDIOVASCULAR: S1S2 RRR    GI: Soft, NT, ND; BS present.    EXTREMITIES:  2+ Peripheral Pulses, No clubbing, cyanosis, or edema    LYMPH: No lymphadenopathy noted    SKIN: No rashes or lesions      **************************** LABS *******************************************************                          11.9   7.58  )-----------( 155      ( 22 Jan 2022 04:30 )             37.4     01-22    134<L>  |  98  |  22<H>  ----------------------------<  164<H>  4.4   |  22  |  1.2    Ca    8.7      22 Jan 2022 04:30          PT/INR - ( 22 Jan 2022 04:30 )   PT: >40.00 sec;   INR: 4.36 ratio         PTT - ( 22 Jan 2022 04:30 )  PTT:44.1 sec  Lactate Trend        CAPILLARY BLOOD GLUCOSE              **************************Active Medications *******************************************  No Known Allergies      acetaminophen     Tablet .. 650 milliGRAM(s) Oral every 6 hours PRN  ascorbic acid 500 milliGRAM(s) Oral daily  BACItracin   Ointment 1 Application(s) Topical two times a day  chlorhexidine 4% Liquid 1 Application(s) Topical <User Schedule>  cholecalciferol 1000 Unit(s) Oral daily  cyanocobalamin 1000 MICROGram(s) Oral daily  diltiazem    milliGRAM(s) Oral daily  famotidine    Tablet 20 milliGRAM(s) Oral daily  ferrous    sulfate 325 milliGRAM(s) Oral daily  finasteride 5 milliGRAM(s) Oral daily  furosemide    Tablet 20 milliGRAM(s) Oral daily  lidocaine   4% Patch 1 Patch Transdermal daily PRN  lisinopril 10 milliGRAM(s) Oral daily  melatonin 5 milliGRAM(s) Oral at bedtime  melatonin 10 milliGRAM(s) Oral at bedtime  metoprolol tartrate 100 milliGRAM(s) Oral three times a day  tamsulosin 0.4 milliGRAM(s) Oral at bedtime      ***************************************************  RADIOLOGY & ADDITIONAL TESTS:    Imaging Personally Reviewed:  [ ] YES  [ ] NO    HEALTH ISSUES - PROBLEM Dx:  ILD (interstitial lung disease)    HTN (hypertension)    BPH (benign prostatic hyperplasia)    Chronic GERD

## 2022-01-22 NOTE — PROGRESS NOTE ADULT - ASSESSMENT
89 yo M with PMH of ILD on home O2 at 2lpm, HTN, BPH, GERD, presented to the ED with c/o worsening shortness of breath and leg swelling for 1 week. Patient states that he saw his PCP recently who started him on lasix.     # Acute on chronic hypoxic respiratory failure due to COVID /  Fluid overload  > presented with shortness of breath, LE swelling  > CXR - bilateral interstitial opacities  > BNP 2765, d-dimer 735  - c/w Lasix 20mg   - Strict I/O, daily weight, fluid restriction  - Monitor electrolytes Keep K > 4, Mg >2   COVID + on 1/17   - at baseline O2 requirement of 2L     # New onset Afib  > CHADSVASC - 3  > per EP recs, consult cardio for possible EFREN/DCCV  > per Cardio avoid amio and digoxin, high risk for cardioversion, pursue medical management  > TSH 0.70  > 1/15: INR 3.28  > 1/19 INR 3.15 --> ordered 2mg coumadin   > Eliquis and Xarelto pricing at pt pharmacy [facility] is $286/month  - c/w metoprolol and lasix    #left shoulder pain, 2/2 recent fall  > full active ROM of LUE on exam  > no acute fracture on xray  - lidocaine patch and heating packs PRN for pain   - OT following     # ILD  # Pulmonary Nodule  > CT chest prior to admission: 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, new mediastinal adenopathy, left pleural effusion, cardiomegaly with dilated ascending and descending aorta  > TTE: EF 40-45%, mod TR and MR, severe pHTN  - continue with supplemental O2, on home 2L NC   - OP pulm Follow up re: 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 Isolation ended 01/22/22 88 yo M with PMH of ILD on home O2 at 2lpm, HTN, BPH, GERD, presented to the ED with c/o worsening shortness of breath and leg swelling for 1 week. Patient states that he saw his PCP recently who started him on lasix.     # Acute on chronic hypoxic respiratory failure due to COVID /  Fluid overload  > presented with shortness of breath, LE swelling  > CXR - bilateral interstitial opacities  > BNP 2765, d-dimer 735  - c/w Lasix 20mg   - Strict I/O, daily weight, fluid restriction  - Monitor electrolytes Keep K > 4, Mg >2   COVID + on 1/17   - at baseline O2 requirement of 2L     # New onset Afib  > CHADSVASC - 3  > per EP recs, consult cardio for possible EFREN/DCCV  > per Cardio avoid amio and digoxin, high risk for cardioversion, pursue medical management  > TSH 0.70  > 1/15: INR 3.28  > 1/19 INR 3.15 --> 4.36  > Will be holding coumadin for now.     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 Isolation ended 01/22/22

## 2022-02-17 NOTE — HISTORY OF PRESENT ILLNESS
[Follow-Up - Routine Clinic] : a routine clinic follow-up of [Dyspnea on Exertion] : dyspnea on exertion [Dry Cough] : dry cough [Currently Experiencing] : The patient is currently experiencing symptoms. [None] : The patient is not currently being treated for this problem [Shortness of Breath] : Shortness of Breath [Dyspnea at Rest] : no dyspnea at rest [Productive Cough] : no productive cough [Wheezing] : no wheezing [Hemoptysis] : no hemoptysis

## 2022-02-17 NOTE — ASSESSMENT
[FreeTextEntry1] : HO ILD possibly related to RA\par Chronic hypoxemic respiratory failure on home O2 \par Did not want any intervention or therapy in the past.  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

## 2022-02-17 NOTE — REASON FOR VISIT
[Follow-Up] : a follow-up visit [Shortness of Breath] : shortness of breath [ILD] : ILD [[id number]] : lung mass

## 2022-02-24 NOTE — H&P ADULT - NSHPPHYSICALEXAM_GEN_ALL_CORE
PHYSICAL EXAM:  GENERAL: NAD, AAO x 4, 89y M  HEAD:  Atraumatic, Normocephalic  EYES: EOMI, conjunctiva and sclera white  NECK: Supple, No JVD  CHEST/LUNG: Clear to auscultation bilaterally; No wheeze; No crackles; No accessory muscles used  HEART: Regular rate and rhythm; No murmurs;   ABDOMEN: Soft, Nontender, Nondistended; Bowel sounds present; No guarding, No organomegaly  EXTREMITIES:  2+ Peripheral Pulses, No cyanosis or edema  NEUROLOGY: non-focal PHYSICAL EXAM:  GENERAL: NAD, AAO x 3, 89y M, on biPAP  HEAD:  Atraumatic, Normocephalic  EYES: EOMI, conjunctiva and sclera white  NECK: Supple, No JVD  CHEST/LUNG: crackles at bilateral bases; No wheeze;   HEART: Regular rate and rhythm; +s1 s2, No murmurs;   ABDOMEN: Soft, Nontender, Nondistended; Bowel sounds present; No guarding, No organomegaly, Joel draining blood tinged urine( likely traumatic)  EXTREMITIES:  2+ LE edema  NEUROLOGY: non-focal

## 2022-02-24 NOTE — ED PROVIDER NOTE - ATTENDING CONTRIBUTION TO CARE
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  Son reports that he has had 2 episodes of syncope today and a few this week most of them occur when he is getting up quickly.  He also reports that the patient is more short of breath.  On exam patient is in no distress S1-S2 bilateral wheezing soft nontender neuro gross unremarkable    Impression  Shortness of breath with syncope.   Patient initially treated for suspected COPD exacerbation however x-ray more consistent with CHF given Lasix.  Patient pending CT head and reeval

## 2022-02-24 NOTE — ED ADULT NURSE NOTE - OBJECTIVE STATEMENT
Patient present to ED with complains of SOB and one episode of syncope yesterday.  Patient is usually on 5 liter NC at home for COPD and per family, patient was hypoxic at home

## 2022-02-24 NOTE — ED PROVIDER NOTE - CLINICAL SUMMARY MEDICAL DECISION MAKING FREE TEXT BOX
89-year-old male with worsening shortness of breath, hypoxemic respiratory failure.  Likely CHF exacerbation.  Placed on BiPAP, supplemental oxygen, given diuretics, with improvement.  No wheezing on my exam, crackles.  No increased work of breathing.  CT with no ICH.  Admitted to stepdown unit.

## 2022-02-24 NOTE — ED PROVIDER NOTE - NS ED ROS FT
Constitutional: (-) fever  Eyes/ENT: (-) blurry vision, (-) epistaxis  Cardiovascular: (-) chest pain, (+) syncope  Respiratory: (+) cough, (+) shortness of breath  Gastrointestinal: (-) vomiting, (-) diarrhea  Musculoskeletal: (-) neck pain, (-) back pain, (-) joint pain  Integumentary: (-) rash, (+) edema  Neurological: (-) headache, (-) altered mental status  Psychiatric: (-) hallucinations  Allergic/Immunologic: (-) pruritus

## 2022-02-24 NOTE — H&P ADULT - ATTENDING COMMENTS
HPI:  Patient is 89-year-old Male with PMH of COPD and ILD on 5 L home oxygen, HTN, BPH, GERD, CHF (last EF 01/2022 40-45%), recent Covid (in January 2022) presents for shortness of breath. Per patient and son at bedside patient had syncopal episodes at home. denies head trauma. In ED, noted to be tachypneic, denies chest pain, palpitations, fever, cough, abdominal pain, nausea, vomiting, diarrhea. Reports chronic shortness of breath, worsened recently over the past 3 days    In ED, VS /81, HR 95, RR 20, T 95F Spo2 88% on 5L. Pt was placed on BiPAP with improvement os Sat to 98%.  Labs: WBC 10.9. Hgb 10.2( baseline 11), troponin 0.01, pro-BNP 6168.  VBG Ph 7.38, pCO2 51, HCO3 30  CXR with worsening b/l opacities.  CT head with no acute intracranial pathology; Stable moderate chronic microvascular ischemic changes.  POCUS was done in ED, showed right sided pleural effusion. Pt was given 40 mg IV lasix x2, 2 gr IV Magnesium.  Pt was admitted for acute on chronic CHF exacerbation.         (24 Feb 2022 20:38)  REVIEW OF SYSTEMS: see cc/HPI   CONSTITUTIONAL: No weakness, fevers or chills  EYES/ENT: No visual changes;  No vertigo or throat pain   NECK: No pain or stiffness  RESPIRATORY: (+) cough, (+)  wheezing, hemoptysis; (+) shortness of breath, O2 dependent, see cc/HPI   CARDIOVASCULAR: No chest pain or palpitations  GASTROINTESTINAL: No abdominal or epigastric pain. No nausea, vomiting, or hematemesis; No diarrhea or constipation. No melena or hematochezia.  GENITOURINARY: No dysuria, frequency or hematuria  NEUROLOGICAL: No numbness or weakness  SKIN: No itching, rashes    Physical Exam:  General: WN/WD NAD  Neurology: A&Ox3, nonfocal, follows commands  Eyes: PERRLA/ EOMI  ENT/Neck: Neck supple, trachea midline, No JVD  Respiratory:######### B/L, No wheezing,##### rales,##### rhonchi  CV: Normal rate regular rhythm, S1S2, no murmurs, rubs or gallops  Abdominal: Soft, NT, ND +BS,   Extremities: No edema, + peripheral pulses  Skin: No Rashes, Hematoma, Ecchymosis  Incisions:   Tubes: HPI:  Patient is 89-year-old Male with PMH of COPD and ILD on 5 L home oxygen, HTN, BPH, GERD, CHF (last EF 01/2022 40-45%), recent Covid (in January 2022) presents for shortness of breath. Per patient and son at bedside patient had syncopal episodes at home. denies head trauma. In ED, noted to be tachypneic, denies chest pain, palpitations, fever, cough, abdominal pain, nausea, vomiting, diarrhea. Reports chronic shortness of breath, worsened recently over the past 3 days    In ED, VS /81, HR 95, RR 20, T 95F Spo2 88% on 5L. Pt was placed on BiPAP with improvement os Sat to 98%.  Labs: WBC 10.9. Hgb 10.2( baseline 11), troponin 0.01, pro-BNP 6168.  VBG Ph 7.38, pCO2 51, HCO3 30  CXR with worsening b/l opacities.  CT head with no acute intracranial pathology; Stable moderate chronic microvascular ischemic changes.  POCUS was done in ED, showed right sided pleural effusion. Pt was given 40 mg IV lasix x2, 2 gr IV Magnesium.  Pt was admitted for acute on chronic CHF exacerbation.         (24 Feb 2022 20:38)  REVIEW OF SYSTEMS: see cc/HPI   CONSTITUTIONAL: No weakness, fevers or chills  EYES/ENT: No visual changes;  No vertigo or throat pain   NECK: No pain or stiffness  RESPIRATORY: (+) cough, (+)  wheezing, hemoptysis; (+) shortness of breath, O2 dependent, see cc/HPI   CARDIOVASCULAR: No chest pain or palpitations  GASTROINTESTINAL: No abdominal or epigastric pain. No nausea, vomiting, or hematemesis; No diarrhea or constipation. No melena or hematochezia.  GENITOURINARY: No dysuria, frequency or hematuria  NEUROLOGICAL: No numbness or weakness  SKIN: No itching, rashes    Physical Exam:  General: WN/WD NAD, resting w/ BiPAP on and sat 100% w/ FiO2 @ 50%  Neurology: A&Ox3, nonfocal, follows commands  Eyes: PERRLA/ EOMI  ENT/Neck: Neck supple, trachea midline, No JVD  Respiratory: B/L decrease breath sounds , No wheezing,(+) rales, NO rhonchi  CV: Normal rate regular rhythm, S1S2, no murmurs, rubs or gallops  Abdominal: Soft, NT, ND +BS,   Extremities: No edema, + peripheral pulses  Skin: No Rashes, Hematoma, Ecchymosis  Incisions: n/a  Tubes: n/A    A/p  Acute resp failure w/ hypoxia 2/2 decompensated HF   H/o HFmrEF   H/o HTN   -Admit to tele  -agree w/ BiPAP w/o O2 and pulse ox monitoring overnight  -Lasix IV/ Is and Os / daily weights / fluid restriction / monitor lytes  -c/w ACEI, Bblocker, CCB   -Heart failure team eval     Syncope ? etiology r/o A. fib w/ RVR r/o seizure r/o orthostatic hypotension   -admit to tele   -check orthostatics   -c/w  diltiazem @ 120mg for now     ILD / Pulm nodule   - follow up w/ Pulm     GERD - c/w PPI    DVT prophylaxis HPI:  Patient is 89-year-old Male with PMH of COPD and ILD on 5 L home oxygen, HTN, BPH, GERD, CHF (last EF 01/2022 40-45%), recent Covid (in January 2022) presents for shortness of breath. Per patient and son at bedside patient had syncopal episodes at home. denies head trauma. In ED, noted to be tachypneic, denies chest pain, palpitations, fever, cough, abdominal pain, nausea, vomiting, diarrhea. Reports chronic shortness of breath, worsened recently over the past 3 days    In ED, VS /81, HR 95, RR 20, T 95F Spo2 88% on 5L. Pt was placed on BiPAP with improvement os Sat to 98%.  Labs: WBC 10.9. Hgb 10.2( baseline 11), troponin 0.01, pro-BNP 6168.  VBG Ph 7.38, pCO2 51, HCO3 30  CXR with worsening b/l opacities.  CT head with no acute intracranial pathology; Stable moderate chronic microvascular ischemic changes.  POCUS was done in ED, showed right sided pleural effusion. Pt was given 40 mg IV lasix x2, 2 gr IV Magnesium.  Pt was admitted for acute on chronic CHF exacerbation.         (24 Feb 2022 20:38)  REVIEW OF SYSTEMS: see cc/HPI   CONSTITUTIONAL: No weakness, fevers or chills  EYES/ENT: No visual changes;  No vertigo or throat pain   NECK: No pain or stiffness  RESPIRATORY: (+) cough, (+)  wheezing, hemoptysis; (+) shortness of breath, O2 dependent, see cc/HPI   CARDIOVASCULAR: No chest pain or palpitations  GASTROINTESTINAL: No abdominal or epigastric pain. No nausea, vomiting, or hematemesis; No diarrhea or constipation. No melena or hematochezia.  GENITOURINARY: No dysuria, frequency or hematuria  NEUROLOGICAL: No numbness or weakness  SKIN: No itching, rashes    Physical Exam:  General: WN/WD NAD, resting w/ BiPAP on and sat 100% w/ FiO2 @ 50%  Neurology: A&Ox3, nonfocal, follows commands  Eyes: PERRLA/ EOMI  ENT/Neck: Neck supple, trachea midline, No JVD  Respiratory: B/L decrease breath sounds , No wheezing,(+) rales, NO rhonchi  CV: Normal rate regular rhythm, S1S2, no murmurs, rubs or gallops  Abdominal: Soft, NT, ND +BS,   Extremities: No edema, + peripheral pulses  Skin: No Rashes, Hematoma, Ecchymosis  Incisions: n/a  Tubes: n/A    A/p  Acute resp failure w/ hypoxia 2/2 decompensated HF   H/o HFmrEF   H/o HTN   -Admit to tele  -agree w/ BiPAP w/o O2 and pulse ox monitoring overnight  -Lasix IV/ Is and Os / daily weights / fluid restriction / monitor lytes  -c/w ACEI, Bblocker, CCB   -Heart failure team eval     Syncope ? etiology r/o A. fib w/ RVR r/o seizure r/o orthostatic hypotension   -admit to tele   -check orthostatics   -c/w  diltiazem @ 120mg for now     ILD / Pulm nodule   - follow up w/ Pulm     GERD - c/w PPI    DVT prophylaxis    PATIENT SEEN 2/24/22 ( note modified 2/25)

## 2022-02-24 NOTE — ED ADULT TRIAGE NOTE - HEIGHT IN CM
.  Progress Note    Subjective:   6-7-20  Laying in bed comfortably  Wound vac drainage from thigh and L Foot  Constipation resolved  Urine retention resolved and no pvr  No HA. CP, SOB, Fa and C.  Transferred to the floor on 6-3-20    6-2-20  Feeling better and on NC  Laying in bed comfortably. Waiting to move to a different room.  Able to ambulate some with PT  Andreas PO intake  Urine adeqaute in [place]  Denies any HA, Dz, CP, SOB, N, V, abdominal pain, fever and chills.    6-1-20  Off vent since 5-31-20  Laying comfortable on C PAP  No complaints  No pain in the leg and foot.    5-30-20  On vent  OR again today    5-29-20  Intubated and on vent  Stable resp and hemodynamic status  S/p I and D aropund 2 am    5-28-20  Eager to get the surgery done  No N and V  SOB on exertion only  Sleep disturbed  Urination adeqaute    5-27-20  Had episode of N and V this am which has resolved with zofran. His appetite is returning  SOB on exertion only  Sleep disturbed  Urination adeqaute    5-26-20  Feeling better for sure with no leg pain and tightening of the leg.  Laying in bed comfortably.  Able to ambulate some.  Andreas PO intake  Urine adeqaute  Denies any HA, Dz, CP, SOB, N, V, abdominal pain, fever and chills.    5-25-20  Ray Saab is a 49 year old male with h/o morbid obesity, CAD s/p CABG (2018), DM2, COPD, JAMES and prevous RLE abscess requiring I&D who presents to the ED with 4d h/o LLE swelling and erythema with associated fevers. He denies trauma to the area, open wounds.        Past Medical History:   Diagnosis Date   • A-fib (CMS/HCC)    • Chronic pain    • Coronary artery disease    • Decreased cardiac ejection fraction    • DM (diabetes mellitus) (CMS/[facility])    • H/O transfusion of whole blood    • History of group B Streptococcus (GBS) infection 2014   • HTN (hypertension)    • Hyperlipidemia    • Ischemic cardiomyopathy    • MRSA (methicillin resistant Staphylococcus aureus) 2014   • Sleep apnea    • SVT  (supraventricular tachycardia) (CMS/HCC)       Past Surgical History:   Procedure Laterality Date   • Coronary artery bypass graft N/A 05/08/2018    Quadruple -- Dr. Ray Escobar   • Iabp N/A 11/28/2018    3 Vessel disease and IABP   • Infected skin debridement Right 06/07/2019    Incision and debridement of right medial proximal thigh vein harvest site abscess -- Dr. Christofer Goins   • Outpatient surgery  11/08/2014    Cellulitis surgery to chin   • Pacemaker implant N/A 01/10/2019   • Tonsillectomy        ALLERGIES:   Allergen Reactions   • Ampicillin Other (See Comments)      Social History     Tobacco Use   • Smoking status: Never Smoker   • Smokeless tobacco: Never Used   Substance Use Topics   • Alcohol use: Never     Frequency: Never      Family History   Problem Relation Age of Onset   • Atrial Fibrilliation Mother    • Coronary Artery Disease Father         CABG   • Diabetes Father         Review of Systems  Review of Systems   As in HPI    Scheduled Medications  • [START ON 6/8/2020] vancomycin (VANCOCIN) IVPB  1,250 mg Intravenous 2 times per day   • tamsulosin  0.4 mg Oral Daily PC   • bumetanide  2 mg Oral BID   • insulin glargine  44 Units Subcutaneous 2 times per day   • insulin lispro  12 Units Subcutaneous TID AC   • gabapentin  300 mg Oral TID   • docusate sodium  200 mg Oral BID   • polyethylene glycol  17 g Oral Daily   • insulin lispro   Subcutaneous TID AC   • aspirin  81 mg Oral Daily   • atorvastatin  80 mg Oral Daily   • carvedilol  12.5 mg Oral BID WC   • spironolactone  25 mg Oral Daily   • pantoprazole  40 mg Oral QAM AC   • sodium chloride (PF)  2 mL Intracatheter 2 times per day   • VANCOMYCIN - PHARMACIST MONITORED   Does not apply See Admin Instructions   • enoxaparin  40 mg Subcutaneous Q12H       PRN Medications      Objective:     Vitals:    06/07/20 1549   BP: 118/78   Pulse: 73   Resp: 16   Temp: 98.3 °F (36.8 °C)     I/O this shift:  In: -   Out: 1400 [Urine:1400]    Physical  Exam  Constitutional:       Appearance: Normal appearance. He is obese.   HENT:      Head: Normocephalic and atraumatic.      Nose: Nose normal.      Mouth/Throat:      Mouth: Mucous membranes are dry.      Pharynx: Oropharynx is clear.   Eyes:      Extraocular Movements: Extraocular movements intact.      Conjunctiva/sclera: Conjunctivae normal.      Pupils: Pupils are equal, round, and reactive to light.   Neck:      Musculoskeletal: Normal range of motion and neck supple.   Cardiovascular:      Rate and Rhythm: Normal rate and regular rhythm.      Pulses: Normal pulses.      Heart sounds: Normal heart sounds.   Pulmonary:      Effort: Pulmonary effort is normal.      Breath sounds: Normal breath sounds. No wheezing or rhonchi.   Abdominal:      General: Abdomen is flat. Bowel sounds are normal. There is no distension.      Palpations: Abdomen is soft.      Tenderness: There is no abdominal tenderness. There is no guarding.   Musculoskeletal: Normal range of motion.   Skin:     General: Skin is warm and dry.      Comments: L thigh wound vac  L Calf sutures  L Foot wound vac   Neurological:      General: No focal deficit present.      Mental Status: He is alert and oriented to person, place, and time.   Psychiatric:         Mood and Affect: Mood normal.         Behavior: Behavior normal.        Data Review:   Labs:   Lab Results   Component Value Date/Time    WBC 8.1 06/02/2020 05:35 AM    HGB 10.3 (L) 06/02/2020 05:35 AM    HCT 30.9 (L) 06/02/2020 05:35 AM     06/02/2020 05:35 AM     Lab Results   Component Value Date/Time    SODIUM 134 (L) 06/07/2020 06:13 AM    POTASSIUM 3.7 06/07/2020 06:13 AM    CHLORIDE 98 06/07/2020 06:13 AM    CO2 31 06/07/2020 06:13 AM    BUN 30 (H) 06/07/2020 06:13 AM    CREATININE 0.88 06/07/2020 06:13 AM    GLUCOSE 164 (H) 06/07/2020 06:13 AM    CALCIUM 8.7 06/07/2020 06:13 AM    ALBUMIN 3.2 (L) 05/25/2020 07:24 PM    AST 16 05/25/2020 07:24 PM    ALKPT 75 05/25/2020 07:24 PM     BILIRUBIN 1.8 (H) 05/25/2020 07:24 PM    TSH 3.490 05/26/2020 04:10 AM     Hemoglobin A1C (%)   Date Value   05/26/2020 9.2 (H)         Imaging:   Encounter Date: 05/25/20   Electrocardiogram 12-Lead   Result Value    Ventricular Rate EKG/Min (BPM) 90    Atrial Rate (BPM) 90    WA-Interval (MSEC) 186    QRS-Interval (MSEC) 106    QT-Interval (MSEC) 366    QTc 448    P Axis (Degrees) 45    R Axis (Degrees) -10    T Axis (Degrees) 149    REPORT TEXT      Normal sinus rhythm  Intraventricular conduction defect  in LBB  ST And  T wave abnormality, consider lateral ischemia  Abnormal ECG  No previous ECGs available  Confirmed by GRAEME BHATT MD ([id number]) on 5/26/2020 7:43:45 PM       Ct Lower Extremity Left W Contrast    Result Date: 5/25/2020  Narrative: EXAM: CT LOWER EXTREMITY LEFT W CONTRAST INDICATION: knee down include foot cellulitis.  Infection, pain.. TECHNIQUE:  Computed tomographic images of the left lower extremity from the knee to the foot were obtained without the administration of intravenous contrast, and axial, sagittal, and coronal reformats were provided.  Automated exposure control (AEC) and/or iterative reconstruction technique was utilized as radiation dose reduction. COMPARISON:  None. FINDINGS:  There is no acute fracture.  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  Small knee joint effusion is seen.  Tricompartmental osteoarthritis appears mild.  Chronic/posttraumatic changes of the right medial malleolar tip are seen.  No  bony erosion is seen.  No abnormal periosteal reaction is seen.  There is extensive skin thickening and subcutaneous edema within the lower extremity consistent with the given history of cellulitis.  No drainable fluid collection is seen.  More focal stranding is seen at the medial distal thigh, however there is no collection-like appearance.  Abnormal density is partially included on the field-of-view at the right anterolateral mid thigh, which may be  related to scarring.  No soft tissue air is seen. Atherosclerotic calcifications are seen.  Muscle bulk appears normal.      Impression: 1.  Extensive skin thickening and subcutaneous edema consistent with the given history of cellulitis.  No drainable fluid collection is seen. 2.  No fracture or bony erosion. 3.  Please see above for additional observations. Electronically Signed by: CLAUDE ALVARENGA M.D. Signed on: 5/25/2020 9:19 PM     Xr Chest Pa Or Ap 1 View    Result Date: 5/25/2020  Narrative: EXAM: XR CHEST PA OR AP 1 VIEW INDICATION: fever. Fever, cellulitis of both lower legs. COMPARISON: 01/12/2019 TECHNIQUE: Portable upright AP radiograph of the chest FINDINGS: The left chest AICD leads project at the right atrium and right ventricle.  Postsurgical changes of median sternotomy are seen.  The cardiomediastinal silhouette is mildly prominent, at least in part due to AP technique, unchanged since the prior exam.  There is no pneumothorax.  The costophrenic angles are sharp.  There is mild interstitial prominence and perihilar haziness.  Linear densities are seen at the lungs.  No lobar consolidation is seen.     Impression: No acute-appearing fracture is seen.  Number one mildly prominent cardiac silhouette and findings of mild pulmonary interstitial edema are likely exaggerated due to AP technique. 2.  Linear densities at the lungs predominantly at the bases may be related to subsegmental atelectasis/scarring or edema. Follow-up with full inspiratory PA and lateral radiographs to ensure resolution is recommended. Electronically Signed by: CLAUDE ALVARENGA M.D. Signed on: 5/25/2020 9:23 PM     Us Vasc Extremity Lower Venous Duplex - Left    Result Date: 5/25/2020  Narrative: EXAM: US VASC EXTREMITY LOWER VENOUS DUPLEX LEFT CLINICAL INDICATION: SWELLING. . COMPARISON: There are no prior examinations currently available for comparison. TECHNIQUE: Grayscale, color flow Doppler and spectral waveform analysis  162.56 of the left lower extremity veins was performed. FINDINGS:  There is spontaneous and phasic flow to the left lower extremity deep veins with normal proximal and distal augmentation of the Doppler signal and compressibility. The calf veins are not well seen.  There our prominent left femoral lymph nodes  measuring up to 2.6 cm x 1.2 cm     Impression: 1.  There is no evidence of deep venous thrombosis in the left lower extremity interrogated veins, however the calf veins are not well seen 2.  Prominent left groin/femoral lymph node may be reactive, and clinical follow-up is recommended. Electronically Signed by: CLAUDE ALVARENGA M.D. Signed on: 5/25/2020 10:47 PM       Assessment and Plan:     L Thigh Abscess and L Foot wound  S/P I and D  Wound vac draining from both sites  Foot I and D 5-30-20 and 6-4-20  Cont IV Vanc. May need long term ABx as per ID  CT scan earlier showed  1: Worsening cellulitis  2: Venous reflux  3: Arterial occulusion     Cellulitis LE  Previous history of group B strep and MRSA.  Hx of wound infection of CABG venous graft scar on the L Thigh.  Left shin counterincisions clean  Below knee cellulitis largely resolved  IV ABx as per ID  Stable hemodynamics  CT Thigh on 5-25-20 showed no signs of necrotizing skin soft tissue process   BCx results reviewed   Wound culture with staph aureus, MRSA    U retention  Resolved  Possibly multifactorial due to inactivity, constipation, obesity, meds an prob BPH  Cont Flomax,   Treat constipation  Bladder scan and if more than 400 do CIC  If fails, will reinsert Tavarez till he becomes more active.    Constipation  Treat symptomatically    Peripheral vascular disease  Arterial duplex showed poor circ but no occulusion  PV team managing conservatively for now.  GS following for FEN     Post Op respiratory failure  Resolved  NC O2 during the day  Currently on CPAP only during the night    Screened for COVID is neg X 2      Leucocytosis  Resolved    Anemia  unspecified  H/H sable    HTN  Monitor on BB and diuretics  Low salt diet.  Advised dietary modifications.  Advised increase activity as tolerated as an O/P.  Counselled about weight loss.    History of Hyperlipidemia  Fasting lipid profile done  Cont statins  Counselled about dietary modifications.    Hx of NIDDM with neuropathy  Adjust Lantus and ISS  Cont home meds and doses adhusted. He usually takes 38 U Lantus QD and 14 U Humalog TID  A1c > 9.8  Accuchecks and ISS for mealtime coverage.  Dietary counseling  Advised weight loss and regular exercise regimen once discharged.    Hyponatremia  Monitor and replace according to the protocol.    CAD with ischemic cardiomyopathy and Hx of chronic systolic CHF  S/P CABG   EF 25-30 % by echo  Cont with Spironolactone and Bumex  Checked 2 D Echo  Cards following    Hx of A Fib and Hx of SVT  S/P implantable defibrillator  Cont with Amiodarone    Morbid obesity  Will  about weight loss, dietary modifications and regular exercise once stable.    Hx of JAMES  Cont with the use of CPAP while asleep    Constipation  Treat symptomatically    GI and DVT prophylaxis    HHN and  following    Severity of illness, risks of complictions and plan of care d/w the patient at length. All Qs answered to the satisfaction. The patient verbalized understanding.  More than half the FTF time 50 min was spent in counselling and coordination of care including medical records, labs, imaging, medications, S/E and D/I.  Further recommendations to follow as the clinical course progresses.  D/W RN     Monitor.    Disposition: IMed floor  Condition: Stable  Code Status: Full code    PLAN for Sub acute rehab possibly 6-8-20    Randee Faye MD  6/7/2020 8:56 PM

## 2022-02-24 NOTE — H&P ADULT - ASSESSMENT
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  - pro-BNP 6168. VBG Ph 7.38, pCO2 51, HCO3 30  - POCUS was done in ED, showed right sided pleural effusion.  - s/p 40 mg IV lasix x2, 2 gr IV Magnesium.  - Spo2 88% on 5L. Pt was placed on BiPAP with improvement os Sat to 98%.  - TTE(1/22): EF 40-45%, mod TR and MR, severe pHTN  - c/w lasix 60 mgh IV BID  - c/w metoprolol tartrate 100mg TID, lisinoprol 10 mg QD  - c/w cardizem 120 mg CD QD  - Strict I/O, daily weight, fluid restriction  - Monitor electrolytes Keep K > 4, Mg >2  - f/u HF consult    # syncope, r/o new seizure vs cardiac etiology vs vasovagal   - HT, + LOC. + palpitations. + bladder incontinence, shakiness, foamy mouth secretions  - Denies CP,  focal weakness, or headaches. No bowel incontinence, no tongue biting.   - CT head with no acute intracranial pathology; Stable moderate chronic microvascular ischemic changes.  - TTE(1/22): EF 40-45%, mod TR and MR, severe pHTN  - c/w tele monitoring  - f/u Serial cardiac enzymes/EKGs  - f/u TSH, folate/B12, UA  - f/u EEG.   - f/u Check orthostatics.   - f/u Neuro consult.   - PT eval. Fall precautions.     # hematouria, likely traumatic from Joel insertion  # BPH  - continue with finasteride  - Hgb 10.2( baseline 11),   - monitor CBC daily  - active type and screen  - c/w Joel care    # New onset Afib, currently rate controlled  - as per chavez multiple episodes of irregular heart rate  - CHADSVASC - 4  - TSH 0.70 1/2022  - c/w Eliquis   - c/w metoprolol 100mg TID  - c/w cardizem 120mg OD   - consider EP follow up    # ILD  # Pulmonary Nodule  - CT chest prior to admission: 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, new mediastinal adenopathy, left pleural effusion, cardiomegaly with dilated ascending and descending aorta  - OP Pulmonary Follow up for 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    # HTN  - c/w Lisinopril , lopressor    # GERD  - continue with PPI                                                          # DVT prophylaxis: Eliquis  # GI prophylaxis: protonix  # Diet: DASH/TLC, NPO while on BiPAP  # Code status: FULL  # Dispo: SDU Patient is 89-year-old Male with PMH of ILD on 3L O2 at baseline at home, HTN, BPH, GERD,  A fib( on Eliquis), 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  - pro-BNP 6168. VBG Ph 7.38, pCO2 51, HCO3 30  - POCUS was done in ED, showed right sided pleural effusion.  - s/p 40 mg IV lasix x2, 2 gr IV Magnesium.  - Spo2 88% on 5L. Pt was placed on BiPAP with improvement os Sat to 98%.  - TTE(1/22): EF 40-45%, mod TR and MR, severe pHTN  - c/w lasix 60 mgh IV BID  - c/w metoprolol tartrate 100mg TID, lisinoprol 10 mg QD  - c/w cardizem 120 mg CD QD  - Strict I/O, daily weight, fluid restriction  - Monitor electrolytes Keep K > 4, Mg >2  - f/u HF consult    # syncope, r/o new seizure vs cardiac etiology vs vasovagal   - HT, + LOC. + palpitations. + bladder incontinence, shakiness, foamy mouth secretions  - Denies CP,  focal weakness, or headaches. No bowel incontinence, no tongue biting.   - CT head with no acute intracranial pathology; Stable moderate chronic microvascular ischemic changes.  - TTE(1/22): EF 40-45%, mod TR and MR, severe pHTN  - c/w tele monitoring  - f/u Serial cardiac enzymes/EKGs  - f/u TSH, folate/B12, UA  - f/u EEG.   - f/u Check orthostatics.   - f/u Neuro consult.   - PT eval. Fall precautions.     # hematuria, likely traumatic from Joel insertion  # BPH  - continue with finasteride  - Hgb 10.2( baseline 11),   - monitor CBC daily  - active type and screen  - c/w Joel care    # Afib, currently rate controlled  - as per son multiple episodes of irregular heart rate  - CHADSVASC - 4  - TSH 0.70 1/2022  - c/w Eliquis 5mg BID  - c/w metoprolol 100mg TID  - c/w cardizem 120mg OD   - consider EP follow up    # ILD  # Pulmonary Nodule  - CT chest prior to admission: 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, new mediastinal adenopathy, left pleural effusion, cardiomegaly with dilated ascending and descending aorta  - OP Pulmonary Follow up for 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    # HTN  - c/w Lisinopril , lopressor    # GERD  - continue with PPI                                                          # DVT prophylaxis: Eliquis  # GI prophylaxis: protonix  # Diet: DASH/TLC, NPO while on BiPAP  # Code status: FULL  # Dispo: SDU

## 2022-02-24 NOTE — H&P ADULT - NSHPLABSRESULTS_GEN_ALL_CORE
LABS:                        10.2   10.98 )-----------( 147      ( 24 Feb 2022 14:43 )             32.9       02-24    137  |  101  |  29<H>  ----------------------------<  128<H>  3.9   |  25  |  1.2    Ca    8.5      24 Feb 2022 14:43  Mg     1.9     02-24    TPro  7.8  /  Alb  3.5  /  TBili  1.3<H>  /  DBili  x   /  AST  18  /  ALT  13  /  AlkPhos  68  02-24        PT/INR - ( 24 Feb 2022 14:40 )   PT: 24.60 sec;   INR: 2.16 ratio         PTT - ( 24 Feb 2022 14:40 )  PTT:32.5 sec    Lactate Trend  02-24 @ 14:43 Lactate:1.9       CARDIAC MARKERS ( 24 Feb 2022 14:43 )  x     / <0.01 ng/mL / x     / x     / x        RADIOLOGY:  < from: CT Head No Cont (02.24.22 @ 18:27) >    IMPRESSION:    No acute intracranial pathology.    Stable moderate chronic microvascular ischemic changes.    Redemonstrated bilateral mastoid effusions, nonspecific.      < end of copied text >    < from: Xray Chest 1 View-PORTABLE IMMEDIATE (02.24.22 @ 16:11) >    Impression:    Bilateral opacifications, low lung volumes.    Worsening.    < end of copied text >

## 2022-02-24 NOTE — ED PROVIDER NOTE - CARE PLAN
1 Principal Discharge DX:	Acute respiratory failure with hypoxia  Secondary Diagnosis:	CHF exacerbation

## 2022-02-24 NOTE — ED ADULT NURSE REASSESSMENT NOTE - NS ED NURSE REASSESS COMMENT FT1
Pt assessed. Pt is awake and alert. Pt taken to CT with RN, RT, and transport. Pt on bipap; tolerating well. Cardiac and 02 monitoring maintained. Lasix given. Safety and comfort maintained.

## 2022-02-24 NOTE — ED PROVIDER NOTE - OBJECTIVE STATEMENT
89-year-old man past medical history of COPD on 5 L home oxygen, hypertension, BPH, CHF, and interstitial lung disease, GERD, recent Covid positive in January 2022 presents for shortness of breath. Per patient and son at bedside patient had syncopal episodes at home. denies head trauma. In ED noted to be tachypneic, denies chest pain, palpitations, fever, cough, abdominal pain, nausea, vomiting, diarrhea. Reports chronic shortness of breath, worsened recently over the past 3 days. 89-year-old man past medical history of COPD on 5 L home oxygen, hypertension, BPH, CHF (last EF 01/2022 40-45%) , and interstitial lung disease, GERD, recent Covid positive in January 2022 presents for shortness of breath. Per patient and son at bedside patient had syncopal episodes at home. denies head trauma. In ED noted to be tachypneic, denies chest pain, palpitations, fever, cough, abdominal pain, nausea, vomiting, diarrhea. Reports chronic shortness of breath, worsened recently over the past 3 days.

## 2022-02-24 NOTE — H&P ADULT - NSICDXPASTMEDICALHX_GEN_ALL_CORE_FT
PAST MEDICAL HISTORY:  BPH (benign prostatic hyperplasia)     COPD (chronic obstructive pulmonary disease)     GERD (gastroesophageal reflux disease)     HFrEF (heart failure with reduced ejection fraction)     Hypertension     Lung interstitial disease

## 2022-02-24 NOTE — ED PROVIDER NOTE - PHYSICAL EXAMINATION
Physical Exam: PHYSICAL EXAM:  GENERAL: NAD, AAO x 3, 89y M, on biPAP  HEAD:  Atraumatic, Normocephalic  EYES: EOMI, conjunctiva and sclera white  NECK: Supple, No JVD  CHEST/LUNG: crackles at bilateral bases; No wheeze;   HEART: Regular rate and rhythm; +s1 s2, No murmurs;   ABDOMEN: Soft, Nontender, Nondistended; Bowel sounds present; No guarding, No organomegaly, Joel draining blood tinged urine( likely traumatic)  EXTREMITIES:  2+ LE edema  NEUROLOGY: non-focal

## 2022-02-24 NOTE — H&P ADULT - HISTORY OF PRESENT ILLNESS
Patient is 89-year-old Male with PMH of COPD and ILD on 5 L home oxygen, HTN, BPH, GERD, CHF (last EF 01/2022 40-45%), recent Covid (in January 2022) presents for shortness of breath. Per patient and son at bedside patient had syncopal episodes at home. denies head trauma. In ED, noted to be tachypneic, denies chest pain, palpitations, fever, cough, abdominal pain, nausea, vomiting, diarrhea. Reports chronic shortness of breath, worsened recently over the past 3 days    In ED, VS /81, HR 95, RR 20, T 95F Spo2 88% on 5L. Pt was placed on BiPAP with improvement os Sat to 98%.  Labs: WBC 10.9. Hgb 10.2( baseline 11), troponin 0.01, pro-BNP 6168.  VBG Ph 7.38, pCO2 51, HCO3 30  CXR with worsening b/l opacities.  CT head with no acute intracranial pathology; Stable moderate chronic microvascular ischemic changes.  POCUS was done in ED, showed right sided pleural effusion. Pt was given 40 mg IV lasix x2, 2 gr IV Magnesium.  Pt was admitted for acute on chronic CHF exacerbation.         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 Per son Elkin at bedside, ever since being discharged from hospital, patient has been feeling short of breath, and  for last 2 days his oxygen requirement slowly climb up to 5 L O2. Son also reports multiple episodes of syncope since last week, usually occurring with mild activity, associated with bladder incontinence. Today, when his son was changing his clothing, patient passed out again, had foamy secretions from mouth, and urinated on himself. Denies head trauma. Each episode of syncope lasts for about 2 minutes at the time. When son checked his pulse ox, his saturation was at 80% O2, he was also noted to be tachycardic at 120s. Denies chest pain, fever, chills, abdominal pain, nausea, vomiting, diarrhea. + LE edema.    In ED, VS /81, HR 95, tachypneic RR 20-24, T 95F Spo2 88% on 5L. Pt was placed on BiPAP with improvement os Sat to 98%.  Labs: WBC 10.9. Hgb 10.2( baseline 11), troponin 0.01, pro-BNP 6168. VBG Ph 7.38, pCO2 51, HCO3 30  CXR with worsening b/l opacities.  CT head with no acute intracranial pathology; Stable moderate chronic microvascular ischemic changes.  POCUS was done in ED, showed right sided pleural effusion. Pt was given 40 mg IV lasix x2, 2 gr IV Magnesium.  Pt was admitted for acute on chronic CHF exacerbation and syncope work up.         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 Per son Elkin at bedside, ever since being discharged from hospital, patient has been feeling short of breath, and  for last 2 days his oxygen requirement climbed up to 5 L O2. Son also reports multiple episodes of syncope since last week, usually occurring with mild activity, associated with bladder incontinence. Today, when his son was changing his clothing, patient passed out again, had foamy secretions from mouth, and urinated on himself. Denies head trauma. Each episode of syncope lasts for about 2 minutes at the time. When son checked his pulse ox, his saturation was at 80% O2, he was also noted to be tachycardic at 120s. Denies chest pain, fever, chills, abdominal pain, nausea, vomiting, diarrhea. + LE edema.    In ED, VS /81, HR 95, tachypneic RR 20-24, T 95F Spo2 88% on 5L. Pt was placed on BiPAP with improvement os Sat to 98%.  Labs: WBC 10.9. Hgb 10.2( baseline 11), troponin 0.01, pro-BNP 6168. VBG Ph 7.38, pCO2 51, HCO3 30  CXR with worsening b/l opacities.  CT head with no acute intracranial pathology; Stable moderate chronic microvascular ischemic changes.  POCUS was done in ED, showed right sided pleural effusion. Pt was given 40 mg IV lasix x2, 2 gr IV Magnesium.  Pt was admitted for acute on chronic CHF exacerbation and syncope work up.

## 2022-02-24 NOTE — ED PROVIDER NOTE - INTERPRETATION
Irregularly irregular, atrial fibrillation, anterior and inferolateral T wave inversions with slight ST depressions laterally.  No ST elevation.

## 2022-02-24 NOTE — ED PROVIDER NOTE - EKG #1 DATE/TIME
25-Feb-2022 00:14
I have reviewed and confirmed nurses' notes for patient's medications, allergies, medical history, and surgical history.

## 2022-02-25 NOTE — PROGRESS NOTE ADULT - ASSESSMENT
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  - pro-BNP 6168.  - VBG Ph 7.38, pCO2 51, HCO3 30  - POCUS was done in ED, showed right sided pleural effusion.  - S/p 40 mg IV lasix x2, 2 gr IV Magnesium.  - Spo2 88% on 5L. Pt was placed on BiPAP with improvement os Sat to 98%.  - TTE(1/22): EF 40-45%, mod TR and MR, severe pHTN  - C/w lasix 60 mgh IV BID  - C/w metoprolol tartrate 100mg TID, lisinoprol 10 mg QD  - C/w cardizem 120 mg CD QD  - Strict I/O, daily weight, fluid restriction  - Monitor electrolytes Keep K > 4, Mg >2  - F/u HF consult    # Syncope, r/o new seizure vs cardiac etiology vs vasovagal  - HT, + LOC. + palpitations. + bladder incontinence, shakiness, foamy mouth secretions  - Denies CP,  focal weakness, or headaches. No bowel incontinence, no tongue biting.   - CT head with no acute intracranial pathology; Stable moderate chronic microvascular ischemic changes.  - TTE(1/22): EF 40-45%, mod TR and MR, severe pHTN  - C/w tele monitoring  - F/u Serial cardiac enzymes/EKGs  - F/u TSH, folate/B12, UA  - F/u EEG.   - F/u Check orthostatics.   - F/u Neuro recs  - PT eval. Fall precautions.     # hematuria, likely traumatic from Joel insertion  # BPH  - continue with finasteride  - Hgb 10.2( baseline 11),   - monitor CBC daily  - active type and screen  - c/w Joel care    # Afib, currently rate controlled  - as per son multiple episodes of irregular heart rate  - CHADSVASC - 4  - TSH 0.70 1/2022  - c/w Eliquis 5mg BID  - c/w metoprolol 100mg TID  - c/w cardizem 120mg OD   - consider EP follow up    # ILD  # Pulmonary Nodule  - CT chest prior to admission: 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, new mediastinal adenopathy, left pleural effusion, cardiomegaly with dilated ascending and descending aorta  - OP Pulmonary Follow up for 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    # HTN  - c/w Lisinopril , lopressor    # GERD  - continue with PPI                                                          # DVT prophylaxis: Eliquis  # GI prophylaxis: protonix  # Diet: DASH/TLC, NPO while on BiPAP  # Code status: FULL  # Dispo: SDU

## 2022-02-25 NOTE — PHYSICAL THERAPY INITIAL EVALUATION ADULT - SPECIFY REASON(S)
Hold PT, patient currently on biPAP. discussed with MD in room. Pt educated on PT process, expectations, and rationale for hold. Will f/u as appropriate.

## 2022-02-25 NOTE — CONSULT NOTE ADULT - ASSESSMENT
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 On the day of presentation when son was changing his clothing, patient passed out again, had foaming from the mouth, and urinated on himself. Each episode of syncope lasts for about 2 minutes at the time unknown post ictal state. When son checked his pulse ox, his saturation was at 80%, he was also noted to be tachycardic at 120s. Denies Fever chills, fall /trauma. Pt was placed on BiPAP with improvement os Sat to 98%.      Labs: WBC 10.9. Hgb 10.2( baseline 11), troponin 0.01, pro-BNP 6168. VBG Ph 7.38, pCO2 51, HCO3 30  CXR:  worsening b/l opacities. Rt pleural effusion.   CT head: No acute intracranial pathology; Stable moderate chronic microvascular ischemic changes.      #Syncope R/o seizure    Plan  REEG  Check orthostatic bp  Cardiology evaluation for syncope and Heart failure  Neuro attending note will follow

## 2022-02-25 NOTE — CONSULT NOTE ADULT - ATTENDING COMMENTS
Patient seen and examined and agree with above except as noted.  Patients history, notes, labs, imaging, vitals and meds reviewed personally.  Patient alert oriented and only complaint is pain in his buttocks  No motor weakness, sensory symmetric, FTN NL  Possibly orthostatic, vasovagal response    Plan as above
IMPRESSION:    Acute on chronic hypoxemic respiratory failure  HO ILD possible exacerbation   Volume overload ?  HO HFrEF  Syncope  Recent COVID illness 1/22  HO Afib on Eliquis  HO LLL Lung mass likely malignant  HO CKD    Plan  as outlined above

## 2022-02-25 NOTE — CONSULT NOTE ADULT - ASSESSMENT
IMPRESSION:    Acute on chronic hypoxemic respiratory failure  Likely exacerbation of ILD vs Volume overload; doubt pneumonia  HFrEF  Syncope  Recent COVID illness 1/22  HO Afib on Eliquis  HO ILD on Ofev  HO LLL Lung mass likely malignant  HO CKD    PLAN:    CNS: No sedation. rEEG. neuro following.     HEENT: oral care    PULMONARY: HOB >45. Solumedrol 60mg q12 for now. nebs prn. NIV 4h on 4h off alternate with HFNC. target spo2 88-92%    CARDIOVASCULAR: keep i=o. diuresis as tolerated. Avoid volume overload. Check orthostatic VS    GI: GI prophylaxis.  Feeding when off NIV    RENAL: Fu lytes. correct as needed    INFECTIOUS DISEASE: Monitor off antibx for now. Check procal    HEMATOLOGICAL:  AC with Eliquis.    ENDOCRINE:  Follow up FS.  Insulin protocol if needed.    MUSCULOSKELETAL: bedrest for now    SDU monitoring     IMPRESSION:    Acute on chronic hypoxemic respiratory failure  HO ILD possible exacerbation   Volume overload ?  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      HEENT: oral care    PULMONARY: HOB >45. Solumedrol 60mg q 8 for now. nebs prn. NIV 4h on 4h off alternate with HFNC.  Target spo2 88-92%    CARDIOVASCULAR: keep i=o.  Diuresis as tolerated.  Avoid volume overload.   BNP     GI: GI prophylaxis.  Feeding when off NIV    RENAL: Fu lytes. correct as needed    INFECTIOUS DISEASE: Monitor off antibx for now. Check procal    HEMATOLOGICAL:  AC with Eliquis.    ENDOCRINE:  Follow up FS.  Insulin protocol if needed.    MUSCULOSKELETAL: bedrest for now    SDU monitoring    GOC     Prognosis overall poor

## 2022-02-25 NOTE — PROGRESS NOTE ADULT - SUBJECTIVE AND OBJECTIVE BOX
24H events:    Patient is a 89y old Male who presents with a chief complaint of SOB and syncope (25 Feb 2022 05:32)    Primary diagnosis of Acute respiratory failure with hypoxia       Today is hospital day 1d. This morning patient was seen and examined at bedside, resting comfortably in bed.    No acute or major events overnight.    PAST MEDICAL & SURGICAL HISTORY  Hypertension    GERD (gastroesophageal reflux disease)    Lung interstitial disease    BPH (benign prostatic hyperplasia)    HFrEF (heart failure with reduced ejection fraction)    COPD (chronic obstructive pulmonary disease)    No significant past surgical history      SOCIAL HISTORY:  Social History:      ALLERGIES:  No Known Allergies    MEDICATIONS:  STANDING MEDICATIONS  albuterol/ipratropium for Nebulization 3 milliLiter(s) Nebulizer every 20 minutes  apixaban 5 milliGRAM(s) Oral two times a day  chlorhexidine 4% Liquid 1 Application(s) Topical <User Schedule>  cyanocobalamin 1000 MICROGram(s) Oral daily  diltiazem    milliGRAM(s) Oral daily  ferrous    sulfate 325 milliGRAM(s) Oral daily  finasteride 5 milliGRAM(s) Oral daily  furosemide   Injectable 60 milliGRAM(s) IV Push two times a day  lisinopril 10 milliGRAM(s) Oral daily  metoprolol tartrate 100 milliGRAM(s) Oral three times a day  pantoprazole    Tablet 40 milliGRAM(s) Oral before breakfast    PRN MEDICATIONS    VITALS:   T(F): 98  HR: 99  BP: 135/82  RR: 18  SpO2: 97%    PHYSICAL EXAM:  GENERAL: NAD, well-groomed, well-developed  HEAD:  Atraumatic, Normocephalic  EYES: EOMI  NECK: Supple  NERVOUS SYSTEM:  Alert & Oriented X3, non focal   CHEST/LUNG: b/l crackles and ronchi  HEART: irregularly irregular, holosystolic murmur  ABDOMEN: Soft, Nontender, Nondistended; Bowel sounds present  EXTREMITIES:  2+ Peripheral Pulses, No clubbing, cyanosis, or edema  LYMPH: No lymphadenopathy noted  SKIN: No rashes or lesions  LABS:                        9.8    10.78 )-----------( 122      ( 25 Feb 2022 06:40 )             31.1     02-24    137  |  101  |  29<H>  ----------------------------<  128<H>  3.9   |  25  |  1.2    Ca    8.5      24 Feb 2022 14:43  Mg     1.9     02-24    TPro  7.8  /  Alb  3.5  /  TBili  1.3<H>  /  DBili  x   /  AST  18  /  ALT  13  /  AlkPhos  68  02-24    PT/INR - ( 24 Feb 2022 14:40 )   PT: 24.60 sec;   INR: 2.16 ratio         PTT - ( 24 Feb 2022 14:40 )  PTT:32.5 sec      Lactate, Blood: 1.9 mmol/L (02-24-22 @ 14:43)  Troponin T, Serum: <0.01 ng/mL (02-24-22 @ 14:43)      CARDIAC MARKERS ( 24 Feb 2022 14:43 )  x     / <0.01 ng/mL / x     / x     / x          RADIOLOGY:

## 2022-02-26 NOTE — PHYSICAL THERAPY INITIAL EVALUATION ADULT - PRECAUTIONS/LIMITATIONS, REHAB EVAL
cardiac precautions/seizure precautions
EF 40-45%, Hi flow 50L/58%/cardiac precautions/fall precautions/oxygen therapy device and L/min

## 2022-02-26 NOTE — PROGRESS NOTE ADULT - SUBJECTIVE AND OBJECTIVE BOX
OPAL NNEKA  89y  Male      Patient is a 89y old  Male who presents with a chief complaint of SOB and syncope (26 Feb 2022 09:22)      INTERVAL HPI/OVERNIGHT EVENTS:      REVIEW OF SYSTEMS:  CONSTITUTIONAL: No fever, weight loss, or fatigue  RESPIRATORY: No cough, wheezing, chills or hemoptysis; No shortness of breath  CARDIOVASCULAR: No chest pain, palpitations, dizziness, or leg swelling  GASTROINTESTINAL: No abdominal or epigastric pain. No nausea, vomiting, or hematemesis; No diarrhea or constipation. No melena or hematochezia.  GENITOURINARY: No dysuria, frequency, hematuria, or incontinence  NEUROLOGICAL: No headaches, memory loss, loss of strength, numbness, or tremors  SKIN: No itching, burning, rashes, or lesions   MUSCULOSKELETAL: No joint pain or swelling; No muscle, back, or extremity pain  PSYCHIATRIC: No depression, anxiety, mood swings, or difficulty sleeping  All other review of systems negative    T(C): 35.9 (02-26-22 @ 07:38), Max: 37.2 (02-25-22 @ 20:00)  HR: 110 (02-26-22 @ 10:00) (90 - 115)  BP: 131/80 (02-26-22 @ 10:00) (107/80 - 147/87)  RR: 32 (02-26-22 @ 10:00) (14 - 43)  SpO2: 98% (02-26-22 @ 10:00) (94% - 100%)  Wt(kg): --Vital Signs Last 24 Hrs  T(C): 35.9 (26 Feb 2022 07:38), Max: 37.2 (25 Feb 2022 20:00)  T(F): 96.6 (26 Feb 2022 07:38), Max: 98.9 (25 Feb 2022 20:00)  HR: 110 (26 Feb 2022 10:00) (90 - 115)  BP: 131/80 (26 Feb 2022 10:00) (107/80 - 147/87)  BP(mean): 98 (26 Feb 2022 10:00) (90 - 112)  RR: 32 (26 Feb 2022 10:00) (14 - 43)  SpO2: 98% (26 Feb 2022 10:00) (94% - 100%)      02-25-22 @ 07:01  -  02-26-22 @ 07:00  --------------------------------------------------------  IN: 1075 mL / OUT: 2125 mL / NET: -1050 mL    02-26-22 @ 07:01  -  02-26-22 @ 10:27  --------------------------------------------------------  IN: 0 mL / OUT: 1400 mL / NET: -1400 mL        PHYSICAL EXAM:  GENERAL: NAD, well-groomed, well-developed  PSYCH: no agitation, baseline mentation  NERVOUS SYSTEM:  Alert & Oriented X3, Motor Strength 5/5 B/L upper and lower extremities; Sensory intact; FTN WNL  PULMONARY: Clear to percussion bilaterally; No rales, rhonchi, wheezing, or rubs  CARDIOVASCULAR: Regular rate and rhythm; No murmurs, rubs, or gallops  GI: Soft, Nontender, Nondistended; Bowel sounds present  EXTREMITIES:  2+ Peripheral Pulses, No clubbing, cyanosis, or edema  LYMPH: No lymphadenopathy noted  SKIN: No rashes or lesions    Consultant(s) Notes Reviewed:  [x ] YES  [ ] NO    Discussed with Consultants/Other Providers [ x] YES     LABS                          9.8    10.78 )-----------( 122      ( 25 Feb 2022 06:40 )             31.1     02-25    141  |  100  |  27<H>  ----------------------------<  96  3.7   |  30  |  1.3    Ca    8.3<L>      25 Feb 2022 06:40  Mg     2.1     02-25    TPro  7.1  /  Alb  3.2<L>  /  TBili  1.5<H>  /  DBili  x   /  AST  15  /  ALT  11  /  AlkPhos  62  02-25        PT/INR - ( 24 Feb 2022 14:40 )   PT: 24.60 sec;   INR: 2.16 ratio         PTT - ( 24 Feb 2022 14:40 )  PTT:32.5 sec  Lactate Trend  02-24 @ 14:43 Lactate:1.9     CARDIAC MARKERS ( 25 Feb 2022 06:40 )  x     / <0.01 ng/mL / x     / x     / x      CARDIAC MARKERS ( 24 Feb 2022 14:43 )  x     / <0.01 ng/mL / x     / x     / x          CAPILLARY BLOOD GLUCOSE            RADIOLOGY & ADDITIONAL TESTS:    Imaging Personally Reviewed:  [ ] YES  [ ] NO    HEALTH ISSUES - PROBLEM Dx:         NNEKA JOSEPH  89y  Male      Patient is a 89y old  Male who presents with a chief complaint of SOB and syncope (26 Feb 2022 09:22)      INTERVAL HPI/OVERNIGHT EVENTS: no acute events overnight. remains on hiflow, diuresing with fair UOP. no major complaints. no nursing concerns.       REVIEW OF SYSTEMS:  CONSTITUTIONAL: No fever, weight loss, or fatigue  RESPIRATORY: No cough, wheezing, chills or hemoptysis; No shortness of breath  CARDIOVASCULAR: No chest pain, palpitations, dizziness, or leg swelling  GASTROINTESTINAL: No abdominal or epigastric pain. No nausea, vomiting, or hematemesis; No diarrhea or constipation. No melena or hematochezia.  GENITOURINARY: No dysuria, frequency, hematuria, or incontinence  NEUROLOGICAL: No headaches, memory loss, loss of strength, numbness, or tremors  SKIN: No itching, burning, rashes, or lesions   MUSCULOSKELETAL: No joint pain or swelling; No muscle, back, or extremity pain  PSYCHIATRIC: No depression, anxiety, mood swings, or difficulty sleeping  All other review of systems negative    VITALS  T(C): 35.9 (02-26-22 @ 07:38), Max: 37.2 (02-25-22 @ 20:00)  HR: 110 (02-26-22 @ 10:00) (90 - 115)  BP: 131/80 (02-26-22 @ 10:00) (107/80 - 147/87)  RR: 32 (02-26-22 @ 10:00) (14 - 43)  SpO2: 98% (02-26-22 @ 10:00) (94% - 100%)  Wt(kg): --Vital Signs Last 24 Hrs  T(C): 35.9 (26 Feb 2022 07:38), Max: 37.2 (25 Feb 2022 20:00)  T(F): 96.6 (26 Feb 2022 07:38), Max: 98.9 (25 Feb 2022 20:00)  HR: 110 (26 Feb 2022 10:00) (90 - 115)  BP: 131/80 (26 Feb 2022 10:00) (107/80 - 147/87)  BP(mean): 98 (26 Feb 2022 10:00) (90 - 112)  RR: 32 (26 Feb 2022 10:00) (14 - 43)  SpO2: 98% (26 Feb 2022 10:00) (94% - 100%)      02-25-22 @ 07:01  -  02-26-22 @ 07:00  --------------------------------------------------------  IN: 1075 mL / OUT: 2125 mL / NET: -1050 mL    02-26-22 @ 07:01  -  02-26-22 @ 10:27  --------------------------------------------------------  IN: 0 mL / OUT: 1400 mL / NET: -1400 mL        PHYSICAL EXAM:  GENERAL: elderly, frail appearing M, NAD  EYES: anicteric sclera, non injected conjunctiva  ENT: hearing grossly intact  PSYCH: no agitation, baseline mentation  NERVOUS SYSTEM:  Alert & Oriented X3  PULMONARY: Clear to percussion bilaterally; No rales, rhonchi, wheezing, or rubs  CARDIOVASCULAR: Regular rate and rhythm; No murmurs, rubs, or gallops  GI: Soft, Nontender, Nondistended; Bowel sounds present  EXTREMITIES:  2+ Peripheral Pulses, No clubbing, cyanosis, or edema  SKIN: No rashes or lesions    Consultant(s) Notes Reviewed:  [x ] YES  [ ] NO    Discussed with Consultants/Other Providers [ x] YES     LABS                          9.8    10.78 )-----------( 122      ( 25 Feb 2022 06:40 )             31.1     02-25    141  |  100  |  27<H>  ----------------------------<  96  3.7   |  30  |  1.3    Ca    8.3<L>      25 Feb 2022 06:40  Mg     2.1     02-25    TPro  7.1  /  Alb  3.2<L>  /  TBili  1.5<H>  /  DBili  x   /  AST  15  /  ALT  11  /  AlkPhos  62  02-25        PT/INR - ( 24 Feb 2022 14:40 )   PT: 24.60 sec;   INR: 2.16 ratio         PTT - ( 24 Feb 2022 14:40 )  PTT:32.5 sec  Lactate Trend  02-24 @ 14:43 Lactate:1.9     CARDIAC MARKERS ( 25 Feb 2022 06:40 )  x     / <0.01 ng/mL / x     / x     / x      CARDIAC MARKERS ( 24 Feb 2022 14:43 )  x     / <0.01 ng/mL / x     / x     / x        RADIOLOGY & ADDITIONAL TESTS:  - no images 2/26  Imaging Personally Reviewed:  [ ] YES  [ ] NO    HEALTH ISSUES - PROBLEM Dx:      MEDICATIONS  (STANDING):  ALBUTerol    90 MICROgram(s) HFA Inhaler 1 Puff(s) Inhalation every 8 hours  albuterol/ipratropium for Nebulization 3 milliLiter(s) Nebulizer every 6 hours  albuterol/ipratropium for Nebulization 3 milliLiter(s) Nebulizer every 20 minutes  apixaban 5 milliGRAM(s) Oral two times a day  chlorhexidine 4% Liquid 1 Application(s) Topical <User Schedule>  cyanocobalamin 1000 MICROGram(s) Oral daily  diltiazem    milliGRAM(s) Oral daily  ferrous    sulfate 325 milliGRAM(s) Oral daily  finasteride 5 milliGRAM(s) Oral daily  furosemide   Injectable 60 milliGRAM(s) IV Push two times a day  lisinopril 10 milliGRAM(s) Oral daily  methylPREDNISolone sodium succinate Injectable 60 milliGRAM(s) IV Push every 12 hours  metoprolol tartrate 100 milliGRAM(s) Oral three times a day  pantoprazole    Tablet 40 milliGRAM(s) Oral before breakfast    MEDICATIONS  (PRN):

## 2022-02-26 NOTE — PHYSICAL THERAPY INITIAL EVALUATION ADULT - GAIT TRAINING, PT EVAL
Pt will ambulate using RW or least restrictive AD for 50 ft with supervision by discharge to facilitate return to PLOF.

## 2022-02-26 NOTE — PHYSICAL THERAPY INITIAL EVALUATION ADULT - PERTINENT HX OF CURRENT PROBLEM, REHAB EVAL
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

## 2022-02-26 NOTE — PHYSICAL THERAPY INITIAL EVALUATION ADULT - DIAGNOSIS, PT EVAL
Rehab of deconditioning 2/2 chf exacerbation
deconditioning 2/2 Acute hypoxic respiratory failure likely secondary to acute on chronic CHF exacerbation

## 2022-02-26 NOTE — PROGRESS NOTE ADULT - ASSESSMENT
IMPRESSION:    Acute on chronic hypoxemic respiratory failure  HO ILD possible exacerbation   Volume overload ?  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      HEENT: oral care    PULMONARY: HOB >45.   Solumedrol 60mg q 8 for now.   nebs prn.   NIV 4h on 4h off alternate with HFNC.    Target spo2 88-92%  repeat CXR AM    CARDIOVASCULAR: keep o>I.  Diuresis as tolerated.  Avoid volume overload.   BNP     GI: GI prophylaxis.  Feeding when off NIV    RENAL: Fu lytes. correct as needed    INFECTIOUS DISEASE:   Monitor off antibx for now.   procal 0.08    HEMATOLOGICAL:  AC with Eliquis.    ENDOCRINE:  Follow up FS.  Insulin protocol if needed.    MUSCULOSKELETAL: bedrest for now    SDU monitoring    GOC     Prognosis overall poor

## 2022-02-26 NOTE — PROGRESS NOTE ADULT - ASSESSMENT
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  - A/w shortness of breath, LE edema, at baseline 3 L O2--> currently on HiFlo 55L @ 60% Fi02  - pro-BNP 6168.  - S/p 40 mg IV lasix x2, 2 gr IV Magnesium.  - Xray Chest 1 View-PORTABLE IMMEDIATE (02.24.22 @ 16:11): Impression: Bilateral opacifications, low lung volumes. Worsening.  - TTE(1/22): EF 40-45%, mod TR and MR, severe pHTN  - currently net neg 1.4L  - no daily weights documented; unsure of dry weight  - C/w lasix 60 mgh IV BID  - C/w cardizem 120 mg CD QDmetoprolol tartrate 100mg TID, lisinoprol 10 mg QD, as hemodynamics allow  - Strict I/O, daily weight, fluid restriction  - Monitor electrolytes Keep K > 4, Mg >2  - F/u HF consult    # Syncope  - r/o new seizure vs cardiac etiology vs vasovagal  - HT, + LOC. + palpitations. + bladder incontinence, shakiness, foamy mouth secretions  - Denies CP,  focal weakness, or headaches. No bowel incontinence, no tongue biting.   - CT head with no acute intracranial pathology; Stable moderate chronic microvascular ischemic changes.  - TTE(1/22): EF 40-45%, mod TR and MR, severe pHTN  - C/w tele monitoring  - TSH(0.34), folate(5.9), B12(794)  - F/u EEG.   - F/u Check orthostatics.   - F/u Neuro recs  - PT eval. Fall precautions.     # hematuria, likely traumatic from Joel insertion  # BPH  - continue with finasteride  - Hgb 10.2( baseline 11),   - monitor CBC daily  - active type and screen  - c/w Joel care    # Afib, currently rate controlled(<110)  - as per son multiple episodes of irregular heart rate  - CHADSVASC - 4  - TSH 0.70 1/2022  - c/w Eliquis 5mg BID  - c/w metoprolol 100mg TID  - c/w cardizem 120mg OD   - consider EP follow up    # ILD  # Pulmonary Nodule  - CT chest prior to admission: 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, new mediastinal adenopathy, left pleural effusion, cardiomegaly with dilated ascending and descending aorta  - OP Pulmonary Follow up for 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    #Progress Note Handoff  Pending (specify):  Clinical improvement, weaned off hiflo,  Disposition: Unknown at this time________

## 2022-02-26 NOTE — PROGRESS NOTE ADULT - SUBJECTIVE AND OBJECTIVE BOX
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 Per son Elkin at bedside, ever since being discharged from hospital, patient has been feeling short of breath, and  for last 2 days his oxygen requirement climbed up to 5 L O2. Son also reports multiple episodes of syncope since last week, usually occurring with mild activity, associated with bladder incontinence. Today, when his son was changing his clothing, patient passed out again, had foamy secretions from mouth, and urinated on himself. Denies head trauma. Each episode of syncope lasts for about 2 minutes at the time. When son checked his pulse ox, his saturation was at 80% O2, he was also noted to be tachycardic at 120s. Denies chest pain, fever, chills, abdominal pain, nausea, vomiting, diarrhea. + LE edema.    In ED, VS /81, HR 95, tachypneic RR 20-24, T 95F Spo2 88% on 5L. Pt was placed on BiPAP with improvement os Sat to 98%.  Labs: WBC 10.9. Hgb 10.2( baseline 11), troponin 0.01, pro-BNP 6168. VBG Ph 7.38, pCO2 51, HCO3 30  CXR with worsening b/l opacities.  CT head with no acute intracranial pathology; Stable moderate chronic microvascular ischemic changes.  POCUS was done in ED, showed right sided pleural effusion. Pt was given 40 mg IV lasix x2, 2 gr IV Magnesium.  Pt was admitted for acute on chronic CHF exacerbation and syncope work up.         (24 Feb 2022 20:38)      Pt evaluated on rounds.  I reviewed the radiology tests and hospital record.    I reviewed previous notes on this patient.    Interval Events: No overnight events.      REVIEW OF SYSTEMS:   see HPI      OBJECTIVE:  ICU Vital Signs Last 24 Hrs  T(C): 35.9 (26 Feb 2022 07:38), Max: 37.2 (25 Feb 2022 20:00)  T(F): 96.6 (26 Feb 2022 07:38), Max: 98.9 (25 Feb 2022 20:00)  HR: 95 (26 Feb 2022 07:38) (90 - 115)  BP: 147/87 (26 Feb 2022 07:38) (107/80 - 147/87)  BP(mean): 112 (26 Feb 2022 07:38) (90 - 112)  RR: 22 (26 Feb 2022 07:38) (14 - 32)  SpO2: 99% (26 Feb 2022 07:38) (96% - 100%)        02-25 @ 07:01  -  02-26 @ 07:00  --------------------------------------------------------  IN: 1075 mL / OUT: 2125 mL / NET: -1050 mL      CAPILLARY BLOOD GLUCOSE            PHYSICAL EXAM:       · ENMT:   Airway patent,   Nasal mucosa clear.  Mouth with normal mucosa.   No thrush    · EYES:   Clear bilaterally,   pupils equal,   round and reactive to light.    · CARDIAC:   Normal rate,   regular rhythm.    Heart sounds S1, S2.   No murmurs, no rubs or gallops on auscultation  no edema        CAROTID:   normal systolic impulse  no bruits    · RESPIRATORY:   rales  normal chest expansion  no retractions or use of accessory muscles  palpation of chest is normal with no fremitus  percussion of chest demonstrates no hyperresonance or dullness    · GASTROINTESTINAL:  Abdomen soft,   non-tender,   + BS  liver/spleen not palpable    · MUSCULOSKELETAL:   no clubbing, cyanosis      · SKIN:   Skin normal color for race,   warm, dry   No evidence of rash.        · HEME LYMPH:   no splenomegaly.  No cervical  lymphadenopathy.  no inguinal lymphadenopathy    HOSPITAL MEDICATIONS:  MEDICATIONS  (STANDING):  ALBUTerol    90 MICROgram(s) HFA Inhaler 1 Puff(s) Inhalation every 8 hours  albuterol/ipratropium for Nebulization 3 milliLiter(s) Nebulizer every 6 hours  albuterol/ipratropium for Nebulization 3 milliLiter(s) Nebulizer every 20 minutes  apixaban 5 milliGRAM(s) Oral two times a day  chlorhexidine 4% Liquid 1 Application(s) Topical <User Schedule>  cyanocobalamin 1000 MICROGram(s) Oral daily  diltiazem    milliGRAM(s) Oral daily  ferrous    sulfate 325 milliGRAM(s) Oral daily  finasteride 5 milliGRAM(s) Oral daily  furosemide   Injectable 60 milliGRAM(s) IV Push two times a day  lisinopril 10 milliGRAM(s) Oral daily  methylPREDNISolone sodium succinate Injectable 60 milliGRAM(s) IV Push every 12 hours  metoprolol tartrate 100 milliGRAM(s) Oral three times a day  pantoprazole    Tablet 40 milliGRAM(s) Oral before breakfast    MEDICATIONS  (PRN):        LABS:                        9.8    10.78 )-----------( 122      ( 25 Feb 2022 06:40 )             31.1     02-25    141  |  100  |  27<H>  ----------------------------<  96  3.7   |  30  |  1.3    Ca    8.3<L>      25 Feb 2022 06:40  Mg     2.1     02-25    TPro  7.1  /  Alb  3.2<L>  /  TBili  1.5<H>  /  DBili  x   /  AST  15  /  ALT  11  /  AlkPhos  62  02-25    PT/INR - ( 24 Feb 2022 14:40 )   PT: 24.60 sec;   INR: 2.16 ratio         PTT - ( 24 Feb 2022 14:40 )  PTT:32.5 sec      Venous Blood Gas:  02-24 @ 14:49  7.38/51/23/30/27.2  VBG Lactate: 1.70      CARDIAC MARKERS ( 25 Feb 2022 06:40 )  x     / <0.01 ng/mL / x     / x     / x      CARDIAC MARKERS ( 24 Feb 2022 14:43 )  x     / <0.01 ng/mL / x     / x     / x          SARS-CoV-2: NotDetec (24 Feb 2022 14:40)  COVID-19 PCR: Detected (17 Jan 2022 11:56)  COVID-19 PCR: NotDetec (13 Jan 2022 21:51)  COVID-19 PCR: NotDetec (10 Norris 2022 06:35)  COVID-19 PCR: NotDetec (04 Jan 2022 03:00)            RADIOLOGY: Today I personally interpreted the latest CXR and other pertinent films.

## 2022-02-26 NOTE — PHYSICAL THERAPY INITIAL EVALUATION ADULT - GENERAL OBSERVATIONS, REHAB EVAL
121-850; patient encountered in bed, +biPAP, +tele, +IV.
9:45-10:20. chart reviewed. Pt received semi-markham at B/S, alert, oriented, able to follow multi-step instructions and agreeable to PT evaluation. + IV, + Monitoring, + Hi flow O2 via NC 50 L/58%, + foly, HR at baseline 110s tachy with exertion 140s, SPO2 88-93%

## 2022-02-27 NOTE — PROGRESS NOTE ADULT - ASSESSMENT
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      HEENT: oral care    PULMONARY: HOB >45.   taper Solumedrol   nebs prn.   NIV 4h on 4h off alternate with HFNC.    Target spo2 88-92%  repeat CXR AM    CARDIOVASCULAR: keep o>I.  Diuresis as tolerated.  Avoid volume overload.   BNP     GI: GI prophylaxis.  Feeding when off NIV    RENAL: Fu lytes. correct as needed    INFECTIOUS DISEASE:   Monitor off antibx for now.   procal 0.08    HEMATOLOGICAL:  AC with Eliquis.    ENDOCRINE:  Follow up FS.  Insulin protocol if needed.    MUSCULOSKELETAL: bedrest for now    SDU monitoring    GOC     Prognosis overall poor

## 2022-02-27 NOTE — PROGRESS NOTE ADULT - ASSESSMENT
NNEKA JOSEPH 89y Male  MRN#: [id number]   CODE STATUS: Full code       SUBJECTIVE  Patient is a 89y old Male who presents with a chief complaint of SOB and syncope (27 Feb 2022 08:15)  Currently admitted to medicine with the primary diagnosis of Acute respiratory failure with hypoxia  Today is hospital day 3d, and this morning he is resting in bed and RN reports no overnight events. reports minimal; improvement in respiratory symptoms. remains on hfnc. wants to go out of bed to chair     OBJECTIVE  PAST MEDICAL & SURGICAL HISTORY  Hypertension    GERD (gastroesophageal reflux disease)    Lung interstitial disease    BPH (benign prostatic hyperplasia)    HFrEF (heart failure with reduced ejection fraction)    COPD (chronic obstructive pulmonary disease)    No significant past surgical history      ALLERGIES:  No Known Allergies    MEDICATIONS:  STANDING MEDICATIONS  ALBUTerol    90 MICROgram(s) HFA Inhaler 1 Puff(s) Inhalation every 8 hours  albuterol/ipratropium for Nebulization 3 milliLiter(s) Nebulizer every 6 hours  albuterol/ipratropium for Nebulization 3 milliLiter(s) Nebulizer every 20 minutes  apixaban 5 milliGRAM(s) Oral two times a day  chlorhexidine 4% Liquid 1 Application(s) Topical <User Schedule>  cyanocobalamin 1000 MICROGram(s) Oral daily  diltiazem    milliGRAM(s) Oral daily  ferrous    sulfate 325 milliGRAM(s) Oral daily  finasteride 5 milliGRAM(s) Oral daily  furosemide   Injectable 60 milliGRAM(s) IV Push two times a day  lisinopril 10 milliGRAM(s) Oral daily  methylPREDNISolone sodium succinate Injectable 60 milliGRAM(s) IV Push every 12 hours  metoprolol tartrate 100 milliGRAM(s) Oral three times a day  pantoprazole    Tablet 40 milliGRAM(s) Oral before breakfast  polyethylene glycol 3350 17 Gram(s) Oral daily    PRN MEDICATIONS      VITAL SIGNS: Last 24 Hours  T(C): 36.1 (27 Feb 2022 04:00), Max: 36.4 (26 Feb 2022 20:00)  T(F): 96.9 (27 Feb 2022 04:00), Max: 97.5 (26 Feb 2022 20:00)  HR: 95 (27 Feb 2022 08:00) (84 - 106)  BP: 133/75 (27 Feb 2022 08:00) (105/67 - 133/75)  BP(mean): 98 (27 Feb 2022 08:00) (75 - 99)  RR: 25 (27 Feb 2022 08:00) (17 - 45)  SpO2: 97% (27 Feb 2022 08:00) (92% - 100%)    LABS:                        9.4    12.64 )-----------( 146      ( 27 Feb 2022 05:40 )             29.2     02-27    138  |  96<L>  |  36<H>  ----------------------------<  148<H>  3.5   |  33<H>  |  1.0    Ca    8.3<L>      27 Feb 2022 05:40    TPro  7.0  /  Alb  3.1<L>  /  TBili  0.7  /  DBili  x   /  AST  15  /  ALT  13  /  AlkPhos  57  02-27                  RADIOLOGY:      PHYSICAL EXAM:    GENERAL: HFNC  HEENT:  Atraumatic, Normocephalic.   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   - POCUS was done in ED, showed right sided pleural effusion.  - A/w shortness of breath, LE edema, at baseline 3 L O2--> currently on HiFlo 55L @ 60% Fi02  - pro-BNP 6168.  - S/p 40 mg IV lasix x2, 2 gr IV Magnesium.  - Xray Chest 1 View-PORTABLE IMMEDIATE (02.24.22 @ 16:11): Impression: Bilateral opacifications, low lung volumes. Worsening.  - TTE(1/22): EF 40-45%, mod TR and MR, severe pHTN  - net neg  -  lasix 60 mgh IV BID  - C/w cardizem 120 mg CD QDmetoprolol tartrate 100mg TID, lisinoprol 10 mg QD, as hemodynamics allow  - Strict I/O, daily weight, fluid restriction  - Monitor electrolytes Keep K > 4, Mg >2  - F/u HF consult    # Syncope  - r/o new seizure vs cardiac etiology vs vasovagal  - HT, + LOC. + palpitations. + bladder incontinence, shakiness, foamy mouth secretions  - Denies CP,  focal weakness, or headaches. No bowel incontinence, no tongue biting.   - CT head with no acute intracranial pathology; Stable moderate chronic microvascular ischemic changes.  - TTE(1/22): EF 40-45%, mod TR and MR, severe pHTN  - C/w tele monitoring  - TSH(0.34), folate(5.9), B12(794)  - EEG. wnl   - F/u Check orthostatics when more stable   - F/u Neuro recs  - PT eval. Fall precautions.     # hematuria, likely traumatic from Joel insertion  # BPH  - continue with finasteride  - Hgb 10.2( baseline 11),   - monitor CBC daily  - active type and screen  - c/w Joel care    # Afib, currently rate controlled(<110)  - as per son multiple episodes of irregular heart rate  - CHADSVASC - 4  - TSH 0.70 1/2022  - c/w Eliquis 5mg BID  - c/w metoprolol 100mg TID  - c/w cardizem 120mg OD     # ILD  # Pulmonary Nodule  - CT chest prior to admission: 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, new mediastinal adenopathy, left pleural effusion, cardiomegaly with dilated ascending and descending aorta  - OP Pulmonary Follow up for 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    poor prognosis     #Progress Note Handoff  Pending (specify):  Clinical improvement, weaned off hfnc, IV lasix, IV steroids, NIV and HFNC  Disposition: Unknown at this time NNEKA JOSEPH 89y Male  MRN#: [id number]   CODE STATUS: Full code       SUBJECTIVE  Patient is a 89y old Male who presents with a chief complaint of SOB and syncope (27 Feb 2022 08:15)  Currently admitted to medicine with the primary diagnosis of Acute respiratory failure with hypoxia  Today is hospital day 3d, and this morning he is resting in bed and RN reports no overnight events. reports minimal; improvement in respiratory symptoms. remains on hfnc. wants to go out of bed to chair     OBJECTIVE  PAST MEDICAL & SURGICAL HISTORY  Hypertension    GERD (gastroesophageal reflux disease)    Lung interstitial disease    BPH (benign prostatic hyperplasia)    HFrEF (heart failure with reduced ejection fraction)    COPD (chronic obstructive pulmonary disease)    No significant past surgical history      ALLERGIES:  No Known Allergies    MEDICATIONS:  STANDING MEDICATIONS  ALBUTerol    90 MICROgram(s) HFA Inhaler 1 Puff(s) Inhalation every 8 hours  albuterol/ipratropium for Nebulization 3 milliLiter(s) Nebulizer every 6 hours  albuterol/ipratropium for Nebulization 3 milliLiter(s) Nebulizer every 20 minutes  apixaban 5 milliGRAM(s) Oral two times a day  chlorhexidine 4% Liquid 1 Application(s) Topical <User Schedule>  cyanocobalamin 1000 MICROGram(s) Oral daily  diltiazem    milliGRAM(s) Oral daily  ferrous    sulfate 325 milliGRAM(s) Oral daily  finasteride 5 milliGRAM(s) Oral daily  furosemide   Injectable 60 milliGRAM(s) IV Push two times a day  lisinopril 10 milliGRAM(s) Oral daily  methylPREDNISolone sodium succinate Injectable 60 milliGRAM(s) IV Push every 12 hours  metoprolol tartrate 100 milliGRAM(s) Oral three times a day  pantoprazole    Tablet 40 milliGRAM(s) Oral before breakfast  polyethylene glycol 3350 17 Gram(s) Oral daily    PRN MEDICATIONS      VITAL SIGNS: Last 24 Hours  T(C): 36.1 (27 Feb 2022 04:00), Max: 36.4 (26 Feb 2022 20:00)  T(F): 96.9 (27 Feb 2022 04:00), Max: 97.5 (26 Feb 2022 20:00)  HR: 95 (27 Feb 2022 08:00) (84 - 106)  BP: 133/75 (27 Feb 2022 08:00) (105/67 - 133/75)  BP(mean): 98 (27 Feb 2022 08:00) (75 - 99)  RR: 25 (27 Feb 2022 08:00) (17 - 45)  SpO2: 97% (27 Feb 2022 08:00) (92% - 100%)    LABS:                        9.4    12.64 )-----------( 146      ( 27 Feb 2022 05:40 )             29.2     02-27    138  |  96<L>  |  36<H>  ----------------------------<  148<H>  3.5   |  33<H>  |  1.0    Ca    8.3<L>      27 Feb 2022 05:40    TPro  7.0  /  Alb  3.1<L>  /  TBili  0.7  /  DBili  x   /  AST  15  /  ALT  13  /  AlkPhos  57  02-27                  RADIOLOGY:      PHYSICAL EXAM:    GENERAL: HFNC  HEENT:  Atraumatic, Normocephalic.   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   - POCUS was done in ED, showed right sided pleural effusion.  - A/w shortness of breath, LE edema, at baseline 3 L O2--> currently on HiFlo 55L @ 60% Fi02  - pro-BNP 6168.  - unlikely pna procal 0.08  - S/p 40 mg IV lasix x2, 2 gr IV Magnesium.  - Xray Chest 1 View-PORTABLE IMMEDIATE (02.24.22 @ 16:11): Impression: Bilateral opacifications, low lung volumes. Worsening.  - TTE(1/22): EF 40-45%, mod TR and MR, severe pHTN  - net neg  -  lasix 60 mgh IV BID  - C/w cardizem 120 mg CD QDmetoprolol tartrate 100mg TID, lisinoprol 10 mg QD, as hemodynamics allow  - Strict I/O, daily weight, fluid restriction  - Monitor electrolytes Keep K > 4, Mg >2  - F/u HF consult    # Syncope  - r/o new seizure vs cardiac etiology vs vasovagal  - HT, + LOC. + palpitations. + bladder incontinence, shakiness, foamy mouth secretions  - Denies CP,  focal weakness, or headaches. No bowel incontinence, no tongue biting.   - CT head with no acute intracranial pathology; Stable moderate chronic microvascular ischemic changes.  - TTE(1/22): EF 40-45%, mod TR and MR, severe pHTN  - C/w tele monitoring  - TSH(0.34), folate(5.9), B12(794)  - EEG. wnl   - F/u Check orthostatics when more stable   - F/u Neuro recs  - PT eval. Fall precautions.     # hematuria, likely traumatic from Joel insertion  # BPH  - continue with finasteride  - Hgb 10.2( baseline 11),   - monitor CBC daily  - active type and screen  - c/w Joel care    # Afib, currently rate controlled(<110)  - as per son multiple episodes of irregular heart rate  - CHADSVASC - 4  - TSH 0.70 1/2022  - c/w Eliquis 5mg BID  - c/w metoprolol 100mg TID  - c/w cardizem 120mg OD     # ILD  # Pulmonary Nodule  - CT chest prior to admission: 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, new mediastinal adenopathy, left pleural effusion, cardiomegaly with dilated ascending and descending aorta  - OP Pulmonary Follow up for 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    poor prognosis     #Progress Note Handoff  Pending (specify):  Clinical improvement, weaned off hfnc, IV lasix, IV steroids, NIV and HFNC  Disposition: Unknown at this time

## 2022-02-27 NOTE — PROGRESS NOTE ADULT - SUBJECTIVE AND OBJECTIVE BOX
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 Per son Elkin at bedside, ever since being discharged from hospital, patient has been feeling short of breath, and  for last 2 days his oxygen requirement climbed up to 5 L O2. Son also reports multiple episodes of syncope since last week, usually occurring with mild activity, associated with bladder incontinence. Today, when his son was changing his clothing, patient passed out again, had foamy secretions from mouth, and urinated on himself. Denies head trauma. Each episode of syncope lasts for about 2 minutes at the time. When son checked his pulse ox, his saturation was at 80% O2, he was also noted to be tachycardic at 120s. Denies chest pain, fever, chills, abdominal pain, nausea, vomiting, diarrhea. + LE edema.    In ED, VS /81, HR 95, tachypneic RR 20-24, T 95F Spo2 88% on 5L. Pt was placed on BiPAP with improvement os Sat to 98%.  Labs: WBC 10.9. Hgb 10.2( baseline 11), troponin 0.01, pro-BNP 6168. VBG Ph 7.38, pCO2 51, HCO3 30  CXR with worsening b/l opacities.  CT head with no acute intracranial pathology; Stable moderate chronic microvascular ischemic changes.  POCUS was done in ED, showed right sided pleural effusion. Pt was given 40 mg IV lasix x2, 2 gr IV Magnesium.  Pt was admitted for acute on chronic CHF exacerbation and syncope work up.         (24 Feb 2022 20:38)    Pt evaluated on AM rounds.  Interval Events: No overnight events.    REVIEW OF SYSTEMS:   see HPI      OBJECTIVE:  ICU Vital Signs Last 24 Hrs  T(C): 36.1 (27 Feb 2022 04:00), Max: 36.4 (26 Feb 2022 20:00)  T(F): 96.9 (27 Feb 2022 04:00), Max: 97.5 (26 Feb 2022 20:00)  HR: 84 (27 Feb 2022 04:00) (84 - 110)  BP: 129/79 (27 Feb 2022 04:00) (105/67 - 131/80)  BP(mean): 99 (27 Feb 2022 04:00) (75 - 99)  ABP: --  ABP(mean): --  RR: 17 (27 Feb 2022 04:00) (17 - 45)  SpO2: 100% (27 Feb 2022 04:00) (92% - 100%)        02-26 @ 07:01  -  02-27 @ 07:00  --------------------------------------------------------  IN: 660 mL / OUT: 3390 mL / NET: -2730 mL      CAPILLARY BLOOD GLUCOSE            PHYSICAL EXAM:     · CONSTITUTIONAL:   not septic appearing,   well nourished,   NAD    · ENMT:   Airway patent,   Nasal mucosa clear.  Mouth with normal mucosa.   No thrush    · EYES:   Clear bilaterally,   pupils equal,   round and reactive to light.    · CARDIAC:   Normal rate,   regular rhythm.    Heart sounds S1, S2.   No murmurs, no rubs or gallops on auscultation  no edema        CAROTID:   normal systolic impulse  no bruits    · RESPIRATORY:   no w/r/r/,   normal chest expansion  no tachypnea,  no retractions or use of accessory muscles  palpation of chest is normal with no fremitus  percussion of chest demonstrates no hyperresonance or dullness    · GASTROINTESTINAL:  Abdomen soft,   non-tender,   + BS  liver/spleen not palpable    · MUSCULOSKELETAL:   no clubbing, cyanosis      · NEUROLOGICAL:   .sed      · SKIN:   Skin normal color for race,   warm, dry   No evidence of rash.    · PSYCHIATRIC:   .un      · HEME LYMPH:   no splenomegaly.  No cervical  lymphadenopathy.  no inguinal lymphadenopathy    HOSPITAL MEDICATIONS:  MEDICATIONS  (STANDING):  ALBUTerol    90 MICROgram(s) HFA Inhaler 1 Puff(s) Inhalation every 8 hours  albuterol/ipratropium for Nebulization 3 milliLiter(s) Nebulizer every 6 hours  albuterol/ipratropium for Nebulization 3 milliLiter(s) Nebulizer every 20 minutes  apixaban 5 milliGRAM(s) Oral two times a day  chlorhexidine 4% Liquid 1 Application(s) Topical <User Schedule>  cyanocobalamin 1000 MICROGram(s) Oral daily  diltiazem    milliGRAM(s) Oral daily  ferrous    sulfate 325 milliGRAM(s) Oral daily  finasteride 5 milliGRAM(s) Oral daily  furosemide   Injectable 60 milliGRAM(s) IV Push two times a day  lisinopril 10 milliGRAM(s) Oral daily  methylPREDNISolone sodium succinate Injectable 60 milliGRAM(s) IV Push every 12 hours  metoprolol tartrate 100 milliGRAM(s) Oral three times a day  pantoprazole    Tablet 40 milliGRAM(s) Oral before breakfast  polyethylene glycol 3350 17 Gram(s) Oral daily    MEDICATIONS  (PRN):        LABS:                        9.4    12.64 )-----------( 146      ( 27 Feb 2022 05:40 )             29.2     02-27    138  |  96<L>  |  36<H>  ----------------------------<  148<H>  3.5   |  33<H>  |  1.0    Ca    8.3<L>      27 Feb 2022 05:40    TPro  7.0  /  Alb  3.1<L>  /  TBili  0.7  /  DBili  x   /  AST  15  /  ALT  13  /  AlkPhos  57  02-27        RADIOLOGY: I personally reviewed latest CXR and other pertinent films.

## 2022-02-28 NOTE — PROGRESS NOTE ADULT - SUBJECTIVE AND OBJECTIVE BOX
NNEKA JOSEPH 89y Male  MRN#: [id number]   Hospital Day: 4d    SUBJECTIVE  Patient is a 89y old Male who presents with a chief complaint of SOB and syncope (28 Feb 2022 07:42)  Currently admitted to medicine with the primary diagnosis of Acute respiratory failure with hypoxia      INTERVAL HPI AND OVERNIGHT EVENTS:  Patient was examined and seen at bedside. This morning he is resting comfortably in bed and reports no issues or overnight events.    REVIEW OF SYMPTOMS:  CONSTITUTIONAL: No weakness, fevers or chills; No headaches  EYES: No visual changes, eye pain, or discharge  ENT: No vertigo; No ear pain or change in hearing; No sore throat or difficulty swallowing  NECK: No pain or stiffness  RESPIRATORY: No cough, wheezing, or hemoptysis; No shortness of breath  CARDIOVASCULAR: No chest pain or palpitations  GASTROINTESTINAL: No abdominal or epigastric pain; No nausea, vomiting, or hematemesis; No diarrhea or constipation; No melena or hematochezia  GENITOURINARY: No dysuria, frequency or hematuria  MUSCULOSKELETAL: No joint pain, no muscle pain, no weakness  NEUROLOGICAL: No numbness or weakness  SKIN: No itching or rashes    OBJECTIVE  PAST MEDICAL & SURGICAL HISTORY  Hypertension    GERD (gastroesophageal reflux disease)    Lung interstitial disease    BPH (benign prostatic hyperplasia)    HFrEF (heart failure with reduced ejection fraction)    COPD (chronic obstructive pulmonary disease)    No significant past surgical history      ALLERGIES:  No Known Allergies    MEDICATIONS:  STANDING MEDICATIONS  ALBUTerol    90 MICROgram(s) HFA Inhaler 1 Puff(s) Inhalation every 8 hours  albuterol/ipratropium for Nebulization 3 milliLiter(s) Nebulizer every 6 hours  albuterol/ipratropium for Nebulization 3 milliLiter(s) Nebulizer every 20 minutes  apixaban 5 milliGRAM(s) Oral two times a day  chlorhexidine 4% Liquid 1 Application(s) Topical <User Schedule>  cyanocobalamin 1000 MICROGram(s) Oral daily  diltiazem    milliGRAM(s) Oral daily  ferrous    sulfate 325 milliGRAM(s) Oral daily  finasteride 5 milliGRAM(s) Oral daily  furosemide   Injectable 60 milliGRAM(s) IV Push two times a day  lisinopril 10 milliGRAM(s) Oral daily  methylPREDNISolone sodium succinate Injectable 60 milliGRAM(s) IV Push every 12 hours  metoprolol tartrate 100 milliGRAM(s) Oral three times a day  pantoprazole    Tablet 40 milliGRAM(s) Oral before breakfast  polyethylene glycol 3350 17 Gram(s) Oral daily    PRN MEDICATIONS      VITAL SIGNS: Last 24 Hours  T(C): 35.9 (28 Feb 2022 08:00), Max: 36.2 (28 Feb 2022 00:20)  T(F): 96.6 (28 Feb 2022 08:00), Max: 97.1 (28 Feb 2022 00:20)  HR: 89 (28 Feb 2022 08:00) (89 - 94)  BP: 152/93 (28 Feb 2022 08:00) (138/76 - 152/93)  BP(mean): 117 (28 Feb 2022 08:00) (99 - 117)  RR: 20 (28 Feb 2022 08:00) (17 - 25)  SpO2: 98% (28 Feb 2022 08:00) (98% - 100%)    LABS:                        9.4    12.64 )-----------( 146      ( 27 Feb 2022 05:40 )             29.2     02-27    138  |  96<L>  |  36<H>  ----------------------------<  148<H>  3.5   |  33<H>  |  1.0    Ca    8.3<L>      27 Feb 2022 05:40    TPro  7.0  /  Alb  3.1<L>  /  TBili  0.7  /  DBili  x   /  AST  15  /  ALT  13  /  AlkPhos  57  02-27                  RADIOLOGY:      PHYSICAL EXAM:  CONSTITUTIONAL: No acute distress, well-developed, well-groomed, AAOx3  HEAD: Atraumatic, normocephalic  EYES: EOM intact, PERRLA, conjunctiva and sclera clear  ENT: Supple, no masses, no thyromegaly, no bruits, no JVD; moist mucous membranes  PULMONARY: Clear to auscultation bilaterally; no wheezes, rales, or rhonchi  CARDIOVASCULAR: Regular rate and rhythm; no murmurs, rubs, or gallops  GASTROINTESTINAL: Soft, non-tender, non-distended; bowel sounds present  MUSCULOSKELETAL: 2+ peripheral pulses; no clubbing, no cyanosis, no edema  NEUROLOGY: non-focal  SKIN: No rashes or lesions; warm and dry

## 2022-02-28 NOTE — PROGRESS NOTE ADULT - ASSESSMENT
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      HEENT: oral care    PULMONARY: HOB >45. solumedrol, 40 q 12 h, NC trail    CARDIOVASCULAR: keep o>I.  Diuresis as tolerated.  Avoid volume overload.    GI: GI prophylaxis.  Feeding when off NIV    RENAL: Fu lytes. correct as needed    INFECTIOUS DISEASE:   Monitor off antibx for now.   procal 0.08    HEMATOLOGICAL:  AC with Eliquis.    ENDOCRINE:  Follow up FS.  Insulin protocol if needed.    MUSCULOSKELETAL: bedrest for now    SDU monitoring    GOC     Prognosis overall poor

## 2022-02-28 NOTE — PROGRESS NOTE ADULT - SUBJECTIVE AND OBJECTIVE BOX
Over Night Events: events noted, on HHFNC 55%, afebrile    PHYSICAL EXAM    ICU Vital Signs Last 24 Hrs  T(C): 36.2 (28 Feb 2022 00:20), Max: 36.2 (28 Feb 2022 00:20)  T(F): 97.1 (28 Feb 2022 00:20), Max: 97.1 (28 Feb 2022 00:20)  HR: 89 (28 Feb 2022 04:00) (89 - 95)  BP: 151/91 (28 Feb 2022 04:00) (133/75 - 151/91)  RR: 17 (28 Feb 2022 04:00) (17 - 25)  SpO2: 100% (28 Feb 2022 04:00) (97% - 100%)      General: ill looking  Lungs: Bilateral crackles  Cardiovascular: BOOKER 2.6  Abdomen: Soft, Positive BS  Extremities: No clubbing   Skin: Warm  Neurological: Non focal       02-27-22 @ 07:01  -  02-28-22 @ 07:00  --------------------------------------------------------  IN:    Oral Fluid: 240 mL  Total IN: 240 mL    OUT:    Indwelling Catheter - Urethral (mL): 725 mL  Total OUT: 725 mL    Total NET: -485 mL          LABS:                          9.4    12.64 )-----------( 146      ( 27 Feb 2022 05:40 )             29.2                                               02-27    138  |  96<L>  |  36<H>  ----------------------------<  148<H>  3.5   |  33<H>  |  1.0    Ca    8.3<L>      27 Feb 2022 05:40    TPro  7.0  /  Alb  3.1<L>  /  TBili  0.7  /  DBili  x   /  AST  15  /  ALT  13  /  AlkPhos  57  02-27                                                                                           LIVER FUNCTIONS - ( 27 Feb 2022 05:40 )  Alb: 3.1 g/dL / Pro: 7.0 g/dL / ALK PHOS: 57 U/L / ALT: 13 U/L / AST: 15 U/L / GGT: x                                                                                                                                       MEDICATIONS  (STANDING):  ALBUTerol    90 MICROgram(s) HFA Inhaler 1 Puff(s) Inhalation every 8 hours  albuterol/ipratropium for Nebulization 3 milliLiter(s) Nebulizer every 20 minutes  albuterol/ipratropium for Nebulization 3 milliLiter(s) Nebulizer every 6 hours  apixaban 5 milliGRAM(s) Oral two times a day  chlorhexidine 4% Liquid 1 Application(s) Topical <User Schedule>  cyanocobalamin 1000 MICROGram(s) Oral daily  diltiazem    milliGRAM(s) Oral daily  ferrous    sulfate 325 milliGRAM(s) Oral daily  finasteride 5 milliGRAM(s) Oral daily  furosemide   Injectable 60 milliGRAM(s) IV Push two times a day  lisinopril 10 milliGRAM(s) Oral daily  methylPREDNISolone sodium succinate Injectable 60 milliGRAM(s) IV Push every 12 hours  metoprolol tartrate 100 milliGRAM(s) Oral three times a day  pantoprazole    Tablet 40 milliGRAM(s) Oral before breakfast  polyethylene glycol 3350 17 Gram(s) Oral daily

## 2022-02-28 NOTE — PROGRESS NOTE ADULT - ASSESSMENT
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     #AHRF likely 2/2 HFmrEF vs ILD exacerbation   #R sided pleural effusion   - + shortness of breath, LE edema, at baseline 3 L O2--> currently on HiFlo 55L @ 60% Fi02  - POCUS was done in ED, showed right sided pleural effusion.  - On NIV 4h on 4h off alternate with HFNC.   - last TTE (EF 01/2022 40-45%) exacerbation- resolving   - pro-BNP 6168.  - unlikely pna procal 0.08  - c/e lasix   - Xray Chest 1 View-PORTABLE IMMEDIATE (02.24.22 @ 16:11): Impression: Bilateral opacifications, low lung volumes. Worsening.  - TTE(1/22): EF 40-45%, mod TR and MR, severe pHTN  - net neg  -  lasix 60 mgh IV BID  - C/w cardizem 120 mg CD QD, metoprolol tartrate 100mg TID, lisinoprol 10 mg QD, as hemodynamics allow  - Strict I/O, daily weight, fluid restriction (output not impressive, -450)  - Monitor electrolytes Keep K > 4, Mg >2  - F/u HF consult: recommended general cardiology consult prior to their eval    # Syncope  - r/o new seizure vs cardiac etiology vs vasovagal  - HT, + LOC. + palpitations. + bladder incontinence, shakiness, foamy mouth secretions  - Denies CP,  focal weakness, or headaches. No bowel incontinence, no tongue biting.   - CT head with no acute intracranial pathology; Stable moderate chronic microvascular ischemic changes.  - TTE(1/22): EF 40-45%, mod TR and MR, severe pHTN  - C/w tele monitoring  - TSH(0.34), folate(5.9), B12(794)  - EEG. wnl   - F/u Check orthostatics when more stable   - F/u Neuro recs  - PT eval. Fall precautions.     # hematuria, likely traumatic from Joel insertion  # BPH  - continue with finasteride  - Hgb 10.2( baseline 11),   - monitor CBC daily  - active type and screen  - c/w [facility]    # Afib, currently rate controlled(<110)  - as per son multiple episodes of irregular heart rate  - CHADSVASC - 4  - TSH 0.70 1/2022  - c/w Eliquis 5mg BID  - c/w metoprolol 100mg TID  - c/w cardizem 120mg OD     # ILD  # Pulmonary Nodule  - CT chest prior to admission: 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, new mediastinal adenopathy, left pleural effusion, cardiomegaly with dilated ascending and descending aorta  - OP Pulmonary Follow up for 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

## 2022-03-01 NOTE — DIETITIAN INITIAL EVALUATION ADULT. - RD TO REMAIN AVAILABLE
Pt meeting estimated nutrient demand via current oral intake. Nutrition goals: Pt to maintain tolerance & adherence to diet order, with at least 75% po intake maintained over next 7-10 days. Monitor: Skin, labs, BM, wt, po, diet, tolerance, adherence./yes

## 2022-03-01 NOTE — PATIENT PROFILE ADULT - FALL HARM RISK - RISK INTERVENTIONS
Assistance OOB with selected safe patient handling equipment/Assistance with ambulation/Communicate Fall Risk and Risk Factors to all staff, patient, and family/Discuss with provider need for PT consult/Monitor gait and stability/Reinforce activity limits and safety measures with patient and family/Visual Cue: Yellow wristband/Bed in lowest position, wheels locked, appropriate side rails in place/Call bell, personal items and telephone in reach/Instruct patient to call for assistance before getting out of bed or chair/Non-slip footwear when patient is out of bed/[place] to call system/Physically safe environment - no spills, clutter or unnecessary equipment/Purposeful Proactive Rounding/Room/bathroom lighting operational, light cord in reach

## 2022-03-01 NOTE — PROGRESS NOTE ADULT - SUBJECTIVE AND OBJECTIVE BOX
Over Night Events: events noted on [facility] 50/40, afebrile    PHYSICAL EXAM    ICU Vital Signs Last 24 Hrs  T(C): 36.1 (01 Mar 2022 00:00), Max: 36.1 (28 Feb 2022 16:00)  T(F): 97 (01 Mar 2022 00:00), Max: 97 (28 Feb 2022 20:00)  HR: 91 (01 Mar 2022 06:00) (83 - 99)  BP: 141/86 (01 Mar 2022 06:00) (122/81 - 152/93)  BP(mean): 109 (01 Mar 2022 06:00) (87 - 117)  RR: 14 (01 Mar 2022 06:00) (13 - 41)  SpO2: 96% (01 Mar 2022 06:00) (95% - 100%)      General: ill looking  Lungs: Bilateral crackles  Cardiovascular: BOOKER 2.6  Abdomen: Soft, Positive BS  Neurological: Non focal       02-27-22 @ 07:01  -  02-28-22 @ 07:00  --------------------------------------------------------  IN:    Oral Fluid: 240 mL  Total IN: 240 mL    OUT:    Indwelling Catheter - Urethral (mL): 725 mL  Total OUT: 725 mL    Total NET: -485 mL      02-28-22 @ 07:01  -  03-01-22 @ 06:31  --------------------------------------------------------  IN:    Oral Fluid: 240 mL  Total IN: 240 mL    OUT:    Indwelling Catheter - Urethral (mL): 1400 mL  Total OUT: 1400 mL    Total NET: -1160 mL          LABS:                          10.5   10.39 )-----------( 202      ( 01 Mar 2022 05:30 )             33.1                                                                                                                                                                                                                                                                          MEDICATIONS  (STANDING):  ALBUTerol    90 MICROgram(s) HFA Inhaler 1 Puff(s) Inhalation every 8 hours  albuterol/ipratropium for Nebulization 3 milliLiter(s) Nebulizer every 6 hours  albuterol/ipratropium for Nebulization 3 milliLiter(s) Nebulizer every 20 minutes  apixaban 5 milliGRAM(s) Oral two times a day  chlorhexidine 4% Liquid 1 Application(s) Topical <User Schedule>  cyanocobalamin 1000 MICROGram(s) Oral daily  diltiazem    milliGRAM(s) Oral daily  ferrous    sulfate 325 milliGRAM(s) Oral daily  finasteride 5 milliGRAM(s) Oral daily  furosemide   Injectable 60 milliGRAM(s) IV Push two times a day  lisinopril 10 milliGRAM(s) Oral daily  methylPREDNISolone sodium succinate Injectable 60 milliGRAM(s) IV Push every 12 hours  metoprolol tartrate 100 milliGRAM(s) Oral three times a day  pantoprazole    Tablet 40 milliGRAM(s) Oral before breakfast  polyethylene glycol 3350 17 Gram(s) Oral daily      CXR reviewed

## 2022-03-01 NOTE — DIETITIAN INITIAL EVALUATION ADULT. - PERTINENT LABORATORY DATA
3/1: RBC-3.71, H/H-10.5/33.1, Na-137 (WDL), K-3.1, Cl-91, BUN-41, creat-1.0 (WDL), gluc-133, Mg-1.8 (WDL),

## 2022-03-01 NOTE — DIETITIAN INITIAL EVALUATION ADULT. - PERSON TAUGHT/METHOD
Review Heart Healthy nutrition therapy concepts. Encourage adequate po intake to meet metabolic demand & avoid unintentional wt loss. D/c instruction - written material of heart healthy nutrition therapy concepts./verbal instruction/written material/patient instructed

## 2022-03-01 NOTE — PROGRESS NOTE ADULT - SUBJECTIVE AND OBJECTIVE BOX
OPAL, NNEKA  89y Male    INTERVAL HPI/OVERNIGHT EVENTS:    Pt sitting in a chair this afternoon  appeared comfortable respiratory wise but c/o the chair being too low  denied pain  no fever    T(F): 97 (03-01-22 @ 16:00), Max: 97.1 (03-01-22 @ 11:25)  HR: 92 (03-01-22 @ 16:00) (84 - 99)  BP: 127/75 (03-01-22 @ 16:00) (106/74 - 151/85)  RR: 21 (03-01-22 @ 16:00) (13 - 41)  SpO2: 97% (03-01-22 @ 16:00) (95% - 99%)  98% on HFNC 50/40    I&O's Summary    28 Feb 2022 07:01  -  01 Mar 2022 07:00  --------------------------------------------------------  IN: 240 mL / OUT: 1400 mL / NET: -1160 mL    01 Mar 2022 07:01  -  01 Mar 2022 18:16  --------------------------------------------------------  IN: 720 mL / OUT: 1150 mL / NET: -430 mL      PHYSICAL EXAM:  GENERAL: NAD in a chair  HEAD:  Normocephalic  EYES:  conjunctiva and sclera clear  ENMT: Moist mucous membranes  NERVOUS SYSTEM:  Alert, awake, Good concentration  CHEST/LUNG: crackles at bases and decreased BS b/l  HEART: IRR  ABDOMEN: Soft, Nontender, Nondistended; Bowel sounds present  EXTREMITIES:   No edema    Consultant(s) Notes Reviewed:  [x ] YES  [ ] NO  Care Discussed with Consultants/Other Providers [ x] YES  [ ] NO    MEDICATIONS  (STANDING):  ALBUTerol    90 MICROgram(s) HFA Inhaler 1 Puff(s) Inhalation every 8 hours  albuterol/ipratropium for Nebulization 3 milliLiter(s) Nebulizer every 6 hours  albuterol/ipratropium for Nebulization 3 milliLiter(s) Nebulizer every 20 minutes  apixaban 5 milliGRAM(s) Oral two times a day  chlorhexidine 4% Liquid 1 Application(s) Topical <User Schedule>  cyanocobalamin 1000 MICROGram(s) Oral daily  diltiazem    milliGRAM(s) Oral daily  ferrous    sulfate 325 milliGRAM(s) Oral daily  finasteride 5 milliGRAM(s) Oral daily  furosemide   Injectable 60 milliGRAM(s) IV Push two times a day  lisinopril 10 milliGRAM(s) Oral daily  methylPREDNISolone sodium succinate Injectable 60 milliGRAM(s) IV Push daily  metoprolol tartrate 100 milliGRAM(s) Oral three times a day  pantoprazole    Tablet 40 milliGRAM(s) Oral before breakfast  polyethylene glycol 3350 17 Gram(s) Oral daily    MEDICATIONS  (PRN):      LABS:                        10.5   10.39 )-----------( 202      ( 01 Mar 2022 05:30 )             33.1     03-01    137  |  91<L>  |  41<H>  ----------------------------<  133<H>  3.1<L>   |  37<H>  |  1.0    Ca    8.1<L>      01 Mar 2022 05:30  Mg     1.8     03-01    TPro  6.4  /  Alb  3.1<L>  /  TBili  0.7  /  DBili  x   /  AST  14  /  ALT  20  /  AlkPhos  57  03-01          RADIOLOGY & ADDITIONAL TESTS:    Imaging or report Personally Reviewed:  [x ] YES  [ ] NO    < from: TTE Echo Complete w/o Contrast w/ Doppler (02.27.22 @ 08:49) >    Summary:   1. LV Ejection Fractionby Lovelace's Method with a biplane EF of 46 %.   2. Mildly decreased global left ventricular systolic function.   3. The left ventricular diastolic function could not be assessed in this   study.   4. Mild thickening of the anterior and posterior mitral valve leaflets.   5. Mild mitral valve regurgitation.   6. Mild aortic regurgitation.   7. Mild-moderate tricuspid regurgitation.   8. Estimated pulmonary artery systolic pressure is 62.5 mmHg assuming a   right atrial pressure of 15 mmHg, which is consistent with severe   pulmonary hypertension.   9. Left atrial enlargement.  10. Mildly enlarged right atrium.    < end of copied text >  < from: Xray Chest 1 View- PORTABLE-Routine (Xray Chest 1 View- PORTABLE-Routine in AM.) (02.28.22 @ 05:39) >  Impression:    Cardiomegaly with bilateral opacifications, without difference.    < end of copied text >      Case discussed with resident today    Care discussed with pt

## 2022-03-01 NOTE — DIETITIAN INITIAL EVALUATION ADULT. - ORAL INTAKE PTA/DIET HISTORY
Good appetite & po intake until ~1-2 days PTP d/t onset of SOB. Prior to this, pt consumed 2-3 meals/day typically; occasionally would consume snacks. Reportedly takes Vit D, Vit C, daily MVI & fish oil at home. No oral supplements reported. No food preferences reported. No dietary restrictions r/t culture/Latter day per report. UBW reported to be 72.7 kg with no known h/o unintentional wt loss. Reports wt fluctuations 2/2 fluid overload. No significant muscle wasting/subcutaneous fat loss observe

## 2022-03-01 NOTE — DIETITIAN INITIAL EVALUATION ADULT. - OTHER CALCULATIONS
Energy: 2958-5999 kcal/day (MSJx1.3-1.4) - increased activity factor used in setting of critical illness with increased work of breathing in setting of acute hypoxemic respiratory failure and recent COVID. Protein:  g/day (1.2-1.4 g/kg ABW) - as above. Fluids: 1815 mL/day (25 mL/kg ABW).

## 2022-03-01 NOTE — DIETITIAN INITIAL EVALUATION ADULT. - OTHER INFO
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 Acute hypoxemic respiratory failure noted. HFmrEF exacerbation in setting of ILD noted. R pleural effusion noted. Volume overload improved per report.    Ht: 162.6 cm. Dosing wt: 72.6 kg. BMI: 27.5 (using dosing wt 72.6 kg). IBW: 59.1 kg.    Alert. Last BM 3/1. No edema noted at this time. Abd noted non-distended. No N/V/D/C reported at this time. Skin noted intact.    Current diet: DASH/TLC. Pt reports good appetite & po intake this admit; consumes % of most meals this admit.

## 2022-03-01 NOTE — CONSULT NOTE ADULT - ASSESSMENT
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    Severe pulm HTN  Afib  CHFmrEF    - Monitor on tele.  - Check orthostatic vital signs.  - Consider EP consult for outpatient rhythm monitoring.  - F/u neuro given signs of seizure during described syncopal event.  - Will eventually need w/u for pulm HTN if not done previously as this is a potential etiology for syncope.  - Will discuss plan with attending cardiologist. 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    Severe pulm HTN  Afib  CHFmrEF    - Monitor on tele.  - Check orthostatic vital signs.  - Consider EP consult for outpatient rhythm monitoring.  - F/u neuro given signs of seizure during described syncopal event.  - Will eventually need w/u for pulm HTN if not done previously as this is a potential etiology for syncope.

## 2022-03-01 NOTE — CONSULT NOTE ADULT - SUBJECTIVE AND OBJECTIVE BOX
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 Per son Elkin at bedside, ever since being discharged from hospital, patient has been feeling short of breath, and  for last 2 days his oxygen requirement climbed up to 5 L O2. Son also reports multiple episodes of syncope since last week, usually occurring with mild activity, associated with bladder incontinence. Today, when his son was changing his clothing, patient passed out again, had foamy secretions from mouth, and urinated on himself. Denies head trauma. Each episode of syncope lasts for about 2 minutes at the time. When son checked his pulse ox, his saturation was at 80% O2, he was also noted to be tachycardic at 120s. Denies chest pain, fever, chills, abdominal pain, nausea, vomiting, diarrhea. + LE edema.    PAST MEDICAL & SURGICAL HISTORY  Hypertension    GERD (gastroesophageal reflux disease)    Lung interstitial disease    BPH (benign prostatic hyperplasia)    HFrEF (heart failure with reduced ejection fraction)    COPD (chronic obstructive pulmonary disease)    No significant past surgical history        FAMILY HISTORY:  FAMILY HISTORY:  No pertinent family history in first degree relatives        SOCIAL HISTORY:  []smoker  []Alcohol  []Drug    ROS:  Negative except as mentioned in HPI    ALLERGIES:  No Known Allergies      MEDICATIONS:  MEDICATIONS  (STANDING):  ALBUTerol    90 MICROgram(s) HFA Inhaler 1 Puff(s) Inhalation every 8 hours  albuterol/ipratropium for Nebulization 3 milliLiter(s) Nebulizer every 6 hours  albuterol/ipratropium for Nebulization 3 milliLiter(s) Nebulizer every 20 minutes  apixaban 5 milliGRAM(s) Oral two times a day  chlorhexidine 4% Liquid 1 Application(s) Topical <User Schedule>  cyanocobalamin 1000 MICROGram(s) Oral daily  diltiazem    milliGRAM(s) Oral daily  ferrous    sulfate 325 milliGRAM(s) Oral daily  finasteride 5 milliGRAM(s) Oral daily  furosemide   Injectable 60 milliGRAM(s) IV Push two times a day  lisinopril 10 milliGRAM(s) Oral daily  methylPREDNISolone sodium succinate Injectable 60 milliGRAM(s) IV Push daily  metoprolol tartrate 100 milliGRAM(s) Oral three times a day  pantoprazole    Tablet 40 milliGRAM(s) Oral before breakfast  polyethylene glycol 3350 17 Gram(s) Oral daily    MEDICATIONS  (PRN):      HOME MEDICATIONS:  Home Medications:  dilTIAZem 120 mg/24 hours oral capsule, extended release: 1 cap(s) orally once a day (15 Norris 2022 11:38)  Eliquis 5 mg oral tablet: 1 tab(s) orally 2 times a day (24 Feb 2022 21:58)  FERROUS SULFATE 325 MG TABLET: 1 each orally once a day (15 Norris 2022 11:38)  FINASTERIDE 5 MG TABLET: 1 each orally once a day (15 Norris 2022 11:38)  FUROSEMIDE 20MG TAB: 1 tab(s) orally once a day (15 Norris 2022 11:38)  Lidocare Pain Relief Patch 4% topical film: Apply topically to left shoulder once a day (15 Norris 2022 11:38)  Lopressor 100 mg oral tablet: 1 tab(s) orally 3 times a day (15 Norris 2022 11:38)  OMEPRAZOLE DR 40MG CAP: 1 cap(s) orally once a day (15 Norris 2022 11:38)  TAMSULOSIN HCL 0.4 MG CAPSULE: 1 each orally once a day (at bedtime) (15 Norris 2022 11:38)  VITAMIN B-12 1,000 MCG TABLET: 1 each orally once a day (15 Norris 2022 11:38)  Zestril 10 mg oral tablet: 1 tab(s) orally once a day (17 Jan 2022 08:12)      VITALS:   T(F): 97.1 (03-01 @ 11:25), Max: 97.5 (02-26 @ 20:00)  HR: 91 (03-01 @ 11:25) (83 - 110)  BP: 106/74 (03-01 @ 11:25) (105/67 - 152/93)  BP(mean): 86 (03-01 @ 11:25) (75 - 117)  RR: 24 (03-01 @ 11:25) (13 - 45)  SpO2: 98% (03-01 @ 11:25) (92% - 100%)    I&O's Summary    28 Feb 2022 07:01  -  01 Mar 2022 07:00  --------------------------------------------------------  IN: 240 mL / OUT: 1400 mL / NET: -1160 mL    01 Mar 2022 07:01  -  01 Mar 2022 12:18  --------------------------------------------------------  IN: 720 mL / OUT: 0 mL / NET: 720 mL        PHYSICAL EXAM:  CONSTITUTIONAL: No acute distress, well-developed, well-groomed, AAOx3  HEAD: Atraumatic, normocephalic  EYES: EOM intact, PERRLA, conjunctiva and sclera clear  ENT: Supple, no masses, no thyromegaly, no bruits, no JVD; moist mucous membranes  PULMONARY: Clear to auscultation bilaterally; no wheezes, rales, or rhonchi  CARDIOVASCULAR: Regular rate and rhythm; no murmurs, rubs, or gallops  GASTROINTESTINAL: Soft, non-tender, non-distended; bowel sounds present  MUSCULOSKELETAL: 2+ peripheral pulses; no clubbing, no cyanosis, no edema  NEUROLOGY: non-focal  SKIN: No rashes or lesions; warm and     LABS:                        10.5   10.39 )-----------( 202      ( 01 Mar 2022 05:30 )             33.1     03-01    137  |  91<L>  |  41<H>  ----------------------------<  133<H>  3.1<L>   |  37<H>  |  1.0    Ca    8.1<L>      01 Mar 2022 05:30  Mg     1.8     03-01    TPro  6.4  /  Alb  3.1<L>  /  TBili  0.7  /  DBili  x   /  AST  14  /  ALT  20  /  AlkPhos  57  03-01        Troponin <0.01, CKMB --, CK --/ 02-25-22 @ 06:40  Troponin <0.01, CKMB --, CK --/ 02-24-22 @ 14:43    Serum Pro-Brain Natriuretic Peptide: 2711 pg/mL (02-28-22 @ 05:40)      Hemoglobin A1C   Thyroid    EKG: afib    Tele: Afib
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 Per son Petepatient has been feeling short of breath, worse in the last 2 days his oxygen requirement climbed up to 5 L O2. Son also reports multiple episodes of syncope since last week, usually occurring with mild activity, associated with bladder incontinence. On the day of presentation when son was changing his clothing, patient passed out again, had foaming from the mouth, and urinated on himself. Denies head trauma. Each episode of syncope lasts for about 2 minutes at the time unknown post ictal state. When son checked his pulse ox, his saturation was at 80%, he was also noted to be tachycardic at 120s. Denies Fever chills, fall /trauma.        Home Medications:  Diltiazem 120 mg/24 hours oral capsule, extended release: 1 cap(s) orally once a day (15 Norris 2022 11:38)  Eliquis 5 mg oral tablet: 1 tab(s) orally 2 times a day (24 Feb 2022 21:58)  FERROUS SULFATE 325 MG TABLET: 1 each orally once a day (15 Norris 2022 11:38)  FINASTERIDE 5 MG TABLET: 1 each orally once a day (15 Norris 2022 11:38)  FUROSEMIDE 20MG TAB: 1 tab(s) orally once a day (15 Norris 2022 11:38)  Lidocare Pain Relief Patch 4% topical film: Apply topically to left shoulder once a day (15 Norris 2022 11:38)  Lopressor 100 mg oral tablet: 1 tab(s) orally 3 times a day (15 Norris 2022 11:38)  OMEPRAZOLE DR 40MG CAP: 1 cap(s) orally once a day (15 Norris 2022 11:38)  TAMSULOSIN HCL 0.4 MG CAPSULE: 1 each orally once a day (at bedtime) (15 Norris 2022 11:38)  VITAMIN B-12 1,000 MCG TABLET: 1 each orally once a day (15 Norris 2022 11:38)  Zestril 10 mg oral tablet: 1 tab(s) orally once a day (17 Jan 2022 08:12)      Social History  Denies smoking alcohol or drug use      Neuro Exam:  Orientation: Patient not fully co-operative with exam, is a/o x 3.    Cranial Nerves: Eomi, responds to visual threat, no obvious facial asymmetry, speech and language is intact. Very [place]  Motor: 5/5 throughout/ no drift             No abnormal mvmts  Sensory exam:  intact and symmetric  Coordination:. no dysmetria or limb ataxia   Gait: deferred    NIHSS:     Allergies    No Known Allergies    Intolerances      MEDICATIONS  (STANDING):  albuterol/ipratropium for Nebulization 3 milliLiter(s) Nebulizer every 20 minutes  apixaban 5 milliGRAM(s) Oral two times a day  chlorhexidine 4% Liquid 1 Application(s) Topical <User Schedule>  cyanocobalamin 1000 MICROGram(s) Oral daily  diltiazem    milliGRAM(s) Oral daily  ferrous    sulfate 325 milliGRAM(s) Oral daily  finasteride 5 milliGRAM(s) Oral daily  furosemide   Injectable 60 milliGRAM(s) IV Push two times a day  lisinopril 10 milliGRAM(s) Oral daily  metoprolol tartrate 100 milliGRAM(s) Oral three times a day  pantoprazole    Tablet 40 milliGRAM(s) Oral before breakfast    MEDICATIONS  (PRN):      LABS:                        10.2   10.98 )-----------( 147      ( 24 Feb 2022 14:43 )             32.9     02-24    137  |  101  |  29<H>  ----------------------------<  128<H>  3.9   |  25  |  1.2    Ca    8.5      24 Feb 2022 14:43  Mg     1.9     02-24    TPro  7.8  /  Alb  3.5  /  TBili  1.3<H>  /  DBili  x   /  AST  18  /  ALT  13  /  AlkPhos  68  02-24    PT/INR - ( 24 Feb 2022 14:40 )   PT: 24.60 sec;   INR: 2.16 ratio         PTT - ( 24 Feb 2022 14:40 )  PTT:32.5 sec    Respiratory Viral Panel with COVID-19 by SHMUEL (02.24.22 @ 14:40)   Rapid RVP Result: [facility]   SARS-CoV-2: Not Detec:         Neuro Imaging:  < from: CT Head No Cont (02.24.22 @ 18:27) >  FINDINGS:    There is prominence of the sulci, sylvian fissures, and ventricles,   reflecting stable mild diffuse parenchymal volume loss.    There are scattered patchy low attenuations in the bilateral   periventricular cerebral white matter consistent with stable moderate   chronic microvascular ischemic changes.    There is no evidence of acute territorial infarct or intracranial   hemorrhage. There is no space-occupying lesion or midline shift.    There is no evidence of hydrocephalus. There are no extra-axial fluid   collections.    There are moderate atherosclerotic calcifications at bilateral carotid   siphons.    Status post left cataract surgery, stable. There is a small retention   cyst in the right ethmoid sinus. There is partial opacification of the   bilateral mastoid air cells are unremarkable the left. The visualized   soft tissues and osseous structures appear normal.      IMPRESSION:    No acute intracranial pathology.    Stable moderate chronic microvascular ischemic changes.    Redemonstrated bilateral mastoid effusions, nonspecific.    --- End of Report ---            BENJI KRUEGER MD; Attending Radiologist  This document has been electronically signed. Feb 24 2022  7:31PM    < end of copied text >    EEG:     Echo:   Carotid Doppler: N/A  Telemetry:             
Date of Admission: 22    CHIEF COMPLAINT: Patient is a 89y old  Male who presents with a chief complaint of SOB and syncope (2022 05:32)    HISTORY OF PRESENT ILLNESS: 89-year-old Male with PMH of ILD on 3L O2 at baseline at home, HTN, BPH, GERD, A fib( on Eliquis), CHFmrEF (EF 2022 40-45%), recent Covid (in 2022) presents for shortness of breath, hypoxia and several episodes of syncope. Per son Elkin at bedside, ever since being discharged from hospital, patient has been feeling short of breath, and  for last 2 days his oxygen requirement climbed up to 5 L O2. Son also reports multiple episodes of syncope since last week, usually occurring with mild activity, associated with bladder incontinence. Today, when his son was changing his clothing, patient passed out again, had foamy secretions from mouth, and urinated on himself. Denies head trauma. Each episode of syncope lasts for about 2 minutes at the time. When son checked his pulse ox, his saturation was at 80% O2, he was also noted to be tachycardic at 120s. Denies chest pain, fever, chills, abdominal pain, nausea, vomiting, diarrhea. + LE edema.  In ED, VS /81, HR 95, tachypneic RR 20-24, T 95F Spo2 88% on 5L. Pt was placed on BiPAP with improvement os Sat to 98%.  Labs: WBC 10.9. Hgb 10.2( baseline 11), troponin 0.01, pro-BNP 6168. VBG Ph 7.38, pCO2 51, HCO3 30  CXR with worsening b/l opacities.  CT head with no acute intracranial pathology; Stable moderate chronic microvascular ischemic changes.  POCUS was done in ED, showed right sided pleural effusion. Pt was given 40 mg IV lasix x2, 2 gr IV Magnesium.  Pt was admitted for acute on chronic CHF exacerbation and syncope work up. (2022 20:38)        PAST MEDICAL & SURGICAL HISTORY:  Hypertension  GERD (gastroesophageal reflux disease)  Lung interstitial disease  BPH (benign prostatic hyperplasia)  HFrEF (heart failure with reduced ejection fraction)  COPD (chronic obstructive pulmonary disease)  No significant past surgical history        FAMILY HISTORY:  [ ] no pertinent family history of premature cardiovascular disease in first degree relatives.  Mother:   Father:   Siblings:     SOCIAL HISTORY:    [ ] Non-smoker  [ ] Smoker  [ ] Alcohol    Allergies  No Known Allergies    Intolerances    	    REVIEW OF SYSTEMS:  CONSTITUTIONAL: No fever, weight loss, or fatigue.  CARDIOLOGY: Patient denies chest pain, shortness of breath or syncopal episodes.   RESPIRATORY: denies shortness of breath, wheezing.   NEUROLOGICAL: No weakness, no focal deficits to report.  ENDOCRINOLOGICAL: no recent change in diabetic medications.   GI: no BRBPR, no n/v, diarrhea.    PSYCHIATRY: normal mood and affect  HEENT: no nasal discharge, no ecchymosis  SKIN: no ecchymosis, no breakdown  MUSCULOSKELETAL: Full range of motion x4.     PHYSICAL EXAM:  General Appearance: well appearing, normal for age and gender. 	  Neck: normal JVP, no bruit.   Eyes: Extra Ocular muscles intact.   Cardiovascular: regular rate and rhythm S1 S2, No JVD, No murmurs, No edema  Respiratory: Lungs clear to auscultation	  Psychiatry: Alert and oriented x 3, Mood & affect appropriate  Gastrointestinal:  Soft, Non-tender  Skin/Integumen: No rashes, No ecchymoses, No cyanosis	  Neurologic: Non-focal  Musculoskeletal/ extremities: Normal range of motion, No clubbing, cyanosis or edema  Vascular: Peripheral pulses palpable 2+ bilaterally      CARDIAC MARKERS:  Serum Pro-Brain Natriuretic Peptide: 6168 pg/mL (22 @ 14:43)      TELEMETRY EVENTS: 	    EC22    Ventricular Rate 99 BPM  Atrial Rate 86 BPM  QRS Duration 88 ms  Q-T Interval 370 ms  QTC Calculation(Bazett) 474 ms  R Axis 27 degrees  T Axis -76 degrees    Diagnosis Line Atrial fibrillation  ST & T wave abnormality, consider inferior ischemia  ST & T wave abnormality, consider anterolateral ischemia  Prolonged QT  Abnormal ECG    Confirmed by BELEN COULTER MD ([id number]) on 2022 7:04:21 AM      PREVIOUS DIAGNOSTIC TESTING:    [X] Echocardiogram:  22    Summary:   1. Left ventricular ejection fraction, by visual estimation, is 40 to   45%.   2. Mildly decreased global left ventricular systolic function.   3. Mildly increased LV wall thickness.   4. The left ventricular diastolic function could not be assessed in this   study.   5. Mild thickening of the anterior and posterior mitral valve leaflets.   6. Mild to moderate mitral valve regurgitation.   7. Mild-moderate tricuspid regurgitation.   8. Estimated pulmonary artery systolic pressure is 60.7 mmHg assuming a   right atrial pressure of 15 mmHg, which is consistent with severe   pulmonary hypertension.   9. Mildly enlarged left atrium.  10. LA volume Index is 39.9 ml/m² ml/m2.  11. Mildly enlarged right atrium.      Home Medications:  dilTIAZem 120 mg/24 hours oral capsule, extended release: 1 cap(s) orally once a day (15 Norris 2022 11:38)  Eliquis 5 mg oral tablet: 1 tab(s) orally 2 times a day (2022 21:58)  FERROUS SULFATE 325 MG TABLET: 1 each orally once a day (15 Norris 2022 11:38)  FINASTERIDE 5 MG TABLET: 1 each orally once a day (15 Norris 2022 11:38)  FUROSEMIDE 20MG TAB: 1 tab(s) orally once a day (15 Norris 2022 11:38)  Lidocare Pain Relief Patch 4% topical film: Apply topically to left shoulder once a day (15 Norris 2022 11:38)  Lopressor 100 mg oral tablet: 1 tab(s) orally 3 times a day (15 Norris 2022 11:38)  OMEPRAZOLE DR 40MG CAP: 1 cap(s) orally once a day (15 Norris 2022 11:38)  TAMSULOSIN HCL 0.4 MG CAPSULE: 1 each orally once a day (at bedtime) (15 Norris 2022 11:38)  VITAMIN B-12 1,000 MCG TABLET: 1 each orally once a day (15 Norris 2022 11:38)  Zestril 10 mg oral tablet: 1 tab(s) orally once a day (2022 08:12)    MEDICATIONS  (STANDING):  albuterol/ipratropium for Nebulization 3 milliLiter(s) Nebulizer every 20 minutes  apixaban 5 milliGRAM(s) Oral two times a day  chlorhexidine 4% Liquid 1 Application(s) Topical <User Schedule>  cyanocobalamin 1000 MICROGram(s) Oral daily  diltiazem    milliGRAM(s) Oral daily  ferrous    sulfate 325 milliGRAM(s) Oral daily  finasteride 5 milliGRAM(s) Oral daily  furosemide   Injectable 60 milliGRAM(s) IV Push two times a day  lisinopril 10 milliGRAM(s) Oral daily  metoprolol tartrate 100 milliGRAM(s) Oral three times a day  pantoprazole    Tablet 40 milliGRAM(s) Oral before breakfast    MEDICATIONS  (PRN):        
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 Per son Elkin at bedside, ever since being discharged from hospital, patient has been feeling short of breath, and  for last 2 days his oxygen requirement climbed up to 5 L O2. Son also reports multiple episodes of syncope since last week, usually occurring with mild activity, associated with bladder incontinence. Today, when his son was changing his clothing, patient passed out again, had foamy secretions from mouth, and urinated on himself. Denies head trauma. Each episode of syncope lasts for about 2 minutes at the time. When son checked his pulse ox, his saturation was at 80% O2, he was also noted to be tachycardic at 120s. Denies chest pain, fever, chills, abdominal pain, nausea, vomiting, diarrhea. + LE edema.    In ED, VS /81, HR 95, tachypneic RR 20-24, T 95F Spo2 88% on 5L. Pt was placed on BiPAP with improvement os Sat to 98%.  Labs: WBC 10.9. Hgb 10.2( baseline 11), troponin 0.01, pro-BNP 6168. VBG Ph 7.38, pCO2 51, HCO3 30  CXR with worsening b/l opacities.  CT head with no acute intracranial pathology; Stable moderate chronic microvascular ischemic changes.  POCUS was done in ED, showed right sided pleural effusion. Pt was given 40 mg IV lasix x2, 2 gr IV Magnesium.  Pt was admitted for acute on chronic CHF exacerbation and syncope work up.         (24 Feb 2022 20:38)      PAST MEDICAL & SURGICAL HISTORY:  Hypertension    GERD (gastroesophageal reflux disease)    Lung interstitial disease    BPH (benign prostatic hyperplasia)    HFrEF (heart failure with reduced ejection fraction)    COPD (chronic obstructive pulmonary disease)    No significant past surgical history        SOCIAL HX:   nonsmoker, no etoh abuse    FAMILY HISTORY:  No pertinent family history in first degree relatives        Review Of Systems:     negative except as in HPI      Allergies    No Known Allergies    Intolerances          PHYSICAL EXAM    ICU Vital Signs Last 24 Hrs  T(C): 36.7 (25 Feb 2022 06:29), Max: 36.7 (24 Feb 2022 16:36)  T(F): 98 (25 Feb 2022 06:29), Max: 98 (24 Feb 2022 16:36)  HR: 99 (25 Feb 2022 06:29) (73 - 105)  BP: 135/82 (25 Feb 2022 06:29) (118/66 - 153/70)  BP(mean): --  ABP: --  ABP(mean): --  RR: 29 (25 Feb 2022 09:02) (16 - 29)  SpO2: 100% (25 Feb 2022 09:02) (88% - 100%)      CONSTITUTIONAL:   Ill appearing.   NAD    ENT:   Airway patent,   Mouth with normal mucosa.   No thrush    EYES:   pupils equal,   round and reactive to light.    CARDIAC:   Normal rate,   Regular rhythm.    Heart sounds S1, S2.   No edema    Vascular:   normal systolic impulse  no bruits    RESPIRATORY:   No wheezing  Bilateral crackles   Normal chest expansion  No use of accessory muscles    GASTROINTESTINAL:  Abdomen soft   Non-tender,   No guarding,   + BS    MUSCULOSKELETAL:   Range of motion is not limited,  No clubbing, cyanosis    NEUROLOGICAL:   Alert and oriented x3  No motor or sensory deficits.  Pertinent DTRs normal    SKIN:   Skin normal color for race,   Warm and dry  No evidence of rash.    PSYCHIATRIC:   Normal mood and affect.   No apparent risk to self or others.              02-24-22 @ 07:01  -  02-25-22 @ 07:00  --------------------------------------------------------  IN:  Total IN: 0 mL    OUT:    Indwelling Catheter - Urethral (mL): 3800 mL  Total OUT: 3800 mL    Total NET: -3800 mL          LABS:                          9.8    10.78 )-----------( 122      ( 25 Feb 2022 06:40 )             31.1                                               02-25    141  |  100  |  27<H>  ----------------------------<  96  3.7   |  30  |  1.3    Ca    8.3<L>      25 Feb 2022 06:40  Mg     2.1     02-25    TPro  7.1  /  Alb  3.2<L>  /  TBili  1.5<H>  /  DBili  x   /  AST  15  /  ALT  11  /  AlkPhos  62  02-25      PT/INR - ( 24 Feb 2022 14:40 )   PT: 24.60 sec;   INR: 2.16 ratio         PTT - ( 24 Feb 2022 14:40 )  PTT:32.5 sec                                           CARDIAC MARKERS ( 25 Feb 2022 06:40 )  x     / <0.01 ng/mL / x     / x     / x      CARDIAC MARKERS ( 24 Feb 2022 14:43 )  x     / <0.01 ng/mL / x     / x     / x                                                LIVER FUNCTIONS - ( 25 Feb 2022 06:40 )  Alb: 3.2 g/dL / Pro: 7.1 g/dL / ALK PHOS: 62 U/L / ALT: 11 U/L / AST: 15 U/L / GGT: x                                                                                                                                       X-Rays reviewed:  stable bilateral infiltrates    MEDICATIONS  (STANDING):  albuterol/ipratropium for Nebulization 3 milliLiter(s) Nebulizer every 20 minutes  apixaban 5 milliGRAM(s) Oral two times a day  chlorhexidine 4% Liquid 1 Application(s) Topical <User Schedule>  cyanocobalamin 1000 MICROGram(s) Oral daily  diltiazem    milliGRAM(s) Oral daily  ferrous    sulfate 325 milliGRAM(s) Oral daily  finasteride 5 milliGRAM(s) Oral daily  furosemide   Injectable 60 milliGRAM(s) IV Push two times a day  lisinopril 10 milliGRAM(s) Oral daily  metoprolol tartrate 100 milliGRAM(s) Oral three times a day  pantoprazole    Tablet 40 milliGRAM(s) Oral before breakfast    MEDICATIONS  (PRN):

## 2022-03-02 NOTE — PROGRESS NOTE ADULT - SUBJECTIVE AND OBJECTIVE BOX
NNEKA JOSEPH 89y Male  MRN#: [id number]   Hospital Day: 6d    SUBJECTIVE  Patient is a 89y old Male who presents with a chief complaint of SOB and syncope (02 Mar 2022 07:55)  Currently admitted to medicine with the primary diagnosis of Acute respiratory failure with hypoxia      INTERVAL HPI AND OVERNIGHT EVENTS:  Patient was examined and seen at bedside. This morning he is resting comfortably in bed and reports no issues or overnight events.    REVIEW OF SYMPTOMS:  CONSTITUTIONAL: No weakness, fevers or chills; No headaches  EYES: No visual changes, eye pain, or discharge  ENT: No vertigo; No ear pain or change in hearing; No sore throat or difficulty swallowing  NECK: No pain or stiffness  RESPIRATORY: No cough, wheezing, or hemoptysis; No shortness of breath  CARDIOVASCULAR: No chest pain or palpitations  GASTROINTESTINAL: No abdominal or epigastric pain; No nausea, vomiting, or hematemesis; No diarrhea or constipation; No melena or hematochezia  GENITOURINARY: No dysuria, frequency or hematuria  MUSCULOSKELETAL: No joint pain, no muscle pain, no weakness  NEUROLOGICAL: No numbness or weakness  SKIN: No itching or rashes    OBJECTIVE  PAST MEDICAL & SURGICAL HISTORY  Hypertension    GERD (gastroesophageal reflux disease)    Lung interstitial disease    BPH (benign prostatic hyperplasia)    HFrEF (heart failure with reduced ejection fraction)    COPD (chronic obstructive pulmonary disease)    No significant past surgical history      ALLERGIES:  No Known Allergies    MEDICATIONS:  STANDING MEDICATIONS  ALBUTerol    90 MICROgram(s) HFA Inhaler 1 Puff(s) Inhalation every 8 hours  albuterol/ipratropium for Nebulization 3 milliLiter(s) Nebulizer every 6 hours  albuterol/ipratropium for Nebulization 3 milliLiter(s) Nebulizer every 20 minutes  apixaban 5 milliGRAM(s) Oral two times a day  chlorhexidine 4% Liquid 1 Application(s) Topical <User Schedule>  cyanocobalamin 1000 MICROGram(s) Oral daily  diltiazem    milliGRAM(s) Oral daily  ferrous    sulfate 325 milliGRAM(s) Oral daily  finasteride 5 milliGRAM(s) Oral daily  furosemide   Injectable 60 milliGRAM(s) IV Push two times a day  lisinopril 10 milliGRAM(s) Oral daily  metoprolol tartrate 100 milliGRAM(s) Oral three times a day  pantoprazole    Tablet 40 milliGRAM(s) Oral before breakfast  polyethylene glycol 3350 17 Gram(s) Oral daily  predniSONE   Tablet 40 milliGRAM(s) Oral daily    PRN MEDICATIONS      VITAL SIGNS: Last 24 Hours  T(C): 36.2 (02 Mar 2022 12:00), Max: 36.2 (02 Mar 2022 04:00)  T(F): 97.2 (02 Mar 2022 12:00), Max: 97.2 (02 Mar 2022 04:00)  HR: 81 (02 Mar 2022 12:00) (81 - 100)  BP: 109/75 (02 Mar 2022 12:00) (109/75 - 146/84)  BP(mean): 88 (02 Mar 2022 12:00) (88 - 109)  RR: 25 (02 Mar 2022 12:00) (11 - 27)  SpO2: 97% (02 Mar 2022 12:00) (96% - 97%)    LABS:                        11.9   14.10 )-----------( 228      ( 02 Mar 2022 06:20 )             36.9     03-02    138  |  92<L>  |  51<H>  ----------------------------<  92  3.5   |  37<H>  |  1.1    Ca    8.6      02 Mar 2022 06:20  Mg     1.9     03-02    TPro  7.0  /  Alb  3.3<L>  /  TBili  0.8  /  DBili  x   /  AST  14  /  ALT  21  /  AlkPhos  59  03-02                  RADIOLOGY:      PHYSICAL EXAM:  CONSTITUTIONAL: No acute distress, well-developed, well-groomed, AAOx3  HEAD: Atraumatic, normocephalic  EYES: EOM intact, PERRLA, conjunctiva and sclera clear  ENT: Supple, no masses, no thyromegaly, no bruits, no JVD; moist mucous membranes  PULMONARY: Clear to auscultation bilaterally; no wheezes, rales, or rhonchi  CARDIOVASCULAR: Regular rate and rhythm; no murmurs, rubs, or gallops  GASTROINTESTINAL: Soft, non-tender, non-distended; bowel sounds present  MUSCULOSKELETAL: 2+ peripheral pulses; no clubbing, no cyanosis, no edema  NEUROLOGY: non-focal  SKIN: No rashes or lesions; warm and dry

## 2022-03-02 NOTE — PROGRESS NOTE ADULT - SUBJECTIVE AND OBJECTIVE BOX
Over Night Events: events noted, on HHFNC 35%, afebrile, cardio reviewed    PHYSICAL EXAM    ICU Vital Signs Last 24 Hrs  T(C): 36 (02 Mar 2022 07:40), Max: 36.2 (01 Mar 2022 11:25)  T(F): 96.8 (02 Mar 2022 07:40), Max: 97.2 (02 Mar 2022 04:00)  HR: 90 (02 Mar 2022 07:40) (86 - 100)  BP: 129/83 (02 Mar 2022 07:40) (106/74 - 151/85)  BP(mean): 101 (02 Mar 2022 07:40) (86 - 112)  RR: 20 (02 Mar 2022 07:40) (11 - 27)  SpO2: 96% (02 Mar 2022 07:40) (96% - 98%)      General:  HEENT: ARCHANA             Lymph Nodes: No cervical LN   Lungs: Bilateral BS  Cardiovascular: Regular   Abdomen: Soft, Positive BS  Extremities: No clubbing   Skin: Warm  Neurological: Non focal       03-01-22 @ 07:01  -  03-02-22 @ 07:00  --------------------------------------------------------  IN:    Oral Fluid: 720 mL  Total IN: 720 mL    OUT:    Indwelling Catheter - Urethral (mL): 3150 mL  Total OUT: 3150 mL    Total NET: -2430 mL          LABS:                          11.9   14.10 )-----------( 228      ( 02 Mar 2022 06:20 )             36.9                                               03-02    138  |  92<L>  |  51<H>  ----------------------------<  92  3.5   |  37<H>  |  1.1    Ca    8.6      02 Mar 2022 06:20  Mg     1.9     03-02    TPro  7.0  /  Alb  3.3<L>  /  TBili  0.8  /  DBili  x   /  AST  14  /  ALT  21  /  AlkPhos  59  03-02                                                                                           LIVER FUNCTIONS - ( 02 Mar 2022 06:20 )  Alb: 3.3 g/dL / Pro: 7.0 g/dL / ALK PHOS: 59 U/L / ALT: 21 U/L / AST: 14 U/L / GGT: x                                                                                                                                       MEDICATIONS  (STANDING):  ALBUTerol    90 MICROgram(s) HFA Inhaler 1 Puff(s) Inhalation every 8 hours  albuterol/ipratropium for Nebulization 3 milliLiter(s) Nebulizer every 6 hours  albuterol/ipratropium for Nebulization 3 milliLiter(s) Nebulizer every 20 minutes  apixaban 5 milliGRAM(s) Oral two times a day  chlorhexidine 4% Liquid 1 Application(s) Topical <User Schedule>  cyanocobalamin 1000 MICROGram(s) Oral daily  diltiazem    milliGRAM(s) Oral daily  ferrous    sulfate 325 milliGRAM(s) Oral daily  finasteride 5 milliGRAM(s) Oral daily  furosemide   Injectable 60 milliGRAM(s) IV Push two times a day  lisinopril 10 milliGRAM(s) Oral daily  methylPREDNISolone sodium succinate Injectable 60 milliGRAM(s) IV Push daily  metoprolol tartrate 100 milliGRAM(s) Oral three times a day  pantoprazole    Tablet 40 milliGRAM(s) Oral before breakfast  polyethylene glycol 3350 17 Gram(s) Oral daily    MEDICATIONS  (PRN):      Xrays:                                                                                     ECHO

## 2022-03-02 NOTE — PROGRESS NOTE ADULT - TIME BILLING
#Acute on chronic hypoxic resp failure  presumed due to ILD exacerbation, +/- heart failure exacerbation  cxr bl opacities  change lasix 60 iv bid to 40 iv daily; home dose 20 po  improved from highflow nc 50 lpm, 35% to 3l nc  prednisone 40  f/u pulm  #Syncope  in setting of hypoxia, pHTN  check orthostatics  reeg neg  tte unrevealing  PT  f/u cards    #Progress Note Handoff:  Pending (specify):  Consults_________, Tests________, Test Results_______, Other_____orthostatics, iv diuresis____  Family discussion: d/w pt at bedside re: treatment plan, primary dx  Disposition: Home___/SNF___/Other________/Unknown at this time____x____ #Acute on chronic hypoxic resp failure  presumed due to ILD exacerbation, +/- heart failure exacerbation  cxr bl opacities  change lasix 60 iv bid to 40 iv daily; home dose 20 po  improved from highflow nc 50 lpm, 35% to 3l nc  prednisone 40  f/u pulm  #Syncope; in setting of hypoxia, pHTN  cth neg  check orthostatics  reeg neg  tte unrevealing  PT  f/u cards    #Progress Note Handoff:  Pending (specify):  Consults_________, Tests________, Test Results_______, Other_____orthostatics, iv diuresis____  Family discussion: d/w pt at bedside re: treatment plan, primary dx  Disposition: Home___/SNF___/Other________/Unknown at this time____x____

## 2022-03-02 NOTE — PROGRESS NOTE ADULT - ASSESSMENT
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     #AHRF likely 2/2 HFmrEF vs ILD exacerbation   #R sided pleural effusion   - + shortness of breath, LE edema, at baseline 3 L O2--> was on HiFlo 55L @ 60% Fi02-->on 3L NC (possible downgrade tomorrow)   - POCUS was done in ED, showed right sided pleural effusion.  - On NIV 4h on 4h off alternate with HFNC.   - last TTE (EF 01/2022 40-45%) exacerbation- resolving   - pro-BNP 6168.  - unlikely pna procal 0.08  - c/e lasix   - Xray Chest 1 View-PORTABLE IMMEDIATE (02.24.22 @ 16:11): Impression: Bilateral opacifications, low lung volumes. Worsening.  - TTE(1/22): EF 40-45%, mod TR and MR, severe pHTN  - net neg  -  lasix 60 mgh IV BID  - C/w cardizem 120 mg CD QD, metoprolol tartrate 100mg TID, lisinoprol 10 mg QD, as hemodynamics allow  - Strict I/O, daily weight, fluid restriction (output not impressive, -450)  - Monitor electrolytes Keep K > 4, Mg >2  - F/u HF  - f/u cardio     # Syncope  - r/o new seizure vs cardiac etiology vs vasovagal  - HT, + LOC. + palpitations. + bladder incontinence, shakiness, foamy mouth secretions  - Denies CP,  focal weakness, or headaches. No bowel incontinence, no tongue biting.   - CT head with no acute intracranial pathology; Stable moderate chronic microvascular ischemic changes.  - TTE(1/22): EF 40-45%, mod TR and MR, severe pHTN  - C/w tele monitoring  - TSH(0.34), folate(5.9), B12(794)  - EEG. wnl   - F/u Check orthostatics when more stable   - F/u Neuro recs  - PT eval. Fall precautions.     # hematuria, likely traumatic from Joel insertion  # BPH  - continue with finasteride  - Hgb 10.2( baseline 11),   - monitor CBC daily  - active type and screen  - c/w Joel care    # Afib, currently rate controlled(<110)  - as per son multiple episodes of irregular heart rate  - CHADSVASC - 4  - TSH 0.70 1/2022  - c/w Eliquis 5mg BID  - c/w metoprolol 100mg TID  - c/w cardizem 120mg OD     # ILD  # Pulmonary Nodule  - CT chest prior to admission: 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, new mediastinal adenopathy, left pleural effusion, cardiomegaly with dilated ascending and descending aorta  - OP Pulmonary Follow up for 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

## 2022-03-02 NOTE — PROGRESS NOTE ADULT - SUBJECTIVE AND OBJECTIVE BOX
SUBJ: Patient seen and examined. No events overnight. Still on high flow O2       MEDICATIONS  (STANDING):  ALBUTerol    90 MICROgram(s) HFA Inhaler 1 Puff(s) Inhalation every 8 hours  albuterol/ipratropium for Nebulization 3 milliLiter(s) Nebulizer every 6 hours  albuterol/ipratropium for Nebulization 3 milliLiter(s) Nebulizer every 20 minutes  apixaban 5 milliGRAM(s) Oral two times a day  chlorhexidine 4% Liquid 1 Application(s) Topical <User Schedule>  cyanocobalamin 1000 MICROGram(s) Oral daily  diltiazem    milliGRAM(s) Oral daily  ferrous    sulfate 325 milliGRAM(s) Oral daily  finasteride 5 milliGRAM(s) Oral daily  furosemide   Injectable 40 milliGRAM(s) IV Push daily  lisinopril 10 milliGRAM(s) Oral daily  melatonin 10 milliGRAM(s) Oral once  metoprolol tartrate 100 milliGRAM(s) Oral three times a day  pantoprazole    Tablet 40 milliGRAM(s) Oral before breakfast  polyethylene glycol 3350 17 Gram(s) Oral daily  predniSONE   Tablet 40 milliGRAM(s) Oral daily    MEDICATIONS  (PRN):            Vital Signs Last 24 Hrs  T(C): 36.1 (02 Mar 2022 20:00), Max: 36.2 (02 Mar 2022 04:00)  T(F): 97 (02 Mar 2022 20:00), Max: 97.2 (02 Mar 2022 04:00)  HR: 89 (02 Mar 2022 20:00) (81 - 91)  BP: 131/77 (02 Mar 2022 20:00) (109/75 - 146/84)  BP(mean): 98 (02 Mar 2022 20:00) (88 - 109)  RR: 23 (02 Mar 2022 20:00) (11 - 25)  SpO2: 97% (02 Mar 2022 20:00) (96% - 97%)     REVIEW OF SYSTEMS:  CONSTITUTIONAL: No fever  NECK: No pain or stiffness  CARDIOVASCULAR: patient denies chest pain, shortness of breathimproving .  Repiratory: No cough   NEUROLOGICAL: No focal deficits to report.  GI: no BRBPR, no N,V,diarrhea.    PSYCHIATRY: normal mood and affect  HEENT: no nasal discharge, no ecchymosis        PHYSICAL EXAM:  GENERAL: NAD  HEAD:  Atraumatic, Normocephalic  NECK: Supple, No JVD  NERVOUS SYSTEM:  Alert & Oriented X3, Good concentration  CHEST/LUNG: bilateral poor air entry  HEART: Irregular rate and rhythm  ABDOMEN: Soft, Nontender, Nondistended;   EXTREMITIES:  No  edema  SKIN: No rashes or lesions    	  TELEMETRY:      LABS:                        11.9   14.10 )-----------( 228      ( 02 Mar 2022 06:20 )             36.9     03-02    138  |  92<L>  |  51<H>  ----------------------------<  92  3.5   |  37<H>  |  1.1    Ca    8.6      02 Mar 2022 06:20  Mg     1.9     03-02    TPro  7.0  /  Alb  3.3<L>  /  TBili  0.8  /  DBili  x   /  AST  14  /  ALT  21  /  AlkPhos  59  03-02            I&O's Summary    01 Mar 2022 07:01  -  02 Mar 2022 07:00  --------------------------------------------------------  IN: 720 mL / OUT: 3150 mL / NET: -2430 mL    02 Mar 2022 07:01  -  02 Mar 2022 22:33  --------------------------------------------------------  IN: 600 mL / OUT: 1000 mL / NET: -400 mL      BNP  RADIOLOGY & ADDITIONAL STUDIES:    IMPRESSION AND PLAN:  Hypoxemic respiratory failure  ILD    HFrEF  Syncope  Afib   - Management as per primary team  - Continue Furosemide/ I&O   - Continue Apixaban Diltiazem and metoprolol  - Will follow

## 2022-03-02 NOTE — PROGRESS NOTE ADULT - ASSESSMENT
IMPRESSION:    Acute on chronic hypoxemic respiratory failure slowly improving    ILD possible exacerbation   Volume overload improved  HO HFrEF  Syncope  Recent COVID illness 1/22  HO Afib on Eliquis  HO CKD    PLAN:    CNS: No sedation.      HEENT: oral care    PULMONARY: HOB >45, NC trial, change solumedrol to prednisone 40 daily    CARDIOVASCULAR: keep o>I.  Diuresis as tolerated. lasix daily    GI: GI prophylaxis.  Feeding po    RENAL: Fu lytes. correct as needed    HEMATOLOGICAL:  AC with Eliquis.    ENDOCRINE:  Follow up FS.  Insulin protocol if needed.    MUSCULOSKELETAL: bedrest for now    SDU monitoring    GOC     Prognosis overall poor

## 2022-03-03 NOTE — PROGRESS NOTE ADULT - SUBJECTIVE AND OBJECTIVE BOX
Over Night Events: events noted, on NC 3 L     PHYSICAL EXAM    ICU Vital Signs Last 24 Hrs  T(C): 36.1 (02 Mar 2022 23:54), Max: 36.2 (02 Mar 2022 12:00)  T(F): 96.9 (02 Mar 2022 23:54), Max: 97.2 (02 Mar 2022 12:00)  HR: 89 (03 Mar 2022 00:00) (81 - 91)  BP: 127/79 (03 Mar 2022 00:00) (109/75 - 138/80)  BP(mean): 97 (03 Mar 2022 00:00) (88 - 101)  RR: 22 (03 Mar 2022 00:00) (20 - 25)  SpO2: 99% (03 Mar 2022 00:00) (96% - 99%)      General: Ill looking  Lungs: Bilateral crackles  Cardiovascular: irregular  Abdomen: Soft, Positive BS  Extremities: No clubbing   Skin: Warm  Neurological: Non focal       03-02-22 @ 07:01  -  03-03-22 @ 07:00  --------------------------------------------------------  IN:    Oral Fluid: 600 mL  Total IN: 600 mL    OUT:    Indwelling Catheter - Urethral (mL): 1000 mL  Total OUT: 1000 mL    Total NET: -400 mL          LABS:                          11.3   15.28 )-----------( 244      ( 03 Mar 2022 05:30 )             35.3                                               03-03    137  |  95<L>  |  45<H>  ----------------------------<  109<H>  3.9   |  35<H>  |  1.1    Ca    8.4<L>      03 Mar 2022 05:30  Mg     2.0     03-03    TPro  6.5  /  Alb  3.1<L>  /  TBili  0.8  /  DBili  x   /  AST  13  /  ALT  22  /  AlkPhos  59  03-03                                                                                           LIVER FUNCTIONS - ( 03 Mar 2022 05:30 )  Alb: 3.1 g/dL / Pro: 6.5 g/dL / ALK PHOS: 59 U/L / ALT: 22 U/L / AST: 13 U/L / GGT: x                                                                                                                                       MEDICATIONS  (STANDING):  ALBUTerol    90 MICROgram(s) HFA Inhaler 1 Puff(s) Inhalation every 8 hours  albuterol/ipratropium for Nebulization 3 milliLiter(s) Nebulizer every 6 hours  albuterol/ipratropium for Nebulization 3 milliLiter(s) Nebulizer every 20 minutes  apixaban 5 milliGRAM(s) Oral two times a day  chlorhexidine 4% Liquid 1 Application(s) Topical <User Schedule>  cyanocobalamin 1000 MICROGram(s) Oral daily  diltiazem    milliGRAM(s) Oral daily  ferrous    sulfate 325 milliGRAM(s) Oral daily  finasteride 5 milliGRAM(s) Oral daily  furosemide   Injectable 40 milliGRAM(s) IV Push daily  lisinopril 10 milliGRAM(s) Oral daily  metoprolol tartrate 100 milliGRAM(s) Oral three times a day  pantoprazole    Tablet 40 milliGRAM(s) Oral before breakfast  polyethylene glycol 3350 17 Gram(s) Oral daily  predniSONE   Tablet 40 milliGRAM(s) Oral daily

## 2022-03-03 NOTE — PROGRESS NOTE ADULT - TIME BILLING
89M PMHx ILD on home o2, HTN, BPH, AFib on eliquis, HFpEF, h/o covid here with syncope. Acute on chronic hypoxic resp failure.    #Acute on chronic hypoxic resp failure  presumed due to ILD exacerbation, +/- heart failure exacerbation  cxr bl opacities  change lasix to 40 po daily; home dose 20 po  satting well on 3l nc; home dose 4l  prednisone 40  f/u pulm  PT  #Syncope  occurred while standing, suspect due to hypoxia  cth neg  check orthostatics  reeg neg  tte unrevealing  PT  f/u cards  #ILD  duoneb q6  prendisone as above  #HTN  lisinopril 10  #BPH  finasteride 5  #AFib  eliquis  lopressor 100 tid  dilt 120  #DVT ppx  eliquis    #Progress Note Handoff:  Pending (specify):  Consults_________, Tests________, Test Results_______, Other_____PT____  Family discussion: d/w pt at bedside re: treatment plan, primary dx  Disposition: Home___/SNF___/Other________/Unknown at this time____x____.

## 2022-03-03 NOTE — PROGRESS NOTE ADULT - SUBJECTIVE AND OBJECTIVE BOX
INTERVAL HPI/OVERNIGHT EVENTS:    SUBJECTIVE: Patient seen and examined at bedside.     no cp, sob, abd pain, fever  no sob, orthopnea, pnd, cough    OBJECTIVE:    VITAL SIGNS:  Vital Signs Last 24 Hrs  T(C): 36.3 (03 Mar 2022 12:00), Max: 36.3 (03 Mar 2022 12:00)  T(F): 97.4 (03 Mar 2022 12:00), Max: 97.4 (03 Mar 2022 12:00)  HR: 73 (03 Mar 2022 12:00) (64 - 96)  BP: 111/70 (03 Mar 2022 12:00) (110/54 - 154/87)  BP(mean): 86 (03 Mar 2022 12:00) (86 - 112)  RR: 22 (03 Mar 2022 12:00) (22 - 23)  SpO2: 97% (03 Mar 2022 12:00) (97% - 100%)      PHYSICAL EXAM:    General: NAD  HEENT: NC/AT; PERRL, clear conjunctiva  Neck: supple  Respiratory: CTA b/l  Cardiovascular: +S1/S2; RRR  Abdomen: soft, NT/ND; +BS x4  Extremities: WWP, 2+ peripheral pulses b/l; no LE edema  Skin: normal color and turgor; no rash  Neurological:    MEDICATIONS:  MEDICATIONS  (STANDING):  ALBUTerol    90 MICROgram(s) HFA Inhaler 1 Puff(s) Inhalation every 8 hours  albuterol/ipratropium for Nebulization 3 milliLiter(s) Nebulizer every 6 hours  albuterol/ipratropium for Nebulization 3 milliLiter(s) Nebulizer every 20 minutes  apixaban 5 milliGRAM(s) Oral two times a day  chlorhexidine 4% Liquid 1 Application(s) Topical <User Schedule>  cyanocobalamin 1000 MICROGram(s) Oral daily  diltiazem    milliGRAM(s) Oral daily  ferrous    sulfate 325 milliGRAM(s) Oral daily  finasteride 5 milliGRAM(s) Oral daily  furosemide    Tablet 40 milliGRAM(s) Oral daily  lisinopril 10 milliGRAM(s) Oral daily  metoprolol tartrate 100 milliGRAM(s) Oral three times a day  pantoprazole    Tablet 40 milliGRAM(s) Oral before breakfast  polyethylene glycol 3350 17 Gram(s) Oral daily  predniSONE   Tablet 40 milliGRAM(s) Oral daily    MEDICATIONS  (PRN):      ALLERGIES:  Allergies    No Known Allergies    Intolerances        LABS:                        11.3   15.28 )-----------( 244      ( 03 Mar 2022 05:30 )             35.3     Hemoglobin: 11.3 g/dL (03-03 @ 05:30)  Hemoglobin: 11.9 g/dL (03-02 @ 06:20)  Hemoglobin: 10.5 g/dL (03-01 @ 05:30)  Hemoglobin: 9.4 g/dL (02-27 @ 05:40)    CBC Full  -  ( 03 Mar 2022 05:30 )  WBC Count : 15.28 K/uL  RBC Count : 3.96 M/uL  Hemoglobin : 11.3 g/dL  Hematocrit : 35.3 %  Platelet Count - Automated : 244 K/uL  Mean Cell Volume : 89.1 fL  Mean Cell Hemoglobin : 28.5 pg  Mean Cell Hemoglobin Concentration : 32.0 g/dL  Auto Neutrophil # : x  Auto Lymphocyte # : x  Auto Monocyte # : x  Auto Eosinophil # : x  Auto Basophil # : x  Auto Neutrophil % : x  Auto Lymphocyte % : x  Auto Monocyte % : x  Auto Eosinophil % : x  Auto Basophil % : x    03-03    137  |  95<L>  |  45<H>  ----------------------------<  109<H>  3.9   |  35<H>  |  1.1    Ca    8.4<L>      03 Mar 2022 05:30  Mg     2.0     03-03    TPro  6.5  /  Alb  3.1<L>  /  TBili  0.8  /  DBili  x   /  AST  13  /  ALT  22  /  AlkPhos  59  03-03    Creatinine Trend: 1.1<--, 1.1<--, 1.0<--, 1.0<--, 1.3<--, 1.2<--  LIVER FUNCTIONS - ( 03 Mar 2022 05:30 )  Alb: 3.1 g/dL / Pro: 6.5 g/dL / ALK PHOS: 59 U/L / ALT: 22 U/L / AST: 13 U/L / GGT: x               hs Troponin:              CSF:                      EKG:   MICROBIOLOGY:    IMAGING:      Labs, imaging, EKG personally reviewed    RADIOLOGY & ADDITIONAL TESTS: Reviewed.

## 2022-03-03 NOTE — PROGRESS NOTE ADULT - ASSESSMENT
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      HEENT: oral care    PULMONARY: HOB >45, NC trial, change solumedrol to prednisone 40 daily    CARDIOVASCULAR: keep o>I.  Diuresis as tolerated. lasix daily    GI: GI prophylaxis.  Feeding po    RENAL: Fu lytes. correct as needed    HEMATOLOGICAL:  AC with Eliquis.    ENDOCRINE:  Follow up FS.  Insulin protocol if needed.    MUSCULOSKELETAL: bedrest for now    SDU monitoring    floor

## 2022-03-04 NOTE — PROGRESS NOTE ADULT - ATTENDING COMMENTS
# Acute on chronic hypoxemic respiratory failure- multifactorial with ILD and possible fluid overload  # acute HFmrEF  # Interstitial lung disease  Pulmonology team evaluated- started on IV steroids- solumedrol 60 mg BID, BIPAP/hi-flow as tolerated  continue IV lasix BID  Heart failure team consulted    # Syncope-  r/o seizure vs cardiac etiology vs vasovagal  CT head results reviewed. Echo from 1/22- reviewed  EKG: afib with TWI inferior and lateral leads; trop X2- neg; monitor for any chest pain  continue tele  Neuro evaluated- EEG  check orthostatic    # hematuria, likely traumatic from Joel insertion  # BPH  - continue with finasteride  monitor CBC    # Afib, currently rate controlled  - c/w Eliquis 5mg BID, metoprolol 100mg TID, cardizem 120mg OD     # per documentation left lower lobe mass-suspecting malignancy;  unable to find imaging in the system. Patient to follow up with pulmonologist    Pending; clinical improvement; pulmonology follow up as OP  Dispo: Acute
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     #AHRF likely 2/2 HFmrEF vs ILD exacerbation   #R sided pleural effusion   - + shortness of breath, LE edema, at baseline 3 L O2--> currently on HiFlo 55L @ 60% Fi02  - POCUS was done in ED, showed right sided pleural effusion.  - On NIV 4h on 4h off alternate with HFNC.   - last TTE (EF 01/2022 40-45%) exacerbation- resolving   - pro-BNP 6168.  - unlikely pna procal 0.08  - c/e lasix   - Xray Chest 1 View-PORTABLE IMMEDIATE (02.24.22 @ 16:11): Impression: Bilateral opacifications, low lung volumes. Worsening.  - TTE(1/22): EF 40-45%, mod TR and MR, severe pHTN  - net neg  -  lasix 60 mgh IV BID  - C/w cardizem 120 mg CD QD, metoprolol tartrate 100mg TID, lisinoprol 10 mg QD, as hemodynamics allow  - Strict I/O, daily weight, fluid restriction (output not impressive, -450)  - Monitor electrolytes Keep K > 4, Mg >2  - F/u HF consult: recommended general cardiology consult prior to their eval    # Syncope  - r/o new seizure vs cardiac etiology vs vasovagal  - HT, + LOC. + palpitations. + bladder incontinence, shakiness, foamy mouth secretions  - Denies CP,  focal weakness, or headaches. No bowel incontinence, no tongue biting.   - CT head with no acute intracranial pathology; Stable moderate chronic microvascular ischemic changes.  - TTE(1/22): EF 40-45%, mod TR and MR, severe pHTN  - C/w tele monitoring  - TSH(0.34), folate(5.9), B12(794)  - EEG. wnl   - F/u Check orthostatics when more stable   - F/u Neuro recs  - PT eval. Fall precautions.     # hematuria, likely traumatic from Joel insertion  # BPH  - continue with finasteride  - Hgb 10.2( baseline 11),   - monitor CBC daily  - active type and screen  - c/w Joel care    # Afib, currently rate controlled(<110)  - as per son multiple episodes of irregular heart rate  - CHADSVASC - 4  - TSH 0.70 1/2022  - c/w Eliquis 5mg BID  - c/w metoprolol 100mg TID  - c/w cardizem 120mg OD     # ILD  # Pulmonary Nodule  - CT chest prior to admission: 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, new mediastinal adenopathy, left pleural effusion, cardiomegaly with dilated ascending and descending aorta  - OP Pulmonary Follow up for 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
as below
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 Off IV Lasix now oral 40mg po qd.   Pulmonary notes reviewed and appreciated. c/w Prednisone - may need a slow dario check with pulmonary.   - f/u BUN/CR and c/w oral lasix and prednisone     Anxiety - Start Xanax 0.25mg po q6h   Bloating - Symethycone TID trial and re-assess     Overall pt is improving but with poor prognosis 2/2 ILD. Family aware. Pt and Family want to only go home not SNF.     Dispo: f/u PT for d/c planning / ?Code Status

## 2022-03-04 NOTE — PROGRESS NOTE ADULT - ASSESSMENT
IMPRESSION:    Acute on chronic hypoxemic respiratory failure slowly improving   ILD with possible exacerbation   LLL MASS/ med LN (chest ct at [facility] 12/21) Likely malignant  Volume overload improved  HO HFrEF  Recent COVID illness 1/22  HO Afib on Eliquis  HO CKD    PLAN:    CNS: No sedation.      HEENT: oral care    PULMONARY: HOB >45.  Wean O2 as tolerated.  Prednisone 40 daily    CARDIOVASCULAR: Keep negative balance as tolerated.      GI: GI prophylaxis.  Feeding po    RENAL: Fu lytes.  Correct as needed    HEMATOLOGICAL:  AC with Eliquis.  FU CBC     ENDOCRINE:  Follow up FS.  Insulin protocol if needed.    MUSCULOSKELETAL:  OOB to chair.  PT OT     DC Planing    OP Pulm follow up

## 2022-03-04 NOTE — PROGRESS NOTE ADULT - SUBJECTIVE AND OBJECTIVE BOX
Patient is a 89y old  Male who presents with a chief complaint of SOB and syncope (03 Mar 2022 15:16)        Over Night Events:  Looks and feels much better.  On NC O2.          ROS:     All ROS are negative except HPI         PHYSICAL EXAM    ICU Vital Signs Last 24 Hrs  T(C): 36.4 (04 Mar 2022 05:07), Max: 36.4 (04 Mar 2022 05:07)  T(F): 97.5 (04 Mar 2022 05:07), Max: 97.5 (04 Mar 2022 05:07)  HR: 68 (04 Mar 2022 05:07) (64 - 96)  BP: 127/83 (04 Mar 2022 05:07) (110/54 - 154/87)  BP(mean): 86 (03 Mar 2022 12:00) (86 - 112)  ABP: --  ABP(mean): --  RR: 16 (04 Mar 2022 05:07) (16 - 22)  SpO2: 98% (04 Mar 2022 05:07) (97% - 100%)      CONSTITUTIONAL:  Ill appearing in  NAD    ENT:   Airway patent,   Mouth with normal mucosa.   No thrush    EYES:   Pupils equal,   Round and reactive to light.    CARDIAC:   Normal rate,   Regular rhythm.    No edema      Vascular:  Normal systolic impulse  No Carotid bruits    RESPIRATORY:   No wheezing  Bilateral crackles   Normal chest expansion  Not tachypneic,  No use of accessory muscles    GASTROINTESTINAL:  Abdomen soft,   Non-tender,   No guarding,   + BS    MUSCULOSKELETAL:   Range of motion is not limited,  No clubbing, cyanosis    NEUROLOGICAL:   Alert and oriented   No motor  deficits.    SKIN:   Skin normal color for race,   Warm and dry and intact.   No evidence of rash.    PSYCHIATRIC:   Normal mood and affect.   No apparent risk to self or others.    HEMATOLOGICAL:  No cervical  lymphadenopathy.  no inguinal lymphadenopathy      03-03-22 @ 07:01  -  03-04-22 @ 07:00  --------------------------------------------------------  IN:    Oral Fluid: 1000 mL  Total IN: 1000 mL    OUT:    Indwelling Catheter - Urethral (mL): 1250 mL  Total OUT: 1250 mL    Total NET: -250 mL          LABS:                            11.3   15.28 )-----------( 244      ( 03 Mar 2022 05:30 )             35.3                                               03-03    137  |  95<L>  |  45<H>  ----------------------------<  109<H>  3.9   |  35<H>  |  1.1    Ca    8.4<L>      03 Mar 2022 05:30  Mg     2.0     03-03    TPro  6.5  /  Alb  3.1<L>  /  TBili  0.8  /  DBili  x   /  AST  13  /  ALT  22  /  AlkPhos  59  03-03                                                                                           LIVER FUNCTIONS - ( 03 Mar 2022 05:30 )  Alb: 3.1 g/dL / Pro: 6.5 g/dL / ALK PHOS: 59 U/L / ALT: 22 U/L / AST: 13 U/L / GGT: x                                                                                                                                       MEDICATIONS  (STANDING):  ALBUTerol    90 MICROgram(s) HFA Inhaler 1 Puff(s) Inhalation every 8 hours  albuterol/ipratropium for Nebulization 3 milliLiter(s) Nebulizer every 6 hours  albuterol/ipratropium for Nebulization 3 milliLiter(s) Nebulizer every 20 minutes  apixaban 5 milliGRAM(s) Oral two times a day  chlorhexidine 4% Liquid 1 Application(s) Topical <User Schedule>  cyanocobalamin 1000 MICROGram(s) Oral daily  diltiazem    milliGRAM(s) Oral daily  ferrous    sulfate 325 milliGRAM(s) Oral daily  finasteride 5 milliGRAM(s) Oral daily  furosemide    Tablet 40 milliGRAM(s) Oral daily  lisinopril 10 milliGRAM(s) Oral daily  metoprolol tartrate 100 milliGRAM(s) Oral three times a day  pantoprazole    Tablet 40 milliGRAM(s) Oral before breakfast  polyethylene glycol 3350 17 Gram(s) Oral daily  predniSONE   Tablet 40 milliGRAM(s) Oral daily    MEDICATIONS  (PRN):      New X-rays reviewed:                                                                                  ECHO    CXR interpreted by me:  Bilateral opacities

## 2022-03-04 NOTE — PROGRESS NOTE ADULT - SUBJECTIVE AND OBJECTIVE BOX
SUBJ: Patient seen and examined. No events overnight.       MEDICATIONS  (STANDING):  ALBUTerol    90 MICROgram(s) HFA Inhaler 1 Puff(s) Inhalation every 8 hours  albuterol/ipratropium for Nebulization 3 milliLiter(s) Nebulizer every 20 minutes  albuterol/ipratropium for Nebulization 3 milliLiter(s) Nebulizer every 6 hours  apixaban 5 milliGRAM(s) Oral two times a day  BACItracin   Ointment 1 Application(s) Topical four times a day  chlorhexidine 4% Liquid 1 Application(s) Topical <User Schedule>  cyanocobalamin 1000 MICROGram(s) Oral daily  diltiazem    milliGRAM(s) Oral daily  ferrous    sulfate 325 milliGRAM(s) Oral daily  finasteride 5 milliGRAM(s) Oral daily  furosemide    Tablet 40 milliGRAM(s) Oral daily  melatonin 10 milliGRAM(s) Oral at bedtime  metoprolol tartrate 100 milliGRAM(s) Oral three times a day  pantoprazole    Tablet 40 milliGRAM(s) Oral before breakfast  polyethylene glycol 3350 17 Gram(s) Oral daily  predniSONE   Tablet 40 milliGRAM(s) Oral daily  senna 2 Tablet(s) Oral at bedtime  simethicone 80 milliGRAM(s) Chew every 8 hours    MEDICATIONS  (PRN):  ALPRAZolam 0.25 milliGRAM(s) Oral every 6 hours PRN anxiety            Vital Signs Last 24 Hrs  T(C): 36.4 (04 Mar 2022 05:07), Max: 36.4 (04 Mar 2022 05:07)  T(F): 97.5 (04 Mar 2022 05:07), Max: 97.5 (04 Mar 2022 05:07)  HR: 95 (04 Mar 2022 20:30) (68 - 95)  BP: 100/87 (04 Mar 2022 20:30) (88/52 - 127/83)  BP(mean): --  RR: 18 (04 Mar 2022 20:30) (16 - 18)  SpO2: 98% (04 Mar 2022 05:07) (98% - 98%)     REVIEW OF SYSTEMS:  CONSTITUTIONAL: No fever  NECK: No pain or stiffness  CARDIOVASCULAR: patient denies chest pain, shortness of breath unchanged .  Respiratory: No cough or wheezing.  NEUROLOGICAL: No focal deficits to report.  GI: no BRBPR, no N,V,diarrhea.    PSYCHIATRY: normal mood and affect  HEENT: no nasal discharge, no ecchymosis        PHYSICAL EXAM:  GENERAL: NAD  HEAD:  Atraumatic, Normocephalic  NECK: Supple, No JVD  NERVOUS SYSTEM:  Alert & Oriented X3, Good concentration  CHEST/LUNG: Clear to anterior auscultation bilaterally  HEART: Regular rate and rhythm  ABDOMEN: Soft, Nontender, Nondistended;   EXTREMITIES:  No  edema      	  TELEMETRY:      LABS:                        11.6   12.63 )-----------( 230      ( 04 Mar 2022 06:45 )             36.7     03-04    135  |  93<L>  |  43<H>  ----------------------------<  73  3.8   |  36<H>  |  1.1    Ca    8.1<L>      04 Mar 2022 06:45  Mg     2.1     03-04    TPro  5.8<L>  /  Alb  2.8<L>  /  TBili  0.8  /  DBili  x   /  AST  15  /  ALT  21  /  AlkPhos  56  03-04            I&O's Summary    03 Mar 2022 07:01  -  04 Mar 2022 07:00  --------------------------------------------------------  IN: 1000 mL / OUT: 1250 mL / NET: -250 mL    04 Mar 2022 07:01  -  04 Mar 2022 23:22  --------------------------------------------------------  IN: 0 mL / OUT: 1100 mL / NET: -1100 mL      BNP  RADIOLOGY & ADDITIONAL STUDIES:    IMPRESSION AND PLAN:    Hypoxemic respiratory failure  ILD    HFrEF  Syncope  Afib   - Management as per primary team  - Continue Furosemide/ I&O   - Continue Apixaban Diltiazem and metoprolol  - Will follow as needed

## 2022-03-04 NOTE — PROGRESS NOTE ADULT - SUBJECTIVE AND OBJECTIVE BOX
NNEKA JOSEPH 89y Male  MRN#: [id number]   CODE STATUS: FULL    Hospital Day: 8d    Pt is currently admitted with the primary diagnosis of SOB and syncope.    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 Per son Elkin at bedside, ever since being discharged from hospital, patient has been feeling short of breath, and  for last 2 days his oxygen requirement climbed up to 5 L O2. Son also reports multiple episodes of syncope since last week, usually occurring with mild activity, associated with bladder incontinence. Today, when his son was changing his clothing, patient passed out again, had foamy secretions from mouth, and urinated on himself. Denies head trauma. Each episode of syncope lasts for about 2 minutes at the time. When son checked his pulse ox, his saturation was at 80% O2, he was also noted to be tachycardic at 120s. Denies chest pain, fever, chills, abdominal pain, nausea, vomiting, diarrhea. + LE edema.    ED Course:   VS /81, HR 95, tachypneic RR 20-24, T 95F Spo2 88% on 5L. Pt was placed on BiPAP with improvement os Sat to 98%.  Labs: WBC 10.9. Hgb 10.2( baseline 11), troponin 0.01, pro-BNP 6168. VBG Ph 7.38, pCO2 51, HCO3 30  CXR with worsening b/l opacities.  CT head with no acute intracranial pathology; Stable moderate chronic microvascular ischemic changes.  POCUS was done in ED, showed right sided pleural effusion. Pt was given 40 mg IV lasix x2, 2 gr IV Magnesium.  Pt was admitted for acute on chronic CHF exacerbation and syncope work up.    Hospital Course: EEG negative for seizure activity.  CTH negative for acute pathology.  TTE on 2/27 showed EF 46% with decreased LVSF, severe pHTN, and moderate TR.  TSH, folate, and B12 all wnl.      Overnight events:   Patient downgraded to 3B overnight.    Subjective complaints:  Patient seen and examined at bedside.  Continues to endorse SOB and is very frustrated that he feels he cannot stand without becoming tachycardic and feeling like he is going to pass out.  Currently on 5L NC.    Present Today:   - Joel:  No [ X ], Yes [   ] : Indication:     - Type of IV Access:       .. CVC/Piccline:  No [ X ], Yes [   ] : Indication:       .. Midline: No [ X ], Yes [   ] : Indication:                                             ----------------------------------------------------------  OBJECTIVE  PAST MEDICAL & SURGICAL HISTORY  Hypertension    GERD (gastroesophageal reflux disease)    Lung interstitial disease    BPH (benign prostatic hyperplasia)    HFrEF (heart failure with reduced ejection fraction)    COPD (chronic obstructive pulmonary disease)    No significant past surgical history                                              -----------------------------------------------------------  ALLERGIES:  No Known Allergies                                            ------------------------------------------------------------    HOME MEDICATIONS  Home Medications:  dilTIAZem 120 mg/24 hours oral capsule, extended release: 1 cap(s) orally once a day (15 Norris 2022 11:38)  Eliquis 5 mg oral tablet: 1 tab(s) orally 2 times a day (24 Feb 2022 21:58)  FERROUS SULFATE 325 MG TABLET: 1 each orally once a day (15 Norris 2022 11:38)  FINASTERIDE 5 MG TABLET: 1 each orally once a day (15 Norris 2022 11:38)  FUROSEMIDE 20MG TAB: 1 tab(s) orally once a day (15 Norris 2022 11:38)  Lidocare Pain Relief Patch 4% topical film: Apply topically to left shoulder once a day (15 Norris 2022 11:38)  Lopressor 100 mg oral tablet: 1 tab(s) orally 3 times a day (15 Norris 2022 11:38)  OMEPRAZOLE DR 40MG CAP: 1 cap(s) orally once a day (15 Norris 2022 11:38)  TAMSULOSIN HCL 0.4 MG CAPSULE: 1 each orally once a day (at bedtime) (15 Norris 2022 11:38)  VITAMIN B-12 1,000 MCG TABLET: 1 each orally once a day (15 Norris 2022 11:38)  Zestril 10 mg oral tablet: 1 tab(s) orally once a day (17 Jan 2022 08:12)                           MEDICATIONS:  STANDING MEDICATIONS  ALBUTerol    90 MICROgram(s) HFA Inhaler 1 Puff(s) Inhalation every 8 hours  albuterol/ipratropium for Nebulization 3 milliLiter(s) Nebulizer every 6 hours  albuterol/ipratropium for Nebulization 3 milliLiter(s) Nebulizer every 20 minutes  apixaban 5 milliGRAM(s) Oral two times a day  BACItracin   Ointment 1 Application(s) Topical four times a day  chlorhexidine 4% Liquid 1 Application(s) Topical <User Schedule>  cyanocobalamin 1000 MICROGram(s) Oral daily  diltiazem    milliGRAM(s) Oral daily  ferrous    sulfate 325 milliGRAM(s) Oral daily  finasteride 5 milliGRAM(s) Oral daily  furosemide    Tablet 40 milliGRAM(s) Oral daily  melatonin 10 milliGRAM(s) Oral at bedtime  metoprolol tartrate 100 milliGRAM(s) Oral three times a day  midodrine. 5 milliGRAM(s) Oral once  pantoprazole    Tablet 40 milliGRAM(s) Oral before breakfast  polyethylene glycol 3350 17 Gram(s) Oral daily  predniSONE   Tablet 40 milliGRAM(s) Oral daily  senna 2 Tablet(s) Oral at bedtime  simethicone 80 milliGRAM(s) Chew every 8 hours    PRN MEDICATIONS  ALPRAZolam 0.25 milliGRAM(s) Oral every 6 hours PRN                                            ------------------------------------------------------------  VITAL SIGNS: Last 24 Hours  T(C): 36.4 (04 Mar 2022 05:07), Max: 36.4 (04 Mar 2022 05:07)  T(F): 97.5 (04 Mar 2022 05:07), Max: 97.5 (04 Mar 2022 05:07)  HR: 81 (04 Mar 2022 13:41) (68 - 95)  BP: 88/52 (04 Mar 2022 13:41) (88/52 - 127/83)  BP(mean): --  RR: 17 (04 Mar 2022 12:49) (16 - 18)  SpO2: 98% (04 Mar 2022 05:07) (97% - 98%)      03-03-22 @ 07:01  -  03-04-22 @ 07:00  --------------------------------------------------------  IN: 1000 mL / OUT: 1250 mL / NET: -250 mL    03-04-22 @ 07:01  -  03-04-22 @ 15:42  --------------------------------------------------------  IN: 0 mL / OUT: 800 mL / NET: -800 mL                                             --------------------------------------------------------------  LABS:                        11.6   12.63 )-----------( 230      ( 04 Mar 2022 06:45 )             36.7     03-04    135  |  93<L>  |  43<H>  ----------------------------<  73  3.8   |  36<H>  |  1.1    Ca    8.1<L>      04 Mar 2022 06:45  Mg     2.1     03-04    TPro  5.8<L>  /  Alb  2.8<L>  /  TBili  0.8  /  DBili  x   /  AST  15  /  ALT  21  /  AlkPhos  56  03-04                                                -------------------------------------------------------------  RADIOLOGY:  ACC: [id number] EXAM:  XR CHEST PORTABLE ROUTINE 1V                        PROCEDURE DATE:  03/02/2022    FINDINGS/IMPRESSION:  Slightly increased right greater than left bilateral peripheral   opacities. No pneumothorax.  Stable cardiomediastinal silhouette.  Unchanged osseous structures.      ACC: [id number] EXAM:  XR CHEST PORTABLE ROUTINE 1V                        PROCEDURE DATE:  02/27/2022    Impression:  Bilateral opacifications, unchanged.      ACC: [id number] EXAM:  CT BRAIN                        PROCEDURE DATE:  02/24/2022    IMPRESSION:  No acute intracranial pathology.  Stable moderate chronic microvascular ischemic changes.  Redemonstrated bilateral mastoid effusions, nonspecific.                                            --------------------------------------------------------------    PHYSICAL EXAM:  General: sitting up supported in bed, frustrated, anxious  HEENT: nontraumatic, neck supple, conjunctiva clear  LUNGS: decreased breath sounds bilaterally, no cough, on 5L NC  HEART: RRR, S1/S2  ABDOMEN: soft, nontender, nondistended  NEURO: AAOx3, CN grossly intact, moves all extremities                                           --------------------------------------------------------------    ASSESSMENT & PLAN  89-year-old Male with PMH of ILD on 3L O2 at baseline at home, HTN, BPH, GERD,  A fib( on Eliquis), 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     #AHRF, likely 2/2 HFmrEF exacerbation vs progression of chronic ILD  #R sided pleural effusion   > + shortness of breath, LE edema, at baseline 3 L O2--> was on HiFlo 55L @ 60% Fi02--> now on 5L NC  > POCUS was done in ED, showed right sided pleural effusion.  > last TTE (EF 01/2022 40-45%) exacerbation, repeat TTE (2/27): EF 46%, decreased LVSF, severe pHTN, mod TR  > pro-BNP 6168.  > unlikely pna procal 0.08  > Xray Chest 1 View-PORTABLE IMMEDIATE (02.24.22 @ 16:11): Impression: Bilateral opacifications, low lung volumes. Worsening.  - c/w lasix 40mg PO qD  - Strict I/O, daily weight, fluid restriction (output not impressive, -450)  - Monitor electrolytes Keep K > 4, Mg >2  - F/u HF  - f/u cardio     # Syncope, likely cardiac etiology vs vasovagal (less likely new seizure disorder)  > HT, + LOC. + palpitations. + bladder incontinence, shakiness, foamy mouth secretions  > Denies CP,  focal weakness, or headaches. No bowel incontinence, no tongue biting.   > CT head with no acute intracranial pathology; Stable moderate chronic microvascular ischemic changes.  > last TTE (EF 01/2022 40-45%) exacerbation, repeat TTE (2/27): EF 46%, decreased LVSF, severe pHTN, mod TR  > TSH(0.34), folate(5.9), B12(794) - all wnl  > EEG. wnl   - F/u Check orthostatics when more stable - unable to tolerate standing at this time due to dizziness  - PT following. Fall precautions.     # hematuria, likely traumatic from Joel insertion  # BPH  - continue with finasteride  - Hgb 10.2 (baseline 11)  - monitor CBC daily  - active type and screen  - c/w Joel care    # Afib, currently rate controlled(<110)  - as per son multiple episodes of irregular heart rate  - CHADSVASC - 4  - TSH 0.70 1/2022  - c/w Eliquis 5mg BID  - c/w metoprolol 100mg TID  - c/w cardizem 120mg OD   - C/w cardizem 120 mg CD QD, metoprolol tartrate 100mg TID, lisinoprol 10 mg QD, as hemodynamics allow    # ILD  # Pulmonary Nodule  - CT chest prior to admission: 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, new mediastinal adenopathy, left pleural effusion, cardiomegaly with dilated ascending and descending aorta  - OP Pulmonary Follow up for 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                                                                              ----------------------------------------------------  # DVT prophylaxis     # GI prophylaxis     # Diet     # Activity Score (AM-PAC)    # Code status     # Disposition                                                                              --------------------------------------------------------    # Handoff      NNEKA JOSEPH 89y Male  MRN#: [id number]   CODE STATUS: FULL    Hospital Day: 8d    Pt is currently admitted with the primary diagnosis of SOB and syncope.    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 Per son Elkin at bedside, ever since being discharged from hospital, patient has been feeling short of breath, and  for last 2 days his oxygen requirement climbed up to 5 L O2. Son also reports multiple episodes of syncope since last week, usually occurring with mild activity, associated with bladder incontinence. Today, when his son was changing his clothing, patient passed out again, had foamy secretions from mouth, and urinated on himself. Denies head trauma. Each episode of syncope lasts for about 2 minutes at the time. When son checked his pulse ox, his saturation was at 80% O2, he was also noted to be tachycardic at 120s. Denies chest pain, fever, chills, abdominal pain, nausea, vomiting, diarrhea. + LE edema.    ED Course:   VS /81, HR 95, tachypneic RR 20-24, T 95F Spo2 88% on 5L. Pt was placed on BiPAP with improvement os Sat to 98%.  Labs: WBC 10.9. Hgb 10.2( baseline 11), troponin 0.01, pro-BNP 6168. VBG Ph 7.38, pCO2 51, HCO3 30  CXR with worsening b/l opacities.  CT head with no acute intracranial pathology; Stable moderate chronic microvascular ischemic changes.  POCUS was done in ED, showed right sided pleural effusion. Pt was given 40 mg IV lasix x2, 2 gr IV Magnesium.  Pt was admitted for acute on chronic CHF exacerbation and syncope work up.    Hospital Course: EEG negative for seizure activity.  CTH negative for acute pathology.  TTE on 2/27 showed EF 46% with decreased LVSF, severe pHTN, and moderate TR.  TSH, folate, and B12 all wnl.      Overnight events:   Patient downgraded to 3B overnight.    Subjective complaints:  Patient seen and examined at bedside.  Continues to endorse SOB and is very frustrated that he feels he cannot stand without becoming tachycardic and feeling like he is going to pass out.  Currently on 5L NC.    Present Today:   - Joel:  No [ X ], Yes [   ] : Indication:     - Type of IV Access:       .. CVC/Piccline:  No [ X ], Yes [   ] : Indication:       .. Midline: No [ X ], Yes [   ] : Indication:                                             ----------------------------------------------------------  OBJECTIVE  PAST MEDICAL & SURGICAL HISTORY  Hypertension    GERD (gastroesophageal reflux disease)    Lung interstitial disease    BPH (benign prostatic hyperplasia)    HFrEF (heart failure with reduced ejection fraction)    COPD (chronic obstructive pulmonary disease)    No significant past surgical history                                              -----------------------------------------------------------  ALLERGIES:  No Known Allergies                                            ------------------------------------------------------------    HOME MEDICATIONS  Home Medications:  dilTIAZem 120 mg/24 hours oral capsule, extended release: 1 cap(s) orally once a day (15 Norris 2022 11:38)  Eliquis 5 mg oral tablet: 1 tab(s) orally 2 times a day (24 Feb 2022 21:58)  FERROUS SULFATE 325 MG TABLET: 1 each orally once a day (15 Norris 2022 11:38)  FINASTERIDE 5 MG TABLET: 1 each orally once a day (15 Norris 2022 11:38)  FUROSEMIDE 20MG TAB: 1 tab(s) orally once a day (15 Norris 2022 11:38)  Lidocare Pain Relief Patch 4% topical film: Apply topically to left shoulder once a day (15 Norris 2022 11:38)  Lopressor 100 mg oral tablet: 1 tab(s) orally 3 times a day (15 Norris 2022 11:38)  OMEPRAZOLE DR 40MG CAP: 1 cap(s) orally once a day (15 Norris 2022 11:38)  TAMSULOSIN HCL 0.4 MG CAPSULE: 1 each orally once a day (at bedtime) (15 Norris 2022 11:38)  VITAMIN B-12 1,000 MCG TABLET: 1 each orally once a day (15 Norris 2022 11:38)  Zestril 10 mg oral tablet: 1 tab(s) orally once a day (17 Jan 2022 08:12)                           MEDICATIONS:  STANDING MEDICATIONS  ALBUTerol    90 MICROgram(s) HFA Inhaler 1 Puff(s) Inhalation every 8 hours  albuterol/ipratropium for Nebulization 3 milliLiter(s) Nebulizer every 6 hours  albuterol/ipratropium for Nebulization 3 milliLiter(s) Nebulizer every 20 minutes  apixaban 5 milliGRAM(s) Oral two times a day  BACItracin   Ointment 1 Application(s) Topical four times a day  chlorhexidine 4% Liquid 1 Application(s) Topical <User Schedule>  cyanocobalamin 1000 MICROGram(s) Oral daily  diltiazem    milliGRAM(s) Oral daily  ferrous    sulfate 325 milliGRAM(s) Oral daily  finasteride 5 milliGRAM(s) Oral daily  furosemide    Tablet 40 milliGRAM(s) Oral daily  melatonin 10 milliGRAM(s) Oral at bedtime  metoprolol tartrate 100 milliGRAM(s) Oral three times a day  midodrine. 5 milliGRAM(s) Oral once  pantoprazole    Tablet 40 milliGRAM(s) Oral before breakfast  polyethylene glycol 3350 17 Gram(s) Oral daily  predniSONE   Tablet 40 milliGRAM(s) Oral daily  senna 2 Tablet(s) Oral at bedtime  simethicone 80 milliGRAM(s) Chew every 8 hours    PRN MEDICATIONS  ALPRAZolam 0.25 milliGRAM(s) Oral every 6 hours PRN                                            ------------------------------------------------------------  VITAL SIGNS: Last 24 Hours  T(C): 36.4 (04 Mar 2022 05:07), Max: 36.4 (04 Mar 2022 05:07)  T(F): 97.5 (04 Mar 2022 05:07), Max: 97.5 (04 Mar 2022 05:07)  HR: 81 (04 Mar 2022 13:41) (68 - 95)  BP: 88/52 (04 Mar 2022 13:41) (88/52 - 127/83)  BP(mean): --  RR: 17 (04 Mar 2022 12:49) (16 - 18)  SpO2: 98% (04 Mar 2022 05:07) (97% - 98%)      03-03-22 @ 07:01  -  03-04-22 @ 07:00  --------------------------------------------------------  IN: 1000 mL / OUT: 1250 mL / NET: -250 mL    03-04-22 @ 07:01  -  03-04-22 @ 15:42  --------------------------------------------------------  IN: 0 mL / OUT: 800 mL / NET: -800 mL                                             --------------------------------------------------------------  LABS:                        11.6   12.63 )-----------( 230      ( 04 Mar 2022 06:45 )             36.7     03-04    135  |  93<L>  |  43<H>  ----------------------------<  73  3.8   |  36<H>  |  1.1    Ca    8.1<L>      04 Mar 2022 06:45  Mg     2.1     03-04    TPro  5.8<L>  /  Alb  2.8<L>  /  TBili  0.8  /  DBili  x   /  AST  15  /  ALT  21  /  AlkPhos  56  03-04                                                -------------------------------------------------------------  RADIOLOGY:  ACC: [id number] EXAM:  XR CHEST PORTABLE ROUTINE 1V                        PROCEDURE DATE:  03/02/2022    FINDINGS/IMPRESSION:  Slightly increased right greater than left bilateral peripheral   opacities. No pneumothorax.  Stable cardiomediastinal silhouette.  Unchanged osseous structures.      ACC: [id number] EXAM:  XR CHEST PORTABLE ROUTINE 1V                        PROCEDURE DATE:  02/27/2022    Impression:  Bilateral opacifications, unchanged.      ACC: [id number] EXAM:  CT BRAIN                        PROCEDURE DATE:  02/24/2022    IMPRESSION:  No acute intracranial pathology.  Stable moderate chronic microvascular ischemic changes.  Redemonstrated bilateral mastoid effusions, nonspecific.                                            --------------------------------------------------------------    PHYSICAL EXAM:  General: sitting up supported in bed, frustrated, anxious  HEENT: nontraumatic, neck supple, conjunctiva clear  LUNGS: decreased breath sounds bilaterally, no cough, on 5L NC  HEART: RRR, S1/S2  ABDOMEN: soft, nontender, nondistended  NEURO: AAOx3, CN grossly intact, moves all extremities                                           --------------------------------------------------------------    ASSESSMENT & PLAN  89-year-old Male with PMH of ILD on 3L O2 at baseline at home, HTN, BPH, GERD,  A fib( on Eliquis), 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     #AHRF, likely 2/2 HFmrEF exacerbation vs progression of chronic ILD  #R sided pleural effusion   > + shortness of breath, LE edema, at baseline 3 L O2--> was on HiFlo 55L @ 60% Fi02--> now on 5L NC  > POCUS was done in ED, showed right sided pleural effusion.  > last TTE (EF 01/2022 40-45%) exacerbation, repeat TTE (2/27): EF 46%, decreased LVSF, severe pHTN, mod TR  > pro-BNP 6168.  > unlikely pna procal 0.08  > Xray Chest 1 View-PORTABLE IMMEDIATE (02.24.22 @ 16:11): Impression: Bilateral opacifications, low lung volumes. Worsening.  - c/w lasix 40mg PO qD  - Strict I/O, daily weight, fluid restriction (output not impressive, -450)  - Monitor electrolytes Keep K > 4, Mg >2  - F/u HF  - f/u cardio     # Syncope, likely cardiac etiology vs vasovagal (less likely new seizure disorder)  > HT, + LOC. + palpitations. + bladder incontinence, shakiness, foamy mouth secretions  > Denies CP,  focal weakness, or headaches. No bowel incontinence, no tongue biting.   > CT head with no acute intracranial pathology; Stable moderate chronic microvascular ischemic changes.  > last TTE (EF 01/2022 40-45%) exacerbation, repeat TTE (2/27): EF 46%, decreased LVSF, severe pHTN, mod TR  > TSH(0.34), folate(5.9), B12(794) - all wnl  > EEG. wnl   - F/u Check orthostatics when more stable - unable to tolerate standing at this time due to dizziness  - PT following. Fall precautions.     # hematuria, likely traumatic from Joel insertion  # BPH  > Hgb 10.2 (baseline 11), now 11.3  - continue with finasteride  - monitor CBC daily  - keep active type and screen  - c/w Joel care    # Afib, currently rate controlled (<110)  > diagnoses with a fib during last admission, too high risk for cardioversion, medically managed  > as per son multiple episodes of irregular heart rate  > CHADSVASC - 4  > TSH 0.70 1/2022, repeat during admission 0.34  - c/w Eliquis 5mg BID  - C/w cardizem 120 mg CD QD and metoprolol tartrate 100mg TID for rate control as hemodynamics allow    # h/o ILD  # Pulmonary Nodule  > CT chest prior to admission: 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, new mediastinal adenopathy, left pleural effusion, cardiomegaly with dilated ascending and descending aorta  > per pt son, pt last saw Dr. Womack 2 weeks prior to admission, recommended medication for ILD (likely Esbriet) and PET scan for malignancy work-up  - c/w prednisone  - OP Pulmonary Follow up for PET scan for 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 and initiation of new ILD medication    # h/o HTN  > hypotensive with PT on 3/4, orthostatics positive  > holding lisinopril in setting of hypotension  > given one dose of midodrine, can give additional dose of 10mg if hypotensive in the evening  - continue to monitor BP    # h/o GERD  - continue with PPI                                                          # DVT prophylaxis: Eliquis  # GI prophylaxis: protonix  # Diet: DASH/TLC, speech and swallow eval  # Code status: FULL  # Dispo: SDU                                                                              ----------------------------------------------------  # DVT prophylaxis: Eliquis  # GI prophylaxis: Protonix    # Diet     # Activity Score (AM-PAC)    # Code status     # Disposition                                                                              --------------------------------------------------------    # Handoff      NNEKA JOSEPH 89y Male  MRN#: [id number]   CODE STATUS: FULL    Hospital Day: 8d    Pt is currently admitted with the primary diagnosis of SOB and syncope.    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 Per son Elkin at bedside, ever since being discharged from hospital, patient has been feeling short of breath, and  for last 2 days his oxygen requirement climbed up to 5 L O2. Son also reports multiple episodes of syncope since last week, usually occurring with mild activity, associated with bladder incontinence. Today, when his son was changing his clothing, patient passed out again, had foamy secretions from mouth, and urinated on himself. Denies head trauma. Each episode of syncope lasts for about 2 minutes at the time. When son checked his pulse ox, his saturation was at 80% O2, he was also noted to be tachycardic at 120s. Denies chest pain, fever, chills, abdominal pain, nausea, vomiting, diarrhea. + LE edema.    ED Course:   VS /81, HR 95, tachypneic RR 20-24, T 95F Spo2 88% on 5L. Pt was placed on BiPAP with improvement os Sat to 98%.  Labs: WBC 10.9. Hgb 10.2( baseline 11), troponin 0.01, pro-BNP 6168. VBG Ph 7.38, pCO2 51, HCO3 30  CXR with worsening b/l opacities.  CT head with no acute intracranial pathology; Stable moderate chronic microvascular ischemic changes.  POCUS was done in ED, showed right sided pleural effusion. Pt was given 40 mg IV lasix x2, 2 gr IV Magnesium.  Pt was admitted for acute on chronic CHF exacerbation and syncope work up.    Hospital Course: EEG negative for seizure activity.  CTH negative for acute pathology.  TTE on 2/27 showed EF 46% with decreased LVSF, severe pHTN, and moderate TR.  TSH, folate, and B12 all wnl.      Overnight events:   Patient downgraded to 3B overnight.    Subjective complaints:  Patient seen and examined at bedside.  Continues to endorse SOB and is very frustrated that he feels he cannot stand without becoming tachycardic and feeling like he is going to pass out.  Currently on 5L NC.    Present Today:   - Joel:  No [ X ], Yes [   ] : Indication:     - Type of IV Access:       .. CVC/Piccline:  No [ X ], Yes [   ] : Indication:       .. Midline: No [ X ], Yes [   ] : Indication:                                             ----------------------------------------------------------  OBJECTIVE  PAST MEDICAL & SURGICAL HISTORY  Hypertension    GERD (gastroesophageal reflux disease)    Lung interstitial disease    BPH (benign prostatic hyperplasia)    HFrEF (heart failure with reduced ejection fraction)    COPD (chronic obstructive pulmonary disease)    No significant past surgical history                                              -----------------------------------------------------------  ALLERGIES:  No Known Allergies                                            ------------------------------------------------------------    HOME MEDICATIONS  Home Medications:  dilTIAZem 120 mg/24 hours oral capsule, extended release: 1 cap(s) orally once a day (15 Norris 2022 11:38)  Eliquis 5 mg oral tablet: 1 tab(s) orally 2 times a day (24 Feb 2022 21:58)  FERROUS SULFATE 325 MG TABLET: 1 each orally once a day (15 Norris 2022 11:38)  FINASTERIDE 5 MG TABLET: 1 each orally once a day (15 Norris 2022 11:38)  FUROSEMIDE 20MG TAB: 1 tab(s) orally once a day (15 Norris 2022 11:38)  Lidocare Pain Relief Patch 4% topical film: Apply topically to left shoulder once a day (15 Norris 2022 11:38)  Lopressor 100 mg oral tablet: 1 tab(s) orally 3 times a day (15 Norris 2022 11:38)  OMEPRAZOLE DR 40MG CAP: 1 cap(s) orally once a day (15 Norris 2022 11:38)  TAMSULOSIN HCL 0.4 MG CAPSULE: 1 each orally once a day (at bedtime) (15 Norris 2022 11:38)  VITAMIN B-12 1,000 MCG TABLET: 1 each orally once a day (15 Norris 2022 11:38)  Zestril 10 mg oral tablet: 1 tab(s) orally once a day (17 Jan 2022 08:12)                           MEDICATIONS:  STANDING MEDICATIONS  ALBUTerol    90 MICROgram(s) HFA Inhaler 1 Puff(s) Inhalation every 8 hours  albuterol/ipratropium for Nebulization 3 milliLiter(s) Nebulizer every 6 hours  albuterol/ipratropium for Nebulization 3 milliLiter(s) Nebulizer every 20 minutes  apixaban 5 milliGRAM(s) Oral two times a day  BACItracin   Ointment 1 Application(s) Topical four times a day  chlorhexidine 4% Liquid 1 Application(s) Topical <User Schedule>  cyanocobalamin 1000 MICROGram(s) Oral daily  diltiazem    milliGRAM(s) Oral daily  ferrous    sulfate 325 milliGRAM(s) Oral daily  finasteride 5 milliGRAM(s) Oral daily  furosemide    Tablet 40 milliGRAM(s) Oral daily  melatonin 10 milliGRAM(s) Oral at bedtime  metoprolol tartrate 100 milliGRAM(s) Oral three times a day  midodrine. 5 milliGRAM(s) Oral once  pantoprazole    Tablet 40 milliGRAM(s) Oral before breakfast  polyethylene glycol 3350 17 Gram(s) Oral daily  predniSONE   Tablet 40 milliGRAM(s) Oral daily  senna 2 Tablet(s) Oral at bedtime  simethicone 80 milliGRAM(s) Chew every 8 hours    PRN MEDICATIONS  ALPRAZolam 0.25 milliGRAM(s) Oral every 6 hours PRN                                            ------------------------------------------------------------  VITAL SIGNS: Last 24 Hours  T(C): 36.4 (04 Mar 2022 05:07), Max: 36.4 (04 Mar 2022 05:07)  T(F): 97.5 (04 Mar 2022 05:07), Max: 97.5 (04 Mar 2022 05:07)  HR: 81 (04 Mar 2022 13:41) (68 - 95)  BP: 88/52 (04 Mar 2022 13:41) (88/52 - 127/83)  BP(mean): --  RR: 17 (04 Mar 2022 12:49) (16 - 18)  SpO2: 98% (04 Mar 2022 05:07) (97% - 98%)      03-03-22 @ 07:01  -  03-04-22 @ 07:00  --------------------------------------------------------  IN: 1000 mL / OUT: 1250 mL / NET: -250 mL    03-04-22 @ 07:01  -  03-04-22 @ 15:42  --------------------------------------------------------  IN: 0 mL / OUT: 800 mL / NET: -800 mL                                             --------------------------------------------------------------  LABS:                        11.6   12.63 )-----------( 230      ( 04 Mar 2022 06:45 )             36.7     03-04    135  |  93<L>  |  43<H>  ----------------------------<  73  3.8   |  36<H>  |  1.1    Ca    8.1<L>      04 Mar 2022 06:45  Mg     2.1     03-04    TPro  5.8<L>  /  Alb  2.8<L>  /  TBili  0.8  /  DBili  x   /  AST  15  /  ALT  21  /  AlkPhos  56  03-04                                                -------------------------------------------------------------  RADIOLOGY:  ACC: [id number] EXAM:  XR CHEST PORTABLE ROUTINE 1V                        PROCEDURE DATE:  03/02/2022    FINDINGS/IMPRESSION:  Slightly increased right greater than left bilateral peripheral   opacities. No pneumothorax.  Stable cardiomediastinal silhouette.  Unchanged osseous structures.      ACC: [id number] EXAM:  XR CHEST PORTABLE ROUTINE 1V                        PROCEDURE DATE:  02/27/2022    Impression:  Bilateral opacifications, unchanged.      ACC: [id number] EXAM:  CT BRAIN                        PROCEDURE DATE:  02/24/2022    IMPRESSION:  No acute intracranial pathology.  Stable moderate chronic microvascular ischemic changes.  Redemonstrated bilateral mastoid effusions, nonspecific.                                            --------------------------------------------------------------    PHYSICAL EXAM:  General: sitting up supported in bed, frustrated, anxious  HEENT: nontraumatic, neck supple, conjunctiva clear  LUNGS: decreased breath sounds bilaterally, no cough, on 5L NC  HEART: RRR, S1/S2  ABDOMEN: soft, nontender, nondistended  NEURO: AAOx3, CN grossly intact, moves all extremities                                           --------------------------------------------------------------    ASSESSMENT & PLAN  89-year-old Male with PMH of ILD on 3L O2 at baseline at home, HTN, BPH, GERD,  A fib( on Eliquis), 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     #AHRF, likely 2/2 HFmrEF exacerbation vs progression of chronic ILD  #R sided pleural effusion   > + shortness of breath, LE edema, at baseline 3 L O2--> was on HiFlo 55L @ 60% Fi02--> now on 5L NC  > POCUS was done in ED, showed right sided pleural effusion.  > last TTE (EF 01/2022 40-45%) exacerbation, repeat TTE (2/27): EF 46%, decreased LVSF, severe pHTN, mod TR  > pro-BNP 6168.  > unlikely pna procal 0.08  > Xray Chest 1 View-PORTABLE IMMEDIATE (02.24.22 @ 16:11): Impression: Bilateral opacifications, low lung volumes. Worsening.  - c/w lasix 40mg PO qD  - Strict I/O, daily weight, fluid restriction (output not impressive, -450)  - Monitor electrolytes Keep K > 4, Mg >2  - F/u HF  - f/u cardio     # Syncope, likely cardiac etiology vs vasovagal (less likely new seizure disorder)  > HT, + LOC. + palpitations. + bladder incontinence, shakiness, foamy mouth secretions  > Denies CP,  focal weakness, or headaches. No bowel incontinence, no tongue biting.   > CT head with no acute intracranial pathology; Stable moderate chronic microvascular ischemic changes.  > last TTE (EF 01/2022 40-45%) exacerbation, repeat TTE (2/27): EF 46%, decreased LVSF, severe pHTN, mod TR  > TSH(0.34), folate(5.9), B12(794) - all wnl  > EEG. wnl   - F/u Check orthostatics when more stable - unable to tolerate standing at this time due to dizziness  - PT following. Fall precautions.     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  > per pt's son, patient has difficulty sleeping and has become increasing anxious in recent months  - started on xanax 0.25mg q6h PRN    # Afib, currently rate controlled (<110)  > diagnoses with a fib during last admission, too high risk for cardioversion, medically managed  > as per son multiple episodes of irregular heart rate  > CHADSVASC - 4  > TSH 0.70 1/2022, repeat during admission 0.34  - c/w Eliquis 5mg BID  - C/w cardizem 120 mg CD QD and metoprolol tartrate 100mg TID for rate control as hemodynamics allow    # h/o ILD  # Pulmonary Nodule  > CT chest prior to admission: 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, new mediastinal adenopathy, left pleural effusion, cardiomegaly with dilated ascending and descending aorta  > per pt son, pt last saw Dr. Womack 2 weeks prior to admission, recommended medication for ILD (likely Esbriet) and PET scan for malignancy work-up  - c/w prednisone and nebulizer q6h - f/u with pulm for prednisone taper on discharge  - OP Pulmonary Follow up for PET scan for 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 and initiation of new ILD medication    # h/o HTN  > hypotensive with PT on 3/4, orthostatics positive  > holding lisinopril in setting of hypotension  > given one dose of midodrine, can give additional dose of 10mg if hypotensive in the evening  - continue to monitor BP    # h/o GERD  - continue with PPI                                                                                                                 ----------------------------------------------------  # DVT prophylaxis: Eliquis  # GI prophylaxis: Protonix  # Diet: DASH/TLC  # Activity Score (AM-PAC): AAT  # Code status: FULL  # Disposition: from home, pending discharge to home with 24hr [facility] vs [facility] (pt is unvaccinated against COVID-19)                                                                             --------------------------------------------------------    # Handoff   - f/u with pulm for prednisone taper on discharge  - F/u Check orthostatics when more stable  - f/u HF recs

## 2022-03-05 NOTE — PROGRESS NOTE ADULT - ASSESSMENT
89M PMHx ILD on home o2, HTN, BPH, AFib on eliquis, HFpEF, h/o covid here with syncope. Acute on chronic hypoxic resp failure.    #Acute on Chronic Hypoxemic Respiratory Failure 2/2 ILD Exarc and HFpEF Exarc POA  - Tappering Steroids now Prednisone 40mg po qd  - Duonebs q6h  - Keep Sats 95% on 4-5 L NC   - Pulmonary care appreciated   - CXR chronic b/l opacities     # HFpEF Exarc POA  - s/p IV Lasix  - c/w Oral Lasix 40mg po qd     # Syncope POA   - CTH Neg   - Possibly due to severe hypoxia vs hypotension   - Check Orthostatics as pt was hypotensive and responding to midodrine    # Hypotension   - Check Orthostatics  - Start midodrine 5mg po tid  - check am cortisol level   - d/c lisinopril     # Physical Debility   - due to Chronic hypoxia and ILD   - PT evaluation pending - pls ask to see pt today     Dispo: Home with VNS / f/u PT / cortisol / BP / Orthostatics

## 2022-03-05 NOTE — PROGRESS NOTE ADULT - SUBJECTIVE AND OBJECTIVE BOX
INTERVAL HPI/OVERNIGHT EVENTS:    SUBJECTIVE: Patient seen and examined at bedside.   States can not breath and all he does is eat food. He is feeling low mood today but no suicidal ideation. Sat and spoke to him in detail about his condition and he is looking forward to going home with family soon once he walks with PT. Walking held yesterday due to hypotension. Unable to do orthostatics yesterday.     OBJECTIVE:    Vital Signs Last 24 Hrs  T(C): 36.1 (05 Mar 2022 06:02), Max: 36.1 (05 Mar 2022 06:02)  T(F): 96.9 (05 Mar 2022 06:02), Max: 96.9 (05 Mar 2022 06:02)  HR: 82 (05 Mar 2022 06:02) (74 - 95)  BP: 138/78 (05 Mar 2022 06:02) (88/52 - 138/78)  RR: 18 (05 Mar 2022 06:02) (17 - 18)  SpO2: 96% (05 Mar 2022 06:02) (96% - 96%)    PHYSICAL EXAM:    General: NAD  HEENT: NC/AT; PERRL, clear conjunctiva  Neck: supple  Respiratory: CTA b/l  Cardiovascular: +S1/S2; RRR  Abdomen: soft, NT/ND; +BS x4  Extremities: WWP, 2+ peripheral pulses b/l; no LE edema  Skin: normal color and turgor; no rash  MS: AOx3     MEDICATIONS  (STANDING):  ALBUTerol    90 MICROgram(s) HFA Inhaler 1 Puff(s) Inhalation every 8 hours  albuterol/ipratropium for Nebulization 3 milliLiter(s) Nebulizer every 6 hours  albuterol/ipratropium for Nebulization 3 milliLiter(s) Nebulizer every 20 minutes  apixaban 5 milliGRAM(s) Oral two times a day  chlorhexidine 4% Liquid 1 Application(s) Topical <User Schedule>  cyanocobalamin 1000 MICROGram(s) Oral daily  diltiazem    milliGRAM(s) Oral daily  ferrous    sulfate 325 milliGRAM(s) Oral daily  finasteride 5 milliGRAM(s) Oral daily  furosemide    Tablet 40 milliGRAM(s) Oral daily  metoprolol tartrate 100 milliGRAM(s) Oral three times a day  pantoprazole    Tablet 40 milliGRAM(s) Oral before breakfast  polyethylene glycol 3350 17 Gram(s) Oral daily  predniSONE   Tablet 40 milliGRAM(s) Oral daily    ALLERGIES:  Allergies    No Known Allergies  No Intolerances      LABS:                        11.6   12.63 )-----------( 230      ( 04 Mar 2022 06:45 )             36.7     03-04    135  |  93<L>  |  43<H>  ----------------------------<  73  3.8   |  36<H>  |  1.1    Ca    8.1<L>      04 Mar 2022 06:45  Mg     2.1     03-04    TPro  5.8<L>  /  Alb  2.8<L>  /  TBili  0.8  /  DBili  x   /  AST  15  /  ALT  21  /  AlkPhos  56  03-04                          11.3   15.28 )-----------( 244      ( 03 Mar 2022 05:30 )             35.3     Hemoglobin: 11.3 g/dL (03-03 @ 05:30)  Hemoglobin: 11.9 g/dL (03-02 @ 06:20)  Hemoglobin: 10.5 g/dL (03-01 @ 05:30)  Hemoglobin: 9.4 g/dL (02-27 @ 05:40)    Creatinine Trend: 1.1<--, 1.1<--, 1.0<--, 1.0<--, 1.3<--, 1.2<--  LIVER FUNCTIONS - ( 03 Mar 2022 05:30 )  Alb: 3.1 g/dL / Pro: 6.5 g/dL / ALK PHOS: 59 U/L / ALT: 22 U/L / AST: 13 U/L / GGT: x           Labs, imaging, EKG personally reviewed    RADIOLOGY & ADDITIONAL TESTS: Reviewed.

## 2022-03-06 NOTE — PROGRESS NOTE ADULT - ASSESSMENT
89M PMHx ILD on home o2, HTN, BPH, AFib on eliquis, HFpEF, h/o covid here with syncope. Acute on chronic hypoxic resp failure.    #Acute on Chronic Hypoxemic Respiratory Failure 2/2 ILD Exarc and HFpEF Exarc POA  - Tappering Steroids now Prednisone 40mg po qd  - Duonebs q6h  - Keep Sats 95% on 4-5 L NC   - Pulmonary care appreciated   - CXR chronic b/l opacities     # HFpEF Exarc POA  - s/p IV Lasix  - c/w Oral Lasix 40mg po qd     # Syncope POA   - CTH Neg   - Possibly due to severe hypoxia vs hypotension   - Check Orthostatics as pt was hypotensive and responding to midodrine    # Hypotension 2/2 Lisinopril   - d/c midodrine 5mg po tid and monitor for now as BP today improved while off lisinopril   - check am cortisol level   - d/c lisinopril     # Physical Debility   - due to Chronic hypoxia and ILD   - PT evaluation pending - pls ask to see pt today     Social: Discussed with pt poor prognosis of his lung disease and encouraged him to participate with PT today. He has 2 sons and pt and family wants to go home not SNF. f/u PT    Dispo: Home with VNS / f/u PT / f/u cortisol / BP (Off Lisinopril and d/c Midodrine, Monitor BP)

## 2022-03-06 NOTE — PROGRESS NOTE ADULT - SUBJECTIVE AND OBJECTIVE BOX
INTERVAL HPI/OVERNIGHT EVENTS:    SUBJECTIVE: Patient seen and examined at bedside.   States can not breath and all he does is eat food. He is feeling low mood today but no suicidal ideation. Sat and spoke to him in detail about his condition and he is looking forward to going home with family soon once he walks with PT. Walking held yesterday due to hypotension. Unable to do orthostatics yesterday.     OBJECTIVE:    Vital Signs Last 24 Hrs  T(C): 36.6 (06 Mar 2022 06:00), Max: 36.6 (06 Mar 2022 06:00)  T(F): 97.8 (06 Mar 2022 06:00), Max: 97.8 (06 Mar 2022 06:00)  HR: 67 (06 Mar 2022 06:00) (54 - 99)  BP: 141/76 (06 Mar 2022 06:00) (82/59 - 141/76)  RR: 18 (06 Mar 2022 06:00) (16 - 19)  SpO2: 99% (06 Mar 2022 06:00) (97% - 100%)    PHYSICAL EXAM:    General: NAD  HEENT: NC/AT; PERRL, clear conjunctiva  Neck: supple  Respiratory: CTA b/l  Cardiovascular: +S1/S2; RRR  Abdomen: soft, NT/ND; +BS x4  Extremities: WWP, 2+ peripheral pulses b/l; no LE edema  Skin: normal color and turgor; no rash  MS: AOx3     MEDICATIONS  (STANDING):  MEDICATIONS  (STANDING):  ALBUTerol    90 MICROgram(s) HFA Inhaler 1 Puff(s) Inhalation every 8 hours  albuterol/ipratropium for Nebulization 3 milliLiter(s) Nebulizer every 6 hours  albuterol/ipratropium for Nebulization 3 milliLiter(s) Nebulizer every 20 minutes  apixaban 5 milliGRAM(s) Oral two times a day  BACItracin   Ointment 1 Application(s) Topical four times a day  chlorhexidine 4% Liquid 1 Application(s) Topical <User Schedule>  cyanocobalamin 1000 MICROGram(s) Oral daily  diltiazem    milliGRAM(s) Oral daily  ferrous    sulfate 325 milliGRAM(s) Oral daily  finasteride 5 milliGRAM(s) Oral daily  furosemide    Tablet 40 milliGRAM(s) Oral daily  melatonin 10 milliGRAM(s) Oral at bedtime  metoprolol tartrate 100 milliGRAM(s) Oral three times a day  midodrine. 5 milliGRAM(s) Oral three times a day  pantoprazole    Tablet 40 milliGRAM(s) Oral before breakfast  polyethylene glycol 3350 17 Gram(s) Oral daily  predniSONE   Tablet 40 milliGRAM(s) Oral daily  senna 2 Tablet(s) Oral at bedtime  simethicone 80 milliGRAM(s) Chew every 8 hours    MEDICATIONS  (PRN):  ALPRAZolam 0.25 milliGRAM(s) Oral every 6 hours PRN anxiety    ALLERGIES:  Allergies    No Known Allergies  No Intolerances    LABS:                          11.2   14.75 )-----------( 233      ( 06 Mar 2022 07:26 )             35.4     03-06    135  |  95<L>  |  41<H>  ----------------------------<  76  4.0   |  34<H>  |  1.0    Ca    8.3<L>      06 Mar 2022 07:26  Mg     2.0     03-06    TPro  5.6<L>  /  Alb  2.9<L>  /  TBili  0.6  /  DBili  x   /  AST  11  /  ALT  18  /  AlkPhos  56  03-06                        11.6   12.63 )-----------( 230      ( 04 Mar 2022 06:45 )             36.7     03-04    135  |  93<L>  |  43<H>  ----------------------------<  73  3.8   |  36<H>  |  1.1    Ca    8.1<L>      04 Mar 2022 06:45    Mg     2.1     03-04    TPro  5.8<L>  /  Alb  2.8<L>  /  TBili  0.8  /  DBili  x   /  AST  15  /  ALT  21  /  AlkPhos  56  03-04                          11.3   15.28 )-----------( 244      ( 03 Mar 2022 05:30 )             35.3     Hemoglobin: 11.3 g/dL (03-03 @ 05:30)  Hemoglobin: 11.9 g/dL (03-02 @ 06:20)  Hemoglobin: 10.5 g/dL (03-01 @ 05:30)  Hemoglobin: 9.4 g/dL (02-27 @ 05:40)    Creatinine Trend: 1.1<--, 1.1<--, 1.0<--, 1.0<--, 1.3<--, 1.2<--  LIVER FUNCTIONS - ( 03 Mar 2022 05:30 )  Alb: 3.1 g/dL / Pro: 6.5 g/dL / ALK PHOS: 59 U/L / ALT: 22 U/L / AST: 13 U/L / GGT: x           Labs, imaging, EKG personally reviewed    RADIOLOGY & ADDITIONAL TESTS: Reviewed.

## 2022-03-07 NOTE — PROGRESS NOTE ADULT - ASSESSMENT
IMPRESSION:    Acute on chronic hypoxemic respiratory failure slowly improving   ILD with possible exacerbation improving   LLL MASS/ med LN (chest ct at [facility] 12/21) Likely malignant  Volume overload improved  HO HFrEF  Recent COVID illness 1/22  HO Afib on Eliquis  HO CKD    PLAN:    CNS: No sedation.      HEENT: oral care    PULMONARY: HOB >45.  Wean O2 as tolerated.  Prednisone 40 daily adn slow taper over 6 weeks     CARDIOVASCULAR: Keep negative balance as tolerated.      GI: GI prophylaxis.  Feeding po    RENAL: Fu lytes.  Correct as needed    HEMATOLOGICAL:  AC with Eliquis.  FU CBC     ENDOCRINE:  Follow up FS.  Insulin protocol if needed.    MUSCULOSKELETAL:  OOB to chair.  PT OT     DC Planing    OP Pulm follow up

## 2022-03-07 NOTE — PROGRESS NOTE ADULT - ASSESSMENT
89M PMHx ILD on home o2, HTN, BPH, AFib on eliquis, HFpEF, h/o covid here with syncope. Acute on chronic hypoxic resp failure.    #Acute on Chronic Hypoxemic Respiratory Failure 2/2 ILD Exarc and HFimpEF Exarc POA  - Tappering Steroids now Prednisone 40mg po qd  - Duonebs q6h  - Keep Sats 95% on 4-5 L NC   - Pulmonary care appreciated   - CXR chronic b/l opacities     # HFimpEF Exarc POA  - s/p IV Lasix  - c/w Oral Lasix 40mg po qd   - Low Sodium diet  - off lisinopril 2/2 hypotension     # Syncope POA   - CTH Neg   - Possibly due to severe hypoxia vs hypotension   - Responding to midodrine now BP normalizing off Lisinopril   - f/u Orthostatics from this afternoon     # Hypotension 2/2 Lisinopril   - d/c midodrine 5mg po tid and monitor for now as BP today improved while off lisinopril   - check am cortisol level   - d/c lisinopril     # Physical Debility   - due to Chronic hypoxia and ILD   - PT evaluation pending - pls ask to see pt today     Family Discussion: Had a long discussion with son who lives in  about the pt and recommending SNF. They will look into it. Pt needs SNF.   - f/u orthostatics / off midodrine / off lisinopril   - f/u cortisol level   - Needs PT daily     Dispo: Pt to decide home vs snf / SNF recommended medically

## 2022-03-07 NOTE — PROGRESS NOTE ADULT - SUBJECTIVE AND OBJECTIVE BOX
INTERVAL HPI/OVERNIGHT EVENTS:    Today: Son at bedside. Case/Plan discussed explained pt is cleared for discharge and SNF recommended. Son has to discuss with other family members. If pt goes home he will be along and son only checks on him after work. Live in a 2Family Home. Pt has no help during the day.     OBJECTIVE:    Vital Signs Last 24 Hrs  T(C): 36 (07 Mar 2022 05:16), Max: 36.5 (06 Mar 2022 20:38)  T(F): 96.8 (07 Mar 2022 05:16), Max: 97.7 (06 Mar 2022 20:38)  HR: 74 (07 Mar 2022 05:16) (65 - 96)  BP: 155/55 (07 Mar 2022 05:16) (91/56 - 155/55)  RR: 18 (07 Mar 2022 05:16) (18 - 20)  SpO2: 95% 4L NC    PHYSICAL EXAM:    General: NAD  HEENT: NC/AT; PERRL, clear conjunctiva  Neck: supple  Respiratory: CTA b/l  Cardiovascular: +S1/S2; RRR  Abdomen: soft, NT/ND; +BS x4  Extremities: WWP, 2+ peripheral pulses b/l; no LE edema  Skin: normal color and turgor; no rash  MS: AOx3     MEDICATIONS  (STANDING):  ALBUTerol    90 MICROgram(s) HFA Inhaler 1 Puff(s) Inhalation every 8 hours  albuterol/ipratropium for Nebulization 3 milliLiter(s) Nebulizer every 6 hours  albuterol/ipratropium for Nebulization 3 milliLiter(s) Nebulizer every 20 minutes  apixaban 5 milliGRAM(s) Oral two times a day  BACItracin   Ointment 1 Application(s) Topical four times a day  chlorhexidine 4% Liquid 1 Application(s) Topical <User Schedule>  cyanocobalamin 1000 MICROGram(s) Oral daily  diltiazem    milliGRAM(s) Oral daily  ferrous    sulfate 325 milliGRAM(s) Oral daily  finasteride 5 milliGRAM(s) Oral daily  furosemide    Tablet 40 milliGRAM(s) Oral daily  melatonin 10 milliGRAM(s) Oral at bedtime  metoprolol tartrate 100 milliGRAM(s) Oral three times a day  pantoprazole    Tablet 40 milliGRAM(s) Oral before breakfast  polyethylene glycol 3350 17 Gram(s) Oral daily  predniSONE   Tablet 40 milliGRAM(s) Oral daily  senna 2 Tablet(s) Oral at bedtime  simethicone 80 milliGRAM(s) Chew every 8 hours    MEDICATIONS  (PRN):  ALPRAZolam 0.25 milliGRAM(s) Oral every 6 hours PRN anxiety    MEDICATIONS  (PRN):  ALPRAZolam 0.25 milliGRAM(s) Oral every 6 hours PRN anxiety    ALLERGIES:  Allergies    No Known Allergies  No Intolerances    LABS:                          11.2   14.57 )-----------( 222      ( 07 Mar 2022 05:57 )             35.0     03-07    134<L>  |  96<L>  |  35<H>  ----------------------------<  70  4.1   |  31  |  1.0    Ca    8.1<L>      07 Mar 2022 05:57  Mg     2.0     03-07    TPro  5.5<L>  /  Alb  2.7<L>  /  TBili  0.6  /  DBili  x   /  AST  13  /  ALT  19  /  AlkPhos  58  03-07                          11.2   14.75 )-----------( 233      ( 06 Mar 2022 07:26 )             35.4     03-06    135  |  95<L>  |  41<H>  ----------------------------<  76  4.0   |  34<H>  |  1.0    Ca    8.3<L>      06 Mar 2022 07:26  Mg     2.0     03-06    TPro  5.6<L>  /  Alb  2.9<L>  /  TBili  0.6  /  DBili  x   /  AST  11  /  ALT  18  /  AlkPhos  56  03-06                        11.6   12.63 )-----------( 230      ( 04 Mar 2022 06:45 )             36.7     03-04    135  |  93<L>  |  43<H>  ----------------------------<  73  3.8   |  36<H>  |  1.1    Ca    8.1<L>      04 Mar 2022 06:45    Mg     2.1     03-04    TPro  5.8<L>  /  Alb  2.8<L>  /  TBili  0.8  /  DBili  x   /  AST  15  /  ALT  21  /  AlkPhos  56  03-04                          11.3   15.28 )-----------( 244      ( 03 Mar 2022 05:30 )             35.3     Hemoglobin: 11.3 g/dL (03-03 @ 05:30)  Hemoglobin: 11.9 g/dL (03-02 @ 06:20)  Hemoglobin: 10.5 g/dL (03-01 @ 05:30)  Hemoglobin: 9.4 g/dL (02-27 @ 05:40)    Creatinine Trend: 1.1<--, 1.1<--, 1.0<--, 1.0<--, 1.3<--, 1.2<--  LIVER FUNCTIONS - ( 03 Mar 2022 05:30 )  Alb: 3.1 g/dL / Pro: 6.5 g/dL / ALK PHOS: 59 U/L / ALT: 22 U/L / AST: 13 U/L / GGT: x           Labs, imaging, EKG personally reviewed    RADIOLOGY & ADDITIONAL TESTS: Reviewed.

## 2022-03-07 NOTE — PROGRESS NOTE ADULT - SUBJECTIVE AND OBJECTIVE BOX
NNEKA JOSEPH 89y Male  MRN#: [id number]   CODE STATUS: Full  Hospital Day: 11d  Pt is currently admitted with the primary diagnosis of: SOB and syncope    SUBJECTIVE  Overnight events: None  Subjective complaints: Denies chest pain, palpitations, SOB, abdominal pain. Endorses suprapubic pressure, macedo catheter in place. Reports some lightheadedness on standing.                                          ----------------------------------------------------------  OBJECTIVE  PAST MEDICAL & SURGICAL HISTORY  Hypertension    GERD (gastroesophageal reflux disease)    Lung interstitial disease    BPH (benign prostatic hyperplasia)    HFrEF (heart failure with reduced ejection fraction)    COPD (chronic obstructive pulmonary disease)    No significant past surgical history                                          -----------------------------------------------------------  ALLERGIES:  No Known Allergies                                          ------------------------------------------------------------  HOME MEDICATIONS  Home Medications:  dilTIAZem 120 mg/24 hours oral capsule, extended release: 1 cap(s) orally once a day (15 Norris 2022 11:38)  Eliquis 5 mg oral tablet: 1 tab(s) orally 2 times a day (24 Feb 2022 21:58)  FERROUS SULFATE 325 MG TABLET: 1 each orally once a day (15 Norris 2022 11:38)  FINASTERIDE 5 MG TABLET: 1 each orally once a day (15 Norris 2022 11:38)  FUROSEMIDE 20MG TAB: 1 tab(s) orally once a day (15 Norris 2022 11:38)  Lidocare Pain Relief Patch 4% topical film: Apply topically to left shoulder once a day (15 Norris 2022 11:38)  Lopressor 100 mg oral tablet: 1 tab(s) orally 3 times a day (15 Norris 2022 11:38)  OMEPRAZOLE DR 40MG CAP: 1 cap(s) orally once a day (15 Norris 2022 11:38)  TAMSULOSIN HCL 0.4 MG CAPSULE: 1 each orally once a day (at bedtime) (15 Norris 2022 11:38)  VITAMIN B-12 1,000 MCG TABLET: 1 each orally once a day (15 Norris 2022 11:38)  Zestril 10 mg oral tablet: 1 tab(s) orally once a day (17 Jan 2022 08:12)                         MEDICATIONS:  STANDING MEDICATIONS  ALBUTerol    90 MICROgram(s) HFA Inhaler 1 Puff(s) Inhalation every 8 hours  albuterol/ipratropium for Nebulization 3 milliLiter(s) Nebulizer every 20 minutes  albuterol/ipratropium for Nebulization 3 milliLiter(s) Nebulizer every 6 hours  apixaban 5 milliGRAM(s) Oral two times a day  BACItracin   Ointment 1 Application(s) Topical four times a day  chlorhexidine 4% Liquid 1 Application(s) Topical <User Schedule>  cyanocobalamin 1000 MICROGram(s) Oral daily  diltiazem    milliGRAM(s) Oral daily  ferrous    sulfate 325 milliGRAM(s) Oral daily  finasteride 5 milliGRAM(s) Oral daily  furosemide    Tablet 40 milliGRAM(s) Oral daily  melatonin 10 milliGRAM(s) Oral at bedtime  metoprolol tartrate 100 milliGRAM(s) Oral three times a day  pantoprazole    Tablet 40 milliGRAM(s) Oral before breakfast  polyethylene glycol 3350 17 Gram(s) Oral daily  predniSONE   Tablet 40 milliGRAM(s) Oral daily  senna 2 Tablet(s) Oral at bedtime  simethicone 80 milliGRAM(s) Chew every 8 hours    PRN MEDICATIONS  ALPRAZolam 0.25 milliGRAM(s) Oral every 6 hours PRN                                          ------------------------------------------------------------  VITAL SIGNS: Last 24 Hours  T(C): 36 (07 Mar 2022 05:16), Max: 36.5 (06 Mar 2022 20:38)  T(F): 96.8 (07 Mar 2022 05:16), Max: 97.7 (06 Mar 2022 20:38)  HR: 74 (07 Mar 2022 05:16) (65 - 96)  BP: 155/55 (07 Mar 2022 05:16) (91/56 - 155/55)  BP(mean): --  RR: 18 (07 Mar 2022 05:16) (18 - 20)  SpO2: --    03-06-22 @ 07:01  -  03-07-22 @ 07:00  --------------------------------------------------------  IN: 0 mL / OUT: 2150 mL / NET: -2150 mL                                         --------------------------------------------------------------  LABS:                        11.2   14.57 )-----------( 222      ( 07 Mar 2022 05:57 )             35.0     03-07    134<L>  |  96<L>  |  35<H>  ----------------------------<  70  4.1   |  31  |  1.0    Ca    8.1<L>      07 Mar 2022 05:57  Mg     2.0     03-07    TPro  5.5<L>  /  Alb  2.7<L>  /  TBili  0.6  /  DBili  x   /  AST  13  /  ALT  19  /  AlkPhos  58  03-07                                          -------------------------------------------------------------  RADIOLOGY:                                          --------------------------------------------------------------  PHYSICAL EXAM:  General: NAD, sitting upright in bed  HEENT: Atraumatic, normocephalic  LUNGS: CTAB, no wheezes  HEART: +S1,S2. Regular rate and rhythm   ABDOMEN: Soft, nontender. Nondistended  EXT: +trace edema b/l LE, no cyanosis  NEURO: AAOx3  SKIN: No rashes or bruises                                         --------------------------------------------------------------

## 2022-03-07 NOTE — PROGRESS NOTE ADULT - SUBJECTIVE AND OBJECTIVE BOX
Patient is a 89y old  Male who presents with a chief complaint of SOB and syncope (07 Mar 2022 09:16)        Over Night Events:  Resting in bed on NC.  Off pressors.          ROS:     All ROS are negative except HPI         PHYSICAL EXAM    ICU Vital Signs Last 24 Hrs  T(C): 36 (07 Mar 2022 05:16), Max: 36.5 (06 Mar 2022 20:38)  T(F): 96.8 (07 Mar 2022 05:16), Max: 97.7 (06 Mar 2022 20:38)  HR: 74 (07 Mar 2022 05:16) (65 - 96)  BP: 155/55 (07 Mar 2022 05:16) (91/56 - 155/55)  BP(mean): --  ABP: --  ABP(mean): --  RR: 18 (07 Mar 2022 05:16) (18 - 20)  SpO2: --      CONSTITUTIONAL:  In NAD    ENT:   Airway patent,   Mouth with normal mucosa.   No thrush    EYES:   Pupils equal,   Round and reactive to light.    CARDIAC:   Normal rate,   Regular rhythm.    edema      Vascular:  Normal systolic impulse  No Carotid bruits    RESPIRATORY:   No wheezing  Bilateral crackles   Normal chest expansion  Not tachypneic,  No use of accessory muscles    GASTROINTESTINAL:  Abdomen soft,   Non-tender,   No guarding,   + BS    MUSCULOSKELETAL:   Range of motion is not limited,  No clubbing, cyanosis    NEUROLOGICAL:   Alert and oriented   No motor  deficits.    SKIN:   Skin normal color for race,   No evidence of rash.    PSYCHIATRIC:   Normal mood and affect.   No apparent risk to self or others.    HEMATOLOGICAL:  No cervical  lymphadenopathy.  no inguinal lymphadenopathy      03-06-22 @ 07:01  -  03-07-22 @ 07:00  --------------------------------------------------------  IN:  Total IN: 0 mL    OUT:    Indwelling Catheter - Urethral (mL): 2150 mL  Total OUT: 2150 mL    Total NET: -2150 mL          LABS:                            11.2   14.57 )-----------( 222      ( 07 Mar 2022 05:57 )             35.0                                               03-07    134<L>  |  96<L>  |  35<H>  ----------------------------<  70  4.1   |  31  |  1.0    Ca    8.1<L>      07 Mar 2022 05:57  Mg     2.0     03-07    TPro  5.5<L>  /  Alb  2.7<L>  /  TBili  0.6  /  DBili  x   /  AST  13  /  ALT  19  /  AlkPhos  58  03-07                                                                                           LIVER FUNCTIONS - ( 07 Mar 2022 05:57 )  Alb: 2.7 g/dL / Pro: 5.5 g/dL / ALK PHOS: 58 U/L / ALT: 19 U/L / AST: 13 U/L / GGT: x                                                                                                                                       MEDICATIONS  (STANDING):  ALBUTerol    90 MICROgram(s) HFA Inhaler 1 Puff(s) Inhalation every 8 hours  albuterol/ipratropium for Nebulization 3 milliLiter(s) Nebulizer every 20 minutes  albuterol/ipratropium for Nebulization 3 milliLiter(s) Nebulizer every 6 hours  apixaban 5 milliGRAM(s) Oral two times a day  BACItracin   Ointment 1 Application(s) Topical four times a day  chlorhexidine 4% Liquid 1 Application(s) Topical <User Schedule>  cyanocobalamin 1000 MICROGram(s) Oral daily  diltiazem    milliGRAM(s) Oral daily  ferrous    sulfate 325 milliGRAM(s) Oral daily  finasteride 5 milliGRAM(s) Oral daily  furosemide    Tablet 40 milliGRAM(s) Oral daily  melatonin 10 milliGRAM(s) Oral at bedtime  metoprolol tartrate 100 milliGRAM(s) Oral three times a day  pantoprazole    Tablet 40 milliGRAM(s) Oral before breakfast  polyethylene glycol 3350 17 Gram(s) Oral daily  predniSONE   Tablet 40 milliGRAM(s) Oral daily  senna 2 Tablet(s) Oral at bedtime  simethicone 80 milliGRAM(s) Chew every 8 hours    MEDICATIONS  (PRN):  ALPRAZolam 0.25 milliGRAM(s) Oral every 6 hours PRN anxiety      New X-rays reviewed:                                                                                  ECHO    CXR interpreted by me:  Bilateral infiltrates

## 2022-03-07 NOTE — PROGRESS NOTE ADULT - ASSESSMENT
89M PMHx ILD on home o2, HTN, BPH, AFib on Eliquis, HFmrEF, h/o covid (Jan 2022) presented with SOB and multiple episodes of syncope.    #AHRF, likely 2/2 HFmrEF exacerbation vs progression of chronic ILD  #R sided pleural effusion   - Tapering steroids, continue prednisone 40mg po daily  - Duonebs q6h  - At baseline, on 3L NC, keep Sats 95% on 2-3 L NC today  - Pulmonary care appreciated   - CXR 3/2: chronic b/l opacities, slightly increased R>L     #HFmrEF   - s/p IV Lasix x2  - TTE 2/27: LVEF 46% w/ severe pHTN and moderate TR  - continue Lasix 40mg PO daily     #Syncope (likely vasovagal vs. hypoxia vs. cardiac etiology)   - CT head negative, EEG negative   - TSH(0.34), folate(5.9), B12(794) - all WNL  - Possibly due to severe hypoxia vs hypotension   - Orthostatics 3/5 positive, repeat orthostatics today    #Hypotension 2/2 Lisinopril   - s/p midodrine, holding lisinopril,  BP this /55  - continue to monitor BP  - AM cortisol pending    #Physical Debility   - due to Chronic hypoxia and ILD   - PT evaluation pending     #hematuria, likely traumatic from Macedo insertion  #BPH  - Hgb 10.2 (baseline 11), now 11.2  - macedo placed on admission in ED (2/24) for retention   - continue finasteride 5mg PO daily  - monitor CBC daily  - keep active type and screen  - continue macedo care  - continue daily iron and B12 supplements    # Constipation  # Excessive flatulence   - continue miralax and simethicone     # Anxiety  - 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  - per pt's son, patient has difficulty sleeping and has become increasing anxious in recent months  - continue xanax 0.25mg q6h PRN    # Afib, currently rate controlled (<110)  - diagnosed with afib during last admission, too high risk for cardioversion, medically managed  - as per son multiple episodes of irregular heart rate  - CHADSVASC: 4  - TSH 0.70 1/2022, repeat during admission 0.34  - continue Eliquis 5mg PO BID  - continue cardizem 120 mg CD QD and metoprolol tartrate 100mg TID for rate control as hemodynamics allow    # h/o ILD  # Pulmonary Nodule  - CT chest prior to admission: 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, new mediastinal adenopathy, left pleural effusion, cardiomegaly with dilated ascending and descending aorta  - per pt son, pt last saw Dr. Womack 2 weeks prior to admission, recommended medication for ILD (likely Esbriet) and PET scan for malignancy work-up  - continue prednisone 40mg PO daily and nebulizer q6h - f/u with pulm for prednisone taper on discharge  - OP Pulmonary Follow up for PET scan for 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 and initiation of new ILD medication        # DVT prophylaxis: Eliquis 5mg PO  # GI prophylaxis: Protonix 40mg PO  # Diet: DASH/TLC  # Activity: Ambulate as tolerated  # Code status: Full  # Disposition: anticipate d/c, from home, pending discharge to home with 24hr [facility] vs [facility]                                                                           --------------------------------------------------------  # Handoff: f/u PT, AM cortisol, f/u orthostatics & BP (off lisinopril, s/p midodrine)  89M PMHx ILD on home o2, HTN, BPH, AFib on Eliquis, HFmrEF, h/o covid (Jan 2022) presented with SOB and multiple episodes of syncope.    #AHRF, likely 2/2 HFmrEF exacerbation vs progression of chronic ILD  #R sided pleural effusion   - Tapering steroids, continue prednisone 40mg po daily  - Duonebs q6h  - At baseline, on 3L NC, keep Sats 95% on 2-3 L NC today  - Pulmonary care appreciated   - CXR 3/2: chronic b/l opacities, slightly increased R>L   - procal 3/2: 0.04    #HFmrEF   - s/p IV Lasix x2  - TTE 2/27: LVEF 46% w/ severe pHTN and moderate TR  - monitor I/Os  - continue Lasix 40mg PO daily     #Syncope (likely vasovagal vs. hypoxia vs. cardiac etiology)   - CT head negative, EEG negative   - TSH(0.34), folate(5.9), B12(794) - all WNL  - Possibly due to severe hypoxia vs hypotension   - Orthostatics 3/5 positive, repeat orthostatics today    #Hypotension 2/2 Lisinopril   - s/p midodrine, holding lisinopril,  BP this /55  - continue to monitor BP  - AM cortisol pending    #Physical Debility   - due to chronic hypoxia and ILD   - PT evaluation pending     #hematuria, likely traumatic from Macedo insertion  #BPH  - Hgb 10.2 (baseline 11), now 11.2  - macedo placed on admission in ED (2/24) for retention   - continue finasteride 5mg PO daily  - monitor CBC daily  - keep active type and screen  - continue macedo care  - continue daily iron and B12 supplements    # Constipation  # Excessive flatulence   - continue miralax and simethicone     # Anxiety  - 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  - per pt's son, patient has difficulty sleeping and has become increasing anxious in recent months  - continue xanax 0.25mg q6h PRN    # Afib, currently rate controlled (<110)  - diagnosed with afib during last admission, too high risk for cardioversion, medically managed  - as per son multiple episodes of irregular heart rate  - CHADSVASC: 4  - TSH 0.70 1/2022, repeat during admission 0.34  - continue Eliquis 5mg PO BID  - continue cardizem 120 mg CD QD and metoprolol tartrate 100mg TID for rate control as hemodynamics allow    # h/o ILD  # Pulmonary Nodule  - CT chest prior to admission: 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, new mediastinal adenopathy, left pleural effusion, cardiomegaly with dilated ascending and descending aorta  - per pt son, pt last saw Dr. Womack 2 weeks prior to admission, recommended medication for ILD (likely Esbriet) and PET scan for malignancy work-up  - continue prednisone 40mg PO daily and nebulizer q6h - f/u with pulm for prednisone taper on discharge  - OP Pulmonary Follow up for PET scan for 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 and initiation of new ILD medication        # DVT prophylaxis: Eliquis 5mg PO  # GI prophylaxis: Protonix 40mg PO  # Diet: DASH/TLC  # Activity: Ambulate as tolerated  # Code status: Full  # Disposition: anticipate d/c, from home, pending discharge to home with 24hr [facility] vs [facility]                                                                           --------------------------------------------------------  # Handoff: f/u PT, AM cortisol, f/u orthostatics & BP (off lisinopril, s/p midodrine)

## 2022-03-08 NOTE — DISCHARGE NOTE PROVIDER - HOSPITAL COURSE
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 Per son Elkin at bedside, ever since being discharged from hospital, patient has been feeling short of breath, and  for last 2 days his oxygen requirement climbed up to 5 L O2. Son also reports multiple episodes of syncope since last week, usually occurring with mild activity, associated with bladder incontinence. Today, when his son was changing his clothing, patient passed out again, had foamy secretions from mouth, and urinated on himself. Denies head trauma. Each episode of syncope lasts for about 2 minutes at the time. When son checked his pulse ox, his saturation was at 80% O2, he was also noted to be tachycardic at 120s. Denies chest pain, fever, chills, abdominal pain, nausea, vomiting, diarrhea. + LE edema.    Syncope attributed to hypoxemic respiratory failure secondary to ILD exacerbation. Patient started on solumedrol IV 60mg BID, which was then transitioned to prednisone 40mg QD (to be tapered over 6 weeks). Hypoxemic respiratory failure likely worsened by concomitant HF exacerbation leading to volume overload, which rapidly improved on IV diuretics. Pt to be discharged to [facility] due to debilitation secondary to chronic hypoxia and ILD.

## 2022-03-08 NOTE — PROGRESS NOTE ADULT - ASSESSMENT
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      HEENT: oral care    PULMONARY: HOB >45.  Wean O2 as tolerated.  Prednisone 40 daily with slow taper over 6 weeks     CARDIOVASCULAR: Keep negative balance as tolerated.      GI: GI prophylaxis.  Feeding po    RENAL: Fu lytes.  Correct as needed    HEMATOLOGICAL:  AC with Eliquis.  FU CBC     ENDOCRINE:  Follow up FS.  Insulin protocol if needed.    MUSCULOSKELETAL:  OOB to chair.  PT OT     DC Planing    OP Pulm follow up

## 2022-03-08 NOTE — DISCHARGE NOTE PROVIDER - CARE PROVIDERS DIRECT ADDRESSES
,DirectAddress_Unknown,[email or web address],DirectAddress_Unknown

## 2022-03-08 NOTE — PROGRESS NOTE ADULT - SUBJECTIVE AND OBJECTIVE BOX
Patient is a 89y old  Male who presents with a chief complaint of SOB and syncope (08 Mar 2022 14:03)        Over Night Events: Resting in bed.  ON NC.         ROS:     All ROS are negative except HPI         PHYSICAL EXAM    ICU Vital Signs Last 24 Hrs  T(C): 36 (08 Mar 2022 14:08), Max: 36.4 (07 Mar 2022 21:39)  T(F): 96.8 (08 Mar 2022 14:08), Max: 97.5 (07 Mar 2022 21:39)  HR: 77 (08 Mar 2022 14:08) (75 - 91)  BP: 107/63 (08 Mar 2022 14:08) (107/63 - 135/84)  BP(mean): --  ABP: --  ABP(mean): --  RR: 18 (08 Mar 2022 14:08) (18 - 18)  SpO2: --      CONSTITUTIONAL:  in NAD    ENT:   Airway patent,   Mouth with normal mucosa.   No thrush    EYES:   Pupils equal,   Round and reactive to light.    CARDIAC:   Normal rate,   Regular rhythm.    No edema      Vascular:  Normal systolic impulse  No Carotid bruits    RESPIRATORY:   No wheezing  Bilateral crackles   Normal chest expansion  Not tachypneic,  No use of accessory muscles    GASTROINTESTINAL:  Abdomen soft,   Non-tender,   No guarding,   + BS    MUSCULOSKELETAL:   Range of motion is not limited,  No clubbing, cyanosis    NEUROLOGICAL:   Alert  No motor  deficits.    SKIN:   Skin normal color for race,   Warm and dry  No evidence of rash.    PSYCHIATRIC:   Normal mood and affect.   No apparent risk to self or others.    HEMATOLOGICAL:  No cervical  lymphadenopathy.  no inguinal lymphadenopathy      03-07-22 @ 07:01  -  03-08-22 @ 07:00  --------------------------------------------------------  IN:  Total IN: 0 mL    OUT:    Indwelling Catheter - Urethral (mL): 900 mL  Total OUT: 900 mL    Total NET: -900 mL          LABS:                            10.9   15.18 )-----------( 235      ( 08 Mar 2022 05:56 )             35.2                                               03-08    137  |  99  |  33<H>  ----------------------------<  75  4.2   |  32  |  1.2    Ca    8.1<L>      08 Mar 2022 05:56  Mg     1.9     03-08    TPro  5.4<L>  /  Alb  2.8<L>  /  TBili  0.6  /  DBili  x   /  AST  13  /  ALT  18  /  AlkPhos  60  03-08                                                                                           LIVER FUNCTIONS - ( 08 Mar 2022 05:56 )  Alb: 2.8 g/dL / Pro: 5.4 g/dL / ALK PHOS: 60 U/L / ALT: 18 U/L / AST: 13 U/L / GGT: x                                                                                                                                       MEDICATIONS  (STANDING):  ALBUTerol    90 MICROgram(s) HFA Inhaler 1 Puff(s) Inhalation every 8 hours  albuterol/ipratropium for Nebulization 3 milliLiter(s) Nebulizer every 20 minutes  albuterol/ipratropium for Nebulization 3 milliLiter(s) Nebulizer every 6 hours  apixaban 5 milliGRAM(s) Oral two times a day  BACItracin   Ointment 1 Application(s) Topical four times a day  chlorhexidine 4% Liquid 1 Application(s) Topical <User Schedule>  cyanocobalamin 1000 MICROGram(s) Oral daily  diltiazem    milliGRAM(s) Oral daily  ferrous    sulfate 325 milliGRAM(s) Oral daily  finasteride 5 milliGRAM(s) Oral daily  furosemide    Tablet 40 milliGRAM(s) Oral daily  melatonin 10 milliGRAM(s) Oral at bedtime  metoprolol tartrate 100 milliGRAM(s) Oral three times a day  pantoprazole    Tablet 40 milliGRAM(s) Oral before breakfast  polyethylene glycol 3350 17 Gram(s) Oral daily  predniSONE   Tablet 30 milliGRAM(s) Oral daily  senna 2 Tablet(s) Oral at bedtime  simethicone 80 milliGRAM(s) Chew every 8 hours    MEDICATIONS  (PRN):  ALPRAZolam 0.25 milliGRAM(s) Oral every 6 hours PRN anxiety      New X-rays reviewed:                                                                                  ECHO    CXR interpreted by me:

## 2022-03-08 NOTE — DISCHARGE NOTE PROVIDER - PROVIDER TOKENS
PROVIDER:[TOKEN:[[id number]],FOLLOWUP:[2 weeks],ESTABLISHEDPATIENT:[T]],PROVIDER:[TOKEN:[[id number]],FOLLOWUP:[2 weeks]],PROVIDER:[TOKEN:[[id number]],FOLLOWUP:[2 weeks]]

## 2022-03-08 NOTE — PROGRESS NOTE ADULT - SUBJECTIVE AND OBJECTIVE BOX
SUBJ: Patient seen and examined. No events overnight.       MEDICATIONS  (STANDING):  ALBUTerol    90 MICROgram(s) HFA Inhaler 1 Puff(s) Inhalation every 8 hours  albuterol/ipratropium for Nebulization 3 milliLiter(s) Nebulizer every 20 minutes  albuterol/ipratropium for Nebulization 3 milliLiter(s) Nebulizer every 6 hours  apixaban 5 milliGRAM(s) Oral two times a day  BACItracin   Ointment 1 Application(s) Topical four times a day  chlorhexidine 4% Liquid 1 Application(s) Topical <User Schedule>  cyanocobalamin 1000 MICROGram(s) Oral daily  diltiazem    milliGRAM(s) Oral daily  ferrous    sulfate 325 milliGRAM(s) Oral daily  finasteride 5 milliGRAM(s) Oral daily  furosemide    Tablet 40 milliGRAM(s) Oral daily  melatonin 10 milliGRAM(s) Oral at bedtime  metoprolol tartrate 100 milliGRAM(s) Oral three times a day  pantoprazole    Tablet 40 milliGRAM(s) Oral before breakfast  polyethylene glycol 3350 17 Gram(s) Oral daily  predniSONE   Tablet 30 milliGRAM(s) Oral daily  senna 2 Tablet(s) Oral at bedtime  simethicone 80 milliGRAM(s) Chew every 8 hours    MEDICATIONS  (PRN):  ALPRAZolam 0.25 milliGRAM(s) Oral every 6 hours PRN anxiety            Vital Signs Last 24 Hrs  T(C): 35.7 (08 Mar 2022 19:20), Max: 36 (08 Mar 2022 14:08)  T(F): 96.3 (08 Mar 2022 19:20), Max: 96.8 (08 Mar 2022 14:08)  HR: 71 (08 Mar 2022 19:20) (71 - 77)  BP: 109/57 (08 Mar 2022 19:20) (107/63 - 135/84)  BP(mean): --  RR: 18 (08 Mar 2022 19:20) (18 - 18)  SpO2: 97% (08 Mar 2022 19:20) (97% - 97%)     REVIEW OF SYSTEMS:  CONSTITUTIONAL: No fever  NECK: No pain or stiffness  CARDIOVASCULAR: patient denies chest pain, reports  shortness of breath with minimal acticity .  Respiratory: No cough or wheezing.  NEUROLOGICAL: No focal deficits to report.  GI: no BRBPR, no N,V,diarrhea.    PSYCHIATRY: normal mood and affect  HEENT: no nasal discharge, no ecchymosis      PHYSICAL EXAM:  GENERAL: NAD  HEAD:  Atraumatic, Normocephalic  NECK: Supple, No JVD  NERVOUS SYSTEM:  Alert & Oriented X3, Good concentration  CHEST/LUNG: bilateral poor air entry  HEART: Irregular rate and rhythm  ABDOMEN: Soft, Nontender, Nondistended;   EXTREMITIES:  No  edema      	  TELEMETRY:      LABS:                        10.9   15.18 )-----------( 235      ( 08 Mar 2022 05:56 )             35.2     03-08    137  |  99  |  33<H>  ----------------------------<  75  4.2   |  32  |  1.2    Ca    8.1<L>      08 Mar 2022 05:56  Mg     1.9     03-08    TPro  5.4<L>  /  Alb  2.8<L>  /  TBili  0.6  /  DBili  x   /  AST  13  /  ALT  18  /  AlkPhos  60  03-08            I&O's Summary    07 Mar 2022 07:01  -  08 Mar 2022 07:00  --------------------------------------------------------  IN: 0 mL / OUT: 900 mL / NET: -900 mL      BNP  RADIOLOGY & ADDITIONAL STUDIES:    IMPRESSION AND PLAN:    Hypoxemic respiratory failure  ILD    HFrEF  Syncope  Afib   - Management as per primary team  - Continue Furosemide/ I&O   - Continue Apixaban Diltiazem and metoprolol  - PT  - Will follow as needed

## 2022-03-08 NOTE — DISCHARGE NOTE PROVIDER - NSDCMRMEDTOKEN_GEN_ALL_CORE_FT
dilTIAZem 120 mg/24 hours oral capsule, extended release: 1 cap(s) orally once a day  Eliquis 5 mg oral tablet: 1 tab(s) orally 2 times a day  FERROUS SULFATE 325 MG TABLET: 1 each orally once a day  FINASTERIDE 5 MG TABLET: 1 each orally once a day  furosemide 40 mg oral tablet: 1 tab(s) orally once a day  Lopressor 100 mg oral tablet: 1 tab(s) orally 3 times a day  OMEPRAZOLE DR 40MG CAP: 1 cap(s) orally once a day  predniSONE 10 mg oral tablet: 4 tab(s) orally once a day x 10 days  3 tab(s) orally once a day x 10 days  2 tab(s) orally once a day x 10 days  1 tab(s) orally once a day x 10 days  TAMSULOSIN HCL 0.4 MG CAPSULE: 1 each orally once a day (at bedtime)  VITAMIN B-12 1,000 MCG TABLET: 1 each orally once a day

## 2022-03-08 NOTE — PROGRESS NOTE ADULT - REASON FOR ADMISSION
SOB and syncope

## 2022-03-08 NOTE — PROGRESS NOTE ADULT - PROVIDER SPECIALTY LIST ADULT
Hospitalist
Hospitalist
Internal Medicine
Pulmonology
Cardiology
Hospitalist
Internal Medicine
Internal Medicine
Cardiology
Cardiology
Internal Medicine
Pulmonology
Hospitalist
Hospitalist
Internal Medicine
Pulmonology
Hospitalist

## 2022-03-08 NOTE — DISCHARGE NOTE PROVIDER - NSDCCPCAREPLAN_GEN_ALL_CORE_FT
PRINCIPAL DISCHARGE DIAGNOSIS  Diagnosis: ILD (interstitial lung disease)  Assessment and Plan of Treatment: You came to the hospital with difficulty breathing. Your symptoms were attributed to an exacerbation of your interstitial lung disease. You were started on steroids. You are to continue taking these steroids and taper off them over the next 6 weeks.      SECONDARY DISCHARGE DIAGNOSES  Diagnosis: Heart failure with mid-range ejection fraction  Assessment and Plan of Treatment: Your difficulty breathing was also worsened by an acute exacerbation of your heart dysfunction. As a result your diuretic (furosemide) was increased from 20mg to 40mg. Please continue to take this increased dose of your diuretic and follow up outpatient with your cardiologist or primary care physician within two weeks after discharge.    Diagnosis: Syncope  Assessment and Plan of Treatment: You came to the hospital after syncopizing ("passing out"). These episodes likely happened as a result of your low oxygen levels in the setting of your ILD and HF exacerbations. However, this was likely worsened by low blood pressure. As a result, your blood pressure medication (lisinopril/Zestril) was discontinued. Please follow up outpatient with your cardiologist or primary care physician within two weeks after discharge.     PRINCIPAL DISCHARGE DIAGNOSIS  Diagnosis: ILD (interstitial lung disease)  Assessment and Plan of Treatment: You came to the hospital with difficulty breathing. Your symptoms were attributed to an exacerbation of your interstitial lung disease. You were started on steroids. You are to continue taking these steroids and taper off them over the next 6 weeks.  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 As a result your diuretic (furosemide) was increased from 20mg to 40mg. Please continue to take this increased dose of your diuretic and follow up outpatient with your cardiologist or primary care physician within two weeks after discharge.  HEART FAILURE WITH IMPROVED EF (HFimrEF)   - CONTINUE WITH LASIX  - FOLLOW WITH YOUR DR    Diagnosis: Syncope  Assessment and Plan of Treatment: You came to the hospital after syncopizing ("passing out"). These episodes likely happened as a result of your low oxygen levels in the setting of your ILD and HF exacerbations. However, this was likely worsened by low blood pressure. As a result, your blood pressure medication (lisinopril/Zestril) was discontinued. Please follow up outpatient with your cardiologist or primary care physician within two weeks after discharge.  SYNCOPE 2/2 HYPOXIA  GENERAL WEAKNESS 2/2 DECONDITION   - NEEDS SNF FOR REHABILITATION  URINARY RETENTION  - DUE TO DECONDITIONING AND INABILITY TO STAND TO URINATE  - C/W STORM   - NEED AGGRESSIVE PHYSICAL THERAPY  - CAN FOLLOW WITH UROLOGY   - TRY TRIAL OF VOIDING WHEN FUNCTIONAL STATUS IMPROVES

## 2022-03-08 NOTE — DISCHARGE NOTE NURSING/CASE MANAGEMENT/SOCIAL WORK - NSDCPEFALRISK_GEN_ALL_CORE
For information on Fall & Injury Prevention, visit: [email or web address] OR  [email or web address] OR  https://www.cdc.gov/steadi/patient.html

## 2022-03-08 NOTE — DISCHARGE NOTE PROVIDER - CARE PROVIDER_API CALL
Julio Tom  INTERNAL MEDICINE  [address] [place] [address]  Phone: [phone]  Fax: [phone]  Established Patient  Follow Up Time: 2 weeks    Idania Aguero)  Cardiovascular Disease; Internal Medicine  [address] [place] [address] [address]  [place] [address]  Phone: [phone]  Fax: [phone]  Follow Up Time: 2 weeks    Mathieu Womack)  Critical Care Medicine; Pulmonary Disease; Sleep Medicine  [address] [place], [place] [id number]  [place] [address]  Phone: [phone]  Fax: [phone]  Follow Up Time: 2 weeks

## 2022-03-08 NOTE — DISCHARGE NOTE PROVIDER - ATTENDING DISCHARGE PHYSICAL EXAMINATION:
Vital Signs Last 24 Hrs  T(C): 36 (08 Mar 2022 14:08), Max: 36.4 (07 Mar 2022 21:39)  T(F): 96.8 (08 Mar 2022 14:08), Max: 97.5 (07 Mar 2022 21:39)  HR: 77 (08 Mar 2022 14:08) (75 - 91)  BP: 107/63 (08 Mar 2022 14:08) (107/63 - 135/84)  RR: 18 (08 Mar 2022 14:08) (18 - 18)  SpO2: -- 4L NC     CV: NL S1, S2  Resp: Bilateral crackles   GI: +BS, Soft, NT, ND  Ext: No e/c/c   MS: AOx3 / anxiousness - chronic due to chronic resp failure                         10.9   15.18 )-----------( 235      ( 08 Mar 2022 05:56 )             35.2   03-08    137  |  99  |  33  ----------------------<  75  4.2   |  32  |  1.2    Ca    8.1<L>      08 Mar 2022 05:56    Mg     1.9     03-08    TPro  5.4<L>  /  Alb  2.8<L>  /  TBili  0.6  /  DBili  x   /  AST  13  /  ALT  18  /  AlkPhos  60  03-08

## 2022-03-08 NOTE — DISCHARGE NOTE NURSING/CASE MANAGEMENT/SOCIAL WORK - PATIENT PORTAL LINK FT
You can access the FollowMyHealth Patient Portal offered by [facility] by registering at the following website: [email or web address]. By joining [employer]’s FollowMyHealth portal, you will also be able to view your health information using other applications (apps) compatible with our system.

## 2022-03-09 PROBLEM — J44.9 CHRONIC OBSTRUCTIVE PULMONARY DISEASE, UNSPECIFIED: Chronic | Status: ACTIVE | Noted: 2022-01-01

## 2022-03-09 PROBLEM — I50.20 UNSPECIFIED SYSTOLIC (CONGESTIVE) HEART FAILURE: Chronic | Status: ACTIVE | Noted: 2022-01-01

## 2022-03-23 PROBLEM — I48.91 ATRIAL FIBRILLATION: Status: ACTIVE | Noted: 2022-01-01

## 2022-03-23 PROBLEM — I10 CHRONIC HYPERTENSION: Status: ACTIVE | Noted: 2022-01-01

## 2022-03-23 PROBLEM — I25.10 CAD (CORONARY ARTERY DISEASE): Status: ACTIVE | Noted: 2022-01-01

## 2022-03-23 NOTE — HISTORY OF PRESENT ILLNESS
[FreeTextEntry1] : patient has h/o atrial fib, htn, cad and copd\par class 3-4 sob, on oxygen\par class 1 chest pain\par denies chest pain \par no peripheral edema\par no chf on exam\par no tia, cva or pvd\par \par ekg is atial fib

## 2022-03-24 NOTE — CONSULT NOTE ADULT - ATTENDING COMMENTS
IMPRESSION:    Acute on chronic hypoxemic respiratory failure improved   Possible heart failure exacerbation.  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    Plan as outlined above

## 2022-03-24 NOTE — H&P ADULT - HISTORY OF PRESENT ILLNESS
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 Per patient had acute onset of SOB while ambulating to the bathroom and syncopized on the toilet. denies head trauma. He was recently d/c earlier this month for a similar admission for syncope secondary to hypoxemia from ILD exacerbation. Patient started on solumedrol IV 60mg BID, which was then transitioned to prednisone 40mg QD (to be tapered over 6 weeks).    ED course: Tachycardic in triage, SpO2 97% on NRB. Labs revealed mild leukocytosis, hypoMg, trop 0.03 and BNP 2892. VBG unremarkable except LA of 4.6. EKG revealed Afib w/ RVR. CXR w/ chronic b/l opacities. s/p 500ml LR + Solu-Medrol in ED and placed on NIV.

## 2022-03-24 NOTE — H&P ADULT - ASSESSMENT
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    #Acute on chronic resp failure, likely 2/2 ILD exacerbation  #LLL Pulm mass, likely malignant  -CXR w/ stable chronic b/l opacities  -CT chest prior to admission: 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, new mediastinal adenopathy, left pleural effusion, cardiomegaly with dilated ascending and descending aorta  Plan  - IV Medrol + Duonebs  - c/w NIV  - Admit to SDU  - f/u Procal +Cx  - OP Pulmonary Follow up for PET scan for 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 and initiation of new ILD medication    #Elevated LA  -Does not appear septic, likely form suspected lung malignancy  -Trend    # Afib  -CHADSVASC: 4  Plan  - continue Eliquis 5mg PO BID  - continue cardizem 120 mg CD QD and metoprolol tartrate 100mg TID for rate control as hemodynamics allow    #HFmrEF   -TTE 2/27: LVEF 46% w/ severe pHTN and moderate TR   -Serum Pro-BNP: 2892, lower than baseline  -Trop 0.03  -Not on ACEI 2/2 HoTN  Plan  - Daily Weight  - Hold diuresis    #BPH  -Flomax + Finasteride    DVT prophylaxis: Eliquis 5mg PO  GI prophylaxis: Protonix 40mg PO  Diet: DASH/TLC  Activity: Ambulate as tolerated  Code status: Full  Disposition: SDU 89M w/ PMHx of ILD on 5L O2 at baseline at home, HTN, BPH, GERD, A fib (on Eliquis), 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    #Acute on chronic resp failure, likely 2/2 ILD exacerbation  #LLL Pulm mass, likely malignant  -CXR w/ stable chronic b/l opacities  -CT chest prior to admission: 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, new mediastinal adenopathy, left pleural effusion, cardiomegaly with dilated ascending and descending aorta  Plan  - IV Medrol + Duonebs  - off NIV now  - Admit to SDU  - f/u Procal +Cx + Fungitel + Galactomannan  - Bactrim for PCP PPx  - OP Pulmonary Follow up for PET scan for 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 and initiation of new ILD medication    #Elevated LA  -Does not appear septic, likely form suspected lung malignancy  -Trend    #Afib  -CHADSVASC: 4  Plan  - continue Eliquis 5mg PO BID  - continue cardizem 120 mg CD QD and metoprolol tartrate 100mg TID    #HFmrEF   -TTE 2/27: LVEF 46% w/ severe pHTN and moderate TR   -Serum Pro-BNP: 2892  -Trop 0.03  -Not on ACEI 2/2 HoTN  -Does not appear overloaded on exam  Plan  - Daily Weight  - Hold diuresis    #BPH  -Flomax + Finasteride    DVT prophylaxis: Eliquis 5mg PO  GI prophylaxis: Protonix 40mg PO  Diet: DASH/TLC  Activity: Ambulate as tolerated  Code status: Full  Disposition: SDU 89M w/ PMHx of ILD on 5L O2 at baseline at home, HTN, BPH, GERD, A fib (on Eliquis), 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    #Acute on chronic resp failure, likely 2/2 ILD exacerbation  #LLL Pulm mass, likely malignant  -CXR w/ stable chronic b/l opacities  -CT chest prior to admission: 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, new mediastinal adenopathy, left pleural effusion, cardiomegaly with dilated ascending and descending aorta  Plan  - IV Medrol + Duonebs  - off NIV now  - Admit to SDU  - f/u Procal +Cx + Fungitel + Galactomannan  - Bactrim for PCP PPx  - OP Pulmonary Follow up for PET scan for 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 and initiation of new ILD medication    #Elevated LA  -Does not appear septic, likely form suspected lung malignancy  -Trend    #Afib  #Elevated Trops 0.03, Denies CP  -CHADSVASC: 4  -RVR on admission now controlled  Plan  - continue Eliquis 5mg PO BID  - continue cardizem 120 mg CD QD and metoprolol tartrate 100mg TID  - Repeat EKG in AM  - Trend Trops    #HFmrEF   -TTE 2/27: LVEF 46% w/ severe pHTN and moderate TR   -Serum Pro-BNP: 2892  -Trop 0.03  -Not on ACEI 2/2 HoTN  -Does not appear overloaded on exam  Plan  - Daily Weight  - Hold diuresis    #BPH  -Flomax + Finasteride    DVT prophylaxis: Eliquis 5mg PO  GI prophylaxis: Protonix 40mg PO  Diet: DASH/TLC  Activity: Ambulate as tolerated  Code status: Full  Disposition: SDU

## 2022-03-24 NOTE — H&P ADULT - ATTENDING COMMENTS
Patient seen and examined. Case discussed with resident team    # acute respiratory failure; multifactorial with ILD, CHF, severe pulm hypertension  # ILD  # r/o Acute HFmrEF; echo from 2/27- showing EF 46%  # severe pulm hypertension  # r/o malignancy- OP CT chest showing LLL mass and mediatinal LN  pulm crit care team following  continue steroids solumedrol 40 mg q8h; lasix 40 mg IV daily  i/O monitoring    # A fib with RVR on admission- heart rate controlled now  # mild trop elevation- possibly related to tachycardia and CHF  patient denies any chest pain; EKG- no acute ST changes; trend troponin  continue cardiaem and eliquis    # thrombocytopenia  did not receive heparin products current admission  d dimer- neg, will check coag panel( patient on eliquis); monitor cbc    # recent COVID    #  macedo catheter; placed on last hospitalization  trial of void once improved with mobility  routine care    Pending: clinical improvement, trend troponin Patient seen and examined. Case discussed with resident team    # acute respiratory failure; multifactorial with ILD, CHF, severe pulm hypertension  # ILD  # r/o Acute HFmrEF; echo from 2/27- showing EF 46%  # severe pulm hypertension  # r/o malignancy- OP CT chest showing LLL mass and mediatinal LN  pulm crit care team following  continue steroids solumedrol 40 mg q8h; lasix 40 mg IV daily  i/O monitoring    # A fib with RVR on admission- heart rate controlled now  # mild trop elevation- possibly related to tachycardia and CHF  patient denies any chest pain; EKG- no acute ST changes; trend troponin  continue cardiaem and eliquis    # thrombocytopenia  did not receive heparin products current admission  d dimer- neg, will check coag panel( patient on eliquis); monitor cbc    # recent COVID    #  macedo catheter; placed on last hospitalization  trial of void once improved with mobility  routine care    Pending: clinical improvement, trend troponin    plan of care d/w patient and with son at bedside.

## 2022-03-24 NOTE — ED PROVIDER NOTE - NS_BEDUNITTYPES_ED_ALL_ED
[facility] ART with [facility] notified of urine output of greater then 600. Stated we will wait for the results of serum NA and urinalysis.   STEPDOWN

## 2022-03-24 NOTE — ED PROVIDER NOTE - PHYSICAL EXAMINATION
Physical Exam: PHYSICAL EXAM:  	GENERAL: NAD, AAO x 3, 89y M, on biPAP  	HEAD:  Atraumatic, Normocephalic  	EYES: EOMI, conjunctiva and sclera white  	NECK: Supple, No JVD  	CHEST/LUNG: crackles at bilateral bases; No wheeze;   	HEART: Regular rate and rhythm; +s1 s2, No murmurs;   	ABDOMEN: Soft, Nontender, Nondistended; Bowel sounds present; No guarding, No organomegaly, Joel draining blood tinged urine( likely traumatic)  	EXTREMITIES:  2+ LE edema  NEUROLOGY: non-focal Physical Exam: PHYSICAL EXAM:  	GENERAL: NAD, AAO x 3, 89y M, on biPAP  	HEAD:  Atraumatic, Normocephalic  	EYES: EOMI, conjunctiva and sclera white  	NECK: Supple, No JVD  	CHEST/LUNG: crackles at bilateral bases; No wheeze;   	HEART: Regular rate and rhythm; +s1 s2, No murmurs;   	ABDOMEN: Soft, Nontender, Nondistended; Bowel sounds present; No guarding, No organomegaly, Joel draining urine  	EXTREMITIES:  2+ LE edema  NEUROLOGY: non-focal

## 2022-03-24 NOTE — CONSULT NOTE ADULT - SUBJECTIVE AND OBJECTIVE BOX
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 The patient experienced SOB overnight, patient was placed on NIV upon admission to the ED felt better. Denies chest pain, fever, chills, abdominal pain, nausea, vomiting, diarrhea. + LE edema.    In ED, VS on presentation: 102/52, , RR 26, Temp 97.7, 100% NRM. Pt was placed on BiPAP with improvement os Sat to 98% on 40%, IPAP/EPAP 10/5  Labs: WBC 10.9. Hgb 10.8( baseline), troponin 0.03, pro-BNP 2892(last probnp 2700). VBG Ph 7.38, lactate 4.6 PCo2 48.  CXR compared to last (B/l opacities improved)    S/p solumedrol/mag/nebs in ED       (24 Feb 2022 20:38)      PAST MEDICAL & SURGICAL HISTORY:  Hypertension    GERD (gastroesophageal reflux disease)    Lung interstitial disease    BPH (benign prostatic hyperplasia)    HFrEF (heart failure with reduced ejection fraction)    COPD (chronic obstructive pulmonary disease)    No significant past surgical history        SOCIAL HX:  former smoker, no etoh abuse    FAMILY HISTORY:  No pertinent family history in first degree relatives        Review Of Systems:     negative except as in HPI      Allergies      CONSTITUTIONAL:  in NAD, comfortable on NIV, speaking in full sentences    ENT:   Airway patent,   Mouth with normal mucosa.   No thrush    EYES:   Pupils equal,   Round and reactive to light.    CARDIAC:   Normal rate,   irregular rhythm.    LE edema      Vascular:  Normal systolic impulse  No Carotid bruits    RESPIRATORY:     Bilateral crackles   Normal chest expansion  Not tachypneic,  No use of accessory muscles    GASTROINTESTINAL:  Abdomen soft,   Non-tender,   No guarding,   + BS    MUSCULOSKELETAL:   Range of motion is not limited,  No clubbing, cyanosis    NEUROLOGICAL:   Alert and orientedx3  No motor  deficits.    SKIN:   Skin normal color for race,   Warm and dry  No evidence of rash.    PSYCHIATRIC:   Normal mood and affect.   No apparent risk to self or others.    HEMATOLOGICAL:  No cervical  lymphadenopathy.  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 The patient experienced SOB overnight, patient was placed on NIV upon admission to the ED felt better. Denies chest pain, fever, chills, abdominal pain, nausea, vomiting, diarrhea. + LE edema.    In ED, VS on presentation: 102/52, , RR 26, Temp 97.7, 100% NRM. Pt was placed on BiPAP with improvement os Sat to 98% on 40%, IPAP/EPAP 10/5  Labs: WBC 10.9. Hgb 10.8( baseline), troponin 0.03, pro-BNP 2892(last probnp 2700). VBG Ph 7.38, lactate 4.6 PCo2 48.  CXR compared to last (B/l opacities improved)    S/p solumedrol/mag/nebs in ED       (24 Feb 2022 20:38)      PAST MEDICAL & SURGICAL HISTORY:  Hypertension    GERD (gastroesophageal reflux disease)    Lung interstitial disease    BPH (benign prostatic hyperplasia)    HFrEF (heart failure with reduced ejection fraction)    COPD (chronic obstructive pulmonary disease)    No significant past surgical history        SOCIAL HX:  former smoker, no etoh abuse    FAMILY HISTORY:  No pertinent family history in first degree relatives        Review Of Systems:     negative except as in HPI      Allergies      CONSTITUTIONAL:  in NAD, comfortable on NIV, speaking in full sentences    ENT:   Airway patent,   Mouth with normal mucosa.   No thrush    EYES:   Pupils equal,   Round and reactive to light.    CARDIAC:   Normal rate,   irregular rhythm.    LE edema      Vascular:  Normal systolic impulse  No Carotid bruits    RESPIRATORY:     Bilateral crackles   Normal chest expansion  Not tachypneic,  Bilateral crackles   No use of accessory muscles    GASTROINTESTINAL:  Abdomen soft,   Non-tender,   No guarding,   + BS    MUSCULOSKELETAL:   Range of motion is not limited,  No clubbing, cyanosis    NEUROLOGICAL:   Alert and orientedx3  No motor  deficits.    SKIN:   Skin normal color for race,   Warm and dry  No evidence of rash.    PSYCHIATRIC:   Normal mood and affect.   No apparent risk to self or others.    HEMATOLOGICAL:  No cervical  lymphadenopathy.  no inguinal lymphadenopathy

## 2022-03-24 NOTE — PATIENT PROFILE ADULT - NSPROGENSOURCEINFO_GEN_A_NUR
Eating Out When You Have Diabetes  Eating right is an important part of keeping your blood sugar in your target range. You just need to make healthy choices.    Tips for restaurant meals  When you eat away from home try these tips:  · Try to schedule your dining-out meal at your normal meal time. Make a reservation if possible, so you don't have to wait to eat. If you can't make a reservation, try to arrive at the restaurant at a less-busy time to cut down your wait time. Eat a small fruit or starch snack at your regular mealtime if your restaurant meal is going to be later than usual.   · Call ahead to see if the restaurant can meet your dietary needs if you've never been there before. Or you can go online to see the menu ahead of time.  · Carry some crackers with you in case the restaurant needs you to wait until you can be served.  · Ask how foods are prepared before you order.  · Instead of fried, sautéed, or breaded foods, choose ones that are broiled, steamed, grilled, or baked.  · Ask for sauces, gravies, and dressings on the side.  · Only eat an amount that fits your meal plan. Remember: You can take home the leftovers.  · Save dessert for special occasions. Then choose a small dessert or share one with a friend or family member.  Make healthy choices  Fast food  · Garden salad with light dressing on the side  · Baked potato with vegetables or herbs  · Broiled, roasted, or grilled chicken sandwich  · Sliced turkey or lean roast beef sandwich  Mexican  · Chicken enchilada, without cheese or sour cream   · Small burrito with whole beans and chicken  · Whole beans (not refried) and rice  · Chicken or fish fajitas  Steakhouse  · Grilled or broiled lean cuts of beef  · Baked potato with vegetables or herbs  · Broiled or baked chicken. Dont eat the skin.  · Steamed vegetables  Asian  · Steamed dumplings or potstickers  · Broiled, boiled, or steamed meats or fish  · Sushi or sashimi  · Steamed rice or boiled  eduardo. One serving is equal to 1/3 cup.  Date Last Reviewed: 6/1/2016  © 6735-1596 "[employer]. [address] [place] [address], [place] [address]. All rights reserved. This information is not intended as a substitute for professional medical care. Always follow your healthcare professional's instructions.        Established High Blood Pressure    High blood pressure (hypertension) is a chronic disease. Often, healthcare providers dont know what causes it. But it can be caused by certain health conditions and medicines.  If you have high blood pressure, you may not have any symptoms. If you do have symptoms, they may include headache, dizziness, changes in your vision, chest pain, and shortness of breath. But even without symptoms, high blood pressure thats not treated raises your risk for heart attack and stroke. High blood pressure is a serious health risk and shouldnt be ignored.  A blood pressure reading is made up of two numbers: a higher number over a lower number. The top number is the systolic pressure. The bottom number is the diastolic pressure. A normal blood pressure is a systolic pressure of  less than 120 over a diastolic pressure of less than 80. You will see your blood pressure readings written together. For example, a person with a systolic pressure of 188 and a diastolic pressure of 78 will have 118/78 written in the medical record.  High blood pressure is when either the top number is 140 or higher, or the bottom number is 90 or higher. This must be the result when taking your blood pressure a number of times. The blood pressures between normal and high are called prehypertension.  Home care  If you have high blood pressure, you should do what is listed below to lower your blood pressure. If you are taking medicines for high blood pressure, these methods may reduce or end your need for medicines in the future.  · Begin a weight-loss program if you are overweight.  · Cut back on how much  salt you get in your diet. Heres how to do this:  ¨ Dont eat foods that have a lot of salt. These include olives, pickles, smoked meats, and salted potato chips.  ¨ Dont add salt to your food at the table.  ¨ Use only small amounts of salt when cooking.  · Start an exercise program. Talk with your healthcare provider about the type of exercise program that would be best for you. It doesn't have to be hard. Even brisk walking for 20 minutes 3 times a week is a good form of exercise.  · Dont take medicines that stimulate the heart. This includes many over-the-counter cold and sinus decongestant pills and sprays, as well as diet pills. Check the warnings about hypertension on the label. Before buying any over-the-counter medicines or supplements, always ask the pharmacist about the product's potential interaction with your high blood pressure and your high blood pressure medicines.  · Stimulants such as amphetamine or cocaine could be deadly for someone with high blood pressure. Never take these.  · Limit how much caffeine you get in your diet. Switch to caffeine-free products.  · Stop smoking. If you are a long-time smoker, this can be hard. Talk to your healthcare provider about medicines and nicotine replacement options to help you. Also, enroll in a stop-smoking program to make it more likely that you will quit for good.  · Learn how to handle stress. This is an important part of any program to lower blood pressure. Learn about relaxation methods like meditation, yoga, or biofeedback.  · If your provider prescribed medicines, take them exactly as directed. Missing doses may cause your blood pressure get out of control.  · If you miss a dose or doses, check with your healthcare provider or pharmacist about what to do.  · Consider buying an automatic blood pressure machine. Ask your provider for a recommendation. You can get one of these at most pharmacies.     The American Heart Association recommends the  following guidelines for home blood pressure monitoring:  · Don't smoke or drink coffee for 30 minutes before taking your blood pressure.  · Go to the bathroom before the test.  · Relax for 5 minutes before taking the measurement.  · Sit with your back supported (don't sit on a couch or soft chair); keep your feet on the floor uncrossed. Place your arm on a solid flat surface (like a table) with the upper part of the arm at heart level. Place the middle of the cuff directly above the eye of the elbow. Check the monitor's instruction manual for an illustration.  · Take multiple readings. When you measure, take 2 to 3 readings one minute apart and record all of the results.  · Take your blood pressure at the same time every day, or as your healthcare provider recommends.  · Record the date, time, and blood pressure reading.  · Take the record with you to your next medical appointment. If your blood pressure monitor has a built-in memory, simply take the monitor with you to your next appointment.  · Call your provider if you have several high readings. Don't be frightened by a single high blood pressure reading, but if you get several high readings, check in with your healthcare provider.  · Note: When blood pressure reaches a systolic (top number) of 180 or higher OR diastolic (bottom number) of 110 or higher, seek emergency medical treatment.  Follow-up care  You will need to see your healthcare provider regularly. This is to check your blood pressure and to make changes to your medicines. Make a follow-up appointment as directed. Bring the record of your home blood pressure readings to the appointment.  When to seek medical advice  Call your healthcare provider right away if any of these occur:  · Blood pressure reaches a systolic (upper number) of 180 or higher OR a diastolic (bottom number) of 110 or higher  · Chest pain or shortness of breath  · Severe headache  · Throbbing or rushing sound in the  ears  · Nosebleed  · Sudden severe pain in your belly (abdomen)  · Extreme drowsiness, confusion, or fainting  · Dizziness or spinning sensation (vertigo)  · Weakness of an arm or leg or one side of the face  · You have problems speaking or seeing   Date Last Reviewed: 12/1/2016  © 6233-8443 [employer]. [address] [place] [address], [place] [id number]. All rights reserved. This information is not intended as a substitute for professional medical care. Always follow your healthcare professional's instructions.         patient

## 2022-03-24 NOTE — ED ADULT NURSE NOTE - INTERVENTIONS DEFINITIONS
[place] to call system/Call bell, personal items and telephone within reach/Instruct patient to call for assistance/Room bathroom lighting operational/Non-slip footwear when patient is off stretcher/Physically safe environment: no spills, clutter or unnecessary equipment/Stretcher in lowest position, wheels locked, appropriate side rails in place/Monitor gait and stability/Monitor for mental status changes and reorient to person, place, and time/Review medications for side effects contributing to fall risk/Reinforce activity limits and safety measures with patient and family/Provide visual clues: red socks

## 2022-03-24 NOTE — ED PROVIDER NOTE - ATTENDING CONTRIBUTION TO CARE
hypoxia/syncope as above.  pt put on bipap and felt better.   lungs clear but poor movement  airway int.  tachy/irreg (afib)  ab soft, nt. nfd    labs, imaging, ekg, supportive care

## 2022-03-24 NOTE — ED ADULT NURSE NOTE - IN THE PAST 12 MONTHS HAVE YOU USED DRUGS OTHER THAN THOSE REQUIRED FOR MEDICAL REASON?
No
You can access the FollowMyHealth Patient Portal offered by [facility] by registering at the following website: [email or web address]. By joining [facility]’s FollowMyHealth portal, you will also be able to view your health information using other applications (apps) compatible with our system.

## 2022-03-24 NOTE — ED ADULT NURSE NOTE - OBJECTIVE STATEMENT
Patient presents to ED sent from nursing home for difficulty breathing. As per EMS pt had panic attack while on toilet. Upon arrival to ED pt on non rebreather mask with SpO2 97%-100%, tachypnea noted. Patient a+ox3, reports he is "feeling better". Pt denies chest pain, n/v/d, fever. MD and RT bedside. Pt placed on BIPAP and CCM. Joel catheter in place on arrival.

## 2022-03-24 NOTE — PATIENT PROFILE ADULT - FALL HARM RISK - HARM RISK INTERVENTIONS
Assistance with ambulation/Assistance OOB with selected safe patient handling equipment/Communicate Risk of Fall with Harm to all staff/Discuss with provider need for PT consult/Monitor gait and stability/Reinforce activity limits and safety measures with patient and family/Tailored Fall Risk Interventions/Visual Cue: Yellow wristband and red socks/Bed in lowest position, wheels locked, appropriate side rails in place/Call bell, personal items and telephone in reach/Instruct patient to call for assistance before getting out of bed or chair/Non-slip footwear when patient is out of bed/[place] to call system/Physically safe environment - no spills, clutter or unnecessary equipment/Purposeful Proactive Rounding/Room/bathroom lighting operational, light cord in reach

## 2022-03-24 NOTE — H&P ADULT - NSHPLABSRESULTS_GEN_ALL_CORE
Labs:                         10.8   13.64 )-----------( 112      ( 24 Mar 2022 04:35 )             33.4     Neutophil% 92.8, Lymphocyte% 2.3, Monocyte% 2.9, Bands% 1.7 22 @ 04:35    24 Mar 2022 04:35    137    |  99     |  31     ----------------------------<  90     3.6     |  24     |  1.4      Ca    8.3        24 Mar 2022 04:35  Mg     1.5       24 Mar 2022 04:35    TPro  5.8    /  Alb  3.4    /  TBili  0.6    /  DBili  x      /  AST  13     /  ALT  18     /  AlkPhos  74     24 Mar 2022 04:35            Serum Pro-Brain Natriuretic Peptide: 2892 pg/mL (22 @ 04:35)    Troponin 0.03, CKMB --, CK -- 22 @ 04:35        Urinalysis Basic - ( 24 Mar 2022 07:56 )    Color: Yellow / Appearance: Slightly Turbid / S.022 / pH: x  Gluc: x / Ketone: Negative  / Bili: Negative / Urobili: 3 mg/dL   Blood: x / Protein: 30 mg/dL / Nitrite: Negative   Leuk Esterase: Large / RBC: 8 /HPF /  /HPF   Sq Epi: x / Non Sq Epi: 1 /HPF / Bacteria: Moderate              Procalcitonin, Serum: 0.04 ng/mL ()            Troponin T, Serum: 0.03 ng/mL ()      Radiology:         < from: Xray Chest 1 View- PORTABLE-Urgent (22 @ 06:18) >    Low lung volume.    Bilateral opacities, unchanged.    < end of copied text >

## 2022-03-24 NOTE — ED PROVIDER NOTE - OBJECTIVE STATEMENT
89-year-old man past medical history of COPD on 5 L home oxygen, hypertension, BPH, CHF (last EF 01/2022 40-45%) , and interstitial lung disease, GERD, recent Covid positive in January 2022 presents for shortness of breath. Per patient had acute onset of SOB while ambulating to the bathroom and syncopizedon the toilet. denies head trauma. In ED noted to be tachypneic, denies chest pain, palpitations, fever, cough, abdominal pain, nausea, vomiting, diarrhea.

## 2022-03-24 NOTE — CONSULT NOTE ADULT - ASSESSMENT
IMPRESSION:    Acute on chronic hypoxemic respiratory failure improved(CXR compared to recent significantly better)  Possible heart failure exacerbation.  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    PLAN:    CNS: No sedation.      HEENT: oral care    PULMONARY: HOB 30.  switch to NC keep sats>89%.  Continue current prednisone course, bactrim ppx.  repeat lactate.  CARDIOVASCULAR: Lasix 40 q12, Check dimer, LE duplex, repeat trops.    GI: GI prophylaxis.  Feeding po    RENAL: Fu lytes.  Correct as needed    Infectious: monitor off abx, Check fungitell, galactomannan, culture.    HEMATOLOGICAL:  AC with Eliquis.  FU CBC/monitor platelets count(drop by more than half), Check HIT panel.    ENDOCRINE:  Follow up FS.  Insulin protocol if needed.    MUSCULOSKELETAL:  OOB to chair.  PT OT     SDU for now    Attending attestation to follow IMPRESSION:    Acute on chronic hypoxemic respiratory failure improved(CXR compared to recent significantly better)  Possible heart failure exacerbation.  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    PLAN:    CNS: No sedation.      HEENT: oral care    PULMONARY: HOB 30.  switch to NC keep sats>89%.  Continue current prednisone course, bactrim ppx.  repeat lactate.  CARDIOVASCULAR: Lasix 40 q12, Check dimer, LE duplex, repeat trops.    GI: GI prophylaxis.  Feeding po    RENAL: Fu lytes.  Correct as needed    Infectious: monitor off abx, Check procal, fungitell, galactomannan, blood culture.     HEMATOLOGICAL:  AC with Eliquis.  FU CBC/monitor platelets count(drop by more than half), Check HIT panel.    ENDOCRINE:  Follow up FS.  Insulin protocol if needed.    MUSCULOSKELETAL:  OOB to chair.  PT OT     SDU for now    Attending attestation to follow IMPRESSION:    Acute on chronic hypoxemic respiratory failure improved   Possible heart failure exacerbation.  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    PLAN:    CNS: No sedation.      HEENT: oral care    PULMONARY: HOB 30.  switch to NC keep sats>89%.  Continue current prednisone course, bactrim ppx.  Repeat lactate.    CARDIOVASCULAR: Lasix 40 q12, FU CE     GI: GI prophylaxis.  Feeding po    RENAL: Fu lytes.  Correct as needed    Infectious: monitor off abx, Check procal.  Pan cultures      HEMATOLOGICAL:  AC with Eliquis.  FU CBC/monitor platelets count (drop by more than half), Check HIT panel.    ENDOCRINE:  Follow up FS.  Insulin protocol if needed.    MUSCULOSKELETAL:  OOB to chair.  PT OT     SDU for now

## 2022-03-24 NOTE — H&P ADULT - NSHPPHYSICALEXAM_GEN_ALL_CORE
PHYSICAL EXAM:  GENERAL: NAD, well-developed  HEAD:  Atraumatic, Normocephalic  EYES: EOMI, PERRLA, conjunctiva and sclera clear  NECK: Supple, No JVD  CHEST/LUNG: Clear to auscultation bilaterally; No wheeze  HEART: Regular rate and rhythm; No murmurs, rubs, or gallops  ABDOMEN: Soft, Nontender, Nondistended; Bowel sounds present  EXTREMITIES:  2+ Peripheral Pulses, No clubbing, cyanosis, or edema  PSYCH: AAOx3  NEUROLOGY: non-focal  SKIN: No rashes or lesions PHYSICAL EXAM:  GENERAL: NAD, well-developed  HEAD:  Atraumatic, Normocephalic  EYES: EOMI, PERRLA, conjunctiva and sclera clear  NECK: Supple, No JVD  CHEST/LUNG: Coarse crackles b/l  HEART: No murmurs, rubs, or gallops  ABDOMEN: Soft, Nontender, Nondistended; Bowel sounds present  EXTREMITIES:  No clubbing, cyanosis, or edema  PSYCH: AAOx3  NEUROLOGY: non-focal  SKIN: No rashes or lesions

## 2022-03-25 NOTE — OCCUPATIONAL THERAPY INITIAL EVALUATION ADULT - PERTINENT HX OF CURRENT PROBLEM, REHAB EVAL
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 Per patient had acute onset of SOB while ambulating to the bathroom and syncopized on the toilet. denies head trauma. He was recently d/c earlier this month for a similar admission for syncope secondary to hypoxemia from ILD exacerbation.

## 2022-03-25 NOTE — CONSULT NOTE ADULT - ASSESSMENT
Syncope  Acute resp distress  Afib  HFrEF  Severe pulm HTN    - Monitor on tele.  - Check orthostatic vital signs.  - Consider EP consult for outpatient rhythm monitoring.  - F/u neuro given signs of seizure/shaking during described syncopal event.  - SOB likely 2/2 acute ILD flare. C/w steroids. Management per primary team.  - No acute signs of HF exacerbation at present. Restart home dose lasix 40mg daily.  - Closely monitor I&Os, standing weight, Cr and replete electrolytes prn.   Syncope  Acute resp distress  Afib  HFrEF  Severe pulm HTN    - Monitor on tele.  - Check orthostatic vital signs.  - Consider EP consult for outpatient rhythm monitoring.  - F/u neuro given signs of seizure/shaking during described syncopal event.  - SOB likely 2/2 acute ILD flare. C/w steroids. Management per primary team.  - No acute signs of HF exacerbation at present. Restart home dose lasix 40mg daily.  - Closely monitor I&Os, standing weight, Cr and replete electrolytes prn

## 2022-03-25 NOTE — PHYSICAL THERAPY INITIAL EVALUATION ADULT - BED MOBILITY TRAINING, PT EVAL
Pt will participate in supine to sit and reverse using side rails with S/U by discharge to facilitate return to PLOF.

## 2022-03-25 NOTE — PROGRESS NOTE ADULT - SUBJECTIVE AND OBJECTIVE BOX
Patient is a 89y old  Male who presents with a chief complaint of       Over Night Events:  Back to baseline.  On 2 liters O2.  PONCE.           ROS:     All ROS are negative except HPI         PHYSICAL EXAM    ICU Vital Signs Last 24 Hrs  T(C): 36.8 (25 Mar 2022 04:00), Max: 36.8 (25 Mar 2022 04:00)  T(F): 98.2 (25 Mar 2022 04:00), Max: 98.2 (25 Mar 2022 04:00)  HR: 82 (25 Mar 2022 04:00) (73 - 96)  BP: 140/68 (25 Mar 2022 04:00) (119/73 - 140/68)  BP(mean): 93 (24 Mar 2022 18:30) (93 - 93)  ABP: --  ABP(mean): --  RR: 18 (25 Mar 2022 04:00) (18 - 20)  SpO2: 100% (25 Mar 2022 04:00) (98% - 100%)      CONSTITUTIONAL:  In NAD    ENT:   Airway patent,   Mouth with normal mucosa.   No thrush    EYES:   Pupils equal,   Round and reactive to light.    CARDIAC:   Normal rate,   Regular rhythm.    No edema      Vascular:  Normal systolic impulse  No Carotid bruits    RESPIRATORY:   No wheezing  Bilateral crackles   Normal chest expansion  Not tachypneic,  No use of accessory muscles    GASTROINTESTINAL:  Abdomen soft,   Non-tender,   No guarding,   + BS    MUSCULOSKELETAL:   Range of motion is not limited,  No clubbing, cyanosis    NEUROLOGICAL:   Alert and oriented   No motor  deficits.    SKIN:   Skin normal color for race,   Warm and dry  No evidence of rash.    PSYCHIATRIC:   Normal mood and affect.   No apparent risk to self or others.    HEMATOLOGICAL:  No cervical  lymphadenopathy.  no inguinal lymphadenopathy      22 @ 07:01  -  22 @ 07:00  --------------------------------------------------------  IN:  Total IN: 0 mL    OUT:    Voided (mL): 775 mL  Total OUT: 775 mL    Total NET: -775 mL          LABS:                            9.8    15.08 )-----------( 108      ( 25 Mar 2022 06:30 )             30.9                                                   135  |  99  |  31<H>  ----------------------------<  136<H>  3.9   |  27  |  1.0    Ca    8.4<L>      25 Mar 2022 06:30  Mg     1.9         TPro  5.4<L>  /  Alb  3.2<L>  /  TBili  0.8  /  DBili  x   /  AST  14  /  ALT  16  /  AlkPhos  69  03-25      PT/INR - ( 25 Mar 2022 06:30 )   PT: 19.80 sec;   INR: 1.73 ratio         PTT - ( 25 Mar 2022 06:30 )  PTT:28.4 sec                                       Urinalysis Basic - ( 24 Mar 2022 07:56 )    Color: Yellow / Appearance: Slightly Turbid / S.022 / pH: x  Gluc: x / Ketone: Negative  / Bili: Negative / Urobili: 3 mg/dL   Blood: x / Protein: 30 mg/dL / Nitrite: Negative   Leuk Esterase: Large / RBC: 8 /HPF /  /HPF   Sq Epi: x / Non Sq Epi: 1 /HPF / Bacteria: Moderate        CARDIAC MARKERS ( 24 Mar 2022 20:00 )  x     / 0.03 ng/mL / x     / x     / x      CARDIAC MARKERS ( 24 Mar 2022 16:00 )  x     / 0.05 ng/mL / x     / x     / x      CARDIAC MARKERS ( 24 Mar 2022 12:21 )  x     / 0.09 ng/mL / 46 U/L / x     / 4.9 ng/mL  CARDIAC MARKERS ( 24 Mar 2022 04:35 )  x     / 0.03 ng/mL / x     / x     / x                                                LIVER FUNCTIONS - ( 25 Mar 2022 06:30 )  Alb: 3.2 g/dL / Pro: 5.4 g/dL / ALK PHOS: 69 U/L / ALT: 16 U/L / AST: 14 U/L / GGT: x                                                                                                                                       MEDICATIONS  (STANDING):  albuterol/ipratropium for Nebulization 3 milliLiter(s) Nebulizer every 6 hours  apixaban 5 milliGRAM(s) Oral two times a day  chlorhexidine 4% Liquid 1 Application(s) Topical <User Schedule>  cyanocobalamin 1000 MICROGram(s) Oral daily  diltiazem    milliGRAM(s) Oral daily  ferrous    sulfate 325 milliGRAM(s) Oral daily  finasteride 5 milliGRAM(s) Oral daily  furosemide   Injectable 40 milliGRAM(s) IV Push daily  influenza  Vaccine (HIGH DOSE) 0.7 milliLiter(s) IntraMuscular once  melatonin 5 milliGRAM(s) Oral at bedtime  methylPREDNISolone sodium succinate Injectable 40 milliGRAM(s) IV Push every 8 hours  metoprolol tartrate 100 milliGRAM(s) Oral three times a day  pantoprazole    Tablet 40 milliGRAM(s) Oral before breakfast  tamsulosin 0.4 milliGRAM(s) Oral at bedtime  trimethoprim  160 mG/sulfamethoxazole 800 mG 1 Tablet(s) Oral daily    MEDICATIONS  (PRN):  aluminum hydroxide/magnesium hydroxide/simethicone Suspension 30 milliLiter(s) Oral every 4 hours PRN Dyspepsia      New X-rays reviewed:                                                                                  ECHO    CXR interpreted by me:

## 2022-03-25 NOTE — PROGRESS NOTE ADULT - ASSESSMENT
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      HEENT: oral care    PULMONARY: HOB >45.  Wean O2 as tolerated.  Prednisone 40 daily with slow taper over 6 weeks     CARDIOVASCULAR: Keep negative balance as tolerated.  Maximize cardiac therapy and volume status     GI: GI prophylaxis.  Feeding po    RENAL: Fu lytes.  Correct as needed    HEMATOLOGICAL:  AC with Eliquis.  FU CBC     ENDOCRINE:  Follow up FS.  Insulin protocol if needed.    MUSCULOSKELETAL:  OOB to chair.  PT OT     DC Planing    DGTF     OP Pulm follow up

## 2022-03-25 NOTE — PHYSICAL THERAPY INITIAL EVALUATION ADULT - PERTINENT HX OF CURRENT PROBLEM, REHAB EVAL
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 Per patient had acute onset of SOB while ambulating to the bathroom and syncopized on the toilet. denies head trauma. He was recently d/c earlier this month for a similar admission for syncope secondary to hypoxemia from ILD exacerbation

## 2022-03-25 NOTE — PROGRESS NOTE ADULT - SUBJECTIVE AND OBJECTIVE BOX
NNEKA JOSEPH  89y Male    CHIEF COMPLAINT:    Patient is a 89y old  Male who presents with a chief complaint of     INTERVAL HPI/OVERNIGHT EVENTS:    Patient seen and examined. No acute events overnight. Improving, on 3L NC    ROS: All other systems are negative.    Vital Signs:    T(F): 97.8 (03-25-22 @ 10:32), Max: 98.2 (03-25-22 @ 04:00)  HR: 75 (03-25-22 @ 10:32) (73 - 96)  BP: 141/79 (03-25-22 @ 10:32) (119/73 - 141/79)  RR: 18 (03-25-22 @ 10:32) (18 - 20)  SpO2: 97% (03-25-22 @ 10:32) (97% - 100%)    24 Mar 2022 07:01  -  25 Mar 2022 07:00  --------------------------------------------------------  IN: 0 mL / OUT: 775 mL / NET: -775 mL    PHYSICAL EXAM:    GENERAL:  NAD  SKIN: No rashes or lesions  HEENT: Atraumatic. Normocephalic.   NECK: Supple, No JVD  PULMONARY: Coarse breath sounds B/L. No wheezing. No rales  CVS: Normal S1, S2. Rate and Rhythm are regular. No murmurs.  ABDOMEN/GI: Soft, Nontender, Nondistended  MSK:  No edema B/L LE. No clubbing or cyanosis   NEUROLOGIC:  Moves all extremities   PSYCH: Alert & oriented x 3    Consultant(s) Notes Reviewed:  [x ] YES  [ ] NO  Care Discussed with Consultants/Other Providers [ x] YES  [ ] NO    LABS:                        9.8    15.08 )-----------( 108      ( 25 Mar 2022 06:30 )             30.9     03-25    135  |  99  |  31<H>  ----------------------------<  136<H>  3.9   |  27  |  1.0    Ca    8.4<L>      25 Mar 2022 06:30  Mg     1.9     03-25    TPro  5.4<L>  /  Alb  3.2<L>  /  TBili  0.8  /  DBili  x   /  AST  14  /  ALT  16  /  AlkPhos  69  03-25    PT/INR - ( 25 Mar 2022 06:30 )   PT: 19.80 sec;   INR: 1.73 ratio         PTT - ( 25 Mar 2022 06:30 )  PTT:28.4 sec  Serum Pro-Brain Natriuretic Peptide: 2892 pg/mL (03-24-22 @ 04:35)    Trop 0.03, CKMB --, CK --, 03-24-22 @ 20:00  Trop 0.05, CKMB --, CK --, 03-24-22 @ 16:00  Trop 0.09, CKMB 4.9, CK 46, 03-24-22 @ 12:21  Trop 0.03, CKMB --, CK --, 03-24-22 @ 04:35    RADIOLOGY & ADDITIONAL TESTS:  Imaging or report Personally Reviewed:  [x] YES  [ ] NO  EKG reviewed: [x] YES  [ ] NO    Medications:  Standing  albuterol/ipratropium for Nebulization 3 milliLiter(s) Nebulizer every 6 hours  apixaban 5 milliGRAM(s) Oral two times a day  chlorhexidine 4% Liquid 1 Application(s) Topical <User Schedule>  cyanocobalamin 1000 MICROGram(s) Oral daily  diltiazem    milliGRAM(s) Oral daily  ferrous    sulfate 325 milliGRAM(s) Oral daily  finasteride 5 milliGRAM(s) Oral daily  furosemide   Injectable 40 milliGRAM(s) IV Push daily  influenza  Vaccine (HIGH DOSE) 0.7 milliLiter(s) IntraMuscular once  melatonin 5 milliGRAM(s) Oral at bedtime  metoprolol tartrate 100 milliGRAM(s) Oral three times a day  pantoprazole    Tablet 40 milliGRAM(s) Oral before breakfast  tamsulosin 0.4 milliGRAM(s) Oral at bedtime  trimethoprim  160 mG/sulfamethoxazole 800 mG 1 Tablet(s) Oral daily    PRN Meds  aluminum hydroxide/magnesium hydroxide/simethicone Suspension 30 milliLiter(s) Oral every 4 hours PRN

## 2022-03-25 NOTE — CHART NOTE - NSCHARTNOTEFT_GEN_A_CORE
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 Per patient had acute onset of SOB while ambulating to the bathroom and syncopized on the toilet. denies head trauma. He was recently d/c earlier this month for a similar admission for syncope secondary to hypoxemia from ILD exacerbation. Patient started on solumedrol IV 60mg BID, which was then transitioned to prednisone 40mg QD (to be tapered over 6 weeks).    ED course: Tachycardic in triage, SpO2 97% on NRB. Labs revealed mild leukocytosis, hypoMg, trop 0.03 and BNP 2892. VBG unremarkable except LA of 4.6. EKG revealed Afib w/ RVR. CXR w/ chronic b/l opacities. s/p 500ml LR + Solu-Medrol in ED and placed on NIV.    Patient admitted to SDU. Oxygen weaned down to 2L. Patient described being very active during day, likely that dyspnea is related to chronic ILD and overexertion. Diuretics were held. Plan for Pulm follow up outpatient and further workup of neoplasm. Plan to monitor off antibiotics and f/u cultures. Continue with steroids. DC planning.     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    #Acute on chronic resp failure, likely 2/2 ILD exacerbation  #LLL Pulm mass, likely malignant  - is following w/pulm outpatient   - CXR w/ stable chronic b/l opacities  - CT chest prior to admission: 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, new mediastinal adenopathy, left pleural effusion, cardiomegaly with dilated ascending and descending aorta  - Off NIV. Wean O2 as tolerated.  Prednisone 40 daily with slow taper over 6 weeks  - Procal noted, lactate downtrending.  f/u Cx and monitor off abx  - Bactrim for PCP PPx  - continue OP Pulmonary Follow up for PET scan for 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 and initiation of new ILD medication    #Afib  #Elevated Trops 0.03, Denies CP  - CHADSVASC: 4  - RVR on admission now controlled  - continue Eliquis 5mg PO BID  - continue cardizem 120 mg CD QD and metoprolol tartrate 100mg TID  -Trop downtrending    #HFmrEF   - TTE 2/27: LVEF 46% w/ severe pHTN and moderate TR  - Serum Pro-BNP: 2892  - Not on ACEI 2/2 Hypotension  - Does not appear overloaded on exam  - Daily Weight  - Hold diuresis for now     #BPH  -Flomax + Finasteride    DVT prophylaxis: Eliquis 5mg PO  GI prophylaxis: Protonix 40mg PO  Diet: DASH/TLC  Activity: Ambulate as tolerated  Code status: Full  Disposition: DGTF DC planning       For Follow-Up:

## 2022-03-25 NOTE — CONSULT NOTE ADULT - SUBJECTIVE AND OBJECTIVE BOX
HISTORY OF PRESENT ILLNESS:   89M w/ PMHx as above presents to the ED for acute on chronic dyspnea and syncope. Per patient had acute onset of SOB while ambulating to the bathroom and syncopized on the toilet. denies head trauma. He was recently d/c earlier this month for a similar admission for syncope secondary to hypoxemia from ILD exacerbation. Patient started on solumedrol IV 60mg BID, which was then transitioned to prednisone 40mg QD (to be tapered over 6 weeks).    ED course: Tachycardic in triage, SpO2 97% on NRB. Labs revealed mild leukocytosis, hypoMg, trop 0.03 and BNP 2892. VBG unremarkable except LA of 4.6. EKG revealed Afib w/ RVR. CXR w/ chronic b/l opacities. s/p 500ml LR + Solu-Medrol in ED and placed on NIV.    PAST MEDICAL & SURGICAL HISTORY  Hypertension    GERD (gastroesophageal reflux disease)    Lung interstitial disease    BPH (benign prostatic hyperplasia)    HFrEF (heart failure with reduced ejection fraction)    COPD (chronic obstructive pulmonary disease)    No significant past surgical history        FAMILY HISTORY:  FAMILY HISTORY:  No pertinent family history in first degree relatives        SOCIAL HISTORY:  []smoker  []Alcohol  []Drug    ROS:  Negative except as mentioned in HPI    ALLERGIES:  No Known Allergies      MEDICATIONS:  MEDICATIONS  (STANDING):  albuterol/ipratropium for Nebulization 3 milliLiter(s) Nebulizer every 6 hours  apixaban 5 milliGRAM(s) Oral two times a day  chlorhexidine 4% Liquid 1 Application(s) Topical <User Schedule>  cyanocobalamin 1000 MICROGram(s) Oral daily  diltiazem    milliGRAM(s) Oral daily  ferrous    sulfate 325 milliGRAM(s) Oral daily  finasteride 5 milliGRAM(s) Oral daily  furosemide   Injectable 40 milliGRAM(s) IV Push daily  influenza  Vaccine (HIGH DOSE) 0.7 milliLiter(s) IntraMuscular once  melatonin 5 milliGRAM(s) Oral at bedtime  metoprolol tartrate 100 milliGRAM(s) Oral three times a day  pantoprazole    Tablet 40 milliGRAM(s) Oral before breakfast  tamsulosin 0.4 milliGRAM(s) Oral at bedtime  trimethoprim  160 mG/sulfamethoxazole 800 mG 1 Tablet(s) Oral daily    MEDICATIONS  (PRN):  aluminum hydroxide/magnesium hydroxide/simethicone Suspension 30 milliLiter(s) Oral every 4 hours PRN Dyspepsia      HOME MEDICATIONS:  Home Medications:  dilTIAZem 120 mg/24 hours oral capsule, extended release: 1 cap(s) orally once a day (24 Mar 2022 08:26)  Eliquis 5 mg oral tablet: 1 tab(s) orally 2 times a day (24 Mar 2022 08:26)  FERROUS SULFATE 325 MG TABLET: 1 each orally once a day (24 Mar 2022 08:26)  FINASTERIDE 5 MG TABLET: 1 each orally once a day (24 Mar 2022 08:26)  furosemide 40 mg oral tablet: 1 tab(s) orally once a day (24 Mar 2022 08:26)  Lopressor 100 mg oral tablet: 1 tab(s) orally 3 times a day (24 Mar 2022 08:26)  OMEPRAZOLE DR 40MG CAP: 1 cap(s) orally once a day (24 Mar 2022 08:26)  TAMSULOSIN HCL 0.4 MG CAPSULE: 1 each orally once a day (at bedtime) (24 Mar 2022 08:26)  VITAMIN B-12 1,000 MCG TABLET: 1 each orally once a day (24 Mar 2022 08:26)      VITALS:   T(F): 96.5 (03-25 @ 15:46), Max: 99.9 (03-24 @ 07:22)  HR: 94 (03-25 @ 15:46) (73 - 123)  BP: 137/84 (03-25 @ 15:46) (102/52 - 162/70)  BP(mean): 105 (03-25 @ 10:32) (87 - 105)  RR: 18 (03-25 @ 15:46) (16 - 26)  SpO2: 97% (03-25 @ 10:32) (96% - 100%)    I&O's Summary    24 Mar 2022 07:01  -  25 Mar 2022 07:00  --------------------------------------------------------  IN: 0 mL / OUT: 775 mL / NET: -775 mL    25 Mar 2022 07:01  -  25 Mar 2022 17:00  --------------------------------------------------------  IN: 0 mL / OUT: 850 mL / NET: -850 mL        PHYSICAL EXAM:  GEN: Not in acute distress  HEENT: NCAT, PERRL, EOMI  LUNGS: bilateral coarse lung sounds  CARDIOVASCULAR: irregular  ABD: Soft, non-tender, non-distended  EXT: No TONY  SKIN: Intact  NEURO: AAOx3    LABS:                        9.8    15.08 )-----------( 108      ( 25 Mar 2022 06:30 )             30.9     03-25    135  |  99  |  31<H>  ----------------------------<  136<H>  3.9   |  27  |  1.0    Ca    8.4<L>      25 Mar 2022 06:30  Mg     1.9     03-25    TPro  5.4<L>  /  Alb  3.2<L>  /  TBili  0.8  /  DBili  x   /  AST  14  /  ALT  16  /  AlkPhos  69  03-25    PT/INR - ( 25 Mar 2022 06:30 )   PT: 19.80 sec;   INR: 1.73 ratio         PTT - ( 25 Mar 2022 06:30 )  PTT:28.4 sec  Lactate, Blood: 1.5 mmol/L (03-25-22 @ 06:30)  Troponin T, Serum: 0.03 ng/mL *HH* (03-24-22 @ 20:00)    Troponin 0.03, CKMB --, CK --/ 03-24-22 @ 20:00  Troponin 0.05, CKMB --, CK --/ 03-24-22 @ 16:00  Troponin 0.09, CKMB 4.9, CK 46/ 03-24-22 @ 12:21  Troponin 0.03, CKMB --, CK --/ 03-24-22 @ 04:35  Troponin <0.01, CKMB --, CK --/ 02-25-22 @ 06:40  Troponin <0.01, CKMB --, CK --/ 02-24-22 @ 14:43    Serum Pro-Brain Natriuretic Peptide: 2892 pg/mL (03-24-22 @ 04:35)      Hemoglobin A1C   Thyroid

## 2022-03-25 NOTE — PHYSICAL THERAPY INITIAL EVALUATION ADULT - GENERAL OBSERVATIONS, REHAB EVAL
13:25-13:50. chart reviewed. Pt received semi-markham at B/S, alert, oriented X 2, able to follow one-step instructions and agreeable to PT evaluation. [place] need increase volume, + O2 2 L via NC, + foly, denies pain or discomfort, initial vitals 129/78, sitting 113/59, standing 95/64 denies dizziness or discomfort, NAD.

## 2022-03-25 NOTE — PROGRESS NOTE ADULT - ASSESSMENT
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 Per patient had acute onset of SOB while ambulating to the bathroom and syncopized on the toilet. denies head trauma. He was recently d/c earlier this month for a similar admission for syncope secondary to hypoxemia from ILD exacerbation. Patient started on solumedrol IV 60mg BID, which was then transitioned to prednisone 40mg QD (to be tapered over 6 weeks).    Acute on chronic resp failure, likely 2/2 ILD exacerbation  On Home o2  LLL Pulm mass  Doubt Bacterial PNA  - CXR w/ stable chronic b/l opacities  - CT chest prior to admission: 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, new mediastinal adenopathy, left pleural effusion, cardiomegaly with dilated ascending and descending aorta  - Procal elevated to 3, clinically does not appear septic/no evidence of PNA  - f/u cx, monitor off abx  - Solumedrol switched to Prednisone to be tapered slowly over the next 6w, c/w bactrim  - patient follows Dr Womack as OP  - PT eval     Afib, chronic with RVR, now resolved  Elevated Trops 0.03, Denies CP  - continue Eliquis 5mg PO BID  - continue cardizem 120 mg CD QD and metoprolol tartrate 100mg TID  -Trop downtrending    HFmrEF, not in exacerbation  - TTE 2/27: LVEF 46% w/ severe pHTN and moderate TR  - Serum Pro-BNP: 2892  - monitor i&O, resume home lasix    BPH  -Flomax + Finasteride    #Progress Note Handoff  Pending (specify):   monitor resp status, f/u cx, PT eval   Family discussion: Plan of care discussed with patient, aware and agreeable   Disposition:  from [facility]    Ainsley Baez MD  s. [phone]

## 2022-03-25 NOTE — PROGRESS NOTE ADULT - SUBJECTIVE AND OBJECTIVE BOX
NNEKA JOSEPH 89y Male  MRN#: [id number]   Hospital Day: 1d    SUBJECTIVE  Patient is a 89y old Male who presents with a chief complaint of Currently admitted to medicine with the primary diagnosis of Shortness of breath      INTERVAL HPI AND OVERNIGHT EVENTS:  Patient was examined and seen at bedside. This morning he is resting comfortably in bed. No issues or overnight events.    OBJECTIVE  PAST MEDICAL & SURGICAL HISTORY  Hypertension    GERD (gastroesophageal reflux disease)    Lung interstitial disease    BPH (benign prostatic hyperplasia)    HFrEF (heart failure with reduced ejection fraction)    COPD (chronic obstructive pulmonary disease)    No significant past surgical history      ALLERGIES:  No Known Allergies    MEDICATIONS:  STANDING MEDICATIONS  albuterol/ipratropium for Nebulization 3 milliLiter(s) Nebulizer every 6 hours  apixaban 5 milliGRAM(s) Oral two times a day  chlorhexidine 4% Liquid 1 Application(s) Topical <User Schedule>  cyanocobalamin 1000 MICROGram(s) Oral daily  diltiazem    milliGRAM(s) Oral daily  ferrous    sulfate 325 milliGRAM(s) Oral daily  finasteride 5 milliGRAM(s) Oral daily  furosemide   Injectable 40 milliGRAM(s) IV Push daily  influenza  Vaccine (HIGH DOSE) 0.7 milliLiter(s) IntraMuscular once  melatonin 5 milliGRAM(s) Oral at bedtime  metoprolol tartrate 100 milliGRAM(s) Oral three times a day  pantoprazole    Tablet 40 milliGRAM(s) Oral before breakfast  tamsulosin 0.4 milliGRAM(s) Oral at bedtime  trimethoprim  160 mG/sulfamethoxazole 800 mG 1 Tablet(s) Oral daily    PRN MEDICATIONS  aluminum hydroxide/magnesium hydroxide/simethicone Suspension 30 milliLiter(s) Oral every 4 hours PRN      VITAL SIGNS: Last 24 Hours  T(C): 36.8 (25 Mar 2022 04:00), Max: 36.8 (25 Mar 2022 04:00)  T(F): 98.2 (25 Mar 2022 04:00), Max: 98.2 (25 Mar 2022 04:00)  HR: 82 (25 Mar 2022 04:00) (73 - 96)  BP: 140/68 (25 Mar 2022 04:00) (119/73 - 140/68)  BP(mean): 93 (24 Mar 2022 18:30) (93 - 93)  RR: 18 (25 Mar 2022 04:00) (18 - 20)  SpO2: 100% (25 Mar 2022 04:00) (98% - 100%)    LABS:                        9.8    15.08 )-----------( 108      ( 25 Mar 2022 06:30 )             30.9         135  |  99  |  31<H>  ----------------------------<  136<H>  3.9   |  27  |  1.0    Ca    8.4<L>      25 Mar 2022 06:30  Mg     1.9         TPro  5.4<L>  /  Alb  3.2<L>  /  TBili  0.8  /  DBili  x   /  AST  14  /  ALT  16  /  AlkPhos  69      PT/INR - ( 25 Mar 2022 06:30 )   PT: 19.80 sec;   INR: 1.73 ratio         PTT - ( 25 Mar 2022 06:30 )  PTT:28.4 sec  Urinalysis Basic - ( 24 Mar 2022 07:56 )    Color: Yellow / Appearance: Slightly Turbid / S.022 / pH: x  Gluc: x / Ketone: Negative  / Bili: Negative / Urobili: 3 mg/dL   Blood: x / Protein: 30 mg/dL / Nitrite: Negative   Leuk Esterase: Large / RBC: 8 /HPF /  /HPF   Sq Epi: x / Non Sq Epi: 1 /HPF / Bacteria: Moderate        Lactate, Blood: 1.5 mmol/L (22 @ 06:30)  Troponin T, Serum: 0.03 ng/mL *HH* (22 @ 20:00)  Troponin T, Serum: 0.05 ng/mL *HH* (22 @ 16:00)  Creatine Kinase, Serum: 46 U/L (22 @ 12:21)  Troponin T, Serum: 0.09 ng/mL *HH* (22 @ 12:21)  Lactate, Blood: 2.4 mmol/L *H* (22 @ 12:21)      CARDIAC MARKERS ( 24 Mar 2022 20:00 )  x     / 0.03 ng/mL / x     / x     / x      CARDIAC MARKERS ( 24 Mar 2022 16:00 )  x     / 0.05 ng/mL / x     / x     / x      CARDIAC MARKERS ( 24 Mar 2022 12:21 )  x     / 0.09 ng/mL / 46 U/L / x     / 4.9 ng/mL  CARDIAC MARKERS ( 24 Mar 2022 04:35 )  x     / 0.03 ng/mL / x     / x     / x          RADIOLOGY:      PHYSICAL EXAM:  CONSTITUTIONAL: No acute distress, well-developed, well-groomed, AAOx3  HEAD: Atraumatic, normocephalic  EYES: EOM intact, PERRLA, conjunctiva and sclera clear  ENT: Supple, no masses, no thyromegaly, no bruits, no JVD; moist mucous membranes  PULMONARY: b/l crackles  CARDIOVASCULAR: Regular rate and rhythm; no murmurs, rubs, or gallops  GASTROINTESTINAL: Soft, non-tender, non-distended; bowel sounds present  MUSCULOSKELETAL: 2+ peripheral pulses; no clubbing, no cyanosis, no edema  NEUROLOGY: non-focal  SKIN: No rashes or lesions; warm and dry

## 2022-03-26 NOTE — PROGRESS NOTE ADULT - ASSESSMENT
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 Per patient had acute onset of SOB while ambulating to the bathroom and syncopized on the toilet. denies head trauma. He was recently d/c earlier this month for a similar admission for syncope secondary to hypoxemia from ILD exacerbation. Patient started on solumedrol IV 60mg BID, which was then transitioned to prednisone 40mg QD (to be tapered over 6 weeks).    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   Chronic Hypoxemic Respiratory Failure on Home O2 3L   Doubt Bacterial PNA   - f/u pro-cora   - Pt would benefit from Palliative / Hospice Care - Given recurrent admissions to hospital and endstage ILD / Functional Decline   - Needs Palliative for GOC again and Family Meeting   - CXR w/ stable chronic b/l opacities  - CT chest prior to admission: 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, new mediastinal adenopathy, left pleural effusion, cardiomegaly with dilated ascending and descending aorta  - Procal elevated to 3, clinically does not appear septic/no evidence of PNA  - Monitor off abx  - Solumedrol switched to Prednisone to be tapered slowly over the next 6w, c/w bactrim  - Patient follows Dr Womack as OP  - PT eval    Syncope likely 2/2 Hypoxia   - r/o Orthostatic Hypotension      Afib, chronic with RVR, now resolved  Elevated Trops 0.03, Denies CP  - continue Eliquis 5mg PO BID  - continue cardizem 120 mg CD QD and metoprolol tartrate 100mg TID  - Trop downtrending    HFmrEF, not in exacerbation  - TTE 2/27: LVEF 46% w/ severe pHTN and moderate TR  - Serum Pro-BNP: 2892  - monitor i&O, resume home lasix    BPH  -Flomax + Finasteride    #Progress Note Handoff  Pending (specify):   monitor resp status, f/u cx, PT eval   Family discussion: Plan of care discussed with patient, aware and agreeable   Disposition:  from [facility]    Wilner RUBY [id number]

## 2022-03-26 NOTE — PROGRESS NOTE ADULT - SUBJECTIVE AND OBJECTIVE BOX
NNEKA JOSEPH  89y Male    CHIEF COMPLAINT:    Patient is a 89y old  Male who presents with a chief complaint of SOB/Syncope    OVERNIGHT EVENTS:  Complaints of SOB chronically on 3L NC  Productive cough - whitish / No fevers     ROS: All other systems are negative.    Vital Signs:  T(C): 36.6 (26 Mar 2022 07:17), Max: 36.6 (26 Mar 2022 07:17)  T(F): 97.9 (26 Mar 2022 07:17), Max: 97.9 (26 Mar 2022 07:17)  HR: 89 (26 Mar 2022 07:17) (67 - 94)  BP: 144/70 (26 Mar 2022 07:17) (137/84 - 151/74)  RR: 18 (26 Mar 2022 07:17) (18 - 18)  SpO2: 95% (26 Mar 2022 04:59) (93% - 95%)    PHYSICAL EXAM:    GENERAL:  NAD  SKIN: No rashes or lesions  HEENT: Atraumatic. Normocephalic.   NECK: Supple, No JVD  PULMONARY: Coarse breath sounds B/L. No wheezing. No rales  CVS: Normal S1, S2. Rate and Rhythm are regular. No murmurs.  ABDOMEN/GI: Soft, Nontender, Nondistended  MSK:  No edema B/L LE. No clubbing or cyanosis   NEUROLOGIC:  Moves all extremities   PSYCH: Alert & oriented x 3    Consultant(s) Notes Reviewed:  [x ] YES    Care Discussed with Consultants/Other Providers [ x] YES      LABS:                        10.4   17.95 )-----------( 118      ( 26 Mar 2022 08:09 )             32.2     03-26    135  |  99  |  30<H>  ----------------------------<  103<H>  4.1   |  27  |  1.0    Ca    9.0      26 Mar 2022 08:09    Mg     1.9     03-25    TPro  5.7<L>  /  Alb  3.3<L>  /  TBili  0.9  /  DBili  x   /  AST  16  /  ALT  20  /  AlkPhos  80  03-26    PTT - ( 25 Mar 2022 06:30 )  PTT:28.4 sec  Serum Pro-Brain Natriuretic Peptide: 2892 pg/mL (03-24-22 @ 04:35)    Trop 0.03, CKMB --, CK --, 03-24-22 @ 20:00  Trop 0.05, CKMB --, CK --, 03-24-22 @ 16:00  Trop 0.09, CKMB 4.9, CK 46, 03-24-22 @ 12:21  Trop 0.03, CKMB --, CK --, 03-24-22 @ 04:35    RADIOLOGY & ADDITIONAL TESTS:  Imaging or report Personally Reviewed:  [x] YES   EKG reviewed: [x] YES      Medications:  Standing  MEDICATIONS  (STANDING):  albuterol/ipratropium for Nebulization 3 milliLiter(s) Nebulizer every 6 hours  apixaban 5 milliGRAM(s) Oral two times a day  chlorhexidine 4% Liquid 1 Application(s) Topical <User Schedule>  cyanocobalamin 1000 MICROGram(s) Oral daily  diltiazem    milliGRAM(s) Oral daily  ferrous    sulfate 325 milliGRAM(s) Oral daily  finasteride 5 milliGRAM(s) Oral daily  furosemide   Injectable 40 milliGRAM(s) IV Push daily  influenza  Vaccine (HIGH DOSE) 0.7 milliLiter(s) IntraMuscular once  melatonin 5 milliGRAM(s) Oral at bedtime  metoprolol tartrate 100 milliGRAM(s) Oral three times a day  pantoprazole    Tablet 40 milliGRAM(s) Oral before breakfast  predniSONE   Tablet 40 milliGRAM(s) Oral daily  tamsulosin 0.4 milliGRAM(s) Oral at bedtime  trimethoprim  160 mG/sulfamethoxazole 800 mG 1 Tablet(s) Oral daily    MEDICATIONS  (PRN):  aluminum hydroxide/magnesium hydroxide/simethicone Suspension 30 milliLiter(s) Oral every 4 hours PRN Dyspepsia    PRN Meds  aluminum hydroxide/magnesium hydroxide/simethicone Suspension 30 milliLiter(s) Oral every 4 hours PRN

## 2022-03-26 NOTE — PROGRESS NOTE ADULT - SUBJECTIVE AND OBJECTIVE BOX
SUBJ: Patient seen and examined. No events overnight. reports breathing better       MEDICATIONS  (STANDING):  albuterol/ipratropium for Nebulization 3 milliLiter(s) Nebulizer every 6 hours  apixaban 5 milliGRAM(s) Oral two times a day  chlorhexidine 4% Liquid 1 Application(s) Topical <User Schedule>  cyanocobalamin 1000 MICROGram(s) Oral daily  diltiazem    milliGRAM(s) Oral daily  ferrous    sulfate 325 milliGRAM(s) Oral daily  finasteride 5 milliGRAM(s) Oral daily  influenza  Vaccine (HIGH DOSE) 0.7 milliLiter(s) IntraMuscular once  melatonin 5 milliGRAM(s) Oral at bedtime  metoprolol tartrate 100 milliGRAM(s) Oral three times a day  pantoprazole    Tablet 40 milliGRAM(s) Oral before breakfast  predniSONE   Tablet 40 milliGRAM(s) Oral daily  tamsulosin 0.4 milliGRAM(s) Oral at bedtime  trimethoprim  160 mG/sulfamethoxazole 800 mG 1 Tablet(s) Oral daily    MEDICATIONS  (PRN):  aluminum hydroxide/magnesium hydroxide/simethicone Suspension 30 milliLiter(s) Oral every 4 hours PRN Dyspepsia            Vital Signs Last 24 Hrs  T(C): 36.6 (26 Mar 2022 07:17), Max: 36.6 (26 Mar 2022 07:17)  T(F): 97.9 (26 Mar 2022 07:17), Max: 97.9 (26 Mar 2022 07:17)  HR: 89 (26 Mar 2022 07:17) (67 - 94)  BP: 144/70 (26 Mar 2022 07:17) (137/84 - 151/74)  BP(mean): --  RR: 18 (26 Mar 2022 07:17) (18 - 18)  SpO2: 95% (26 Mar 2022 04:59) (93% - 95%)     REVIEW OF SYSTEMS:  CONSTITUTIONAL: No fever  NECK: No pain or stiffness  CARDIOVASCULAR: patient denies chest pain, shortness of breath improved .  Repiratory: No cough or wheezing.  NEUROLOGICAL: No focal deficits to report.  GI: no BRBPR, no N,V,diarrhea.    PSYCHIATRY: normal mood and affect  HEENT: no nasal discharge, no ecchymosis        PHYSICAL EXAM:  GENERAL: NAD  HEAD:  Atraumatic, Normocephalic  NECK: Supple, No JVD  NERVOUS SYSTEM:  Alert & Oriented X3, Good concentration  CHEST/LUNG: bibasilar fine crackles   HEART: Irregular rate and rhythm, HSM  ABDOMEN: Soft, Nontender, Nondistended;   EXTREMITIES: +1 pitting edema      	  TELEMETRY:      LABS:                        10.4   17.95 )-----------( 118      ( 26 Mar 2022 08:09 )             32.2     03-26    135  |  99  |  30<H>  ----------------------------<  103<H>  4.1   |  27  |  1.0    Ca    9.0      26 Mar 2022 08:09  Mg     1.9     03-25    TPro  5.7<L>  /  Alb  3.3<L>  /  TBili  0.9  /  DBili  x   /  AST  16  /  ALT  20  /  AlkPhos  80  03-26    CARDIAC MARKERS ( 24 Mar 2022 20:00 )  x     / 0.03 ng/mL / x     / x     / x      CARDIAC MARKERS ( 24 Mar 2022 16:00 )  x     / 0.05 ng/mL / x     / x     / x          PT/INR - ( 25 Mar 2022 06:30 )   PT: 19.80 sec;   INR: 1.73 ratio         PTT - ( 25 Mar 2022 06:30 )  PTT:28.4 sec    I&O's Summary    25 Mar 2022 07:01  -  26 Mar 2022 07:00  --------------------------------------------------------  IN: 0 mL / OUT: 1600 mL / NET: -1600 mL    26 Mar 2022 07:01  -  26 Mar 2022 15:11  --------------------------------------------------------  IN: 720 mL / OUT: 300 mL / NET: 420 mL      BNP  RADIOLOGY & ADDITIONAL STUDIES:    IMPRESSION AND PLAN:    Syncope and collapse during physical therapy session at [facility]  ILD with hypoxia  PHTN  HFrEF  Afib  Protein calorie malnutrition     - Management as per primary team  - Resume oral furosemide 40 mg daily   - I&O   - Will follow as needed

## 2022-03-27 NOTE — PROGRESS NOTE ADULT - ASSESSMENT
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 Per patient had acute onset of SOB while ambulating to the bathroom and syncopized on the toilet. denies head trauma. He was recently d/c earlier this month for a similar admission for syncope secondary to hypoxemia from ILD exacerbation. Patient started on solumedrol IV 60mg BID, which was then transitioned to prednisone 40mg QD (to be tapered over 6 weeks).    Recurrent admission to hospital for SOB/ILD   Acute on chronic resp failure 2/2 ILD  LLL Mass and Mediastinal LAD   Chronic Hypoxemic Respiratory Failure on Home O2 3L   - as per pulmonary team pt had repet outpt CT chest which showed resolution of mass  - outpt pulm follow up  - further syncopal work up offerred to patient but he declined-agreeable to meet with palliative care to discuss transitioning to more comfort care and less hospitalizations    - f/u pro-cora   - Pt would benefit from Palliative / Hospice Care - Given recurrent admissions to hospital and endstage ILD / Functional Decline   - Needs Palliative for GOC again and Family Meeting   - CXR w/ stable chronic b/l opacities  - CT chest prior to admission: 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, new mediastinal adenopathy, left pleural effusion, cardiomegaly with dilated ascending and descending aorta  - Procal elevated to 3, clinically does not appear septic/no evidence of PNA  - Monitor off abx  - Solumedrol switched to Prednisone to be tapered slowly over the next 6w, c/w bactrim  - Patient follows Dr Womack as OP  - PT eval    Syncope likely 2/2 Hypoxia   - r/o Orthostatic Hypotension      Afib, chronic with RVR, now resolved  Elevated Trops 0.03, Denies CP  - continue Eliquis 5mg PO BID  - continue cardizem 120 mg CD QD and metoprolol tartrate 100mg TID  - Trop downtrending    HFmrEF, not in exacerbation  - TTE 2/27: LVEF 46% w/ severe pHTN and moderate TR  - Serum Pro-BNP: 2892  - monitor i&O, resume home lasix    BPH  -Flomax + Finasteride    #Progress Note Handoff  Pending (specify):   monitor resp status, f/u cx, PT eval   Family discussion: Plan of care discussed with patient, aware and agreeable   Disposition:  from [facility]    Wilner RUBY [id number]  88 yo M PMHx ILD on 5L O2 at baseline at home, HTN, BPH, GERD, chronic A fib (on Eliquis), 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 Per patient had acute onset of SOB while ambulating to the bathroom and syncopized on the toilet. denies head trauma. He was recently d/c earlier this month for a similar admission for syncope secondary to hypoxemia from ILD exacerbation. Patient started on solumedrol IV 60mg BID, which was then transitioned to prednisone 40mg QD (to be tapered over 6 weeks).    Recurrent admission to hospital for SOB/ILD   Acute on chronic resp failure 2/2 ILD  LLL Mass and Mediastinal LAD   Chronic Hypoxemic Respiratory Failure on Home O2 3L   - as per pulmonary team pt had repet outpt CT chest which showed resolution of mass  - outpt pulm follow up  - further syncopal work up offerred to patient but he declined-agreeable to meet with palliative care to discuss transitioning to more comfort care and less hospitalizations  - c/w slow prednisone taper and bactrim    Syncope   - likely 2/2 Hypoxia   - shaking likely due to orthostatics, had prior neuro work up  - pt declined MCOT/ILR, doesn't want further work up       Afib, chronic with RVR, now resolved  - Elevated Trops 0.03, Denies CP likely from demand  - continue Eliquis 5mg PO BID  - continue cardizem 120 mg CD QD and metoprolol tartrate 100mg TID  - Trop downtrending    HFmrEF, not in exacerbation  - TTE 2/27: LVEF 46% w/ severe pHTN and moderate TR  - Serum Pro-BNP: 2892  - c/w home lasix    BPH  -Flomax + Finasteride    dc telemetry    #Progress Note Handoff  Pending (specify):   palliative care eval  Family discussion: Plan of care discussed with patient, aware and agreeable   Disposition:  from Health system

## 2022-03-27 NOTE — PROGRESS NOTE ADULT - SUBJECTIVE AND OBJECTIVE BOX
CHIEF COMPLAINT:    Patient is a 89y old  Male who presents with a chief complaint of sob    INTERVAL HPI/OVERNIGHT EVENTS:    Patient seen and examined at bedside. No acute overnight events occurred.    ROS: Denies SOB. All other systems are negative.    Vital Signs:    T(F): 96.4 (22 @ 04:53), Max: 96.4 (22 @ 04:53)  HR: 91 (22 @ 04:53) (79 - 94)  BP: 157/86 (22 @ 04:53) (135/82 - 157/86)  RR: 18 (22 @ 04:53) (18 - 18)  SpO2: 92% (22 @ 07:49) (92% - 95%)  I&O's Summary    26 Mar 2022 07:01  -  27 Mar 2022 07:00  --------------------------------------------------------  IN: 900 mL / OUT: 1330 mL / NET: -430 mL    27 Mar 2022 07:01  -  27 Mar 2022 10:23  --------------------------------------------------------  IN: 210 mL / OUT: 0 mL / NET: 210 mL      Daily Height in cm: 162.56 (26 Mar 2022 18:47)    Daily Weight in k.6 (27 Mar 2022 04:53)  CAPILLARY BLOOD GLUCOSE          PHYSICAL EXAM:  GENERAL:  NAD  SKIN: No rashes or lesions  HEENT: Atraumatic. Normocephalic. Anicteric  NECK:  No JVD.   PULMONARY: decreased breath sounds  CVS: Normal S1, S2. Regular rate and rhythm. No murmurs.  ABDOMEN/GI: Soft, Nontender, Nondistended; Bowel sounds are present  EXTREMITIES:  No edema B/L LE.  NEUROLOGIC:  No motor deficit.  PSYCH: Alert & oriented x 3, normal affect    Consultant(s) Notes Reviewed:  [x ] YES  [ ] NO      LABS:                        10.5   12.53 )-----------( 107      ( 27 Mar 2022 05:20 )             33.3         137  |  101  |  26<H>  ----------------------------<  78  4.6   |  26  |  1.0    Ca    8.8      27 Mar 2022 05:20    TPro  5.5<L>  /  Alb  3.1<L>  /  TBili  1.0  /  DBili  x   /  AST  20  /  ALT  30  /  AlkPhos  75        Serum Pro-Brain Natriuretic Peptide: 2892 pg/mL (22 @ 04:35)    Trop 0.03, CKMB --, CK --, 22 @ 20:00  Trop 0.05, CKMB --, CK --, 22 @ 16:00  Trop 0.09, CKMB 4.9, CK 46, 22 @ 12:21      Culture - Blood (collected 25 Mar 2022 06:30)  Source: .Blood Blood-Peripheral  Preliminary Report (26 Mar 2022 14:01):    No growth to date.    Culture - Blood (collected 24 Mar 2022 12:21)  Source: .Blood Blood-Peripheral  Preliminary Report (25 Mar 2022 22:01):    No growth to date.        RADIOLOGY & ADDITIONAL TESTS:  Imaging or report Personally Reviewed:  [ ] YES  [ ] NO    Telemetry reviewed independently - no acute events    Medications:  Standing  albuterol/ipratropium for Nebulization 3 milliLiter(s) Nebulizer every 6 hours  apixaban 5 milliGRAM(s) Oral two times a day  chlorhexidine 4% Liquid 1 Application(s) Topical <User Schedule>  cyanocobalamin 1000 MICROGram(s) Oral daily  diltiazem    milliGRAM(s) Oral daily  ferrous    sulfate 325 milliGRAM(s) Oral daily  finasteride 5 milliGRAM(s) Oral daily  furosemide    Tablet 40 milliGRAM(s) Oral daily  influenza  Vaccine (HIGH DOSE) 0.7 milliLiter(s) IntraMuscular once  melatonin 5 milliGRAM(s) Oral at bedtime  metoprolol tartrate 100 milliGRAM(s) Oral three times a day  pantoprazole    Tablet 40 milliGRAM(s) Oral before breakfast  predniSONE   Tablet 40 milliGRAM(s) Oral daily  tamsulosin 0.4 milliGRAM(s) Oral at bedtime  trimethoprim  160 mG/sulfamethoxazole 800 mG 1 Tablet(s) Oral daily    PRN Meds  aluminum hydroxide/magnesium hydroxide/simethicone Suspension 30 milliLiter(s) Oral every 4 hours PRN      Case discussed with resident  Care discussed with pt

## 2022-03-28 NOTE — PROGRESS NOTE ADULT - SUBJECTIVE AND OBJECTIVE BOX
[facility] day: 4    HPI:   89 yo M PMHx ILD on 5L O2 at home, HTN, BPH, GERD,  A fib (on Eliquis), HFrEF (EF 46%), recent Covid (in January 2022), LLL mass presents to the ED for acute on chronic dyspnea and syncope.    Subjective:   Overnight events: none   Complaints: none   Telemetry:     Objective:   Vital Signs Last 24 Hrs  T(C): 35.8 (28 Mar 2022 05:50), Max: 36.2 (27 Mar 2022 12:34)  T(F): 96.4 (28 Mar 2022 05:50), Max: 97.2 (27 Mar 2022 12:34)  HR: 90 (28 Mar 2022 05:50) (82 - 94)  BP: 140/86 (28 Mar 2022 05:50) (113/61 - 140/86)  BP(mean): --  RR: 18 (28 Mar 2022 05:50) (18 - 18)  SpO2: 95% (27 Mar 2022 12:01) (95% - 95%)    GENERAL:  NAD  NECK:  No JVD.   PULMONARY: decreased bibasilar breath sounds   CVS: Normal S1, S2. Regular rate and rhythm. No murmurs.  ABDOMEN/GI: Soft, Nontender, Nondistended; Bowel sounds are present  EXTREMITIES:  No edema B/L LE.  PSYCH: Alert & oriented x 3, normal affect    MEDICATIONS  (STANDING):  albuterol/ipratropium for Nebulization 3 milliLiter(s) Nebulizer every 6 hours  apixaban 5 milliGRAM(s) Oral two times a day  chlorhexidine 4% Liquid 1 Application(s) Topical <User Schedule>  cyanocobalamin 1000 MICROGram(s) Oral daily  diltiazem    milliGRAM(s) Oral daily  ferrous    sulfate 325 milliGRAM(s) Oral daily  finasteride 5 milliGRAM(s) Oral daily  furosemide    Tablet 40 milliGRAM(s) Oral daily  influenza  Vaccine (HIGH DOSE) 0.7 milliLiter(s) IntraMuscular once  melatonin 5 milliGRAM(s) Oral at bedtime  metoprolol tartrate 100 milliGRAM(s) Oral three times a day  pantoprazole    Tablet 40 milliGRAM(s) Oral before breakfast  predniSONE   Tablet 40 milliGRAM(s) Oral daily  tamsulosin 0.4 milliGRAM(s) Oral at bedtime  trimethoprim  160 mG/sulfamethoxazole 800 mG 1 Tablet(s) Oral daily    MEDICATIONS  (PRN):  aluminum hydroxide/magnesium hydroxide/simethicone Suspension 30 milliLiter(s) Oral every 4 hours PRN Dyspepsia  sodium chloride 0.65% Nasal 1 Spray(s) Both Nostrils four times a day PRN Nasal Congestion    Labs:                         9.9    9.04  )-----------( 107      ( 28 Mar 2022 06:00 )             31.2   03-28    136  |  100  |  27<H>  ----------------------------<  68<L>  4.4   |  27  |  1.0    Ca    8.2<L>      28 Mar 2022 06:00    TPro  5.1<L>  /  Alb  2.9<L>  /  TBili  1.0  /  DBili  x   /  AST  13  /  ALT  24  /  AlkPhos  63  03-28    Assessment and plan:   89 yo M PMHx ILD on 5L O2 at home, HTN, BPH, GERD,  A fib (on Eliquis), HFrEF (EF 46%), recent Covid (in January 2022), LLL mass presents to the ED for acute on chronic dyspnea and syncope.    # Acute on chronic respiratory failure   #ILD  #LLL Mass and Mediastinal LAD   - Patient has chronic respiratory failure due to ILD with basilene 5 L NC of oxygen requirements.  - Pulm following the patient (Dr Womack) >>> repeat CT chest  done outpatient showed resolution of mass ??? CT not done in this hospital and no access to report  -Further workup offered by Dr Alvarado but patient decline  - Consult of palliative care to advanced lung disease. Patient is agreeable  - Slow prednisone taper (over 6 weeks) as per pulm and bactrim ppx >> On prednisone 40mg PO OD    #Syncope   - Secondary to hypoxia. Recurrent admissions to the hospital due to hypoxemia at home as ILD is advanced   - pt declined MCOT/ILR, doesn't want further work up     #Afib  #Afib with RVR on admission  - No chest pain. Trops positive on admission 0.03, stable on repeats   - On: Eliquis 5mg PO BID, cardizem 120 mg ER and metoprolol tartrate 100mg TID  - Trop downtrending    #HFrEF  - clinically euvolemic  - TTE 2/27: EF: 46%, severe pHTN  - Serum Pro-BNP: 2892 on admission. Baseline ~2000  - on home Lasix dose of 40mg PO OD    #BPH  -Flomax + Finasteride    Disposition: Palliative care consult. Coming from nursing home  [facility] day: 4    HPI:   89 yo M PMHx ILD on 5L O2 at home, HTN, BPH, GERD,  A fib (on Eliquis), HFrEF (EF 46%), recent Covid (in January 2022), LLL mass presents to the ED for acute on chronic dyspnea and syncope.    Subjective:   Overnight events: none   Complaints: none   Telemetry:     Objective:   Vital Signs Last 24 Hrs  T(C): 35.8 (28 Mar 2022 05:50), Max: 36.2 (27 Mar 2022 12:34)  T(F): 96.4 (28 Mar 2022 05:50), Max: 97.2 (27 Mar 2022 12:34)  HR: 90 (28 Mar 2022 05:50) (82 - 94)  BP: 140/86 (28 Mar 2022 05:50) (113/61 - 140/86)  BP(mean): --  RR: 18 (28 Mar 2022 05:50) (18 - 18)  SpO2: 95% (27 Mar 2022 12:01) (95% - 95%)    GENERAL:  NAD  NECK:  No JVD.   PULMONARY: decreased bibasilar breath sounds   CVS: Normal S1, S2. Regular rate and rhythm. No murmurs.  ABDOMEN/GI: Soft, Nontender, Nondistended; Bowel sounds are present  EXTREMITIES:  No edema B/L LE.  PSYCH: Alert & oriented x 3, normal affect    MEDICATIONS  (STANDING):  albuterol/ipratropium for Nebulization 3 milliLiter(s) Nebulizer every 6 hours  apixaban 5 milliGRAM(s) Oral two times a day  chlorhexidine 4% Liquid 1 Application(s) Topical <User Schedule>  cyanocobalamin 1000 MICROGram(s) Oral daily  diltiazem    milliGRAM(s) Oral daily  ferrous    sulfate 325 milliGRAM(s) Oral daily  finasteride 5 milliGRAM(s) Oral daily  furosemide    Tablet 40 milliGRAM(s) Oral daily  influenza  Vaccine (HIGH DOSE) 0.7 milliLiter(s) IntraMuscular once  melatonin 5 milliGRAM(s) Oral at bedtime  metoprolol tartrate 100 milliGRAM(s) Oral three times a day  pantoprazole    Tablet 40 milliGRAM(s) Oral before breakfast  predniSONE   Tablet 40 milliGRAM(s) Oral daily  tamsulosin 0.4 milliGRAM(s) Oral at bedtime  trimethoprim  160 mG/sulfamethoxazole 800 mG 1 Tablet(s) Oral daily    MEDICATIONS  (PRN):  aluminum hydroxide/magnesium hydroxide/simethicone Suspension 30 milliLiter(s) Oral every 4 hours PRN Dyspepsia  sodium chloride 0.65% Nasal 1 Spray(s) Both Nostrils four times a day PRN Nasal Congestion    Labs:                         9.9    9.04  )-----------( 107      ( 28 Mar 2022 06:00 )             31.2   03-28    136  |  100  |  27<H>  ----------------------------<  68<L>  4.4   |  27  |  1.0    Ca    8.2<L>      28 Mar 2022 06:00    TPro  5.1<L>  /  Alb  2.9<L>  /  TBili  1.0  /  DBili  x   /  AST  13  /  ALT  24  /  AlkPhos  63  03-28    Assessment and plan:   89 yo M PMHx ILD on 5L O2 at home, HTN, BPH, GERD,  A fib (on Eliquis), HFrEF (EF 46%), recent Covid (in January 2022), LLL mass presents to the ED for acute on chronic dyspnea and syncope.    # Acute on chronic respiratory failure   #ILD  #LLL Mass and Mediastinal LAD   - Patient has chronic respiratory failure due to ILD with basilene 5 L NC of oxygen requirements.  - Pulm following the patient (Dr Womack) >>> repeat CT chest  done outpatient showed resolution of mass ??? CT not done in this hospital and no access to report  - Slow prednisone taper (over 6 weeks) as per pulm and bactrim ppx >> On prednisone 40mg PO OD  -Further workup offered by Dr Alvarado but patient decline  - Consult of palliative care to advanced lung disease. Patient is agreeable >> Pending consult recs    #Syncope   - Secondary to hypoxia. Recurrent admissions to the hospital due to hypoxemia at home as ILD is advanced   - pt declined [facility]OT/ILR, doesn't want further work up     #Afib  #Afib with RVR on admission  - No chest pain. Trops positive on admission 0.03, stable on repeats   - On: Eliquis 5mg PO BID, cardizem 120 mg ER and metoprolol tartrate 100mg TID  - Trop downtrending    #HFrEF  - clinically euvolemic  - TTE 2/27: EF: 46%, severe pHTN  - Serum Pro-BNP: 2892 on admission. Baseline ~2000  - on home Lasix dose of 40mg PO OD    #BPH  -Flomax + Finasteride    Disposition: Palliative care consult. Coming from nursing home

## 2022-03-28 NOTE — CONSULT NOTE ADULT - PROBLEM SELECTOR RECOMMENDATION 3
son shared that patient has PTSD from  service  will discuss with patient if wants to trial a pharmacological sleep aid  palliative  to provide supportive counseling

## 2022-03-28 NOTE — CONSULT NOTE ADULT - CONSULT REASON
Advanced interstitial lung disease, recurrent syncope, pt thinks he wants to pursue comfort care
Dyspnea
SOB

## 2022-03-28 NOTE — CONSULT NOTE ADULT - CONVERSATION DETAILS
two conversations: one with patient at bedside and one with son via phone    patient described having a difficult time understanding the quick progressive nature of his disease and frustration of PONCE with day-day activity; he did express understanding that it is not curable; he did not want to delve into ACP and gave us permission to contact son    Spoke with son, Elkin. Introduced palliative care and our focus on symptom management. He verbalized that Dr. Sandra had prepared him for getting patient ready for hospice, but that he has not been able to tell patient. He is open to using medications to address symptoms and a family meeting. He wants patient to be on board with plan,. 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

## 2022-03-28 NOTE — CONSULT NOTE ADULT - SUBJECTIVE AND OBJECTIVE BOX
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 Per patient had acute onset of SOB while ambulating to the bathroom and syncopized on the toilet. denies head trauma. He was recently d/c earlier this month for a similar admission for syncope secondary to hypoxemia from ILD exacerbation. Patient started on solumedrol IV 60mg BID, which was then transitioned to prednisone 40mg QD (to be tapered over 6 weeks).    ED course: Tachycardic in triage, SpO2 97% on NRB. Labs revealed mild leukocytosis, hypoMg, trop 0.03 and BNP 2892. VBG unremarkable except LA of 4.6. EKG revealed Afib w/ RVR. CXR w/ chronic b/l opacities. s/p 500ml LR + Solu-Medrol in ED and placed on NIV. (24 Mar 2022 08:23)      PAST MEDICAL & SURGICAL HISTORY:  Hypertension    GERD (gastroesophageal reflux disease)    Lung interstitial disease    BPH (benign prostatic hyperplasia)    HFrEF (heart failure with reduced ejection fraction)    COPD (chronic obstructive pulmonary disease)    No significant past surgical history        FAMILY HISTORY:  No pertinent family history in first degree relatives     Reviewed and found non contributory in mother or father    SOCIAL HISTORY:   Tobacco/etoh/illicit drug use use reported. Yes [ ]  _________  No [ ]  Pt resides at: home [ ]  facility [ ]  other [ ] _______        ROS:	    Unable to attain due to:                      Dyspnea (John 0-10): 0                       N/V (Y/N): No                             Secretions (Y/N) : No                                          Agitation(Y/N): No                              Pain (Y/N): No                                 -Provocation/Palliation: N/A  -Quality/Quantity: N/A  -Radiating: N/A  -Severity: No pain  -Timing/Frequency: N/A  -Impact on ADLs: N/A    General:  Denied  HEENT:    Denied  Neck:  Denied  CVS:  Denied  Resp:  Denied  GI:  Denied    :  Denied  Musc:  Denied  Neuro:  Denied  Psych:  Denied  Skin:  Denied  Lymph:  Denied    Last BM:      Allergies    No Known Allergies    Intolerances      Opiate Naive (Y/N): y  -iStop reviewed (Y/N): y  Ref#:   This report was requested by: Tesha Mancini | Reference #: [id number]               Labs:	    CBC:                        9.9    9.04  )-----------( 107      ( 28 Mar 2022 06:00 )             31.2     CMP:        136  |  100  |  27<H>  ----------------------------<  68<L>  4.4   |  27  |  1.0    Ca    8.2<L>      28 Mar 2022 06:00    TPro  5.1<L>  /  Alb  2.9<L>  /  TBili  1.0  /  DBili  x   /  AST  13  /  ALT  24  /  AlkPhos  63                    Radiology:	   < from: [facility] Duplex Lower Ext Vein Scan, Bilat (22 @ 16:06) >    IMPRESSION:  No evidence of deep venous thrombosis in either lower extremity.    --- End of Report ---            TAVARES TRAN MD; Attending Vascular Surgeon  This document has been electronically signed. Mar 25 2022 1:32PM    < end of copied text >    < from: Xray Chest 1 View- PORTABLE-Urgent (22 @ 06:18) >  ACC: [id number] EXAM:  XR CHEST PORTABLE URGENT 1V                          PROCEDURE DATE:  2022          INTERPRETATION:  Clinical History / Reason for exam: Pain    Comparison : Chest radiograph 2022.    Technique/Positioning: Low lung volume.    Findings:    Support devices: None.    Cardiac/mediastinum/hilum: Magnified, unchanged.    Lung parenchyma/Pleura: Bilateral opacities, unchanged. No pneumothorax   is seen.    Skeleton/soft tissues: Stable.    Impression:    Low lung volume.    Bilateral opacities, unchanged.    --- End of Report ---            ALYSE CALZADA MD; Attending Radiologist  This document has been electronically signed. Mar 24 2022  7:22AM    < end of copied text >        EK Lead ECG:   Ventricular Rate 80 BPM    Atrial Rate 98 BPM    QRS Duration 92 ms    Q-T Interval 392 ms    QTC Calculation(Bazett) 452 ms    R Axis -6 degrees    T Axis -20 degrees    Diagnosis Line Atrial fibrillation  Abnormal ECG    Confirmed by NNEKA CUADRA MD ([id number]) on 3/25/2022 8:39:02 AM (22 @ 07:40)      Imaging Personally Reviewed:  [ ] YES  [ ] NO    Consultant(s) Notes Reviewed:  [ ] YES  [ ] NO  Care Discussed with Consultants/Other Providers [ ] YES  [ ] NO    PEx:	  T(C): 35.8 (22 @ 05:50), Max: 36.2 (22 @ 12:34)  HR: 90 (22 @ 05:50) (82 - 94)  BP: 140/86 (22 @ 05:50) (113/61 - 140/86)  RR: 18 (22 @ 10:00) (18 - 18)  SpO2: 96% (22 @ 10:00) (95% - 96%)  Wt(kg): --  Daily     Daily Weight in k.4 (28 Mar 2022 05:50)    General:  found in bed in NAD  Eyes:  PERRL EOMI Non icteric MOM  ENMT: no external oral ulcers, MMM, no thrush   CVS: RR S1S2 No M/G/R  Resp: Unlabored Non tachypneic No increased WOB  GI:  Soft NT ND BS+  :  Voiding / Joel / PrimaFit  Musc: No C/C/E    Neuro: Follows commands No focal deficits  Psych: Calm Pleasant, AAOx3    Skin: Non jaundiced , no rash   Lymph: no adenopathy     Preadmit Karnofsky:  %           Current Karnofsky:     %  http://www.npcrc.org/files/news/karnofsky_performance_scale.pdf   http://www.npcrc.org/files/news/palliative_performance_scale_PPSv2.pdf  Cachexia (Y/N):   BMI: BMI (kg/m2): 28.1 (22 @ 18:47)      Medications:	      MEDICATIONS  (STANDING):  albuterol/ipratropium for Nebulization 3 milliLiter(s) Nebulizer every 6 hours  apixaban 5 milliGRAM(s) Oral two times a day  chlorhexidine 4% Liquid 1 Application(s) Topical <User Schedule>  cyanocobalamin 1000 MICROGram(s) Oral daily  diltiazem    milliGRAM(s) Oral daily  ferrous    sulfate 325 milliGRAM(s) Oral daily  finasteride 5 milliGRAM(s) Oral daily  furosemide    Tablet 40 milliGRAM(s) Oral daily  influenza  Vaccine (HIGH DOSE) 0.7 milliLiter(s) IntraMuscular once  melatonin 5 milliGRAM(s) Oral at bedtime  metoprolol tartrate 100 milliGRAM(s) Oral three times a day  pantoprazole    Tablet 40 milliGRAM(s) Oral before breakfast  predniSONE   Tablet 40 milliGRAM(s) Oral daily  tamsulosin 0.4 milliGRAM(s) Oral at bedtime  trimethoprim  160 mG/sulfamethoxazole 800 mG 1 Tablet(s) Oral daily    MEDICATIONS  (PRN):  aluminum hydroxide/magnesium hydroxide/simethicone Suspension 30 milliLiter(s) Oral every 4 hours PRN Dyspepsia  sodium chloride 0.65% Nasal 1 Spray(s) Both Nostrils four times a day PRN Nasal Congestion    Advanced Directives:	     Full Code    Decision maker: The patient is able to participate in complex medical decision making conversations.   Legal surrogate:    GOALS OF CARE DISCUSSION	       Palliative care info/counseling provided	           Family meeting scheduled         Documentation of GOC/Advanced Care Planning:       See previous Palliative Medicine Note    PSYCHOSOCIAL-SPIRITUAL ASSESSMENT:       Reviewed       See Palliative Care SW/ documentation        	    REFERRALS       Palliative Med        Unit SW/Case Mgmt              Hospice       Speech/Swallow       Nutrition       PT/OT NNEKA JOSEPH          MRN-[id number]              HPI:  PC: SOB + Syncope    PMHx: ILD on 5L O2 at baseline at home, HTN, BPH, GERD, A fib (on Eliquis), 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 Per patient had acute onset of SOB while ambulating to the bathroom and syncopized on the toilet. denies head trauma. He was recently d/c earlier this month for a similar admission for syncope secondary to hypoxemia from ILD exacerbation. Patient started on solumedrol IV 60mg BID, which was then transitioned to prednisone 40mg QD (to be tapered over 6 weeks).    ED course: Tachycardic in triage, SpO2 97% on NRB. Labs revealed mild leukocytosis, hypoMg, trop 0.03 and BNP 2892. VBG unremarkable except LA of 4.6. EKG revealed Afib w/ RVR. CXR w/ chronic b/l opacities. s/p 500ml LR + Solu-Medrol in ED and placed on NIV. (24 Mar 2022 08:23)      PAST MEDICAL & SURGICAL HISTORY:  Hypertension    GERD (gastroesophageal reflux disease)    Lung interstitial disease    BPH (benign prostatic hyperplasia)    HFrEF (heart failure with reduced ejection fraction)    COPD (chronic obstructive pulmonary disease)    No significant past surgical history        FAMILY HISTORY:  No pertinent family history in first degree relatives     Reviewed and found non contributory in mother or father    SOCIAL HISTORY:   Tobacco/etoh/illicit drug use use reported. Yes [ ]  _________  No [ ?]  Pt resides at: home [ ]  facility [ short term at [facility] was home prior]  other [ ] _______        ROS:	                      Dyspnea (John 0-10): not during the encounter; but described PONCE (eg with going to [place])                     N/V (Y/N): No                             Secretions (Y/N) : No                                          Agitation(Y/N): No                              Pain (Y/N): No                                 -Provocation/Palliation: N/A  -Quality/Quantity: N/A  -Radiating: N/A  -Severity: No pain  -Timing/Frequency: N/A  -Impact on ADLs: N/A    General:  trouble sleeping days on end at times  HEENT:    [place] - improved with amplifiers   Neck:  Denied  CVS:  Denied  Resp:  Denied  GI:  Denied    :  Denied  Musc:  Denied  Neuro:  Denied  Psych:  Denied  Skin:  Denied  Lymph:  Denied    Last BM: ?     Allergies    No Known Allergies    Intolerances    Opiate Naive (Y/N): y  -iStop reviewed (Y/N): y  Ref#:   This report was requested by: Tesha Mancini | Reference #: [id number]               Labs:	    CBC:                        9.9    9.04  )-----------( 107      ( 28 Mar 2022 06:00 )             31.2     CMP:        136  |  100  |  27<H>  ----------------------------<  68<L>  4.4   |  27  |  1.0    Ca    8.2<L>      28 Mar 2022 06:00    TPro  5.1<L>  /  Alb  2.9<L>  /  TBili  1.0  /  DBili  x   /  AST  13  /  ALT  24  /  AlkPhos  63                    Radiology:	   < from: [facility] Duplex Lower Ext Vein Scan, Bilat (22 @ 16:06) >    IMPRESSION:  No evidence of deep venous thrombosis in either lower extremity.    --- End of Report ---            TAVARES TRAN MD; Attending Vascular Surgeon  This document has been electronically signed. Mar 25 2022 1:32PM    < end of copied text >    < from: Xray Chest 1 View- PORTABLE-Urgent (22 @ 06:18) >  ACC: [id number] EXAM:  XR CHEST PORTABLE URGENT 1V                          PROCEDURE DATE:  2022          INTERPRETATION:  Clinical History / Reason for exam: Pain    Comparison : Chest radiograph 2022.    Technique/Positioning: Low lung volume.    Findings:    Support devices: None.    Cardiac/mediastinum/hilum: Magnified, unchanged.    Lung parenchyma/Pleura: Bilateral opacities, unchanged. No pneumothorax   is seen.    Skeleton/soft tissues: Stable.    Impression:    Low lung volume.    Bilateral opacities, unchanged.    --- End of Report ---            ALYSE CALZADA MD; Attending Radiologist  This document has been electronically signed. Mar 24 2022  7:22AM    < end of copied text >        EK Lead ECG:   Ventricular Rate 80 BPM    Atrial Rate 98 BPM    QRS Duration 92 ms    Q-T Interval 392 ms    QTC Calculation(Bazett) 452 ms    R Axis -6 degrees    T Axis -20 degrees    Diagnosis Line Atrial fibrillation  Abnormal ECG    Confirmed by NNEKA CUADRA MD ([id number]) on 3/25/2022 8:39:02 AM (22 @ 07:40)      Imaging Personally Reviewed:  [ ] YES  [ ] NO    Consultant(s) Notes Reviewed:  [ ] YES  [ ] NO  Care Discussed with Consultants/Other Providers [ ] YES  [ ] NO    PEx:	  T(C): 35.8 (22 @ 05:50), Max: 36.2 (22 @ 12:34)  HR: 90 (22 @ 05:50) (82 - 94)  BP: 140/86 (22 @ 05:50) (113/61 - 140/86)  RR: 18 (22 @ 10:00) (18 - 18)  SpO2: 96% (22 @ 10:00) (95% - 96%)  Wt(kg): --  Daily     Daily Weight in k.4 (28 Mar 2022 05:50)    General:  found in bed in NAD  Eyes:  PERRL EOMI Non icteric MOM  ENMT: no external oral ulcers, MMM, no thrush   CVS: RR S1S2 No M/G/R  Resp: Unlabored Non tachypneic No increased WOB  GI:  Soft NT ND BS+  :  Voiding / Joel / PrimaFit  Musc: No C/C/E    Neuro: Follows commands No focal deficits  Psych: Calm Pleasant, AAOx3    Skin: Non jaundiced , no rash   Lymph: no adenopathy     Preadmit Karnofsky:  %  6months prior to admission 90%         Current Karnofsky:   40  %  http://www.npcrc.org/files/news/karnofsky_performance_scale.pdf   http://www.npcrc.org/files/news/palliative_performance_scale_PPSv2.pdf  Cachexia (Y/N):   BMI: BMI (kg/m2): 28.1 (22 @ 18:47)      Medications:	      MEDICATIONS  (STANDING):  albuterol/ipratropium for Nebulization 3 milliLiter(s) Nebulizer every 6 hours  apixaban 5 milliGRAM(s) Oral two times a day  chlorhexidine 4% Liquid 1 Application(s) Topical <User Schedule>  cyanocobalamin 1000 MICROGram(s) Oral daily  diltiazem    milliGRAM(s) Oral daily  ferrous    sulfate 325 milliGRAM(s) Oral daily  finasteride 5 milliGRAM(s) Oral daily  furosemide    Tablet 40 milliGRAM(s) Oral daily  influenza  Vaccine (HIGH DOSE) 0.7 milliLiter(s) IntraMuscular once  melatonin 5 milliGRAM(s) Oral at bedtime  metoprolol tartrate 100 milliGRAM(s) Oral three times a day  pantoprazole    Tablet 40 milliGRAM(s) Oral before breakfast  predniSONE   Tablet 40 milliGRAM(s) Oral daily  tamsulosin 0.4 milliGRAM(s) Oral at bedtime  trimethoprim  160 mG/sulfamethoxazole 800 mG 1 Tablet(s) Oral daily    MEDICATIONS  (PRN):  aluminum hydroxide/magnesium hydroxide/simethicone Suspension 30 milliLiter(s) Oral every 4 hours PRN Dyspepsia  sodium chloride 0.65% Nasal 1 Spray(s) Both Nostrils four times a day PRN Nasal Congestion    Advanced Directives:	     Full Code    Decision maker: The patient is able to participate in complex medical decision making conversations. 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 Per patient had acute onset of SOB while ambulating to the bathroom and syncopized on the toilet. denies head trauma. He was recently d/c earlier this month for a similar admission for syncope secondary to hypoxemia from ILD exacerbation. Patient started on solumedrol IV 60mg BID, which was then transitioned to prednisone 40mg QD (to be tapered over 6 weeks).    ED course: Tachycardic in triage, SpO2 97% on NRB. Labs revealed mild leukocytosis, hypoMg, trop 0.03 and BNP 2892. VBG unremarkable except LA of 4.6. EKG revealed Afib w/ RVR. CXR w/ chronic b/l opacities. s/p 500ml LR + Solu-Medrol in ED and placed on NIV. (24 Mar 2022 08:23)      PAST MEDICAL & SURGICAL HISTORY:  Hypertension    GERD (gastroesophageal reflux disease)    Lung interstitial disease    BPH (benign prostatic hyperplasia)    HFrEF (heart failure with reduced ejection fraction)    COPD (chronic obstructive pulmonary disease)    No significant past surgical history        FAMILY HISTORY:  No pertinent family history of premature CAD in first degree relatives    SOCIAL HISTORY:   Tobacco/etoh/illicit drug use use reported. Yes [ ]  _________  No [ ?]  Pt resides at: home [ ]  facility [ short term at [facility] was home prior]  other [ ] _______        ROS:	                      Dyspnea (John 0-10): not during the encounter; but described PONCE (eg with going to [place])                     N/V (Y/N): No                             Secretions (Y/N) : No                                          Agitation(Y/N): No                              Pain (Y/N): No                                 -Provocation/Palliation: N/A  -Quality/Quantity: N/A  -Radiating: N/A  -Severity: No pain  -Timing/Frequency: N/A  -Impact on ADLs: N/A    General:  trouble sleeping days on end at times  HEENT:    [place] - improved with amplifiers   Neck:  Denied  CVS:  Denied  Resp:  Denied  GI:  Denied    :  Denied  Musc:  Denied  Neuro:  Denied  Psych:  Denied  Skin:  Denied  Lymph:  Denied    Last BM: ?     Allergies    No Known Allergies    Intolerances    Opiate Naive (Y/N): y  -iStop reviewed (Y/N): y  Ref#:   This report was requested by: eTsha Mancini | Reference #: [id number]               Labs:	    CBC:                        9.9    9.04  )-----------( 107      ( 28 Mar 2022 06:00 )             31.2     CMP:        136  |  100  |  27<H>  ----------------------------<  68<L>  4.4   |  27  |  1.0    Ca    8.2<L>      28 Mar 2022 06:00    TPro  5.1<L>  /  Alb  2.9<L>  /  TBili  1.0  /  DBili  x   /  AST  13  /  ALT  24  /  AlkPhos  63                    Radiology:	   < from: [facility] Duplex Lower Ext Vein Scan, Bilat (22 @ 16:06) >    IMPRESSION:  No evidence of deep venous thrombosis in either lower extremity.    --- End of Report ---            TAVARES TRAN MD; Attending Vascular Surgeon  This document has been electronically signed. Mar 25 2022 1:32PM    < end of copied text >    < from: Xray Chest 1 View- PORTABLE-Urgent (22 @ 06:18) >  ACC: [id number] EXAM:  XR CHEST PORTABLE URGENT 1V                          PROCEDURE DATE:  2022          INTERPRETATION:  Clinical History / Reason for exam: Pain    Comparison : Chest radiograph 2022.    Technique/Positioning: Low lung volume.    Findings:    Support devices: None.    Cardiac/mediastinum/hilum: Magnified, unchanged.    Lung parenchyma/Pleura: Bilateral opacities, unchanged. No pneumothorax   is seen.    Skeleton/soft tissues: Stable.    Impression:    Low lung volume.    Bilateral opacities, unchanged.    --- End of Report ---            ALYSE CALZADA MD; Attending Radiologist  This document has been electronically signed. Mar 24 2022  7:22AM    < end of copied text >        EK Lead ECG:   Ventricular Rate 80 BPM    Atrial Rate 98 BPM    QRS Duration 92 ms    Q-T Interval 392 ms    QTC Calculation(Bazett) 452 ms    R Axis -6 degrees    T Axis -20 degrees    Diagnosis Line Atrial fibrillation  Abnormal ECG    Confirmed by NNEKA CUADRA MD ([id number]) on 3/25/2022 8:39:02 AM (22 @ 07:40)      Imaging Personally Reviewed:  [ ] YES  [ ] NO    Consultant(s) Notes Reviewed:  [ ] YES  [ ] NO  Care Discussed with Consultants/Other Providers [ ] YES  [ ] NO    PEx:	  T(C): 35.8 (22 @ 05:50), Max: 36.2 (22 @ 12:34)  HR: 90 (22 @ 05:50) (82 - 94)  BP: 140/86 (22 @ 05:50) (113/61 - 140/86)  RR: 18 (22 @ 10:00) (18 - 18)  SpO2: 96% (22 @ 10:00) (95% - 96%)  Wt(kg): --  Daily     Daily Weight in k.4 (28 Mar 2022 05:50)    General:  found in bed in NAD  Eyes:  PERRL EOMI Non icteric MOM  ENMT: no external oral ulcers, MMM, no thrush   CVS: RR S1S2 No M/G/R  Resp: Unlabored Non tachypneic No increased WOB  GI:  Soft NT ND BS+  :  Voiding / Joel / PrimaFit  Musc: No C/C/E    Neuro: Follows commands No focal deficits  Psych: Calm Pleasant, AAOx3    Skin: Non jaundiced , no rash   Lymph: no adenopathy     Preadmit Karnofsky:  %  6months prior to admission 90%         Current Karnofsky:   40  %  http://www.npcrc.org/files/news/karnofsky_performance_scale.pdf   http://www.npcrc.org/files/news/palliative_performance_scale_PPSv2.pdf  Cachexia (Y/N):   BMI: BMI (kg/m2): 28.1 (22 @ 18:47)      Medications:	      MEDICATIONS  (STANDING):  albuterol/ipratropium for Nebulization 3 milliLiter(s) Nebulizer every 6 hours  apixaban 5 milliGRAM(s) Oral two times a day  chlorhexidine 4% Liquid 1 Application(s) Topical <User Schedule>  cyanocobalamin 1000 MICROGram(s) Oral daily  diltiazem    milliGRAM(s) Oral daily  ferrous    sulfate 325 milliGRAM(s) Oral daily  finasteride 5 milliGRAM(s) Oral daily  furosemide    Tablet 40 milliGRAM(s) Oral daily  influenza  Vaccine (HIGH DOSE) 0.7 milliLiter(s) IntraMuscular once  melatonin 5 milliGRAM(s) Oral at bedtime  metoprolol tartrate 100 milliGRAM(s) Oral three times a day  pantoprazole    Tablet 40 milliGRAM(s) Oral before breakfast  predniSONE   Tablet 40 milliGRAM(s) Oral daily  tamsulosin 0.4 milliGRAM(s) Oral at bedtime  trimethoprim  160 mG/sulfamethoxazole 800 mG 1 Tablet(s) Oral daily    MEDICATIONS  (PRN):  aluminum hydroxide/magnesium hydroxide/simethicone Suspension 30 milliLiter(s) Oral every 4 hours PRN Dyspepsia  sodium chloride 0.65% Nasal 1 Spray(s) Both Nostrils four times a day PRN Nasal Congestion    Advanced Directives:	     Full Code    Decision maker: The patient is able to participate in complex medical decision making conversations. Wanted us to call his son, Elkin   Legal surrogate:    GOALS OF CARE DISCUSSION	       Palliative care info/counseling provided	               Documentation of GOC/Advanced Care Planning:          PSYCHOSOCIAL-SPIRITUAL ASSESSMENT:            See Palliative Care SW/ documentation        	    REFERRALS       Palliative Med        Unit SW/Case Mgmt

## 2022-03-28 NOTE — PROGRESS NOTE ADULT - ASSESSMENT
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 Per patient had acute onset of SOB while ambulating to the bathroom and syncopized on the toilet. denies head trauma. He was recently d/c earlier this month for a similar admission for syncope secondary to hypoxemia from ILD exacerbation. Patient started on solumedrol IV 60mg BID, which was then transitioned to prednisone 40mg QD (to be tapered over 6 weeks).    Recurrent admission to hospital for SOB/ILD   Acute on chronic resp failure 2/2 ILD  LLL Mass and Mediastinal LAD   Chronic Hypoxemic Respiratory Failure on Home O2 3L   - as per pulmonary team pt had repet outpt CT chest which showed resolution of mass  - outpt pulm follow up  - further syncopal work up offerred to patient but he declined-agreeable to meet with palliative care to discuss transitioning to more comfort care and less hospitalizations  - c/w slow prednisone taper and bactrim    Hypoglycemia  - seen on AM BMP  - pt subsequently ate breakfast, was asymptomatic  - check glucose now    Syncope   - likely 2/2 Hypoxia   - shaking likely due to orthostatics, had prior neuro work up  - pt declined MCOT/ILR, doesn't want further work up       Afib, chronic with RVR, now resolved  - Elevated Trops 0.03, Denies CP likely from demand  - continue Eliquis 5mg PO BID  - continue cardizem 120 mg CD QD and metoprolol tartrate 100mg TID  - Trop downtrending    HFmrEF, not in exacerbation  - TTE 2/27: LVEF 46% w/ severe pHTN and moderate TR  - Serum Pro-BNP: 2892  - c/w home lasix    BPH  -Flomax + Finasteride    dc telemetry    #Progress Note Handoff  Pending (specify):   palliative care zulay, pt still in agreement  Family discussion: Plan of care discussed with patient, aware and agreeable   Disposition:  from [facility]

## 2022-03-28 NOTE — CONSULT NOTE ADULT - PROBLEM SELECTOR RECOMMENDATION 6
See above for details.  In sum, continue current plan for now. Will approach patient re: setting up family meeting to discuss ACP and plan to better manage symptoms will follow  full code - need to address with patient and family

## 2022-03-28 NOTE — PROGRESS NOTE ADULT - SUBJECTIVE AND OBJECTIVE BOX
CHIEF COMPLAINT:    Patient is a 89y old  Male who presents with a chief complaint of sob     INTERVAL HPI/OVERNIGHT EVENTS:    Patient seen and examined at bedside. No acute overnight events occurred.    ROS: Denies SOB, chest pain. All other systems are negative.    Vital Signs:    T(F): 96.4 (03-28-22 @ 05:50), Max: 96.7 (03-27-22 @ 20:33)  HR: 90 (03-28-22 @ 05:50) (90 - 94)  BP: 140/86 (03-28-22 @ 05:50) (127/73 - 140/86)  RR: 18 (03-28-22 @ 10:00) (18 - 18)  SpO2: 96% (03-28-22 @ 10:00) (96% - 96%)  I&O's Summary    27 Mar 2022 07:01  -  28 Mar 2022 07:00  --------------------------------------------------------  IN: 920 mL / OUT: 3650 mL / NET: -2730 mL    28 Mar 2022 07:01  -  28 Mar 2022 12:37  --------------------------------------------------------  IN: 356 mL / OUT: 1000 mL / NET: -644 mL      PHYSICAL EXAM:  GENERAL:  NAD  SKIN: No rashes or lesions  HEENT: Atraumatic. Normocephalic. Anicteric  NECK:  No JVD.   PULMONARY: Clear to ausculation bilaterally. No wheezing. No rales  CVS: Normal S1, S2. Regular rate and rhythm. No murmurs.  ABDOMEN/GI: Soft, Nontender, Nondistended; Bowel sounds are present  EXTREMITIES:  No edema B/L LE.  NEUROLOGIC:  No motor deficit.  PSYCH: Alert & oriented x 3, normal affect      LABS:                        9.9    9.04  )-----------( 107      ( 28 Mar 2022 06:00 )             31.2     03-28    136  |  100  |  27<H>  ----------------------------<  68<L>  4.4   |  27  |  1.0    Ca    8.2<L>      28 Mar 2022 06:00    TPro  5.1<L>  /  Alb  2.9<L>  /  TBili  1.0  /  DBili  x   /  AST  13  /  ALT  24  /  AlkPhos  63  03-28      Serum Pro-Brain Natriuretic Peptide: 2892 pg/mL (03-24-22 @ 04:35)      RADIOLOGY & ADDITIONAL TESTS:  Imaging or report Personally Reviewed:  [ ] YES  [ ] NO    Telemetry reviewed independently - no acute events    Medications:  Standing  albuterol/ipratropium for Nebulization 3 milliLiter(s) Nebulizer every 6 hours  apixaban 5 milliGRAM(s) Oral two times a day  chlorhexidine 4% Liquid 1 Application(s) Topical <User Schedule>  cyanocobalamin 1000 MICROGram(s) Oral daily  diltiazem    milliGRAM(s) Oral daily  ferrous    sulfate 325 milliGRAM(s) Oral daily  finasteride 5 milliGRAM(s) Oral daily  furosemide    Tablet 40 milliGRAM(s) Oral daily  influenza  Vaccine (HIGH DOSE) 0.7 milliLiter(s) IntraMuscular once  melatonin 5 milliGRAM(s) Oral at bedtime  metoprolol tartrate 100 milliGRAM(s) Oral three times a day  pantoprazole    Tablet 40 milliGRAM(s) Oral before breakfast  predniSONE   Tablet 40 milliGRAM(s) Oral daily  tamsulosin 0.4 milliGRAM(s) Oral at bedtime  trimethoprim  160 mG/sulfamethoxazole 800 mG 1 Tablet(s) Oral daily    PRN Meds  aluminum hydroxide/magnesium hydroxide/simethicone Suspension 30 milliLiter(s) Oral every 4 hours PRN  sodium chloride 0.65% Nasal 1 Spray(s) Both Nostrils four times a day PRN      Case discussed with resident  Care discussed with pt

## 2022-03-28 NOTE — CONSULT NOTE ADULT - PROBLEM SELECTOR RECOMMENDATION 2
PONCE  educated son about use of low dose opioids - agreeable if patient agreeable  will address with patient tomorrow as he will likely benefit from concentrated liquid morphine 5mg every 6H PRN for dyspnea

## 2022-03-28 NOTE — CONSULT NOTE ADULT - PROBLEM SELECTOR RECOMMENDATION 5
will follow  full code - need to address with patient and family See above for details.  In sum, continue current plan for now. Will approach patient re: setting up family meeting to discuss ACP and plan to better manage symptoms

## 2022-03-28 NOTE — CONSULT NOTE ADULT - NS ATTEND AMEND GEN_ALL_CORE FT
Agree with above, patient comfortable, but did endorse trouble sleeping and worsening dyspnea, which he appears to realize is due to worsening disease. States his greatest hope would be to walk again, as he was intensely independent throughout his life. When asked about further GOC, it was difficult to continue conversation, but he agreed to allow conversation with son Elkin, as noted above. Would benefit from family meeting this week    Chaitanya Montoya  Palliative Care  [phone]

## 2022-03-28 NOTE — CHART NOTE - NSCHARTNOTEFT_GEN_A_CORE
PALLIATIVE MEDICINE INTERDISCIPLINARY TEAM NOTE    Provider:                                         [  X ] Initial visit        Met with: [ X  ] Patient  [   ] Family  [   ] Other:    Primary Language: [ X  ] English [   ] Other*:                      *Interpretation provided by:    SUPPORT DIAGNOSES            (Check all that apply)    [   ] EOL issues  [   ] Cultural / spiritual concerns  [   ] Pain / suffering  [   ] Dementia / AMS  [   ] Other:  [   ] AD issues  [   ] Grief / loss / sadness  [   ] Discharge issues  [ X ] Distress / coping    PSYCHOSOCIAL ASSESSMENT OF PATIENT         (Check all that apply)    [  X ] Initial Assessment            [   ] Reassessment          [   ] Not Applicable this visit    Pain/suffering acuity:  [  X ] None to mild (0-3)           [   ] Moderate (4-6)        [   ] High (7-10)    Mental Status:  [ X  ] Alert/oriented (x3)          [   ] Confused/Altered(x2/x1)         [   ] Non-resp    Functional status:  [   ] Independent w ADLs      [ X ] Needs Assistance             [   ] Bedbound/Full Care    Coping:  [ X  ] Coping well                     [   ] Coping w/difficulty            [   ] Poor coping    Support system:  [   ] Strong                              [ X  ] Adequate                        [   ] Inadequate      Past history and medications for:     [ ] Anxiety       [ ] Depression    [ ] Sleep disorders       SERVICE PROVIDED                                                                                              [   ]Discharge support / facilitation  [   ]AD / goals of care counseling                                  [   ]EOL / death / bereavement counseling  [ X ]Counseling / support                                                [   ] Family meeting  [   ]Prayer / sacrament / ritual                                      [   ] Referral   [   ]Other                                                                       NOTE and Plan of Care (PoC):    patient is a 88 y/o M with noted pmhx, presenting with c/o acute chronic dyspnea and syncope. visited patient earlier today. patient encountered in bedside chair. AOX3. patient could not hear very well said he left his hearing aids at NH. SW able to provide patient with headphone amplifier. role of palliative care Sw introduced. patient denied any pain or discomfort. He has support from his son Graham. per patient, breathing is stable now, however, gets aggravated with movement. Patient confirmed he was admitted from [facility] where he was getting rehab. per patient he was doing very well prior to being admitted. contact info provided. all questions answered. support rendered. will continue to follow. [phone]

## 2022-03-28 NOTE — CONSULT NOTE ADULT - ASSESSMENT
89yMale being evaluated for      MEDD (morphine equivalent daily dose): na      See Recs below. d/w Primary and Palliative teams   NOT COMPLETE    Please call [phone] with questions or concerns 24/7.   We will continue to follow.    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 Per patient had acute onset of SOB while ambulating to the bathroom and syncopized on the toilet. denies head trauma. He was recently d/c earlier this month for a similar admission for syncope secondary to hypoxemia from ILD exacerbation. Patient started on solumedrol IV 60mg BID, which was then transitioned to prednisone 40mg QD (to be tapered over 6 weeks).       being evaluated for support with [facility]       MEDD (morphine equivalent daily dose): na      See Recs below. d/w Primary and Palliative teams      Please call [phone] with questions or concerns 24/7.   We will continue to follow.

## 2022-03-28 NOTE — CONSULT NOTE ADULT - PROBLEM SELECTOR RECOMMENDATION 9
advanced  continue current plan of care per pulm and primary teams  encouraged use of NC O2  spoke to RT re: adding humification as patient c/o dryness

## 2022-03-29 NOTE — CHART NOTE - NSCHARTNOTEFT_GEN_A_CORE
visited patient earlier today. patient encountered on bedside chair, eating lunch. patient appeared din better mood today. patient denied any pain or discomfort, reports breathing is slightly better when sitting, however, when trying to do any movement gets slight SOB. support rendered. will continue to follow.

## 2022-03-29 NOTE — PROGRESS NOTE ADULT - SUBJECTIVE AND OBJECTIVE BOX
CHIEF COMPLAINT:    Patient is a 89y old  Male who presents with a chief complaint of syncope     INTERVAL HPI/OVERNIGHT EVENTS:    Patient seen and examined at bedside. No acute overnight events occurred.    ROS: Denies SOB, chest pain. All other systems are negative.    Vital Signs:    T(F): 96.5 (22 @ 12:06), Max: 97.3 (22 @ 20:31)  HR: 68 (22 @ 12:06) (68 - 99)  BP: 98/57 (22 @ 12:06) (93/59 - 130/67)  RR: 18 (22 @ 12:06) (18 - 18)  SpO2: 99% (22 @ 07:57) (96% - 99%)  I&O's Summary    28 Mar 2022 07:  -  29 Mar 2022 07:00  --------------------------------------------------------  IN: 824 mL / OUT: 2450 mL / NET: -1626 mL    29 Mar 2022 07:01  -  29 Mar 2022 12:54  --------------------------------------------------------  IN: 450 mL / OUT: 0 mL / NET: 450 mL      Daily     Daily Weight in k.7 (29 Mar 2022 05:37)  CAPILLARY BLOOD GLUCOSE      POCT Blood Glucose.: 186 mg/dL (28 Mar 2022 17:20)  POCT Blood Glucose.: 273 mg/dL (28 Mar 2022 14:06)      PHYSICAL EXAM:  GENERAL:  NAD  SKIN: No rashes or lesions  HEENT: Atraumatic. Normocephalic. Anicteric  NECK:  No JVD.   PULMONARY: Clear to ausculation bilaterally. No wheezing. No rales  CVS: Normal S1, S2. Regular rate and rhythm. No murmurs.  ABDOMEN/GI: Soft, Nontender, Nondistended; Bowel sounds are present  EXTREMITIES:  No edema B/L LE.  NEUROLOGIC:  No motor deficit.  PSYCH: Alert & oriented x 3, normal affect      LABS:                        9.6    10.17 )-----------( 103      ( 29 Mar 2022 04:30 )             30.0     03-    134<L>  |  99  |  29<H>  ----------------------------<  76  4.0   |  28  |  1.0    Ca    8.4<L>      29 Mar 2022 04:30  Phos  2.4     -  Mg     1.8         TPro  4.9<L>  /  Alb  2.9<L>  /  TBili  1.0  /  DBili  x   /  AST  15  /  ALT  24  /  AlkPhos  73        Serum Pro-Brain Natriuretic Peptide: 2892 pg/mL (22 @ 04:35)      RADIOLOGY & ADDITIONAL TESTS:  Imaging or report Personally Reviewed:  [ ] YES  [ ] NO    Telemetry reviewed independently - no acute events    Medications:  Standing  albuterol/ipratropium for Nebulization 3 milliLiter(s) Nebulizer every 6 hours  apixaban 5 milliGRAM(s) Oral two times a day  chlorhexidine 4% Liquid 1 Application(s) Topical <User Schedule>  cyanocobalamin 1000 MICROGram(s) Oral daily  diltiazem    milliGRAM(s) Oral daily  ferrous    sulfate 325 milliGRAM(s) Oral daily  finasteride 5 milliGRAM(s) Oral daily  furosemide    Tablet 40 milliGRAM(s) Oral daily  influenza  Vaccine (HIGH DOSE) 0.7 milliLiter(s) IntraMuscular once  melatonin 5 milliGRAM(s) Oral at bedtime  metoprolol tartrate 100 milliGRAM(s) Oral three times a day  pantoprazole    Tablet 40 milliGRAM(s) Oral before breakfast  predniSONE   Tablet 40 milliGRAM(s) Oral daily  tamsulosin 0.4 milliGRAM(s) Oral at bedtime  trimethoprim  160 mG/sulfamethoxazole 800 mG 1 Tablet(s) Oral daily    PRN Meds  aluminum hydroxide/magnesium hydroxide/simethicone Suspension 30 milliLiter(s) Oral every 4 hours PRN  sodium chloride 0.65% Nasal 1 Spray(s) Both Nostrils four times a day PRN      Case discussed with resident  Care discussed with pt

## 2022-03-29 NOTE — PROGRESS NOTE ADULT - ASSESSMENT
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      HEENT: oral care    PULMONARY: HOB >45.  Wean O2 as tolerated.  Prednisone 30 daily with slow taper over 6 weeks     CARDIOVASCULAR: Keep negative balance as tolerated.  Maximize cardiac therapy and volume status     GI: GI prophylaxis.  Feeding po    RENAL: Fu lytes.  Correct as needed    HEMATOLOGICAL:  AC with Eliquis.  FU CBC     ENDOCRINE:  Follow up FS.  Insulin protocol if needed.    MUSCULOSKELETAL:  OOB to chair.  PT OT     DC Planing     OP Pulm follow up

## 2022-03-29 NOTE — PROGRESS NOTE ADULT - SUBJECTIVE AND OBJECTIVE BOX
Patient is a 89y old  Male who presents with a chief complaint of syncope (28 Mar 2022 10:17)        Over Night Events:  Resting in bed on NC.  Of pressors.          ROS:     All ROS are negative except HPI         PHYSICAL EXAM    ICU Vital Signs Last 24 Hrs  T(C): 35.8 (29 Mar 2022 12:06), Max: 36.3 (28 Mar 2022 20:31)  T(F): 96.5 (29 Mar 2022 12:06), Max: 97.3 (28 Mar 2022 20:31)  HR: 68 (29 Mar 2022 12:06) (68 - 83)  BP: 98/57 (29 Mar 2022 12:06) (98/57 - 130/67)  BP(mean): --  ABP: --  ABP(mean): --  RR: 18 (29 Mar 2022 12:06) (18 - 18)  SpO2: 99% (29 Mar 2022 07:57) (99% - 99%)      CONSTITUTIONAL:  In  NAD    ENT:   Airway patent,   Mouth with normal mucosa.   No thrush    EYES:   Pupils equal,   Round and reactive to light.    CARDIAC:   Normal rate,   Regular rhythm.    No edema      Vascular:  Normal systolic impulse  No Carotid bruits    RESPIRATORY:   No wheezing  Bilateral crackles   Normal chest expansion  Not tachypneic,  No use of accessory muscles    GASTROINTESTINAL:  Abdomen soft,   Non-tender,   No guarding,   + BS    MUSCULOSKELETAL:   Range of motion is not limited,  No clubbing, cyanosis    NEUROLOGICAL:   Alert  No motor  deficits.    SKIN:   Skin normal color for race,   Warm and dry  No evidence of rash.    PSYCHIATRIC:   Normal mood and affect.   No apparent risk to self or others.    HEMATOLOGICAL:  No cervical  lymphadenopathy.  no inguinal lymphadenopathy      03-28-22 @ 07:01  -  03-29-22 @ 07:00  --------------------------------------------------------  IN:    Oral Fluid: 824 mL  Total IN: 824 mL    OUT:    Indwelling Catheter - Urethral (mL): 2450 mL  Total OUT: 2450 mL    Total NET: -1626 mL      03-29-22 @ 07:01  -  03-29-22 @ 14:03  --------------------------------------------------------  IN:    Oral Fluid: 450 mL  Total IN: 450 mL    OUT:  Total OUT: 0 mL    Total NET: 450 mL          LABS:                            9.6    10.17 )-----------( 103      ( 29 Mar 2022 04:30 )             30.0                                               03-29    134<L>  |  99  |  29<H>  ----------------------------<  76  4.0   |  28  |  1.0    Ca    8.4<L>      29 Mar 2022 04:30  Phos  2.4     03-29  Mg     1.8     03-29    TPro  4.9<L>  /  Alb  2.9<L>  /  TBili  1.0  /  DBili  x   /  AST  15  /  ALT  24  /  AlkPhos  73  03-29                                                                                           LIVER FUNCTIONS - ( 29 Mar 2022 04:30 )  Alb: 2.9 g/dL / Pro: 4.9 g/dL / ALK PHOS: 73 U/L / ALT: 24 U/L / AST: 15 U/L / GGT: x                                                                                                                                       MEDICATIONS  (STANDING):  albuterol/ipratropium for Nebulization 3 milliLiter(s) Nebulizer every 6 hours  apixaban 5 milliGRAM(s) Oral two times a day  chlorhexidine 4% Liquid 1 Application(s) Topical <User Schedule>  cyanocobalamin 1000 MICROGram(s) Oral daily  diltiazem    milliGRAM(s) Oral daily  ferrous    sulfate 325 milliGRAM(s) Oral daily  finasteride 5 milliGRAM(s) Oral daily  furosemide    Tablet 40 milliGRAM(s) Oral daily  influenza  Vaccine (HIGH DOSE) 0.7 milliLiter(s) IntraMuscular once  melatonin 5 milliGRAM(s) Oral at bedtime  metoprolol tartrate 100 milliGRAM(s) Oral three times a day  pantoprazole    Tablet 40 milliGRAM(s) Oral before breakfast  predniSONE   Tablet 40 milliGRAM(s) Oral daily  tamsulosin 0.4 milliGRAM(s) Oral at bedtime  trimethoprim  160 mG/sulfamethoxazole 800 mG 1 Tablet(s) Oral daily    MEDICATIONS  (PRN):  aluminum hydroxide/magnesium hydroxide/simethicone Suspension 30 milliLiter(s) Oral every 4 hours PRN Dyspepsia  sodium chloride 0.65% Nasal 1 Spray(s) Both Nostrils four times a day PRN Nasal Congestion      New X-rays reviewed:                                                                                  ECHO    CXR interpreted by me:

## 2022-03-29 NOTE — PROGRESS NOTE ADULT - SUBJECTIVE AND OBJECTIVE BOX
[facility] day:     HPI:   89 yo M PMHx ILD on 5L O2 at home, HTN, BPH, GERD,  A fib (on Eliquis), HFrEF (EF 46%), recent Covid (in January 2022), LLL mass presents to the ED for acute on chronic dyspnea and syncope.    Subjective:   Overnight events: none   Complaints: none     Objective:   Vital Signs Last 24 Hrs  T(C): 36 (29 Mar 2022 05:37), Max: 36.3 (28 Mar 2022 20:31)  T(F): 96.8 (29 Mar 2022 05:37), Max: 97.3 (28 Mar 2022 20:31)  HR: 83 (29 Mar 2022 05:37) (75 - 99)  BP: 130/67 (29 Mar 2022 05:37) (93/59 - 130/67)  BP(mean): 71 (28 Mar 2022 13:29) (71 - 71)  RR: 18 (29 Mar 2022 07:57) (18 - 18)  SpO2: 99% (29 Mar 2022 07:57) (96% - 99%)    GENERAL:  NAD  NECK:  No JVD.   PULMONARY: decreased bibasilar breath sounds   CVS: Normal S1, S2. Regular rate and rhythm. No murmurs.  ABDOMEN/GI: Soft, Nontender, Nondistended; Bowel sounds are present  EXTREMITIES:  No edema B/L LE.  PSYCH: Alert & oriented x 3, normal affect    MEDICATIONS  (STANDING):  albuterol/ipratropium for Nebulization 3 milliLiter(s) Nebulizer every 6 hours  apixaban 5 milliGRAM(s) Oral two times a day  chlorhexidine 4% Liquid 1 Application(s) Topical <User Schedule>  cyanocobalamin 1000 MICROGram(s) Oral daily  diltiazem    milliGRAM(s) Oral daily  ferrous    sulfate 325 milliGRAM(s) Oral daily  finasteride 5 milliGRAM(s) Oral daily  furosemide    Tablet 40 milliGRAM(s) Oral daily  influenza  Vaccine (HIGH DOSE) 0.7 milliLiter(s) IntraMuscular once  melatonin 5 milliGRAM(s) Oral at bedtime  metoprolol tartrate 100 milliGRAM(s) Oral three times a day  pantoprazole    Tablet 40 milliGRAM(s) Oral before breakfast  predniSONE   Tablet 40 milliGRAM(s) Oral daily  tamsulosin 0.4 milliGRAM(s) Oral at bedtime  trimethoprim  160 mG/sulfamethoxazole 800 mG 1 Tablet(s) Oral daily    MEDICATIONS  (PRN):  aluminum hydroxide/magnesium hydroxide/simethicone Suspension 30 milliLiter(s) Oral every 4 hours PRN Dyspepsia  sodium chloride 0.65% Nasal 1 Spray(s) Both Nostrils four times a day PRN Nasal Congestion    Labs:                           9.6    10.17 )-----------( 103      ( 29 Mar 2022 04:30 )             30.0   03-29    134<L>  |  99  |  29<H>  ----------------------------<  76  4.0   |  28  |  1.0    Ca    8.4<L>      29 Mar 2022 04:30  Phos  2.4     03-29  Mg     1.8     03-29    TPro  4.9<L>  /  Alb  2.9<L>  /  TBili  1.0  /  DBili  x   /  AST  15  /  ALT  24  /  AlkPhos  73  03-29    Assessment and plan:   89 yo M PMHx ILD on 5L O2 at home, HTN, BPH, GERD,  A fib (on Eliquis), HFrEF (EF 46%), recent Covid (in January 2022), LLL mass presents to the ED for acute on chronic dyspnea and syncope.    # Acute on chronic respiratory failure   #ILD  #LLL Mass and Mediastinal LAD   - Patient has chronic respiratory failure due to ILD with basilene 5 L NC of oxygen requirements.  - Pulm following the patient (Dr Womack) >>> repeat CT chest  done outpatient showed resolution of mass ??? CT not done in this hospital and no access to report  - Slow prednisone taper (over 6 weeks) as per pulm and bactrim ppx >> On prednisone 40mg PO OD  - Further workup offered by Dr lAvarado but patient declined  - Consult of palliative care: plan for family meeting, the concept of hospice was mentioned by pulm attending to them. For now full code     #Syncope   - Secondary to hypoxia. Recurrent admissions to the hospital due to hypoxemia at home as ILD is advanced   - pt declined [facility]OT/ILR, doesn't want further work up     #Afib  #Afib with RVR on admission  - No chest pain. Trops positive on admission 0.03, stable on repeats   - On: Eliquis 5mg PO BID, cardizem 120 mg ER and metoprolol tartrate 100mg TID  - Trop downtrending    #HFrEF  - clinically euvolemic  - TTE 2/27: EF: 46%, severe pHTN  - Serum Pro-BNP: 2892 on admission. Baseline ~2000  - on home Lasix dose of 40mg PO OD    #BPH  -Flomax + Finasteride    Disposition: Palliative care consult with family meeting. Coming from nursing home

## 2022-03-29 NOTE — PROGRESS NOTE ADULT - SUBJECTIVE AND OBJECTIVE BOX
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 Per patient had acute onset of SOB while ambulating to the bathroom and syncopized on the toilet. denies head trauma. He was recently d/c earlier this month for a similar admission for syncope secondary to hypoxemia from ILD exacerbation. Patient started on solumedrol IV 60mg BID, which was then transitioned to prednisone 40mg QD (to be tapered over 6 weeks).    ED course: Tachycardic in triage, SpO2 97% on NRB. Labs revealed mild leukocytosis, hypoMg, trop 0.03 and BNP 2892. VBG unremarkable except LA of 4.6. EKG revealed Afib w/ RVR. CXR w/ chronic b/l opacities. s/p 500ml LR + Solu-Medrol in ED and placed on NIV. (24 Mar 2022 08:23)    INTERVAL EVENTS:  3/29: patient states going to bathroom today, but had dyspnea after ambulating, is open to trying low-dose dilaudid for symptom relief    ROS:	                      Dyspnea (John 0-10): not during the encounter; but described PONCE (eg with going to [place])                     N/V (Y/N): No                             Secretions (Y/N) : No                                          Agitation(Y/N): No                              Pain (Y/N): No                                 -Provocation/Palliation: N/A  -Quality/Quantity: N/A  -Radiating: N/A  -Severity: No pain  -Timing/Frequency: N/A  -Impact on ADLs: N/A    General:  trouble sleeping days on end at times  HEENT:    [place] - improved with amplifiers   Neck:  Denied  CVS:  Denied  Resp:  Denied  GI:  Denied    :  Denied  Musc:  Denied  Neuro:  Denied  Psych:  Denied  Skin:  Denied  Lymph:  Denied    Last BM: ?     Allergies    No Known Allergies    Intolerances    Opiate Naive (Y/N): y  -iStop reviewed (Y/N): y  Ref#:   This report was requested by: Tesha Mancini | Reference #: [id number]               Labs:	                          9.6    10.17 )-----------( 103      ( 29 Mar 2022 04:30 )             30.0     03-29    134<L>  |  99  |  29<H>  ----------------------------<  76  4.0   |  28  |  1.0    Ca    8.4<L>      29 Mar 2022 04:30  Phos  2.4       Mg     1.8         Radiology:	   < from: [facility] Duplex Lower Ext Vein Scan, Bilat (22 @ 16:06) >    IMPRESSION:  No evidence of deep venous thrombosis in either lower extremity.    --- End of Report ---            TAVARSE TRAN MD; Attending Vascular Surgeon  This document has been electronically signed. Mar 25 2022 1:32PM    < end of copied text >    < from: Xray Chest 1 View- PORTABLE-Urgent (22 @ 06:18) >  ACC: [id number] EXAM:  XR CHEST PORTABLE URGENT 1V                          PROCEDURE DATE:  2022          INTERPRETATION:  Clinical History / Reason for exam: Pain    Comparison : Chest radiograph 2022.    Technique/Positioning: Low lung volume.    Findings:    Support devices: None.    Cardiac/mediastinum/hilum: Magnified, unchanged.    Lung parenchyma/Pleura: Bilateral opacities, unchanged. No pneumothorax   is seen.    Skeleton/soft tissues: Stable.    Impression:    Low lung volume.    Bilateral opacities, unchanged.    --- End of Report ---            ALYSE CALZADA MD; Attending Radiologist  This document has been electronically signed. Mar 24 2022  7:22AM    < end of copied text >        EK Lead ECG:   Ventricular Rate 80 BPM    Atrial Rate 98 BPM    QRS Duration 92 ms    Q-T Interval 392 ms    QTC Calculation(Bazett) 452 ms    R Axis -6 degrees    T Axis -20 degrees    Diagnosis Line Atrial fibrillation  Abnormal ECG    Confirmed by NNEKA CUADRA MD ([id number]) on 3/25/2022 8:39:02 AM (22 @ 07:40)      Imaging Personally Reviewed:  [ ] YES  [ ] NO    Consultant(s) Notes Reviewed:  [ ] YES  [ ] NO  Care Discussed with Consultants/Other Providers [ ] YES  [ ] NO    PEx:	  Vital Signs Last 24 Hrs  T(C): 35.8 (29 Mar 2022 12:06), Max: 36.3 (28 Mar 2022 20:31)  T(F): 96.5 (29 Mar 2022 12:06), Max: 97.3 (28 Mar 2022 20:31)  HR: 68 (29 Mar 2022 12:06) (68 - 83)  BP: 98/57 (29 Mar 2022 12:06) (98/57 - 130/67)  BP(mean): --  RR: 18 (29 Mar 2022 12:06) (18 - 18)  SpO2: 99% (29 Mar 2022 07:57) (99% - 99%)    General:  NAD  Eyes:  clear conjunctiva  ENMT: grossly normal   Resp: Unlabored Non tachypneic   GI:  nondistended  Neuro: grossly WNL  Psych: Calm   Skin: nonjaundiced    Preadmit Karnofsky:  %  6months prior to admission 90%         Current Karnofsky:   40  %  http://www.npcrc.org/files/news/karnofsky_performance_scale.pdf   http://www.[facility].org/files/news/palliative_performance_scale_PPSv2.pdf  Cachexia (Y/N):   BMI: BMI (kg/m2): 28.1 (22 @ 18:47)      Medications:	      MEDICATIONS  (STANDING):  albuterol/ipratropium for Nebulization 3 milliLiter(s) Nebulizer every 6 hours  apixaban 5 milliGRAM(s) Oral two times a day  chlorhexidine 4% Liquid 1 Application(s) Topical <User Schedule>  cyanocobalamin 1000 MICROGram(s) Oral daily  diltiazem    milliGRAM(s) Oral daily  ferrous    sulfate 325 milliGRAM(s) Oral daily  finasteride 5 milliGRAM(s) Oral daily  furosemide    Tablet 40 milliGRAM(s) Oral daily  influenza  Vaccine (HIGH DOSE) 0.7 milliLiter(s) IntraMuscular once  melatonin 5 milliGRAM(s) Oral at bedtime  metoprolol tartrate 100 milliGRAM(s) Oral three times a day  pantoprazole    Tablet 40 milliGRAM(s) Oral before breakfast  predniSONE   Tablet 40 milliGRAM(s) Oral daily  tamsulosin 0.4 milliGRAM(s) Oral at bedtime  trimethoprim  160 mG/sulfamethoxazole 800 mG 1 Tablet(s) Oral daily    MEDICATIONS  (PRN):  aluminum hydroxide/magnesium hydroxide/simethicone Suspension 30 milliLiter(s) Oral every 4 hours PRN Dyspepsia  HYDROmorphone   Tablet 2 milliGRAM(s) Oral every 6 hours PRN dyspnea  sodium chloride 0.65% Nasal 1 Spray(s) Both Nostrils four times a day PRN Nasal Congestion      Advanced Directives:	     Full Code    Decision maker: The patient is able to participate in complex medical decision making conversations. Wanted us to call his son, Elkin   Legal surrogate:    GOALS OF CARE DISCUSSION	       Palliative care info/counseling provided	               Documentation of GOC/Advanced Care Planning:          PSYCHOSOCIAL-SPIRITUAL ASSESSMENT:            See Palliative Care SW/ documentation        	    REFERRALS       Palliative Med        Unit SW/Case Mgmt

## 2022-03-29 NOTE — PROGRESS NOTE ADULT - ASSESSMENT
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 Per patient had acute onset of SOB while ambulating to the bathroom and syncopized on the toilet. denies head trauma. He was recently d/c earlier this month for a similar admission for syncope secondary to hypoxemia from ILD exacerbation. Patient started on solumedrol IV 60mg BID, which was then transitioned to prednisone 40mg QD (to be tapered over 6 weeks).       being evaluated for support with [facility]       MEDD (morphine equivalent daily dose): na      See Recs below. d/w Primary and Palliative teams      Please call [phone] with questions or concerns 24/7.   We will continue to follow.

## 2022-03-29 NOTE — PROGRESS NOTE ADULT - ASSESSMENT
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 Per patient had acute onset of SOB while ambulating to the bathroom and syncopized on the toilet. denies head trauma. He was recently d/c earlier this month for a similar admission for syncope secondary to hypoxemia from ILD exacerbation. Patient started on solumedrol IV 60mg BID, which was then transitioned to prednisone 40mg QD (to be tapered over 6 weeks).    **Pt is not interested in any further work up or treatment, he wants to focus on comfort and staying out of the hospital. A family meeting is planned with the palliative care team ***    Recurrent admission to hospital for SOB/ILD   Acute on chronic resp failure 2/2 ILD  LLL Mass and Mediastinal LAD   Chronic Hypoxemic Respiratory Failure on Home O2 3L   - as per pulmonary team pt had repet outpt CT chest which showed resolution of mass  - outpt pulm follow up  - further syncopal work up offerred to patient but he declined-agreeable to meet with palliative care to discuss transitioning to more comfort care and less hospitalizations  - c/w slow prednisone taper and bactrim    Hypoglycemia  - resolved    Syncope   - likely 2/2 Hypoxia   - shaking likely due to orthostatics, had prior neuro work up  - pt declined [facility]OT/ILR, doesn't want further work up       Afib, chronic with RVR, now resolved  - Elevated Trops 0.03, Denies CP likely from demand  - continue Eliquis 5mg PO BID  - continue cardizem 120 mg CD QD and metoprolol tartrate 100mg TID  - Trop downtrending    HFmrEF, not in exacerbation  - TTE 2/27: LVEF 46% w/ severe pHTN and moderate TR  - Serum Pro-BNP: 2892  - c/w home lasix    BPH  -Flomax + Finasteride    dc telemetry    #Progress Note Handoff  Pending (specify):   palliative care eval, pt still in agreement  Family discussion: Plan of care discussed with patient, aware and agreeable   Disposition:  from [facility]

## 2022-03-30 NOTE — PROGRESS NOTE ADULT - ASSESSMENT
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     # Acute on chronic respiratory failure, resolved  # ILD on chronic O2  # LLL mass with mediastinal lymphadenopathy  - pt with chronic respiratory failure 2/2 ILD with baseline 5L o2 requirement  - pulm following  - cont slow prednisone taper over 6w per pulm and bactrim ppx  - further w/u offered prior during admission and pt declined  - palliative care following for GOC, discussion of hospice. Family meeting planned.  - cont dilaudid per palliative for dyspnea    # Syncope  - 2/2 hypoxia  - recurrent admissions due to hypoxemia at home as ILD is advanced   - pt declined MCOT/ILR and does not want further workup    # Chronic atrial fibrillation, rate controlled  - RVR on admission  - no chest pain, trops positive on admission 0.03, repeats stable  - cont Eliquis 5 BID, cardizem 120mg ER, and metoprolol tartrate 100mg TID    # HFrEF, not in acute exacerbation  - euvolemic on exam  - TTE 02/27: LVEF 46%, severe pHTN  - BNP 2892 on admission, baseline ~2000  - cont lasix 40mg PO qd    # HTN  - BP: 100/56 (100/56 - 123/74)  - cont cardiac meds as above    # BPH -cont flomax, finasteride    PT/REHAB: PT  ACTIVITY: Activity - Out of Bed to Chair  Activity - Ambulate with Assistance  DVT PPX: eliquis  GI PPX: pantoprazole    Tablet  DIET: Diet, DASH/TLC  CODE: full code  Disposition: acute  Pending: family discussion with palliative

## 2022-03-30 NOTE — PROGRESS NOTE ADULT - SUBJECTIVE AND OBJECTIVE BOX
NNEKA JOSEPH MRN#: [id number]  CODE STATUS: FULL CODE     SUBJECTIVE   Chief complaint: syncope    Currently admitted to medicine with the primary diagnosis of SHORTNESS OF BREATH      Today is hospital day 6d,   INTERVAL HPI/OVERNIGHT EVENTS: No acute events overnight    This morning, he is laying in bed comfortably. Denies chest pain, shortness of breath, abdominal pain, nausea, vomiting or changes in bowel habits. He has no complaints today.     Urinating and stooling appropriately.    Present Today:           Joel Catheter ()No/ (x)Yes? Indwelling Urethral Catheter    Indication:             Central Line (x)No/ ()Yes?  Indication:          IV Fluids (x)No/ ()Yes?  Indication:     OBJECTIVE  PAST MEDICAL & SURGICAL HISTORY  Hypertension    GERD (gastroesophageal reflux disease)    Lung interstitial disease    BPH (benign prostatic hyperplasia)    HFrEF (heart failure with reduced ejection fraction)    COPD (chronic obstructive pulmonary disease)    No significant past surgical history      ALLERGIES:  No Known Allergies    HOME MEDICATIONS:  Home Medications:  dilTIAZem 120 mg/24 hours oral capsule, extended release: 1 cap(s) orally once a day (24 Mar 2022 08:26)  Eliquis 5 mg oral tablet: 1 tab(s) orally 2 times a day (24 Mar 2022 08:26)  FERROUS SULFATE 325 MG TABLET: 1 each orally once a day (24 Mar 2022 08:26)  FINASTERIDE 5 MG TABLET: 1 each orally once a day (24 Mar 2022 08:26)  furosemide 40 mg oral tablet: 1 tab(s) orally once a day (24 Mar 2022 08:26)  Lopressor 100 mg oral tablet: 1 tab(s) orally 3 times a day (24 Mar 2022 08:26)  OMEPRAZOLE DR 40MG CAP: 1 cap(s) orally once a day (24 Mar 2022 08:26)  TAMSULOSIN HCL 0.4 MG CAPSULE: 1 each orally once a day (at bedtime) (24 Mar 2022 08:26)  VITAMIN B-12 1,000 MCG TABLET: 1 each orally once a day (24 Mar 2022 08:26)    MEDICATIONS:  STANDING MEDICATIONS  MEDICATIONS  (STANDING):  albuterol/ipratropium for Nebulization 3 milliLiter(s) Nebulizer every 6 hours  apixaban 5 milliGRAM(s) Oral two times a day  chlorhexidine 4% Liquid 1 Application(s) Topical <User Schedule>  cyanocobalamin 1000 MICROGram(s) Oral daily  diltiazem    milliGRAM(s) Oral daily  ferrous    sulfate 325 milliGRAM(s) Oral daily  finasteride 5 milliGRAM(s) Oral daily  furosemide    Tablet 40 milliGRAM(s) Oral daily  influenza  Vaccine (HIGH DOSE) 0.7 milliLiter(s) IntraMuscular once  melatonin 5 milliGRAM(s) Oral at bedtime  metoprolol tartrate 100 milliGRAM(s) Oral three times a day  pantoprazole    Tablet 40 milliGRAM(s) Oral before breakfast  predniSONE   Tablet 40 milliGRAM(s) Oral daily  tamsulosin 0.4 milliGRAM(s) Oral at bedtime  trimethoprim  160 mG/sulfamethoxazole 800 mG 1 Tablet(s) Oral daily    PRN MEDICATIONS  MEDICATIONS  (PRN):  aluminum hydroxide/magnesium hydroxide/simethicone Suspension 30 milliLiter(s) Oral every 4 hours PRN Dyspepsia  HYDROmorphone   Tablet 2 milliGRAM(s) Oral every 6 hours PRN dyspnea  sodium chloride 0.65% Nasal 1 Spray(s) Both Nostrils four times a day PRN Nasal Congestion      VITAL SIGNS  T(F): 96.7 (03-30 @ 13:45), Max: 96.7 (03-30 @ 13:45)  HR: 95 (03-30 @ 13:45) (85 - 95)  BP: 100/56 (03-30 @ 13:45) (100/56 - 123/74)  RR: 17 (03-30 @ 13:45) (17 - 18)  SpO2: --    LABS:                        10.2   10.66 )-----------( 112      ( 30 Mar 2022 06:20 )             30.2     03-30    136  |  98  |  29<H>  ----------------------------<  85  4.2   |  29  |  1.3    Ca    8.4<L>      30 Mar 2022 06:20  Phos  2.4     03-29  Mg     1.8     03-29    TPro  5.2<L>  /  Alb  3.0<L>  /  TBili  0.8  /  DBili  x   /  AST  15  /  ALT  24  /  AlkPhos  68  03-30          RADIOLOGY:   Reviewed  CXR bl opacities  LE nicole duplex neg      PHYSICAL EXAM:  General: Comfortably laying on the hospital bed. NAD. AAOx3. hard of hearing  HEENT: Atraumatic. Normocephalic. EOMI. Conjunctiva and sclera clear.  Cardiac: Normal S1, S2. RRR. No murmurs, rubs, or gallops.  Pulm: CTA b/l no wheezes, rhonchi, or rales. on 3.5L NC  GI: Soft, nontender, nondistended. Joel.  Ext: 2+ pulses. Moving all 4 extremities. No edema, cyanosis.  Neuro: CN II-XII grossly intact  Skin: No lesions or rashes

## 2022-03-30 NOTE — PROGRESS NOTE ADULT - ASSESSMENT
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 Per patient had acute onset of SOB while ambulating to the bathroom and syncopized on the toilet. denies head trauma. He was recently d/c earlier this month for a similar admission for syncope secondary to hypoxemia from ILD exacerbation. Patient started on solumedrol IV 60mg BID, which was then transitioned to prednisone 40mg QD (to be tapered over 6 weeks).    **Pt is not interested in any further work up or treatment, he wants to focus on comfort and staying out of the hospital. A family meeting is planned with the palliative care team ***    Recurrent admission to hospital for SOB/ILD   Acute on chronic resp failure 2/2 ILD  LLL Mass and Mediastinal LAD   Chronic Hypoxemic Respiratory Failure on Home O2 3L   - as per pulmonary team pt had repet outpt CT chest which showed ?resolution of mass  - outpt pulm follow up  - further syncopal work up offerred to patient but he declined-agreeable to meet with palliative care to discuss transitioning to more comfort care and less hospitalizations  - c/w slow prednisone taper and bactrim    Hypoglycemia  - resolved    Syncope   - likely 2/2 Hypoxia   - shaking likely due to orthostatics, had prior neuro work up  - pt declined MCOT/ILR, doesn't want further work up       Afib, chronic with RVR, now resolved  - Elevated Trops 0.03, Denies CP likely from demand  - continue Eliquis 5mg PO BID  - continue cardizem 120 mg CD QD and metoprolol tartrate 100mg TID  - Trop downtrending    HFmrEF, not in exacerbation  - TTE 2/27: LVEF 46% w/ severe pHTN and moderate TR  - Serum Pro-BNP: 2892  - c/w home lasix    BPH  -Flomax + Finasteride    dc telemetry    #Progress Note Handoff  Pending (specify):   palliative care zulay pt in agreement for a family meeting taking place today   Family discussion: Family meeting    Disposition:  from [facility] post Family meeting dispo plan

## 2022-03-30 NOTE — PROGRESS NOTE ADULT - SUBJECTIVE AND OBJECTIVE BOX
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 Per patient had acute onset of SOB while ambulating to the bathroom and syncopized on the toilet. denies head trauma. He was recently d/c earlier this month for a similar admission for syncope secondary to hypoxemia from ILD exacerbation. Patient started on solumedrol IV 60mg BID, which was then transitioned to prednisone 40mg QD (to be tapered over 6 weeks).    ED course: Tachycardic in triage, SpO2 97% on NRB. Labs revealed mild leukocytosis, hypoMg, trop 0.03 and BNP 2892. VBG unremarkable except LA of 4.6. EKG revealed Afib w/ RVR. CXR w/ chronic b/l opacities. s/p 500ml LR + Solu-Medrol in ED and placed on NIV. (24 Mar 2022 08:23)    INTERVAL EVENTS:  3/29: patient states going to bathroom today, but had dyspnea after ambulating, is open to trying low-dose dilaudid for symptom relief  3/30: patient states ambulating, but desaturating after; at rest has no dyspnea    ROS:	                      Dyspnea (John 0-10): not during the encounter; but described PONCE (eg with going to [place])                     N/V (Y/N): No                             Secretions (Y/N) : No                                          Agitation(Y/N): No                              Pain (Y/N): No                                 -Provocation/Palliation: N/A  -Quality/Quantity: N/A  -Radiating: N/A  -Severity: No pain  -Timing/Frequency: N/A  -Impact on ADLs: N/A    General:  trouble sleeping days on end at times  HEENT:    [place] - improved with amplifiers but amplifier now missing a part   Neck:  Denied  CVS:  Denied  Resp:  Denied  GI:  Denied    :  Denied  Musc:  Denied  Neuro:  Denied  Psych:  Denied  Skin:  Denied  Lymph:  Denied    Last BM: ?     Allergies    No Known Allergies    Intolerances    Opiate Naive (Y/N): y  -iStop reviewed (Y/N): y  Ref#:   This report was requested by: Tesha Mancini | Reference #: [id number]               Labs:	                                     10.2   10.66 )-----------( 112      ( 30 Mar 2022 06:20 )             30.2     03-30    136  |  98  |  29<H>  ----------------------------<  85  4.2   |  29  |  1.3    Ca    8.4<L>      30 Mar 2022 06:20  Phos  2.4     -  Mg     1.8             Radiology:	   < from: [facility] Duplex Lower Ext Vein Scan, Bilat (22 @ 16:06) >    IMPRESSION:  No evidence of deep venous thrombosis in either lower extremity.    --- End of Report ---            TAVARES TRAN MD; Attending Vascular Surgeon  This document has been electronically signed. Mar 25 2022 1:32PM    < end of copied text >    < from: Xray Chest 1 View- PORTABLE-Urgent (22 @ 06:18) >  ACC: [id number] EXAM:  XR CHEST PORTABLE URGENT 1V                          PROCEDURE DATE:  2022          INTERPRETATION:  Clinical History / Reason for exam: Pain    Comparison : Chest radiograph 2022.    Technique/Positioning: Low lung volume.    Findings:    Support devices: None.    Cardiac/mediastinum/hilum: Magnified, unchanged.    Lung parenchyma/Pleura: Bilateral opacities, unchanged. No pneumothorax   is seen.    Skeleton/soft tissues: Stable.    Impression:    Low lung volume.    Bilateral opacities, unchanged.    --- End of Report ---            ALYSE CALZADA MD; Attending Radiologist  This document has been electronically signed. Mar 24 2022  7:22AM    < end of copied text >        EK Lead ECG:   Ventricular Rate 80 BPM    Atrial Rate 98 BPM    QRS Duration 92 ms    Q-T Interval 392 ms    QTC Calculation(Bazett) 452 ms    R Axis -6 degrees    T Axis -20 degrees    Diagnosis Line Atrial fibrillation  Abnormal ECG    Confirmed by NNEKA CUADRA MD ([id number]) on 3/25/2022 8:39:02 AM (22 @ 07:40)      Imaging Personally Reviewed:  [ ] YES  [ ] NO    Consultant(s) Notes Reviewed:  [ ] YES  [ ] NO  Care Discussed with Consultants/Other Providers [ ] YES  [ ] NO    PEx:	  Vital Signs Last 24 Hrs  T(C): 35.7 (30 Mar 2022 05:15), Max: 35.7 (29 Mar 2022 20:20)  T(F): 96.3 (30 Mar 2022 05:15), Max: 96.3 (29 Mar 2022 20:20)  HR: 85 (30 Mar 2022 05:15) (85 - 95)  BP: 123/74 (30 Mar 2022 05:15) (100/64 - 123/74)  BP(mean): --  RR: 18 (30 Mar 2022 05:15) (18 - 18)  SpO2: --    General:  NAD  Eyes:  clear conjunctiva  ENMT: grossly normal   Resp: Unlabored Non tachypneic   GI:  nondistended  Neuro: grossly WNL  Psych: Calm   Skin: nonjaundiced    Preadmit Karnofsky:  %  6months prior to admission 90%         Current Karnofsky:   40  %  http://www.npcrc.org/files/news/karnofsky_performance_scale.pdf   http://www.npcrc.org/files/news/palliative_performance_scale_PPSv2.pdf  Cachexia (Y/N):   BMI: BMI (kg/m2): 28.1 (22 @ 18:47)      Medications:	      MEDICATIONS  (STANDING):  albuterol/ipratropium for Nebulization 3 milliLiter(s) Nebulizer every 6 hours  apixaban 5 milliGRAM(s) Oral two times a day  chlorhexidine 4% Liquid 1 Application(s) Topical <User Schedule>  cyanocobalamin 1000 MICROGram(s) Oral daily  diltiazem    milliGRAM(s) Oral daily  ferrous    sulfate 325 milliGRAM(s) Oral daily  finasteride 5 milliGRAM(s) Oral daily  furosemide    Tablet 40 milliGRAM(s) Oral daily  influenza  Vaccine (HIGH DOSE) 0.7 milliLiter(s) IntraMuscular once  melatonin 5 milliGRAM(s) Oral at bedtime  metoprolol tartrate 100 milliGRAM(s) Oral three times a day  pantoprazole    Tablet 40 milliGRAM(s) Oral before breakfast  predniSONE   Tablet 40 milliGRAM(s) Oral daily  tamsulosin 0.4 milliGRAM(s) Oral at bedtime  trimethoprim  160 mG/sulfamethoxazole 800 mG 1 Tablet(s) Oral daily    MEDICATIONS  (PRN):  aluminum hydroxide/magnesium hydroxide/simethicone Suspension 30 milliLiter(s) Oral every 4 hours PRN Dyspepsia  HYDROmorphone   Tablet 2 milliGRAM(s) Oral every 6 hours PRN dyspnea  sodium chloride 0.65% Nasal 1 Spray(s) Both Nostrils four times a day PRN Nasal Congestion    Advanced Directives:	     Full Code    Decision maker: The patient is able to participate in complex medical decision making conversations. Wanted us to call his son, Elkin   Legal surrogate:    GOALS OF CARE DISCUSSION	       Palliative care info/counseling provided	               Documentation of GOC/Advanced Care Planning:          PSYCHOSOCIAL-SPIRITUAL ASSESSMENT:            See Palliative Care SW/ documentation        	    REFERRALS       Palliative Med        Unit SW/Case Mgmt

## 2022-03-30 NOTE — PROGRESS NOTE ADULT - ASSESSMENT
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 Per patient had acute onset of SOB while ambulating to the bathroom and syncopized on the toilet. denies head trauma. He was recently d/c earlier this month for a similar admission for syncope secondary to hypoxemia from ILD exacerbation. Patient started on solumedrol IV 60mg BID, which was then transitioned to prednisone 40mg QD (to be tapered over 6 weeks).       being evaluated for support with [facility]       MEDD (morphine equivalent daily dose): na      See Recs below. d/w Primary and Palliative teams      Please call [phone] with questions or concerns 24/7.   We will continue to follow.

## 2022-03-30 NOTE — PROGRESS NOTE ADULT - SUBJECTIVE AND OBJECTIVE BOX
CHIEF COMPLAINT:    Patient is a 89y old  Male who presents with a chief complaint of syncope     INTERVAL HPI/OVERNIGHT EVENTS:    Patient seen and examined at bedside. No acute overnight events occurred.    ROS: Denies SOB, chest pain. All other systems are negative.    Vital Signs:  T(C): 35.7 (30 Mar 2022 05:15), Max: 35.7 (29 Mar 2022 20:20)  T(F): 96.3 (30 Mar 2022 05:15), Max: 96.3 (29 Mar 2022 20:20)  HR: 85 (30 Mar 2022 05:15) (85 - 95)  BP: 123/74 (30 Mar 2022 05:15) (100/64 - 123/74)  RR: 18 (30 Mar 2022 05:15) (18 - 18)  SpO2: 92% 3-4L NC     28 Mar 2022 07:01  -  29 Mar 2022 07:00  --------------------------------------------------------  IN: 824 mL / OUT: 2450 mL / NET: -1626 mL    29 Mar 2022 07:01  -  29 Mar 2022 12:54  --------------------------------------------------------  IN: 450 mL / OUT: 0 mL / NET: 450 mL      Daily     Daily Weight in k.7 (29 Mar 2022 05:37)  CAPILLARY BLOOD GLUCOSE      POCT Blood Glucose.: 186 mg/dL (28 Mar 2022 17:20)  POCT Blood Glucose.: 273 mg/dL (28 Mar 2022 14:06)      PHYSICAL EXAM:  GENERAL:  NAD  SKIN: No rashes or lesions  HEENT: Atraumatic. Normocephalic. Anicteric  NECK:  No JVD.   PULMONARY: Clear to ausculation bilaterally. No wheezing. No rales  CVS: Normal S1, S2. Regular rate and rhythm. No murmurs.  ABDOMEN/GI: Soft, Nontender, Nondistended; Bowel sounds are present  EXTREMITIES:  No edema B/L LE.  NEUROLOGIC:  No motor deficit.  PSYCH: Alert & oriented x 3, normal affect      LABS:                        10.2   10.66 )-----------( 112      ( 30 Mar 2022 06:20 )             30.2     03    136  |  98  |  29<H>  ----------------------------<  85  4.2   |  29  |  1.3    Ca    8.4<L>      30 Mar 2022 06:20    Phos  2.4         Mg     1.8         TPro  5.2<L>  /  Alb  3.0<L>  /  TBili  0.8  /  DBili  x   /  AST  15  /  ALT  24  /  AlkPhos  68      Serum Pro-Brain Natriuretic Peptide: 2892 pg/mL (22 @ 04:35)    RADIOLOGY & ADDITIONAL TESTS:  Imaging or report Personally Reviewed:  [ ] YES  [ ] NO    Telemetry reviewed independently - no acute events    Medications:  Standing  albuterol/ipratropium for Nebulization 3 milliLiter(s) Nebulizer every 6 hours  apixaban 5 milliGRAM(s) Oral two times a day  chlorhexidine 4% Liquid 1 Application(s) Topical <User Schedule>  cyanocobalamin 1000 MICROGram(s) Oral daily  diltiazem    milliGRAM(s) Oral daily  ferrous    sulfate 325 milliGRAM(s) Oral daily  finasteride 5 milliGRAM(s) Oral daily  furosemide    Tablet 40 milliGRAM(s) Oral daily  influenza  Vaccine (HIGH DOSE) 0.7 milliLiter(s) IntraMuscular once  melatonin 5 milliGRAM(s) Oral at bedtime  metoprolol tartrate 100 milliGRAM(s) Oral three times a day  pantoprazole    Tablet 40 milliGRAM(s) Oral before breakfast  predniSONE   Tablet 40 milliGRAM(s) Oral daily  tamsulosin 0.4 milliGRAM(s) Oral at bedtime  trimethoprim  160 mG/sulfamethoxazole 800 mG 1 Tablet(s) Oral daily    PRN Meds  aluminum hydroxide/magnesium hydroxide/simethicone Suspension 30 milliLiter(s) Oral every 4 hours PRN  sodium chloride 0.65% Nasal 1 Spray(s) Both Nostrils four times a day PRN      Case discussed with resident  Care discussed with pt

## 2022-03-31 NOTE — PROGRESS NOTE ADULT - ASSESSMENT
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 Per patient had acute onset of SOB while ambulating to the bathroom and syncopized on the toilet. denies head trauma. He was recently d/c earlier this month for a similar admission for syncope secondary to hypoxemia from ILD exacerbation. Patient started on solumedrol IV 60mg BID, which was then transitioned to prednisone 40mg QD (to be tapered over 6 weeks).    **Pt is not interested in any further work up or treatment, he wants to focus on comfort and staying out of the hospital. A family meeting is planned with the palliative care team ***    Recurrent admission to hospital for SOB/ILD   Acute on chronic resp failure 2/2 ILD  LLL Mass and Mediastinal LAD   Chronic Hypoxemic Respiratory Failure on Home O2 3L   - as per pulmonary team pt had repet outpt CT chest which showed ?resolution of mass  - outpt pulm follow up  - further syncopal work up offerred to patient but he declined-agreeable to meet with palliative care to discuss transitioning to more comfort care and less hospitalizations  - c/w slow prednisone taper and bactrim    Hypoglycemia  - resolved    Syncope   - likely 2/2 Hypoxia   - shaking likely due to orthostatics, had prior neuro work up  - pt declined MCOT/ILR, doesn't want further work up       Afib, chronic with RVR, now resolved  - Elevated Trops 0.03, Denies CP likely from demand  - continue Eliquis 5mg PO BID  - continue cardizem 120 mg CD QD and metoprolol tartrate 100mg TID  - Trop downtrending    HFmrEF, not in exacerbation  - TTE 2/27: LVEF 46% w/ severe pHTN and moderate TR  - Serum Pro-BNP: 2892  - c/w home lasix    BPH  -Flomax + Finasteride    dc telemetry    #Progress Note Handoff  Pending (specify):   palliative care zulay pt in agreement for a family meeting taking place today   Family discussion: Discussed w/ Daughter Letitia [phone] and she wants to speak with his brothers to schedule family meeting for likely tomorrow.    Disposition: FAMILY MEETING BEFORE D/C to [facility] - MEETING 4/1st w/ palliative and family

## 2022-03-31 NOTE — PROGRESS NOTE ADULT - SUBJECTIVE AND OBJECTIVE BOX
CHIEF COMPLAINT:    Patient is a 89y old  Male who presents with a chief complaint of syncope     INTERVAL HPI/OVERNIGHT EVENTS:    Patient seen and examined at bedside. No acute overnight events occurred.    ROS: Denies SOB, chest pain. All other systems are negative.    Vital Signs Stable   SpO2: 92% 3-4L NC     28 Mar 2022 07:01  -  29 Mar 2022 07:00  --------------------------------------------------------  IN: 824 mL / OUT: 2450 mL / NET: -1626 mL    29 Mar 2022 07:01  -  29 Mar 2022 12:54  --------------------------------------------------------  IN: 450 mL / OUT: 0 mL / NET: 450 mL      Daily     Daily Weight in k.7 (29 Mar 2022 05:37)  CAPILLARY BLOOD GLUCOSE      POCT Blood Glucose.: 186 mg/dL (28 Mar 2022 17:20)  POCT Blood Glucose.: 273 mg/dL (28 Mar 2022 14:06)      PHYSICAL EXAM:  GENERAL:  NAD  SKIN: No rashes or lesions  HEENT: Atraumatic. Normocephalic. Anicteric  NECK:  No JVD.   PULMONARY: Clear to ausculation bilaterally. No wheezing. No rales  CVS: Normal S1, S2. Regular rate and rhythm. No murmurs.  ABDOMEN/GI: Soft, Nontender, Nondistended; Bowel sounds are present  EXTREMITIES:  No edema B/L LE.  NEUROLOGIC:  No motor deficit.  PSYCH: Alert & oriented x 3, normal affect      LABS:                        10.2   10.66 )-----------( 112      ( 30 Mar 2022 06:20 )             30.2     03-30    136  |  98  |  29<H>  ----------------------------<  85  4.2   |  29  |  1.3    Ca    8.4<L>      30 Mar 2022 06:20    Phos  2.4     03-29    Mg     1.8         TPro  5.2<L>  /  Alb  3.0<L>  /  TBili  0.8  /  DBili  x   /  AST  15  /  ALT  24  /  AlkPhos  68      Serum Pro-Brain Natriuretic Peptide: 2892 pg/mL (22 @ 04:35)    RADIOLOGY & ADDITIONAL TESTS:  Imaging or report Personally Reviewed:  [ ] YES  [ ] NO    Telemetry reviewed independently - no acute events    Medications:  Standing  albuterol/ipratropium for Nebulization 3 milliLiter(s) Nebulizer every 6 hours  apixaban 5 milliGRAM(s) Oral two times a day  chlorhexidine 4% Liquid 1 Application(s) Topical <User Schedule>  cyanocobalamin 1000 MICROGram(s) Oral daily  diltiazem    milliGRAM(s) Oral daily  ferrous    sulfate 325 milliGRAM(s) Oral daily  finasteride 5 milliGRAM(s) Oral daily  furosemide    Tablet 40 milliGRAM(s) Oral daily  influenza  Vaccine (HIGH DOSE) 0.7 milliLiter(s) IntraMuscular once  melatonin 5 milliGRAM(s) Oral at bedtime  metoprolol tartrate 100 milliGRAM(s) Oral three times a day  pantoprazole    Tablet 40 milliGRAM(s) Oral before breakfast  predniSONE   Tablet 40 milliGRAM(s) Oral daily  tamsulosin 0.4 milliGRAM(s) Oral at bedtime  trimethoprim  160 mG/sulfamethoxazole 800 mG 1 Tablet(s) Oral daily    PRN Meds  aluminum hydroxide/magnesium hydroxide/simethicone Suspension 30 milliLiter(s) Oral every 4 hours PRN  sodium chloride 0.65% Nasal 1 Spray(s) Both Nostrils four times a day PRN      Case discussed with resident  Care discussed with pt

## 2022-03-31 NOTE — PROGRESS NOTE ADULT - ASSESSMENT
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     # Acute on chronic respiratory failure, resolved  # ILD on chronic O2  # LLL mass with mediastinal lymphadenopathy  - pt with chronic respiratory failure 2/2 ILD with baseline 5L o2 requirement  - pulm following  - cont slow prednisone taper over 6w per pulm and bactrim ppx  - further w/u offered prior during admission and pt declined  - palliative care following for GOC, discussion of hospice. Family meeting planned for tomorrow 4/1.  - cont dilaudid per palliative for dyspnea    # Syncope  - 2/2 hypoxia  - recurrent admissions due to hypoxemia at home as ILD is advanced   - pt declined MCOT/ILR and does not want further workup    # Chronic atrial fibrillation, rate controlled  - RVR on admission  - no chest pain, trops positive on admission 0.03, repeats stable  - cont Eliquis 5 BID, cardizem 120mg ER, and metoprolol tartrate 100mg TID    # HFrEF, not in acute exacerbation  - euvolemic on exam  - TTE 02/27: LVEF 46%, severe pHTN  - BNP 2892 on admission, baseline ~2000  - cont lasix 40mg PO qd    # HTN  - BP: 90/54 (90/54 - 126/61)  - cont cardiac meds as above    # BPH -cont flomax, finasteride    PT/REHAB: PT  ACTIVITY: Activity - Out of Bed to Chair  Activity - Ambulate with Assistance  DVT PPX: eliquis  GI PPX: pantoprazole    Tablet  DIET: Diet, DASH/TLC  CODE: full code  Disposition: poss dc tomorrow after family discussion. to NH  Pending: family discussion with palliative

## 2022-03-31 NOTE — PROGRESS NOTE ADULT - SUBJECTIVE AND OBJECTIVE BOX
NNEKA JOSEPH MRN#: [id number]  CODE STATUS: FULL CODE     SUBJECTIVE   Chief complaint: SOB    Currently admitted to medicine with the primary diagnosis of SHORTNESS OF BREATH      Today is hospital day 7d,   INTERVAL HPI/OVERNIGHT EVENTS: No acute events overnight    This morning, he is laying in bed comfortably. Denies chest pain, shortness of breath, abdominal pain, nausea, vomiting or changes in bowel habits. He has no complaints today.     Urinating and stooling appropriately.    Present Today:           Joel Catheter ()No/ (x)Yes? Indwelling Urethral Catheter    Indication:           Central Line (x)No/ ()Yes?  Indication:          IV Fluids (x)No/ ()Yes?  Indication:     OBJECTIVE  PAST MEDICAL & SURGICAL HISTORY  Hypertension    GERD (gastroesophageal reflux disease)    Lung interstitial disease    BPH (benign prostatic hyperplasia)    HFrEF (heart failure with reduced ejection fraction)    COPD (chronic obstructive pulmonary disease)    No significant past surgical history      ALLERGIES:  No Known Allergies    HOME MEDICATIONS:  Home Medications:  dilTIAZem 120 mg/24 hours oral capsule, extended release: 1 cap(s) orally once a day (24 Mar 2022 08:26)  Eliquis 5 mg oral tablet: 1 tab(s) orally 2 times a day (24 Mar 2022 08:26)  FERROUS SULFATE 325 MG TABLET: 1 each orally once a day (24 Mar 2022 08:26)  FINASTERIDE 5 MG TABLET: 1 each orally once a day (24 Mar 2022 08:26)  furosemide 40 mg oral tablet: 1 tab(s) orally once a day (24 Mar 2022 08:26)  Lopressor 100 mg oral tablet: 1 tab(s) orally 3 times a day (24 Mar 2022 08:26)  OMEPRAZOLE DR 40MG CAP: 1 cap(s) orally once a day (24 Mar 2022 08:26)  TAMSULOSIN HCL 0.4 MG CAPSULE: 1 each orally once a day (at bedtime) (24 Mar 2022 08:26)  VITAMIN B-12 1,000 MCG TABLET: 1 each orally once a day (24 Mar 2022 08:26)    MEDICATIONS:  STANDING MEDICATIONS  MEDICATIONS  (STANDING):  albuterol/ipratropium for Nebulization 3 milliLiter(s) Nebulizer every 6 hours  apixaban 5 milliGRAM(s) Oral two times a day  chlorhexidine 4% Liquid 1 Application(s) Topical <User Schedule>  cyanocobalamin 1000 MICROGram(s) Oral daily  diltiazem    milliGRAM(s) Oral daily  ferrous    sulfate 325 milliGRAM(s) Oral daily  finasteride 5 milliGRAM(s) Oral daily  furosemide    Tablet 40 milliGRAM(s) Oral daily  influenza  Vaccine (HIGH DOSE) 0.7 milliLiter(s) IntraMuscular once  melatonin 5 milliGRAM(s) Oral at bedtime  metoprolol tartrate 100 milliGRAM(s) Oral three times a day  pantoprazole    Tablet 40 milliGRAM(s) Oral before breakfast  predniSONE   Tablet 40 milliGRAM(s) Oral daily  tamsulosin 0.4 milliGRAM(s) Oral at bedtime  trimethoprim  160 mG/sulfamethoxazole 800 mG 1 Tablet(s) Oral daily    PRN MEDICATIONS  MEDICATIONS  (PRN):  aluminum hydroxide/magnesium hydroxide/simethicone Suspension 30 milliLiter(s) Oral every 4 hours PRN Dyspepsia  HYDROmorphone   Tablet 2 milliGRAM(s) Oral every 6 hours PRN dyspnea  sodium chloride 0.65% Nasal 1 Spray(s) Both Nostrils four times a day PRN Nasal Congestion      VITAL SIGNS  T(F): 98 (03-31 @ 05:30), Max: 98 (03-31 @ 05:30)  HR: 74 (03-31 @ 13:32) (70 - 104)  BP: 90/54 (03-31 @ 13:32) (90/54 - 126/61)  RR: 18 (03-31 @ 13:32) (18 - 19)  SpO2: 98% (03-31 @ 13:32) (97% - 98%)    LABS:                        10.5   11.16 )-----------( 155      ( 31 Mar 2022 12:00 )             32.9     03-31    137  |  100  |  34<H>  ----------------------------<  138<H>  4.8   |  26  |  1.3    Ca    8.3<L>      31 Mar 2022 12:00  Mg     1.6     03-31    TPro  5.7<L>  /  Alb  3.2<L>  /  TBili  0.7  /  DBili  x   /  AST  15  /  ALT  25  /  AlkPhos  69  03-31      RADIOLOGY:   Reviewed  CXR bl opacities  LE nicole duplex neg      PHYSICAL EXAM:  General: Comfortably laying on the hospital bed. NAD. AAOx3. hard of hearing  HEENT: Atraumatic. Normocephalic. EOMI. Conjunctiva and sclera clear.  Cardiac: Normal S1, S2. RRR. No murmurs, rubs, or gallops.  Pulm: CTA b/l no wheezes, rhonchi, or rales. on 3L NC  GI: Soft, nontender, nondistended. Joel.  Ext: 2+ pulses. Moving all 4 extremities. No edema, cyanosis.  Neuro: CN II-XII grossly intact  Skin: No lesions or rashes

## 2022-03-31 NOTE — PROGRESS NOTE ADULT - ASSESSMENT
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 Per patient had acute onset of SOB while ambulating to the bathroom and syncopized on the toilet. denies head trauma. He was recently d/c earlier this month for a similar admission for syncope secondary to hypoxemia from ILD exacerbation. Patient started on solumedrol IV 60mg BID, which was then transitioned to prednisone 40mg QD (to be tapered over 6 weeks).       being evaluated for support with [facility]       MEDD (morphine equivalent daily dose): na      See Recs below. d/w Primary and Palliative teams      Please call [phone] with questions or concerns 24/7.   We will continue to follow.

## 2022-03-31 NOTE — PROGRESS NOTE ADULT - SUBJECTIVE AND OBJECTIVE BOX
NNEKA JOSEPH          MRN-[id number]              HPI:  PC: SOB + Syncope    PMHx: ILD on 5L O2 at baseline at home, HTN, BPH, GERD, A fib (on Eliquis), 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 Per patient had acute onset of SOB while ambulating to the bathroom and syncopized on the toilet. denies head trauma. He was recently d/c earlier this month for a similar admission for syncope secondary to hypoxemia from ILD exacerbation. Patient started on solumedrol IV 60mg BID, which was then transitioned to prednisone 40mg QD (to be tapered over 6 weeks).    ED course: Tachycardic in triage, SpO2 97% on NRB. Labs revealed mild leukocytosis, hypoMg, trop 0.03 and BNP 2892. VBG unremarkable except LA of 4.6. EKG revealed Afib w/ RVR. CXR w/ chronic b/l opacities. s/p 500ml LR + Solu-Medrol in ED and placed on NIV. (24 Mar 2022 08:23)    INTERVAL EVENTS:  3/29: patient states going to bathroom today, but had dyspnea after ambulating, is open to trying low-dose dilaudid for symptom relief  3/30: patient states ambulating, but desaturating after; at rest has no dyspnea  3/31: patient states feeling fine, wanting to ambulate today    ROS:	                      Dyspnea (John 0-10): not during the encounter; but described PONCE (eg with going to BR)                     N/V (Y/N): No                             Secretions (Y/N) : No                                          Agitation(Y/N): No                              Pain (Y/N): No                                 -Provocation/Palliation: N/A  -Quality/Quantity: N/A  -Radiating: N/A  -Severity: No pain  -Timing/Frequency: N/A  -Impact on ADLs: N/A    General:  trouble sleeping days on end at times  HEENT:    [place] - improved with amplifiers but amplifier now missing a part   Neck:  Denied  CVS:  Denied  Resp:  Denied  GI:  Denied    :  Denied  Musc:  Denied  Neuro:  Denied  Psych:  Denied  Skin:  Denied  Lymph:  Denied    Last BM: ?     Allergies    No Known Allergies    Intolerances    Opiate Naive (Y/N): y  -iStop reviewed (Y/N): y  Ref#:   This report was requested by: Tesha Mancini | Reference #: [id number]               Labs:	                                                10.5   11.16 )-----------( 155      ( 31 Mar 2022 12:00 )             32.9       137  |  100  |  34<H>  ----------------------------<  138<H>  4.8   |  26  |  1.3    Ca    8.3<L>      31 Mar 2022 12:00  Mg     1.6               Radiology:	   < from: [facility] Duplex Lower Ext Vein Scan, Bilat (22 @ 16:06) >    IMPRESSION:  No evidence of deep venous thrombosis in either lower extremity.    --- End of Report ---            TAVARES TRAN MD; Attending Vascular Surgeon  This document has been electronically signed. Mar 25 2022 1:32PM    < end of copied text >    < from: Xray Chest 1 View- PORTABLE-Urgent (22 @ 06:18) >  ACC: [id number] EXAM:  XR CHEST PORTABLE URGENT 1V                          PROCEDURE DATE:  2022          INTERPRETATION:  Clinical History / Reason for exam: Pain    Comparison : Chest radiograph 2022.    Technique/Positioning: Low lung volume.    Findings:    Support devices: None.    Cardiac/mediastinum/hilum: Magnified, unchanged.    Lung parenchyma/Pleura: Bilateral opacities, unchanged. No pneumothorax   is seen.    Skeleton/soft tissues: Stable.    Impression:    Low lung volume.    Bilateral opacities, unchanged.    --- End of Report ---            ALYSE CALZADA MD; Attending Radiologist  This document has been electronically signed. Mar 24 2022  7:22AM    < end of copied text >        EK Lead ECG:   Ventricular Rate 80 BPM    Atrial Rate 98 BPM    QRS Duration 92 ms    Q-T Interval 392 ms    QTC Calculation(Bazett) 452 ms    R Axis -6 degrees    T Axis -20 degrees    Diagnosis Line Atrial fibrillation  Abnormal ECG    Confirmed by NNEKA CUADRA MD ([id number]) on 3/25/2022 8:39:02 AM (22 @ 07:40)      Imaging Personally Reviewed:  [ ] YES  [ ] NO    Consultant(s) Notes Reviewed:  [ ] YES  [ ] NO  Care Discussed with Consultants/Other Providers [ ] YES  [ ] NO    PEx:	  Vital Signs Last 24 Hrs  T(C): 36.7 (31 Mar 2022 05:30), Max: 36.7 (31 Mar 2022 05:30)  T(F): 98 (31 Mar 2022 05:30), Max: 98 (31 Mar 2022 05:30)  HR: 74 (31 Mar 2022 13:32) (70 - 104)  BP: 90/54 (31 Mar 2022 13:32) (90/54 - 126/61)  BP(mean): --  RR: 18 (31 Mar 2022 13:32) (18 - 19)  SpO2: 98% (31 Mar 2022 13:32) (97% - 98%)    General:  NAD  Eyes:  clear conjunctiva  ENMT: grossly normal   Resp: Unlabored Non tachypneic   GI:  nondistended  Neuro: grossly WNL  Psych: Calm   Skin: nonjaundiced    Preadmit Karnofsky:  %  6months prior to admission 90%         Current Karnofsky:   40  %  http://www.npcrc.org/files/news/karnofsky_performance_scale.pdf   http://www.npcrc.org/files/news/palliative_performance_scale_PPSv2.pdf  Cachexia (Y/N):   BMI: BMI (kg/m2): 28.1 (22 @ 18:47)      Medications:	      MEDICATIONS  (STANDING):  albuterol/ipratropium for Nebulization 3 milliLiter(s) Nebulizer every 6 hours  apixaban 5 milliGRAM(s) Oral two times a day  chlorhexidine 4% Liquid 1 Application(s) Topical <User Schedule>  cyanocobalamin 1000 MICROGram(s) Oral daily  diltiazem    milliGRAM(s) Oral daily  ferrous    sulfate 325 milliGRAM(s) Oral daily  finasteride 5 milliGRAM(s) Oral daily  furosemide    Tablet 40 milliGRAM(s) Oral daily  influenza  Vaccine (HIGH DOSE) 0.7 milliLiter(s) IntraMuscular once  melatonin 5 milliGRAM(s) Oral at bedtime  metoprolol tartrate 100 milliGRAM(s) Oral three times a day  pantoprazole    Tablet 40 milliGRAM(s) Oral before breakfast  predniSONE   Tablet 40 milliGRAM(s) Oral daily  tamsulosin 0.4 milliGRAM(s) Oral at bedtime  trimethoprim  160 mG/sulfamethoxazole 800 mG 1 Tablet(s) Oral daily    MEDICATIONS  (PRN):  aluminum hydroxide/magnesium hydroxide/simethicone Suspension 30 milliLiter(s) Oral every 4 hours PRN Dyspepsia  HYDROmorphone   Tablet 2 milliGRAM(s) Oral every 6 hours PRN dyspnea  sodium chloride 0.65% Nasal 1 Spray(s) Both Nostrils four times a day PRN Nasal Congestion      Advanced Directives:	     Full Code    Decision maker: The patient is able to participate in complex medical decision making conversations. Wanted us to call his son, Elkin   Legal surrogate:    GOALS OF CARE DISCUSSION	       Palliative care info/counseling provided	               Documentation of GOC/Advanced Care Planning:          PSYCHOSOCIAL-SPIRITUAL ASSESSMENT:            See Palliative Care SW/ documentation        	    REFERRALS       Palliative Med        Unit SW/Case Mgmt

## 2022-04-01 NOTE — PROGRESS NOTE ADULT - ASSESSMENT
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     # Acute on chronic respiratory failure, resolved  # ILD on chronic O2  # LLL mass with mediastinal lymphadenopathy  - pt with chronic respiratory failure 2/2 ILD with baseline 5L o2 requirement  - pulm following  - cont slow prednisone taper over 6w per pulm and bactrim ppx  - further w/u offered prior during admission and pt declined  - palliative care following for GOC, discussion of hospice. Family meeting today possibly. - if not will dc  - cont dilaudid per palliative for dyspnea    # Syncope  - 2/2 hypoxia  - recurrent admissions due to hypoxemia at home as ILD is advanced   - pt declined MCOT/ILR and does not want further workup    # Chronic atrial fibrillation, rate controlled  - RVR on admission  - no chest pain, trops positive on admission 0.03, repeats stable  - cont Eliquis 5 BID, cardizem 120mg ER, and metoprolol tartrate 100mg TID    # HFrEF, not in acute exacerbation  - euvolemic on exam  - TTE 02/27: LVEF 46%, severe pHTN  - BNP 2892 on admission, baseline ~2000  - cont lasix 40mg PO qd    # HTN  - BP: 135/82 (87/54 - 135/82)  - cont cardiac meds as above    # BPH -cont flomax, finasteride    PT/REHAB: PT  ACTIVITY: Activity - Out of Bed to Chair  Activity - Ambulate with Assistance  DVT PPX: eliquis  GI PPX: pantoprazole    Tablet  DIET: Diet, DASH/TLC  CODE: full code  Disposition: poss dc if no family meeting  Pending: family meeting?

## 2022-04-01 NOTE — HOSPICE CARE NOTE - CONVESATION DETAILS
Hospice referral completed. Phone call to patient's son Carlin, hospice services and philosophy reviewed.  Son not receptive to hospice services at this time, says he still wants his dad to be treated aggressively.  ATILIO Bonilla aware and will let medical team know.

## 2022-04-01 NOTE — DISCHARGE NOTE PROVIDER - NSDCCPCAREPLAN_GEN_ALL_CORE_FT
PRINCIPAL DISCHARGE DIAGNOSIS  Diagnosis: Shortness of breath  Assessment and Plan of Treatment: You came to the hospital for shortness of breath. This is likely due to your interstitial lung disease. You were started on steroids and prophylactic antibiotics. You will continue the steroids and antibiotics and will need to follow-up with the pulmonologist outpatient.       PRINCIPAL DISCHARGE DIAGNOSIS  Diagnosis: Shortness of breath  Assessment and Plan of Treatment: You came to the hospital for shortness of breath. This is likely due to your interstitial lung disease. You were started on steroids and prophylactic antibiotics. You will continue the steroids and antibiotics and will need to follow-up with the pulmonologist outpatient.  CHRONIC HYPOXIC RESPIRATORY FAILURE 2/2 ILD  ACUTE HYPOXEMIC RESPIRATORY FAILURE 2/2 HFimprEF AND ILD  SYNCOPE 2/2 HYPOXIA & ANXIETY DISORDER - PANIC ATTACK  CHRONIC ANXIETY TOWARDS END OF LIFE DISTRESS AND ILLNESS   CHRONIC DYSPNEA 2/2 ILD   NO EVIDENCE OF INFECTION ON THIS ADMISSION   DECONDITIONING - DUE TO ENDSTAGE ILD AND ADVANCED AGE  FULL CODE PER SON AND PATIENT (DECLINED HOSPICE INVOLVEMENT IN CARE)

## 2022-04-01 NOTE — DISCHARGE NOTE NURSING/CASE MANAGEMENT/SOCIAL WORK - PATIENT PORTAL LINK FT
You can access the FollowMyHealth Patient Portal offered by [facility] by registering at the following website: [email or web address]. By joining [facility]’s FollowMyHealth portal, you will also be able to view your health information using other applications (apps) compatible with our system.

## 2022-04-01 NOTE — PROGRESS NOTE ADULT - PROBLEM SELECTOR PLAN 2
- added dilaudid 2mg Q6 PO PRN for dyspnea, patient is amenable, but used 0 doses/24 hours  - monitor BMs
- added dilaudid 2mg Q6 PO PRN for dyspnea, patient is amenable, but used 0 doses/24 hours  - monitor BMs
- added dilaudid 2mg Q6 PO PRN for dyspnea, patient is amenable, d/c to SNF with this regimen  - monitor BMs
- added dilaudid 2mg Q6 PO PRN for dyspnea, patient is amenable  - d/w team  - monitor BMs

## 2022-04-01 NOTE — DISCHARGE NOTE PROVIDER - CARE PROVIDER_API CALL
Mathieu Womack)  Critical Care Medicine; Pulmonary Disease; Sleep Medicine  [address] [place] [address]  Phone: [phone]  Fax: [phone]  Follow Up Time: 2 weeks

## 2022-04-01 NOTE — DISCHARGE NOTE NURSING/CASE MANAGEMENT/SOCIAL WORK - NSDCPEFALRISK_GEN_ALL_CORE
For information on Fall & Injury Prevention, visit: [email or web address] OR  [email or web address] OR  https://www.cdc.gov/steadi/patient.html

## 2022-04-01 NOTE — PROGRESS NOTE ADULT - PROBLEM SELECTOR PROBLEM 1
ILD (interstitial lung disease)

## 2022-04-01 NOTE — CHART NOTE - NSCHARTNOTEFT_GEN_A_CORE
visited patient earlier today. patient encountered resting comfortably in bedside chair. patient denied any pain or discomfort today. support rendered.

## 2022-04-01 NOTE — PROGRESS NOTE ADULT - PROBLEM SELECTOR PLAN 1
- ILD with exacerbation, per pulm is improving  - c/w slow prednisone taper

## 2022-04-01 NOTE — PROGRESS NOTE ADULT - TIME BILLING
direct pt care, chart review and interdisciplinary rounds
direct pt care, chart review and interdisciplinary rounds
direct pt care

## 2022-04-01 NOTE — DISCHARGE NOTE PROVIDER - NSDCMRMEDTOKEN_GEN_ALL_CORE_FT
dilTIAZem 120 mg/24 hours oral capsule, extended release: 1 cap(s) orally once a day  Eliquis 5 mg oral tablet: 1 tab(s) orally 2 times a day  FERROUS SULFATE 325 MG TABLET: 1 each orally once a day  FINASTERIDE 5 MG TABLET: 1 each orally once a day  furosemide 40 mg oral tablet: 1 tab(s) orally once a day  Lopressor 100 mg oral tablet: 1 tab(s) orally 3 times a day  OMEPRAZOLE DR 40MG CAP: 1 cap(s) orally once a day  predniSONE 10 mg oral tablet: 4 tab(s) orally once a day x 10 days  3 tab(s) orally once a day x 10 days  2 tab(s) orally once a day x 10 days  1 tab(s) orally once a day x 10 days  TAMSULOSIN HCL 0.4 MG CAPSULE: 1 each orally once a day (at bedtime)  VITAMIN B-12 1,000 MCG TABLET: 1 each orally once a day   dilTIAZem 120 mg/24 hours oral capsule, extended release: 1 cap(s) orally once a day  Eliquis 5 mg oral tablet: 1 tab(s) orally 2 times a day  FERROUS SULFATE 325 MG TABLET: 1 each orally once a day  FINASTERIDE 5 MG TABLET: 1 each orally once a day  furosemide 40 mg oral tablet: 1 tab(s) orally once a day  HYDROmorphone 2 mg oral tablet: 1 tab(s) orally every 6 hours, As needed, dyspnea  Lopressor 100 mg oral tablet: 1 tab(s) orally 3 times a day  OMEPRAZOLE DR 40MG CAP: 1 cap(s) orally once a day  predniSONE 20 mg oral tablet: 2 tab(s) orally once a day  sulfamethoxazole-trimethoprim 800 mg-160 mg oral tablet: 1 tab(s) orally once a day  TAMSULOSIN HCL 0.4 MG CAPSULE: 1 each orally once a day (at bedtime)  VITAMIN B-12 1,000 MCG TABLET: 1 each orally once a day   dilTIAZem 120 mg/24 hours oral capsule, extended release: 1 cap(s) orally once a day  Eliquis 5 mg oral tablet: 1 tab(s) orally 2 times a day  FERROUS SULFATE 325 MG TABLET: 1 each orally once a day  FINASTERIDE 5 MG TABLET: 1 each orally once a day  furosemide 40 mg oral tablet: 1 tab(s) orally once a day  HYDROmorphone 2 mg oral tablet: 1 tab(s) orally every 6 hours, As needed, dyspnea  Lopressor 100 mg oral tablet: 1 tab(s) orally 3 times a day  OMEPRAZOLE DR 40MG CAP: 1 cap(s) orally once a day  predniSONE 20 mg oral tablet: 1 tab(s) orally once a day for 1 week  then 10mg oral chronically and f/u w/ Pulmonary   sulfamethoxazole-trimethoprim 800 mg-160 mg oral tablet: 1 tab(s) orally once a day  TAMSULOSIN HCL 0.4 MG CAPSULE: 1 each orally once a day (at bedtime)  VITAMIN B-12 1,000 MCG TABLET: 1 each orally once a day

## 2022-04-01 NOTE — PROGRESS NOTE ADULT - ATTENDING COMMENTS
Pt seen and examined. Case d/w team at rounds and agree with above.  Case discussed with son Elkin Azar [phone] in detail over at least 35 minutes today to discuss recommendation of hospice care for his father. Son agreed to hospice consultation and discuss hospice care and after discussion with hospice team son decided to DECLINE HOSPICE CARE.

## 2022-04-01 NOTE — PROGRESS NOTE ADULT - SUBJECTIVE AND OBJECTIVE BOX
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 Per patient had acute onset of SOB while ambulating to the bathroom and syncopized on the toilet. denies head trauma. He was recently d/c earlier this month for a similar admission for syncope secondary to hypoxemia from ILD exacerbation. Patient started on solumedrol IV 60mg BID, which was then transitioned to prednisone 40mg QD (to be tapered over 6 weeks).    ED course: Tachycardic in triage, SpO2 97% on NRB. Labs revealed mild leukocytosis, hypoMg, trop 0.03 and BNP 2892. VBG unremarkable except LA of 4.6. EKG revealed Afib w/ RVR. CXR w/ chronic b/l opacities. s/p 500ml LR + Solu-Medrol in ED and placed on NIV. (24 Mar 2022 08:23)    INTERVAL EVENTS:  3/29: patient states going to bathroom today, but had dyspnea after ambulating, is open to trying low-dose dilaudid for symptom relief  3/30: patient states ambulating, but desaturating after; at rest has no dyspnea  3/31: patient states feeling fine, wanting to ambulate today  : patient states being comfortable, being d/c today    ROS:	                      Dyspnea (John 0-10): not during the encounter; but described PONCE (eg with going to BR)                     N/V (Y/N): No                             Secretions (Y/N) : No                                          Agitation(Y/N): No                              Pain (Y/N): No                                 -Provocation/Palliation: N/A  -Quality/Quantity: N/A  -Radiating: N/A  -Severity: No pain  -Timing/Frequency: N/A  -Impact on ADLs: N/A    General:  trouble sleeping days on end at times  HEENT:    [facility] - improved with amplifiers but amplifier now missing a part   Neck:  Denied  CVS:  Denied  Resp:  Denied  GI:  Denied    :  Denied  Musc:  Denied  Neuro:  Denied  Psych:  Denied  Skin:  Denied  Lymph:  Denied    Last BM: ?     Allergies    No Known Allergies    Intolerances    Opiate Naive (Y/N): y  -iStop reviewed (Y/N): y  Ref#:   This report was requested by: Tesha Mancini | Reference #: [id number]               Labs:	                                         10.3   9.34  )-----------( 140      ( 2022 07:00 )             32.4     04-    134<L>  |  100  |  30<H>  ----------------------------<  87  4.8   |  27  |  1.1    Ca    8.3<L>      2022 07:00  Mg     2.1     04-        Radiology:	   < from: VA Duplex Lower Ext Vein Scan, Bilat (22 @ 16:06) >    IMPRESSION:  No evidence of deep venous thrombosis in either lower extremity.    --- End of Report ---            TAVARES TRAN MD; Attending Vascular Surgeon  This document has been electronically signed. Mar 25 2022 1:32PM    < end of copied text >    < from: Xray Chest 1 View- PORTABLE-Urgent (22 @ 06:18) >  ACC: [id number] EXAM:  XR CHEST PORTABLE URGENT 1V                          PROCEDURE DATE:  2022          INTERPRETATION:  Clinical History / Reason for exam: Pain    Comparison : Chest radiograph 2022.    Technique/Positioning: Low lung volume.    Findings:    Support devices: None.    Cardiac/mediastinum/hilum: Magnified, unchanged.    Lung parenchyma/Pleura: Bilateral opacities, unchanged. No pneumothorax   is seen.    Skeleton/soft tissues: Stable.    Impression:    Low lung volume.    Bilateral opacities, unchanged.    --- End of Report ---            ALYSE CALZADA MD; Attending Radiologist  This document has been electronically signed. Mar 24 2022  7:22AM    < end of copied text >        EK Lead ECG:   Ventricular Rate 80 BPM    Atrial Rate 98 BPM    QRS Duration 92 ms    Q-T Interval 392 ms    QTC Calculation(Bazett) 452 ms    R Axis -6 degrees    T Axis -20 degrees    Diagnosis Line Atrial fibrillation  Abnormal ECG    Confirmed by NNEKA CUADRA MD ([id number]) on 3/25/2022 8:39:02 AM (22 @ 07:40)      Imaging Personally Reviewed:  [ ] YES  [ ] NO    Consultant(s) Notes Reviewed:  [ ] YES  [ ] NO  Care Discussed with Consultants/Other Providers [ ] YES  [ ] NO    PEx:	  Vital Signs Last 24 Hrs  T(C): 36.7 (31 Mar 2022 05:30), Max: 36.7 (31 Mar 2022 05:30)  T(F): 98 (31 Mar 2022 05:30), Max: 98 (31 Mar 2022 05:30)  HR: 74 (31 Mar 2022 13:32) (70 - 104)  BP: 90/54 (31 Mar 2022 13:32) (90/54 - 126/61)  BP(mean): --  RR: 18 (31 Mar 2022 13:32) (18 - 19)  SpO2: 98% (31 Mar 2022 13:32) (97% - 98%)    General:  NAD  Eyes:  clear conjunctiva  ENMT: grossly normal   Resp: Unlabored Non tachypneic   GI:  nondistended  Neuro: grossly WNL  Psych: Calm   Skin: nonjaundiced    Preadmit Karnofsky:  %  6months prior to admission 90%         Current Karnofsky:   40  %  http://www.npcrc.org/files/news/karnofsky_performance_scale.pdf   http://www.npcrc.org/files/news/palliative_performance_scale_PPSv2.pdf  Cachexia (Y/N):   BMI: BMI (kg/m2): 28.1 (22 @ 18:47)      Medications:	      MEDICATIONS  (STANDING):  apixaban 5 milliGRAM(s) Oral two times a day  chlorhexidine 4% Liquid 1 Application(s) Topical <User Schedule>  cyanocobalamin 1000 MICROGram(s) Oral daily  diltiazem    milliGRAM(s) Oral daily  ferrous    sulfate 325 milliGRAM(s) Oral daily  finasteride 5 milliGRAM(s) Oral daily  furosemide    Tablet 40 milliGRAM(s) Oral daily  influenza  Vaccine (HIGH DOSE) 0.7 milliLiter(s) IntraMuscular once  melatonin 5 milliGRAM(s) Oral at bedtime  metoprolol tartrate 100 milliGRAM(s) Oral three times a day  pantoprazole    Tablet 40 milliGRAM(s) Oral before breakfast  predniSONE   Tablet 40 milliGRAM(s) Oral daily  tamsulosin 0.4 milliGRAM(s) Oral at bedtime  trimethoprim  160 mG/sulfamethoxazole 800 mG 1 Tablet(s) Oral daily    MEDICATIONS  (PRN):  aluminum hydroxide/magnesium hydroxide/simethicone Suspension 30 milliLiter(s) Oral every 4 hours PRN Dyspepsia  HYDROmorphone   Tablet 2 milliGRAM(s) Oral every 6 hours PRN dyspnea  sodium chloride 0.65% Nasal 1 Spray(s) Both Nostrils four times a day PRN Nasal Congestion        Advanced Directives:	     Full Code    Decision maker: The patient is able to participate in complex medical decision making conversations. Wanted us to call his son, Elkin   Legal surrogate:    GOALS OF CARE DISCUSSION	       Palliative care info/counseling provided	               Documentation of GOC/Advanced Care Planning:          PSYCHOSOCIAL-SPIRITUAL ASSESSMENT:            See Palliative Care SW/ documentation        	    REFERRALS       Palliative Med        Unit SW/Case Mgmt

## 2022-04-01 NOTE — PROGRESS NOTE ADULT - PROVIDER SPECIALTY LIST ADULT
Cardiology
Internal Medicine
Critical Care
Internal Medicine
Palliative Care
Pulmonology
Hospitalist
Palliative Care

## 2022-04-01 NOTE — PROGRESS NOTE ADULT - PROBLEM SELECTOR PLAN 3
- would encourage hearing aide or amplified use to optimize communication with patient
- would try to obtain another amplifier, was working well for conversation and TV
- would try to obtain another amplifier, was working well for conversation and TV
- c/w amplifier, working well for conversation and TV

## 2022-04-01 NOTE — PROGRESS NOTE ADULT - ASSESSMENT
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 Per patient had acute onset of SOB while ambulating to the bathroom and syncopized on the toilet. denies head trauma. He was recently d/c earlier this month for a similar admission for syncope secondary to hypoxemia from ILD exacerbation. Patient started on solumedrol IV 60mg BID, which was then transitioned to prednisone 40mg QD (to be tapered over 6 weeks).       being evaluated for support with [facility]       MEDD (morphine equivalent daily dose): na      See Recs below. d/w Primary and Palliative teams      Please call [phone] with questions or concerns 24/7.   We will continue to follow.

## 2022-04-01 NOTE — PROGRESS NOTE ADULT - PROBLEM SELECTOR PLAN 4
- awaiting to hear from son re: family meeting time  - will follow  ______________  Chaitanya Montoya MD  Palliative Medicine  [facility]   of Geriatric and Palliative Medicine  [phone]
- awaiting to hear from son re: family meeting time, attempted to call today, no answer and voicemail full  - will follow  ______________  Chaitanya Montoya MD  Palliative Medicine  [facility]   of Geriatric and Palliative Medicine  [phone]
- patient being discharged, attempted to but was unable to set family meeting with patient and children; however son did d/w hospice per documentation, and declined for now in favor of continued medical management.     ______________  Chaitanya Montoya MD  Palliative Medicine  [facility]   of Geriatric and Palliative Medicine  [phone]
- d/w son, states father will make his own decisions unless he defers; he feels home would not be feasible, but would like to discuss this further at a family meeting  - son Elkin will d/w his siblings and find a suitable time to meet in the next few days  - will follow  ______________  Chaitanya Montoya MD  Palliative Medicine  [facility]   of Geriatric and Palliative Medicine  [phone]

## 2022-04-01 NOTE — PROGRESS NOTE ADULT - SUBJECTIVE AND OBJECTIVE BOX
NNEKA JOSEPH MRN#: [id number]  CODE STATUS: FULL CODE     SUBJECTIVE   Chief complaint: syncope    Currently admitted to medicine with the primary diagnosis of SHORTNESS OF BREATH      Today is hospital day 8d,   INTERVAL HPI/OVERNIGHT EVENTS: No acute events overnight    This morning, he is laying in bed comfortably. Denies chest pain, shortness of breath, abdominal pain, nausea, vomiting or changes in bowel habits. He has no complaints today.     Urinating and stooling appropriately.    Present Today:           Joel Catheter ()No/ (x)Yes? Indwelling Urethral Catheter    Indication:   chronic retention          Central Line (x)No/ ()Yes?  Indication:          IV Fluids (x)No/ ()Yes?  Indication:     OBJECTIVE  PAST MEDICAL & SURGICAL HISTORY  Hypertension    GERD (gastroesophageal reflux disease)    Lung interstitial disease    BPH (benign prostatic hyperplasia)    HFrEF (heart failure with reduced ejection fraction)    COPD (chronic obstructive pulmonary disease)    No significant past surgical history      ALLERGIES:  No Known Allergies    HOME MEDICATIONS:  Home Medications:  dilTIAZem 120 mg/24 hours oral capsule, extended release: 1 cap(s) orally once a day (24 Mar 2022 08:26)  Eliquis 5 mg oral tablet: 1 tab(s) orally 2 times a day (24 Mar 2022 08:26)  FERROUS SULFATE 325 MG TABLET: 1 each orally once a day (24 Mar 2022 08:26)  FINASTERIDE 5 MG TABLET: 1 each orally once a day (24 Mar 2022 08:26)  furosemide 40 mg oral tablet: 1 tab(s) orally once a day (24 Mar 2022 08:26)  Lopressor 100 mg oral tablet: 1 tab(s) orally 3 times a day (24 Mar 2022 08:26)  OMEPRAZOLE DR 40MG CAP: 1 cap(s) orally once a day (24 Mar 2022 08:26)  TAMSULOSIN HCL 0.4 MG CAPSULE: 1 each orally once a day (at bedtime) (24 Mar 2022 08:26)  VITAMIN B-12 1,000 MCG TABLET: 1 each orally once a day (24 Mar 2022 08:26)    MEDICATIONS:  STANDING MEDICATIONS  MEDICATIONS  (STANDING):  apixaban 5 milliGRAM(s) Oral two times a day  chlorhexidine 4% Liquid 1 Application(s) Topical <User Schedule>  cyanocobalamin 1000 MICROGram(s) Oral daily  diltiazem    milliGRAM(s) Oral daily  ferrous    sulfate 325 milliGRAM(s) Oral daily  finasteride 5 milliGRAM(s) Oral daily  furosemide    Tablet 40 milliGRAM(s) Oral daily  influenza  Vaccine (HIGH DOSE) 0.7 milliLiter(s) IntraMuscular once  melatonin 5 milliGRAM(s) Oral at bedtime  metoprolol tartrate 100 milliGRAM(s) Oral three times a day  pantoprazole    Tablet 40 milliGRAM(s) Oral before breakfast  predniSONE   Tablet 40 milliGRAM(s) Oral daily  tamsulosin 0.4 milliGRAM(s) Oral at bedtime  trimethoprim  160 mG/sulfamethoxazole 800 mG 1 Tablet(s) Oral daily    PRN MEDICATIONS  MEDICATIONS  (PRN):  aluminum hydroxide/magnesium hydroxide/simethicone Suspension 30 milliLiter(s) Oral every 4 hours PRN Dyspepsia  HYDROmorphone   Tablet 2 milliGRAM(s) Oral every 6 hours PRN dyspnea  sodium chloride 0.65% Nasal 1 Spray(s) Both Nostrils four times a day PRN Nasal Congestion      VITAL SIGNS  T(F): 96 (04-01 @ 05:00), Max: 96.6 (03-31 @ 20:47)  HR: 67 (04-01 @ 05:00) (67 - 100)  BP: 135/82 (04-01 @ 05:00) (87/54 - 135/82)  RR: 18 (04-01 @ 05:00) (18 - 18)  SpO2: 96% (03-31 @ 23:08) (96% - 98%)    LABS:                        10.3   9.34  )-----------( 140      ( 01 Apr 2022 07:00 )             32.4     04-01    134<L>  |  100  |  30<H>  ----------------------------<  87  4.8   |  27  |  1.1    Ca    8.3<L>      01 Apr 2022 07:00  Mg     2.1     04-01    TPro  5.2<L>  /  Alb  3.0<L>  /  TBili  0.6  /  DBili  x   /  AST  15  /  ALT  25  /  AlkPhos  64  04-01        RADIOLOGY:   Reviewed  CXR bl opacities  LE nicole duplex neg      PHYSICAL EXAM:  General: Comfortably laying on the hospital bed. NAD. AAOx3. hard of hearing  HEENT: Atraumatic. Normocephalic. EOMI. Conjunctiva and sclera clear.  Cardiac: Normal S1, S2. RRR. No murmurs, rubs, or gallops.  Pulm: CTA b/l no wheezes, rhonchi, or rales. on 3L NC  GI: Soft, nontender, nondistended. Joel.  Ext: 2+ pulses. Moving all 4 extremities. No edema, cyanosis.  Neuro: CN II-XII grossly intact  Skin: No lesions or rashes

## 2022-04-01 NOTE — DISCHARGE NOTE PROVIDER - HOSPITAL COURSE
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 Per patient had acute onset of SOB while ambulating to the bathroom and syncopized on the toilet. denies head trauma. He was recently d/c earlier this month for a similar admission for syncope secondary to hypoxemia from ILD exacerbation. Patient started on solumedrol IV 60mg BID, which was then transitioned to prednisone 40mg QD (to be tapered over 6 weeks).    ED course: Tachycardic in triage, SpO2 97% on NRB. Labs revealed mild leukocytosis, hypoMg, trop 0.03 and BNP 2892. VBG unremarkable except LA of 4.6. EKG revealed Afib w/ RVR. CXR w/ chronic b/l opacities. s/p 500ml LR + Solu-Medrol in ED and placed on NIV.    Pt was admitted for acute on chronic respiratory failure and afib w RVR. Pt was seen by pulm and recommended prednisone taper slowly over 6w and bactrim ppx. Pt's afib w RVR resolved and remains on CCB, BB, Eliquis. There were multiple attempts to have a family meeting with palliative given pt's poor prognosis and frequent admissions, however family has been difficult to reach and meeting was unable to be established.    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 Per patient had acute onset of SOB while ambulating to the bathroom and syncopized on the toilet. denies head trauma. He was recently d/c earlier this month for a similar admission for syncope secondary to hypoxemia from ILD exacerbation. Patient started on solumedrol IV 60mg BID, which was then transitioned to prednisone 40mg QD (to be tapered over 6 weeks).    ED course: Tachycardic in triage, SpO2 97% on NRB. Labs revealed mild leukocytosis, hypoMg, trop 0.03 and BNP 2892. VBG unremarkable except LA of 4.6. EKG revealed Afib w/ RVR. CXR w/ chronic b/l opacities. s/p 500ml LR + Solu-Medrol in ED and placed on NIV.    Pt was admitted for acute on chronic respiratory failure and afib w RVR. Pt was seen by pulm and recommended prednisone taper slowly over 6w and bactrim ppx. Pt's afib w RVR resolved and remains on CCB, BB, Eliquis. Dr. Darby discussed GOC with family and hospice was consulted. Family not receptive to hospice at this time. Stable for dc to [facility].

## 2022-04-01 NOTE — PROGRESS NOTE ADULT - PROBLEM SELECTOR PROBLEM 3
[place] (hard of hearing)
[place] (hard of hearing)
[place] (hard of hearing)
[place] (hard of hearing)

## 2022-04-28 PROBLEM — J84.9 INTERSTITIAL LUNG DISEASE: Status: ACTIVE | Noted: 2021-01-01

## 2022-04-28 NOTE — ASSESSMENT
[FreeTextEntry1] : HO ILD possibly related to RA\par Chronic hypoxemic respiratory failure on home O2 \par Did not want any intervention or therapy in the past.  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  Now in a SNF \par SP hospital stay.

## 2022-05-13 NOTE — ED PROVIDER NOTE - PATIENT PORTAL LINK FT
You can access the FollowMyHealth Patient Portal offered by [facility] by registering at the following website: [email or web address]. By joining [facility]’s FollowMyHealth portal, you will also be able to view your health information using other applications (apps) compatible with our system.

## 2022-05-13 NOTE — ED PROVIDER NOTE - ATTENDING APP SHARED VISIT CONTRIBUTION OF CARE
Patient is a 89-year-old male who comes in for 1 month of dark stools and nursing home noted his hemoglobin was low and sentiment for transfusion.  Patient denies any symptoms.  He simply wants a transfusion and wants to leave does not want any interventions.    Exam: Pale conjunctive a, cap refill less than 2 seconds, soft nontender abdomen, dark stool in rectal vault  Plan: Labs, transfuse as indicated

## 2022-05-13 NOTE — ED PROVIDER NOTE - CLINICAL SUMMARY MEDICAL DECISION MAKING FREE TEXT BOX
anemia - dark stools - GOC conversation had with patient - doesn't want colonoscopy/etc - wants to return to [facility]

## 2022-05-13 NOTE — ED PROVIDER NOTE - NS ED ATTENDING STATEMENT MOD
This was a shared visit with the ALEXIS. I reviewed and verified the documentation and independently performed the documented:

## 2022-05-13 NOTE — ED PROVIDER NOTE - PHYSICAL EXAMINATION
Gen: NAD, AOx3  Head: NCAT  HEENT: PERRL, oral mucosa moist, normal conjunctiva, oropharynx clear without exudate or erythema  Lung: CTAB, no respiratory distress, no wheezing, rales, rhonchi  CV: normal s1/s2, rrr, Normal perfusion, pulses 2+ throughout  Abd: soft, NTND, no CVA tenderness, dark brown stool on CHANEL (chaperone Dr. Nair)   Genitourinary: no pelvic tenderness  MSK: No edema, no visible deformities  Neuro: CN II-XII grossly intact, No focal neurologic deficits  Skin: No rash   Psych: normal affect

## 2022-05-13 NOTE — ED PROVIDER NOTE - OBJECTIVE STATEMENT
88 yo male with a pmh of HTN, CHF, afib, GERD, and LLL mass BIBA from nursing facility for low hgb. pt denies any symptoms including fevers, chill, headache, recent illness/travel, cough, abdominal pain, chest pain, or SOB. pt states to have dark stools for 3 months.

## 2022-05-16 NOTE — PROGRESS NOTE ADULT - ASSESSMENT
89 y/o man with PMH of ILD on 3L O2 at baseline at home, HTN, BPH, GERD, Afib on Eliquis, HFmrEF (EF 01/2022 40-45%) and recent Covid in January 2022 presents for shortness of breath, hypoxemia and several episodes of syncope.     1. Acute hypoxemic respiratory failure   HFmrEF exacerbation in setting of ILD  Right pleural effusion   - CXRs improving since 2/24   - currently on HFNC 50/40 alternating with bipap  - POCUS done in ED showed right sided pleural effusion.  - ECHO 2/27: EF 46%, severe Pulm HTN  - pro-BNP 6168  - Pneumonia less likely with procal 0.08  - on lasix 60mg IV q12hr - avoid overdiuresis  - on metoprolol tartrate 100mg TID and lisinoprol 10 mg QD as hemodynamics allow  - Strict I/O, daily weights, fluid restriction  - in negative balance  - Monitor electrolytes Keep K > 4, Mg >2  - cardio eval in progress  - stepdown unit monitoring  - full code status  - very guarded prognosis    2. ILD/acute over chronic hypoxemic respiratory failure  - titrate oxygen as tolerated  - on HFNC alternating with bipap  - pulm f/u appreciated  - nebs, steroids    3. Syncope  - negative HT, + LOC. + palpitations. + bladder incontinence, shakiness, foamy mouth secretions  - Denied CP,  focal weakness, or headaches. No bowel incontinence, no tongue biting.   - CT head with no acute intracranial pathology; Stable moderate chronic microvascular ischemic changes.  - seen by neurology who favor cardiac causes (orthostasis, vasovagal) for syncope  - ECHO as above  - telemetry  - check orthostatics when able  - EEG normal  - PT as tolerated  - Fall precautions    4. Hematuria, likely traumatic from Joel insertion  h/o BPH  - continue with finasteride  - H/H stable  - monitor CBC daily and keep active T&S    5. Chronic Afib - rate controlled  - on metoprolol, cardizem and apixaban    6. Pulmonary Nodule  - CT chest prior to admission: 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, new mediastinal adenopathy, left pleural effusion, cardiomegaly with dilated ascending and descending aorta  - OP Pulmonary Follow up for 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    7. HTN  - c/w Lisinopril , lopressor and cardizem    8. GERD on PPI                                                          9. Hypokalemia - repleted    10. DVT prophylaxis: Eliquis      full code status      PROGRESS NOTE HANDOFF    Pending: improved respiratory status, cardio attending eval, labs and CXR in AM    pt aware of the plan of care    Disposition: from home not examined

## 2022-06-17 NOTE — ED ADULT NURSE REASSESSMENT NOTE - NS ED NURSE REASSESS COMMENT FT1
Pt assessed. Pt has hematuria in macedo output; admitting resident in pt's room. MD made aware; upon examination MD states hematuria may be due to trauma to site. Will monitor to see if hematuria will improve. Pt admitted; awaiting bed. Cardiac and 02 monitoring maintained. Safety and comfort Purse String (Intermediate) Text: Given the location of the defect and the characteristics of the surrounding skin a purse string intermediate closure was deemed most appropriate.  Undermining was performed circumfirentially around the surgical defect.  A purse string suture was then placed and tightened.

## 2022-07-10 NOTE — ED ADULT NURSE NOTE - NS ED NURSE RECORD ANOTHER VITAL SIGN
Referred by: Godwin Casanova MD; Medical Diagnosis (from order):    Diagnosis Information      Diagnosis    719.45 (ICD-9-CM) - M25.551, M25.552 (ICD-10-CM) - Bilateral hip pain                Physical Therapy -  Daily Treatment Note    Visit:  11     SUBJECTIVE                                                                                                             Hips are doing better overall.  Back is continuing to be bothersome at lower back. symptoms rated at 6/10 when laying on the back.       Pain / Symptoms:  Pain rating (out of 10): Current: 5     OBJECTIVE                                                                                                                       Alignment:   Comments / Details: Left on right backward sacral torsion  Left upslip ilium           TREATMENT                                                                                                                  Therapeutic Exercise:  Prone hip extension Right/Left following stretch x 12    Cross-legged forward reach Right/Left/Center Right/Left     Multihip @ Red Platform & Bolster   30# Adduction / Abduction Right/Left x fatigue   Manual Therapy:  Spinal and pelvic positioning assessment  Muscle Energy Technique correction of dysfunctions as noted  Supine Trigger Point Release L>R psoas   ^knees over bolster due to supine being painful on the back  Prone lateral table edge Quadriceps / Hip Flexor stretches   -/+ anterior hip mobilizations grade 2      Skilled input: verbal instruction/cues, tactile instruction/cues, facilitation and posture correction  education/instruction on: transition to personal training.     Writer verbally educated and received verbal consent for hand placement, positioning of patient, and techniques to be performed today from patient for hand placement and palpation for techniques, therapist position for techniques and clothing adjustments for techniques as described above and how they are pertinent to  the patient's plan of care.    Home Exercise Program: (*above indicates provided as part of home exercise program)  11/27/19: hooklying pelvic tilt, hooklying TA activation with marching and kicks, hooklying DL bridge with marching, sidelying clamshell and reverse clamshell, sidelying hip abduction   12/11/19: modified side planks, DL squats with 4# med ball raise  12/18/19: Prone TPR psoas with ball   12/23/19: Standing Multihip - issued red TheraBand   1/8/2020: Pelvic titls to neutral + Transverse Abdominus contraction (supine, Quadruped, sitting, Standing)  1/15/2020: Standing global spinal movement patterns      ASSESSMENT                                                                                                             Tolerated today's treatment well today.  Discussed using the Zocere machine prior to and/or during personal training appointments for pelvic stabilization.  Challenged with right lower extremity stabilization compared to the left.  Good flexibility noted today.     Pain/symptoms after session: 0  Patient Education:   Results of above outlined education: Verbalizes understanding and Demonstrates understanding     PLAN                                                                                                                             Suggestions for next session as indicated: Progress per plan of care  Right/Left hip rotational stabilization  Right hip rotational progressions from non weight-bearing to weight-bearing        Procedures and total treatment time documented Time Entry flowsheet.     Yes

## 2022-07-10 NOTE — ED ADULT NURSE NOTE - OBJECTIVE STATEMENT
pt biba from [facility] c/o SOB since last night; no relief with duoneb at NH, O2 sat in low 80's; pt normally on 3L O2 for COPD; also bilat LE swelling, history of CHF; O2 sat on room air is 87%, on NRB 02 sat is 97%

## 2022-07-10 NOTE — ED PROVIDER NOTE - OBJECTIVE STATEMENT
89-year-old male with past medical history of ILD on 5L O2, HTN, BPH, GERD, A. fib on Eliquis, CHF (EF 46% 2/27/22), and left lower lobe lung mass presents to the ED sent in from [facility] for evaluation of shortness of breath.  Patient had 1 episode last night but refused to go to the ED to be evaluated.  Today patient developed shortness of breath again, and was found to be hypoxic on 5L O2 to 80s. Pt was placed on NRB with improvement in O2 to 97%. Pt denies other complaints. Pt denies fever, chills, nausea, vomiting, abdominal pain, diarrhea, headache, dizziness, weakness, chest pain, back pain, LOC, trauma.

## 2022-07-10 NOTE — ED ADULT TRIAGE NOTE - CHIEF COMPLAINT QUOTE
pt biba from [facility] c/o SOB since last night; no reliefe with duoneb at NH, O2 sat in low 80's; pt normally on 3L O2 for COPD; also bilat LE swelling, history of CHF; O2 sat on room air is 87%, on NRB 02 sat is 97%

## 2022-07-10 NOTE — ED ADULT NURSE NOTE - CHIEF COMPLAINT QUOTE
pt biba from [facility] c/o SOB since last night; no reliefe with duoneb at NH, O2 sat in low 80's; pt normally on 3L O2 for COPD; also bilat LE swelling, history of CHF; O2 sat on room air is 87%, on NRB 02 sat is 97%

## 2022-07-10 NOTE — ED PROVIDER NOTE - ATTENDING APP SHARED VISIT CONTRIBUTION OF CARE
88 yo m hx chronic lung ds, c/o sob. sent by NH. no fever. no cp. +LE edema.  on O2 at NH. sent for hypoxia.    pt in mild dist, tachypeic, O2 sat on RA in 60s. b/l rales, dec BS at bases, tachycardic, ab soft  nt. b/l LE pitting edema. nfd.    bipap, labs, imaging, ekg, supportive care  reassess

## 2022-07-10 NOTE — ED PROVIDER NOTE - PHYSICAL EXAMINATION
VITAL SIGNS: I have reviewed nursing notes and confirm.  CONSTITUTIONAL: Elderly male laying on stretcher; in mild distress.  SKIN: Skin exam is warm and dry, no acute rash.  HEAD: Normocephalic; atraumatic.  EYES: Conjunctiva and sclera clear.  ENT: No nasal discharge; airway clear. TMs clear.  NECK: Supple; non tender.  CARD: S1, S2 normal; no murmurs, gallops, or rubs. Tachycardic, irregular.  RESP: (+) decreased BS B/L, bibasilar rales, tachypnea  ABD: Normal bowel sounds; soft; non-distended; non-tender; No rebound or guarding.   EXT: 2+ LE Edema. DP 2+.   NEURO: Alert, oriented. Grossly unremarkable. No focal deficits.

## 2022-07-11 NOTE — H&P ADULT - NSHPPHYSICALEXAM_GEN_ALL_CORE
PHYSICAL EXAM:  GENERAL: NAD, AAO x   HEAD:  Atraumatic, Normocephalic  EYES: EOMI, conjunctiva and sclera white  NECK: Supple, No JVD  CHEST/LUNG:   HEART:   ABDOMEN: Soft, Nontender, Nondistended; Bowel sounds present; No guarding  EXTREMITIES:  2+ Peripheral Pulses, No cyanosis or edema  NEUROLOGY: non-focal PHYSICAL EXAM:  GENERAL: NAD, AAO x 4; on bipap 50%  HEAD:  Atraumatic, Normocephalic; hard of hearing  EYES: EOMI, conjunctiva and sclera white  NECK: Supple, No JVD appreciable on my exam  CHEST/LUNG: crackles bilateral ; no wheezing  HEART: irregular normal rate s1 s2  ABDOMEN: Soft, Nontender, Nondistended; Bowel sounds present; No guarding  EXTREMITIES:  2+ Peripheral Pulses, 3+ LE pitting edema  NEUROLOGY: follows commands, moves all extremities, no dysarthria

## 2022-07-11 NOTE — PATIENT PROFILE ADULT - NSPROGENPREVTRANSF_GEN_A_NUR
Called mother after she spoke with  and rescheduled for tomorrow.     Stressed again the importance of the baby being seen in the office for evaluation. Offered mom appt for first available which is tomorrow.  Offered 9am and 1pm appt.  She chose the 9am.  I advised strongly she must keep this appt as baby has missed 6 appts now.  Mom stated clear understanding and stated \"I know, the  called me. I will be there.\"  Mom stated she has the address to the clinic.     She then stated that this will be the only visit with us as they are in the process of moving to [place].  Recommended mom mention this to the provider at the visit tomorrow but reinforced that the baby must be seen.  She stated understanding.     If appointment is not kept tomorrow will notify Liz Snider.  Update her via phone after my conversation with mom.     no

## 2022-07-11 NOTE — H&P ADULT - NSHPLABSRESULTS_GEN_ALL_CORE
LABS: Personally reviewed labs, imaging, and ECG                          11.0   11.24 )-----------( 192      ( 10 Jul 2022 21:23 )             35.0       07-10    137  |  97<L>  |  29<H>  ----------------------------<  127<H>  3.4<L>   |  30  |  1.3    Ca    8.7      10 Jul 2022 21:23  Mg     1.6     07-10    TPro  6.5  /  Alb  3.4<L>  /  TBili  0.8  /  DBili  x   /  AST  16  /  ALT  11  /  AlkPhos  60  07-10       LIVER FUNCTIONS - ( 10 Jul 2022 21:23 )  Alb: 3.4 g/dL / Pro: 6.5 g/dL / ALK PHOS: 60 U/L / ALT: 11 U/L / AST: 16 U/L / GGT: x                        PT/INR - ( 10 Jul 2022 21:23 )   PT: 24.90 sec;   INR: 2.18 ratio         PTT - ( 10 Jul 2022 21:23 )  PTT:30.7 sec    Lactate Trend      CARDIAC MARKERS ( 10 Jul 2022 21:23 )  x     / <0.01 ng/mL / x     / x     / x            CAPILLARY BLOOD GLUCOSE            RADIOLOGY & ADDITIONAL TESTS:

## 2022-07-11 NOTE — H&P ADULT - ASSESSMENT
89-year-old male with past medical history of ILD on 5L O2, HTN, BPH, GERD, A. fib on Eliquis, CHF (EF 46% 2/27/22), and left lower lobe lung mass presents to the ED sent in from [facility] for evaluation of shortness of breath.    Impression  CHF exacerbation  HO HFmrEF  HO ILD  HO GERD  afib on Eliquis  LLL mass suspicious for Cancer      #CHF/ #HFmrEF  -bnp elevated  -pulmonary congestion/b/l opacities on cxr  -JVD? LE edema/ crackles?  -    PLAN  chf protocols  -diet dash/tlc, 1.2L fluid restriction  -continue with IV diuresis lasix 40mg BID  -monitor bmp once daily, keep mag <2, K <4  -O2 support as needed; bipap at night; wean o2 as appropriate  -f/u CT chest  -f/u TTE  -c/w home meds:     Other problems  #ILD/ #LLL mass suspicious for cancer  -f/u pulmonary  -goc were started last admission; never followed up  -c/w inhalers    #GERD  -c/w pantoprazole       89-year-old male with past medical history of ILD on 5L O2, HTN, BPH, GERD, A. fib on Eliquis, CHF (EF 46% 2/27/22), and left lower lobe lung mass presents to the ED sent in from [facility] for evaluation of shortness of breath for 2 days. States his atrial fibrillation has not been in control, causing worsening sob. Noted drastic change from baseline over the past two days, with multiple pulse ox in 80s. Sent in from ED, improved on NRB then placed on bipap with improvement to 95%. Pt feels better, also recived 40mg IV lasix with good urine out/put at least 1L. Pt has pulmonary congestion on cxr, has extensive lower extremity edema. likely in CHF exacebation secondary to afib wit rvr. HR under controll now. c/w IV diuresis, fluid restriction.     Impression  CHF exacerbation secondary to afib with RVR?  HO HFmrEF  HO ILD  HO GERD  afib on Eliquis  LLL mass suspicious for Cancer    #CHF/ #HFmrEF  #afib on eliquis  -bnp elevated 5800 compared to 2900 prior admission  -pulmonary congestion/b/l opacities on cxr  -3+ LE edema/ crackles/ JVD not significantly appreciable  -responded well to IV lasix in ED; increased UO  -states his afib has been acting up; uncontrolled; making it difficulty for him to breathe    PLAN  chf protocols  -diet dash/tlc, 1.2L fluid restriction  -continue with IV diuresis lasix 40mg BID  -strict I and O  -monitor bmp once daily, keep mag <2, K <4  -O2 support as needed; bipap at night; wean o2 as appropriate  -f/u CT chest  -f/u TTE  -c/w home meds: cardizem 120mg daily, lopressor 100 tid  -f/u ekg; monitor HR keep under 110  -consider HF c/s if pt does not improve on IV lasix BID  -dvt ppx  -gi ppx  -activity; AAT f/u PT eval    Other problems  #ILD/ #LLL mass suspicious for cancer  -f/u pulmonary  -goc were started last admission; never followed up  -c/w inhalers    #GERD  -c/w pantoprazole

## 2022-07-11 NOTE — H&P ADULT - TIME BILLING
Chart reviewed, patient examined. Pertinent results reviewed.- including om VENICE;  Patient new to me; spoke w pt, EDWINA, and son, Elkin; Documented this all

## 2022-07-11 NOTE — PATIENT PROFILE ADULT - FALL HARM RISK - HARM RISK INTERVENTIONS
Assistance with ambulation/Assistance OOB with selected safe patient handling equipment/Communicate Risk of Fall with Harm to all staff/Discuss with provider need for PT consult/Monitor gait and stability/Reinforce activity limits and safety measures with patient and family/Tailored Fall Risk Interventions/Visual Cue: Yellow wristband and red socks/Bed in lowest position, wheels locked, appropriate side rails in place/Call bell, personal items and telephone in reach/Instruct patient to call for assistance before getting out of bed or chair/Non-slip footwear when patient is out of bed/[place] to call system/Physically safe environment - no spills, clutter or unnecessary equipment/Purposeful Proactive Rounding/Room/bathroom lighting operational, light cord in reach

## 2022-07-11 NOTE — H&P ADULT - HISTORY OF PRESENT ILLNESS
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  Patient had 1 episode last night but refused to go to the ED to be evaluated.  Today patient developed shortness of breath again, and was found to be hypoxic on 5L O2 to 80s. Pt was placed on NRB with improvement in O2 to 97%. Pt denies other complaints. Pt denies fever, chills, nausea, vomiting, abdominal pain, diarrhea, headache, dizziness, weakness, chest pain, back pain,     in the ED,pt's VS T(C): 36.6 (07-10-22 @ 20:04), Max: 36.6 (07-10-22 @ 20:04)  HR: 86 (07-11-22 @ 02:03) (86 - 110)  BP: 157/91 (07-10-22 @ 23:06) (124/81 - 157/91)  RR: 22 (07-10-22 @ 23:06) (22 - 22)  SpO2: 91% (07-10-22 @ 23:06) (91% - 97%), labs done showed wbc 11.24 (similar to May '22), Hb 11, , plts 192, INR 2.18, sodium 137, potassium 3.4, chloride 97, Cr 1.3, BUN 29, Ca 8.7, albumin 3.4, gfr 53, Magnesium 1.6, troponin <0.01, bnp 5836 (2800 May '22); VBG lactate 2.3, VBG pco2 53, covid neg, influenza neg, rsp Syncitial virus neg  EKG: afib with RVR rate 103, CXR: bl opacities (pending official read)  pt received lasix 40mg IV, 2g magnesium  pt is admitted for workup/management CHF exacerbation vs ILD flare 89 year old hard of hearing male with 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 States he has sob at baseline, but noted his pulse ox to be in 80s consistently the past several days. Endorses baseline LE edema, could not give report of any worsening of it. Pt states he has afib and it has been uncontrolled recently, causing increased difficulty catching his breath. States he had one acute episode one night prior to admission but refused to come in d/t anxiety of staying too long in hospital. Denies chest pain, fever, chills, nausea, vomiting, abd pain, diarrhea, HA, dizziness, weakness.  Pt has multiple admissions for sob d/t various causes such as ILD exacerbations and CHF exacerbations. Pt is aox4.     in the ED,pt's VS T(C): 36.6 (07-10-22 @ 20:04), Max: 36.6 (07-10-22 @ 20:04)  HR: 86 (07-11-22 @ 02:03) (86 - 110)  BP: 157/91 (07-10-22 @ 23:06) (124/81 - 157/91)  RR: 22 (07-10-22 @ 23:06) (22 - 22)  SpO2: 91% (07-10-22 @ 23:06) (91% - 97%), labs done showed wbc 11.24 (similar to May '22), Hb 11, , plts 192, INR 2.18, sodium 137, potassium 3.4, chloride 97, Cr 1.3, BUN 29, Ca 8.7, albumin 3.4, gfr 53, Magnesium 1.6, troponin <0.01, bnp 5836 (2800 May '22); VBG lactate 2.3, VBG pco2 53, covid neg, influenza neg, rsp Syncitial virus neg  EKG: afib with RVR rate 103, CXR: bl opacities (pending official read)  pt received lasix 40mg IV, 2g magnesium  pt is admitted for workup/management CHF exacerbation vs ILD flare

## 2022-07-11 NOTE — H&P ADULT - ATTENDING COMMENTS
Chart reviewed, patient examined. Pertinent results reviewed.  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  He has been hospitalized a number of times over the past 6 mos; I noted that Hospice spoke w the patient and family , but refused, in April.  His condition is getting worse overall. His VR was increasing, and his POx was lowering;       Patient History:      Past Medical, Past Surgical, and Family History:  PAST MEDICAL HISTORY:  BPH (benign prostatic hyperplasia)   COPD (chronic obstructive pulmonary disease)   GERD (gastroesophageal reflux disease)   HFrEF (heart failure with reduced ejection fraction)   Hypertension   Lung interstitial disease.   SH: non smoker; now at SNF for Rehab    Vital Signs Last 24 Hrs  T(C): 36.1 (11 Jul 2022 07:42), Max: 36.6 (10 Jul 2022 20:04)  T(F): 96.9 (11 Jul 2022 07:42), Max: 97.8 (10 Jul 2022 20:04)  HR: 115 (11 Jul 2022 07:42) (86 - 115)  BP: 131/83 (11 Jul 2022 07:42) (124/81 - 157/91)  BP(mean): --  RR: 22 (11 Jul 2022 07:42) (20 - 22)  SpO2: 97% (11 Jul 2022 07:42) (91% - 97%)    Parameters below as of 11 Jul 2022 07:42  Patient On (Oxygen Delivery Method): BiPAP/CPAP    Physical Exam: PHYSICAL EXAM:  GENERAL: NAD, AAO x 4; on bipap 50%; DECreased hearing  HEAD:  Atraumatic, Normocephalic; hard of hearing  EYES: EOMI, conjunctiva and sclera white  NECK: Supple, No JVD appreciable on my exam  CHEST/LUNG: crackles bilateral ; no wheezing  HEART: irregular  rate; mod VR; no MRG  ABDOMEN: Soft, Nontender, Nondistended; Bowel sounds present; No guarding  EXTREMITIES:  2+ Peripheral Pulses, 2+ LE pitting edema-B/L; NT calves  NEUROLOGY: follows commands, moves all extremities, no dysarthria    I/P; Agree w above; H/O ILD, and findings c/w  acute on chronic CHF; see prior echo;  Diurese w IV Rx; Have pulm and cardio ( who know patient) swee and advise;   Get hearing amplification device to help communication;  Ask PT to see and Rx patient- when he is able to wlk;  Will try a lower O2 supplement and re-assess this AM

## 2022-07-11 NOTE — CONSULT NOTE ADULT - SUBJECTIVE AND OBJECTIVE BOX
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 States he has sob at baseline, but noted his pulse ox to be in 80s consistently the past several days. Pt states his baseline SpO2 is 91-92%. His worsening SOB began at rest, was exacerbated by movement. Endorses baseline LE edema, could not give report of any worsening of it. Pt states he has afib and it has been uncontrolled recently, causing increased difficulty catching his breath. States he had one acute episode one night prior to admission but refused to come in d/t anxiety of staying too long in hospital. Denies chest pain, fever, chills, recent illness, weight loss, cough, nausea, vomiting, abd pain, diarrhea, HA, dizziness, weakness.  Pt has multiple admissions for sob d/t various causes such as ILD exacerbations and CHF exacerbations, most recently May 2022. Pt is aox4.   Pt is aware of pulmonary mass, was scheduled for outpt CXR, but did not f/u after entering nursing home care.    in the ED,pt's VS T(C): 36.6 (07-10-22 @ 20:04), Max: 36.6 (07-10-22 @ 20:04)  HR: 86 (07-11-22 @ 02:03) (86 - 110)  BP: 157/91 (07-10-22 @ 23:06) (124/81 - 157/91)  RR: 22 (07-10-22 @ 23:06) (22 - 22)  SpO2: 91% (07-10-22 @ 23:06) (91% - 97%), labs done showed wbc 11.24 (similar to May '22), Hb 11, , plts 192, INR 2.18, sodium 137, potassium 3.4, chloride 97, Cr 1.3, BUN 29, Ca 8.7, albumin 3.4, gfr 53, Magnesium 1.6, troponin <0.01, bnp 5836 (2800 May '22); VBG lactate 2.3, VBG pco2 53, covid neg, influenza neg, rsp Syncitial virus neg  EKG: afib with RVR rate 103, CXR: bl opacities (pending official read)  pt received lasix 40mg IV, 2g magnesium  pt is admitted for workup/management CHF exacerbation vs ILD flare (11 Jul 2022 03:43)      PAST MEDICAL & SURGICAL HISTORY:  Hypertension      GERD (gastroesophageal reflux disease)      Lung interstitial disease      BPH (benign prostatic hyperplasia)      HFrEF (heart failure with reduced ejection fraction)      COPD (chronic obstructive pulmonary disease)      No significant past surgical history          SOCIAL HX:   Smoking  former smoker 33 pack-years, quit 47y ago (1985)      ETOH                            Other    FAMILY HISTORY:  .  No cardiovascular or pulmonary family history     REVIEW OF SYSTEMS:    All ROS are negative except per HPI     Allergies    No Known Allergies    Intolerances          PHYSICAL EXAM  Vital Signs Last 24 Hrs  T(C): 36.1 (11 Jul 2022 07:42), Max: 36.6 (10 Jul 2022 20:04)  T(F): 96.9 (11 Jul 2022 07:42), Max: 97.8 (10 Jul 2022 20:04)  HR: 115 (11 Jul 2022 07:42) (86 - 115)  BP: 131/83 (11 Jul 2022 07:42) (124/81 - 157/91)  BP(mean): --  RR: 22 (11 Jul 2022 07:42) (20 - 22)  SpO2: 97% (11 Jul 2022 07:42) (91% - 97%)    Parameters below as of 11 Jul 2022 07:42  Patient On (Oxygen Delivery Method): BiPAP/CPAP        CONSTITUTIONAL:  Well nourished.  NAD, comfortable, speaking in full sentences.     ENT:   Airway patent,   No thrush    EYES:   Clear bilaterally,   pupils equal,   round and reactive to light.    CARDIAC:   Tachycardic to 115  irregular rhythm.    Bilateral lower extremity edema      CAROTID:   normal systolic impulse  no bruits    RESPIRATORY:   Mild bilateral basal crackles.  No wheezing  Nnormal chest expansion  Not tachypneic,  No use of accessory muscles    GASTROINTESTINAL:  Abdomen soft,   non-tender,   no guarding,   + BS    GENITOURINARY  Joel catheter draining >0.5L of clear yellow urine  normal genitalia for sex  no edema    MUSCULOSKELETAL:   Bilateral symmetric lower extremity 2+ pitting edema.  range of motion is not limited,  no clubbing, cyanosis    NEUROLOGICAL:   Alert and oriented   no motor deficits.    SKIN:   Skin normal color for race,   No evidence of rash.    PSYCHIATRIC:   normal mood and affect.   no apparent risk to self or others.    HEME LYMPH:   No cervical  lymphadenopathy.  no inguinal lymphadenopathy          LABS:                          11.5   12.49 )-----------( 206      ( 11 Jul 2022 07:19 )             36.5                                               07-11    139  |  93<L>  |  23<H>  ----------------------------<  88  3.0<L>   |  35<H>  |  1.1    Ca    8.6      11 Jul 2022 07:19  Mg     1.6     07-10    TPro  6.6  /  Alb  3.5  /  TBili  0.9  /  DBili  x   /  AST  15  /  ALT  10  /  AlkPhos  59  07-11      PT/INR - ( 10 Jul 2022 21:23 )   PT: 24.90 sec;   INR: 2.18 ratio         PTT - ( 10 Jul 2022 21:23 )  PTT:30.7 sec                                           CARDIAC MARKERS ( 10 Jul 2022 21:23 )  x     / <0.01 ng/mL / x     / x     / x                                                LIVER FUNCTIONS - ( 11 Jul 2022 07:19 )  Alb: 3.5 g/dL / Pro: 6.6 g/dL / ALK PHOS: 59 U/L / ALT: 10 U/L / AST: 15 U/L / GGT: x                                                                                                MEDICATIONS  (STANDING):  albuterol/ipratropium for Nebulization 3 milliLiter(s) Nebulizer every 6 hours  apixaban 5 milliGRAM(s) Oral two times a day  cyanocobalamin 1000 MICROGram(s) Oral daily  diltiazem    milliGRAM(s) Oral daily  ferrous    sulfate 325 milliGRAM(s) Oral daily  finasteride 5 milliGRAM(s) Oral daily  furosemide   Injectable 40 milliGRAM(s) IV Push two times a day  metoprolol tartrate 100 milliGRAM(s) Oral three times a day  pantoprazole    Tablet 40 milliGRAM(s) Oral before breakfast  tamsulosin 0.4 milliGRAM(s) Oral at bedtime    MEDICATIONS  (PRN):  acetaminophen     Tablet .. 650 milliGRAM(s) Oral every 6 hours PRN Temp greater or equal to 38C (100.4F), Mild Pain (1 - 3)  aluminum hydroxide/magnesium hydroxide/simethicone Suspension 30 milliLiter(s) Oral every 4 hours PRN Dyspepsia  melatonin 3 milliGRAM(s) Oral at bedtime PRN Insomnia  ondansetron Injectable 4 milliGRAM(s) IV Push every 8 hours PRN Nausea and/or Vomiting      X-Rays reviewed:    CXR interpreted by me: Patient is a 89y old  Male who presents with a chief complaint of acute sob, hypoxemia (11 Jul 2022 03:43)      HPI:  89 year old hard of hearing male with hx of ILD on 2.5L O2 at baseline at home, HTN, BPH, GERD, A fib (on Eliquis), 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 States he has sob at baseline, but noted his pulse ox to be in 80s consistently the past several days. Pt states his baseline SpO2 is 91-92%. His worsening SOB began at rest, was exacerbated by movement. Endorses baseline LE edema, could not give report of any worsening of it. Pt states he has afib and it has been uncontrolled recently, causing increased difficulty catching his breath. States he had one acute episode one night prior to admission but refused to come in d/t anxiety of staying too long in hospital. Denies chest pain, fever, chills, recent illness, weight loss, cough, nausea, vomiting, abd pain, diarrhea, HA, dizziness, weakness.  Pt has multiple admissions for sob d/t various causes such as ILD exacerbations and CHF exacerbations, most recently May 2022. Pt is aox4.   Pt is aware of pulmonary mass, was scheduled for outpt CXR, but did not f/u after entering nursing home care.    in the ED,pt's VS T(C): 36.6 (07-10-22 @ 20:04), Max: 36.6 (07-10-22 @ 20:04)  HR: 86 (07-11-22 @ 02:03) (86 - 110)  BP: 157/91 (07-10-22 @ 23:06) (124/81 - 157/91)  RR: 22 (07-10-22 @ 23:06) (22 - 22)  SpO2: 91% (07-10-22 @ 23:06) (91% - 97%), labs done showed wbc 11.24 (similar to May '22), Hb 11, , plts 192, INR 2.18, sodium 137, potassium 3.4, chloride 97, Cr 1.3, BUN 29, Ca 8.7, albumin 3.4, gfr 53, Magnesium 1.6, troponin <0.01, bnp 5836 (2800 May '22); VBG lactate 2.3, VBG pco2 53, covid neg, influenza neg, rsp Syncitial virus neg  EKG: afib with RVR rate 103, CXR: bl opacities (pending official read)  pt received lasix 40mg IV, 2g magnesium  pt is admitted for workup/management CHF exacerbation vs ILD flare (11 Jul 2022 03:43)      PAST MEDICAL & SURGICAL HISTORY:  Hypertension      GERD (gastroesophageal reflux disease)      Lung interstitial disease      BPH (benign prostatic hyperplasia)      HFrEF (heart failure with reduced ejection fraction)      COPD (chronic obstructive pulmonary disease)      No significant past surgical history          SOCIAL HX:   Smoking  former smoker 33 pack-years, quit 47y ago (1985)      ETOH                            Other    FAMILY HISTORY:  .  No cardiovascular or pulmonary family history     REVIEW OF SYSTEMS:    All ROS are negative except per HPI     Allergies    No Known Allergies    Intolerances          PHYSICAL EXAM  Vital Signs Last 24 Hrs  T(C): 36.1 (11 Jul 2022 07:42), Max: 36.6 (10 Jul 2022 20:04)  T(F): 96.9 (11 Jul 2022 07:42), Max: 97.8 (10 Jul 2022 20:04)  HR: 115 (11 Jul 2022 07:42) (86 - 115)  BP: 131/83 (11 Jul 2022 07:42) (124/81 - 157/91)  BP(mean): --  RR: 22 (11 Jul 2022 07:42) (20 - 22)  SpO2: 97% (11 Jul 2022 07:42) (91% - 97%)    Parameters below as of 11 Jul 2022 07:42  Patient On (Oxygen Delivery Method): BiPAP/CPAP        CONSTITUTIONAL:  Well nourished.  NAD, comfortable, speaking in full sentences.     ENT:   Airway patent,   No thrush    EYES:   Clear bilaterally,   pupils equal,   round and reactive to light.    CARDIAC:   Tachycardic to 115  irregular rhythm.    Bilateral lower extremity edema      CAROTID:   normal systolic impulse  no bruits    RESPIRATORY:   Mild bilateral basal crackles.  No wheezing  Normal chest expansion  Not tachypneic,  No use of accessory muscles    GASTROINTESTINAL:  Abdomen soft,   non-tender,   no guarding,   + BS    GENITOURINARY  Joel catheter draining >0.5L of clear yellow urine  normal genitalia for sex      MUSCULOSKELETAL:   Bilateral symmetric lower extremity 2+ pitting edema.  range of motion is not limited,  no clubbing, cyanosis    NEUROLOGICAL:   Alert and oriented   no motor deficits.    SKIN:   Skin normal color for race,   No evidence of rash.    PSYCHIATRIC:   normal mood and affect.   no apparent risk to self or others.    HEME LYMPH:   No cervical  lymphadenopathy.  no inguinal lymphadenopathy          LABS:                          11.5   12.49 )-----------( 206      ( 11 Jul 2022 07:19 )             36.5                                               07-11    139  |  93<L>  |  23<H>  ----------------------------<  88  3.0<L>   |  35<H>  |  1.1    Ca    8.6      11 Jul 2022 07:19  Mg     1.6     07-10    TPro  6.6  /  Alb  3.5  /  TBili  0.9  /  DBili  x   /  AST  15  /  ALT  10  /  AlkPhos  59  07-11      PT/INR - ( 10 Jul 2022 21:23 )   PT: 24.90 sec;   INR: 2.18 ratio         PTT - ( 10 Jul 2022 21:23 )  PTT:30.7 sec                                           CARDIAC MARKERS ( 10 Jul 2022 21:23 )  x     / <0.01 ng/mL / x     / x     / x                                                LIVER FUNCTIONS - ( 11 Jul 2022 07:19 )  Alb: 3.5 g/dL / Pro: 6.6 g/dL / ALK PHOS: 59 U/L / ALT: 10 U/L / AST: 15 U/L / GGT: x                                                                                                MEDICATIONS  (STANDING):  albuterol/ipratropium for Nebulization 3 milliLiter(s) Nebulizer every 6 hours  apixaban 5 milliGRAM(s) Oral two times a day  cyanocobalamin 1000 MICROGram(s) Oral daily  diltiazem    milliGRAM(s) Oral daily  ferrous    sulfate 325 milliGRAM(s) Oral daily  finasteride 5 milliGRAM(s) Oral daily  furosemide   Injectable 40 milliGRAM(s) IV Push two times a day  metoprolol tartrate 100 milliGRAM(s) Oral three times a day  pantoprazole    Tablet 40 milliGRAM(s) Oral before breakfast  tamsulosin 0.4 milliGRAM(s) Oral at bedtime    MEDICATIONS  (PRN):  acetaminophen     Tablet .. 650 milliGRAM(s) Oral every 6 hours PRN Temp greater or equal to 38C (100.4F), Mild Pain (1 - 3)  aluminum hydroxide/magnesium hydroxide/simethicone Suspension 30 milliLiter(s) Oral every 4 hours PRN Dyspepsia  melatonin 3 milliGRAM(s) Oral at bedtime PRN Insomnia  ondansetron Injectable 4 milliGRAM(s) IV Push every 8 hours PRN Nausea and/or Vomiting      X-Rays reviewed:    CXR interpreted by me: Patient is a 89y old  Male who presents with a chief complaint of acute sob, hypoxemia (11 Jul 2022 03:43)      HPI:  89 year old hard of hearing male with hx of ILD on 2.5L O2 at baseline at home, HTN, BPH, GERD, A fib (on Eliquis), 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 States he has sob at baseline, but noted his pulse ox to be in 80s consistently the past several days. Pt states his baseline SpO2 is 91-92%. His worsening SOB began at rest, was exacerbated by movement. Endorses baseline LE edema, could not give report of any worsening of it. Pt states he has afib and it has been uncontrolled recently, causing increased difficulty catching his breath. States he had one acute episode one night prior to admission but refused to come in d/t anxiety of staying too long in hospital. Denies chest pain, fever, chills, recent illness, weight loss, cough, nausea, vomiting, abd pain, diarrhea, HA, dizziness, weakness.  Pt has multiple admissions for sob d/t various causes such as ILD exacerbations and CHF exacerbations, most recently May 2022. Pt is aox4.   Pt is aware of pulmonary mass, was scheduled for outpt CXR, but did not f/u after entering nursing home care.    in the ED,pt's VS T(C): 36.6 (07-10-22 @ 20:04), Max: 36.6 (07-10-22 @ 20:04)  HR: 86 (07-11-22 @ 02:03) (86 - 110)  BP: 157/91 (07-10-22 @ 23:06) (124/81 - 157/91)  RR: 22 (07-10-22 @ 23:06) (22 - 22)  SpO2: 91% (07-10-22 @ 23:06) (91% - 97%), labs done showed wbc 11.24 (similar to May '22), Hb 11, , plts 192, INR 2.18, sodium 137, potassium 3.4, chloride 97, Cr 1.3, BUN 29, Ca 8.7, albumin 3.4, gfr 53, Magnesium 1.6, troponin <0.01, bnp 5836 (2800 May '22); VBG lactate 2.3, VBG pco2 53, covid neg, influenza neg, rsp Syncitial virus neg  EKG: afib with RVR rate 103, CXR: bl opacities (pending official read)  pt received lasix 40mg IV, 2g magnesium  called to evaluate, cough productive, no fever, on FM, chest ct reviewed      PAST MEDICAL & SURGICAL HISTORY:  Hypertension      GERD (gastroesophageal reflux disease)      Lung interstitial disease      BPH (benign prostatic hyperplasia)      HFrEF (heart failure with reduced ejection fraction)      COPD (chronic obstructive pulmonary disease)      No significant past surgical history          SOCIAL HX:   Smoking  former smoker 33 pack-years, quit 47y ago (1985)     FAMILY HISTORY:  .  No cardiovascular or pulmonary family history     REVIEW OF SYSTEMS:    All ROS are negative except per HPI     Allergies    No Known Allergies    Intolerances          PHYSICAL EXAM  Vital Signs Last 24 Hrs  T(C): 36.1 (11 Jul 2022 07:42), Max: 36.6 (10 Jul 2022 20:04)  T(F): 96.9 (11 Jul 2022 07:42), Max: 97.8 (10 Jul 2022 20:04)  HR: 115 (11 Jul 2022 07:42) (86 - 115)  BP: 131/83 (11 Jul 2022 07:42) (124/81 - 157/91)  RR: 22 (11 Jul 2022 07:42) (20 - 22)  SpO2: 97% (11 Jul 2022 07:42) (91% - 97%)    Parameters below as of 11 Jul 2022 07:42  Patient On (Oxygen Delivery Method): BiPAP/CPAP        CONSTITUTIONAL:  ill looking, tachypenic    ENT:   Airway patent,   No thrush    EYES:   Clear bilaterally,   pupils equal,   round and reactive to light.    CARDIAC:   Tachycardic to 115  irregular rhythm.    Bilateral lower extremity edema      RESPIRATORY:   Mild bilateral basal crackles.  No wheezing  Normal chest expansion  Not tachypneic,  No use of accessory muscles    GASTROINTESTINAL:  Abdomen soft,   non-tender,   no guarding,   + BS    GENITOURINARY  Joel catheter draining >0.5L of clear yellow urine  normal genitalia for sex      MUSCULOSKELETAL:   Bilateral symmetric lower extremity 2+ pitting edema.  range of motion is not limited,  no clubbing, cyanosis    NEUROLOGICAL:   Alert and oriented   no motor deficits.    SKIN:   Skin normal color for race,   No evidence of rash.          LABS:                          11.5   12.49 )-----------( 206      ( 11 Jul 2022 07:19 )             36.5                                               07-11    139  |  93<L>  |  23<H>  ----------------------------<  88  3.0<L>   |  35<H>  |  1.1    Ca    8.6      11 Jul 2022 07:19  Mg     1.6     07-10    TPro  6.6  /  Alb  3.5  /  TBili  0.9  /  DBili  x   /  AST  15  /  ALT  10  /  AlkPhos  59  07-11      PT/INR - ( 10 Jul 2022 21:23 )   PT: 24.90 sec;   INR: 2.18 ratio         PTT - ( 10 Jul 2022 21:23 )  PTT:30.7 sec                                           CARDIAC MARKERS ( 10 Jul 2022 21:23 )  x     / <0.01 ng/mL / x     / x     / x                                                LIVER FUNCTIONS - ( 11 Jul 2022 07:19 )  Alb: 3.5 g/dL / Pro: 6.6 g/dL / ALK PHOS: 59 U/L / ALT: 10 U/L / AST: 15 U/L / GGT: x                                                                                                MEDICATIONS  (STANDING):  albuterol/ipratropium for Nebulization 3 milliLiter(s) Nebulizer every 6 hours  apixaban 5 milliGRAM(s) Oral two times a day  cyanocobalamin 1000 MICROGram(s) Oral daily  diltiazem    milliGRAM(s) Oral daily  ferrous    sulfate 325 milliGRAM(s) Oral daily  finasteride 5 milliGRAM(s) Oral daily  furosemide   Injectable 40 milliGRAM(s) IV Push two times a day  metoprolol tartrate 100 milliGRAM(s) Oral three times a day  pantoprazole    Tablet 40 milliGRAM(s) Oral before breakfast  tamsulosin 0.4 milliGRAM(s) Oral at bedtime    MEDICATIONS  (PRN):  acetaminophen     Tablet .. 650 milliGRAM(s) Oral every 6 hours PRN Temp greater or equal to 38C (100.4F), Mild Pain (1 - 3)  aluminum hydroxide/magnesium hydroxide/simethicone Suspension 30 milliLiter(s) Oral every 4 hours PRN Dyspepsia  melatonin 3 milliGRAM(s) Oral at bedtime PRN Insomnia  ondansetron Injectable 4 milliGRAM(s) IV Push every 8 hours PRN Nausea and/or Vomiting      X-Rays reviewed:    CXR interpreted by me:

## 2022-07-12 NOTE — PROGRESS NOTE ADULT - SUBJECTIVE AND OBJECTIVE BOX
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 States he has sob at baseline, but noted his pulse ox to be in 80s consistently the past several days. Endorses baseline LE edema, could not give report of any worsening of it. Pt states he has afib and it has been uncontrolled recently, causing increased difficulty catching his breath. States he had one acute episode one night prior to admission but refused to come in d/t anxiety of staying too long in hospital. Denies chest pain, fever, chills, nausea, vomiting, abd pain, diarrhea, HA, dizziness, weakness.  Pt has multiple admissions for sob d/t various causes such as ILD exacerbations and CHF exacerbations. Pt is aox4.     ED course:   troponin <0.01, bnp 5836 (2800 May '22);  EKG: afib with RVR rate 103, CXR: pulmonary congestion  pt received lasix 40mg IV, 2g magnesium  placed on NRB and then Bipap with improvement in saturation to 95%  pt is admitted for workup/management CHF exacerbation vs ILD flare    7/12: Patient seen and examined at bedside. No overnight events. Patient says SOB has improved from yesterday. Denies chest pain/N/V/D                                              ----------------------------------------------------------  OBJECTIVE  PAST MEDICAL & SURGICAL HISTORY  Hypertension    GERD (gastroesophageal reflux disease)    Lung interstitial disease    BPH (benign prostatic hyperplasia)    HFrEF (heart failure with reduced ejection fraction)    COPD (chronic obstructive pulmonary disease)    No significant past surgical history                                              -----------------------------------------------------------  ALLERGIES:  No Known Allergies                                            ------------------------------------------------------------    HOME MEDICATIONS  Home Medications:  dilTIAZem 120 mg/24 hours oral capsule, extended release: 1 cap(s) orally once a day (11 Jul 2022 04:16)  Eliquis 5 mg oral tablet: 1 tab(s) orally 2 times a day (11 Jul 2022 04:16)  FERROUS SULFATE 325 MG TABLET: 1 each orally once a day (11 Jul 2022 04:16)  FINASTERIDE 5 MG TABLET: 1 each orally once a day (11 Jul 2022 04:16)  furosemide 40 mg oral tablet: 1 tab(s) orally once a day (11 Jul 2022 04:16)  HYDROmorphone 2 mg oral tablet: 1 tab(s) orally every 6 hours, As needed, dyspnea (11 Jul 2022 04:16)  Lopressor 100 mg oral tablet: 1 tab(s) orally 3 times a day (11 Jul 2022 04:16)  OMEPRAZOLE DR 40MG CAP: 1 cap(s) orally once a day (11 Jul 2022 04:16)  predniSONE 20 mg oral tablet: 1 tab(s) orally once a day for 1 week  then 10mg oral chronically and f/u w/ Pulmonary  (11 Jul 2022 04:16)  sulfamethoxazole-trimethoprim 800 mg-160 mg oral tablet: 1 tab(s) orally once a day (11 Jul 2022 04:16)  TAMSULOSIN HCL 0.4 MG CAPSULE: 1 each orally once a day (at bedtime) (11 Jul 2022 04:16)  VITAMIN B-12 1,000 MCG TABLET: 1 each orally once a day (11 Jul 2022 04:16)                           MEDICATIONS:  STANDING MEDICATIONS  albuterol/ipratropium for Nebulization 3 milliLiter(s) Nebulizer every 6 hours  apixaban 5 milliGRAM(s) Oral two times a day  cefepime   IVPB      cefepime   IVPB 2000 milliGRAM(s) IV Intermittent every 12 hours  cyanocobalamin 1000 MICROGram(s) Oral daily  diltiazem    milliGRAM(s) Oral daily  ferrous    sulfate 325 milliGRAM(s) Oral daily  finasteride 5 milliGRAM(s) Oral daily  furosemide   Injectable 40 milliGRAM(s) IV Push two times a day  methylPREDNISolone sodium succinate Injectable 60 milliGRAM(s) IV Push every 12 hours  metoprolol tartrate 100 milliGRAM(s) Oral three times a day  pantoprazole    Tablet 40 milliGRAM(s) Oral before breakfast  tamsulosin 0.4 milliGRAM(s) Oral at bedtime    PRN MEDICATIONS  acetaminophen     Tablet .. 650 milliGRAM(s) Oral every 6 hours PRN  aluminum hydroxide/magnesium hydroxide/simethicone Suspension 30 milliLiter(s) Oral every 4 hours PRN  melatonin 3 milliGRAM(s) Oral at bedtime PRN  ondansetron Injectable 4 milliGRAM(s) IV Push every 8 hours PRN                                            ------------------------------------------------------------  VITAL SIGNS: Last 24 Hours  T(C): 36.7 (12 Jul 2022 05:29), Max: 36.7 (12 Jul 2022 05:29)  T(F): 98.1 (12 Jul 2022 05:29), Max: 98.1 (12 Jul 2022 05:29)  HR: 96 (12 Jul 2022 05:29) (89 - 108)  BP: 137/91 (12 Jul 2022 05:29) (126/88 - 137/91)  BP(mean): --  RR: 18 (12 Jul 2022 05:29) (18 - 20)  SpO2: 91% (12 Jul 2022 05:29) (91% - 97%)                                             --------------------------------------------------------------  LABS:                        11.7   12.48 )-----------( 182      ( 12 Jul 2022 04:30 )             36.7     07-12    137  |  93<L>  |  31<H>  ----------------------------<  169<H>  3.4<L>   |  33<H>  |  1.3    Ca    8.5      12 Jul 2022 04:30  Phos  2.7     07-12  Mg     1.6     07-12    TPro  5.6<L>  /  Alb  2.9<L>  /  TBili  1.1  /  DBili  x   /  AST  15  /  ALT  11  /  AlkPhos  55  07-12    PT/INR - ( 10 Jul 2022 21:23 )   PT: 24.90 sec;   INR: 2.18 ratio         PTT - ( 10 Jul 2022 21:23 )  PTT:30.7 sec              CARDIAC MARKERS ( 10 Jul 2022 21:23 )  x     / <0.01 ng/mL / x     / x     / x                                                  -------------------------------------------------------------  RADIOLOGY:                                            --------------------------------------------------------------    PHYSICAL EXAM:  GENERAL: NAD  HEENT:  Atraumatic, Normocephalic. EOMI, PERRLA, conjunctiva and sclera clear  PULMONARY: Difficulty breathing, normal breath sounds, moderately dyspneic while talking  CARDIOVASCULAR: Regular rate and rhythm; No murmurs, rubs, or gallops  GASTROINTESTINAL: Soft, Nontender, Nondistended; Bowel sounds present  MUSCULOSKELETAL:  2+ Peripheral Pulses, No clubbing or cyanosis, 2+ pitting edema of legs  NEUROLOGY: no focal neurologic deficits, Alert and oriented x3  SKIN: No rashes or lesions                                           --------------------------------------------------------------    ASSESSMENT & PLAN    89-year-old male with past medical history of ILD on 5L O2, HTN, BPH, GERD, A. fib on Eliquis, CHF (EF 46% 2/27/22), and left lower lobe lung mass presents to the ED sent in from [facility] for evaluation of shortness of breath for 2 days. States his atrial fibrillation has not been in control, causing worsening sob. Noted drastic change from baseline over the past two days, with multiple pulse ox in 80s. Sent in from ED, improved on NRB then placed on bipap with improvement to 95%. Pt feels better, also recived 40mg IV lasix with good urine out/put at least 1L. Pt has pulmonary congestion on cxr, has extensive lower extremity edema. likely in CHF exacebation secondary to afib wit rvr. HR under controll now. c/w IV diuresis, fluid restriction.     #CHF/ #HFmrEF  #afib on eliquis  -bnp elevated 5800 compared to 2900 prior admission  -pulmonary congestion/b/l opacities on cxr  -3+ LE edema/ crackles/ JVD not significantly appreciable  -responded well to IV lasix in ED; increased UO  -states his afib has been acting up; uncontrolled; making it difficulty for him to breathe  -diet dash/tlc, 1.2L fluid restriction  -continue with IV diuresis lasix 40mg BID  -strict I and O  -monitor bmp once daily, keep mag <2, K <4  -O2 support as needed; bipap at night; wean o2 as appropriate  -f/u CT chest and TTE  -c/w home meds: cardizem 120mg daily, lopressor 100 tid      #ILD/ #LLL mass suspicious for cancer  - pulmonary recs: Solumedrol 60 q12 & Cefepime  - f/u procalc and d/c abx if negative   - f/u sputum culture  - Wean O2, titrate FiO2 to 88-92%, CPAP at night  - Clarify GOC - Very poor prognosis    #GERD  -c/w pantoprazole    # Macrocytic anemia  - f/u iron levels     NNEKA JOSEPH 89y Male  MRN#: [id number]     Hospital Day: 2d    Pt is currently admitted with the primary diagnosis of SOB and hypoxemia    SUBJECTIVE  History of Present Illness:   89 year old hard of hearing male with 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 States he has sob at baseline, but noted his pulse ox to be in 80s consistently the past several days. Endorses baseline LE edema, could not give report of any worsening of it. Pt states he has afib and it has been uncontrolled recently, causing increased difficulty catching his breath. States he had one acute episode one night prior to admission but refused to come in d/t anxiety of staying too long in hospital. Denies chest pain, fever, chills, nausea, vomiting, abd pain, diarrhea, HA, dizziness, weakness.  Pt has multiple admissions for sob d/t various causes such as ILD exacerbations and CHF exacerbations. Pt is aox4.     ED course:   troponin <0.01, bnp 5836 (2800 May '22);  EKG: afib with RVR rate 103, CXR: pulmonary congestion  pt received lasix 40mg IV, 2g magnesium  placed on NRB and then Bipap with improvement in saturation to 95%  pt is admitted for workup/management CHF exacerbation vs ILD flare    7/12: Patient seen and examined at bedside. No overnight events. Patient says SOB has improved from yesterday. Denies chest pain/N/V/D                                              ----------------------------------------------------------  OBJECTIVE  PAST MEDICAL & SURGICAL HISTORY  Hypertension    GERD (gastroesophageal reflux disease)    Lung interstitial disease    BPH (benign prostatic hyperplasia)    HFrEF (heart failure with reduced ejection fraction)    COPD (chronic obstructive pulmonary disease)    No significant past surgical history                                              -----------------------------------------------------------  ALLERGIES:  No Known Allergies                                            ------------------------------------------------------------    HOME MEDICATIONS  Home Medications:  dilTIAZem 120 mg/24 hours oral capsule, extended release: 1 cap(s) orally once a day (11 Jul 2022 04:16)  Eliquis 5 mg oral tablet: 1 tab(s) orally 2 times a day (11 Jul 2022 04:16)  FERROUS SULFATE 325 MG TABLET: 1 each orally once a day (11 Jul 2022 04:16)  FINASTERIDE 5 MG TABLET: 1 each orally once a day (11 Jul 2022 04:16)  furosemide 40 mg oral tablet: 1 tab(s) orally once a day (11 Jul 2022 04:16)  HYDROmorphone 2 mg oral tablet: 1 tab(s) orally every 6 hours, As needed, dyspnea (11 Jul 2022 04:16)  Lopressor 100 mg oral tablet: 1 tab(s) orally 3 times a day (11 Jul 2022 04:16)  OMEPRAZOLE DR 40MG CAP: 1 cap(s) orally once a day (11 Jul 2022 04:16)  predniSONE 20 mg oral tablet: 1 tab(s) orally once a day for 1 week  then 10mg oral chronically and f/u w/ Pulmonary  (11 Jul 2022 04:16)  sulfamethoxazole-trimethoprim 800 mg-160 mg oral tablet: 1 tab(s) orally once a day (11 Jul 2022 04:16)  TAMSULOSIN HCL 0.4 MG CAPSULE: 1 each orally once a day (at bedtime) (11 Jul 2022 04:16)  VITAMIN B-12 1,000 MCG TABLET: 1 each orally once a day (11 Jul 2022 04:16)                           MEDICATIONS:  STANDING MEDICATIONS  albuterol/ipratropium for Nebulization 3 milliLiter(s) Nebulizer every 6 hours  apixaban 5 milliGRAM(s) Oral two times a day  cefepime   IVPB      cefepime   IVPB 2000 milliGRAM(s) IV Intermittent every 12 hours  cyanocobalamin 1000 MICROGram(s) Oral daily  diltiazem    milliGRAM(s) Oral daily  ferrous    sulfate 325 milliGRAM(s) Oral daily  finasteride 5 milliGRAM(s) Oral daily  furosemide   Injectable 40 milliGRAM(s) IV Push two times a day  methylPREDNISolone sodium succinate Injectable 60 milliGRAM(s) IV Push every 12 hours  metoprolol tartrate 100 milliGRAM(s) Oral three times a day  pantoprazole    Tablet 40 milliGRAM(s) Oral before breakfast  tamsulosin 0.4 milliGRAM(s) Oral at bedtime    PRN MEDICATIONS  acetaminophen     Tablet .. 650 milliGRAM(s) Oral every 6 hours PRN  aluminum hydroxide/magnesium hydroxide/simethicone Suspension 30 milliLiter(s) Oral every 4 hours PRN  melatonin 3 milliGRAM(s) Oral at bedtime PRN  ondansetron Injectable 4 milliGRAM(s) IV Push every 8 hours PRN                                            ------------------------------------------------------------  VITAL SIGNS: Last 24 Hours  T(C): 36.7 (12 Jul 2022 05:29), Max: 36.7 (12 Jul 2022 05:29)  T(F): 98.1 (12 Jul 2022 05:29), Max: 98.1 (12 Jul 2022 05:29)  HR: 96 (12 Jul 2022 05:29) (89 - 108)  BP: 137/91 (12 Jul 2022 05:29) (126/88 - 137/91)  BP(mean): --  RR: 18 (12 Jul 2022 05:29) (18 - 20)  SpO2: 91% (12 Jul 2022 05:29) (91% - 97%)                                             --------------------------------------------------------------  LABS:                        11.7   12.48 )-----------( 182      ( 12 Jul 2022 04:30 )             36.7     07-12    137  |  93<L>  |  31<H>  ----------------------------<  169<H>  3.4<L>   |  33<H>  |  1.3    Ca    8.5      12 Jul 2022 04:30  Phos  2.7     07-12  Mg     1.6     07-12    TPro  5.6<L>  /  Alb  2.9<L>  /  TBili  1.1  /  DBili  x   /  AST  15  /  ALT  11  /  AlkPhos  55  07-12    PT/INR - ( 10 Jul 2022 21:23 )   PT: 24.90 sec;   INR: 2.18 ratio         PTT - ( 10 Jul 2022 21:23 )  PTT:30.7 sec              CARDIAC MARKERS ( 10 Jul 2022 21:23 )  x     / <0.01 ng/mL / x     / x     / x                                                  -------------------------------------------------------------  RADIOLOGY:                                            --------------------------------------------------------------    PHYSICAL EXAM:  GENERAL: NAD  HEENT:  Atraumatic, Normocephalic. EOMI, PERRLA, conjunctiva and sclera clear  PULMONARY: Difficulty breathing, normal breath sounds, moderately dyspneic while talking  CARDIOVASCULAR: Regular rate and rhythm; No murmurs, rubs, or gallops  GASTROINTESTINAL: Soft, Nontender, Nondistended; Bowel sounds present  MUSCULOSKELETAL:  2+ Peripheral Pulses, No clubbing or cyanosis, 2+ pitting edema of legs  NEUROLOGY: no focal neurologic deficits, Alert and oriented x3  SKIN: No rashes or lesions                                           --------------------------------------------------------------    ASSESSMENT & PLAN    89-year-old male with past medical history of ILD on 5L O2, HTN, BPH, GERD, A. fib on Eliquis, CHF (EF 46% 2/27/22), and left lower lobe lung mass presents to the ED sent in from [facility] for evaluation of shortness of breath for 2 days. States his atrial fibrillation has not been in control, causing worsening sob. Noted drastic change from baseline over the past two days, with multiple pulse ox in 80s. Sent in from ED, improved on NRB then placed on bipap with improvement to 95%. Pt feels better, also recived 40mg IV lasix with good urine out/put at least 1L. Pt has pulmonary congestion on cxr, has extensive lower extremity edema. likely in CHF exacebation secondary to afib wit rvr. HR under controll now. c/w IV diuresis, fluid restriction.     #CHF/ #HFmrEF  #afib on eliquis  -bnp elevated 5800 compared to 2900 prior admission  -pulmonary congestion/b/l opacities on cxr  -3+ LE edema/ crackles/ JVD not significantly appreciable  -responded well to IV lasix in ED; increased UO  -states his afib has been acting up; uncontrolled; making it difficulty for him to breathe  -diet dash/tlc, 1.2L fluid restriction  -continue with IV diuresis lasix 40mg BID  -strict I and O  -monitor bmp once daily, keep mag <2, K <4  -O2 support as needed; bipap at night; wean o2 as appropriate  -f/u CT chest and TTE  -c/w home meds: cardizem 120mg daily, lopressor 100 tid  - Mag and K+ repleted; f/u BMP in AM      #ILD/ #LLL mass suspicious for cancer  - pulmonary recs: Solumedrol 60 q12 & Cefepime  - f/u procalc and d/c abx if negative   - f/u sputum culture  - Wean O2, titrate FiO2 to 88-92%, CPAP at night  - Clarify GOC - Very poor prognosis    #GERD  -c/w pantoprazole    # Macrocytic anemia  - f/u iron levels

## 2022-07-12 NOTE — MEDICAL STUDENT PROGRESS NOTE(EDUCATION) - SUBJECTIVE AND OBJECTIVE BOX
NNEKA JOSEPH 89y Male  MRN#: [id number]   CODE STATUS:________      SUBJECTIVE  Patient is a 89y old hard of hearing Male who presents from [facility] with a chief complaint of acute sob, hypoxemia (11 Jul 2022 09:50)  Currently admitted to medicine with the primary diagnosis of Shortness of breath    Hospital course has been complicated by acute hypoxemic respiratory failure due to CHF.   Today is hospital day 1d, and this morning he says his SOB has improved and would like PT to try to move to chair and reports no overnight events.     Present Today:           Joel Catheter (X)No/ ()Yes? Indication:          Central Line (X)No/ ()Yes? Indication:          IV Fluids (X)No/ ()Yes? 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 per last Pulm note    COPD (chronic obstructive pulmonary disease)    CHF    No significant past surgical history except rotator cuff surgery      ALLERGIES:  No Known Allergies    MEDICATIONS:  STANDING MEDICATIONS  albuterol/ipratropium for Nebulization 3 milliLiter(s) Nebulizer every 6 hours  apixaban 5 milliGRAM(s) Oral two times a day  cefepime   IVPB      cyanocobalamin 1000 MICROGram(s) Oral daily  diltiazem    milliGRAM(s) Oral daily  ferrous    sulfate 325 milliGRAM(s) Oral daily  finasteride 5 milliGRAM(s) Oral daily  furosemide   Injectable 40 milliGRAM(s) IV Push two times a day  methylPREDNISolone sodium succinate Injectable 60 milliGRAM(s) IV Push every 12 hours  metoprolol tartrate 100 milliGRAM(s) Oral three times a day  pantoprazole    Tablet 40 milliGRAM(s) Oral before breakfast  tamsulosin 0.4 milliGRAM(s) Oral at bedtime    PRN MEDICATIONS  acetaminophen     Tablet .. 650 milliGRAM(s) Oral every 6 hours PRN  aluminum hydroxide/magnesium hydroxide/simethicone Suspension 30 milliLiter(s) Oral every 4 hours PRN  melatonin 3 milliGRAM(s) Oral at bedtime PRN  ondansetron Injectable 4 milliGRAM(s) IV Push every 8 hours PRN      VITAL SIGNS: Last 24 Hours  T(C): 36.7 (12 Jul 2022 05:29), Max: 36.7 (12 Jul 2022 05:29)  T(F): 98.1 (12 Jul 2022 05:29), Max: 98.1 (12 Jul 2022 05:29)  HR: 96 (12 Jul 2022 05:29) (87 - 108)  BP: 137/91 (12 Jul 2022 05:29) (126/88 - 137/91)  BP(mean): --  RR: 18 (12 Jul 2022 05:29) (18 - 24)  SpO2: 91% (12 Jul 2022 05:29) (91% - 99%)    LABS:                        11.7   12.48 )-----------( 182      ( 12 Jul 2022 04:30 )             36.7     07-12    137  |  93<L>  |  31<H>  ----------------------------<  169<H>  3.4<L>   |  33<H>  |  1.3    Ca    8.5      12 Jul 2022 04:30  Phos  2.7     07-12  Mg     1.6     07-12    TPro  5.6<L>  /  Alb  2.9<L>  /  TBili  1.1  /  DBili  x   /  AST  15  /  ALT  11  /  AlkPhos  55  07-12    PT/INR - ( 10 Jul 2022 21:23 )   PT: 24.90 sec;   INR: 2.18 ratio         PTT - ( 10 Jul 2022 21:23 )  PTT:30.7 sec          CARDIAC MARKERS ( 10 Jul 2022 21:23 )  x     / <0.01 ng/mL / x     / x     / x          RADIOLOGY:      PHYSICAL EXAM:     GENERAL: NAD  HEENT:  Atraumatic, Normocephalic. EOMI, PERRLA, conjunctiva and sclera clear  PULMONARY: Difficulty breathing, normal breath sounds, moderately dyspneic while talking  CARDIOVASCULAR: Regular rate and rhythm; No murmurs, rubs, or gallops  GASTROINTESTINAL: Soft, Nontender, Nondistended; Bowel sounds present  MUSCULOSKELETAL:  2+ Peripheral Pulses, No clubbing or cyanosis, 2+ pitting edema of legs  NEUROLOGY: no focal neurologic deficits, Alert and oriented x3  SKIN: No rashes or lesions      ADMISSION SUMMARY  Patient is a 89y old Male who presents with a chief complaint of acute sob, hypoxemia (11 Jul 2022 09:50)  Currently admitted to medicine with the primary diagnosis of Shortness of breath    Hospital course has been complicated by _______.       ASSESSMENT & PLAN    1. SHORTNESS OF BREATH        2.    3. Hypertension    GERD (gastroesophageal reflux disease)    Lung interstitial disease    BPH (benign prostatic hyperplasia)    HFrEF (heart failure with reduced ejection fraction)    COPD (chronic obstructive pulmonary disease)          Present today:  ( ) Congestive Heart Failure, Yes? ( )Acute / ( )Acute on Chronic / ( )Chronic  :  ( )Systolic / ( )Diastolic               Plan:  ( ) Complicated Pneumonia, Type?  ( )Parapneumonic effusion / ( )Abscess / ( ) Multilobar / ( )Other               Plan:  ( ) Morbid Obesity, Yes? BMI:               Plan:  ( ) Functional Quadriplegia               Plan:  ( ) Encephalopathy               Plan:    ( ) Discussion with patient and/or family regarding goals of care  ( ) Discussed Case and Plan with Medical Attending, Name:   NNEKA JOSEPH 89y Male  MRN#: [id number]   CODE STATUS:________      SUBJECTIVE  Patient is a 89y old hard of hearing Male with PMH of HTN, Interstitial lung disease, BPH, GERD, Atrial fibrillation, HfrEF, recent COVID, Left lower lobe lung mass, COPD, and CHF who presents from [facility] with a chief complaint of acute sob, hypoxemia (11 Jul 2022 09:50)  Currently admitted to medicine with the primary diagnosis of Shortness of breath    Hospital course has been complicated by acute hypoxemic respiratory failure due to CHF.   Today is hospital day 1d, and this morning he says his SOB has improved and would like PT to try to move to chair and reports no overnight events.     Present Today:           Joel Catheter (X)No/ ()Yes? Indication:          Central Line (X)No/ ()Yes? Indication:          IV Fluids (X)No/ ()Yes? 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 per last Pulm note    COPD (chronic obstructive pulmonary disease)    CHF    No significant past surgical history except rotator cuff surgery      ALLERGIES:  No Known Allergies    MEDICATIONS:  STANDING MEDICATIONS  albuterol/ipratropium for Nebulization 3 milliLiter(s) Nebulizer every 6 hours  apixaban 5 milliGRAM(s) Oral two times a day  cefepime   IVPB      cyanocobalamin 1000 MICROGram(s) Oral daily  diltiazem    milliGRAM(s) Oral daily  ferrous    sulfate 325 milliGRAM(s) Oral daily  finasteride 5 milliGRAM(s) Oral daily  furosemide   Injectable 40 milliGRAM(s) IV Push two times a day  methylPREDNISolone sodium succinate Injectable 60 milliGRAM(s) IV Push every 12 hours  metoprolol tartrate 100 milliGRAM(s) Oral three times a day  pantoprazole    Tablet 40 milliGRAM(s) Oral before breakfast  tamsulosin 0.4 milliGRAM(s) Oral at bedtime    PRN MEDICATIONS  acetaminophen     Tablet .. 650 milliGRAM(s) Oral every 6 hours PRN  aluminum hydroxide/magnesium hydroxide/simethicone Suspension 30 milliLiter(s) Oral every 4 hours PRN  melatonin 3 milliGRAM(s) Oral at bedtime PRN  ondansetron Injectable 4 milliGRAM(s) IV Push every 8 hours PRN      VITAL SIGNS: Last 24 Hours  T(C): 36.7 (12 Jul 2022 05:29), Max: 36.7 (12 Jul 2022 05:29)  T(F): 98.1 (12 Jul 2022 05:29), Max: 98.1 (12 Jul 2022 05:29)  HR: 96 (12 Jul 2022 05:29) (87 - 108)  BP: 137/91 (12 Jul 2022 05:29) (126/88 - 137/91)  BP(mean): --  RR: 18 (12 Jul 2022 05:29) (18 - 24)  SpO2: 91% (12 Jul 2022 05:29) (91% - 99%)    LABS:                        11.7   12.48 )-----------( 182      ( 12 Jul 2022 04:30 )             36.7     07-12    137  |  93<L>  |  31<H>  ----------------------------<  169<H>  3.4<L>   |  33<H>  |  1.3    Ca    8.5      12 Jul 2022 04:30  Phos  2.7     07-12  Mg     1.6     07-12    TPro  5.6<L>  /  Alb  2.9<L>  /  TBili  1.1  /  DBili  x   /  AST  15  /  ALT  11  /  AlkPhos  55  07-12    PT/INR - ( 10 Jul 2022 21:23 )   PT: 24.90 sec;   INR: 2.18 ratio         PTT - ( 10 Jul 2022 21:23 )  PTT:30.7 sec          CARDIAC MARKERS ( 10 Jul 2022 21:23 )  x     / <0.01 ng/mL / x     / x     / x          RADIOLOGY:      PHYSICAL EXAM:     GENERAL: NAD  HEENT:  Atraumatic, Normocephalic. EOMI, PERRLA, conjunctiva and sclera clear  PULMONARY: Difficulty breathing, normal breath sounds, moderately dyspneic while talking  CARDIOVASCULAR: Regular rate and rhythm; No murmurs, rubs, or gallops  GASTROINTESTINAL: Soft, Nontender, Nondistended; Bowel sounds present  MUSCULOSKELETAL:  2+ Peripheral Pulses, No clubbing or cyanosis, 2+ pitting edema of legs  NEUROLOGY: no focal neurologic deficits, Alert and oriented x3  SKIN: No rashes or lesions      ADMISSION SUMMARY  Patient is a 89y old hard of hearing Male with PMH of HTN, Interstitial lung disease, BPH, GERD, Atrial fibrillation, HfrEF, recent COVID, Left lower lobe lung mass, COPD, and CHF who presents from [facility] with a chief complaint of acute sob, hypoxemia (11 Jul 2022 09:50)  Currently admitted to medicine with the primary diagnosis of Shortness of breath    Hospital course has been complicated by acute hypoxemic respiratory failure due to CHF.       ASSESSMENT & PLAN    1. Acute hypoxemic respiratory failure 2/2 CHF  - c/w 6L O2   - c/w Bipap as needed    2. CHF exacerbation 2/2 Atrial fibrillation with Rapid Ventricular Response  - c/w IV lasix  - rate controlled atrial fibrillation     - c/w current management     - c/w fluid restriction, diuretics, strict I and O  - repeat CXR in 24hrs    3. Interstitial Lung disease  - c/w maintenance therapy  - f/u CXR  - add solumedrol  - obtain sputum cultures and procalcitonin level    4. Macrocytic anemia per previous admission  - obtain Iron panel levels (Iron, TIBC, etc.) to check for Iron Deficiency anemia  - add multivitamin    5. Electrolyte level due to IV lasix  - supplement K  - obtain Mg levels          Present today:  ( ) Congestive Heart Failure, Yes? ( )Acute / (X )Acute on Chronic / ( )Chronic  :  ( )Systolic / ( )Diastolic               Plan:  ( ) Complicated Pneumonia, Type?  ( )Parapneumonic effusion / ( )Abscess / ( ) Multilobar / ( )Other               Plan:  ( ) Morbid Obesity, Yes? BMI:               Plan:  ( ) Functional Quadriplegia               Plan:  ( ) Encephalopathy               Plan:    ( ) Discussion with patient and/or family regarding goals of care  ( ) Discussed Case and Plan with Medical Attending, Name:

## 2022-07-12 NOTE — PROGRESS NOTE ADULT - SUBJECTIVE AND OBJECTIVE BOX
Chart reviewed, patient examined. Pertinent results reviewed.  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 States he has sob at baseline, but noted his pulse ox to be in 80s consistently the past several days. Endorses baseline LE edema, could not give report of any worsening of it. Pt states he has afib and it has been uncontrolled recently, causing increased difficulty catching his breath. States he had one acute episode one night prior to admission but refused to come in d/t anxiety of staying too long in hospital. Denies chest pain, fever, chills, nausea, vomiting, abd pain, diarrhea, HA, dizziness, weakness.  Pt has multiple admissions for sob d/t various causes such as ILD exacerbations and CHF exacerbations. Pt is aox4.     ED course:   troponin <0.01, bnp 5836 (2800 May '22);  EKG: afib with RVR rate 103, CXR: pulmonary congestion  pt received lasix 40mg IV, 2g magnesium  placed on NRB and then Bipap with improvement in saturation to 95%  pt is admitted for workup/management CHF exacerbation vs ILD flare    7/12: Patient seen and examined at bedside. No overnight events. Patient says SOB has improved from yesterday. Denies chest pain/N/V/D  ROS: his poor hearing interferes with communication;  He c/o general weakness.                                            ----------------------------------------------------------  OBJECTIVE  PAST MEDICAL & SURGICAL HISTORY  Hypertension    GERD (gastroesophageal reflux disease)    Lung interstitial disease    BPH (benign prostatic hyperplasia)    HFrEF (heart failure with reduced ejection fraction)    COPD (chronic obstructive pulmonary disease)    No significant past surgical history                                              -----------------------------------------------------------  ALLERGIES:  No Known Allergies                                            ------------------------------------------------------------    HOME MEDICATIONS  Home Medications:  dilTIAZem 120 mg/24 hours oral capsule, extended release: 1 cap(s) orally once a day (11 Jul 2022 04:16)  Eliquis 5 mg oral tablet: 1 tab(s) orally 2 times a day (11 Jul 2022 04:16)  FERROUS SULFATE 325 MG TABLET: 1 each orally once a day (11 Jul 2022 04:16)  FINASTERIDE 5 MG TABLET: 1 each orally once a day (11 Jul 2022 04:16)  furosemide 40 mg oral tablet: 1 tab(s) orally once a day (11 Jul 2022 04:16)  HYDROmorphone 2 mg oral tablet: 1 tab(s) orally every 6 hours, As needed, dyspnea (11 Jul 2022 04:16)  Lopressor 100 mg oral tablet: 1 tab(s) orally 3 times a day (11 Jul 2022 04:16)  OMEPRAZOLE DR 40MG CAP: 1 cap(s) orally once a day (11 Jul 2022 04:16)  predniSONE 20 mg oral tablet: 1 tab(s) orally once a day for 1 week  then 10mg oral chronically and f/u w/ Pulmonary  (11 Jul 2022 04:16)  sulfamethoxazole-trimethoprim 800 mg-160 mg oral tablet: 1 tab(s) orally once a day (11 Jul 2022 04:16)  TAMSULOSIN HCL 0.4 MG CAPSULE: 1 each orally once a day (at bedtime) (11 Jul 2022 04:16)  VITAMIN B-12 1,000 MCG TABLET: 1 each orally once a day (11 Jul 2022 04:16)                           MEDICATIONS:  STANDING MEDICATIONS  albuterol/ipratropium for Nebulization 3 milliLiter(s) Nebulizer every 6 hours  apixaban 5 milliGRAM(s) Oral two times a day  cefepime   IVPB      cefepime   IVPB 2000 milliGRAM(s) IV Intermittent every 12 hours  cyanocobalamin 1000 MICROGram(s) Oral daily  diltiazem    milliGRAM(s) Oral daily  ferrous    sulfate 325 milliGRAM(s) Oral daily  finasteride 5 milliGRAM(s) Oral daily  furosemide   Injectable 40 milliGRAM(s) IV Push two times a day  methylPREDNISolone sodium succinate Injectable 60 milliGRAM(s) IV Push every 12 hours  metoprolol tartrate 100 milliGRAM(s) Oral three times a day  pantoprazole    Tablet 40 milliGRAM(s) Oral before breakfast  tamsulosin 0.4 milliGRAM(s) Oral at bedtime    PRN MEDICATIONS  acetaminophen     Tablet .. 650 milliGRAM(s) Oral every 6 hours PRN  aluminum hydroxide/magnesium hydroxide/simethicone Suspension 30 milliLiter(s) Oral every 4 hours PRN  melatonin 3 milliGRAM(s) Oral at bedtime PRN  ondansetron Injectable 4 milliGRAM(s) IV Push every 8 hours PRN                                            ------------------------------------------------------------  VITAL SIGNS: Last 24 Hours  Vital Signs Last 24 Hrs  T(C): 36.1 (12 Jul 2022 20:27), Max: 36.7 (12 Jul 2022 05:29)  T(F): 96.9 (12 Jul 2022 20:27), Max: 98.1 (12 Jul 2022 05:29)  HR: 87 (12 Jul 2022 20:27) (87 - 99)  BP: 131/61 (12 Jul 2022 20:27) (131/61 - 137/91)  BP(mean): --  RR: 19 (12 Jul 2022 20:27) (18 - 20)  SpO2: 91% (12 Jul 2022 20:27) (91% - 92%)    Parameters below as of 12 Jul 2022 20:27  Patient On (Oxygen Delivery Method): nasal cannula-5L/M                                               --------------------------------------------------------------  LABS:                        11.7   12.48 )-----------( 182      ( 12 Jul 2022 04:30 )             36.7     07-12    137  |  93<L>  |  31<H>  ----------------------------<  169<H>  3.4<L>   |  33<H>  |  1.3    Ca    8.5      12 Jul 2022 04:30  Phos  2.7     07-12  Mg     1.6     07-12    TPro  5.6<L>  /  Alb  2.9<L>  /  TBili  1.1  /  DBili  x   /  AST  15  /  ALT  11  /  AlkPhos  55  07-12    PT/INR - ( 10 Jul 2022 21:23 )   PT: 24.90 sec;   INR: 2.18 ratio         PTT - ( 10 Jul 2022 21:23 )  PTT:30.7 sec              CARDIAC MARKERS ( 10 Jul 2022 21:23 )  x     / <0.01 ng/mL / x     / x     / x                                                  -------------------------------------------------------------  RADIOLOGY:                                            --------------------------------------------------------------    PHYSICAL EXAM:  GENERAL: NAD; in bed; weak; poor hearing  HEENT:  Atraumatic, Normocephalic. EOMI, PERRLA, conjunctiva and sclera clear  PULMONARY: Difficulty breathing, + diffuse breath sound w B/L rhonchi;, moderately dyspneic while talking  CARDIOVASCULAR: IRR IRR; No murmurs, rubs, or gallops  GASTROINTESTINAL: obese; Soft, Nontender, Nondistended; Bowel sounds present  MUSCULOSKELETAL:  2+ Peripheral Pulses, No clubbing or cyanosis, 2+ pitting edema of legs  NEUROLOGY: no focal neurologic deficits, Alert and oriented x3; POS gen weakness  SKIN: No rashes or lesions                                           --------------------------------------------------------------    ASSESSMENT & PLAN    89-year-old male with past medical history of ILD on 5L O2, HTN, BPH, GERD, AF on Eliquis, CHF (EF 46% 2/27/22), and left lower lobe lung mass presents to the ED sent in from [facility] for evaluation of shortness of breath for 2 days. States his atrial fibrillation has not been in control, causing worsening sob. Noted drastic change from baseline over the past two days, with multiple pulse ox in 80s. Sent in from ED, improved on NRB then placed on bipap with improvement to 95%. Pt feels better, also recived 40mg IV lasix with good urine out/put at least 1L. Pt has pulmonary congestion on cxr, has extensive lower extremity edema. likely in CHF exacebation secondary to afib wit rvr. HR under controll now. c/w IV diuresis, fluid restriction.     #CHF/ #HFmrEF  #afib on eliquis  -bnp elevated 5800 compared to 2900 prior admission  -pulmonary congestion/b/l opacities on cxr  -3+ LE edema/ crackles/ JVD not significantly appreciable  -responded well to IV lasix in ED; increased UO  -states his afib has been acting up; uncontrolled; making it difficulty for him to breathe  -diet dash/tlc, 1.2L fluid restriction  -continue with IV diuresis lasix 40mg BID  -strict I and O  -monitor bmp once daily, keep mag >2.2, K>4  -O2 support as needed; bipap at night; wean O2 as appropriate  -f/u CT chest and TTE  -c/w home meds: cardizem 120mg daily, lopressor 100 tid  - Mag and K+ repleted; f/u BMP in AM      #ILD/ #LLL mass suspicious for cancer  - pulmonary recs: Solumedrol 60 q12 & Cefepime  - f/u procalc and d/c abx if negative   - f/u sputum culture  - Wean O2, titrate FiO2 to 88-92%, CPAP at night- maintaining now  - Clarify GOC - Very poor prognosis;  Patient does express his desire not to be intubated; will review w his son, Elkin;    #GERD  -c/w pantoprazole    # Macrocytic anemia  - f/u iron levels    # Hearing Loss  - need to get amplification device to aid in communicating    See Pulmonary: prognosis is very poor

## 2022-07-13 NOTE — PROGRESS NOTE ADULT - SUBJECTIVE AND OBJECTIVE BOX
Chart reviewed, patient examined. Pertinent results reviewed.  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 States he has sob at baseline, but noted his pulse ox to be in 80s consistently the past several days. Endorses baseline LE edema, could not give report of any worsening of it. Pt states he has afib and it has been uncontrolled recently, causing increased difficulty catching his breath. States he had one acute episode one night prior to admission but refused to come in d/t anxiety of staying too long in hospital. Denies chest pain, fever, chills, nausea, vomiting, abd pain, diarrhea, HA, dizziness, weakness.  Pt has multiple admissions for sob d/t various causes such as ILD exacerbations and CHF exacerbations. Pt is aox4-    ED course:   troponin <0.01, bnp 5836 (2800 May '22);  EKG: afib with RVR rate 103, CXR: pulmonary congestion  pt received lasix 40mg IV, 2g magnesium  placed on NRB and then Bipap with improvement in saturation to 95%  pt is admitted for workup/management CHF exacerbation vs ILD flare    7/12: Patient seen and examined at bedside. No overnight events. Patient says SOB has improved from yesterday. Denies chest pain/N/V/D  ROS: his poor hearing interferes with communication;  He c/o general weakness.                                            ----------------------------------------------------------  OBJECTIVE  PAST MEDICAL & SURGICAL HISTORY  Hypertension    GERD (gastroesophageal reflux disease)    Lung interstitial disease    BPH (benign prostatic hyperplasia)    HFrEF (heart failure with reduced ejection fraction)    COPD (chronic obstructive pulmonary disease)    No significant past surgical history                                              -----------------------------------------------------------  ALLERGIES:  No Known Allergies                                            ------------------------------------------------------------    HOME MEDICATIONS  Home Medications:  dilTIAZem 120 mg/24 hours oral capsule, extended release: 1 cap(s) orally once a day (11 Jul 2022 04:16)  Eliquis 5 mg oral tablet: 1 tab(s) orally 2 times a day (11 Jul 2022 04:16)  FERROUS SULFATE 325 MG TABLET: 1 each orally once a day (11 Jul 2022 04:16)  FINASTERIDE 5 MG TABLET: 1 each orally once a day (11 Jul 2022 04:16)  furosemide 40 mg oral tablet: 1 tab(s) orally once a day (11 Jul 2022 04:16)  HYDROmorphone 2 mg oral tablet: 1 tab(s) orally every 6 hours, As needed, dyspnea (11 Jul 2022 04:16)  Lopressor 100 mg oral tablet: 1 tab(s) orally 3 times a day (11 Jul 2022 04:16)  OMEPRAZOLE DR 40MG CAP: 1 cap(s) orally once a day (11 Jul 2022 04:16)  predniSONE 20 mg oral tablet: 1 tab(s) orally once a day for 1 week  then 10mg oral chronically and f/u w/ Pulmonary  (11 Jul 2022 04:16)  sulfamethoxazole-trimethoprim 800 mg-160 mg oral tablet: 1 tab(s) orally once a day (11 Jul 2022 04:16)  TAMSULOSIN HCL 0.4 MG CAPSULE: 1 each orally once a day (at bedtime) (11 Jul 2022 04:16)  VITAMIN B-12 1,000 MCG TABLET: 1 each orally once a day (11 Jul 2022 04:16)                           MEDICATIONS:  MEDICATIONS  (STANDING):  albuterol/ipratropium for Nebulization 3 milliLiter(s) Nebulizer every 6 hours  apixaban 5 milliGRAM(s) Oral two times a day  cefepime   IVPB      cefepime   IVPB 2000 milliGRAM(s) IV Intermittent every 12 hours  cyanocobalamin 1000 MICROGram(s) Oral daily  diltiazem    milliGRAM(s) Oral daily  ferrous    sulfate 325 milliGRAM(s) Oral daily  finasteride 5 milliGRAM(s) Oral daily  furosemide   Injectable 40 milliGRAM(s) IV Push two times a day  magnesium sulfate  IVPB 2 Gram(s) IV Intermittent once  methylPREDNISolone sodium succinate Injectable 60 milliGRAM(s) IV Push every 12 hours  metoprolol tartrate 100 milliGRAM(s) Oral three times a day  pantoprazole    Tablet 40 milliGRAM(s) Oral before breakfast  tamsulosin 0.4 milliGRAM(s) Oral at bedtime    MEDICATIONS  (PRN):  acetaminophen     Tablet .. 650 milliGRAM(s) Oral every 6 hours PRN Temp greater or equal to 38C (100.4F), Mild Pain (1 - 3)  aluminum hydroxide/magnesium hydroxide/simethicone Suspension 30 milliLiter(s) Oral every 4 hours PRN Dyspepsia  melatonin 3 milliGRAM(s) Oral at bedtime PRN Insomnia  ondansetron Injectable 4 milliGRAM(s) IV Push every 8 hours PRN Nausea and/or Vomiting  zolpidem 5 milliGRAM(s) Oral at bedtime PRN Insomnia                                            ------------------------------------------------------------  VITAL SIGNS: Last 24 Hours  Vital Signs Last 24 Hrs  T(C): 35.9 (13 Jul 2022 05:00), Max: 36.1 (12 Jul 2022 20:27)  T(F): 96.7 (13 Jul 2022 05:00), Max: 96.9 (12 Jul 2022 20:27)  HR: 95 (13 Jul 2022 06:30) (63 - 95)  BP: 115/79 (13 Jul 2022 05:00) (115/79 - 131/61)  BP(mean): --  RR: 18 (13 Jul 2022 05:00) (18 - 19)  SpO2: 94% (13 Jul 2022 06:30) (81% - 94%)    Parameters below as of 13 Jul 2022 06:30  Patient On (Oxygen Delivery Method): mask, nonrebreather  O2 Flow (L/min): 6                                               --------------------------------------------------------------  LABS:                           11.1   11.96 )-----------( 210      ( 13 Jul 2022 08:49 )             34.6                      11.7   12.48 )-----------( 182      ( 12 Jul 2022 04:30 )             36.7     07-13    139  |  95<L>  |  32<H>  ----------------------------<  145<H>  3.6   |  30  |  1.1    Ca    8.6      13 Jul 2022 08:49  Phos  2.7     07-12  Mg     1.7     07-13    TPro  5.6<L>  /  Alb  2.9<L>  /  TBili  1.1  /  DBili  x   /  AST  15  /  ALT  11  /  AlkPhos  55  07-12    07-12    137  |  93<L>  |  31<H>  ----------------------------<  169<H>  3.4<L>   |  33<H>  |  1.3    Ca    8.5      12 Jul 2022 04:30  Phos  2.7     07-12  Mg     1.6     07-12    TPro  5.6<L>  /  Alb  2.9<L>  /  TBili  1.1  /  DBili  x   /  AST  15  /  ALT  11  /  AlkPhos  55  07-12    PT/INR - ( 10 Jul 2022 21:23 )   PT: 24.90 sec;   INR: 2.18 ratio         PTT - ( 10 Jul 2022 21:23 )  PTT:30.7 sec              CARDIAC MARKERS ( 10 Jul 2022 21:23 )  x     / <0.01 ng/mL / x     / x     / x                                                  -------------------------------------------------------------  RADIOLOGY:                                            --------------------------------------------------------------    PHYSICAL EXAM:  GENERAL: NAD; in bed; weak; poor hearing; irritable; )x4-; year- 2022; mo-?  HEENT:  Atraumatic, Normocephalic. EOMI, PERRLA, MMM  PULMONARY: Difficulty breathing, + diffuse breath sound w B/L rhonchi;, moderately dyspneic while talking; O2 drops just by sitting up  CARDIOVASCULAR: IRR IRR; No murmurs, rubs, or gallops  GASTROINTESTINAL: obese; Soft, Nontender, Nondistended; Bowel sounds present;   : macedo in place- no edema  MUSCULOSKELETAL:  2+ Peripheral Pulses, No clubbing or cyanosis, 1+ pitting edema of legs  NEUROLOGY: no focal neurologic deficits, Alert and oriented x3; POS gen weakness  SKIN: No rashes or lesions                                           --------------------------------------------------------------    ASSESSMENT & PLAN    89-year-old male with past medical history of ILD on 5L O2, HTN, BPH, GERD, AF on Eliquis, CHF (EF 46% 2/27/22), and left lower lobe lung mass presents to the ED sent in from [facility] for evaluation of shortness of breath for 2 days. States his atrial fibrillation has not been in control, causing worsening sob. Noted drastic change from baseline over the past two days, with multiple pulse ox in 80s. Sent in from ED, improved on NRB then placed on bipap with improvement to 95%. Pt feels better, also recived 40mg IV lasix with good urine out/put at least 1L. Pt has pulmonary congestion on cxr, has extensive lower extremity edema. likely in CHF exacerbation secondary to afib wit rvr. HR under control better now.    Also w ILD- on IV steroids; and possible PNA- MDR/GNR from SNF- on Cefepime    #CHF/ #HFmrEF  #afib on eliquis  -bnp elevated 5800 compared to 2900 prior admission  -pulmonary congestion/b/l opacities on cxr  -3+ LE edema/ crackles- improved  -responded well to IV lasix in ED; increased UO  -states his afib has been acting up; uncontrolled; making it difficulty for him to breathe  -diet dash/tlc, 1.2L fluid restriction  -continue with IV diuresis lasix 40mg BID  -strict I and O  -monitor bmp once daily, keep mag >2.2, K>4- needs further supplementation  -O2 support as needed; bipap at night; wean O2 as appropriate  -f/u CT chest and TTE  -c/w home meds: cardizem 120mg daily, lopressor 100 tid  - Mag and K+ repleted; f/u BMP in AM      #ILD/ #LLL mass suspicious for cancer  - pulmonary recs: Solumedrol 60 q12 & Cefepime  - f/u procalc and d/c abx if negative   - f/u sputum culture  - Wean O2, titrate FiO2 to 88-92%, CPAP at night- maintaining now  - Clarify GOC - Very poor prognosis;  Patient does express his desire not to be intubated; will review w his son, Elkin;    #GERD  -c/w pantoprazole    # Macrocytic anemia  - f/u iron levels    # Hearing Loss  - need to get amplification device to aid in communicating    See Pulmonary: prognosis is very poor     Chart reviewed, patient examined. Pertinent results reviewed.  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 States he has sob at baseline, but noted his pulse ox to be in 80s consistently the past several days. Endorses baseline LE edema, could not give report of any worsening of it. Pt states he has afib and it has been uncontrolled recently, causing increased difficulty catching his breath. States he had one acute episode one night prior to admission but refused to come in d/t anxiety of staying too long in hospital. Denies chest pain, fever, chills, nausea, vomiting, abd pain, diarrhea, HA, dizziness, weakness.  Pt has multiple admissions for sob d/t various causes such as ILD exacerbations and CHF exacerbations. Pt is aox4-    ED course:   troponin <0.01, bnp 5836 (2800 May '22);  EKG: afib with RVR rate 103, CXR: pulmonary congestion  pt received lasix 40mg IV, 2g magnesium  placed on NRB and then Bipap with improvement in saturation to 95%  pt is admitted for workup/management CHF exacerbation vs ILD flare    7/12: Patient seen and examined at bedside. No overnight events. Patient says SOB has improved from yesterday. Denies chest pain/N/V/D  ROS: his poor hearing interferes with communication;  He c/o general weakness.                                            ----------------------------------------------------------  OBJECTIVE  PAST MEDICAL & SURGICAL HISTORY  Hypertension    GERD (gastroesophageal reflux disease)    Lung interstitial disease    BPH (benign prostatic hyperplasia)    HFrEF (heart failure with reduced ejection fraction)    COPD (chronic obstructive pulmonary disease)    No significant past surgical history                                              -----------------------------------------------------------  ALLERGIES:  No Known Allergies                                            ------------------------------------------------------------    HOME MEDICATIONS  Home Medications:  dilTIAZem 120 mg/24 hours oral capsule, extended release: 1 cap(s) orally once a day (11 Jul 2022 04:16)  Eliquis 5 mg oral tablet: 1 tab(s) orally 2 times a day (11 Jul 2022 04:16)  FERROUS SULFATE 325 MG TABLET: 1 each orally once a day (11 Jul 2022 04:16)  FINASTERIDE 5 MG TABLET: 1 each orally once a day (11 Jul 2022 04:16)  furosemide 40 mg oral tablet: 1 tab(s) orally once a day (11 Jul 2022 04:16)  HYDROmorphone 2 mg oral tablet: 1 tab(s) orally every 6 hours, As needed, dyspnea (11 Jul 2022 04:16)  Lopressor 100 mg oral tablet: 1 tab(s) orally 3 times a day (11 Jul 2022 04:16)  OMEPRAZOLE DR 40MG CAP: 1 cap(s) orally once a day (11 Jul 2022 04:16)  predniSONE 20 mg oral tablet: 1 tab(s) orally once a day for 1 week  then 10mg oral chronically and f/u w/ Pulmonary  (11 Jul 2022 04:16)  sulfamethoxazole-trimethoprim 800 mg-160 mg oral tablet: 1 tab(s) orally once a day (11 Jul 2022 04:16)  TAMSULOSIN HCL 0.4 MG CAPSULE: 1 each orally once a day (at bedtime) (11 Jul 2022 04:16)  VITAMIN B-12 1,000 MCG TABLET: 1 each orally once a day (11 Jul 2022 04:16)                           MEDICATIONS:  MEDICATIONS  (STANDING):  albuterol/ipratropium for Nebulization 3 milliLiter(s) Nebulizer every 6 hours  apixaban 5 milliGRAM(s) Oral two times a day  cefepime   IVPB      cefepime   IVPB 2000 milliGRAM(s) IV Intermittent every 12 hours  cyanocobalamin 1000 MICROGram(s) Oral daily  diltiazem    milliGRAM(s) Oral daily  ferrous    sulfate 325 milliGRAM(s) Oral daily  finasteride 5 milliGRAM(s) Oral daily  furosemide   Injectable 40 milliGRAM(s) IV Push two times a day  magnesium sulfate  IVPB 2 Gram(s) IV Intermittent once  methylPREDNISolone sodium succinate Injectable 60 milliGRAM(s) IV Push every 12 hours  metoprolol tartrate 100 milliGRAM(s) Oral three times a day  pantoprazole    Tablet 40 milliGRAM(s) Oral before breakfast  tamsulosin 0.4 milliGRAM(s) Oral at bedtime    MEDICATIONS  (PRN):  acetaminophen     Tablet .. 650 milliGRAM(s) Oral every 6 hours PRN Temp greater or equal to 38C (100.4F), Mild Pain (1 - 3)  aluminum hydroxide/magnesium hydroxide/simethicone Suspension 30 milliLiter(s) Oral every 4 hours PRN Dyspepsia  melatonin 3 milliGRAM(s) Oral at bedtime PRN Insomnia  ondansetron Injectable 4 milliGRAM(s) IV Push every 8 hours PRN Nausea and/or Vomiting  zolpidem 5 milliGRAM(s) Oral at bedtime PRN Insomnia                                            ------------------------------------------------------------  VITAL SIGNS: Last 24 Hours  Vital Signs Last 24 Hrs  T(C): 35.9 (13 Jul 2022 05:00), Max: 36.1 (12 Jul 2022 20:27)  T(F): 96.7 (13 Jul 2022 05:00), Max: 96.9 (12 Jul 2022 20:27)  HR: 95 (13 Jul 2022 06:30) (63 - 95)  BP: 115/79 (13 Jul 2022 05:00) (115/79 - 131/61)  BP(mean): --  RR: 18 (13 Jul 2022 05:00) (18 - 19)  SpO2: 94% (13 Jul 2022 06:30) (81% - 94%)    Parameters below as of 13 Jul 2022 06:30  Patient On (Oxygen Delivery Method): mask, nonrebreather  O2 Flow (L/min): 6                                               --------------------------------------------------------------  LABS:                           11.1   11.96 )-----------( 210      ( 13 Jul 2022 08:49 )             34.6                      11.7   12.48 )-----------( 182      ( 12 Jul 2022 04:30 )             36.7     07-13    139  |  95<L>  |  32<H>  ----------------------------<  145<H>  3.6   |  30  |  1.1    Ca    8.6      13 Jul 2022 08:49  Phos  2.7     07-12  Mg     1.7     07-13    TPro  5.6<L>  /  Alb  2.9<L>  /  TBili  1.1  /  DBili  x   /  AST  15  /  ALT  11  /  AlkPhos  55  07-12    07-12    137  |  93<L>  |  31<H>  ----------------------------<  169<H>  3.4<L>   |  33<H>  |  1.3    Ca    8.5      12 Jul 2022 04:30  Phos  2.7     07-12  Mg     1.6     07-12    TPro  5.6<L>  /  Alb  2.9<L>  /  TBili  1.1  /  DBili  x   /  AST  15  /  ALT  11  /  AlkPhos  55  07-12    PT/INR - ( 10 Jul 2022 21:23 )   PT: 24.90 sec;   INR: 2.18 ratio         PTT - ( 10 Jul 2022 21:23 )  PTT:30.7 sec              CARDIAC MARKERS ( 10 Jul 2022 21:23 )  x     / <0.01 ng/mL / x     / x     / x                                                  -------------------------------------------------------------  RADIOLOGY:                                            --------------------------------------------------------------    PHYSICAL EXAM:  GENERAL: NAD; in bed; weak; poor hearing; irritable; )x4-; year- 2022; mo-?  HEENT:  Atraumatic, Normocephalic. EOMI, PERRLA, MMM  PULMONARY: Difficulty breathing, + diffuse breath sound w B/L rhonchi;, moderately dyspneic while talking; O2 drops just by sitting up  CARDIOVASCULAR: IRR IRR; No murmurs, rubs, or gallops  GASTROINTESTINAL: obese; Soft, Nontender, Nondistended; Bowel sounds present;   : macedo in place- no edema  MUSCULOSKELETAL:  2+ Peripheral Pulses, No clubbing or cyanosis, 1+ pitting edema of legs  NEUROLOGY: no focal neurologic deficits, Alert and oriented x3; POS gen weakness  SKIN: No rashes or lesions                                           --------------------------------------------------------------    ASSESSMENT & PLAN    89-year-old male with past medical history of ILD on 5L O2, HTN, BPH, GERD, AF on Eliquis, CHF (EF 46% 2/27/22), and left lower lobe lung mass presents to the ED sent in from [facility] for evaluation of shortness of breath for 2 days. States his atrial fibrillation has not been in control, causing worsening sob. Noted drastic change from baseline over the past two days, with multiple pulse ox in 80s. Sent in from ED, improved on NRB then placed on bipap with improvement to 95%. Pt feels better, also recived 40mg IV lasix with good urine out/put at least 1L. Pt has pulmonary congestion on cxr, has extensive lower extremity edema. likely in CHF exacerbation secondary to afib wit rvr. HR under control better now.    Also w ILD- on IV steroids; and possible PNA- MDR/GNR from SNF- on Cefepime    #CHF/ #HFmrEF  #afib on eliquis  -bnp elevated 5800 compared to 2900 prior admission  -pulmonary congestion/b/l opacities on cxr  -3+ LE edema/ crackles- improved  -responded well to IV lasix in ED; increased UO  -states his afib has been acting up; uncontrolled; making it difficulty for him to breathe  -diet dash/tlc, 1.2L fluid restriction  -continue with IV diuresis lasix 40mg BID  -strict I and O  -monitor bmp once daily, keep mag >2.2, K>4- needs further supplementation  -O2 support as needed; bipap at night; wean O2 as appropriate  -f/u CT chest and TTE  -c/w home meds: cardizem 120mg daily, lopressor 100 tid  - Mag and K+ repleted; f/u BMP in AM      #ILD/ #LLL mass suspicious for cancer  - pulmonary recs: Solumedrol 60 q12 & Cefepime  - f/u procalc-POS; ask ID to evaluate  - get nasal MRSA   - f/u sputum culture; asdk for sputum to be sent for cytologyx3  - Wean O2, titrate FiO2 to 88-92%, CPAP at night- maintaining now  - Clarify GOC - Very poor prognosis;  Patient does express his desire not to be intubated; will review w his son, Elkin;    #GERD  -c/w pantoprazole    # Macrocytic anemia  - f/u iron levels    # Hearing Loss  - amplification device has aided in communicating w patient    See Pulmonary: prognosis is very poor

## 2022-07-13 NOTE — PROGRESS NOTE ADULT - SUBJECTIVE AND OBJECTIVE BOX
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 States he has sob at baseline, but noted his pulse ox to be in 80s consistently the past several days. Endorses baseline LE edema, could not give report of any worsening of it. Pt states he has afib and it has been uncontrolled recently, causing increased difficulty catching his breath. States he had one acute episode one night prior to admission but refused to come in d/t anxiety of staying too long in hospital. Denies chest pain, fever, chills, nausea, vomiting, abd pain, diarrhea, HA, dizziness, weakness.  Pt has multiple admissions for sob d/t various causes such as ILD exacerbations and CHF exacerbations. Pt is aox4.     ED course:   troponin <0.01, bnp 5836 (2800 May '22);  EKG: afib with RVR rate 103, CXR: pulmonary congestion  pt received lasix 40mg IV, 2g magnesium  placed on NRB and then Bipap with improvement in saturation to 95%  pt is admitted for workup/management CHF exacerbation vs ILD flare    7/13: Patient seen and examined at bedside. Patient had an episode of SOB early this AM, tachycardic to 120s, satting in 80s w/ irregular HR. Patient was placed on a NRB                                            ----------------------------------------------------------  OBJECTIVE  PAST MEDICAL & SURGICAL HISTORY  Hypertension    GERD (gastroesophageal reflux disease)    Lung interstitial disease    BPH (benign prostatic hyperplasia)    HFrEF (heart failure with reduced ejection fraction)    COPD (chronic obstructive pulmonary disease)    No significant past surgical history                                              -----------------------------------------------------------  ALLERGIES:  No Known Allergies                                            ------------------------------------------------------------    HOME MEDICATIONS  Home Medications:  dilTIAZem 120 mg/24 hours oral capsule, extended release: 1 cap(s) orally once a day (11 Jul 2022 04:16)  Eliquis 5 mg oral tablet: 1 tab(s) orally 2 times a day (11 Jul 2022 04:16)  FERROUS SULFATE 325 MG TABLET: 1 each orally once a day (11 Jul 2022 04:16)  FINASTERIDE 5 MG TABLET: 1 each orally once a day (11 Jul 2022 04:16)  furosemide 40 mg oral tablet: 1 tab(s) orally once a day (11 Jul 2022 04:16)  HYDROmorphone 2 mg oral tablet: 1 tab(s) orally every 6 hours, As needed, dyspnea (11 Jul 2022 04:16)  Lopressor 100 mg oral tablet: 1 tab(s) orally 3 times a day (11 Jul 2022 04:16)  OMEPRAZOLE DR 40MG CAP: 1 cap(s) orally once a day (11 Jul 2022 04:16)  predniSONE 20 mg oral tablet: 1 tab(s) orally once a day for 1 week  then 10mg oral chronically and f/u w/ Pulmonary  (11 Jul 2022 04:16)  sulfamethoxazole-trimethoprim 800 mg-160 mg oral tablet: 1 tab(s) orally once a day (11 Jul 2022 04:16)  TAMSULOSIN HCL 0.4 MG CAPSULE: 1 each orally once a day (at bedtime) (11 Jul 2022 04:16)  VITAMIN B-12 1,000 MCG TABLET: 1 each orally once a day (11 Jul 2022 04:16)                           MEDICATIONS:  STANDING MEDICATIONS  albuterol/ipratropium for Nebulization 3 milliLiter(s) Nebulizer every 6 hours  apixaban 5 milliGRAM(s) Oral two times a day  cefepime   IVPB      cefepime   IVPB 2000 milliGRAM(s) IV Intermittent every 12 hours  cyanocobalamin 1000 MICROGram(s) Oral daily  diltiazem    milliGRAM(s) Oral daily  ferrous    sulfate 325 milliGRAM(s) Oral daily  finasteride 5 milliGRAM(s) Oral daily  furosemide   Injectable 40 milliGRAM(s) IV Push two times a day  methylPREDNISolone sodium succinate Injectable 60 milliGRAM(s) IV Push every 12 hours  metoprolol tartrate 100 milliGRAM(s) Oral three times a day  pantoprazole    Tablet 40 milliGRAM(s) Oral before breakfast  tamsulosin 0.4 milliGRAM(s) Oral at bedtime    PRN MEDICATIONS  acetaminophen     Tablet .. 650 milliGRAM(s) Oral every 6 hours PRN  aluminum hydroxide/magnesium hydroxide/simethicone Suspension 30 milliLiter(s) Oral every 4 hours PRN  melatonin 3 milliGRAM(s) Oral at bedtime PRN  ondansetron Injectable 4 milliGRAM(s) IV Push every 8 hours PRN                                            ------------------------------------------------------------  VITAL SIGNS: Last 24 Hours  T(C): 36.7 (12 Jul 2022 05:29), Max: 36.7 (12 Jul 2022 05:29)  T(F): 98.1 (12 Jul 2022 05:29), Max: 98.1 (12 Jul 2022 05:29)  HR: 96 (12 Jul 2022 05:29) (89 - 108)  BP: 137/91 (12 Jul 2022 05:29) (126/88 - 137/91)  BP(mean): --  RR: 18 (12 Jul 2022 05:29) (18 - 20)  SpO2: 91% (12 Jul 2022 05:29) (91% - 97%)                                             --------------------------------------------------------------  LABS:                        11.7   12.48 )-----------( 182      ( 12 Jul 2022 04:30 )             36.7     07-12    137  |  93<L>  |  31<H>  ----------------------------<  169<H>  3.4<L>   |  33<H>  |  1.3    Ca    8.5      12 Jul 2022 04:30  Phos  2.7     07-12  Mg     1.6     07-12    TPro  5.6<L>  /  Alb  2.9<L>  /  TBili  1.1  /  DBili  x   /  AST  15  /  ALT  11  /  AlkPhos  55  07-12    PT/INR - ( 10 Jul 2022 21:23 )   PT: 24.90 sec;   INR: 2.18 ratio         PTT - ( 10 Jul 2022 21:23 )  PTT:30.7 sec              CARDIAC MARKERS ( 10 Jul 2022 21:23 )  x     / <0.01 ng/mL / x     / x     / x                                                  -------------------------------------------------------------  RADIOLOGY:                                            --------------------------------------------------------------    PHYSICAL EXAM:  GENERAL: NAD  HEENT:  Atraumatic, Normocephalic. EOMI, PERRLA, conjunctiva and sclera clear  PULMONARY: Difficulty breathing, normal breath sounds, moderately dyspneic while talking  CARDIOVASCULAR: Regular rate and rhythm; No murmurs, rubs, or gallops  GASTROINTESTINAL: Soft, Nontender, Nondistended; Bowel sounds present  MUSCULOSKELETAL:  2+ Peripheral Pulses, No clubbing or cyanosis, 2+ pitting edema of legs  NEUROLOGY: no focal neurologic deficits, Alert and oriented x3  SKIN: No rashes or lesions                                           --------------------------------------------------------------    ASSESSMENT & PLAN    89-year-old male with past medical history of ILD on 5L O2, HTN, BPH, GERD, A. fib on Eliquis, CHF (EF 46% 2/27/22), and left lower lobe lung mass presents to the ED sent in from [facility] for evaluation of shortness of breath for 2 days. States his atrial fibrillation has not been in control, causing worsening sob. Noted drastic change from baseline over the past two days, with multiple pulse ox in 80s. Sent in from ED, improved on NRB then placed on bipap with improvement to 95%. Pt feels better, also recived 40mg IV lasix with good urine out/put at least 1L. Pt has pulmonary congestion on cxr, has extensive lower extremity edema. likely in CHF exacebation secondary to afib wit rvr. HR under controll now. c/w IV diuresis, fluid restriction.     #CHF/ #HFmrEF  #afib on eliquis  -bnp elevated 5800 compared to 2900 prior admission  -pulmonary congestion/b/l opacities on cxr  -3+ LE edema/ crackles/ JVD not significantly appreciable  -responded well to IV lasix in ED; increased UO  -states his afib has been acting up; uncontrolled; making it difficulty for him to breathe  -diet dash/tlc, 1.2L fluid restriction  -continue with IV diuresis lasix 40mg BID  -strict I and O  -monitor bmp once daily, keep mag <2, K <4  -O2 support as needed; bipap at night; wean o2 as appropriate  -f/u CT chest and TTE  -c/w home meds: cardizem 120mg daily, lopressor 100 tid  - Mag and K+ repleted; f/u BMP  - 7/13: desaturation to 80s w tachycardia 120s; pt placed on NRB        #ILD/ #LLL mass suspicious for cancer  - pulmonary recs: Solumedrol 60 q12 & Cefepime  - procalc 0.13; continue Cefepime for now  - f/u ID recs  - f/u sputum culture  - Wean O2, titrate FiO2 to 88-92%, CPAP at night  - Clarify GOC - Very poor prognosis    #GERD  -c/w pantoprazole

## 2022-07-14 NOTE — MEDICAL STUDENT PROGRESS NOTE(EDUCATION) - SUBJECTIVE AND OBJECTIVE BOX
NNEKA JOSEPH 89y Male  MRN#: [id number]   CODE STATUS:________      SUBJECTIVE  Patient is a 89y old hard of hearing Male with PMH of interstitial lung disease, HTN, BPH, GERD, Atrial fibrillation with rapid ventricular response, HFrEF, recent COVID, Left lower lung mass who presents with a chief complaint of acute sob, hypoxemia for 2 days with baseline lower extremity edema (14 Jul 2022 09:47)  Currently admitted to medicine with the primary diagnosis of Shortness of breath secondary to CHF exacerbation    Hospital course has been complicated by acute on chronic Atrial fibrillation with RVR.   Today is hospital day 3d, and this morning his HR is stabilized, he is moderately dyspneic while talking, his LE pitting edema persists and overnight, he was placed on a 15L nasal cannula.     Present Today:           Joel Catheter (X)No/ ()Yes? Indication:          Central Line (X)No/ ()Yes? Indication:          IV Fluids (X)No/ ()Yes? Type:  Rate:  Indication:      OBJECTIVE  PAST MEDICAL & SURGICAL HISTORY  Hypertension    GERD (gastroesophageal reflux disease)    Lung interstitial disease- on 5L O2 at home    BPH (benign prostatic hyperplasia)    HFrEF (heart failure with reduced ejection fraction)- EF 46%    COPD (chronic obstructive pulmonary disease)    Atrial Fibrillation with rapid ventricular response- on Eliquis    Recent COVID- January 2022    Left Lower Lung mass- likely malignant per last Pulmonary note    No significant past surgical history      ALLERGIES:  No Known Allergies    MEDICATIONS:  STANDING MEDICATIONS  albuterol/ipratropium for Nebulization 3 milliLiter(s) Nebulizer every 6 hours  apixaban 5 milliGRAM(s) Oral two times a day  cyanocobalamin 1000 MICROGram(s) Oral daily  diltiazem    milliGRAM(s) Oral daily  ferrous sulfate 325 milliGRAM(s) Oral daily  finasteride 5 milliGRAM(s) Oral daily  furosemide   Injectable 40 milliGRAM(s) IV Push two times a day  methylPREDNISolone sodium succinate Injectable 60 milliGRAM(s) IV Push every 12 hours  metoprolol tartrate 100 milliGRAM(s) Oral three times a day  pantoprazole Tablet 40 milliGRAM(s) Oral before breakfast  potassium chloride    Tablet ER 20 milliEquivalent(s) Oral once  tamsulosin 0.4 milliGRAM(s) Oral at bedtime    PRN MEDICATIONS  acetaminophen     Tablet .. 650 milliGRAM(s) Oral every 6 hours PRN  aluminum hydroxide/magnesium hydroxide/simethicone Suspension 30 milliLiter(s) Oral every 4 hours PRN  melatonin 3 milliGRAM(s) Oral at bedtime PRN  ondansetron Injectable 4 milliGRAM(s) IV Push every 8 hours PRN  zolpidem 5 milliGRAM(s) Oral at bedtime PRN      VITAL SIGNS: Last 24 Hours  T(C): 35.7 (14 Jul 2022 05:00), Max: 36.1 (13 Jul 2022 20:40)  T(F): 96.3 (14 Jul 2022 05:00), Max: 97 (13 Jul 2022 20:40)  HR: 81 (14 Jul 2022 05:00) (81 - 103)  BP: 136/98 (14 Jul 2022 05:00) (132/72 - 146/88)  BP(mean): --  RR: 18 (14 Jul 2022 05:00) (18 - 18)  SpO2: 98% (14 Jul 2022 05:00) (94% - 100%)- on 15L nasal cannula    LABS:                        11.8   15.47 )-----------( 238      ( 14 Jul 2022 08:30 )             37.6     07-14    140  |  96<L>  |  41<H>  ----------------------------<  166<H>  3.7   |  35<H>  |  1.3    Ca    9.2      14 Jul 2022 08:30  Mg     1.8     07-14          ABG - ( 13 Jul 2022 09:16 )  pH, Arterial: 7.55  pH, Blood: x     /  pCO2: 39    /  pO2: 140   / HCO3: 34    / Base Excess: 10.8  /  SaO2: 98.0          07/13/2022- MRSA negative            RADIOLOGY:      PHYSICAL EXAM:    GENERAL: NAD, AAOx3  HEENT:  Atraumatic, Normocephalic. EOMI, no conjunctival pallor  PULMONARY: Clear to auscultation bilaterally; No wheeze; moderately dyspneic while talking  CARDIOVASCULAR: Regular rate and rhythm; No murmurs, rubs, or gallops  GASTROINTESTINAL: Soft, Nontender, Nondistended; Bowel sounds present  MUSCULOSKELETAL:  1+-2+ pitting edema of LE  NEUROLOGY: no focal neurological deficits  SKIN: No rashes or lesions      ADMISSION SUMMARY  Patient is a 89y old hard of hearing Male with PMH of interstitial lung disease, HTN, BPH, GERD, Atrial fibrillation with rapid ventricular response, HFrEF, recent COVID, Left lower lung mass who presents with a chief complaint of acute sob, hypoxemia for 2 days with baseline lower extremity edema (14 Jul 2022 09:47)  Currently admitted to medicine with the primary diagnosis of Shortness of breath secondary to CHF exacerbation    Hospital course has been complicated by acute on chronic Atrial fibrillation with RVR.      ASSESSMENT & PLAN    1. Acute hypoxemic respiratory failure 2/2 CHF exacerbation, Interstitial lung disease, and pneumonia  - decrease 15L nasal cannula to 6L nasal cannula  - ABG showed uncompensated metabolic alkalosis  - repeat CXR  - c/w lasix  - f/u HCO3  - c/w solumedrol    2. Chronic Atrial Fibrillation w/ RVR  - HR stabilized today w/ no overnight events  - c/w cardizem, lopressor, eliquis    3. Interstitial lung disease/ LLL mass suspicious for cancer  - discuss goals of care  - c/w duonebulizers  - f/u ID to d/c antibiotics    Present today:  ( ) Congestive Heart Failure, Yes? ( )Acute / ( )Acute on Chronic / ( )Chronic  :  ( )Systolic / ( )Diastolic               Plan:  ( ) Complicated Pneumonia, Type?  ( )Parapneumonic effusion / ( )Abscess / ( ) Multilobar / ( )Other               Plan:  ( ) Morbid Obesity, Yes? BMI:               Plan:  ( ) Functional Quadriplegia               Plan:  ( ) Encephalopathy               Plan:    ( ) Discussion with patient and/or family regarding goals of care  ( ) Discussed Case and Plan with Medical Attending, Name:   NNEKA JOSEPH 89y Male  MRN#: [id number]   CODE STATUS: Full code      SUBJECTIVE  Patient is a 89y old hard of hearing Male with PMH of interstitial lung disease, HTN, BPH, GERD, Atrial fibrillation with rapid ventricular response, HFrEF, recent COVID, Left lower lung mass who presents with a chief complaint of acute sob, hypoxemia for 2 days with baseline lower extremity edema (14 Jul 2022 09:47)  Currently admitted to medicine with the primary diagnosis of Shortness of breath secondary to CHF exacerbation    Hospital course has been complicated by acute on chronic Atrial fibrillation with RVR.   Today is hospital day 3d, and this morning his HR is stabilized, he is moderately dyspneic while talking, his LE pitting edema persists and overnight. He is currently on NRB, desatting on NC    Present Today:           Joel Catheter (X)No/ ()Yes? Indication:          Central Line (X)No/ ()Yes? Indication:          IV Fluids (X)No/ ()Yes? Type:  Rate:  Indication:      OBJECTIVE  PAST MEDICAL & SURGICAL HISTORY  Hypertension    GERD (gastroesophageal reflux disease)    Lung interstitial disease- on 5L O2 at home    BPH (benign prostatic hyperplasia)    HFrEF (heart failure with reduced ejection fraction)- EF 46%    COPD (chronic obstructive pulmonary disease)    Atrial Fibrillation with rapid ventricular response- on Eliquis    Recent COVID- January 2022    Left Lower Lung mass- likely malignant per last Pulmonary note    No significant past surgical history      ALLERGIES:  No Known Allergies    MEDICATIONS:  STANDING MEDICATIONS  albuterol/ipratropium for Nebulization 3 milliLiter(s) Nebulizer every 6 hours  apixaban 5 milliGRAM(s) Oral two times a day  cyanocobalamin 1000 MICROGram(s) Oral daily  diltiazem    milliGRAM(s) Oral daily  ferrous sulfate 325 milliGRAM(s) Oral daily  finasteride 5 milliGRAM(s) Oral daily  furosemide   Injectable 40 milliGRAM(s) IV Push two times a day  methylPREDNISolone sodium succinate Injectable 60 milliGRAM(s) IV Push every 12 hours  metoprolol tartrate 100 milliGRAM(s) Oral three times a day  pantoprazole Tablet 40 milliGRAM(s) Oral before breakfast  potassium chloride    Tablet ER 20 milliEquivalent(s) Oral once  tamsulosin 0.4 milliGRAM(s) Oral at bedtime    PRN MEDICATIONS  acetaminophen     Tablet .. 650 milliGRAM(s) Oral every 6 hours PRN  aluminum hydroxide/magnesium hydroxide/simethicone Suspension 30 milliLiter(s) Oral every 4 hours PRN  melatonin 3 milliGRAM(s) Oral at bedtime PRN  ondansetron Injectable 4 milliGRAM(s) IV Push every 8 hours PRN  zolpidem 5 milliGRAM(s) Oral at bedtime PRN      VITAL SIGNS: Last 24 Hours  T(C): 35.7 (14 Jul 2022 05:00), Max: 36.1 (13 Jul 2022 20:40)  T(F): 96.3 (14 Jul 2022 05:00), Max: 97 (13 Jul 2022 20:40)  HR: 81 (14 Jul 2022 05:00) (81 - 103)  BP: 136/98 (14 Jul 2022 05:00) (132/72 - 146/88)  BP(mean): --  RR: 18 (14 Jul 2022 05:00) (18 - 18)  SpO2: 98% (14 Jul 2022 05:00) (94% - 100%)- on 15L nasal cannula    LABS:                        11.8   15.47 )-----------( 238      ( 14 Jul 2022 08:30 )             37.6     07-14    140  |  96<L>  |  41<H>  ----------------------------<  166<H>  3.7   |  35<H>  |  1.3    Ca    9.2      14 Jul 2022 08:30  Mg     1.8     07-14          ABG - ( 13 Jul 2022 09:16 )  pH, Arterial: 7.55  pH, Blood: x     /  pCO2: 39    /  pO2: 140   / HCO3: 34    / Base Excess: 10.8  /  SaO2: 98.0          07/13/2022- MRSA negative            RADIOLOGY:      PHYSICAL EXAM:    GENERAL: NAD, AAOx3  HEENT:  Atraumatic, Normocephalic. EOMI, no conjunctival pallor  PULMONARY: Clear to auscultation bilaterally; No wheeze; moderately dyspneic while talking  CARDIOVASCULAR: Regular rate and rhythm; No murmurs, rubs, or gallops  GASTROINTESTINAL: Soft, Nontender, Nondistended; Bowel sounds present  MUSCULOSKELETAL:  1+-2+ pitting edema of LE  NEUROLOGY: no focal neurological deficits  SKIN: No rashes or lesions      ADMISSION SUMMARY  Patient is a 89y old hard of hearing Male with PMH of interstitial lung disease, HTN, BPH, GERD, Atrial fibrillation with rapid ventricular response, HFrEF, recent COVID, Left lower lung mass who presents with a chief complaint of acute sob, hypoxemia for 2 days with baseline lower extremity edema (14 Jul 2022 09:47)  Currently admitted to medicine with the primary diagnosis of Shortness of breath secondary to CHF exacerbation    Hospital course has been complicated by acute on chronic Atrial fibrillation with RVR.      ASSESSMENT & PLAN    1. Acute hypoxemic respiratory failure 2/2 CHF exacerbation, Interstitial lung disease, and pneumonia  - bnp elevated 5800 compared to 2900 prior admission  -pulmonary congestion/b/l opacities on cxr  -3+ LE edema/ crackles/ JVD not significantly appreciable  -responded well to IV lasix in ED; increased UO  -states his afib has been acting up; uncontrolled; making it difficulty for him to breathe  -diet dash/tlc, 1.2L fluid restriction  -continue with IV diuresis lasix 40mg BID  -strict I and O  -monitor bmp once daily, keep mag <2, K <4  - f/u CXR  - c/w lasix and solumedrol  - sats low 80s on NC, currently on NRB saturating around 90    2. Chronic Atrial Fibrillation w/ RVR  - HR stabilized today w/ no overnight events  - c/w cardizem, lopressor, eliquis    3. Interstitial lung disease/ LLL mass suspicious for cancer  - discuss goals of care > pt and family wants to be full code  - c/w duonebulizers  - ABx discontinued as per ID recs     NNEKA JOSEPH 89y Male  MRN#: [id number]   CODE STATUS: Full code      SUBJECTIVE  Patient is a 89y old hard of hearing Male with PMH of interstitial lung disease, HTN, BPH, GERD, Atrial fibrillation with rapid ventricular response, HFrEF, recent COVID, Left lower lung mass who presents with a chief complaint of acute sob, hypoxemia for 2 days with baseline lower extremity edema (14 Jul 2022 09:47)  Currently admitted to medicine with the primary diagnosis of Shortness of breath secondary to CHF exacerbation    Hospital course has been complicated by acute on chronic Atrial fibrillation with RVR.   Today is hospital day 3d, and this morning his HR is stabilized, he is moderately dyspneic while talking, his LE pitting edema persists and overnight. He is currently on NRB    Present Today:           Joel Catheter (X)No/ ()Yes? Indication:          Central Line (X)No/ ()Yes? Indication:          IV Fluids (X)No/ ()Yes? Type:  Rate:  Indication:      OBJECTIVE  PAST MEDICAL & SURGICAL HISTORY  Hypertension    GERD (gastroesophageal reflux disease)    Lung interstitial disease- on 5L O2 at home    BPH (benign prostatic hyperplasia)    HFrEF (heart failure with reduced ejection fraction)- EF 46%    COPD (chronic obstructive pulmonary disease)    Atrial Fibrillation with rapid ventricular response- on Eliquis    Recent COVID- January 2022    Left Lower Lung mass- likely malignant per last Pulmonary note    No significant past surgical history      ALLERGIES:  No Known Allergies    MEDICATIONS:  STANDING MEDICATIONS  albuterol/ipratropium for Nebulization 3 milliLiter(s) Nebulizer every 6 hours  apixaban 5 milliGRAM(s) Oral two times a day  cyanocobalamin 1000 MICROGram(s) Oral daily  diltiazem    milliGRAM(s) Oral daily  ferrous sulfate 325 milliGRAM(s) Oral daily  finasteride 5 milliGRAM(s) Oral daily  furosemide   Injectable 40 milliGRAM(s) IV Push two times a day  methylPREDNISolone sodium succinate Injectable 60 milliGRAM(s) IV Push every 12 hours  metoprolol tartrate 100 milliGRAM(s) Oral three times a day  pantoprazole Tablet 40 milliGRAM(s) Oral before breakfast  potassium chloride    Tablet ER 20 milliEquivalent(s) Oral once  tamsulosin 0.4 milliGRAM(s) Oral at bedtime    PRN MEDICATIONS  acetaminophen     Tablet .. 650 milliGRAM(s) Oral every 6 hours PRN  aluminum hydroxide/magnesium hydroxide/simethicone Suspension 30 milliLiter(s) Oral every 4 hours PRN  melatonin 3 milliGRAM(s) Oral at bedtime PRN  ondansetron Injectable 4 milliGRAM(s) IV Push every 8 hours PRN  zolpidem 5 milliGRAM(s) Oral at bedtime PRN      VITAL SIGNS: Last 24 Hours  T(C): 35.7 (14 Jul 2022 05:00), Max: 36.1 (13 Jul 2022 20:40)  T(F): 96.3 (14 Jul 2022 05:00), Max: 97 (13 Jul 2022 20:40)  HR: 81 (14 Jul 2022 05:00) (81 - 103)  BP: 136/98 (14 Jul 2022 05:00) (132/72 - 146/88)  BP(mean): --  RR: 18 (14 Jul 2022 05:00) (18 - 18)  SpO2: 98% (14 Jul 2022 05:00) (94% - 100%)- on 15L nasal cannula    LABS:                        11.8   15.47 )-----------( 238      ( 14 Jul 2022 08:30 )             37.6     07-14    140  |  96<L>  |  41<H>  ----------------------------<  166<H>  3.7   |  35<H>  |  1.3    Ca    9.2      14 Jul 2022 08:30  Mg     1.8     07-14          ABG - ( 13 Jul 2022 09:16 )  pH, Arterial: 7.55  pH, Blood: x     /  pCO2: 39    /  pO2: 140   / HCO3: 34    / Base Excess: 10.8  /  SaO2: 98.0          07/13/2022- MRSA negative            RADIOLOGY:      PHYSICAL EXAM:    GENERAL: NAD, AAOx3  HEENT:  Atraumatic, Normocephalic. EOMI, no conjunctival pallor  PULMONARY: Clear to auscultation bilaterally; No wheeze; moderately dyspneic while talking  CARDIOVASCULAR: Regular rate and rhythm; No murmurs, rubs, or gallops  GASTROINTESTINAL: Soft, Nontender, Nondistended; Bowel sounds present  MUSCULOSKELETAL:  1+-2+ pitting edema of LE  NEUROLOGY: no focal neurological deficits  SKIN: No rashes or lesions      ADMISSION SUMMARY  Patient is a 89y old hard of hearing Male with PMH of interstitial lung disease, HTN, BPH, GERD, Atrial fibrillation with rapid ventricular response, HFrEF, recent COVID, Left lower lung mass who presents with a chief complaint of acute sob, hypoxemia for 2 days with baseline lower extremity edema (14 Jul 2022 09:47)  Currently admitted to medicine with the primary diagnosis of Shortness of breath secondary to CHF exacerbation    Hospital course has been complicated by acute on chronic Atrial fibrillation with RVR.      ASSESSMENT & PLAN    1. Acute hypoxemic respiratory failure 2/2 CHF exacerbation, Interstitial lung disease, and pneumonia  - bnp elevated 5800 compared to 2900 prior admission  -pulmonary congestion/b/l opacities on cxr  -3+ LE edema/ crackles/ JVD not significantly appreciable  -responded well to IV lasix in ED; increased UO  -states his afib has been acting up; uncontrolled; making it difficulty for him to breathe  -diet dash/tlc, 1.2L fluid restriction  -continue with IV diuresis lasix 40mg BID  -strict I and O  -monitor bmp once daily, keep mag <2, K <4  - f/u CXR  - c/w lasix and solumedrol  - sats low 80s on NC, currently on NRB saturating around 90    2. Chronic Atrial Fibrillation w/ RVR  - HR stabilized today w/ no overnight events  - c/w cardizem, lopressor, eliquis    3. Interstitial lung disease/ LLL mass suspicious for cancer  - discuss goals of care > pt and family wants to be full code  - c/w duonebulizers  - ABx discontinued as per ID recs

## 2022-07-14 NOTE — CONSULT NOTE ADULT - ASSESSMENT
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 States he has sob at baseline, but noted his pulse ox to be in 80s consistently the past several days. Endorses baseline LE edema, could not give report of any worsening of it. Pt states he has afib and it has been uncontrolled recently, causing increased difficulty catching his breath. States he had one acute episode one night prior to admission but refused to come in d/t anxiety of staying too long in hospital. Denies chest pain, fever, chills, nausea, vomiting, abd pain, diarrhea, HA, dizziness, weakness.  Pt has multiple admissions for sob d/t various causes such as ILD exacerbations and CHF exacerbations. Pt is aox4.     IMPRESSION;  No bacterial PNA  exacerbation of his ILD  7/11 CT no consolidation   Procal 0.13    RECOMMENDATIONS;  No ABx  Please do not hesitate to recall ID if any questions arise either through [email or web address] or through [employer]

## 2022-07-14 NOTE — CONSULT NOTE ADULT - SUBJECTIVE AND OBJECTIVE BOX
NNEKA JOSEPH  89y, Male  Allergy: No Known Allergies      All historical available data reviewed.    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 States he has sob at baseline, but noted his pulse ox to be in 80s consistently the past several days. Endorses baseline LE edema, could not give report of any worsening of it. Pt states he has afib and it has been uncontrolled recently, causing increased difficulty catching his breath. States he had one acute episode one night prior to admission but refused to come in d/t anxiety of staying too long in hospital. Denies chest pain, fever, chills, nausea, vomiting, abd pain, diarrhea, HA, dizziness, weakness.  Pt has multiple admissions for sob d/t various causes such as ILD exacerbations and CHF exacerbations. Pt is aox4.     in the ED,pt's VS T(C): 36.6 (07-10-22 @ 20:04), Max: 36.6 (07-10-22 @ 20:04)  HR: 86 (07-11-22 @ 02:03) (86 - 110)  BP: 157/91 (07-10-22 @ 23:06) (124/81 - 157/91)  RR: 22 (07-10-22 @ 23:06) (22 - 22)  SpO2: 91% (07-10-22 @ 23:06) (91% - 97%), labs done showed wbc 11.24 (similar to May '22), Hb 11, , plts 192, INR 2.18, sodium 137, potassium 3.4, chloride 97, Cr 1.3, BUN 29, Ca 8.7, albumin 3.4, gfr 53, Magnesium 1.6, troponin <0.01, bnp 5836 (2800 May '22); VBG lactate 2.3, VBG pco2 53, covid neg, influenza neg, rsp Syncitial virus neg  EKG: afib with RVR rate 103, CXR: bl opacities (pending official read)  pt received lasix 40mg IV, 2g magnesium  pt is admitted for workup/management CHF exacerbation vs ILD flare (11 Jul 2022 03:43)  ID called for possible PNA    FAMILY HISTORY:    PAST MEDICAL & SURGICAL HISTORY:  Hypertension      GERD (gastroesophageal reflux disease)      Lung interstitial disease      BPH (benign prostatic hyperplasia)      HFrEF (heart failure with reduced ejection fraction)      COPD (chronic obstructive pulmonary disease)      No significant past surgical history            VITALS:  T(F): 96.3, Max: 97 (07-13-22 @ 20:40)  HR: 81  BP: 136/98  RR: 18Vital Signs Last 24 Hrs  T(C): 35.7 (14 Jul 2022 05:00), Max: 36.1 (13 Jul 2022 20:40)  T(F): 96.3 (14 Jul 2022 05:00), Max: 97 (13 Jul 2022 20:40)  HR: 81 (14 Jul 2022 05:00) (81 - 103)  BP: 136/98 (14 Jul 2022 05:00) (132/72 - 146/88)  BP(mean): --  RR: 18 (14 Jul 2022 05:00) (18 - 18)  SpO2: 98% (14 Jul 2022 05:00) (94% - 100%)    Parameters below as of 14 Jul 2022 05:00  Patient On (Oxygen Delivery Method): nasal cannula        TESTS & MEASUREMENTS:                        11.8   15.47 )-----------( 238      ( 14 Jul 2022 08:30 )             37.6     07-14    140  |  96<L>  |  41<H>  ----------------------------<  166<H>  3.7   |  35<H>  |  1.3    Ca    9.2      14 Jul 2022 08:30  Mg     1.8     07-14                RADIOLOGY & ADDITIONAL TESTS:  Personal review of radiological diagnostics performed  Echo and EKG results noted when applicable.     MEDICATIONS:  acetaminophen     Tablet .. 650 milliGRAM(s) Oral every 6 hours PRN  albuterol/ipratropium for Nebulization 3 milliLiter(s) Nebulizer every 6 hours  aluminum hydroxide/magnesium hydroxide/simethicone Suspension 30 milliLiter(s) Oral every 4 hours PRN  apixaban 5 milliGRAM(s) Oral two times a day  cefepime   IVPB      cefepime   IVPB 2000 milliGRAM(s) IV Intermittent every 12 hours  cyanocobalamin 1000 MICROGram(s) Oral daily  diltiazem    milliGRAM(s) Oral daily  ferrous    sulfate 325 milliGRAM(s) Oral daily  finasteride 5 milliGRAM(s) Oral daily  furosemide   Injectable 40 milliGRAM(s) IV Push two times a day  melatonin 3 milliGRAM(s) Oral at bedtime PRN  methylPREDNISolone sodium succinate Injectable 60 milliGRAM(s) IV Push every 12 hours  metoprolol tartrate 100 milliGRAM(s) Oral three times a day  ondansetron Injectable 4 milliGRAM(s) IV Push every 8 hours PRN  pantoprazole    Tablet 40 milliGRAM(s) Oral before breakfast  potassium chloride    Tablet ER 20 milliEquivalent(s) Oral once  tamsulosin 0.4 milliGRAM(s) Oral at bedtime  zolpidem 5 milliGRAM(s) Oral at bedtime PRN      ANTIBIOTICS:  cefepime   IVPB      cefepime   IVPB 2000 milliGRAM(s) IV Intermittent every 12 hours

## 2022-07-15 NOTE — CONSULT NOTE ADULT - ASSESSMENT
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 Patient has had multiple recent hospitalizations for ILD exacerbations, CHF. Patient known to palliative team, previously considering hospice and was DNR/DNI at one point.     MEDD (morphine equivalent daily dose):      See Recs below.    Please call [phone] with questions or concerns 24/7.   We will continue to follow.     Discussed with primary MD.   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 Patient has had multiple recent hospitalizations for ILD exacerbations, CHF. Patient known to palliative team, previously considering hospice and was DNR/DNI at one point.     Patient very SOB and anxious on exam.   Patient not ready to discuss GOC in detail today, and did not want us to call his son today.   Will FU next week.     MEDD (morphine equivalent daily dose): 0      See Recs below.    Please call [phone] with questions or concerns 24/7.   We will continue to follow.     Discussed with primary MD.

## 2022-07-15 NOTE — PROGRESS NOTE ADULT - SUBJECTIVE AND OBJECTIVE BOX
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 States he has sob at baseline, but noted his pulse ox to be in 80s consistently the past several days. Endorses baseline LE edema, could not give report of any worsening of it. Pt states he has afib and it has been uncontrolled recently, causing increased difficulty catching his breath. States he had one acute episode one night prior to admission but refused to come in d/t anxiety of staying too long in hospital. Denies chest pain, fever, chills, nausea, vomiting, abd pain, diarrhea, HA, dizziness, weakness.  Pt has multiple admissions for sob d/t various causes such as ILD exacerbations and CHF exacerbations. Pt is aox4.     ED course:   troponin <0.01, bnp 5836 (2800 May '22);  EKG: afib with RVR rate 103, CXR: pulmonary congestion  pt received lasix 40mg IV, 2g magnesium  placed on NRB and then Bipap with improvement in saturation to 95%  pt is admitted for workup/management CHF exacerbation vs ILD flare    7/15: Patient seen and examined at bedside. Patient is agitated and SOB this morning. Spoke with son Lydia at bedside regarding the patient's condition and the family wants all interventions for patient.                                            ----------------------------------------------------------  OBJECTIVE  PAST MEDICAL & SURGICAL HISTORY  Hypertension    GERD (gastroesophageal reflux disease)    Lung interstitial disease    BPH (benign prostatic hyperplasia)    HFrEF (heart failure with reduced ejection fraction)    COPD (chronic obstructive pulmonary disease)    No significant past surgical history                                              -----------------------------------------------------------  ALLERGIES:  No Known Allergies                                            ------------------------------------------------------------    HOME MEDICATIONS  Home Medications:  dilTIAZem 120 mg/24 hours oral capsule, extended release: 1 cap(s) orally once a day (11 Jul 2022 04:16)  Eliquis 5 mg oral tablet: 1 tab(s) orally 2 times a day (11 Jul 2022 04:16)  FERROUS SULFATE 325 MG TABLET: 1 each orally once a day (11 Jul 2022 04:16)  FINASTERIDE 5 MG TABLET: 1 each orally once a day (11 Jul 2022 04:16)  furosemide 40 mg oral tablet: 1 tab(s) orally once a day (11 Jul 2022 04:16)  HYDROmorphone 2 mg oral tablet: 1 tab(s) orally every 6 hours, As needed, dyspnea (11 Jul 2022 04:16)  Lopressor 100 mg oral tablet: 1 tab(s) orally 3 times a day (11 Jul 2022 04:16)  OMEPRAZOLE DR 40MG CAP: 1 cap(s) orally once a day (11 Jul 2022 04:16)  predniSONE 20 mg oral tablet: 1 tab(s) orally once a day for 1 week  then 10mg oral chronically and f/u w/ Pulmonary  (11 Jul 2022 04:16)  sulfamethoxazole-trimethoprim 800 mg-160 mg oral tablet: 1 tab(s) orally once a day (11 Jul 2022 04:16)  TAMSULOSIN HCL 0.4 MG CAPSULE: 1 each orally once a day (at bedtime) (11 Jul 2022 04:16)  VITAMIN B-12 1,000 MCG TABLET: 1 each orally once a day (11 Jul 2022 04:16)                           MEDICATIONS:  STANDING MEDICATIONS  albuterol/ipratropium for Nebulization 3 milliLiter(s) Nebulizer every 6 hours  apixaban 5 milliGRAM(s) Oral two times a day  cefepime   IVPB      cefepime   IVPB 2000 milliGRAM(s) IV Intermittent every 12 hours  cyanocobalamin 1000 MICROGram(s) Oral daily  diltiazem    milliGRAM(s) Oral daily  ferrous    sulfate 325 milliGRAM(s) Oral daily  finasteride 5 milliGRAM(s) Oral daily  furosemide   Injectable 40 milliGRAM(s) IV Push two times a day  methylPREDNISolone sodium succinate Injectable 60 milliGRAM(s) IV Push every 12 hours  metoprolol tartrate 100 milliGRAM(s) Oral three times a day  pantoprazole    Tablet 40 milliGRAM(s) Oral before breakfast  tamsulosin 0.4 milliGRAM(s) Oral at bedtime    PRN MEDICATIONS  acetaminophen     Tablet .. 650 milliGRAM(s) Oral every 6 hours PRN  aluminum hydroxide/magnesium hydroxide/simethicone Suspension 30 milliLiter(s) Oral every 4 hours PRN  melatonin 3 milliGRAM(s) Oral at bedtime PRN  ondansetron Injectable 4 milliGRAM(s) IV Push every 8 hours PRN                                            ------------------------------------------------------------  VITAL SIGNS: Last 24 Hours  T(C): 36.7 (12 Jul 2022 05:29), Max: 36.7 (12 Jul 2022 05:29)  T(F): 98.1 (12 Jul 2022 05:29), Max: 98.1 (12 Jul 2022 05:29)  HR: 96 (12 Jul 2022 05:29) (89 - 108)  BP: 137/91 (12 Jul 2022 05:29) (126/88 - 137/91)  BP(mean): --  RR: 18 (12 Jul 2022 05:29) (18 - 20)  SpO2: 91% (12 Jul 2022 05:29) (91% - 97%)                                             --------------------------------------------------------------  LABS:                        11.7   12.48 )-----------( 182      ( 12 Jul 2022 04:30 )             36.7     07-12    137  |  93<L>  |  31<H>  ----------------------------<  169<H>  3.4<L>   |  33<H>  |  1.3    Ca    8.5      12 Jul 2022 04:30  Phos  2.7     07-12  Mg     1.6     07-12    TPro  5.6<L>  /  Alb  2.9<L>  /  TBili  1.1  /  DBili  x   /  AST  15  /  ALT  11  /  AlkPhos  55  07-12    PT/INR - ( 10 Jul 2022 21:23 )   PT: 24.90 sec;   INR: 2.18 ratio         PTT - ( 10 Jul 2022 21:23 )  PTT:30.7 sec              CARDIAC MARKERS ( 10 Jul 2022 21:23 )  x     / <0.01 ng/mL / x     / x     / x                                                  -------------------------------------------------------------  RADIOLOGY:                                            --------------------------------------------------------------    PHYSICAL EXAM:  GENERAL: NAD  HEENT:  Atraumatic, Normocephalic. EOMI, PERRLA, conjunctiva and sclera clear  PULMONARY: Difficulty breathing, normal breath sounds, moderately dyspneic while talking  CARDIOVASCULAR: Regular rate and rhythm; No murmurs, rubs, or gallops  GASTROINTESTINAL: Soft, Nontender, Nondistended; Bowel sounds present  MUSCULOSKELETAL:  2+ Peripheral Pulses, No clubbing or cyanosis, 2+ pitting edema of legs  NEUROLOGY: no focal neurologic deficits, Alert and oriented x3  SKIN: No rashes or lesions                                           --------------------------------------------------------------    ASSESSMENT & PLAN    89-year-old male with past medical history of ILD on 5L O2, HTN, BPH, GERD, A. fib on Eliquis, CHF (EF 46% 2/27/22), and left lower lobe lung mass presents to the ED sent in from [facility] for evaluation of shortness of breath for 2 days. States his atrial fibrillation has not been in control, causing worsening sob. Noted drastic change from baseline over the past two days, with multiple pulse ox in 80s. Sent in from ED, improved on NRB then placed on bipap with improvement to 95%. Pt feels better, also recived 40mg IV lasix with good urine out/put at least 1L. Pt has pulmonary congestion on cxr, has extensive lower extremity edema. likely in CHF exacebation secondary to afib wit rvr. HR under controll now. c/w IV diuresis, fluid restriction.     #CHF/ #HFmrEF  #afib on eliquis  -bnp elevated 5800 compared to 2900 prior admission  -pulmonary congestion/b/l opacities on cxr  -3+ LE edema/ crackles/ JVD not significantly appreciable  -responded well to IV lasix in ED; increased UO  -states his afib has been acting up; uncontrolled; making it difficulty for him to breathe  -diet dash/tlc, 1.2L fluid restriction  -continue with IV diuresis lasix 40mg BID  -strict I and O  -monitor bmp once daily, keep mag <2, K <4  -O2 support as needed; bipap at night; wean o2 as appropriate  -f/u CT chest and TTE  -c/w home meds: cardizem 120mg daily, lopressor 100 tid  - Mag and K+ repleted 7/15      #ILD/ #LLL mass suspicious for cancer  - pulmonary recs: Solumedrol 60 q12 & Cefepime  - procalc 0.13; continue Cefepime for now  - f/u sputum culture  - Wean O2, titrate FiO2 to 88-92%  - Clarify GOC - Very poor prognosis - family wants full code     #GERD  -c/w pantoprazole

## 2022-07-15 NOTE — CONSULT NOTE ADULT - SUBJECTIVE AND OBJECTIVE BOX
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 States he has sob at baseline, but noted his pulse ox to be in 80s consistently the past several days. Endorses baseline LE edema, could not give report of any worsening of it. Pt states he has afib and it has been uncontrolled recently, causing increased difficulty catching his breath. States he had one acute episode one night prior to admission but refused to come in d/t anxiety of staying too long in hospital. Denies chest pain, fever, chills, nausea, vomiting, abd pain, diarrhea, HA, dizziness, weakness.  Pt has multiple admissions for sob d/t various causes such as ILD exacerbations and CHF exacerbations. Pt is aox4.     in the ED,pt's VS T(C): 36.6 (07-10-22 @ 20:04), Max: 36.6 (07-10-22 @ 20:04)  HR: 86 (07-11-22 @ 02:03) (86 - 110)  BP: 157/91 (07-10-22 @ 23:06) (124/81 - 157/91)  RR: 22 (07-10-22 @ 23:06) (22 - 22)  SpO2: 91% (07-10-22 @ 23:06) (91% - 97%), labs done showed wbc 11.24 (similar to May '22), Hb 11, , plts 192, INR 2.18, sodium 137, potassium 3.4, chloride 97, Cr 1.3, BUN 29, Ca 8.7, albumin 3.4, gfr 53, Magnesium 1.6, troponin <0.01, bnp 5836 (2800 May '22); VBG lactate 2.3, VBG pco2 53, covid neg, influenza neg, rsp Syncitial virus neg  EKG: afib with RVR rate 103, CXR: bl opacities (pending official read)  pt received lasix 40mg IV, 2g magnesium  pt is admitted for workup/management CHF exacerbation vs ILD flare (11 Jul 2022 03:43)    PERTINENT PM/SXH:   Hypertension    GERD (gastroesophageal reflux disease)    Lung interstitial disease    BPH (benign prostatic hyperplasia)    HFrEF (heart failure with reduced ejection fraction)    COPD (chronic obstructive pulmonary disease)      No significant past surgical history      FAMILY HISTORY:    ITEMS NOT CHECKED ARE NOT PRESENT    SOCIAL HISTORY:   Significant other/partner[X ]  Children[ X]  Religious/Spirituality:  Substance hx:  [ ]   Tobacco hx:  [ ]   Alcohol hx: [ ]   Living Situation: [ ]Home  [X - [facility] ]Long term care  [ ]Rehab [ ]Other  Home Services: [ ] [facility] [ ] Sebastián RN [ ] Hospice  Occupation:  Home Opioid hx:  [ ] Y [ ] N [ ] I-Stop Reference No: This report was requested by: Bertha Bo | Reference #: [id number]    Patient Name: Abimael Abbott Date: 09/05/1932  Address: [address] [place] [id number]: Male  Rx Written	Rx Dispensed	Drug	Quantity	Days Supply	Prescriber Name	Prescriber Libertad #	Payment Method	Dispenser  04/04/2022	04/04/2022	tramadol hcl 50 mg tablet	30	7	Amanda Gutierrez MD	[id number]	Cash	[facility]      ADVANCE DIRECTIVES:     MOLST  []  Living Will  [ ]   DECISION MAKER(s): patient is A & O  4  [ X] Health Care Proxy(s)  [ ] Surrogate(s)  [ ] Guardian           Name(s): Phone Number(s): Tri - Dtr (see one content)     BASELINE (I)ADL(s) (prior to admission):  [place]: [ ]Total  [ X] Moderate [ ]Dependent  Palliative Performance Status Version 2:      50   %    http://npcrc.org/files/news/palliative_performance_scale_ppsv2.pdf    Allergies    No Known Allergies    Intolerances    MEDICATIONS  (STANDING):  albuterol/ipratropium for Nebulization 3 milliLiter(s) Nebulizer every 6 hours  apixaban 5 milliGRAM(s) Oral two times a day  cyanocobalamin 1000 MICROGram(s) Oral daily  diltiazem    milliGRAM(s) Oral daily  ferrous    sulfate 325 milliGRAM(s) Oral daily  finasteride 5 milliGRAM(s) Oral daily  furosemide   Injectable 40 milliGRAM(s) IV Push two times a day  magnesium sulfate  IVPB 2 Gram(s) IV Intermittent once  melatonin 5 milliGRAM(s) Oral at bedtime  melatonin 10 milliGRAM(s) Oral at bedtime  methylPREDNISolone sodium succinate Injectable 60 milliGRAM(s) IV Push every 12 hours  metoprolol tartrate 100 milliGRAM(s) Oral three times a day  pantoprazole    Tablet 40 milliGRAM(s) Oral before breakfast  potassium chloride    Tablet ER 40 milliEquivalent(s) Oral once  tamsulosin 0.4 milliGRAM(s) Oral at bedtime    MEDICATIONS  (PRN):  acetaminophen     Tablet .. 650 milliGRAM(s) Oral every 6 hours PRN Temp greater or equal to 38C (100.4F), Mild Pain (1 - 3)  aluminum hydroxide/magnesium hydroxide/simethicone Suspension 30 milliLiter(s) Oral every 4 hours PRN Dyspepsia  melatonin 3 milliGRAM(s) Oral at bedtime PRN Insomnia  ondansetron Injectable 4 milliGRAM(s) IV Push every 8 hours PRN Nausea and/or Vomiting  zolpidem 5 milliGRAM(s) Oral at bedtime PRN Insomnia    PRESENT SYMPTOMS: [ ]Unable to obtain due to poor mentation   Source if other than patient:  [ ]Family   [ ]Team     Pain: [ ]yes [ ]no  QOL impact -   Location -                    Aggravating factors -  Quality -  Radiation -  Timing-  Severity (0-10 scale):  Minimal acceptable level (0-10 scale):     Dyspnea:                           [ ]Mild [ ]Moderate [ ]Severe  Anxiety:                             [ ]Mild [ ]Moderate [ ]Severe  Fatigue:                             [ ]Mild [ ]Moderate [ ]Severe  Nausea:                             [ ]Mild [ ]Moderate [ ]Severe  Loss of appetite:              [ ]Mild [ ]Moderate [ ]Severe  Constipation:                    [ ]Mild [ ]Moderate [ ]Severe    Other Symptoms:  [ ]All other review of systems negative     Palliative Performance Status Version 2:         %    http://npcrc.org/files/news/palliative_performance_scale_ppsv2.pdf  PHYSICAL EXAM:  Vital Signs Last 24 Hrs  T(C): 36.1 (15 Jul 2022 05:00), Max: 36.1 (14 Jul 2022 20:40)  T(F): 96.9 (15 Jul 2022 05:00), Max: 97 (14 Jul 2022 20:40)  HR: 80 (15 Jul 2022 05:00) (80 - 115)  BP: 139/75 (15 Jul 2022 05:00) (132/80 - 154/85)  BP(mean): --  RR: 18 (15 Jul 2022 05:00) (18 - 20)  SpO2: 96% (15 Jul 2022 05:00) (96% - 100%)    GENERAL:  [ ]Alert  [ ]Oriented x   [ ]Lethargic  [ ]Cachexia  [ ]Unarousable  [ ]Verbal  [ ]Non-Verbal  Behavioral:   [ ] Anxiety  [ ] Delirium [ ] Agitation [ ] Other  HEENT:  [ ]Normal   [ ]Dry mouth   [ ]ET Tube/Trach  [ ]Oral lesions  PULMONARY:   [ ]Clear [ ]Tachypnea  [ ]Audible excessive secretions   [ ]Rhonchi        [ ]Right [ ]Left [ ]Bilateral  [ ]Crackles        [ ]Right [ ]Left [ ]Bilateral  [ ]Wheezing     [ ]Right [ ]Left [ ]Bilateral  [ ]Diminished breath sounds [ ]right [ ]left [ ]bilateral  CARDIOVASCULAR:    [ ]Regular [ ]Irregular [ ]Tachy  [ ]Ruddy [ ]Murmur [ ]Other  GASTROINTESTINAL:  [ ]Soft  [ ]Distended   [ ]+BS  [ ]Non tender [ ]Tender  [ ]PEG [ ]OGT/ NGT  Last BM:   GENITOURINARY:  [ ]Normal [ ] Incontinent   [ ]Oliguria/Anuria   [ ]Joel  MUSCULOSKELETAL:   [ ]Normal   [ ]Weakness  [ ]Bed/Wheelchair bound [ ]Edema  NEUROLOGIC:   [ ]No focal deficits  [ ]Cognitive impairment  [ ]Dysphagia [ ]Dysarthria [ ]Paresis [ ]Other   SKIN:   [ ]Normal    [ ]Rash  [ ]Pressure ulcer(s)       Present on admission [ ]y [ ]n      LABS:                        11.2   13.79 )-----------( 238      ( 15 Jul 2022 07:04 )             35.9   07-15    138  |  93<L>  |  44<H>  ----------------------------<  130<H>  3.4<L>   |  37<H>  |  1.1    Ca    9.0      15 Jul 2022 07:04  Mg     1.7     07-15      RADIOLOGY & ADDITIONAL STUDIES:    < from: Xray Chest 1 View- PORTABLE-Urgent (Xray Chest 1 View- PORTABLE-Urgent .) (07.14.22 @ 11:48) >  Findings:    Support devices: None.    Cardiac/mediastinum/hilum: Stable enlarged cardiac silhouette    Lung parenchyma/Pleura: Slightly worsened diffuse bilateral opacities   compared to 7/12/2022. Trace bilateral pleural effusions.    Skeleton/soft tissues: Severe glenohumeral arthrosis. Unchanged.    Impression:  Slightly worsened diffuse bilateral opacities.    --- End of Report ---    < end of copied text >    < from: CT Chest No Cont (07.11.22 @ 12:23) >  IMPRESSION:    Diffuse interstitial opacifications with groundglass abnormalities   diffusely as well as honeycombing. 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 States he has sob at baseline, but noted his pulse ox to be in 80s consistently the past several days. Endorses baseline LE edema, could not give report of any worsening of it. Pt states he has afib and it has been uncontrolled recently, causing increased difficulty catching his breath. States he had one acute episode one night prior to admission but refused to come in d/t anxiety of staying too long in hospital. Denies chest pain, fever, chills, nausea, vomiting, abd pain, diarrhea, HA, dizziness, weakness.  Pt has multiple admissions for sob d/t various causes such as ILD exacerbations and CHF exacerbations. Pt is aox4.     in the ED,pt's VS T(C): 36.6 (07-10-22 @ 20:04), Max: 36.6 (07-10-22 @ 20:04)  HR: 86 (07-11-22 @ 02:03) (86 - 110)  BP: 157/91 (07-10-22 @ 23:06) (124/81 - 157/91)  RR: 22 (07-10-22 @ 23:06) (22 - 22)  SpO2: 91% (07-10-22 @ 23:06) (91% - 97%), labs done showed wbc 11.24 (similar to May '22), Hb 11, , plts 192, INR 2.18, sodium 137, potassium 3.4, chloride 97, Cr 1.3, BUN 29, Ca 8.7, albumin 3.4, gfr 53, Magnesium 1.6, troponin <0.01, bnp 5836 (2800 May '22); VBG lactate 2.3, VBG pco2 53, covid neg, influenza neg, rsp Syncitial virus neg  EKG: afib with RVR rate 103, CXR: bl opacities (pending official read)  pt received lasix 40mg IV, 2g magnesium  pt is admitted for workup/management CHF exacerbation vs ILD flare (11 Jul 2022 03:43)    PERTINENT PM/SXH:   Hypertension    GERD (gastroesophageal reflux disease)    Lung interstitial disease    BPH (benign prostatic hyperplasia)    HFrEF (heart failure with reduced ejection fraction)    COPD (chronic obstructive pulmonary disease)      No significant past surgical history      FAMILY HISTORY:    ITEMS NOT CHECKED ARE NOT PRESENT    SOCIAL HISTORY:   Significant other/partner[X ]  Children[ X]  Scientology/Spirituality:  Substance hx:  [ ]   Tobacco hx:  [ ]   Alcohol hx: [ ]   Living Situation: [ ]Home  [X - [facility] ]Long term care  [ ]Rehab [ ]Other  Home Services: [ ] [facility] [ ] Sebastián RN [ ] Hospice  Occupation:  Home Opioid hx:  [ ] Y [ ] N [ ] I-Stop Reference No: This report was requested by: Bertha Bo | Reference #: [id number]    Patient Name: Abimael Abbott Date: 09/05/1932  Address: [address] [place] [id number]: Male  Rx Written	Rx Dispensed	Drug	Quantity	Days Supply	Prescriber Name	Prescriber Libertad #	Payment Method	Dispenser  04/04/2022	04/04/2022	tramadol hcl 50 mg tablet	30	7	Amanda Gutierrez MD	[id number]	Cash	[facility]      ADVANCE DIRECTIVES:     MOLST  []  Living Will  [ ]   DECISION MAKER(s): patient is A & O  4  [ ] Health Care Proxy(s)  [ X] Surrogate(s)  [ ] Guardian           Name(s): Phone Number(s):   Palak Conn    BASELINE (I)ADL(s) (prior to admission):  [place]: [ ]Total  [ X] Moderate [ ]Dependent  Palliative Performance Status Version 2:      50   %    http://npcrc.org/files/news/palliative_performance_scale_ppsv2.pdf    Allergies    No Known Allergies    Intolerances    MEDICATIONS  (STANDING):  albuterol/ipratropium for Nebulization 3 milliLiter(s) Nebulizer every 6 hours  apixaban 5 milliGRAM(s) Oral two times a day  cyanocobalamin 1000 MICROGram(s) Oral daily  diltiazem    milliGRAM(s) Oral daily  ferrous    sulfate 325 milliGRAM(s) Oral daily  finasteride 5 milliGRAM(s) Oral daily  furosemide   Injectable 40 milliGRAM(s) IV Push two times a day  magnesium sulfate  IVPB 2 Gram(s) IV Intermittent once  melatonin 5 milliGRAM(s) Oral at bedtime  melatonin 10 milliGRAM(s) Oral at bedtime  methylPREDNISolone sodium succinate Injectable 60 milliGRAM(s) IV Push every 12 hours  metoprolol tartrate 100 milliGRAM(s) Oral three times a day  pantoprazole    Tablet 40 milliGRAM(s) Oral before breakfast  potassium chloride    Tablet ER 40 milliEquivalent(s) Oral once  tamsulosin 0.4 milliGRAM(s) Oral at bedtime    MEDICATIONS  (PRN):  acetaminophen     Tablet .. 650 milliGRAM(s) Oral every 6 hours PRN Temp greater or equal to 38C (100.4F), Mild Pain (1 - 3)  aluminum hydroxide/magnesium hydroxide/simethicone Suspension 30 milliLiter(s) Oral every 4 hours PRN Dyspepsia  melatonin 3 milliGRAM(s) Oral at bedtime PRN Insomnia  ondansetron Injectable 4 milliGRAM(s) IV Push every 8 hours PRN Nausea and/or Vomiting  zolpidem 5 milliGRAM(s) Oral at bedtime PRN Insomnia    PRESENT SYMPTOMS: [ ]Unable to obtain due to poor mentation   Source if other than patient:  [ ]Family   [ ]Team     Pain: [ ]yes [ ]no  QOL impact -   Location -                    Aggravating factors -  Quality -  Radiation -  Timing-  Severity (0-10 scale):  Minimal acceptable level (0-10 scale):     Dyspnea:                           [ ]Mild [ ]Moderate [ ]Severe  Anxiety:                             [ ]Mild [ ]Moderate [ ]Severe  Fatigue:                             [ ]Mild [ ]Moderate [ ]Severe  Nausea:                             [ ]Mild [ ]Moderate [ ]Severe  Loss of appetite:              [ ]Mild [ ]Moderate [ ]Severe  Constipation:                    [ ]Mild [ ]Moderate [ ]Severe    Other Symptoms:  [ ]All other review of systems negative     Palliative Performance Status Version 2:         %    http://npcrc.org/files/news/palliative_performance_scale_ppsv2.pdf  PHYSICAL EXAM:  Vital Signs Last 24 Hrs  T(C): 36.1 (15 Jul 2022 05:00), Max: 36.1 (14 Jul 2022 20:40)  T(F): 96.9 (15 Jul 2022 05:00), Max: 97 (14 Jul 2022 20:40)  HR: 80 (15 Jul 2022 05:00) (80 - 115)  BP: 139/75 (15 Jul 2022 05:00) (132/80 - 154/85)  BP(mean): --  RR: 18 (15 Jul 2022 05:00) (18 - 20)  SpO2: 96% (15 Jul 2022 05:00) (96% - 100%)    GENERAL:  [ ]Alert  [ ]Oriented x   [ ]Lethargic  [ ]Cachexia  [ ]Unarousable  [ ]Verbal  [ ]Non-Verbal  Behavioral:   [ ] Anxiety  [ ] Delirium [ ] Agitation [ ] Other  HEENT:  [ ]Normal   [ ]Dry mouth   [ ]ET Tube/Trach  [ ]Oral lesions  PULMONARY:   [ ]Clear [ ]Tachypnea  [ ]Audible excessive secretions   [ ]Rhonchi        [ ]Right [ ]Left [ ]Bilateral  [ ]Crackles        [ ]Right [ ]Left [ ]Bilateral  [ ]Wheezing     [ ]Right [ ]Left [ ]Bilateral  [ ]Diminished breath sounds [ ]right [ ]left [ ]bilateral  CARDIOVASCULAR:    [ ]Regular [ ]Irregular [ ]Tachy  [ ]Ruddy [ ]Murmur [ ]Other  GASTROINTESTINAL:  [ ]Soft  [ ]Distended   [ ]+BS  [ ]Non tender [ ]Tender  [ ]PEG [ ]OGT/ NGT  Last BM:   GENITOURINARY:  [ ]Normal [ ] Incontinent   [ ]Oliguria/Anuria   [ ]Joel  MUSCULOSKELETAL:   [ ]Normal   [ ]Weakness  [ ]Bed/Wheelchair bound [ ]Edema  NEUROLOGIC:   [ ]No focal deficits  [ ]Cognitive impairment  [ ]Dysphagia [ ]Dysarthria [ ]Paresis [ ]Other   SKIN:   [ ]Normal    [ ]Rash  [ ]Pressure ulcer(s)       Present on admission [ ]y [ ]n      LABS:                        11.2   13.79 )-----------( 238      ( 15 Jul 2022 07:04 )             35.9   07-15    138  |  93<L>  |  44<H>  ----------------------------<  130<H>  3.4<L>   |  37<H>  |  1.1    Ca    9.0      15 Jul 2022 07:04  Mg     1.7     07-15      RADIOLOGY & ADDITIONAL STUDIES:    < from: Xray Chest 1 View- PORTABLE-Urgent (Xray Chest 1 View- PORTABLE-Urgent .) (07.14.22 @ 11:48) >  Findings:    Support devices: None.    Cardiac/mediastinum/hilum: Stable enlarged cardiac silhouette    Lung parenchyma/Pleura: Slightly worsened diffuse bilateral opacities   compared to 7/12/2022. Trace bilateral pleural effusions.    Skeleton/soft tissues: Severe glenohumeral arthrosis. Unchanged.    Impression:  Slightly worsened diffuse bilateral opacities.    --- End of Report ---    < end of copied text >    < from: CT Chest No Cont (07.11.22 @ 12:23) >  IMPRESSION:    Diffuse interstitial opacifications with groundglass abnormalities   diffusely as well as honeycombing. 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 States he has sob at baseline, but noted his pulse ox to be in 80s consistently the past several days. Endorses baseline LE edema, could not give report of any worsening of it. Pt states he has afib and it has been uncontrolled recently, causing increased difficulty catching his breath. States he had one acute episode one night prior to admission but refused to come in d/t anxiety of staying too long in hospital. Denies chest pain, fever, chills, nausea, vomiting, abd pain, diarrhea, HA, dizziness, weakness.  Pt has multiple admissions for sob d/t various causes such as ILD exacerbations and CHF exacerbations. Pt is aox4.     in the ED,pt's VS T(C): 36.6 (07-10-22 @ 20:04), Max: 36.6 (07-10-22 @ 20:04)  HR: 86 (07-11-22 @ 02:03) (86 - 110)  BP: 157/91 (07-10-22 @ 23:06) (124/81 - 157/91)  RR: 22 (07-10-22 @ 23:06) (22 - 22)  SpO2: 91% (07-10-22 @ 23:06) (91% - 97%), labs done showed wbc 11.24 (similar to May '22), Hb 11, , plts 192, INR 2.18, sodium 137, potassium 3.4, chloride 97, Cr 1.3, BUN 29, Ca 8.7, albumin 3.4, gfr 53, Magnesium 1.6, troponin <0.01, bnp 5836 (2800 May '22); VBG lactate 2.3, VBG pco2 53, covid neg, influenza neg, rsp Syncitial virus neg  EKG: afib with RVR rate 103, CXR: bl opacities (pending official read)  pt received lasix 40mg IV, 2g magnesium  pt is admitted for workup/management CHF exacerbation vs ILD flare (11 Jul 2022 03:43)    PERTINENT PM/SXH:   Hypertension    GERD (gastroesophageal reflux disease)    Lung interstitial disease    BPH (benign prostatic hyperplasia)    HFrEF (heart failure with reduced ejection fraction)    COPD (chronic obstructive pulmonary disease)      No significant past surgical history      FAMILY HISTORY:    ITEMS NOT CHECKED ARE NOT PRESENT    SOCIAL HISTORY:   Significant other/partner[X ]  Children[ X]  Latter-day/Spirituality:  Substance hx:  [ ]   Tobacco hx:  [ ]   Alcohol hx: [ ]   Living Situation: [ ]Home  [X - [facility] ]Long term care  [ ]Rehab [ ]Other  Home Services: [ ] [facility]HA [ ] Sebastián RN [ ] Hospice  Occupation:  Home Opioid hx:  [ ] Y [ ] N [X ] I-Stop Reference No: This report was requested by: Bertha Bo | Reference #: [id number]    Patient Name: Abimael Abbott Date: 09/05/1932  Address: [address] [place] [id number]: Male  Rx Written	Rx Dispensed	Drug	Quantity	Days Supply	Prescriber Name	Prescriber Libertad #	Payment Method	Dispenser  04/04/2022	04/04/2022	tramadol hcl 50 mg tablet	30	7	Amanda Gutierrez MD	[id number]	Cash	[facility]      ADVANCE DIRECTIVES:     MOLST  []  Living Will  [ ]   DECISION MAKER(s): patient is A & O  4  [ ] Health Care Proxy(s)  [ X] Surrogate(s)  [ ] Guardian           Name(s): Phone Number(s):   Palak Conn    BASELINE (I)ADL(s) (prior to admission):  [place]: [ ]Total  [ X] Moderate [ ]Dependent  Palliative Performance Status Version 2:      50   %    http://npcrc.org/files/news/palliative_performance_scale_ppsv2.pdf    Allergies    No Known Allergies    Intolerances    MEDICATIONS  (STANDING):  albuterol/ipratropium for Nebulization 3 milliLiter(s) Nebulizer every 6 hours  apixaban 5 milliGRAM(s) Oral two times a day  cyanocobalamin 1000 MICROGram(s) Oral daily  diltiazem    milliGRAM(s) Oral daily  ferrous    sulfate 325 milliGRAM(s) Oral daily  finasteride 5 milliGRAM(s) Oral daily  furosemide   Injectable 40 milliGRAM(s) IV Push two times a day  magnesium sulfate  IVPB 2 Gram(s) IV Intermittent once  melatonin 5 milliGRAM(s) Oral at bedtime  melatonin 10 milliGRAM(s) Oral at bedtime  methylPREDNISolone sodium succinate Injectable 60 milliGRAM(s) IV Push every 12 hours  metoprolol tartrate 100 milliGRAM(s) Oral three times a day  pantoprazole    Tablet 40 milliGRAM(s) Oral before breakfast  potassium chloride    Tablet ER 40 milliEquivalent(s) Oral once  tamsulosin 0.4 milliGRAM(s) Oral at bedtime    MEDICATIONS  (PRN):  acetaminophen     Tablet .. 650 milliGRAM(s) Oral every 6 hours PRN Temp greater or equal to 38C (100.4F), Mild Pain (1 - 3)  aluminum hydroxide/magnesium hydroxide/simethicone Suspension 30 milliLiter(s) Oral every 4 hours PRN Dyspepsia  melatonin 3 milliGRAM(s) Oral at bedtime PRN Insomnia  ondansetron Injectable 4 milliGRAM(s) IV Push every 8 hours PRN Nausea and/or Vomiting  zolpidem 5 milliGRAM(s) Oral at bedtime PRN Insomnia    PRESENT SYMPTOMS:     Pain: [ ]yes [X ]no  QOL impact -   Location -                    Aggravating factors -  Quality -  Radiation -  Timing-  Severity (0-10 scale):  Minimal acceptable level (0-10 scale):     Dyspnea:                           [ ]Mild [ ]Moderate [ X]Severe - worse with BMs, moving around  Anxiety:                             [ ]Mild [ X]Moderate [ ]Severe  Fatigue:                             [ ]Mild [ ]Moderate [ ]Severe  Nausea:                             [ ]Mild [ ]Moderate [ ]Severe  Loss of appetite:              [ ]Mild [ ]Moderate [ ]Severe  Constipation:                    [ ]Mild [ ]Moderate [ ]Severe    Other Symptoms:  [X ]All other review of systems negative     Palliative Performance Status Version 2:     50    %    http://npcrc.org/files/news/palliative_performance_scale_ppsv2.pdf    PHYSICAL EXAM:  Vital Signs Last 24 Hrs  T(C): 36.1 (15 Jul 2022 05:00), Max: 36.1 (14 Jul 2022 20:40)  T(F): 96.9 (15 Jul 2022 05:00), Max: 97 (14 Jul 2022 20:40)  HR: 80 (15 Jul 2022 05:00) (80 - 115)  BP: 139/75 (15 Jul 2022 05:00) (132/80 - 154/85)  RR: 18 (15 Jul 2022 05:00) (18 - 20)  SpO2: 96% (15 Jul 2022 05:00) (96% - 100%)    GENERAL:  [X ]Alert  [ X]Oriented x 3   [ ]Lethargic  [ ]Cachexia  [ ]Unarousable  [ X]Verbal  [ ]Non-Verbal  Behavioral:   [X ] Anxiety  [ ] Delirium [ ] Agitation [ ] Other  HEENT:  [X ]Normal   [ ]Dry mouth   [ ]ET Tube/Trach  [ ]Oral lesions  PULMONARY:   [ ]Clear [ X]Tachypnea  [ ]Audible excessive secretions   [ ]Rhonchi        [ ]Right [ ]Left [ ]Bilateral  [ ]Crackles        [ ]Right [ ]Left [ ]Bilateral  [ ]Wheezing     [ ]Right [ ]Left [ ]Bilateral  [ ]Diminished breath sounds [ ]right [ ]left [ ]bilateral  CARDIOVASCULAR:    [ X]Regular [ ]Irregular [ ]Tachy  [ ]Ruddy [ ]Murmur [ ]Other  GASTROINTESTINAL:  [ X]Soft  [ ]Distended   [ ]+BS  [ ]Non tender [ ]Tender  [ ]PEG [ ]OGT/ NGT  Last BM:  today   GENITOURINARY:  [X ]Normal [ ] Incontinent   [ ]Oliguria/Anuria   [ ]Joel  MUSCULOSKELETAL:   [X ]Normal   [ ]Weakness  [ ]Bed/Wheelchair bound [ ]Edema  NEUROLOGIC:   [X ]No focal deficits  [ ]Cognitive impairment  [ ]Dysphagia [ ]Dysarthria [ ]Paresis [ ]Other   SKIN:   [ X]Normal    [ ]Rash  [ ]Pressure ulcer(s)       Present on admission [ ]y [ ]n      LABS:                        11.2   13.79 )-----------( 238      ( 15 Jul 2022 07:04 )             35.9   07-15    138  |  93<L>  |  44<H>  ----------------------------<  130<H>  3.4<L>   |  37<H>  |  1.1    Ca    9.0      15 Jul 2022 07:04  Mg     1.7     07-15      RADIOLOGY & ADDITIONAL STUDIES:    < from: Xray Chest 1 View- PORTABLE-Urgent (Xray Chest 1 View- PORTABLE-Urgent .) (07.14.22 @ 11:48) >  Findings:    Support devices: None.    Cardiac/mediastinum/hilum: Stable enlarged cardiac silhouette    Lung parenchyma/Pleura: Slightly worsened diffuse bilateral opacities   compared to 7/12/2022. Trace bilateral pleural effusions.    Skeleton/soft tissues: Severe glenohumeral arthrosis. Unchanged.    Impression:  Slightly worsened diffuse bilateral opacities.    --- End of Report ---    < end of copied text >    < from: CT Chest No Cont (07.11.22 @ 12:23) >  IMPRESSION:    Diffuse interstitial opacifications with groundglass abnormalities   diffusely as well as honeycombing. Differential diagnosis remains CHF,   more likely worsening interstitial lung disease    --- End of Report ---    < end of copied text >    REFERRALS:   [ ]Chaplaincy  [ ]Hospice  [ ]Child Life  [ ]Social Work  [ ]Case management [ ]Holistic Therapy     Goals of Care Document:   - spoke with patient at bedside and introduced palliative care  - discussed hospice which he does not feel ready for at this time  - patient asked that we do NOT call his son Elkin today  - he is agreeable to us returning monday to re-address [facility]    CC: SOB    HPI:    89 year old hard of hearing male with 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 States he has sob at baseline, but noted his pulse ox to be in 80s consistently the past several days. Endorses baseline LE edema, could not give report of any worsening of it. Pt states he has afib and it has been uncontrolled recently, causing increased difficulty catching his breath. States he had one acute episode one night prior to admission but refused to come in d/t anxiety of staying too long in hospital. Denies chest pain, fever, chills, nausea, vomiting, abd pain, diarrhea, HA, dizziness, weakness.  Pt has multiple admissions for sob d/t various causes such as ILD exacerbations and CHF exacerbations. Pt is aox4.     in the ED,pt's VS T(C): 36.6 (07-10-22 @ 20:04), Max: 36.6 (07-10-22 @ 20:04)  HR: 86 (07-11-22 @ 02:03) (86 - 110)  BP: 157/91 (07-10-22 @ 23:06) (124/81 - 157/91)  RR: 22 (07-10-22 @ 23:06) (22 - 22)  SpO2: 91% (07-10-22 @ 23:06) (91% - 97%), labs done showed wbc 11.24 (similar to May '22), Hb 11, , plts 192, INR 2.18, sodium 137, potassium 3.4, chloride 97, Cr 1.3, BUN 29, Ca 8.7, albumin 3.4, gfr 53, Magnesium 1.6, troponin <0.01, bnp 5836 (2800 May '22); VBG lactate 2.3, VBG pco2 53, covid neg, influenza neg, rsp Syncitial virus neg  EKG: afib with RVR rate 103, CXR: bl opacities (pending official read)  pt received lasix 40mg IV, 2g magnesium  pt is admitted for workup/management CHF exacerbation vs ILD flare (11 Jul 2022 03:43)    PERTINENT PM/SXH:   Hypertension    GERD (gastroesophageal reflux disease)    Lung interstitial disease    BPH (benign prostatic hyperplasia)    HFrEF (heart failure with reduced ejection fraction)    COPD (chronic obstructive pulmonary disease)      No significant past surgical history      FAMILY HISTORY:    No pertinent history in mother or father    ITEMS NOT CHECKED ARE NOT PRESENT    SOCIAL HISTORY:   Significant other/partner[X ]  Children[ X]  Muslim/Spirituality:  Substance hx:  [ ]   Tobacco hx:  [ ]   Alcohol hx: [ ]   Living Situation: [ ]Home  [X - [facility] ]Long term care  [ ]Rehab [ ]Other  Home Services: [ ] [facility] [ ] Visting RN [ ] Hospice  Occupation:  Home Opioid hx:  [ ] Y [ ] N [X ] I-Stop Reference No: This report was requested by: Bertha Bo | Reference #: [id number]    Patient Name: Abimael Abbott Date: 09/05/1932  Address: [address] [place], [place] [address]Sex: Male  Rx Written	Rx Dispensed	Drug	Quantity	Days Supply	Prescriber Name	Prescriber Libert[place] #	Payment Method	Dispenser  04/04/2022	04/04/2022	tramadol hcl 50 mg tablet	30	7	Amanda Gutierrez MD	[id number]	Cash	[facility]      ADVANCE DIRECTIVES:     MOLST  []  Living Will  [ ]   DECISION MAKER(s): patient is A & O  4  [ ] Health Care Proxy(s)  [ X] Surrogate(s)  [ ] Guardian           Name(s): Phone Number(s):   Palak Conn    BASELINE (I)ADL(s) (prior to admission):  [place]: [ ]Total  [ X] Moderate [ ]Dependent  Palliative Performance Status Version 2:      50   %    http://npcrc.org/files/news/palliative_performance_scale_[email or web address].pdf    Allergies  No Known Allergies      MEDICATIONS  (STANDING):  albuterol/ipratropium for Nebulization 3 milliLiter(s) Nebulizer every 6 hours  apixaban 5 milliGRAM(s) Oral two times a day  cyanocobalamin 1000 MICROGram(s) Oral daily  diltiazem    milliGRAM(s) Oral daily  ferrous    sulfate 325 milliGRAM(s) Oral daily  finasteride 5 milliGRAM(s) Oral daily  furosemide   Injectable 40 milliGRAM(s) IV Push two times a day  magnesium sulfate  IVPB 2 Gram(s) IV Intermittent once  melatonin 5 milliGRAM(s) Oral at bedtime  melatonin 10 milliGRAM(s) Oral at bedtime  methylPREDNISolone sodium succinate Injectable 60 milliGRAM(s) IV Push every 12 hours  metoprolol tartrate 100 milliGRAM(s) Oral three times a day  pantoprazole    Tablet 40 milliGRAM(s) Oral before breakfast  potassium chloride    Tablet ER 40 milliEquivalent(s) Oral once  tamsulosin 0.4 milliGRAM(s) Oral at bedtime    MEDICATIONS  (PRN):  acetaminophen     Tablet .. 650 milliGRAM(s) Oral every 6 hours PRN Temp greater or equal to 38C (100.4F), Mild Pain (1 - 3)  aluminum hydroxide/magnesium hydroxide/simethicone Suspension 30 milliLiter(s) Oral every 4 hours PRN Dyspepsia  melatonin 3 milliGRAM(s) Oral at bedtime PRN Insomnia  ondansetron Injectable 4 milliGRAM(s) IV Push every 8 hours PRN Nausea and/or Vomiting  zolpidem 5 milliGRAM(s) Oral at bedtime PRN Insomnia    PRESENT SYMPTOMS:     Pain: [ ]yes [X ]no  QOL impact -   Location -                    Aggravating factors -  Quality -  Radiation -  Timing-  Severity (0-10 scale):  Minimal acceptable level (0-10 scale):     Dyspnea:                           [ ]Mild [ ]Moderate [ X]Severe - worse with BMs, moving around  Anxiety:                             [ ]Mild [ X]Moderate [ ]Severe  Fatigue:                             [ ]Mild [ ]Moderate [ ]Severe  Nausea:                             [ ]Mild [ ]Moderate [ ]Severe  Loss of appetite:              [ ]Mild [ ]Moderate [ ]Severe  Constipation:                    [ ]Mild [ ]Moderate [ ]Severe    Other Symptoms:  [X ]All other review of systems negative     Palliative Performance Status Version 2:     50    %    http://npcrc.org/files/news/palliative_performance_scale_ppsv2.pdf    PHYSICAL EXAM:  Vital Signs Last 24 Hrs  T(C): 36.1 (15 Jul 2022 05:00), Max: 36.1 (14 Jul 2022 20:40)  T(F): 96.9 (15 Jul 2022 05:00), Max: 97 (14 Jul 2022 20:40)  HR: 80 (15 Jul 2022 05:00) (80 - 115)  BP: 139/75 (15 Jul 2022 05:00) (132/80 - 154/85)  RR: 18 (15 Jul 2022 05:00) (18 - 20)  SpO2: 96% (15 Jul 2022 05:00) (96% - 100%)    GENERAL:  [X ]Alert  [ X]Oriented x 3   [ ]Lethargic  [ ]Cachexia  [ ]Unarousable  [ X]Verbal  [ ]Non-Verbal [x]Hard of hearing  Behavioral:   [X ] Anxiety  [ ] Delirium [ ] Agitation [ ] Other  HEENT:  [X ]Normal   [ ]Dry mouth   [ ]ET Tube/Trach  [ ]Oral lesions  PULMONARY:   [ ]Clear [ X]Tachypnea  [ ]Audible excessive secretions   [ ]Rhonchi        [ ]Right [ ]Left [ ]Bilateral  [ ]Crackles        [ ]Right [ ]Left [ ]Bilateral  [ ]Wheezing     [ ]Right [ ]Left [ ]Bilateral  [ ]Diminished breath sounds [ ]right [ ]left [ ]bilateral  CARDIOVASCULAR:    [ X]Regular [ ]Irregular [ ]Tachy  [ ]Ruddy [ ]Murmur [ ]Other  GASTROINTESTINAL:  [ X]Soft  [ ]Distended   [ ]+BS  [ ]Non tender [ ]Tender  [ ]PEG [ ]OGT/ NGT  Last BM:  today   GENITOURINARY:  [X ]Normal [ ] Incontinent   [ ]Oliguria/Anuria   [ ]Joel  MUSCULOSKELETAL:   [X ]Normal   [ ]Weakness  [ ]Bed/Wheelchair bound [ ]Edema  NEUROLOGIC:   [X ]No focal deficits  [ ]Cognitive impairment  [ ]Dysphagia [ ]Dysarthria [ ]Paresis [ ]Other   SKIN:   [ X]Normal    [ ]Rash  [ ]Pressure ulcer(s)       Present on admission [ ]y [ ]n      LABS:                        11.2   13.79 )-----------( 238      ( 15 Jul 2022 07:04 )             35.9   07-15    138  |  93<L>  |  44<H>  ----------------------------<  130<H>  3.4<L>   |  37<H>  |  1.1    Ca    9.0      15 Jul 2022 07:04  Mg     1.7     07-15      RADIOLOGY & ADDITIONAL STUDIES:    < from: Xray Chest 1 View- PORTABLE-Urgent (Xray Chest 1 View- PORTABLE-Urgent .) (07.14.22 @ 11:48) >  Findings:    Support devices: None.    Cardiac/mediastinum/hilum: Stable enlarged cardiac silhouette    Lung parenchyma/Pleura: Slightly worsened diffuse bilateral opacities   compared to 7/12/2022. Trace bilateral pleural effusions.    Skeleton/soft tissues: Severe glenohumeral arthrosis. Unchanged.    Impression:  Slightly worsened diffuse bilateral opacities.    --- End of Report ---    < end of copied text >    < from: CT Chest No Cont (07.11.22 @ 12:23) >  IMPRESSION:    Diffuse interstitial opacifications with groundglass abnormalities   diffusely as well as honeycombing. Differential diagnosis remains CHF,   more likely worsening interstitial lung disease    --- End of Report ---    < end of copied text >    REFERRALS:   [ ]Chaplaincy  [ ]Hospice  [ ]Child Life  [ ]Social Work  [ ]Case management [ ]Holistic Therapy     Goals of Care Document:   - spoke with patient at bedside and introduced palliative care  - discussed hospice which he does not feel ready for at this time  - patient asked that we do NOT call his son Elkin today  - he is agreeable to us returning monday to re-address [facility]

## 2022-07-15 NOTE — PROGRESS NOTE ADULT - SUBJECTIVE AND OBJECTIVE BOX
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 States he has sob at baseline, but noted his pulse ox to be in 80s consistently the past several days. Endorses baseline LE edema, could not give report of any worsening of it. Pt states he has afib and it has been uncontrolled recently, causing increased difficulty catching his breath. States he had one acute episode one night prior to admission but refused to come in d/t anxiety of staying too long in hospital. Denies chest pain, fever, chills, nausea, vomiting, abd pain, diarrhea, HA, dizziness, weakness.  Pt has multiple admissions for sob d/t various causes such as ILD exacerbations and CHF exacerbations. Pt is aox4.     in the ED,pt's VS T(C): 36.6 (07-10-22 @ 20:04), Max: 36.6 (07-10-22 @ 20:04)  HR: 86 (07-11-22 @ 02:03) (86 - 110)  BP: 157/91 (07-10-22 @ 23:06) (124/81 - 157/91)  RR: 22 (07-10-22 @ 23:06) (22 - 22)  SpO2: 91% (07-10-22 @ 23:06) (91% - 97%), labs done showed wbc 11.24 (similar to May '22), Hb 11, , plts 192, INR 2.18, sodium 137, potassium 3.4, chloride 97, Cr 1.3, BUN 29, Ca 8.7, albumin 3.4, gfr 53, Magnesium 1.6, troponin <0.01, bnp 5836 (2800 May '22); VBG lactate 2.3, VBG pco2 53, covid neg, influenza neg, rsp Syncitial virus neg  EKG: afib with RVR rate 103, CXR: bl opacities (pending official read)  pt received lasix 40mg IV, 2g magnesium  pt is admitted for workup/management CHF exacerbation vs ILD flare (11 Jul 2022 03:43)    MEDICATIONS  (STANDING):  albuterol/ipratropium for Nebulization 3 milliLiter(s) Nebulizer every 6 hours  apixaban 5 milliGRAM(s) Oral two times a day  cyanocobalamin 1000 MICROGram(s) Oral daily  diltiazem    milliGRAM(s) Oral daily  ferrous    sulfate 325 milliGRAM(s) Oral daily  finasteride 5 milliGRAM(s) Oral daily  furosemide   Injectable 40 milliGRAM(s) IV Push two times a day  melatonin 5 milliGRAM(s) Oral at bedtime  melatonin 10 milliGRAM(s) Oral at bedtime  methylPREDNISolone sodium succinate Injectable 60 milliGRAM(s) IV Push every 12 hours  metoprolol tartrate 100 milliGRAM(s) Oral three times a day  pantoprazole    Tablet 40 milliGRAM(s) Oral before breakfast  tamsulosin 0.4 milliGRAM(s) Oral at bedtime    MEDICATIONS  (PRN):  acetaminophen     Tablet .. 650 milliGRAM(s) Oral every 6 hours PRN Temp greater or equal to 38C (100.4F), Mild Pain (1 - 3)  aluminum hydroxide/magnesium hydroxide/simethicone Suspension 30 milliLiter(s) Oral every 4 hours PRN Dyspepsia  melatonin 3 milliGRAM(s) Oral at bedtime PRN Insomnia  ondansetron Injectable 4 milliGRAM(s) IV Push every 8 hours PRN Nausea and/or Vomiting  zolpidem 5 milliGRAM(s) Oral at bedtime PRN Insomnia    INTERVAL EVENTS:    T(C): 36.1 (07-15-22 @ 05:00), Max: 36.1 (07-14-22 @ 20:40)  HR: 80 (07-15-22 @ 05:00) (80 - 99)  BP: 139/75 (07-15-22 @ 05:00) (139/75 - 154/85)  RR: 18 (07-15-22 @ 05:00) (18 - 19)  SpO2: 96% (07-15-22 @ 05:00) (96% - 100%)  Wt(kg): --Vital Signs Last 24 Hrs  T(C): 36.1 (15 Jul 2022 05:00), Max: 36.1 (14 Jul 2022 20:40)  T(F): 96.9 (15 Jul 2022 05:00), Max: 97 (14 Jul 2022 20:40)  HR: 80 (15 Jul 2022 05:00) (80 - 99)  BP: 139/75 (15 Jul 2022 05:00) (139/75 - 154/85)  BP(mean): --  RR: 18 (15 Jul 2022 05:00) (18 - 19)  SpO2: 96% (15 Jul 2022 05:00) (96% - 100%)    Parameters below as of 15 Jul 2022 05:00  Patient On (Oxygen Delivery Method): nasal cannula        PHYSICAL EXAM:  GENERAL:   NECK: Supple, No JVD, Normal thyroid  CHEST/LUNG: Clear; No crackles or wheezing  HEART: S1, S2, Regular rate and rhythm;   ABDOMEN: Soft, Nontender, Nondistended; Bowel sounds present  EXTREMITIES: No clubbing, cyanosis, or edema  SKIN: No rashes or lesions    LABS:    RADIOLOGY & ADDITIONAL TESTS:     OPAL NNEKA  89y  Male  HPI:  89 year old hard of hearing male with 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 States he has sob at baseline, but noted his pulse ox to be in 80s consistently the past several days. Endorses baseline LE edema, could not give report of any worsening of it. Pt states he has afib and it has been uncontrolled recently, causing increased difficulty catching his breath. States he had one acute episode one night prior to admission but refused to come in d/t anxiety of staying too long in hospital. Denies chest pain, fever, chills, nausea, vomiting, abd pain, diarrhea, HA, dizziness, weakness.  Pt has multiple admissions for sob d/t various causes such as ILD exacerbations and CHF exacerbations. Pt is aox4.     in the ED,pt's VS T(C): 36.6 (07-10-22 @ 20:04), Max: 36.6 (07-10-22 @ 20:04)  HR: 86 (07-11-22 @ 02:03) (86 - 110)  BP: 157/91 (07-10-22 @ 23:06) (124/81 - 157/91)  RR: 22 (07-10-22 @ 23:06) (22 - 22)  SpO2: 91% (07-10-22 @ 23:06) (91% - 97%), labs done showed wbc 11.24 (similar to May '22), Hb 11, , plts 192, INR 2.18, sodium 137, potassium 3.4, chloride 97, Cr 1.3, BUN 29, Ca 8.7, albumin 3.4, gfr 53, Magnesium 1.6, troponin <0.01, bnp 5836 (2800 May '22); VBG lactate 2.3, VBG pco2 53, covid neg, influenza neg, rsp Syncitial virus neg  EKG: afib with RVR rate 103, CXR: bl opacities (pending official read)  pt received lasix 40mg IV, 2g magnesium  pt is admitted for workup/management CHF exacerbation vs ILD flare (11 Jul 2022 03:43)    MEDICATIONS  (STANDING):  albuterol/ipratropium for Nebulization 3 milliLiter(s) Nebulizer every 6 hours  apixaban 5 milliGRAM(s) Oral two times a day  cyanocobalamin 1000 MICROGram(s) Oral daily  diltiazem    milliGRAM(s) Oral daily  ferrous    sulfate 325 milliGRAM(s) Oral daily  finasteride 5 milliGRAM(s) Oral daily  furosemide   Injectable 40 milliGRAM(s) IV Push two times a day  melatonin 5 milliGRAM(s) Oral at bedtime  melatonin 10 milliGRAM(s) Oral at bedtime  methylPREDNISolone sodium succinate Injectable 60 milliGRAM(s) IV Push every 12 hours  metoprolol tartrate 100 milliGRAM(s) Oral three times a day  pantoprazole    Tablet 40 milliGRAM(s) Oral before breakfast  tamsulosin 0.4 milliGRAM(s) Oral at bedtime    MEDICATIONS  (PRN):  acetaminophen     Tablet .. 650 milliGRAM(s) Oral every 6 hours PRN Temp greater or equal to 38C (100.4F), Mild Pain (1 - 3)  aluminum hydroxide/magnesium hydroxide/simethicone Suspension 30 milliLiter(s) Oral every 4 hours PRN Dyspepsia  melatonin 3 milliGRAM(s) Oral at bedtime PRN Insomnia  ondansetron Injectable 4 milliGRAM(s) IV Push every 8 hours PRN Nausea and/or Vomiting  zolpidem 5 milliGRAM(s) Oral at bedtime PRN Insomnia    INTERVAL EVENTS: Patient seen today OOB in chair without new complaint. Patient using O2 via NC    T(C): 36.1 (07-15-22 @ 05:00), Max: 36.1 (07-14-22 @ 20:40)  HR: 80 (07-15-22 @ 05:00) (80 - 99)  BP: 139/75 (07-15-22 @ 05:00) (139/75 - 154/85)  RR: 18 (07-15-22 @ 05:00) (18 - 19)  SpO2: 96% (07-15-22 @ 05:00) (96% - 100%)  Wt(kg): --Vital Signs Last 24 Hrs  T(C): 36.1 (15 Jul 2022 05:00), Max: 36.1 (14 Jul 2022 20:40)  T(F): 96.9 (15 Jul 2022 05:00), Max: 97 (14 Jul 2022 20:40)  HR: 80 (15 Jul 2022 05:00) (80 - 99)  BP: 139/75 (15 Jul 2022 05:00) (139/75 - 154/85)  BP(mean): --  RR: 18 (15 Jul 2022 05:00) (18 - 19)  SpO2: 96% (15 Jul 2022 05:00) (96% - 100%)    Parameters below as of 15 Jul 2022 05:00  Patient On (Oxygen Delivery Method): nasal cannula        PHYSICAL EXAM:  GENERAL: NAD  NECK: Supple, No JVD  CHEST/LUNG: Decreased BS at left base  HEART: S1, S2  ABDOMEN: Soft, Nontender, Nondistended; Bowel sounds present  EXTREMITIES: + edema    LABS:                        11.2   13.79 )-----------( 238      ( 15 Jul 2022 07:04 )             35.9             07-15    138  |  93<L>  |  44<H>  ----------------------------<  130<H>  3.4<L>   |  37<H>  |  1.1    Ca    9.0      15 Jul 2022 07:04  Mg     1.7     07-15              RADIOLOGY & ADDITIONAL TESTS:

## 2022-07-15 NOTE — CONSULT NOTE ADULT - SUBJECTIVE AND OBJECTIVE BOX
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 States he has sob at baseline, but noted his pulse ox to be in 80s consistently the past several days. Endorses baseline LE edema, could not give report of any worsening of it. Pt states he has afib and it has been uncontrolled recently, causing increased difficulty catching his breath. States he had one acute episode one night prior to admission but refused to come in d/t anxiety of staying too long in hospital. Denies chest pain, fever, chills, nausea, vomiting, abd pain, diarrhea, HA, dizziness, weakness.  Pt has multiple admissions for sob d/t various causes such as ILD exacerbations and CHF exacerbations. Pt is aox4.     in the ED,pt's VS T(C): 36.6 (07-10-22 @ 20:04), Max: 36.6 (07-10-22 @ 20:04)  HR: 86 (07-11-22 @ 02:03) (86 - 110)  BP: 157/91 (07-10-22 @ 23:06) (124/81 - 157/91)  RR: 22 (07-10-22 @ 23:06) (22 - 22)  SpO2: 91% (07-10-22 @ 23:06) (91% - 97%), labs done showed wbc 11.24 (similar to May '22), Hb 11, , plts 192, INR 2.18, sodium 137, potassium 3.4, chloride 97, Cr 1.3, BUN 29, Ca 8.7, albumin 3.4, gfr 53, Magnesium 1.6, troponin <0.01, bnp 5836 (2800 May '22); VBG lactate 2.3, VBG pco2 53, covid neg, influenza neg, rsp Syncitial virus neg  EKG: afib with RVR rate 103, CXR: bl opacities (pending official read)  pt received lasix 40mg IV, 2g magnesium  pt is admitted for workup/management CHF exacerbation vs ILD flare (11 Jul 2022 03:43)      PAST MEDICAL & SURGICAL HISTORY:  Hypertension      GERD (gastroesophageal reflux disease)      Lung interstitial disease      BPH (benign prostatic hyperplasia)      HFrEF (heart failure with reduced ejection fraction)      COPD (chronic obstructive pulmonary disease)      No significant past surgical history          PREVIOUS DIAGNOSTIC TESTING:      ECHO  FINDINGS:    STRESS  FINDINGS:    CATHETERIZATION  FINDINGS:    MEDICATIONS  (STANDING):  albuterol/ipratropium for Nebulization 3 milliLiter(s) Nebulizer every 6 hours  apixaban 5 milliGRAM(s) Oral two times a day  cyanocobalamin 1000 MICROGram(s) Oral daily  diltiazem    milliGRAM(s) Oral daily  ferrous    sulfate 325 milliGRAM(s) Oral daily  finasteride 5 milliGRAM(s) Oral daily  furosemide   Injectable 40 milliGRAM(s) IV Push two times a day  melatonin 5 milliGRAM(s) Oral at bedtime  melatonin 10 milliGRAM(s) Oral at bedtime  methylPREDNISolone sodium succinate Injectable 60 milliGRAM(s) IV Push every 12 hours  metoprolol tartrate 100 milliGRAM(s) Oral three times a day  pantoprazole    Tablet 40 milliGRAM(s) Oral before breakfast  tamsulosin 0.4 milliGRAM(s) Oral at bedtime    MEDICATIONS  (PRN):  acetaminophen     Tablet .. 650 milliGRAM(s) Oral every 6 hours PRN Temp greater or equal to 38C (100.4F), Mild Pain (1 - 3)  aluminum hydroxide/magnesium hydroxide/simethicone Suspension 30 milliLiter(s) Oral every 4 hours PRN Dyspepsia  melatonin 3 milliGRAM(s) Oral at bedtime PRN Insomnia  ondansetron Injectable 4 milliGRAM(s) IV Push every 8 hours PRN Nausea and/or Vomiting  zolpidem 5 milliGRAM(s) Oral at bedtime PRN Insomnia      FAMILY HISTORY:      SOCIAL HISTORY:  CIGARETTES:    ALCOHOL:    REVIEW OF SYSTEMS:  CONSTITUTIONAL: No fever, weight loss, or fatigue  NECK: No pain or stiffness  RESPIRATORY: Complains of shortness of breath  CARDIOVASCULAR: No chest pain, dizziness, or leg swelling  GASTROINTESTINAL: No abdominal or epigastric pain. No nausea, vomiting, or hematemesis; No diarrhea or constipation. No melena or hematochezia.  GENITOURINARY: No dysuria, frequency, hematuria, or incontinence  NEUROLOGICAL: No headaches, memory loss, loss of strength, numbness, or tremors  SKIN: No itching, burning, rashes, or lesions   ENDOCRINE: No heat or cold intolerance; No hair loss  MUSCULOSKELETAL: No joint pain or swelling; No muscle, back, or extremity pain  HEME/LYMPH: No easy bruising, or bleeding gums          Vital Signs Last 24 Hrs  T(C): 36.1 (15 Jul 2022 20:00), Max: 36.1 (15 Jul 2022 05:00)  T(F): 96.9 (15 Jul 2022 20:00), Max: 96.9 (15 Jul 2022 05:00)  HR: 106 (15 Jul 2022 20:00) (80 - 106)  BP: 136/92 (15 Jul 2022 20:00) (136/92 - 144/84)  BP(mean): --  RR: 18 (15 Jul 2022 20:00) (18 - 18)  SpO2: 85% (15 Jul 2022 20:00) (85% - 99%)    Parameters below as of 15 Jul 2022 20:00  Patient On (Oxygen Delivery Method): nasal cannula            PHYSICAL EXAM:  GENERAL: NAD, well-groomed, well-developed  HEAD:  Atraumatic, Normocephalic  NECK: Supple, No JVD, Normal thyroid  NERVOUS SYSTEM:  Alert & Oriented X3, Good concentration  CHEST/LUNG: bilateral decreased air entry with dry crackles   HEART: Irregular rate and rhythm  ABDOMEN: Soft, Nontender, Nondistended; Bowel sounds present  EXTREMITIES:  trace L.E edema  SKIN: No rashes or lesions    INTERPRETATION OF TELEMETRY:    ECG:    I&O's Detail    14 Jul 2022 07:01  -  15 Jul 2022 07:00  --------------------------------------------------------  IN:  Total IN: 0 mL    OUT:    Voided (mL): 2000 mL  Total OUT: 2000 mL    Total NET: -2000 mL      15 Jul 2022 07:01  -  15 Jul 2022 23:32  --------------------------------------------------------  IN:  Total IN: 0 mL    OUT:    Indwelling Catheter - Urethral (mL): 1000 mL  Total OUT: 1000 mL    Total NET: -1000 mL          LABS:                        11.2   13.79 )-----------( 238      ( 15 Jul 2022 07:04 )             35.9     07-15    138  |  93<L>  |  44<H>  ----------------------------<  130<H>  3.4<L>   |  37<H>  |  1.1    Ca    9.0      15 Jul 2022 07:04  Mg     1.7     07-15              I&O's Summary    14 Jul 2022 07:01  -  15 Jul 2022 07:00  --------------------------------------------------------  IN: 0 mL / OUT: 2000 mL / NET: -2000 mL    15 Jul 2022 07:01  -  15 Jul 2022 23:32  --------------------------------------------------------  IN: 0 mL / OUT: 1000 mL / NET: -1000 mL        RADIOLOGY & ADDITIONAL STUDIES:        diltiazem    milliGRAM(s) Oral daily  furosemide   Injectable 40 milliGRAM(s) IV Push two times a day  metoprolol tartrate 100 milliGRAM(s) Oral three times a day  tamsulosin 0.4 milliGRAM(s) Oral at bedtime

## 2022-07-15 NOTE — CONSULT NOTE ADULT - PROBLEM SELECTOR RECOMMENDATION 9
2/2 ILD, CHF  continue medical optimization for CHF exacerbation, ILD  recs per pulm 2/2 ILD, CHF  continue medical optimization for CHF exacerbation, ILD  recs per pulm  Continue IV lasix and steroids

## 2022-07-15 NOTE — CONSULT NOTE ADULT - PROBLEM SELECTOR RECOMMENDATION 2
acute on chronic respiratory failure  recommendations per pulm   overall poor prognosis per pulm   patient feels not ready for hospice as of today

## 2022-07-15 NOTE — CONSULT NOTE ADULT - ASSESSMENT
ILD / Hypoxic respiratory failure   HFrEF/ Euvolemic   Afib   - Management as per primary team   - Continue Cardizem/ May increase to 180   -  Continue Metoprolol and Furosemide   - I/O  - Consider palliative car consult   - Will follow as needed    ILD / Hypoxic respiratory failure   HFrEF/ Euvolemic   Afib   - Management as per primary team   - Continue Eliquis and  Cardizem/ May increase to 180   -  Continue Metoprolol and Furosemide   - I/O and please replace Mg and K   - Consider palliative car consult   - Will follow as needed

## 2022-07-15 NOTE — CONSULT NOTE ADULT - PROBLEM SELECTOR RECOMMENDATION 4
Full Code  Continue aggressive medical management  Will attempt to re-address GOC with patient and family  Will follow

## 2022-07-16 NOTE — PROGRESS NOTE ADULT - SUBJECTIVE AND OBJECTIVE BOX
NNEKA JOSEPH  89y  Male      Patient is a 89y old  Male who presents with a chief complaint of acute sob, hypoxemia (15 Jul 2022 16:31)        REVIEW OF SYSTEMS:  CONSTITUTIONAL: No fever, weight loss, or fatigue  RESPIRATORY: No cough, wheezing, chills or hemoptysis; sob+  CARDIOVASCULAR: No chest pain, palpitations, dizziness, or leg swelling  GASTROINTESTINAL: No abdominal or epigastric pain. No nausea, vomiting, or hematemesis; No diarrhea or constipation. No melena or hematochezia.  GENITOURINARY: No dysuria, frequency, hematuria, or incontinence  MUSCULOSKELETAL: No joint pain or swelling; No muscle, back, or extremity pain  PSYCHIATRIC: No depression, anxiety, mood swings, or difficulty sleeping  HEME/LYMPH: No easy bruising, or bleeding gums  ALLERY AND IMMUNOLOGIC: No hives or eczema  FAMILY HISTORY:    T(C): 36.2 (07-16-22 @ 05:00), Max: 36.2 (07-16-22 @ 05:00)  HR: 95 (07-16-22 @ 05:00) (95 - 106)  BP: 137/88 (07-16-22 @ 05:00) (136/92 - 144/84)  RR: 18 (07-16-22 @ 05:00) (18 - 18)  SpO2: 98% (07-16-22 @ 07:50) (85% - 99%)  Wt(kg): --Vital Signs Last 24 Hrs  T(C): 36.2 (16 Jul 2022 05:00), Max: 36.2 (16 Jul 2022 05:00)  T(F): 97.1 (16 Jul 2022 05:00), Max: 97.1 (16 Jul 2022 05:00)  HR: 95 (16 Jul 2022 05:00) (95 - 106)  BP: 137/88 (16 Jul 2022 05:00) (136/92 - 144/84)  BP(mean): --  RR: 18 (16 Jul 2022 05:00) (18 - 18)  SpO2: 98% (16 Jul 2022 07:50) (85% - 99%)    Parameters below as of 16 Jul 2022 07:50  Patient On (Oxygen Delivery Method): nasal cannula  O2 Flow (L/min): 5    No Known Allergies      PHYSICAL EXAM:  GENERAL: NAD,   HEAD:  Atraumatic, Normocephalic  EYES: EOMI, PERRLA, conjunctiva and sclera clear  ENMT: No tonsillar erythema, exudates, or enlargement;  NECK: Supple, No JVD, Normal thyroid  NERVOUS SYSTEM:  Alert & Orientated  CHEST/LUNG: vbs slight decreased breath sound  HEART: Regular rate and rhythm; No murmurs, rubs, or gallops  ABDOMEN: Soft, Nontender, Nondistended; Bowel sounds present  EXTREMITIES:  , No clubbing, cyanosis, or edema  LYMPH: No lymphadenopathy noted  SKIN: No rashes or lesions      LABS:  07-16    140  |  96<L>  |  48<H>  ----------------------------<  141<H>  4.2   |  33<H>  |  1.0    Ca    9.0      16 Jul 2022 06:37  Mg     2.2     07-16                          11.6   12.57 )-----------( 260      ( 16 Jul 2022 06:37 )             36.6     < from: 12 Lead ECG (07.11.22 @ 09:59) >  Diagnosis Line Atrial fibrillation with rapid ventricular response with premature ventricular  or aberrantly conducted complexes  Moderate voltage criteria for LVH, may be normal variant  Nonspecific ST and T wave abnormality  Abnormal ECG    < end of copied text >        RADIOLOGY & ADDITIONAL TESTS:    MEDICATION:  acetaminophen     Tablet .. 650 milliGRAM(s) Oral every 6 hours PRN  albuterol/ipratropium for Nebulization 3 milliLiter(s) Nebulizer every 6 hours  aluminum hydroxide/magnesium hydroxide/simethicone Suspension 30 milliLiter(s) Oral every 4 hours PRN  apixaban 5 milliGRAM(s) Oral two times a day  cyanocobalamin 1000 MICROGram(s) Oral daily  diltiazem    milliGRAM(s) Oral daily  ferrous    sulfate 325 milliGRAM(s) Oral daily  finasteride 5 milliGRAM(s) Oral daily  furosemide   Injectable 40 milliGRAM(s) IV Push two times a day  melatonin 3 milliGRAM(s) Oral at bedtime PRN  melatonin 10 milliGRAM(s) Oral at bedtime  metoprolol tartrate 100 milliGRAM(s) Oral three times a day  ondansetron Injectable 4 milliGRAM(s) IV Push every 8 hours PRN  pantoprazole    Tablet 40 milliGRAM(s) Oral before breakfast  predniSONE   Tablet   Oral   predniSONE   Tablet 20 milliGRAM(s) Oral two times a day  tamsulosin 0.4 milliGRAM(s) Oral at bedtime  zolpidem 5 milliGRAM(s) Oral at bedtime PRN      HEALTH ISSUES - PROBLEM Dx:  Dyspnea acute on chronic CHF systolic and diastolic HF on iv lasix,oxygen  chronic A  fib on eliquis,cardiazem  BPH on tamsulosin  COPD on duoneb nebulizer,prednisone tapering  Lt lung mass possible malignancy family knows    ILD (interstitial lung disease)    Acute CHF    Palliative care by specialist will consider

## 2022-07-16 NOTE — PROGRESS NOTE ADULT - SUBJECTIVE AND OBJECTIVE BOX
SUBJ: Patient seen and examined. No events overnight. Reports breathing better. Complains about the O2 monitor noise all night       MEDICATIONS  (STANDING):  albuterol/ipratropium for Nebulization 3 milliLiter(s) Nebulizer every 6 hours  apixaban 5 milliGRAM(s) Oral two times a day  cyanocobalamin 1000 MICROGram(s) Oral daily  diltiazem    milliGRAM(s) Oral daily  ferrous    sulfate 325 milliGRAM(s) Oral daily  finasteride 5 milliGRAM(s) Oral daily  furosemide   Injectable 40 milliGRAM(s) IV Push two times a day  melatonin 10 milliGRAM(s) Oral at bedtime  metoprolol tartrate 100 milliGRAM(s) Oral three times a day  pantoprazole    Tablet 40 milliGRAM(s) Oral before breakfast  predniSONE   Tablet   Oral   predniSONE   Tablet 20 milliGRAM(s) Oral two times a day  tamsulosin 0.4 milliGRAM(s) Oral at bedtime    MEDICATIONS  (PRN):  acetaminophen     Tablet .. 650 milliGRAM(s) Oral every 6 hours PRN Temp greater or equal to 38C (100.4F), Mild Pain (1 - 3)  aluminum hydroxide/magnesium hydroxide/simethicone Suspension 30 milliLiter(s) Oral every 4 hours PRN Dyspepsia  melatonin 3 milliGRAM(s) Oral at bedtime PRN Insomnia  ondansetron Injectable 4 milliGRAM(s) IV Push every 8 hours PRN Nausea and/or Vomiting  zolpidem 5 milliGRAM(s) Oral at bedtime PRN Insomnia            Vital Signs Last 24 Hrs  T(C): 36.2 (16 Jul 2022 05:00), Max: 36.2 (16 Jul 2022 05:00)  T(F): 97.1 (16 Jul 2022 05:00), Max: 97.1 (16 Jul 2022 05:00)  HR: 95 (16 Jul 2022 05:00) (95 - 106)  BP: 137/88 (16 Jul 2022 05:00) (136/92 - 144/84)  BP(mean): --  RR: 18 (16 Jul 2022 05:00) (18 - 18)  SpO2: 98% (16 Jul 2022 07:50) (85% - 99%)    Parameters below as of 16 Jul 2022 07:50  Patient On (Oxygen Delivery Method): nasal cannula  O2 Flow (L/min): 5       REVIEW OF SYSTEMS:  CONSTITUTIONAL: No fever  NECK: No pain or stiffness  CARDIOVASCULAR: patient denies chest pain, shortness of breath slightly improved  .  Repiratory: No cough or wheezing.  NEUROLOGICAL: No focal deficits to report.  GI: no BRBPR, no N,V,diarrhea.    PSYCHIATRY: normal mood and affect  HEENT: no nasal discharge, no ecchymosis        PHYSICAL EXAM:  GENERAL: NAD  HEAD:  Atraumatic, Normocephalic  NECK: Supple, No JVD  NERVOUS SYSTEM:  Alert & Oriented X3, Good concentration  CHEST/LUNG: decreased air entry bilaterally  HEART: Irregular rate and rhythm  ABDOMEN: Soft, Nontender, Nondistended;   EXTREMITIES:  Trace edema  SKIN: No rashes or lesions    	  TELEMETRY:      LABS:                        11.6   12.57 )-----------( 260      ( 16 Jul 2022 06:37 )             36.6     07-16    140  |  96<L>  |  48<H>  ----------------------------<  141<H>  4.2   |  33<H>  |  1.0    Ca    9.0      16 Jul 2022 06:37  Mg     2.2     07-16              I&O's Summary    15 Jul 2022 07:01  -  16 Jul 2022 07:00  --------------------------------------------------------  IN: 0 mL / OUT: 1400 mL / NET: -1400 mL      BNP  RADIOLOGY & ADDITIONAL STUDIES:    IMPRESSION AND PLAN:    ILD / Hypoxic respiratory failure   HFrEF/ Euvolemic   Afib   - Management as per primary team   - Continue Eliquis and  Cardizem/ May increase to 180   -  Continue Metoprolol and Furosemide   - I/O and please replace Mg and K   - Consider D/C O2 monitor if pt leaning towards palliative care   - Will follow as needed

## 2022-07-16 NOTE — PROGRESS NOTE ADULT - SUBJECTIVE AND OBJECTIVE BOX
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 States he has sob at baseline, but noted his pulse ox to be in 80s consistently the past several days. Endorses baseline LE edema, could not give report of any worsening of it. Pt states he has afib and it has been uncontrolled recently, causing increased difficulty catching his breath. States he had one acute episode one night prior to admission but refused to come in d/t anxiety of staying too long in hospital. Denies chest pain, fever, chills, nausea, vomiting, abd pain, diarrhea, HA, dizziness, weakness.  Pt has multiple admissions for sob d/t various causes such as ILD exacerbations and CHF exacerbations. Pt is aox4.     ED course:   troponin <0.01, bnp 5836 (2800 May '22);  EKG: afib with RVR rate 103, CXR: pulmonary congestion  pt received lasix 40mg IV, 2g magnesium  placed on NRB and then Bipap with improvement in saturation to 95%  pt is admitted for workup/management CHF exacerbation vs ILD flare    7/16: Patient seen and examined in chair - NAD. Patient reports feeling well and having no difficulty breathing. Satting low-mid 80s on 12L NC                                          ----------------------------------------------------------  OBJECTIVE  PAST MEDICAL & SURGICAL HISTORY  Hypertension    GERD (gastroesophageal reflux disease)    Lung interstitial disease    BPH (benign prostatic hyperplasia)    HFrEF (heart failure with reduced ejection fraction)    COPD (chronic obstructive pulmonary disease)    No significant past surgical history                                              -----------------------------------------------------------  ALLERGIES:  No Known Allergies                                            ------------------------------------------------------------    HOME MEDICATIONS  Home Medications:  dilTIAZem 120 mg/24 hours oral capsule, extended release: 1 cap(s) orally once a day (11 Jul 2022 04:16)  Eliquis 5 mg oral tablet: 1 tab(s) orally 2 times a day (11 Jul 2022 04:16)  FERROUS SULFATE 325 MG TABLET: 1 each orally once a day (11 Jul 2022 04:16)  FINASTERIDE 5 MG TABLET: 1 each orally once a day (11 Jul 2022 04:16)  furosemide 40 mg oral tablet: 1 tab(s) orally once a day (11 Jul 2022 04:16)  HYDROmorphone 2 mg oral tablet: 1 tab(s) orally every 6 hours, As needed, dyspnea (11 Jul 2022 04:16)  Lopressor 100 mg oral tablet: 1 tab(s) orally 3 times a day (11 Jul 2022 04:16)  OMEPRAZOLE DR 40MG CAP: 1 cap(s) orally once a day (11 Jul 2022 04:16)  predniSONE 20 mg oral tablet: 1 tab(s) orally once a day for 1 week  then 10mg oral chronically and f/u w/ Pulmonary  (11 Jul 2022 04:16)  sulfamethoxazole-trimethoprim 800 mg-160 mg oral tablet: 1 tab(s) orally once a day (11 Jul 2022 04:16)  TAMSULOSIN HCL 0.4 MG CAPSULE: 1 each orally once a day (at bedtime) (11 Jul 2022 04:16)  VITAMIN B-12 1,000 MCG TABLET: 1 each orally once a day (11 Jul 2022 04:16)                           MEDICATIONS:  STANDING MEDICATIONS  albuterol/ipratropium for Nebulization 3 milliLiter(s) Nebulizer every 6 hours  apixaban 5 milliGRAM(s) Oral two times a day  cefepime   IVPB      cefepime   IVPB 2000 milliGRAM(s) IV Intermittent every 12 hours  cyanocobalamin 1000 MICROGram(s) Oral daily  diltiazem    milliGRAM(s) Oral daily  ferrous    sulfate 325 milliGRAM(s) Oral daily  finasteride 5 milliGRAM(s) Oral daily  furosemide   Injectable 40 milliGRAM(s) IV Push two times a day  methylPREDNISolone sodium succinate Injectable 60 milliGRAM(s) IV Push every 12 hours  metoprolol tartrate 100 milliGRAM(s) Oral three times a day  pantoprazole    Tablet 40 milliGRAM(s) Oral before breakfast  tamsulosin 0.4 milliGRAM(s) Oral at bedtime    PRN MEDICATIONS  acetaminophen     Tablet .. 650 milliGRAM(s) Oral every 6 hours PRN  aluminum hydroxide/magnesium hydroxide/simethicone Suspension 30 milliLiter(s) Oral every 4 hours PRN  melatonin 3 milliGRAM(s) Oral at bedtime PRN  ondansetron Injectable 4 milliGRAM(s) IV Push every 8 hours PRN                                            ------------------------------------------------------------  VITAL SIGNS: Last 24 Hours  T(C): 36.7 (12 Jul 2022 05:29), Max: 36.7 (12 Jul 2022 05:29)  T(F): 98.1 (12 Jul 2022 05:29), Max: 98.1 (12 Jul 2022 05:29)  HR: 96 (12 Jul 2022 05:29) (89 - 108)  BP: 137/91 (12 Jul 2022 05:29) (126/88 - 137/91)  BP(mean): --  RR: 18 (12 Jul 2022 05:29) (18 - 20)  SpO2: 91% (12 Jul 2022 05:29) (91% - 97%)                                             --------------------------------------------------------------  LABS:                        11.7   12.48 )-----------( 182      ( 12 Jul 2022 04:30 )             36.7     07-12    137  |  93<L>  |  31<H>  ----------------------------<  169<H>  3.4<L>   |  33<H>  |  1.3    Ca    8.5      12 Jul 2022 04:30  Phos  2.7     07-12  Mg     1.6     07-12    TPro  5.6<L>  /  Alb  2.9<L>  /  TBili  1.1  /  DBili  x   /  AST  15  /  ALT  11  /  AlkPhos  55  07-12    PT/INR - ( 10 Jul 2022 21:23 )   PT: 24.90 sec;   INR: 2.18 ratio         PTT - ( 10 Jul 2022 21:23 )  PTT:30.7 sec              CARDIAC MARKERS ( 10 Jul 2022 21:23 )  x     / <0.01 ng/mL / x     / x     / x                                                  -------------------------------------------------------------  RADIOLOGY:                                            --------------------------------------------------------------    PHYSICAL EXAM:  GENERAL: NAD  HEENT:  Atraumatic, Normocephalic. EOMI, PERRLA, conjunctiva and sclera clear  PULMONARY: Difficulty breathing, normal breath sounds, moderately dyspneic while talking  CARDIOVASCULAR: Regular rate and rhythm; No murmurs, rubs, or gallops  GASTROINTESTINAL: Soft, Nontender, Nondistended; Bowel sounds present  MUSCULOSKELETAL:  2+ Peripheral Pulses, No clubbing or cyanosis, 2+ pitting edema of legs  NEUROLOGY: no focal neurologic deficits, Alert and oriented x3  SKIN: No rashes or lesions                                           --------------------------------------------------------------    ASSESSMENT & PLAN    89-year-old male with past medical history of ILD on 5L O2, HTN, BPH, GERD, A. fib on Eliquis, CHF (EF 46% 2/27/22), and left lower lobe lung mass presents to the ED sent in from [facility] for evaluation of shortness of breath for 2 days. States his atrial fibrillation has not been in control, causing worsening sob. Noted drastic change from baseline over the past two days, with multiple pulse ox in 80s. Sent in from ED, improved on NRB then placed on bipap with improvement to 95%. Pt feels better, also recived 40mg IV lasix with good urine out/put at least 1L. Pt has pulmonary congestion on cxr, has extensive lower extremity edema. likely in CHF exacebation secondary to afib wit rvr. HR under controll now. c/w IV diuresis, fluid restriction.     #CHF/ #HFmrEF  #afib on eliquis  -bnp elevated 5800 compared to 2900 prior admission  -pulmonary congestion/b/l opacities on cxr  -3+ LE edema/ crackles/ JVD not significantly appreciable  -responded well to IV lasix in ED; increased UO  -states his afib has been acting up; uncontrolled; making it difficulty for him to breathe  -diet dash/tlc, 1.2L fluid restriction  -continue with IV diuresis lasix 40mg BID  -strict I and O  -monitor bmp once daily, keep mag <2, K <4  -O2 support as needed; bipap at night; wean o2 as appropriate  -f/u CT chest and TTE  -c/w home meds: cardizem 120mg daily, lopressor 100 tid  - Mag and K+ repleted 7/15      #ILD/ #LLL mass suspicious for cancer  - pulmonary recs: Solumedrol 60 q12 & Cefepime  - procalc 0.13; continue Cefepime for now  - f/u sputum culture  - Wean O2, titrate FiO2 to 88-92%  - 7/16: PO Prednisone taper started   - Clarify GOC - Very poor prognosis - family wants full code     #GERD  -c/w pantoprazole

## 2022-07-17 NOTE — PROGRESS NOTE ADULT - SUBJECTIVE AND OBJECTIVE BOX
NNEKA JOSEPH  89y  Male      Patient is a 89y old  Male who presents with a chief complaint of acute sob, hypoxemia (16 Jul 2022 11:35)        REVIEW OF SYSTEMS:  CONSTITUTIONAL: No fever, weight loss, or fatigue  RESPIRATORY: No cough, wheezing, chills or hemoptysis; sob+  CARDIOVASCULAR: No chest pain, palpitations, dizziness, or leg swelling  GASTROINTESTINAL: No abdominal or epigastric pain. No nausea, vomiting, or hematemesis; No diarrhea or constipation. No melena or hematochezia.  GENITOURINARY: No dysuria, frequency, hematuria, or incontinence  MUSCULOSKELETAL: No joint pain or swelling; No muscle, back, or extremity pain  PSYCHIATRIC: No depression, anxiety, mood swings, or difficulty sleeping  HEME/LYMPH: No easy bruising, or bleeding gums  ALLERY AND IMMUNOLOGIC: No hives or eczema  FAMILY HISTORY:    T(C): 35.6 (07-17-22 @ 05:55), Max: 35.9 (07-16-22 @ 12:24)  HR: 91 (07-17-22 @ 05:55) (88 - 91)  BP: 157/88 (07-17-22 @ 05:55) (142/78 - 157/88)  RR: 18 (07-17-22 @ 05:55) (18 - 18)  SpO2: 100% (07-17-22 @ 05:55) (76% - 100%)  Wt(kg): --Vital Signs Last 24 Hrs  T(C): 35.6 (17 Jul 2022 05:55), Max: 35.9 (16 Jul 2022 12:24)  T(F): 96 (17 Jul 2022 05:55), Max: 96.7 (16 Jul 2022 12:24)  HR: 91 (17 Jul 2022 05:55) (88 - 91)  BP: 157/88 (17 Jul 2022 05:55) (142/78 - 157/88)  BP(mean): --  RR: 18 (17 Jul 2022 05:55) (18 - 18)  SpO2: 100% (17 Jul 2022 05:55) (76% - 100%)    Parameters below as of 16 Jul 2022 17:11  Patient On (Oxygen Delivery Method): nasal cannula  O2 Flow (L/min): 6    No Known Allergies      PHYSICAL EXAM:  GENERAL: NAD,   HEAD:  Atraumatic, Normocephalic  EYES: EOMI, PERRLA, conjunctiva and sclera clear  ENMT: No tonsillar erythema, exudates, or enlargement;   NECK: Supple, No JVD, Normal thyroid  NERVOUS SYSTEM:  Alert & Oriented   CHEST/LUNG: vbs slight decreased breath sound  HEART: Regular rate and rhythm; No murmurs, rubs, or gallops  ABDOMEN: Soft, Nontender, Nondistended; Bowel sounds present  EXTREMITIES:   No clubbing, cyanosis, or edema  LYMPH: No lymphadenopathy noted  SKIN: No rashes or lesions      LABS:  07-17    138  |  96<L>  |  50<H>  ----------------------------<  117<H>  3.6   |  35<H>  |  1.2    Ca    9.0      17 Jul 2022 07:01  Mg     2.0     07-17                            11.7   19.09 )-----------( 250      ( 17 Jul 2022 07:01 )             36.7         RADIOLOGY & ADDITIONAL TESTS:    MEDICATION:  < from: TTE Echo Complete w/o Contrast w/ Doppler (02.27.22 @ 08:49) >   1. LV Ejection Fractionby Lovelace's Method with a biplane EF of 46 %.   2. Mildly decreased global left ventricular systolic function.   3. The left ventricular diastolic function could not be assessed in this   study.   4. Mild thickening of the anterior and posterior mitral valve leaflets.   5. Mild mitral valve regurgitation.   6. Mild aortic regurgitation.   7. Mild-moderate tricuspid regurgitation.   8. Estimated pulmonary artery systolic pressure is 62.5 mmHg assuming a   right atrial pressure of 15 mmHg, which is consistent with severe   pulmonary hypertension.   9. Left atrial enlargement.  10. Mildly enlarged right atrium.    < end of copied text >  acetaminophen     Tablet .. 650 milliGRAM(s) Oral every 6 hours PRN  albuterol/ipratropium for Nebulization 3 milliLiter(s) Nebulizer every 6 hours  aluminum hydroxide/magnesium hydroxide/simethicone Suspension 30 milliLiter(s) Oral every 4 hours PRN  apixaban 5 milliGRAM(s) Oral two times a day  cyanocobalamin 1000 MICROGram(s) Oral daily  diltiazem    milliGRAM(s) Oral daily  ferrous    sulfate 325 milliGRAM(s) Oral daily  finasteride 5 milliGRAM(s) Oral daily  furosemide   Injectable 40 milliGRAM(s) IV Push two times a day  haloperidol     Tablet 5 milliGRAM(s) Oral once  melatonin 3 milliGRAM(s) Oral at bedtime PRN  melatonin 10 milliGRAM(s) Oral at bedtime  metoprolol tartrate 100 milliGRAM(s) Oral three times a day  ondansetron Injectable 4 milliGRAM(s) IV Push every 8 hours PRN  pantoprazole    Tablet 40 milliGRAM(s) Oral before breakfast  predniSONE   Tablet 20 milliGRAM(s) Oral two times a day  predniSONE   Tablet   Oral   tamsulosin 0.4 milliGRAM(s) Oral at bedtime  zolpidem 5 milliGRAM(s) Oral at bedtime PRN      HEALTH ISSUES - PROBLEM Dx:  Dyspnea due to acute CHF,on oxygen  elevated wbc it could be from prednisone,prior solumedrol,will check ua,urine culture  ILD (interstitial lung disease)duoneb nebulizer,prednisone tapering  chronic A fib on eliquis,cardiazem  Acute CHF on chronic both systolic and diastolic CHF on lasix   urinary retention,/BPh on [place]   ckd#3 stable    Palliative care by specialist because of his age,chronic CHF

## 2022-07-18 NOTE — PROGRESS NOTE ADULT - SUBJECTIVE AND OBJECTIVE BOX
24H events:    Patient is a 89y old Male who presents with a chief complaint of acute sob, hypoxemia (18 Jul 2022 09:13)    Primary diagnosis of Shortness of breath       Today is hospital day 7d.  he reported improvement in his dyspnea.     PAST MEDICAL & SURGICAL HISTORY  Hypertension    GERD (gastroesophageal reflux disease)    Lung interstitial disease    BPH (benign prostatic hyperplasia)    HFrEF (heart failure with reduced ejection fraction)    COPD (chronic obstructive pulmonary disease)    No significant past surgical history      SOCIAL HISTORY:  Negative for smoking/alcohol/drug use.     ALLERGIES:  No Known Allergies    MEDICATIONS:  STANDING MEDICATIONS  albuterol/ipratropium for Nebulization 3 milliLiter(s) Nebulizer every 6 hours  apixaban 5 milliGRAM(s) Oral two times a day  cyanocobalamin 1000 MICROGram(s) Oral daily  diltiazem    milliGRAM(s) Oral daily  ferrous    sulfate 325 milliGRAM(s) Oral daily  finasteride 5 milliGRAM(s) Oral daily  furosemide   Injectable 40 milliGRAM(s) IV Push two times a day  haloperidol     Tablet 5 milliGRAM(s) Oral once  melatonin 10 milliGRAM(s) Oral at bedtime  metoprolol tartrate 100 milliGRAM(s) Oral three times a day  pantoprazole    Tablet 40 milliGRAM(s) Oral before breakfast  predniSONE   Tablet   Oral   predniSONE   Tablet 10 milliGRAM(s) Oral three times a day  tamsulosin 0.4 milliGRAM(s) Oral at bedtime    PRN MEDICATIONS  acetaminophen     Tablet .. 650 milliGRAM(s) Oral every 6 hours PRN  aluminum hydroxide/magnesium hydroxide/simethicone Suspension 30 milliLiter(s) Oral every 4 hours PRN  melatonin 3 milliGRAM(s) Oral at bedtime PRN  ondansetron Injectable 4 milliGRAM(s) IV Push every 8 hours PRN  zolpidem 5 milliGRAM(s) Oral at bedtime PRN    VITALS:   T(F): 96.4  HR: 104  BP: 117/77  RR: 18  SpO2: 96%    LABS:                        11.9   18.56 )-----------( 252      ( 18 Jul 2022 05:22 )             37.9     07-18    137  |  92<L>  |  43<H>  ----------------------------<  125<H>  3.7   |  36<H>  |  1.1    Ca    8.7      18 Jul 2022 05:22  Mg     2.0     07-18    TPro  6.3  /  Alb  3.4<L>  /  TBili  0.7  /  DBili  0.2  /  AST  25  /  ALT  32  /  AlkPhos  74  07-17        RADIOLOGY:  < from: Xray Chest 1 View- PORTABLE-Urgent (Xray Chest 1 View- PORTABLE-Urgent .) (07.15.22 @ 16:28) >  Impression:    Stable bilateral diffuse opacities.    < end of copied text >    PHYSICAL EXAM:  GENERAL: NAD  EYES: conjunctiva and sclera clear  ENMT: Moist mucous membranes  NERVOUS SYSTEM:  Alert & Oriented X3, Good concentration  CHEST/LUNG: right upper lung wheezing, decreased air entry on lower lungs  left diffuse lung fine crepitations   HEART: Regular rate and rhythm; No murmurs, rubs, or gallops  ABDOMEN: Soft, Nontender, Nondistended  EXTREMITIES:  no edema          24H events:    Patient is a 89y old Male who presents with a chief complaint of acute sob, hypoxemia (18 Jul 2022 09:13)    Primary diagnosis of Shortness of breath       Today is hospital day 7d.  he reported improvement in his dyspnea.     PAST MEDICAL & SURGICAL HISTORY  Hypertension    GERD (gastroesophageal reflux disease)    Lung interstitial disease    BPH (benign prostatic hyperplasia)    HFrEF (heart failure with reduced ejection fraction)    COPD (chronic obstructive pulmonary disease)    No significant past surgical history      SOCIAL HISTORY:  Negative for smoking/alcohol/drug use.     ALLERGIES:  No Known Allergies    MEDICATIONS:  STANDING MEDICATIONS  albuterol/ipratropium for Nebulization 3 milliLiter(s) Nebulizer every 6 hours  apixaban 5 milliGRAM(s) Oral two times a day  cyanocobalamin 1000 MICROGram(s) Oral daily  diltiazem    milliGRAM(s) Oral daily  ferrous    sulfate 325 milliGRAM(s) Oral daily  finasteride 5 milliGRAM(s) Oral daily  furosemide   Injectable 40 milliGRAM(s) IV Push two times a day  haloperidol     Tablet 5 milliGRAM(s) Oral once  melatonin 10 milliGRAM(s) Oral at bedtime  metoprolol tartrate 100 milliGRAM(s) Oral three times a day  pantoprazole    Tablet 40 milliGRAM(s) Oral before breakfast  predniSONE   Tablet   Oral   predniSONE   Tablet 10 milliGRAM(s) Oral three times a day  tamsulosin 0.4 milliGRAM(s) Oral at bedtime    PRN MEDICATIONS  acetaminophen     Tablet .. 650 milliGRAM(s) Oral every 6 hours PRN  aluminum hydroxide/magnesium hydroxide/simethicone Suspension 30 milliLiter(s) Oral every 4 hours PRN  melatonin 3 milliGRAM(s) Oral at bedtime PRN  ondansetron Injectable 4 milliGRAM(s) IV Push every 8 hours PRN  zolpidem 5 milliGRAM(s) Oral at bedtime PRN    VITALS:   T(F): 96.4  HR: 104  BP: 117/77  RR: 18  SpO2: 96%    LABS:                        11.9   18.56 )-----------( 252      ( 18 Jul 2022 05:22 )             37.9     07-18    137  |  92<L>  |  43<H>  ----------------------------<  125<H>  3.7   |  36<H>  |  1.1    Ca    8.7      18 Jul 2022 05:22  Mg     2.0     07-18    TPro  6.3  /  Alb  3.4<L>  /  TBili  0.7  /  DBili  0.2  /  AST  25  /  ALT  32  /  AlkPhos  74  07-17        RADIOLOGY:  < from: Xray Chest 1 View- PORTABLE-Urgent (Xray Chest 1 View- PORTABLE-Urgent .) (07.15.22 @ 16:28) >  Impression:    Stable bilateral diffuse opacities.    < end of copied text >    PHYSICAL EXAM:  GENERAL: NAD  EYES: conjunctiva and sclera clear  ENMT: Moist mucous membranes  NERVOUS SYSTEM:  Alert & Oriented X3, Good concentration  CHEST/LUNG: right upper lung wheezing, decreased air entry on lower lungs  left diffuse lung fine crepitations   HEART: irregularly irregular heart sound, normal S1 S2   ABDOMEN: Soft, Nontender, Nondistended  EXTREMITIES:  no edema

## 2022-07-18 NOTE — PROGRESS NOTE ADULT - SUBJECTIVE AND OBJECTIVE BOX
Chart reviewed, patient examined. Pertinent results reviewed.  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 States he has sob at baseline, but noted his pulse ox to be in 80s consistently the past several days. Endorses baseline LE edema, could not give report of any worsening of it. Pt states he has afib and it has been uncontrolled recently, causing increased difficulty catching his breath. States he had one acute episode one night prior to admission but refused to come in d/t anxiety of staying too long in hospital. Denies chest pain, fever, chills, nausea, vomiting, abd pain, diarrhea, HA, dizziness, weakness.  Pt has multiple admissions for sob d/t various causes such as ILD exacerbations and CHF exacerbations. Pt is aox4-    ED course:   troponin <0.01, bnp 5836 (2800 May '22);  EKG: afib with RVR rate 103, CXR: pulmonary congestion  pt received lasix 40mg IV, 2g magnesium  placed on NRB and then Bipap with improvement in saturation to 95%  pt is admitted for workup/management CHF exacerbation vs ILD flare    7/12: Patient seen and examined at bedside. No overnight events. Patient says SOB has improved.  ROS: his poor hearing interferes with communication;  He c/o general weakness. Leukocytosis is new; Pt is afebrile.                                            ----------------------------------------------------------  OBJECTIVE  PAST MEDICAL & SURGICAL HISTORY  Hypertension    GERD (gastroesophageal reflux disease)    Lung interstitial disease    BPH (benign prostatic hyperplasia)    HFrEF (heart failure with reduced ejection fraction)    COPD (chronic obstructive pulmonary disease)    No significant past surgical history                                              -----------------------------------------------------------  ALLERGIES:  No Known Allergies                                            ------------------------------------------------------------    HOME MEDICATIONS  Home Medications:  dilTIAZem 120 mg/24 hours oral capsule, extended release: 1 cap(s) orally once a day (11 Jul 2022 04:16)  Eliquis 5 mg oral tablet: 1 tab(s) orally 2 times a day (11 Jul 2022 04:16)  FERROUS SULFATE 325 MG TABLET: 1 each orally once a day (11 Jul 2022 04:16)  FINASTERIDE 5 MG TABLET: 1 each orally once a day (11 Jul 2022 04:16)  furosemide 40 mg oral tablet: 1 tab(s) orally once a day (11 Jul 2022 04:16)  HYDROmorphone 2 mg oral tablet: 1 tab(s) orally every 6 hours, As needed, dyspnea (11 Jul 2022 04:16)  Lopressor 100 mg oral tablet: 1 tab(s) orally 3 times a day (11 Jul 2022 04:16)  OMEPRAZOLE DR 40MG CAP: 1 cap(s) orally once a day (11 Jul 2022 04:16)  predniSONE 20 mg oral tablet: 1 tab(s) orally once a day for 1 week  then 10mg oral chronically and f/u w/ Pulmonary  (11 Jul 2022 04:16)  sulfamethoxazole-trimethoprim 800 mg-160 mg oral tablet: 1 tab(s) orally once a day (11 Jul 2022 04:16)  TAMSULOSIN HCL 0.4 MG CAPSULE: 1 each orally once a day (at bedtime) (11 Jul 2022 04:16)  VITAMIN B-12 1,000 MCG TABLET: 1 each orally once a day (11 Jul 2022 04:16)                           MEDICATIONS:  MEDICATIONS  (STANDING):  albuterol/ipratropium for Nebulization 3 milliLiter(s) Nebulizer every 6 hours  apixaban 5 milliGRAM(s) Oral two times a day  cyanocobalamin 1000 MICROGram(s) Oral daily  diltiazem    milliGRAM(s) Oral daily  ferrous    sulfate 325 milliGRAM(s) Oral daily  finasteride 5 milliGRAM(s) Oral daily  furosemide   Injectable 40 milliGRAM(s) IV Push two times a day  haloperidol     Tablet 5 milliGRAM(s) Oral once  melatonin 10 milliGRAM(s) Oral at bedtime  metoprolol tartrate 100 milliGRAM(s) Oral three times a day  pantoprazole    Tablet 40 milliGRAM(s) Oral before breakfast  predniSONE   Tablet   Oral   predniSONE   Tablet 10 milliGRAM(s) Oral three times a day  tamsulosin 0.4 milliGRAM(s) Oral at bedtime    MEDICATIONS  (PRN):  acetaminophen     Tablet .. 650 milliGRAM(s) Oral every 6 hours PRN Temp greater or equal to 38C (100.4F), Mild Pain (1 - 3)  aluminum hydroxide/magnesium hydroxide/simethicone Suspension 30 milliLiter(s) Oral every 4 hours PRN Dyspepsia  melatonin 3 milliGRAM(s) Oral at bedtime PRN Insomnia  ondansetron Injectable 4 milliGRAM(s) IV Push every 8 hours PRN Nausea and/or Vomiting  zolpidem 5 milliGRAM(s) Oral at bedtime PRN Insomnia                                            ------------------------------------------------------------  VITAL SIGNS: Last 24 Hours  Vital Signs Last 24 Hrs  T(C): 36.2 (18 Jul 2022 05:13), Max: 36.3 (17 Jul 2022 19:35)  T(F): 97.2 (18 Jul 2022 05:13), Max: 97.4 (17 Jul 2022 19:35)  HR: 108 (18 Jul 2022 05:13) (95 - 108)  BP: 128/81 (18 Jul 2022 05:13) (125/81 - 154/78)  BP(mean): --  RR: 18 (18 Jul 2022 05:13) (18 - 20)  SpO2: 96% (18 Jul 2022 05:13) (96% - 98%)    Parameters below as of 18 Jul 2022 05:13  Patient On (Oxygen Delivery Method): nasal cannula  O2 Flow (L/min): 6                                                 --------------------------------------------------------------  LABS:                           11.9   18.56 )-----------( 252      ( 18 Jul 2022 05:22 )             37.9                         11.1   11.96 )-----------( 210      ( 13 Jul 2022 08:49 )             34.6                      11.7   12.48 )-----------( 182      ( 12 Jul 2022 04:30 )             36.7     07-17    138  |  96<L>  |  50<H>  ----------------------------<  117<H>  3.6   |  35<H>  |  1.2    Ca    9.0      17 Jul 2022 07:01  Mg     2.0     07-17    TPro  6.3  /  Alb  3.4<L>  /  TBili  0.7  /  DBili  0.2  /  AST  25  /  ALT  32  /  AlkPhos  74  07-17      07-13    139  |  95<L>  |  32<H>  ----------------------------<  145<H>  3.6   |  30  |  1.1    Ca    8.6      13 Jul 2022 08:49  Phos  2.7     07-12  Mg     1.7     07-13    TPro  5.6<L>  /  Alb  2.9<L>  /  TBili  1.1  /  DBili  x   /  AST  15  /  ALT  11  /  AlkPhos  55  07-12    07-12    137  |  93<L>  |  31<H>  ----------------------------<  169<H>  3.4<L>   |  33<H>  |  1.3    Ca    8.5      12 Jul 2022 04:30  Phos  2.7     07-12  Mg     1.6     07-12    TPro  5.6<L>  /  Alb  2.9<L>  /  TBili  1.1  /  DBili  x   /  AST  15  /  ALT  11  /  AlkPhos  55  07-12    PT/INR - ( 10 Jul 2022 21:23 )   PT: 24.90 sec;   INR: 2.18 ratio         PTT - ( 10 Jul 2022 21:23 )  PTT:30.7 sec              CARDIAC MARKERS ( 10 Jul 2022 21:23 )  x     / <0.01 ng/mL / x     / x     / x                                                  -------------------------------------------------------------  RADIOLOGY:                                            --------------------------------------------------------------    PHYSICAL EXAM:  GENERAL: NAD; in bed; weak; poor hearing; irritable; )x4-;   HEENT:  Atraumatic, Normocephalic. EOMI, PERRLA, MMM  PULMONARY: Difficulty breathing, + diffuse breath sound w B/L rhonchi;, moderately dyspneic while talking;   CARDIOVASCULAR: IRR IRR; No murmurs, rubs, or gallops  GASTROINTESTINAL: obese; Soft, Nontender, Nondistended; Bowel sounds present;   : macedo in place- no edema  MUSCULOSKELETAL:  2+ Peripheral Pulses, No clubbing or cyanosis, 1+ pitting edema of legs  NEUROLOGY: no focal neurologic deficits, Alert and oriented x3; POS gen weakness  SKIN: No rashes or lesions                                           --------------------------------------------------------------    ASSESSMENT & PLAN    89-year-old male with past medical history of ILD on 5L O2, HTN, BPH, GERD, AF on Eliquis, CHF (EF 46% 2/27/22), and left lower lobe lung mass presents to the ED sent in from [facility] for evaluation of shortness of breath for 2 days. States his atrial fibrillation has not been in control, causing worsening sob. Noted drastic change from baseline over the past two days, with multiple pulse ox in 80s. Sent in from ED, improved on NRB then placed on bipap with improvement to 95%. Pt feels better, also recived 40mg IV lasix with good urine out/put at least 1L. Pt has pulmonary congestion on cxr, has extensive lower extremity edema. likely in CHF exacerbation secondary to afib wit rvr. HR under control better now.    Also w ILD- on IV steroids; and possible PNA- MDR/GNR from SNF- on Cefepime    #CHF/ #HFmrEF  #afib on eliquis  -bnp elevated 5800 compared to 2900 prior admission  -pulmonary congestion/b/l opacities on cxr  -3+ LE edema/ crackles- improved  -responded well to IV lasix in ED; increased UO  -states his afib has been acting up; uncontrolled; making it difficulty for him to breathe  -diet dash/tlc, 1.2L fluid restriction  -continue with IV diuresis lasix 40mg BID  -strict I and O  -monitor bmp once daily, keep mag >2.2, K>4- needs further supplementation--put on PO Mag Oxide 400 2x/D; & IV PRN  -O2 support as needed; bipap at night; wean O2 as appropriate  -f/u CT chest and TTE  -c/w home meds: cardizem 120mg daily, lopressor 100 tid  -  BMP in AM noted Mg-2; replete      #ILD/ #LLL mass suspicious for cancer  - pulmonary recs: Solumedrol 60 q12- now PO Prednisone;  Cefepime- stopped 7/14 as per ID  - f/u procalc-POS;   - get nasal MRSA   - f/u sputum culture; asdk for sputum to be sent for cytologyx3  - Wean O2, titrate FiO2 to 88-92%, CPAP at night- maintaining now  - Clarify GOC - Very poor prognosis;  Patient does express his desire not to be intubated; will review w his son, Elkin;    #GERD  -c/w pantoprazole    # Macrocytic anemia  - f/u iron levels    # Hearing Loss  - amplification device has aided in communicating w patient    See Pulmonary: prognosis is very poor

## 2022-07-19 NOTE — DISCHARGE NOTE PROVIDER - CARE PROVIDER_API CALL
Jason Aguero)  Cardiovascular Disease; Internal Medicine; Interventional Cardiology  [address]  [place] [address]  Phone: [phone]  Fax: [phone]  Follow Up Time: 2 weeks    Mathieu Womack)  Critical Care Medicine; Pulmonary Disease; Sleep Medicine  [address]  [place] [address]  Phone: [phone]  Fax: [phone]  Follow Up Time: 2 weeks    [place] [address]  Phone: [phone]  Fax: [phone]  Follow Up Time: 2 weeks   Jason Aguero)  Cardiovascular Disease; Internal Medicine; Interventional Cardiology  [address]  [place] [address]  Phone: [phone]  Fax: [phone]  Follow Up Time: 2 weeks    Mathieu Womack)  Critical Care Medicine; Pulmonary Disease; Sleep Medicine  [address]  [place] [address]  Phone: [phone]  Fax: [phone]  Follow Up Time: 2 weeks    SaludRebekah [address] [address]  Phone: [phone]  Fax: [phone]  Follow Up Time: 2 weeks    Everardo Dasilva)  Urology  [address]  [place] [address]  Phone: [phone]  Fax: [phone]  Follow Up Time: 2 weeks

## 2022-07-19 NOTE — PROGRESS NOTE ADULT - PROBLEM SELECTOR PLAN 2
end stage  patient and family want full code for now per last discussions  will attempt to address GOC as the patient is hospice appropriate- declined in the past   recs per pulm

## 2022-07-19 NOTE — PROGRESS NOTE ADULT - SUBJECTIVE AND OBJECTIVE BOX
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 States he has sob at baseline, but noted his pulse ox to be in 80s consistently the past several days. Endorses baseline LE edema, could not give report of any worsening of it. Pt states he has afib and it has been uncontrolled recently, causing increased difficulty catching his breath. States he had one acute episode one night prior to admission but refused to come in d/t anxiety of staying too long in hospital. Denies chest pain, fever, chills, nausea, vomiting, abd pain, diarrhea, HA, dizziness, weakness.  Pt has multiple admissions for sob d/t various causes such as ILD exacerbations and CHF exacerbations. Pt is aox4.     in the ED,pt's VS T(C): 36.6 (07-10-22 @ 20:04), Max: 36.6 (07-10-22 @ 20:04)  HR: 86 (22 @ 02:03) (86 - 110)  BP: 157/91 (07-10-22 @ 23:06) (124/81 - 157/91)  RR: 22 (07-10-22 @ 23:06) (22 - 22)  SpO2: 91% (07-10-22 @ 23:06) (91% - 97%), labs done showed wbc 11.24 (similar to May '22), Hb 11, , plts 192, INR 2.18, sodium 137, potassium 3.4, chloride 97, Cr 1.3, BUN 29, Ca 8.7, albumin 3.4, gfr 53, Magnesium 1.6, troponin <0.01, bnp 5836 (2800 May '22); VBG lactate 2.3, VBG pco2 53, covid neg, influenza neg, rsp Syncitial virus neg  EKG: afib with RVR rate 103, CXR: bl opacities (pending official read)  pt received lasix 40mg IV, 2g magnesium  pt is admitted for workup/management CHF exacerbation vs ILD flare (2022 03:43)    MEDICATIONS  (STANDING):  albuterol/ipratropium for Nebulization 3 milliLiter(s) Nebulizer every 6 hours  apixaban 5 milliGRAM(s) Oral two times a day  cyanocobalamin 1000 MICROGram(s) Oral daily  diltiazem    milliGRAM(s) Oral daily  ferrous    sulfate 325 milliGRAM(s) Oral daily  furosemide   Injectable 40 milliGRAM(s) IV Push two times a day  melatonin 10 milliGRAM(s) Oral at bedtime  metoprolol tartrate 100 milliGRAM(s) Oral three times a day  pantoprazole    Tablet 40 milliGRAM(s) Oral before breakfast  polyethylene glycol 3350 17 Gram(s) Oral two times a day  predniSONE   Tablet   Oral   predniSONE   Tablet 10 milliGRAM(s) Oral three times a day  senna 2 Tablet(s) Oral at bedtime    MEDICATIONS  (PRN):  acetaminophen     Tablet .. 650 milliGRAM(s) Oral every 6 hours PRN Temp greater or equal to 38C (100.4F), Mild Pain (1 - 3)  aluminum hydroxide/magnesium hydroxide/simethicone Suspension 30 milliLiter(s) Oral every 4 hours PRN Dyspepsia  ondansetron Injectable 4 milliGRAM(s) IV Push every 8 hours PRN Nausea and/or Vomiting    INTERVAL EVENTS: Patient seen today without distress, remains dyspneic on exertion, recovers with time. Patient not using AVAPS. Patient stable on 5 lpm for past 24 hours    T(C): 36.2 (22 @ 20:15), Max: 36.2 (22 @ 20:15)  HR: 87 (22 @ 20:15) (87 - 98)  BP: 124/81 (22 @ 20:15) (111/70 - 127/87)  RR: 18 (22 @ 20:15) (18 - 18)  SpO2: --  Wt(kg): --Vital Signs Last 24 Hrs  T(C): 36.2 (2022 20:15), Max: 36.2 (2022 20:15)  T(F): 97.1 (2022 20:15), Max: 97.1 (2022 20:15)  HR: 87 (2022 20:15) (87 - 98)  BP: 124/81 (2022 20:15) (111/70 - 127/87)  BP(mean): --  RR: 18 (2022 20:15) (18 - 18)  SpO2: --        PHYSICAL EXAM:  GENERAL: NAD, on O2 at 5 lpm  NECK: Supple, No JVD  CHEST/LUNG: + crackles, faint wheeze  HEART: S1, S2, Irregular   ABDOMEN: Soft, Nontender, Nondistended; Bowel sounds present  EXTREMITIES: ++ edema    LABS:                        12.9   22.94 )-----------( 292      ( 2022 09:01 )             40.3             07-    134<L>  |  88<L>  |  43<H>  ----------------------------<  113<H>  3.7   |  40<H>  |  1.0    Ca    9.1      2022 09:01  Mg     2.0     -    TPro  6.5  /  Alb  3.5  /  TBili  1.0  /  DBili  x   /  AST  16  /  ALT  25  /  AlkPhos  75  07-19    LIVER FUNCTIONS - ( 2022 09:01 )  Alb: 3.5 g/dL / Pro: 6.5 g/dL / ALK PHOS: 75 U/L / ALT: 25 U/L / AST: 16 U/L / GGT: x                         Urinalysis Basic - ( 2022 12:00 )    Color: Light Yellow / Appearance: Clear / S.010 / pH: x  Gluc: x / Ketone: Negative  / Bili: Negative / Urobili: <2 mg/dL   Blood: x / Protein: Negative / Nitrite: Negative   Leuk Esterase: Negative / RBC: 12 /HPF / WBC 0 /HPF   Sq Epi: x / Non Sq Epi: 0 /HPF / Bacteria: Few              RADIOLOGY & ADDITIONAL TESTS:  < from: Xray Chest 1 View- PORTABLE-Urgent (Xray Chest 1 View- PORTABLE-Urgent .) (07.15.22 @ 16:28) >    INTERPRETATION:  Clinical History/Reason for Exam:  fu    Technique:  Frontal view the chest.    Comparison: Chest x-ray 2022.    Findings:    Support devices:  none    Cardiac/mediastinum/hilum: Stable cardiomegaly    Lung parenchyma/ Pleura: Stable bilateral diffuse opacities.      Skeleton/soft tissues: Stable scoliosis/degenerative changes (spine and   right shoulder)      Impression:    Stable bilateral diffuse opacities.    --- End of Report ---        < end of copied text >

## 2022-07-19 NOTE — PROGRESS NOTE ADULT - PROBLEM SELECTOR PLAN 4
Full Code  Full medical management  Will attempt to engage patient and family in further GOC discussion  Will follow

## 2022-07-19 NOTE — DISCHARGE NOTE PROVIDER - CARE PROVIDERS DIRECT ADDRESSES
,[email or web address],[email or web address]

## 2022-07-19 NOTE — DISCHARGE NOTE PROVIDER - NSDCCPCAREPLAN_GEN_ALL_CORE_FT
PRINCIPAL DISCHARGE DIAGNOSIS  Diagnosis: Shortness of breath  Assessment and Plan of Treatment: You presented with congestive heart failure. We improved your heart function with IV diuretics. You did recieve antibiotics during your stay. We have switched your medication back to oral diuretics. Please make sure to follow up with your cardiologist Dr. Aguero and your pulmonologist Dr. Womack.   Please also follow your steroid (prednisone) taper schedule as instructed.   Congestive heart failure (CHF) is a chronic condition in which the heart has trouble pumping blood. In some cases of heart failure, fluid may back up into your lungs or you may have swelling (edema) in your lower legs. There are many causes of heart failure including high blood pressure, coronary artery disease, abnormal heart valves, heart muscle disease, lung disease, diabetes, etc. Symptoms include shortness of breath with activity or when lying flat, cough, swelling of the legs, fatigue, or increased urination during the night.   Treatment is aimed at managing the symptoms of heart failure and may include lifestyle changes, medications, or surgical procedures. Take medicines only as directed by your health care provider and do not stop unless instructed to do so. Eat heart-healthy foods with low or no trans/saturated fats, cholesterol and salt. Weigh yourself every day for early recognition of fluid accumulation.  SEEK IMMEDIATE MEDICAL CARE IF YOU HAVE ANY OF THE FOLLOWING SYMPTOMS: shortness of breath, change in mental status, chest pain, lightheadedness/dizziness/fainting, or worsening of symptoms including not being able to conduct normal physical activity.

## 2022-07-19 NOTE — DISCHARGE NOTE PROVIDER - PROVIDER TOKENS
PROVIDER:[TOKEN:[[id number]],FOLLOWUP:[2 weeks]],PROVIDER:[TOKEN:[[id number]],FOLLOWUP:[2 weeks]],PROVIDER:[TOKEN:[[id number]],FOLLOWUP:[2 weeks]] PROVIDER:[TOKEN:[[id number]],FOLLOWUP:[2 weeks]],PROVIDER:[TOKEN:[[id number]],FOLLOWUP:[2 weeks]],PROVIDER:[TOKEN:[[id number]],FOLLOWUP:[2 weeks]],PROVIDER:[TOKEN:[[id number]],FOLLOWUP:[2 weeks]]

## 2022-07-19 NOTE — PHYSICAL THERAPY INITIAL EVALUATION ADULT - ADDITIONAL COMMENTS
As per patient he been in nursing home rehab for 3 months. Was assisted in ADL's and receiving PT treatment. Ambulates short distances using RW.

## 2022-07-19 NOTE — DISCHARGE NOTE PROVIDER - NSDCMRMEDTOKEN_GEN_ALL_CORE_FT
dilTIAZem 120 mg/24 hours oral capsule, extended release: 1 cap(s) orally once a day  Eliquis 5 mg oral tablet: 1 tab(s) orally 2 times a day  FERROUS SULFATE 325 MG TABLET: 1 each orally once a day  FINASTERIDE 5 MG TABLET: 1 each orally once a day  furosemide 40 mg oral tablet: 1 tab(s) orally once a day  HYDROmorphone 2 mg oral tablet: 1 tab(s) orally every 6 hours, As needed, dyspnea  Lopressor 100 mg oral tablet: 1 tab(s) orally 3 times a day  OMEPRAZOLE DR 40MG CAP: 1 cap(s) orally once a day  predniSONE 20 mg oral tablet: 1 tab(s) orally once a day for 1 week  then 10mg oral chronically and f/u w/ Pulmonary   sulfamethoxazole-trimethoprim 800 mg-160 mg oral tablet: 1 tab(s) orally once a day  TAMSULOSIN HCL 0.4 MG CAPSULE: 1 each orally once a day (at bedtime)  VITAMIN B-12 1,000 MCG TABLET: 1 each orally once a day   dilTIAZem 120 mg/24 hours oral capsule, extended release: 1 cap(s) orally once a day  Eliquis 5 mg oral tablet: 1 tab(s) orally 2 times a day  ferrous sulfate 325 mg (65 mg elemental iron) oral delayed release tablet: 1 tab(s) orally once a day   finasteride 5 mg oral tablet: 1 tab(s) orally once a day  furosemide 40 mg oral tablet: 1 tab(s) orally once a day  HYDROmorphone 2 mg oral tablet: 1 tab(s) orally every 6 hours, As needed, dyspnea  Lopressor 100 mg oral tablet: 1 tab(s) orally 3 times a day   OMEPRAZOLE DR 40MG CAP: 1 cap(s) orally once a day. PLEASE TAKE WHILE TAKING STEROIDS  predniSONE 20 mg oral tablet: 1 tab(s) orally once a day FOR 2 WEEKS. PLEASE FOLLOW UP WITH DR. LOPEZ  tamsulosin 0.4 mg oral capsule: 1 cap(s) orally once a day (at bedtime)  VITAMIN B-12 1,000 MCG TABLET: 1 each orally once a day

## 2022-07-19 NOTE — DISCHARGE NOTE PROVIDER - NSDCFUSCHEDAPPT_GEN_ALL_CORE_FT
[facility]  [facility] [address]  Scheduled Appointment: 08/11/2022

## 2022-07-19 NOTE — PHYSICAL THERAPY INITIAL EVALUATION ADULT - PERTINENT HX OF CURRENT PROBLEM, REHAB EVAL
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 Presents to hospital for acute sob for 2 days duration. States he has sob at baseline, but noted his pulse ox to be in 80s consistently the past several days.

## 2022-07-19 NOTE — PROGRESS NOTE ADULT - SUBJECTIVE AND OBJECTIVE BOX
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 States he has sob at baseline, but noted his pulse ox to be in 80s consistently the past several days. Endorses baseline LE edema, could not give report of any worsening of it. Pt states he has afib and it has been uncontrolled recently, causing increased difficulty catching his breath. States he had one acute episode one night prior to admission but refused to come in d/t anxiety of staying too long in hospital. Denies chest pain, fever, chills, nausea, vomiting, abd pain, diarrhea, HA, dizziness, weakness.  Pt has multiple admissions for sob d/t various causes such as ILD exacerbations and CHF exacerbations. Pt is aox4.     in the ED,pt's VS T(C): 36.6 (07-10-22 @ 20:04), Max: 36.6 (07-10-22 @ 20:04)  HR: 86 (22 @ 02:03) (86 - 110)  BP: 157/91 (07-10-22 @ 23:06) (124/81 - 157/91)  RR: 22 (07-10-22 @ 23:06) (22 - 22)  SpO2: 91% (07-10-22 @ 23:06) (91% - 97%), labs done showed wbc 11.24 (similar to May '22), Hb 11, , plts 192, INR 2.18, sodium 137, potassium 3.4, chloride 97, Cr 1.3, BUN 29, Ca 8.7, albumin 3.4, gfr 53, Magnesium 1.6, troponin <0.01, bnp 5836 (2800 May '22); VBG lactate 2.3, VBG pco2 53, covid neg, influenza neg, rsp Syncitial virus neg  EKG: afib with RVR rate 103, CXR: bl opacities (pending official read)  pt received lasix 40mg IV, 2g magnesium  pt is admitted for workup/management CHF exacerbation vs ILD flare (2022 03:43)     INTERVAL EVENTS:  : PT came to evaluate patient. Patient complaining of constipation X 3 days and wanting to go home.     ADVANCE DIRECTIVES:     MOLST  [ ]  Living Will  [ ]   DECISION MAKER(s):  [ ] Health Care Proxy(s)  [X ] Surrogate(s)  [ ] Guardian           Name(s): Phone Number(s): Fabien     BASELINE (I)ADL(s) (prior to admission):  [place]: [ ]Total  [X ] Moderate [ ]Dependent  Palliative Performance Status Version 2:         50%    [email or web address]    Allergies    No Known Allergies    Intolerances    MEDICATIONS  (STANDING):  albuterol/ipratropium for Nebulization 3 milliLiter(s) Nebulizer every 6 hours  apixaban 5 milliGRAM(s) Oral two times a day  cyanocobalamin 1000 MICROGram(s) Oral daily  diltiazem    milliGRAM(s) Oral daily  ferrous    sulfate 325 milliGRAM(s) Oral daily  furosemide   Injectable 40 milliGRAM(s) IV Push two times a day  melatonin 10 milliGRAM(s) Oral at bedtime  metoprolol tartrate 100 milliGRAM(s) Oral three times a day  pantoprazole    Tablet 40 milliGRAM(s) Oral before breakfast  polyethylene glycol 3350 17 Gram(s) Oral two times a day  predniSONE   Tablet   Oral   predniSONE   Tablet 10 milliGRAM(s) Oral three times a day  senna 2 Tablet(s) Oral at bedtime    MEDICATIONS  (PRN):  acetaminophen     Tablet .. 650 milliGRAM(s) Oral every 6 hours PRN Temp greater or equal to 38C (100.4F), Mild Pain (1 - 3)  aluminum hydroxide/magnesium hydroxide/simethicone Suspension 30 milliLiter(s) Oral every 4 hours PRN Dyspepsia  ondansetron Injectable 4 milliGRAM(s) IV Push every 8 hours PRN Nausea and/or Vomiting    PRESENT SYMPTOMS:  Pain: [ ]yes [ X]no  QOL impact -   Location -                    Aggravating factors -  Quality -  Radiation -  Timing-  Severity (0-10 scale):  Minimal acceptable level (0-10 scale):       Dyspnea:                           [ ]Mild [X ]Moderate [ ]Severe  Anxiety:                             [ ]Mild [ X]Moderate [ ]Severe  Fatigue:                             [ ]Mild [ ]Moderate [ ]Severe  Nausea:                             [ ]Mild [ ]Moderate [ ]Severe  Loss of appetite:              [ ]Mild [ ]Moderate [ ]Severe  Constipation:                    [ ]Mild [ X]Moderate [ ]Severe    Other Symptoms:  [X ]All other review of systems negative     Palliative Performance Status Version 2:       50  %    [email or web address]    PHYSICAL EXAM:  Vital Signs Last 24 Hrs  T(C): 36 (2022 15:12), Max: 36 (2022 05:38)  T(F): 96.8 (2022 15:12), Max: 96.8 (2022 05:38)  HR: 98 (2022 05:38) (88 - 98)  BP: 111/70 (2022 15:12) (111/70 - 133/67)  BP(mean): --  RR: 18 (2022 15:12) (18 - 18)    GENERAL:  [ X]Alert  [X ]Oriented x 3   [ ]Lethargic  [ ]Cachexia  [ ]Unarousable  [ ]Verbal  [ ]Non-Verbal  Behavioral:   [ X] Anxiety  [ ] Delirium [ ] Agitation [ ] Other  HEENT:  [ X]Normal   [ ]Dry mouth   [ ]ET Tube/Trach  [ ]Oral lesions  PULMONARY:   [ ]Clear [X ]Tachypnea  [ ]Audible excessive secretions   5 L NC  CARDIOVASCULAR:    [X ]Regular [ ]Irregular [ ]Tachy  [ ]Ruddy [ ]Murmur [ ]Other  GASTROINTESTINAL:  [X ]Soft  [ ]Distended   [ ]+BS  [ ]Non tender [ ]Tender  [ ]PEG [ ]OGT/ NGT  Last BM:   GENITOURINARY:  [ X]Normal [ ] Incontinent   [ ]Oliguria/Anuria   [ ]Joel  MUSCULOSKELETAL:   [ ]Normal   [ X]Weakness  [ ]Bed/Wheelchair bound [ ]Edema  NEUROLOGIC:   [X ]No focal deficits  [ ]Cognitive impairment  [ ]Dysphagia [ ]Dysarthria [ ]Paresis [ ]Other   SKIN:   [ X]Normal    [ ]Rash  [ ]Pressure ulcer(s)       Present on admission [ ]y [ ]n      LABS:                        12.9   22.94 )-----------( 292      ( 2022 09:01 )             40.3   07-19    134<L>  |  88<L>  |  43<H>  ----------------------------<  113<H>  3.7   |  40<H>  |  1.0    Ca    9.1      2022 09:01  Mg     2.0         TPro  6.5  /  Alb  3.5  /  TBili  1.0  /  DBili  x   /  AST  16  /  ALT  25  /  AlkPhos  75        Urinalysis Basic - ( 2022 12:00 )    Color: Light Yellow / Appearance: Clear / S.010 / pH: x  Gluc: x / Ketone: Negative  / Bili: Negative / Urobili: <2 mg/dL   Blood: x / Protein: Negative / Nitrite: Negative   Leuk Esterase: Negative / RBC: 12 /HPF / WBC 0 /HPF   Sq Epi: x / Non Sq Epi: 0 /HPF / Bacteria: Few      RADIOLOGY & ADDITIONAL STUDIES:    < from: Xray Chest 1 View- PORTABLE-Urgent (Xray Chest 1 View- PORTABLE-Urgent .) (07.15.22 @ 16:28) >    Impression:    Stable bilateral diffuse opacities.    --- End of Report ---    < end of copied text >      Goals of Care Document:   - attempted to discuss GOC with patient but he was not interested and could not hear very well  - left message for son, Elkin, who the patient defers to and left message on voicemail

## 2022-07-19 NOTE — DISCHARGE NOTE PROVIDER - HOSPITAL COURSE
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 At the EDm her VS were stable, SaO2 91%. VBG pCO2 53, covid (-), influenza, RSV are (-). Placed on Bipap. EKG showed Atrial fibrillation with RVR. CXR shows b/l opacities. Subsequently, patient was given 40mg IV lasix. Patient was admitted under medicine for management of CHF exacerbation (BNP 5800 which was elevated from 2900). Patient was managed with IV diuretics. 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 At the EDm her VS were stable, SaO2 91%. VBG pCO2 53, covid (-), influenza, RSV are (-). Placed on Bipap. EKG showed Atrial fibrillation with RVR. CXR shows b/l opacities. Subsequently, patient was given 40mg IV lasix. Patient was admitted under medicine for management of CHF exacerbation (BNP 5800 which was elevated from 2900). Patient was managed with IV diuretics.  Patient was suspected for MDR PNA from SNF and given cefepime. CT chest and TTe ordered. Imaging from CT chest showed diffuse interstitial opacification, honeycombing. TTE showed improvement of EF to 60-65%. Patient was started on IV steroids per pulm and switched to PO steroids (taper) after patient's respiratory status is back to his baseline. MRSA (-) and procal 0.13. Patient's volume status improved. Atrial fibrllation controlled with home meds. Patient's diuretics switched to PO on discharge. GOC discussion had with family. Patient remains full code despite poor prognosis from ILD/LLL mass suspicious for cancer, atrial fibrillation, and CHF. Patient will need close follow up with cardiology and pulmonology.

## 2022-07-20 NOTE — CONSULT NOTE ADULT - ASSESSMENT
89 y/o m admitted for SOB with hx of severe ILD on O2 5L, BPH, with LUTS    A) penile pain 2/2 Joel catheter  BPH with LUTS ? retention  ILD    P) restart Flomax and Proscar  D/C Joel for TOV, place condom catheter for I" & O's  renal/bladder sonogram to r/o hydronephrosis and document PVR.  If unable to void consider SP Tube placement vs long standing Joel  I believe pt is not a candidate for CIC.  OP f/u for urodynamics if pt is medically optimal  will d/w attending

## 2022-07-20 NOTE — CONSULT NOTE ADULT - NS ATTEND AMEND GEN_ALL_CORE FT
pt seen and examined 7/20/22 appears pt had a macedo for ease in voiding by nursing home and was not in retention. discussed case w pts son who explained hx and I informed son that for easier time voiding he may have a condom catheter, for IOs etc.   does not need macedo if not in retention. also urinal is an option. either way given he has discomfort from macedo recommend TOV.  obtain pvr  flomax  finasteride  condom cath
[facility] Risk patient admitted for respiratory failure and heart failure requiring IV diuretics and IV steroids  Re-introduced palliative care today  Patient not ready to discuss GOC and did not want us to call his son Today  Will follow up again on Monday 7/18

## 2022-07-20 NOTE — CONSULT NOTE ADULT - TIME BILLING
Counseled patient about diagnostic testing and treatment plan. All questions answered.
I have personally seen and examined this patient.  I have reviewed all pertinent clinical information and reviewed all relevant imaging and diagnostic studies personally.   If possible, I counseled the patient about diagnostic testing and treatment plan.   I discussed my recommendations with the primary team.

## 2022-07-20 NOTE — CONSULT NOTE ADULT - ASSESSMENT
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 ID consulted for leukocytosis    IMPRESSION  #    Leukocytosis, on steroids    Afebrile since admission  < from: Xray Chest 1 View- PORTABLE-Urgent (Xray Chest 1 View- PORTABLE-Urgent .) (07.15.22 @ 16:28) >  Stable bilateral diffuse opacities.  < from: CT Chest No Cont (07.11.22 @ 12:23) >  Diffuse interstitial opacifications with groundglass abnormalities   diffusely as well as honeycombing. Differential diagnosis remains CHF,   more likely worsening interstitial lung disease    UA 0 WBC UCX <100k pseudo, > 100k enterococcus  #  Creatinine, Serum: 0.9 (07-20-22 @ 06:34)    Weight (kg): 73.5 (07-11-22 @ 22:00)    RECOMMENDATIONS  This is an incomplete consult note. All final recommendations to follow after interview and examination of the patient. Please follow recommendations noted below.    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 ID consulted for leukocytosis    IMPRESSION  # Leukocytosis, on steroids, no evidence of active infection    Afebrile since admission  < from: Xray Chest 1 View- PORTABLE-Urgent (Xray Chest 1 View- PORTABLE-Urgent .) (07.15.22 @ 16:28) >  Stable bilateral diffuse opacities.  < from: CT Chest No Cont (07.11.22 @ 12:23) >  Diffuse interstitial opacifications with groundglass abnormalities   diffusely as well as honeycombing. Differential diagnosis remains CHF,   more likely worsening interstitial lung disease  #Asymptomatic bacteriuria- has some penile irritation from macedo- symptoms not consistent with UTI    UA 0 WBC UCX <100k pseudo, > 100k enterococcus  Creatinine, Serum: 0.9 (07-20-22 @ 06:34)    Weight (kg): 73.5 (07-11-22 @ 22:00)    RECOMMENDATIONS  - monitor off antibiotics  - trend WBC  - appreciate Urology consult- agree may need SPC  - if fever or hemodynamic compromise, start zosyn    If any questions, please call or send a message on [employer] Teams  Please continue to update ID with any pertinent new laboratory or radiographic findings  Spectra [phone]

## 2022-07-20 NOTE — PROGRESS NOTE ADULT - SUBJECTIVE AND OBJECTIVE BOX
SUBJ: Patient seen and examined. Still complains of SOB at rest     MEDICATIONS  (STANDING):  albuterol/ipratropium for Nebulization 3 milliLiter(s) Nebulizer every 6 hours  apixaban 5 milliGRAM(s) Oral two times a day  buMETAnide 1 milliGRAM(s) Oral two times a day  cyanocobalamin 1000 MICROGram(s) Oral daily  diltiazem    milliGRAM(s) Oral daily  ferrous    sulfate 325 milliGRAM(s) Oral daily  finasteride 5 milliGRAM(s) Oral daily  melatonin 10 milliGRAM(s) Oral at bedtime  metoprolol tartrate 100 milliGRAM(s) Oral three times a day  pantoprazole    Tablet 40 milliGRAM(s) Oral before breakfast  polyethylene glycol 3350 17 Gram(s) Oral two times a day  predniSONE   Tablet   Oral   predniSONE   Tablet 10 milliGRAM(s) Oral two times a day  senna 2 Tablet(s) Oral at bedtime  tamsulosin 0.4 milliGRAM(s) Oral at bedtime    MEDICATIONS  (PRN):  acetaminophen     Tablet .. 650 milliGRAM(s) Oral every 6 hours PRN Temp greater or equal to 38C (100.4F), Mild Pain (1 - 3)  aluminum hydroxide/magnesium hydroxide/simethicone Suspension 30 milliLiter(s) Oral every 4 hours PRN Dyspepsia  ondansetron Injectable 4 milliGRAM(s) IV Push every 8 hours PRN Nausea and/or Vomiting            Vital Signs Last 24 Hrs  T(C): 35.8 (20 Jul 2022 19:57), Max: 35.9 (20 Jul 2022 13:21)  T(F): 96.5 (20 Jul 2022 19:57), Max: 96.7 (20 Jul 2022 13:21)  HR: 94 (20 Jul 2022 19:57) (90 - 94)  BP: 120/72 (20 Jul 2022 19:57) (111/70 - 132/90)  BP(mean): --  RR: 18 (20 Jul 2022 19:57) (18 - 18)  SpO2: 98% (20 Jul 2022 19:57) (98% - 98%)         REVIEW OF SYSTEMS:  CONSTITUTIONAL: No fever  NECK: No pain or stiffness  CARDIOVASCULAR: patient denies chest pain, shortness of breath or palpitations .  Repiratory: No cough or wheezing.  NEUROLOGICAL: No focal deficits to report.  GI: no BRBPR, no N,V,diarrhea.    PSYCHIATRY: normal mood and affect  HEENT: no nasal discharge, no ecchymosis  SKIN: no ecchymosis, no breakdown  MUSCULOSKELETAL: Full range of motion x4.      PHYSICAL EXAM:  GENERAL: NAD  HEAD:  Atraumatic, Normocephalic  NECK: Supple, No JVD  NERVOUS SYSTEM:  Alert & Oriented X3, Good concentration  CHEST/LUNG: bilateral rales   HEART: Regular rate and rhythm; No murmurs  ABDOMEN: Soft, Nontender, Nondistended;   EXTREMITIES:  trace  edema      	  TELEMETRY:      LABS:                        12.5   29.99 )-----------( 265      ( 20 Jul 2022 06:34 )             39.2     07-20    136  |  93<L>  |  42<H>  ----------------------------<  130<H>  3.7   |  35<H>  |  0.9    Ca    8.9      20 Jul 2022 06:34  Mg     2.0     07-20    TPro  6.2  /  Alb  3.1<L>  /  TBili  1.1  /  DBili  x   /  AST  11  /  ALT  21  /  AlkPhos  74  07-20            I&O's Summary    19 Jul 2022 07:01  -  20 Jul 2022 07:00  --------------------------------------------------------  IN: 0 mL / OUT: 800 mL / NET: -800 mL    20 Jul 2022 07:01  -  20 Jul 2022 22:51  --------------------------------------------------------  IN: 0 mL / OUT: 525 mL / NET: -525 mL      BNP  RADIOLOGY & ADDITIONAL STUDIES:    IMPRESSION AND PLAN:  ILD / Hypoxic respiratory failure   HFrEF/ Euvolemic   Afib   - Management as per primary team   - Continue Eliquis and  Cardizem/ May increase to 180   -  Continue Metoprolol and consider switching to oral Bumetanide   - Will follow as outpt

## 2022-07-20 NOTE — PROGRESS NOTE ADULT - SUBJECTIVE AND OBJECTIVE BOX
Chart reviewed, patient examined. Pertinent results reviewed.  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 States he has sob at baseline, but noted his pulse ox to be in 80s consistently the past several days. Endorses baseline LE edema, could not give report of any worsening of it. Pt states he has afib and it has been uncontrolled recently, causing increased difficulty catching his breath. States he had one acute episode one night prior to admission but refused to come in d/t anxiety of staying too long in hospital. Denies chest pain, fever, chills, nausea, vomiting, abd pain, diarrhea, HA, dizziness, weakness.  Pt has multiple admissions for sob d/t various causes such as ILD exacerbations and CHF exacerbations. Pt is aox4.     in the ED,pt's VS T(C): 36.6 (07-10-22 @ 20:04), Max: 36.6 (07-10-22 @ 20:04)  HR: 86 (22 @ 02:03) (86 - 110)  BP: 157/91 (07-10-22 @ 23:06) (124/81 - 157/91)  RR: 22 (07-10-22 @ 23:06) (22 - 22)  SpO2: 91% (07-10-22 @ 23:06) (91% - 97%), labs done showed wbc 11.24 (similar to May '22), Hb 11, , plts 192, INR 2.18, sodium 137, potassium 3.4, chloride 97, Cr 1.3, BUN 29, Ca 8.7, albumin 3.4, gfr 53, Magnesium 1.6, troponin <0.01, bnp 5836 (2800 May '22); VBG lactate 2.3, VBG pco2 53, covid neg, influenza neg, rsp Syncitial virus neg  EKG: afib with RVR rate 103, CXR: bl opacities (pending official read)  pt received lasix 40mg IV, 2g magnesium  pt is admitted for workup/management CHF exacerbation vs ILD flare (2022 03:43)      MEDICATIONS  (STANDING):  albuterol/ipratropium for Nebulization 3 milliLiter(s) Nebulizer every 6 hours  apixaban 5 milliGRAM(s) Oral two times a day  cyanocobalamin 1000 MICROGram(s) Oral daily  diltiazem    milliGRAM(s) Oral daily  ferrous    sulfate 325 milliGRAM(s) Oral daily  melatonin 10 milliGRAM(s) Oral at bedtime  metoprolol tartrate 100 milliGRAM(s) Oral three times a day  pantoprazole    Tablet 40 milliGRAM(s) Oral before breakfast  polyethylene glycol 3350 17 Gram(s) Oral two times a day  predniSONE   Tablet   Oral   predniSONE   Tablet 10 milliGRAM(s) Oral two times a day  senna 2 Tablet(s) Oral at bedtime    MEDICATIONS  (PRN):  acetaminophen     Tablet .. 650 milliGRAM(s) Oral every 6 hours PRN Temp greater or equal to 38C (100.4F), Mild Pain (1 - 3)  aluminum hydroxide/magnesium hydroxide/simethicone Suspension 30 milliLiter(s) Oral every 4 hours PRN Dyspepsia  ondansetron Injectable 4 milliGRAM(s) IV Push every 8 hours PRN Nausea and/or Vomiting    INTERVAL EVENTS: Patient seen today without distress, remains dyspneic on exertion, recovers with time. Patient stable on 5 lpm for past 24 hours;   patient c/o pain in his penis from catheter w some leaking urine- it is s/p a change  Vital Signs Last 24 Hrs  T(C): 35.6 (2022 05:32), Max: 36.2 (2022 20:15)  T(F): 96 (2022 05:32), Max: 97.1 (2022 20:15)  HR: 91 (2022 05:32) (87 - 91)  BP: 132/90 (2022 05:32) (111/70 - 132/90)  BP(mean): --  RR: 18 (2022 05:32) (18 - 18)  SpO2: --        PHYSICAL EXAM:  GENERAL: NAD, on O2 at 5 lpm; SOB as he moved to a chair; very comfortable when at rest  NECK: Supple, No JVD  CHEST/LUNG: + crackles, L>R base  HEART: IRR w/o MRG   ABDOMEN: Soft, Nontender, Nondistended; Bowel sounds present  Back: no ulers; min red around sacrum; DRE_ boggy prostate- NT  EXTREMITIES: no edema; Neuro: no focal deficit    LABS:                           12.5   29.99 )-----------( 265      ( 2022 06:34 )             39.2                      12.9   22.94 )-----------( 292      ( 2022 09:01 )             40.3     07-20    136  |  93<L>  |  42<H>  ----------------------------<  130<H>  3.7   |  35<H>  |  0.9    Ca    8.9      2022 06:34  Mg     2.0     07-20    TPro  6.2  /  Alb  3.1<L>  /  TBili  1.1  /  DBili  x   /  AST  11  /  ALT  21  /  AlkPhos  74  07-20              07-19    134<L>  |  88<L>  |  43<H>  ----------------------------<  113<H>  3.7   |  40<H>  |  1.0    Ca    9.1      2022 09:01  Mg     2.0     07-19    TPro  6.5  /  Alb  3.5  /  TBili  1.0  /  DBili  x   /  AST  16  /  ALT  25  /  AlkPhos  75  07-19    LIVER FUNCTIONS - ( 2022 09:01 )  Alb: 3.5 g/dL / Pro: 6.5 g/dL / ALK PHOS: 75 U/L / ALT: 25 U/L / AST: 16 U/L / GGT: x                         Urinalysis Basic - ( 2022 12:00 )    Color: Light Yellow / Appearance: Clear / S.010 / pH: x  Gluc: x / Ketone: Negative  / Bili: Negative / Urobili: <2 mg/dL   Blood: x / Protein: Negative / Nitrite: Negative   Leuk Esterase: Negative / RBC: 12 /HPF / WBC 0 /HPF   Sq Epi: x / Non Sq Epi: 0 /HPF / Bacteria: Few              RADIOLOGY & ADDITIONAL TESTS:  < from: Xray Chest 1 View- PORTABLE-Urgent (Xray Chest 1 View- PORTABLE-Urgent .) (07.15.22 @ 16:28) >    INTERPRETATION:  Clinical History/Reason for Exam:  fu    Technique:  Frontal view the chest.    Comparison: Chest x-ray 2022.    Findings:    Support devices:  none    Cardiac/mediastinum/hilum: Stable cardiomegaly    Lung parenchyma/ Pleura: Stable bilateral diffuse opacities.      Skeleton/soft tissues: Stable scoliosis/degenerative changes (spine and   right shoulder)      Impression:    Stable bilateral diffuse opacities.    --- End of Report ---        < end of copied text >

## 2022-07-20 NOTE — PROGRESS NOTE ADULT - SUBJECTIVE AND OBJECTIVE BOX
24H events:    Patient is a 89y old Male who presents with a chief complaint of acute sob, hypoxemia (20 Jul 2022 13:53)    Primary diagnosis of Shortness of breath       Today is hospital day 9d.  he is complaining from pain at site of Joel's catheter     PAST MEDICAL & SURGICAL HISTORY  Hypertension    GERD (gastroesophageal reflux disease)    Lung interstitial disease    BPH (benign prostatic hyperplasia)    HFrEF (heart failure with reduced ejection fraction)    COPD (chronic obstructive pulmonary disease)    No significant past surgical history      SOCIAL HISTORY:  Negative for smoking/alcohol/drug use.     ALLERGIES:  No Known Allergies    MEDICATIONS:  STANDING MEDICATIONS  albuterol/ipratropium for Nebulization 3 milliLiter(s) Nebulizer every 6 hours  apixaban 5 milliGRAM(s) Oral two times a day  buMETAnide 1 milliGRAM(s) Oral two times a day  cyanocobalamin 1000 MICROGram(s) Oral daily  diltiazem    milliGRAM(s) Oral daily  ferrous    sulfate 325 milliGRAM(s) Oral daily  melatonin 10 milliGRAM(s) Oral at bedtime  metoprolol tartrate 100 milliGRAM(s) Oral three times a day  pantoprazole    Tablet 40 milliGRAM(s) Oral before breakfast  polyethylene glycol 3350 17 Gram(s) Oral two times a day  predniSONE   Tablet   Oral   predniSONE   Tablet 10 milliGRAM(s) Oral two times a day  senna 2 Tablet(s) Oral at bedtime    PRN MEDICATIONS  acetaminophen     Tablet .. 650 milliGRAM(s) Oral every 6 hours PRN  aluminum hydroxide/magnesium hydroxide/simethicone Suspension 30 milliLiter(s) Oral every 4 hours PRN  ondansetron Injectable 4 milliGRAM(s) IV Push every 8 hours PRN    VITALS:   T(F): 96.7  HR: 90  BP: 111/70  RR: 18  SpO2: 98%    LABS:                        12.5   29.99 )-----------( 265      ( 20 Jul 2022 06:34 )             39.2     07-20    136  |  93<L>  |  42<H>  ----------------------------<  130<H>  3.7   |  35<H>  |  0.9    Ca    8.9      20 Jul 2022 06:34  Mg     2.0     07-20    TPro  6.2  /  Alb  3.1<L>  /  TBili  1.1  /  DBili  x   /  AST  11  /  ALT  21  /  AlkPhos  74  07-20              Culture - Urine (collected 18 Jul 2022 12:00)  Source: Catheterized Catheterized  Preliminary Report (20 Jul 2022 02:28):    50,000 - 99,000 CFU/mL Pseudomonas aeruginosa    >100,000 CFU/ml Enterococcus species      RADIOLOGY:    PHYSICAL EXAM:  GENERAL: NAD  EYES: conjunctiva and sclera clear  ENMT: No tonsillar erythema, exudates, or enlargement; Moist mucous membranes, Good dentition, No lesions  NECK: Supple, No JVD, Normal thyroid  NERVOUS SYSTEM:  Alert & Oriented X3, Good concentration; Motor Strength 5/5 B/L upper and lower extremities; DTRs 2+ intact and symmetric  CHEST/LUNG: Clear to auscultation bilaterally; No rales, rhonchi, wheezing, or rubs  HEART: Regular rate and rhythm; No murmurs, rubs, or gallops  ABDOMEN: Soft, Nontender, Nondistended; Bowel sounds present  EXTREMITIES:  2+ Peripheral Pulses, No clubbing, cyanosis, or edema  LYMPH: No lymphadenopathy noted  SKIN: No rashes or lesions       24H events:    Patient is a 89y old Male who presents with a chief complaint of acute sob, hypoxemia (20 Jul 2022 13:53)    Primary diagnosis of Shortness of breath       Today is hospital day 9d.  he is complaining from pain at site of Joel's catheter     PAST MEDICAL & SURGICAL HISTORY  Hypertension    GERD (gastroesophageal reflux disease)    Lung interstitial disease    BPH (benign prostatic hyperplasia)    HFrEF (heart failure with reduced ejection fraction)    COPD (chronic obstructive pulmonary disease)    No significant past surgical history      SOCIAL HISTORY:  Negative for smoking/alcohol/drug use.     ALLERGIES:  No Known Allergies    MEDICATIONS:  STANDING MEDICATIONS  albuterol/ipratropium for Nebulization 3 milliLiter(s) Nebulizer every 6 hours  apixaban 5 milliGRAM(s) Oral two times a day  buMETAnide 1 milliGRAM(s) Oral two times a day  cyanocobalamin 1000 MICROGram(s) Oral daily  diltiazem    milliGRAM(s) Oral daily  ferrous    sulfate 325 milliGRAM(s) Oral daily  melatonin 10 milliGRAM(s) Oral at bedtime  metoprolol tartrate 100 milliGRAM(s) Oral three times a day  pantoprazole    Tablet 40 milliGRAM(s) Oral before breakfast  polyethylene glycol 3350 17 Gram(s) Oral two times a day  predniSONE   Tablet   Oral   predniSONE   Tablet 10 milliGRAM(s) Oral two times a day  senna 2 Tablet(s) Oral at bedtime    PRN MEDICATIONS  acetaminophen     Tablet .. 650 milliGRAM(s) Oral every 6 hours PRN  aluminum hydroxide/magnesium hydroxide/simethicone Suspension 30 milliLiter(s) Oral every 4 hours PRN  ondansetron Injectable 4 milliGRAM(s) IV Push every 8 hours PRN    VITALS:   T(F): 96.7  HR: 90  BP: 111/70  RR: 18  SpO2: 98%    LABS:                        12.5   29.99 )-----------( 265      ( 20 Jul 2022 06:34 )             39.2     07-20    136  |  93<L>  |  42<H>  ----------------------------<  130<H>  3.7   |  35<H>  |  0.9    Ca    8.9      20 Jul 2022 06:34  Mg     2.0     07-20    TPro  6.2  /  Alb  3.1<L>  /  TBili  1.1  /  DBili  x   /  AST  11  /  ALT  21  /  AlkPhos  74  07-20              Culture - Urine (collected 18 Jul 2022 12:00)  Source: Catheterized Catheterized  Preliminary Report (20 Jul 2022 02:28):    50,000 - 99,000 CFU/mL Pseudomonas aeruginosa    >100,000 CFU/ml Enterococcus species      RADIOLOGY:    PHYSICAL EXAM:  GENERAL: NAD  EYES: conjunctiva and sclera clear  ENMT: Moist mucous membrane  NERVOUS SYSTEM:  Alert, difficulty hearing, generalized weakness, oriented to place and time    CHEST/LUNG: right upper lung wheezing, left sided diffuse crepitations  HEART: irregularly irregular pulse and heart sounds   ABDOMEN: Soft, Nontender, Nondistended; Bowel sounds present  EXTREMITIES:  no edema

## 2022-07-20 NOTE — CONSULT NOTE ADULT - SUBJECTIVE AND OBJECTIVE BOX
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 States he has sob at baseline, but noted his pulse ox to be in 80s consistently the past several days. Endorses baseline LE edema, could not give report of any worsening of it. Pt states he has afib and it has been uncontrolled recently, causing increased difficulty catching his breath. States he had one acute episode one night prior to admission but refused to come in d/t anxiety of staying too long in hospital. Denies chest pain, fever, chills, nausea, vomiting, abd pain, diarrhea, HA, dizziness, weakness.  Pt has multiple admissions for sob d/t various causes such as ILD exacerbations and CHF exacerbations. Pt is aox4.     in the ED,pt's VS T(C): 36.6 (07-10-22 @ 20:04), Max: 36.6 (07-10-22 @ 20:04)  HR: 86 (07-11-22 @ 02:03) (86 - 110)  BP: 157/91 (07-10-22 @ 23:06) (124/81 - 157/91)  RR: 22 (07-10-22 @ 23:06) (22 - 22)  SpO2: 91% (07-10-22 @ 23:06) (91% - 97%), labs done showed wbc 11.24 (similar to May '22), Hb 11, , plts 192, INR 2.18, sodium 137, potassium 3.4, chloride 97, Cr 1.3, BUN 29, Ca 8.7, albumin 3.4, gfr 53, Magnesium 1.6, troponin <0.01, bnp 5836 (2800 May '22); VBG lactate 2.3, VBG pco2 53, covid neg, influenza neg, rsp Syncitial virus neg  EKG: afib with RVR rate 103, CXR: bl opacities (pending official read)  pt received lasix 40mg IV, 2g magnesium  pt is admitted for workup/management CHF exacerbation vs ILD flare (11 Jul 2022 03:43)      INFECTIOUS DISEASE HISTORY:  Admitted on 7/11, on steroids, ID consulted for leukocytosis  Afebrile since admission    UA 0 WBC UCX <100k pseudo, > 100k enterococcus  ABX  cefepime   IVPB 7/12-14            PMH  PAST MEDICAL & SURGICAL HISTORY:  Hypertension      GERD (gastroesophageal reflux disease)      Lung interstitial disease      BPH (benign prostatic hyperplasia)      HFrEF (heart failure with reduced ejection fraction)      COPD (chronic obstructive pulmonary disease)      No significant past surgical history          FAMILY HISTORY  No pertinent family history in first degree relatives        SOCIAL HISTORY  Social History:  Substance Use (street drugs): ( x ) never used  (  ) other:  Tobacco Usage:  (  ) never smoked   ( X  ) former smoker - quit [id number]   (   ) current smoker  (     ) pack year  Alcohol Usage: occasional (04 Jan 2022 04:50)        ROS  ***    VITALS:  T(F): 96.7, Max: 97.1 (07-19-22 @ 20:15)  HR: 90  BP: 111/70  RR: 18Vital Signs Last 24 Hrs  T(C): 35.9 (20 Jul 2022 13:21), Max: 36.2 (19 Jul 2022 20:15)  T(F): 96.7 (20 Jul 2022 13:21), Max: 97.1 (19 Jul 2022 20:15)  HR: 90 (20 Jul 2022 13:21) (87 - 91)  BP: 111/70 (20 Jul 2022 13:21) (111/70 - 132/90)  BP(mean): --  RR: 18 (20 Jul 2022 13:21) (18 - 18)  SpO2: 98% (20 Jul 2022 13:21) (98% - 98%)        PHYSICAL EXAM:  ***    TESTS & MEASUREMENTS:                        12.5   29.99 )-----------( 265      ( 20 Jul 2022 06:34 )             39.2     07-20    136  |  93<L>  |  42<H>  ----------------------------<  130<H>  3.7   |  35<H>  |  0.9    Ca    8.9      20 Jul 2022 06:34  Mg     2.0     07-20    TPro  6.2  /  Alb  3.1<L>  /  TBili  1.1  /  DBili  x   /  AST  11  /  ALT  21  /  AlkPhos  74  07-20      LIVER FUNCTIONS - ( 20 Jul 2022 06:34 )  Alb: 3.1 g/dL / Pro: 6.2 g/dL / ALK PHOS: 74 U/L / ALT: 21 U/L / AST: 11 U/L / GGT: x               Culture - Urine (collected 07-18-22 @ 12:00)  Source: Catheterized Catheterized  Preliminary Report (07-20-22 @ 02:28):    50,000 - 99,000 CFU/mL Pseudomonas aeruginosa    >100,000 CFU/ml Enterococcus species    Culture - Blood (collected 03-25-22 @ 06:30)  Source: .Blood Blood-Peripheral  Final Report (03-30-22 @ 14:00):    No Growth Final    Culture - Blood (collected 03-24-22 @ 12:21)  Source: .Blood Blood-Peripheral  Final Report (03-29-22 @ 22:00):    No Growth Final            INFECTIOUS DISEASES TESTING  COVID-19 PCR: NotDetec (07-17-22 @ 06:00)  MRSA PCR Result.: Negative (07-13-22 @ 11:30)  Procalcitonin, Serum: 0.13 ng/mL (07-12-22 @ 11:00)  COVID-19 PCR: NotDetec (03-30-22 @ 19:18)  Fungitell: <31 pg/mL (03-24-22 @ 12:21)  Procalcitonin, Serum: 3.18 ng/mL (03-24-22 @ 12:21)  COVID-19 PCR: NotDetec (03-24-22 @ 10:31)  COVID-19 PCR: NotDetec (03-06-22 @ 17:58)  Procalcitonin, Serum: 0.04 ng/mL (03-02-22 @ 10:45)  Procalcitonin, Serum: 0.08 ng/mL (02-25-22 @ 17:44)  Rapid RVP Result: NotDetec (02-24-22 @ 14:40)  Procalcitonin, Serum: 0.04 ng/mL (01-19-22 @ 06:28)  COVID-19 PCR: Detected (01-17-22 @ 11:56)  COVID-19 PCR: NotDetec (01-13-22 @ 21:51)  COVID-19 PCR: NotDetec (01-10-22 @ 06:35)  COVID-19 PCR: NotDetec (01-04-22 @ 03:00)      INFLAMMATORY MARKERS      RADIOLOGY & ADDITIONAL TESTS:  I have personally reviewed the last Chest xray  CXR      CT      CARDIOLOGY TESTING  12 Lead ECG:   Ventricular Rate 124 BPM    Atrial Rate 133 BPM    QRS Duration 86 ms    Q-T Interval 304 ms    QTC Calculation(Bazett) 436 ms    R Axis -1 degrees    T Axis -27 degrees    Diagnosis Line Atrial fibrillation with rapid ventricular response with premature ventricular  or aberrantly conducted complexes  Moderate voltage criteria for LVH, may be normal variant  Nonspecific ST and T wave abnormality  Abnormal ECG    Confirmed by Tim Christie ([id number]) on 7/11/2022 7:37:19 PM (07-11-22 @ 09:59)  12 Lead ECG:   Ventricular Rate 103 BPM    Atrial Rate 100 BPM    QRS Duration 88 ms    Q-T Interval 376 ms    QTC Calculation(Bazett) 492 ms    R Axis 3 degrees    T Axis -12 degrees    Diagnosis Line Atrial fibrillation with rapid ventricular response  AbnormalECG    Confirmed by Tim Christie ([id number]) on 7/11/2022 3:35:16 AM (07-10-22 @ 22:20)      MEDICATIONS  albuterol/ipratropium for Nebulization 3 Nebulizer every 6 hours  apixaban 5 Oral two times a day  buMETAnide 1 Oral two times a day  cyanocobalamin 1000 Oral daily  diltiazem    Oral daily  ferrous    sulfate 325 Oral daily  melatonin 10 Oral at bedtime  metoprolol tartrate 100 Oral three times a day  pantoprazole    Tablet 40 Oral before breakfast  polyethylene glycol 3350 17 Oral two times a day  predniSONE   Tablet  Oral   predniSONE   Tablet 10 Oral two times a day  senna 2 Oral at bedtime        ANTIBIOTICS:      ALLERGIES:  No Known Allergies           NNEKA JOSEPH  89y, Male  Allergy: No Known Allergies      CHIEF COMPLAINT:   acute sob, hypoxemia (20 Jul 2022 11:34)      LOS  9d    HPI  HPI:  89 year old hard of hearing male with hx of ILD on 5L O2 at baseline at home, HTN, BPH, GERD, A fib (on Eliquis), 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 States he has sob at baseline, but noted his pulse ox to be in 80s consistently the past several days. Endorses baseline LE edema, could not give report of any worsening of it. Pt states he has afib and it has been uncontrolled recently, causing increased difficulty catching his breath. States he had one acute episode one night prior to admission but refused to come in d/t anxiety of staying too long in hospital. Denies chest pain, fever, chills, nausea, vomiting, abd pain, diarrhea, HA, dizziness, weakness.  Pt has multiple admissions for sob d/t various causes such as ILD exacerbations and CHF exacerbations. Pt is aox4.     in the ED,pt's VS T(C): 36.6 (07-10-22 @ 20:04), Max: 36.6 (07-10-22 @ 20:04)  HR: 86 (07-11-22 @ 02:03) (86 - 110)  BP: 157/91 (07-10-22 @ 23:06) (124/81 - 157/91)  RR: 22 (07-10-22 @ 23:06) (22 - 22)  SpO2: 91% (07-10-22 @ 23:06) (91% - 97%), labs done showed wbc 11.24 (similar to May '22), Hb 11, , plts 192, INR 2.18, sodium 137, potassium 3.4, chloride 97, Cr 1.3, BUN 29, Ca 8.7, albumin 3.4, gfr 53, Magnesium 1.6, troponin <0.01, bnp 5836 (2800 May '22); VBG lactate 2.3, VBG pco2 53, covid neg, influenza neg, rsp Syncitial virus neg  EKG: afib with RVR rate 103, CXR: bl opacities (pending official read)  pt received lasix 40mg IV, 2g magnesium  pt is admitted for workup/management CHF exacerbation vs ILD flare (11 Jul 2022 03:43)      INFECTIOUS DISEASE HISTORY:  Admitted on 7/11, on steroids, ID consulted for leukocytosis  Afebrile since admission    UA 0 WBC UCX <100k pseudo, > 100k enterococcus  ABX  cefepime   IVPB 7/12-14    REports pain in AM around penile tip near [place]            PMH  PAST MEDICAL & SURGICAL HISTORY:  Hypertension      GERD (gastroesophageal reflux disease)      Lung interstitial disease      BPH (benign prostatic hyperplasia)      HFrEF (heart failure with reduced ejection fraction)      COPD (chronic obstructive pulmonary disease)      No significant past surgical history          FAMILY HISTORY  No pertinent family history in first degree relatives        SOCIAL HISTORY  Social History:  Substance Use (street drugs): ( x ) never used  (  ) other:  Tobacco Usage:  (  ) never smoked   ( X  ) former smoker - quit [id number]   (   ) current smoker  (     ) pack year  Alcohol Usage: occasional (04 Jan 2022 04:50)        ROS  General: Denies rigors, nightsweats  HEENT: Denies headache, rhinorrhea, sore throat, eye pain  CV: Denies CP, palpitations  PULM: Denies wheezing, hemoptysis  GI: Denies hematemesis, hematochezia, melena  : Denies discharge, hematuria  MSK: Denies arthralgias, myalgias  SKIN: Denies rash, lesions  NEURO: Denies paresthesias, weakness  PSYCH: Denies depression, anxiety     VITALS:  T(F): 96.7, Max: 97.1 (07-19-22 @ 20:15)  HR: 90  BP: 111/70  RR: 18Vital Signs Last 24 Hrs  T(C): 35.9 (20 Jul 2022 13:21), Max: 36.2 (19 Jul 2022 20:15)  T(F): 96.7 (20 Jul 2022 13:21), Max: 97.1 (19 Jul 2022 20:15)  HR: 90 (20 Jul 2022 13:21) (87 - 91)  BP: 111/70 (20 Jul 2022 13:21) (111/70 - 132/90)  BP(mean): --  RR: 18 (20 Jul 2022 13:21) (18 - 18)  SpO2: 98% (20 Jul 2022 13:21) (98% - 98%)        PHYSICAL EXAM:  Gen: NAD, resting in bed NC  HEENT: Normocephalic, atraumatic  Neck: supple, no lymphadenopathy  CV: Regular rate & regular rhythm  Lungs: decreased BS at bases, no fremitus  Abdomen: Soft, BS present  Ext: Warm, well perfused  Neuro: non focal, awake  Skin: no rash, no erythema  Lines: no phlebitis   [place]     TESTS & MEASUREMENTS:                        12.5   29.99 )-----------( 265      ( 20 Jul 2022 06:34 )             39.2     07-20    136  |  93<L>  |  42<H>  ----------------------------<  130<H>  3.7   |  35<H>  |  0.9    Ca    8.9      20 Jul 2022 06:34  Mg     2.0     07-20    TPro  6.2  /  Alb  3.1<L>  /  TBili  1.1  /  DBili  x   /  AST  11  /  ALT  21  /  AlkPhos  74  07-20      LIVER FUNCTIONS - ( 20 Jul 2022 06:34 )  Alb: 3.1 g/dL / Pro: 6.2 g/dL / ALK PHOS: 74 U/L / ALT: 21 U/L / AST: 11 U/L / GGT: x               Culture - Urine (collected 07-18-22 @ 12:00)  Source: Catheterized Catheterized  Preliminary Report (07-20-22 @ 02:28):    50,000 - 99,000 CFU/mL Pseudomonas aeruginosa    >100,000 CFU/ml Enterococcus species    Culture - Blood (collected 03-25-22 @ 06:30)  Source: .Blood Blood-Peripheral  Final Report (03-30-22 @ 14:00):    No Growth Final    Culture - Blood (collected 03-24-22 @ 12:21)  Source: .Blood Blood-Peripheral  Final Report (03-29-22 @ 22:00):    No Growth Final            INFECTIOUS DISEASES TESTING  COVID-19 PCR: NotDetec (07-17-22 @ 06:00)  MRSA PCR Result.: Negative (07-13-22 @ 11:30)  Procalcitonin, Serum: 0.13 ng/mL (07-12-22 @ 11:00)  COVID-19 PCR: NotDetec (03-30-22 @ 19:18)  Fungitell: <31 pg/mL (03-24-22 @ 12:21)  Procalcitonin, Serum: 3.18 ng/mL (03-24-22 @ 12:21)  COVID-19 PCR: NotDetec (03-24-22 @ 10:31)  COVID-19 PCR: NotDetec (03-06-22 @ 17:58)  Procalcitonin, Serum: 0.04 ng/mL (03-02-22 @ 10:45)  Procalcitonin, Serum: 0.08 ng/mL (02-25-22 @ 17:44)  Rapid RVP Result: NotDetec (02-24-22 @ 14:40)  Procalcitonin, Serum: 0.04 ng/mL (01-19-22 @ 06:28)  COVID-19 PCR: Detected (01-17-22 @ 11:56)  COVID-19 PCR: NotDetec (01-13-22 @ 21:51)  COVID-19 PCR: NotDetec (01-10-22 @ 06:35)  COVID-19 PCR: NotDetec (01-04-22 @ 03:00)      INFLAMMATORY MARKERS      RADIOLOGY & ADDITIONAL TESTS:  I have personally reviewed the last Chest xray  CXR      CT      CARDIOLOGY TESTING  12 Lead ECG:   Ventricular Rate 124 BPM    Atrial Rate 133 BPM    QRS Duration 86 ms    Q-T Interval 304 ms    QTC Calculation(Bazett) 436 ms    R Axis -1 degrees    T Axis -27 degrees    Diagnosis Line Atrial fibrillation with rapid ventricular response with premature ventricular  or aberrantly conducted complexes  Moderate voltage criteria for LVH, may be normal variant  Nonspecific ST and T wave abnormality  Abnormal ECG    Confirmed by Tim Christie ([id number]) on 7/11/2022 7:37:19 PM (07-11-22 @ 09:59)  12 Lead ECG:   Ventricular Rate 103 BPM    Atrial Rate 100 BPM    QRS Duration 88 ms    Q-T Interval 376 ms    QTC Calculation(Bazett) 492 ms    R Axis 3 degrees    T Axis -12 degrees    Diagnosis Line Atrial fibrillation with rapid ventricular response  AbnormalECG    Confirmed by Tim Christie ([id number]) on 7/11/2022 3:35:16 AM (07-10-22 @ 22:20)      MEDICATIONS  albuterol/ipratropium for Nebulization 3 Nebulizer every 6 hours  apixaban 5 Oral two times a day  buMETAnide 1 Oral two times a day  cyanocobalamin 1000 Oral daily  diltiazem    Oral daily  ferrous    sulfate 325 Oral daily  melatonin 10 Oral at bedtime  metoprolol tartrate 100 Oral three times a day  pantoprazole    Tablet 40 Oral before breakfast  polyethylene glycol 3350 17 Oral two times a day  predniSONE   Tablet  Oral   predniSONE   Tablet 10 Oral two times a day  senna 2 Oral at bedtime        ANTIBIOTICS:      ALLERGIES:  No Known Allergies

## 2022-07-20 NOTE — CONSULT NOTE ADULT - SUBJECTIVE AND OBJECTIVE BOX
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 States he has sob at baseline, but noted his pulse ox to be in 80s consistently the past several days. Endorses baseline LE edema, could not give report of any worsening of it. Pt states he has afib and it has been uncontrolled recently, causing increased difficulty catching his breath. States he had one acute episode one night prior to admission but refused to come in d/t anxiety of staying too long in hospital. Denies chest pain, fever, chills, nausea, vomiting, abd pain, diarrhea, HA, dizziness, weakness.  Pt has multiple admissions for sob d/t various causes such as ILD exacerbations and CHF exacerbations. Pt is aox4.     Urology: Pt is a poor historian, hard of hearing, hx obtained from chart and as best from pt. Pt with severe ILD on Nasal O2 5L.  Pt sitting in a chair SOB, c/o penile pain, Pt states that he can void, but it sprays everywhere. Note from NH state pt has Joel 2/2  BPH and retention, but no documentation as to PVR. ED note reviewed pt presented without Joel. No documentation when it was placed  or what his residual was. It's reported that 1,000 cc drained ? when placed or for the shift.  Pt reports med stream, + frequency q 2 hrs, nocturia x 4, + urgency no dysuria or hematuria. Pt has no reported urologist.     PAST MEDICAL & SURGICAL HISTORY:  Hypertension      GERD (gastroesophageal reflux disease)      Lung interstitial disease      BPH (benign prostatic hyperplasia)      HFrEF (heart failure with reduced ejection fraction)      COPD (chronic obstructive pulmonary disease)      No significant past surgical history          REVIEW OF SYSTEMS:    [ x ] a 10 point review of systems was negative except where noted      MEDICATIONS  (STANDING):  albuterol/ipratropium for Nebulization 3 milliLiter(s) Nebulizer every 6 hours  apixaban 5 milliGRAM(s) Oral two times a day  buMETAnide 1 milliGRAM(s) Oral two times a day  cyanocobalamin 1000 MICROGram(s) Oral daily  diltiazem    milliGRAM(s) Oral daily  ferrous    sulfate 325 milliGRAM(s) Oral daily  finasteride 5 milliGRAM(s) Oral daily  melatonin 10 milliGRAM(s) Oral at bedtime  metoprolol tartrate 100 milliGRAM(s) Oral three times a day  pantoprazole    Tablet 40 milliGRAM(s) Oral before breakfast  polyethylene glycol 3350 17 Gram(s) Oral two times a day  predniSONE   Tablet   Oral   predniSONE   Tablet 10 milliGRAM(s) Oral two times a day  senna 2 Tablet(s) Oral at bedtime  tamsulosin 0.4 milliGRAM(s) Oral at bedtime    MEDICATIONS  (PRN):  acetaminophen     Tablet .. 650 milliGRAM(s) Oral every 6 hours PRN Temp greater or equal to 38C (100.4F), Mild Pain (1 - 3)  aluminum hydroxide/magnesium hydroxide/simethicone Suspension 30 milliLiter(s) Oral every 4 hours PRN Dyspepsia  ondansetron Injectable 4 milliGRAM(s) IV Push every 8 hours PRN Nausea and/or Vomiting      Allergies    No Known Allergies    SOCIAL HISTORY: No illicit drug use    FAMILY HISTORY:  unable to illicit    Vital Signs Last 24 Hrs  T(C): 35.9 (2022 13:21), Max: 36.2 (2022 20:15)  T(F): 96.7 (2022 13:21), Max: 97.1 (2022 20:15)  HR: 90 (2022 13:21) (87 - 91)  BP: 111/70 (2022 13:21) (111/70 - 132/90)  RR: 18 (2022 13:21) (18 - 18)  SpO2: 98% (2022 13:21) (98% - 98%)      Daily Weight in k (2022 05:32)    PHYSICAL EXAM:    Constitutional: NAD, well-developed, well nourished on nasal O2 SOB  HEENT: NC/AT  Neck: no pain, FROM  Back: No CVA tenderness  Respiratory: No accessory respiratory muscle use  Abd: Soft, NT/ND  no organomegally  no hernia  : circumcised M + Joel draining clear urine  testicles x 2 sym non tender no masses, ? right hydrocele  Extremities: no edema  Neurological: A/O x 3  Psychiatric: Normal mood, normal affect  Skin: No rashes    I&O's Summary    2022 07:01  -  2022 07:00  --------------------------------------------------------  IN: 0 mL / OUT: 800 mL / NET: -800 mL    2022 07:01  -  2022 14:37  --------------------------------------------------------  IN: 0 mL / OUT: 525 mL / NET: -525 mL        LABS:                        12.5   29.99 )-----------( 265      ( 2022 06:34 )             39.2     07-20    136  |  93<L>  |  42<H>  ----------------------------<  130<H>  3.7   |  35<H>  |  0.9    Ca    8.9      2022 06:34  Mg     2.0     -    TPro  6.2  /  Alb  3.1<L>  /  TBili  1.1  /  DBili  x   /  AST  11  /  ALT  21  /  AlkPhos  74  07-    Urinalysis (22 @ 12:00)   pH Urine: 7.5   Glucose Qualitative, Urine: Negative   Blood, Urine: Trace   Color: Light Yellow   Urine Appearance: Clear   Bilirubin: Negative   Ketone - Urine: Negative   Specific Gravity: 1.010   Protein, Urine: Negative   Urobilinogen: <2 mg/dL   Nitrite: Negative   Leukocyte Esterase Concentration: Negative       Historical Values  Urinalysis (22 @ 12:00)   pH Urine: 7.5   Glucose Qualitative, Urine: Negative   Blood, Urine: Trace   Color: Light Yellow   Urine Appearance: Clear   Bilirubin: Negative   Ketone - Urine: Negative   Specific Gravity: 1.010   Protein, Urine: Negative   Urobilinogen: <2 mg/dL   Nitrite: Negative   Leukocyte Esterase Concentration: Negative   Urinalysis (22 @ 07:56)   pH Urine: 6.0   Blood, Urine: Small   Glucose Qualitative, Urine: Negative   Color: Yellow   Urine Appearance: Slightly Turbid   Bilirubin: Negative   Ketone - Urine: Negative   Specific Gravity: 1.022   Protein, Urine: 30 mg/dL   Urobilinogen: 3 mg/dL   Nitrite: Negative   Leukocyte Esterase Concentration: Large     Urine Culture:   Culture - Urine (22 @ 12:00)   Specimen Source: Catheterized Catheterized   Culture Results:   50,000 - 99,000 CFU/mL Pseudomonas aeruginosa   >100,000 CFU/ml Enterococcus species       RADIOLOGY & ADDITIONAL STUDIES:  No studies done at this time

## 2022-07-21 NOTE — PROGRESS NOTE ADULT - SUBJECTIVE AND OBJECTIVE BOX
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 States he has sob at baseline, but noted his pulse ox to be in 80s consistently the past several days. Endorses baseline LE edema, could not give report of any worsening of it. Pt states he has afib and it has been uncontrolled recently, causing increased difficulty catching his breath. States he had one acute episode one night prior to admission but refused to come in d/t anxiety of staying too long in hospital. Denies chest pain, fever, chills, nausea, vomiting, abd pain, diarrhea, HA, dizziness, weakness.  Pt has multiple admissions for sob d/t various causes such as ILD exacerbations and CHF exacerbations. Pt is aox4.     in the ED,pt's VS T(C): 36.6 (07-10-22 @ 20:04), Max: 36.6 (07-10-22 @ 20:04)  HR: 86 (07-11-22 @ 02:03) (86 - 110)  BP: 157/91 (07-10-22 @ 23:06) (124/81 - 157/91)  RR: 22 (07-10-22 @ 23:06) (22 - 22)  SpO2: 91% (07-10-22 @ 23:06) (91% - 97%), labs done showed wbc 11.24 (similar to May '22), Hb 11, , plts 192, INR 2.18, sodium 137, potassium 3.4, chloride 97, Cr 1.3, BUN 29, Ca 8.7, albumin 3.4, gfr 53, Magnesium 1.6, troponin <0.01, bnp 5836 (2800 May '22); VBG lactate 2.3, VBG pco2 53, covid neg, influenza neg, rsp Syncitial virus neg  EKG: afib with RVR rate 103, CXR: bl opacities (pending official read)  pt received lasix 40mg IV, 2g magnesium  pt is admitted for workup/management CHF exacerbation vs ILD flare (11 Jul 2022 03:43)    MEDICATIONS  (STANDING):  albuterol/ipratropium for Nebulization 3 milliLiter(s) Nebulizer every 6 hours  apixaban 5 milliGRAM(s) Oral two times a day  buMETAnide 1 milliGRAM(s) Oral two times a day  cyanocobalamin 1000 MICROGram(s) Oral daily  diltiazem    milliGRAM(s) Oral daily  ferrous    sulfate 325 milliGRAM(s) Oral daily  finasteride 5 milliGRAM(s) Oral daily  melatonin 10 milliGRAM(s) Oral at bedtime  metoprolol tartrate 100 milliGRAM(s) Oral three times a day  pantoprazole    Tablet 40 milliGRAM(s) Oral before breakfast  polyethylene glycol 3350 17 Gram(s) Oral two times a day  predniSONE   Tablet   Oral   predniSONE   Tablet 10 milliGRAM(s) Oral two times a day  senna 2 Tablet(s) Oral at bedtime  tamsulosin 0.4 milliGRAM(s) Oral at bedtime    MEDICATIONS  (PRN):  acetaminophen     Tablet .. 650 milliGRAM(s) Oral every 6 hours PRN Temp greater or equal to 38C (100.4F), Mild Pain (1 - 3)  aluminum hydroxide/magnesium hydroxide/simethicone Suspension 30 milliLiter(s) Oral every 4 hours PRN Dyspepsia  ondansetron Injectable 4 milliGRAM(s) IV Push every 8 hours PRN Nausea and/or Vomiting    INTERVAL EVENTS: Patient seen today, OOB in chair without distress. Patient concerned with condom catheter, stating that 2 had all ready broken.    T(C): 36 (07-21-22 @ 12:55), Max: 36 (07-21-22 @ 12:55)  HR: 98 (07-21-22 @ 16:01) (90 - 98)  BP: 118/75 (07-21-22 @ 16:01) (112/78 - 118/75)  RR: 18 (07-21-22 @ 16:01) (18 - 18)  SpO2: 97% (07-21-22 @ 07:40) (97% - 97%)  Wt(kg): --Vital Signs Last 24 Hrs  T(C): 36 (21 Jul 2022 12:55), Max: 36 (21 Jul 2022 12:55)  T(F): 96.8 (21 Jul 2022 12:55), Max: 96.8 (21 Jul 2022 12:55)  HR: 98 (21 Jul 2022 16:01) (90 - 98)  BP: 118/75 (21 Jul 2022 16:01) (112/78 - 118/75)  BP(mean): --  RR: 18 (21 Jul 2022 16:01) (18 - 18)  SpO2: 97% (21 Jul 2022 07:40) (97% - 97%)    Parameters below as of 21 Jul 2022 07:40  Patient On (Oxygen Delivery Method): nasal cannula  O2 Flow (L/min): 5      PHYSICAL EXAM:  GENERAL: NAD  NECK: Supple, No JVD  CHEST/LUNG: Few crackles, no wheeze  HEART: S1, S2  ABDOMEN: Soft, Nontender, Nondistended? Bowel sounds present  EXTREMITIES: Trace edema      LABS:                        13.1   20.52 )-----------( 287      ( 21 Jul 2022 09:05 )             41.6             07-21    135  |  x   |  x   ----------------------------<  x   x    |  x   |  x     Ca    8.6      21 Jul 2022 09:05  Mg     2.0     07-21    TPro  6.4  /  Alb  3.1<L>  /  TBili  1.0  /  DBili  x   /  AST  15  /  ALT  20  /  AlkPhos  77  07-21    LIVER FUNCTIONS - ( 21 Jul 2022 09:05 )  Alb: 3.1 g/dL / Pro: 6.4 g/dL / ALK PHOS: 77 U/L / ALT: 20 U/L / AST: 15 U/L / GGT: x                           RADIOLOGY & ADDITIONAL TESTS:

## 2022-07-21 NOTE — CONSULT NOTE ADULT - ATTENDING COMMENTS
Agree with above A/P
events noted, worsening SOB/ chest ct BL GGO, increase leg swelling, lasix/ steroids/ abx, SDU, spoke also with son at bedside/ SDU, poor prognosis, GOC

## 2022-07-21 NOTE — CONSULT NOTE ADULT - REASON FOR ADMISSION
acute sob, hypoxemia

## 2022-07-21 NOTE — CONSULT NOTE ADULT - ASSESSMENT
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 ID consulted for leukocytosis      #Persistent leukocytosis most likely 2/2 to steroid use vs reactive from underlying stress (ILD on chronic steroids)  - on admission wbc trend 12.9 (7/11) -> currently 20 7/21  - received solumedrol 60 mg q12 from 6/12 -> 6/16; followed by prednisone   - ID consulted; montior off antibiotics  - Recommend trending wbc as patient is being weaned off steroids  - c/w with care as per primary team       #Newly diagnosed lung mass?  - initially was reported to have a LLL Mass and Mediastinal LAD however as per previous pulmonary note pt had repet outpt CT chest which showed resolution of mass  - CT chest this admission: Diffuse interstitial opacifications with groundglass abnormalities diffusely as well as honeycombing. 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 Heme/onc consulted for leukocytosis.    #Persistent leukocytosis most likely 2/2 to steroid use vs reactive from underlying stress (ILD on chronic steroids)  - on admission wbc trend 12.9 (7/11) -> currently 20 7/21  - received solumedrol 60 mg q12 from 6/12 -> 6/16; followed by prednisone   - ID consulted; montior off antibiotics  - Recommend trending wbc as patient is being weaned off steroids  - c/w with care as per primary team       #Newly diagnosed lung mass?  - initially was reported to have a LLL Mass and Mediastinal LAD however as per previous pulmonary note pt had repet outpt CT chest which showed resolution of mass  - CT chest this admission: Diffuse interstitial opacifications with groundglass abnormalities diffusely as well as honeycombing. 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 Heme/onc consulted for leukocytosis.    #Persistent leukocytosis most likely 2/2 to steroid use vs reactive from underlying stress (ILD on chronic steroids) less likely myeloproliferative origin   - on admission wbc trend 12.9 (7/11) -> currently 20 7/21  - received solumedrol 60 mg q12 from 6/12 -> 6/16; followed by prednisone   - ID consulted; montior off antibiotics  - Recommend trending wbc as patient is being weaned off steroids  - c/w with care as per primary team       #Newly diagnosed lung mass?  - initially was reported to have a LLL Mass and Mediastinal LAD however as per previous pulmonary note pt had repeat outpatient CT chest which showed resolution of mass  - CT chest this admission: Diffuse interstitial opacifications with groundglass abnormalities diffusely as well as honeycombing. 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 Heme/onc consulted for leukocytosis.    #Persistent leukocytosis most likely 2/2 to steroid use vs reactive from underlying stress (ILD on chronic steroids) less likely myeloproliferative origin   - on admission wbc trend 12.9 (7/11) -> currently 20 7/21  - received solumedrol 60 mg q12 from 6/12 -> 6/16; followed by prednisone   - ID consulted; montior off antibiotics  - Recommend trending wbc as patient is being weaned off steroids  - c/w with care as per primary team       #Newly diagnosed lung mass?  - initially was reported to have a LLL Mass and Mediastinal LAD however as per previous pulmonary note pt had repeat outpatient CT chest which showed resolution of mass  - CT chest this admission: Diffuse interstitial opacifications with groundglass abnormalities diffusely as well as honeycombing. Differential diagnosis remains CHF,   more likely worsening interstitial lung disease  During this admission CT chest did not show any evident nodules, LAD or possible mass. CT SCAN REVIEWED NO MASS REPORTED  - Regardless patient to follow up with Pulm team

## 2022-07-21 NOTE — CONSULT NOTE ADULT - CONSULT REQUESTED DATE/TIME
21-Jul-2022 11:01
11-Jul-2022 09:51
15-Jul-2022 10:12
20-Jul-2022 13:53
20-Jul-2022 14:36
15-Jul-2022 12:32
14-Jul-2022 09:47

## 2022-07-21 NOTE — CONSULT NOTE ADULT - SUBJECTIVE AND OBJECTIVE BOX
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 States he has sob at baseline, but noted his pulse ox to be in 80s consistently the past several days. Endorses baseline LE edema, could not give report of any worsening of it. Pt states he has afib and it has been uncontrolled recently, causing increased difficulty catching his breath. States he had one acute episode one night prior to admission but refused to come in d/t anxiety of staying too long in hospital. Denies chest pain, fever, chills, nausea, vomiting, abd pain, diarrhea, HA, dizziness, weakness.  Pt has multiple admissions for sob d/t various causes such as ILD exacerbations and CHF exacerbations. Pt is aox4.     in the ED,pt's VS T(C): 36.6 (07-10-22 @ 20:04), Max: 36.6 (07-10-22 @ 20:04)  HR: 86 (07-11-22 @ 02:03) (86 - 110)  BP: 157/91 (07-10-22 @ 23:06) (124/81 - 157/91)  RR: 22 (07-10-22 @ 23:06) (22 - 22)  SpO2: 91% (07-10-22 @ 23:06) (91% - 97%), labs done showed wbc 11.24 (similar to May '22), Hb 11, , plts 192, INR 2.18, sodium 137, potassium 3.4, chloride 97, Cr 1.3, BUN 29, Ca 8.7, albumin 3.4, gfr 53, Magnesium 1.6, troponin <0.01, bnp 5836 (2800 May '22); VBG lactate 2.3, VBG pco2 53, covid neg, influenza neg, rsp Syncitial virus neg  EKG: afib with RVR rate 103, CXR: bl opacities (pending official read)  pt received lasix 40mg IV, 2g magnesium  pt is admitted for workup/management CHF exacerbation vs ILD flare (11 Jul 2022 03:43)      Allergies    No Known Allergies    Intolerances        MEDICATIONS  (STANDING):  albuterol/ipratropium for Nebulization 3 milliLiter(s) Nebulizer every 6 hours  apixaban 5 milliGRAM(s) Oral two times a day  buMETAnide 1 milliGRAM(s) Oral two times a day  cyanocobalamin 1000 MICROGram(s) Oral daily  diltiazem    milliGRAM(s) Oral daily  ferrous    sulfate 325 milliGRAM(s) Oral daily  finasteride 5 milliGRAM(s) Oral daily  melatonin 10 milliGRAM(s) Oral at bedtime  metoprolol tartrate 100 milliGRAM(s) Oral three times a day  pantoprazole    Tablet 40 milliGRAM(s) Oral before breakfast  polyethylene glycol 3350 17 Gram(s) Oral two times a day  predniSONE   Tablet   Oral   predniSONE   Tablet 10 milliGRAM(s) Oral two times a day  senna 2 Tablet(s) Oral at bedtime  tamsulosin 0.4 milliGRAM(s) Oral at bedtime    MEDICATIONS  (PRN):  acetaminophen     Tablet .. 650 milliGRAM(s) Oral every 6 hours PRN Temp greater or equal to 38C (100.4F), Mild Pain (1 - 3)  aluminum hydroxide/magnesium hydroxide/simethicone Suspension 30 milliLiter(s) Oral every 4 hours PRN Dyspepsia  ondansetron Injectable 4 milliGRAM(s) IV Push every 8 hours PRN Nausea and/or Vomiting      PAST MEDICAL & SURGICAL HISTORY:  Hypertension      GERD (gastroesophageal reflux disease)      Lung interstitial disease      BPH (benign prostatic hyperplasia)      HFrEF (heart failure with reduced ejection fraction)      COPD (chronic obstructive pulmonary disease)      No significant past surgical history          FAMILY HISTORY:      SOCIAL HISTORY: No EtOH, no tobacco    REVIEW OF SYSTEMS:    CONSTITUTIONAL: no fever  EYES/ENT: No visual changes;  no throat pain   NECK: No pain or stiffness  RESPIRATORY: no sob  CARDIOVASCULAR: No chest pain or palpitations  GASTROINTESTINAL: No abdominal or epigastric pain. No NV/D/C  GENITOURINARY: No dysuria, change in frequency or hematuria  NEUROLOGICAL: No numbness or weakness  SKIN: No itching, burning, rashes, or lesions   Psych: No depression   MSK: no joint pain  Allergy: no urticaria         T(F): 96 (07-21-22 @ 05:34), Max: 96.7 (07-20-22 @ 13:21)  HR: 94 (07-21-22 @ 07:40)  BP: 112/78 (07-21-22 @ 05:34)  RR: 18 (07-21-22 @ 07:40)  SpO2: 97% (07-21-22 @ 07:40)  Wt(kg): --    PHYSICAL EXAM:                          13.1   20.52 )-----------( 287      ( 21 Jul 2022 09:05 )             41.6       07-21    129<L>  |  90<L>  |  47<H>  ----------------------------<  112<H>  3.6   |  35<H>  |  1.2    Ca    8.6      21 Jul 2022 09:05  Mg     2.0     07-21    TPro  6.4  /  Alb  3.1<L>  /  TBili  1.0  /  DBili  x   /  AST  15  /  ALT  20  /  AlkPhos  77  07-21      Magnesium, Serum: 2.0 mg/dL (07-21 @ 09:05)

## 2022-07-21 NOTE — PROGRESS NOTE ADULT - SUBJECTIVE AND OBJECTIVE BOX
24H events:    Patient is a 89y old Male who presents with a chief complaint of acute sob, hypoxemia (21 Jul 2022 11:00)    Primary diagnosis of Shortness of breath       Today is hospital day 10d.  no acute events overnight     PAST MEDICAL & SURGICAL HISTORY  Hypertension    GERD (gastroesophageal reflux disease)    Lung interstitial disease    BPH (benign prostatic hyperplasia)    HFrEF (heart failure with reduced ejection fraction)    COPD (chronic obstructive pulmonary disease)    No significant past surgical history      SOCIAL HISTORY:  Negative for smoking/alcohol/drug use.     ALLERGIES:  No Known Allergies    MEDICATIONS:  STANDING MEDICATIONS  albuterol/ipratropium for Nebulization 3 milliLiter(s) Nebulizer every 6 hours  apixaban 5 milliGRAM(s) Oral two times a day  buMETAnide 1 milliGRAM(s) Oral two times a day  cyanocobalamin 1000 MICROGram(s) Oral daily  diltiazem    milliGRAM(s) Oral daily  ferrous    sulfate 325 milliGRAM(s) Oral daily  finasteride 5 milliGRAM(s) Oral daily  melatonin 10 milliGRAM(s) Oral at bedtime  metoprolol tartrate 100 milliGRAM(s) Oral three times a day  pantoprazole    Tablet 40 milliGRAM(s) Oral before breakfast  polyethylene glycol 3350 17 Gram(s) Oral two times a day  predniSONE   Tablet   Oral   predniSONE   Tablet 10 milliGRAM(s) Oral two times a day  senna 2 Tablet(s) Oral at bedtime  tamsulosin 0.4 milliGRAM(s) Oral at bedtime    PRN MEDICATIONS  acetaminophen     Tablet .. 650 milliGRAM(s) Oral every 6 hours PRN  aluminum hydroxide/magnesium hydroxide/simethicone Suspension 30 milliLiter(s) Oral every 4 hours PRN  ondansetron Injectable 4 milliGRAM(s) IV Push every 8 hours PRN    VITALS:   T(F): 96  HR: 94  BP: 112/78  RR: 18  SpO2: 97%    LABS:                        13.1   20.52 )-----------( 287      ( 21 Jul 2022 09:05 )             41.6     07-21    129<L>  |  90<L>  |  47<H>  ----------------------------<  112<H>  3.6   |  35<H>  |  1.2    Ca    8.6      21 Jul 2022 09:05  Mg     2.0     07-21    TPro  6.4  /  Alb  3.1<L>  /  TBili  1.0  /  DBili  x   /  AST  15  /  ALT  20  /  AlkPhos  77  07-21      RADIOLOGY:    PHYSICAL EXAM:  GENERAL: NAD  EYES: conjunctiva and sclera clear  ENMT: Moist mucous membranes  NERVOUS SYSTEM:  Alert & Oriented X2  CHEST/LUNG: bilateral diffuse wheezing with left lung coarse crackles   HEART: irregularly irregular pulse   ABDOMEN: Soft, Nontender, Nondistended  EXTREMITIES: no edema

## 2022-07-22 NOTE — PROGRESS NOTE ADULT - SUBJECTIVE AND OBJECTIVE BOX
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 States he has sob at baseline, but noted his pulse ox to be in 80s consistently the past several days. Endorses baseline LE edema, could not give report of any worsening of it. Pt states he has afib and it has been uncontrolled recently, causing increased difficulty catching his breath. States he had one acute episode one night prior to admission but refused to come in d/t anxiety of staying too long in hospital. Denies chest pain, fever, chills, nausea, vomiting, abd pain, diarrhea, HA, dizziness, weakness.  Pt has multiple admissions for sob d/t various causes such as ILD exacerbations and CHF exacerbations. Pt is aox4.     in the ED,pt's VS T(C): 36.6 (07-10-22 @ 20:04), Max: 36.6 (07-10-22 @ 20:04)  HR: 86 (07-11-22 @ 02:03) (86 - 110)  BP: 157/91 (07-10-22 @ 23:06) (124/81 - 157/91)  RR: 22 (07-10-22 @ 23:06) (22 - 22)  SpO2: 91% (07-10-22 @ 23:06) (91% - 97%), labs done showed wbc 11.24 (similar to May '22), Hb 11, , plts 192, INR 2.18, sodium 137, potassium 3.4, chloride 97, Cr 1.3, BUN 29, Ca 8.7, albumin 3.4, gfr 53, Magnesium 1.6, troponin <0.01, bnp 5836 (2800 May '22); VBG lactate 2.3, VBG pco2 53, covid neg, influenza neg, rsp Syncitial virus neg  EKG: afib with RVR rate 103, CXR: bl opacities (pending official read)  pt received lasix 40mg IV, 2g magnesium  pt is admitted for workup/management CHF exacerbation vs ILD flare (11 Jul 2022 03:43)    MEDICATIONS  (STANDING):  albuterol/ipratropium for Nebulization 3 milliLiter(s) Nebulizer every 6 hours  apixaban 5 milliGRAM(s) Oral two times a day  buMETAnide 1 milliGRAM(s) Oral two times a day  cyanocobalamin 1000 MICROGram(s) Oral daily  diltiazem    milliGRAM(s) Oral daily  ferrous    sulfate 325 milliGRAM(s) Oral daily  finasteride 5 milliGRAM(s) Oral daily  melatonin 10 milliGRAM(s) Oral at bedtime  metoprolol tartrate 100 milliGRAM(s) Oral three times a day  pantoprazole    Tablet 40 milliGRAM(s) Oral before breakfast  polyethylene glycol 3350 17 Gram(s) Oral two times a day  predniSONE   Tablet   Oral   predniSONE   Tablet 10 milliGRAM(s) Oral daily  senna 2 Tablet(s) Oral at bedtime  tamsulosin 0.4 milliGRAM(s) Oral at bedtime    MEDICATIONS  (PRN):  acetaminophen     Tablet .. 650 milliGRAM(s) Oral every 6 hours PRN Temp greater or equal to 38C (100.4F), Mild Pain (1 - 3)  aluminum hydroxide/magnesium hydroxide/simethicone Suspension 30 milliLiter(s) Oral every 4 hours PRN Dyspepsia  ondansetron Injectable 4 milliGRAM(s) IV Push every 8 hours PRN Nausea and/or Vomiting    INTERVAL EVENTS:    T(C): 35.2 (07-22-22 @ 05:27), Max: 36 (07-21-22 @ 12:55)  HR: 72 (07-22-22 @ 07:40) (72 - 98)  BP: 124/78 (07-22-22 @ 05:27) (113/61 - 124/78)  RR: 18 (07-22-22 @ 07:40) (18 - 19)  SpO2: 99% (07-22-22 @ 07:40) (99% - 99%)  Wt(kg): --Vital Signs Last 24 Hrs  T(C): 35.2 (22 Jul 2022 05:27), Max: 36 (21 Jul 2022 12:55)  T(F): 95.3 (22 Jul 2022 05:27), Max: 96.8 (21 Jul 2022 12:55)  HR: 72 (22 Jul 2022 07:40) (72 - 98)  BP: 124/78 (22 Jul 2022 05:27) (113/61 - 124/78)  BP(mean): 93 (22 Jul 2022 05:27) (93 - 93)  RR: 18 (22 Jul 2022 07:40) (18 - 19)  SpO2: 99% (22 Jul 2022 07:40) (99% - 99%)    Parameters below as of 22 Jul 2022 07:40  Patient On (Oxygen Delivery Method): nasal cannula  O2 Flow (L/min): 5      PHYSICAL EXAM:  GENERAL:   NECK: Supple, No JVD, Normal thyroid  CHEST/LUNG: Clear; No crackles or wheezing  HEART: S1, S2, Regular rate and rhythm;   ABDOMEN: Soft, Nontender, Nondistended; Bowel sounds present  EXTREMITIES: No clubbing, cyanosis, or edema  SKIN: No rashes or lesions    LABS:                        11.6   15.77 )-----------( 249      ( 22 Jul 2022 06:39 )             36.4             07-22    135  |  94<L>  |  45<H>  ----------------------------<  110<H>  3.6   |  36<H>  |  1.1    Ca    8.5      22 Jul 2022 06:39  Mg     2.0     07-22    TPro  5.8<L>  /  Alb  2.7<L>  /  TBili  0.7  /  DBili  x   /  AST  15  /  ALT  21  /  AlkPhos  61  07-22    LIVER FUNCTIONS - ( 22 Jul 2022 06:39 )  Alb: 2.7 g/dL / Pro: 5.8 g/dL / ALK PHOS: 61 U/L / ALT: 21 U/L / AST: 15 U/L / GGT: x           riCulture - Urine (07.18.22 @ 12:00)   - Ampicillin: S <=2 Predicts results to ampicillin/sulbactam, amoxacillin-clavulanate and piperacillin-tazobactam.   - Ciprofloxacin: S <=1   - Levofloxacin: S <=1   - Nitrofurantoin: S <=32 Should not be used to treat pyelonephritis.   - Tetra/Doxy: R >8   - Vancomycin: S 2   Specimen Source: Catheterized Catheterized   Culture Results:   50,000 - 99,000 CFU/mL Pseudomonas aeruginosa Susceptibility to follow.   >100,000 CFU/ml Enterococcus faecalis   Organism Identification: Enterococcus faecalis   Organism: Enterococcus faecalis   Method Type: RUBEN     RADIOLOGY & ADDITIONAL TESTS:  < from: Xray Chest 1 View- PORTABLE-Urgent (Xray Chest 1 View- PORTABLE-Urgent .) (07.15.22 @ 16:28) >  INTERPRETATION:  Clinical History/Reason for Exam:  fu    Technique:  Frontal view the chest.    Comparison: Chest x-ray 5/14/2022.    Findings:    Support devices:  none    Cardiac/mediastinum/hilum: Stable cardiomegaly    Lung parenchyma/ Pleura: Stable bilateral diffuse opacities.      Skeleton/soft tissues: Stable scoliosis/degenerative changes (spine and   right shoulder)      Impression:    Stable bilateral diffuse opacities.    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 States he has sob at baseline, but noted his pulse ox to be in 80s consistently the past several days. Endorses baseline LE edema, could not give report of any worsening of it. Pt states he has afib and it has been uncontrolled recently, causing increased difficulty catching his breath. States he had one acute episode one night prior to admission but refused to come in d/t anxiety of staying too long in hospital. Denies chest pain, fever, chills, nausea, vomiting, abd pain, diarrhea, HA, dizziness, weakness.  Pt has multiple admissions for sob d/t various causes such as ILD exacerbations and CHF exacerbations. Pt is aox4.     in the ED,pt's VS T(C): 36.6 (07-10-22 @ 20:04), Max: 36.6 (07-10-22 @ 20:04)  HR: 86 (07-11-22 @ 02:03) (86 - 110)  BP: 157/91 (07-10-22 @ 23:06) (124/81 - 157/91)  RR: 22 (07-10-22 @ 23:06) (22 - 22)  SpO2: 91% (07-10-22 @ 23:06) (91% - 97%), labs done showed wbc 11.24 (similar to May '22), Hb 11, , plts 192, INR 2.18, sodium 137, potassium 3.4, chloride 97, Cr 1.3, BUN 29, Ca 8.7, albumin 3.4, gfr 53, Magnesium 1.6, troponin <0.01, bnp 5836 (2800 May '22); VBG lactate 2.3, VBG pco2 53, covid neg, influenza neg, rsp Syncitial virus neg  EKG: afib with RVR rate 103, CXR: bl opacities (pending official read)  pt received lasix 40mg IV, 2g magnesium  pt is admitted for workup/management CHF exacerbation vs ILD flare (11 Jul 2022 03:43)    MEDICATIONS  (STANDING):  albuterol/ipratropium for Nebulization 3 milliLiter(s) Nebulizer every 6 hours  apixaban 5 milliGRAM(s) Oral two times a day  buMETAnide 1 milliGRAM(s) Oral two times a day  cyanocobalamin 1000 MICROGram(s) Oral daily  diltiazem    milliGRAM(s) Oral daily  ferrous    sulfate 325 milliGRAM(s) Oral daily  finasteride 5 milliGRAM(s) Oral daily  melatonin 10 milliGRAM(s) Oral at bedtime  metoprolol tartrate 100 milliGRAM(s) Oral three times a day  pantoprazole    Tablet 40 milliGRAM(s) Oral before breakfast  polyethylene glycol 3350 17 Gram(s) Oral two times a day  predniSONE   Tablet   Oral   predniSONE   Tablet 10 milliGRAM(s) Oral daily  senna 2 Tablet(s) Oral at bedtime  tamsulosin 0.4 milliGRAM(s) Oral at bedtime    MEDICATIONS  (PRN):  acetaminophen     Tablet .. 650 milliGRAM(s) Oral every 6 hours PRN Temp greater or equal to 38C (100.4F), Mild Pain (1 - 3)  aluminum hydroxide/magnesium hydroxide/simethicone Suspension 30 milliLiter(s) Oral every 4 hours PRN Dyspepsia  ondansetron Injectable 4 milliGRAM(s) IV Push every 8 hours PRN Nausea and/or Vomiting    INTERVAL EVENTS: Patient seen today OOB in chair, continues to have thick copious sputum secretions. Patient continues to complain about condom cath, ? pelvic tenderness    T(C): 35.2 (07-22-22 @ 05:27), Max: 36 (07-21-22 @ 12:55)  HR: 72 (07-22-22 @ 07:40) (72 - 98)  BP: 124/78 (07-22-22 @ 05:27) (113/61 - 124/78)  RR: 18 (07-22-22 @ 07:40) (18 - 19)  SpO2: 99% (07-22-22 @ 07:40) (99% - 99%)  Wt(kg): --Vital Signs Last 24 Hrs  T(C): 35.2 (22 Jul 2022 05:27), Max: 36 (21 Jul 2022 12:55)  T(F): 95.3 (22 Jul 2022 05:27), Max: 96.8 (21 Jul 2022 12:55)  HR: 72 (22 Jul 2022 07:40) (72 - 98)  BP: 124/78 (22 Jul 2022 05:27) (113/61 - 124/78)  BP(mean): 93 (22 Jul 2022 05:27) (93 - 93)  RR: 18 (22 Jul 2022 07:40) (18 - 19)  SpO2: 99% (22 Jul 2022 07:40) (99% - 99%)    Parameters below as of 22 Jul 2022 07:40  Patient On (Oxygen Delivery Method): nasal cannula  O2 Flow (L/min): 5      PHYSICAL EXAM:  GENERAL: NAD, in chair  NECK: Supple, No JVD  CHEST/LUNG: Decreased BS  HEART: S1, S2  ABDOMEN: Soft, Nondistended; Bowel sounds present, ? pelvic tenderness  EXTREMITIES: Decreased edema      LABS:                        11.6   15.77 )-----------( 249      ( 22 Jul 2022 06:39 )             36.4             07-22    135  |  94<L>  |  45<H>  ----------------------------<  110<H>  3.6   |  36<H>  |  1.1    Ca    8.5      22 Jul 2022 06:39  Mg     2.0     07-22    TPro  5.8<L>  /  Alb  2.7<L>  /  TBili  0.7  /  DBili  x   /  AST  15  /  ALT  21  /  AlkPhos  61  07-22    LIVER FUNCTIONS - ( 22 Jul 2022 06:39 )  Alb: 2.7 g/dL / Pro: 5.8 g/dL / ALK PHOS: 61 U/L / ALT: 21 U/L / AST: 15 U/L / GGT: x           riCulture - Urine (07.18.22 @ 12:00)   - Ampicillin: S <=2 Predicts results to ampicillin/sulbactam, amoxacillin-clavulanate and piperacillin-tazobactam.   - Ciprofloxacin: S <=1   - Levofloxacin: S <=1   - Nitrofurantoin: S <=32 Should not be used to treat pyelonephritis.   - Tetra/Doxy: R >8   - Vancomycin: S 2   Specimen Source: Catheterized Catheterized   Culture Results:   50,000 - 99,000 CFU/mL Pseudomonas aeruginosa Susceptibility to follow.   >100,000 CFU/ml Enterococcus faecalis   Organism Identification: Enterococcus faecalis   Organism: Enterococcus faecalis   Method Type: RUBEN     RADIOLOGY & ADDITIONAL TESTS:  < from: Xray Chest 1 View- PORTABLE-Urgent (Xray Chest 1 View- PORTABLE-Urgent .) (07.15.22 @ 16:28) >  INTERPRETATION:  Clinical History/Reason for Exam:  fu    Technique:  Frontal view the chest.    Comparison: Chest x-ray 5/14/2022.    Findings:    Support devices:  none    Cardiac/mediastinum/hilum: Stable cardiomegaly    Lung parenchyma/ Pleura: Stable bilateral diffuse opacities.      Skeleton/soft tissues: Stable scoliosis/degenerative changes (spine and   right shoulder)      Impression:    Stable bilateral diffuse opacities.    < end of copied text >

## 2022-07-22 NOTE — PROGRESS NOTE ADULT - SUBJECTIVE AND OBJECTIVE BOX
24H events:    Patient is a 89y old Male who presents with a chief complaint of acute sob, hypoxemia (22 Jul 2022 10:38)    Primary diagnosis of Shortness of breath       Today is hospital day 11d.  no acute events overnight     PAST MEDICAL & SURGICAL HISTORY  Hypertension    GERD (gastroesophageal reflux disease)    Lung interstitial disease    BPH (benign prostatic hyperplasia)    HFrEF (heart failure with reduced ejection fraction)    COPD (chronic obstructive pulmonary disease)    No significant past surgical history      SOCIAL HISTORY:  Negative for smoking/alcohol/drug use.     ALLERGIES:  No Known Allergies    MEDICATIONS:  STANDING MEDICATIONS  albuterol/ipratropium for Nebulization 3 milliLiter(s) Nebulizer every 6 hours  apixaban 5 milliGRAM(s) Oral two times a day  buMETAnide 1 milliGRAM(s) Oral two times a day  cyanocobalamin 1000 MICROGram(s) Oral daily  diltiazem    milliGRAM(s) Oral daily  ferrous    sulfate 325 milliGRAM(s) Oral daily  finasteride 5 milliGRAM(s) Oral daily  melatonin 10 milliGRAM(s) Oral at bedtime  metoprolol tartrate 100 milliGRAM(s) Oral three times a day  pantoprazole    Tablet 40 milliGRAM(s) Oral before breakfast  polyethylene glycol 3350 17 Gram(s) Oral two times a day  predniSONE   Tablet   Oral   predniSONE   Tablet 10 milliGRAM(s) Oral daily  senna 2 Tablet(s) Oral at bedtime  tamsulosin 0.4 milliGRAM(s) Oral at bedtime    PRN MEDICATIONS  acetaminophen     Tablet .. 650 milliGRAM(s) Oral every 6 hours PRN  aluminum hydroxide/magnesium hydroxide/simethicone Suspension 30 milliLiter(s) Oral every 4 hours PRN  ondansetron Injectable 4 milliGRAM(s) IV Push every 8 hours PRN    VITALS:   T(F): 95.3  HR: 95  BP: 109/58  RR: 18  SpO2: 97%    LABS:                        11.6   15.77 )-----------( 249      ( 22 Jul 2022 06:39 )             36.4     07-22    135  |  94<L>  |  45<H>  ----------------------------<  110<H>  3.6   |  36<H>  |  1.1    Ca    8.5      22 Jul 2022 06:39  Mg     2.0     07-22    TPro  5.8<L>  /  Alb  2.7<L>  /  TBili  0.7  /  DBili  x   /  AST  15  /  ALT  21  /  AlkPhos  61  07-22                  RADIOLOGY:    PHYSICAL EXAM:  GENERAL: NAD  EYES: conjunctiva and sclera clear  ENMT: Moist mucous membranes  NERVOUS SYSTEM:  Alert & Oriented X2, difficulty hearing   CHEST/LUNG: decreased air entry bilaterally with left diffuse crackles   HEART: irregularly irregular normal S1 S2   ABDOMEN: Soft, Nontender, Nondistended  EXTREMITIES: no edema

## 2022-07-23 NOTE — PROGRESS NOTE ADULT - SUBJECTIVE AND OBJECTIVE BOX
· Subjective and Objective:   Chart reviewed, patient examined. Pertinent results reviewed.  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 States he has sob at baseline, but noted his pulse ox to be in 80s consistently the past several days. Endorses baseline LE edema, could not give report of any worsening of it. Pt states he has afib and it has been uncontrolled recently, causing increased difficulty catching his breath. States he had one acute episode one night prior to admission but refused to come in d/t anxiety of staying too long in hospital. Denies chest pain, fever, chills, nausea, vomiting, abd pain, diarrhea, HA, dizziness, weakness.  Pt has multiple admissions for sob d/t various causes such as ILD exacerbations and CHF exacerbations. Pt is aox4.            no acute events overnight     PAST MEDICAL & SURGICAL HISTORY  Hypertension    GERD (gastroesophageal reflux disease)    Lung interstitial disease    BPH (benign prostatic hyperplasia)    HFrEF (heart failure with reduced ejection fraction)    COPD (chronic obstructive pulmonary disease)    No significant past surgical history      SOCIAL HISTORY:  Negative for smoking/alcohol/drug use.     ALLERGIES:  No Known Allergies    MEDICATIONS:  MEDICATIONS  (STANDING):  albuterol/ipratropium for Nebulization 3 milliLiter(s) Nebulizer every 6 hours  apixaban 5 milliGRAM(s) Oral two times a day  buMETAnide 1 milliGRAM(s) Oral two times a day  cyanocobalamin 1000 MICROGram(s) Oral daily  diltiazem    milliGRAM(s) Oral daily  ferrous    sulfate 325 milliGRAM(s) Oral daily  finasteride 5 milliGRAM(s) Oral daily  melatonin 10 milliGRAM(s) Oral at bedtime  metoprolol tartrate 100 milliGRAM(s) Oral three times a day  pantoprazole    Tablet 40 milliGRAM(s) Oral before breakfast  polyethylene glycol 3350 17 Gram(s) Oral two times a day  predniSONE   Tablet   Oral   predniSONE   Tablet 10 milliGRAM(s) Oral daily  senna 2 Tablet(s) Oral at bedtime  tamsulosin 0.4 milliGRAM(s) Oral at bedtime    MEDICATIONS  (PRN):  acetaminophen     Tablet .. 650 milliGRAM(s) Oral every 6 hours PRN Temp greater or equal to 38C (100.4F), Mild Pain (1 - 3)  aluminum hydroxide/magnesium hydroxide/simethicone Suspension 30 milliLiter(s) Oral every 4 hours PRN Dyspepsia  ondansetron Injectable 4 milliGRAM(s) IV Push every 8 hours PRN Nausea and/or Vomiting      VITALS:   Vital Signs Last 24 Hrs  T(C): 35.9 (23 Jul 2022 05:40), Max: 36.7 (22 Jul 2022 20:20)  T(F): 96.6 (23 Jul 2022 05:40), Max: 98 (22 Jul 2022 20:20)  HR: 92 (23 Jul 2022 05:40) (92 - 104)  BP: 126/78 (23 Jul 2022 05:40) (109/58 - 126/78)  BP(mean): --  RR: 18 (23 Jul 2022 05:40) (18 - 19)  SpO2: 98% (23 Jul 2022 05:40) (97% - 98%)    Parameters below as of 23 Jul 2022 05:40  Patient On (Oxygen Delivery Method): nasal cannula- 5L/m      LABS:                          12.1   12.62 )-----------( 250      ( 23 Jul 2022 06:22 )             37.8                       11.6   15.77 )-----------( 249      ( 22 Jul 2022 06:39 )             36.4     07-23    137  |  94<L>  |  41<H>  ----------------------------<  76  3.8   |  34<H>  |  1.1    Ca    8.3<L>      23 Jul 2022 06:22  Mg     1.8     07-23    TPro  5.8<L>  /  Alb  2.8<L>  /  TBili  0.7  /  DBili  x   /  AST  20  /  ALT  25  /  AlkPhos  66  07-23 07-22    135  |  94<L>  |  45<H>  ----------------------------<  110<H>  3.6   |  36<H>  |  1.1    Ca    8.5      22 Jul 2022 06:39  Mg     2.0     07-22    TPro  5.8<L>  /  Alb  2.7<L>  /  TBili  0.7  /  DBili  x   /  AST  15  /  ALT  21  /  AlkPhos  61  07-22                  RADIOLOGY:    PHYSICAL EXAM:  GENERAL: NAD; weak older male w poor hearing  EYES: conjunctiva and sclera clear  ENMT: Moist mucous membranes;   NERVOUS SYSTEM:  Alert & Oriented X2, difficulty hearing   CHEST/LUNG: decreased air entry bilaterally with left base crackles   HEART: irregularly irregular ; no MRG  ABDOMEN: Soft, Nontender, Nondistended  EXTREMITIES: no edema

## 2022-07-23 NOTE — DISCHARGE NOTE NURSING/CASE MANAGEMENT/SOCIAL WORK - PATIENT PORTAL LINK FT
You can access the FollowMyHealth Patient Portal offered by [facility] by registering at the following website: [email or web address]. By joining [facility]’s FollowMyHealth portal, you will also be able to view your health information using other applications (apps) compatible with our system.

## 2022-07-23 NOTE — PROGRESS NOTE ADULT - ASSESSMENT
89-year-old male with past medical history of ILD on 5L O2, HTN, BPH, GERD, A. fib on Eliquis, CHF (EF 46% 2/27/22), and left lower lobe lung mass presents to the ED sent in from [facility] for evaluation of shortness of breath for 2 days.       # CHF/ #HFmrEF - resolved  # Mostly secondary to uncontrolled Afib   # HTN   - Was congested on admission with lower limb edema, improved with IV lasix   - Required BIPAP at night, now only on nasal cannula   - Currently he is on nasal cannula 5 liters, he is short of breath on resting sometimes  - diet dash/tlc, 1.2L fluid restriction  - Bumex 1mg X2   - TTE showed improved EF to 55-60%   - strict I and O  - c/w home meds: cardizem 120mg daily, lopressor 100 tid, apixaban 5 BID   - follow up with cardiology as outpatient       # ILD  # LLL mass suspicious for cancer  - CT chest: IMPRESSION:  Diffuse interstitial opacifications with groundglass abnormalities   diffusely as well as honeycombing. Differential diagnosis remains CHF,   more likely worsening interstitial lung disease  - Received cefepime and Solumedrol   - slowly tapering steroids, now on prednisone 20mg daily   - Wean O2, titrate FiO2 to 88-92%  - Clarify GOC - Very poor prognosis - family wants full code   - Follow up with pulmonary upon discharge       # BPH  - macedo's was removed with good urine output   - on condom catheter       # High WBC mostly related to steroids - improved   - peaked reaching 29.9K, today 15K   - no signs or symptoms of infection   - ID consulted: no need for ABx initiation at the moment  - hematology consulted: keep steroid tapering   - follow up WBC     #GERD  -c/w pantoprazole      # GI prophylaxis: pantoprazole  # DVT prophylaxis: apixaban 5 BID   # Full code  # Diet: DASH   Case was discussed with the attending 
  89-year-old male with past medical history of ILD on 5L O2, HTN, BPH, GERD, A. fib on Eliquis, CHF (EF 46% 2/27/22), and left lower lobe lung mass presents to the ED sent in from [facility] for evaluation of shortness of breath for 2 days.       # CHF/ #HFmrEF - resolved  # Mostly secondary to uncontrolled Afib   # HTN   - Was congested on admission with lower limb edema, improved with IV lasix   - Required BIPAP at night, now only on nasal cannula   - Currently he is on nasal cannula 5 liters, he is short of breath on resting sometimes  - diet dash/tlc, 1.2L fluid restriction  - Bumex 1mg X2   - TTE showed improved EF to 55-60%   - strict I and O  - c/w home meds: cardizem 120mg daily, lopressor 100 tid, apixaban 5 BID   - follow up with cardiology as outpatient       # ILD  # LLL mass suspicious for cancer  - CT chest: IMPRESSION:  Diffuse interstitial opacifications with groundglass abnormalities   diffusely as well as honeycombing. Differential diagnosis remains CHF,   more likely worsening interstitial lung disease  - Received cefepime and Solumedrol   - slowly tapering steroids, now on prednisone 20mg daily   - Wean O2, titrate FiO2 to 88-92%  - Clarify GOC - Very poor prognosis - family wants full code   - Follow up with pulmonary upon discharge       # BPH  - macedo's was removed with good urine output   - on condom catheter   - RNs to check bladder scan      # High WBC mostly related to steroids - improved   - peaked reaching 29.9K, today 15K   - no signs or symptoms of infection   - ID consulted: no need for ABx initiation at the moment  - hematology consulted: keep steroid tapering   - follow up WBC     #GERD  -c/w pantoprazole      # GI prophylaxis: pantoprazole  # DVT prophylaxis: apixaban 5 BID   # Full code  # Diet: DASH   Case was discussed with the ; plan is for return to [facility] today
  ASSESSMENT & PLAN    89-year-old male with past medical history of ILD on 5L O2, HTN, BPH, GERD, A. fib on Eliquis, CHF (EF 46% 2/27/22), and left lower lobe lung mass presents to the ED sent in from [facility] for evaluation of shortness of breath for 2 days. States his atrial fibrillation has not been in control, causing worsening sob. Noted drastic change from baseline over the past two days, with multiple pulse ox in 80s. Sent in from ED, improved on NRB then placed on bipap with improvement to 95%. Pt feels better, also recived 40mg IV lasix with good urine out/put at least 1L. Pt has pulmonary congestion on cxr, has extensive lower extremity edema. likely in CHF exacebation secondary to afib wit rvr. HR under controll now. c/w IV diuresis, fluid restriction.         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), ILD  - BNP elevated 5800, 2900 prior admission  - 1.2L fluid restriction  - continue diuresis Lasix 40mg IV BID, transition to PO for discharge  - strict I and O  - O2 support   - slow Prednisone taper as per pulmonary    ILD/  LLL mass suspicious for cancer  - pulmonary recs: Solumedrol 60 q12 & Cefepime  - f/u ID recs  - f/u sputum culture  - Wean O2, titrate FiO2 to 88-92%, CPAP at night  - poor prognosis     A fib   - on home meds: Cardizem 120mg daily, Lopressor 100 tid  - PO Eliquis    Hypokalemia, Hypomagnesemia  - continue to supplement K and Mg    GERD  - on Pantoprazole    D/C planning back to SNF, will need Covid testing. Patient remains a full code, not willing to make decision on advance directives  
  ASSESSMENT & PLAN    89-year-old male with past medical history of ILD on 5L O2, HTN, BPH, GERD, A. fib on Eliquis, CHF (EF 46% 2/27/22), and left lower lobe lung mass presents to the ED sent in from [facility] for evaluation of shortness of breath for 2 days. States his atrial fibrillation has not been in control, causing worsening sob. Noted drastic change from baseline over the past two days, with multiple pulse ox in 80s. Sent in from ED, improved on NRB then placed on bipap with improvement to 95%. Pt feels better, also recived 40mg IV lasix with good urine out/put at least 1L. Pt has pulmonary congestion on cxr, has extensive lower extremity edema. likely in CHF exacebation secondary to afib wit rvr. HR under controll now. c/w IV diuresis, fluid restriction.         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), ILD  - BNP elevated 5800, 2900 prior admission  - 1.2L fluid restriction  - Lasix 40mg  transition to PO Bumex  - strict I and O  - O2 support   - slow Prednisone taper as per pulmonary    ILD/  LLL mass suspicious for cancer  - off antibiotics as per ID recs  - slow Prednisone taper as per pulmonary  - Wean O2, titrate FiO2 to 88-92%, CPAP at night  - poor prognosis   - outpatient f/u with pulmonary    Leuckocytosis  - hem/onc evaluation noted      A fib   - on home meds: Cardizem 120mg daily, Lopressor 100 tid  - PO Eliquis    Hypokalemia, Hypomagnesemia  - continue to supplement K and Mg    GERD  - on Pantoprazole    D/C planning back to SNF, will need Covid testing. Patient remains a full code, not willing to make decision on advance directives  
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 Patient has had multiple recent hospitalizations for ILD exacerbations, CHF. Patient known to palliative team, previously considering hospice and was DNR/DNI at one point.     Patient SOB and anxious on exam. Complains of constipation.   Patient not ready to discuss GOC in detail today, but was Ok with us calling [facility]. left message for Elkin to call back.     MEDD (morphine equivalent daily dose): 0      See Recs below.    Please call [phone] with questions or concerns 24/7.   We will continue to follow.     Discussed with primary MD.  
  89-year-old male with past medical history of ILD on 5L O2, HTN, BPH, GERD, A. fib on Eliquis, CHF (EF 46% 2/27/22), and left lower lobe lung mass presents to the ED sent in from [facility] for evaluation of shortness of breath for 2 days.       # CHF/ #HFmrEF  # Mostly secondary to uncontrolled Afib   # HTN   - Was congested on admission with lower limb edema, improved with IV lasix   - Required BIPAP at night   - Currently he is on nasal cannula 5 liters, no new symptoms   - diet dash/tlc, 1.2L fluid restriction  - continue with IV diuresis lasix 40mg BID  - strict I and O  - Pending TTE  - c/w home meds: cardizem 120mg daily, lopressor 100 tid, apixaban 5 BID       # ILD  # LLL mass suspicious for cancer  - CT chest: IMPRESSION:  Diffuse interstitial opacifications with groundglass abnormalities   diffusely as well as honeycombing. Differential diagnosis remains CHF,   more likely worsening interstitial lung disease  - Received cefepime and Solumedrol   - switched to oral tapering prednisone   - Wean O2, titrate FiO2 to 88-92%  - Clarify GOC - Very poor prognosis - family wants full code     #GERD  -c/w pantoprazole    # BPH   - on chronic macedo's catheter       # GI prophylaxis: pantoprazole  # DVT prophylaxis: apixaban 5 BID   # Full code  # Diet: DASH   Case was discussed with the attending 
  ASSESSMENT & PLAN    89-year-old male with past medical history of ILD on 5L O2, HTN, BPH, GERD, A. fib on Eliquis, CHF (EF 46% 2/27/22), and left lower lobe lung mass presents to the ED sent in from [facility] for evaluation of shortness of breath for 2 days. States his atrial fibrillation has not been in control, causing worsening sob. Noted drastic change from baseline over the past two days, with multiple pulse ox in 80s. Sent in from ED, improved on NRB then placed on bipap with improvement to 95%. Pt feels better, also recived 40mg IV lasix with good urine out/put at least 1L. Pt has pulmonary congestion on cxr, has extensive lower extremity edema. likely in CHF exacebation secondary to afib wit rvr. HR under controll now. c/w IV diuresis, fluid restriction.         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), ILD  - BNP elevated 5800, 2900 prior admission  - 1.2L fluid restriction  - continue diuresis Lasix 40mg IV BID, transition to PO for discharge  - strict I and O  - O2 support   - slow Prednisone taper as per pulmonary    Leukocytosis- worsening  - no obvious new source of infection; Afebrile  - recheck UA; ID to re-see;  - and ask heme to see and review    ILD/  LLL mass suspicious for cancer  - pulmonary recs: Solumedrol 60 q12 & Cefepime  - f/u ID recs  - f/u sputum culture  - Wean O2, titrate FiO2 to 88-92%, CPAP at night  - poor prognosis     A fib   - on home meds: Cardizem 120mg daily, Lopressor 100 tid  - PO Eliquis    Hypokalemia, Hypomagnesemia  - continue to supplement K and Mg    GERD  - on Pantoprazole    D/C planning back to SNF, will need Covid testing. Patient remains a full code, not willing to make decision on advance directives  
  89-year-old male with past medical history of ILD on 5L O2, HTN, BPH, GERD, A. fib on Eliquis, CHF (EF 46% 2/27/22), and left lower lobe lung mass presents to the ED sent in from [facility] for evaluation of shortness of breath for 2 days.       # CHF/ #HFmrEF  # Mostly secondary to uncontrolled Afib   # HTN   - Was congested on admission with lower limb edema, improved with IV lasix   - Required BIPAP at night, now only on nasal cannula   - Currently he is on nasal cannula 5 liters, he is short of breath on resting   - diet dash/tlc, 1.2L fluid restriction  - switch lasix 40mg BID to Bumex 1mg X2 according to cardiology   - TTE showed improved EF to 55-60%   - strict I and O  - c/w home meds: cardizem 120mg daily, lopressor 100 tid, apixaban 5 BID   - Na was 129, possibly due to bumex, repeat sodium and assess need for bumex       # ILD  # LLL mass suspicious for cancer  - CT chest: IMPRESSION:  Diffuse interstitial opacifications with groundglass abnormalities   diffusely as well as honeycombing. Differential diagnosis remains CHF,   more likely worsening interstitial lung disease  - Received cefepime and Solumedrol   - slowly tapering steroids, now on prednisone 20mg daily   - Wean O2, titrate FiO2 to 88-92%  - Clarify GOC - Very poor prognosis - family wants full code       # Pain at site of macedo's catheter - improved   # BPH  - he is on chronic macedo's due to retention according to the [facility] chart   - it was changed 2 days ago   - urology consulted: no clear cause for chronic macedo's to be in place, restart tamsulosin and finasteride, remove macedo's with trial of void   - continue with tamsulosin and finasteride  - DC macedo's, put on condom catheter   - trial of void   - bladder scan to assess     # High WBC  - baseline 12-14K, today 20K, trending down   - no signs or symptoms of infection   - ID consulted: no need for ABx initiation at the moment  - hematology consult waiting for their input     #GERD  -c/w pantoprazole      # GI prophylaxis: pantoprazole  # DVT prophylaxis: apixaban 5 BID   # Full code  # Diet: DASH   Case was discussed with the attending 
  89-year-old male with past medical history of ILD on 5L O2, HTN, BPH, GERD, A. fib on Eliquis, CHF (EF 46% 2/27/22), and left lower lobe lung mass presents to the ED sent in from [facility] for evaluation of shortness of breath for 2 days.       # CHF/ #HFmrEF  # Mostly secondary to uncontrolled Afib   # HTN   - Was congested on admission with lower limb edema, improved with IV lasix   - Required BIPAP at night   - Currently he is on nasal cannula 5 liters, he is short of breath on resting   - diet dash/tlc, 1.2L fluid restriction  - switch lasix 40mg BID to Bumex 1mg X2 according to cardiology   - TTE showed improved EF to 55-60%   - strict I and O  - c/w home meds: cardizem 120mg daily, lopressor 100 tid, apixaban 5 BID       # ILD  # LLL mass suspicious for cancer  - CT chest: IMPRESSION:  Diffuse interstitial opacifications with groundglass abnormalities   diffusely as well as honeycombing. Differential diagnosis remains CHF,   more likely worsening interstitial lung disease  - Received cefepime and Solumedrol   - slowly tapering steroids, now on prednisone 20mg daily   - Wean O2, titrate FiO2 to 88-92%  - Clarify GOC - Very poor prognosis - family wants full code       # Pain at site of macedo's catheter   - he is on chronic macedo's due to retention according to the [facility] chart   - it was changed 2 days ago   - urology consulted: no clear cause for chronic macedo's to be in place, restart tamsulosin and finasteride, remove macedo's with trial of void   - continue with tamsulosin and finasteride  - DC macedo's, put on condom catheter   - trial of void   - bladder scan to assess     # High WBC  - baseline 12-14K, today 29.9K   - it was trending down slowly and it was thought it is related to steroids, but even with tapering steroids number continued going up  - no signs or symptoms of infection   - ID consult  - hematology consult     #GERD  -c/w pantoprazole    # BPH   - on chronic macedo's catheter       # GI prophylaxis: pantoprazole  # DVT prophylaxis: apixaban 5 BID   # Full code  # Diet: DASH   Case was discussed with the attending 
  ASSESSMENT & PLAN    89-year-old male with past medical history of ILD on 5L O2, HTN, BPH, GERD, A. fib on Eliquis, CHF (EF 46% 2/27/22), and left lower lobe lung mass presents to the ED sent in from [facility] for evaluation of shortness of breath for 2 days. States his atrial fibrillation has not been in control, causing worsening sob. Noted drastic change from baseline over the past two days, with multiple pulse ox in 80s. Sent in from ED, improved on NRB then placed on bipap with improvement to 95%. Pt feels better, also recived 40mg IV lasix with good urine out/put at least 1L. Pt has pulmonary congestion on cxr, has extensive lower extremity edema. likely in CHF exacebation secondary to afib wit rvr. HR under controll now. c/w IV diuresis, fluid restriction.     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), ILD  - BNP elevated 5800, 2900 prior admission  - 1.2L fluid restriction  - Lasix 40mg  transition to PO Bumex  - strict I and O  - O2 support   - slow Prednisone taper as per pulmonary    ILD/  LLL mass suspicious for cancer  - off antibiotics as per ID recs  - slow Prednisone taper as per pulmonary  - Wean O2, titrate FiO2 to 88-92%, CPAP at night  - poor prognosis   - outpatient f/u with pulmonary    Leuckocytosis  - hem/onc evaluation noted      A fib   - on home meds: Cardizem 120mg daily, Lopressor 100 tid  - PO Eliquis    Hypokalemia, Hypomagnesemia  - continue to supplement K and Mg    GERD   - on Pantoprazole    Protein Calorie Malnutrition     - supplement PO diet  - son does bring in food    BPH  - on Proscar and Flomax  - macedo was removed 2-3 days ago  - bladder scan today to assure not in retention  - if in retention may require SPC    D/C planning back to SNF, will need Covid testing. Patient remains a full code, not willing to make decision on advance directives. Palliative care service signed off. Patient is not vaccinated for Covid 19 and will require quarantine upon return to SNF  
  ASSESSMENT & PLAN    89-year-old male with past medical history of ILD on 5L O2, HTN, BPH, GERD, A. fib on Eliquis, CHF (EF 46% 2/27/22), and left lower lobe lung mass presents to the ED sent in from [facility] for evaluation of shortness of breath for 2 days. States his atrial fibrillation has not been in control, causing worsening sob. Noted drastic change from baseline over the past two days, with multiple pulse ox in 80s. Sent in from ED, improved on NRB then placed on bipap with improvement to 95%. Pt feels better, also recived 40mg IV lasix with good urine out/put at least 1L. Pt has pulmonary congestion on cxr, has extensive lower extremity edema. likely in CHF exacebation secondary to afib wit rvr. HR under controll now. c/w IV diuresis, fluid restriction.         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), ILD  - BNP elevated 5800, 2900 prior admission  - responded well to IV lasix in ED  - 1.2L fluid restriction  - continue diuresis lasix 40mg BID  - strict I and O  - monitor bmp once daily, keep mag <2, K <4  - O2 support as needed; bipap at night; wean o2 as appropriate      ILD/  LLL mass suspicious for cancer  - pulmonary recs: Solumedrol 60 q12 & Cefepime  - f/u ID recs  - f/u sputum culture  - Wean O2, titrate FiO2 to 88-92%, CPAP at night  - poor prognosis     A fib   - on home meds: Cardizem 120mg daily, Lopressor 100 tid  - PO Eliquis    Hypokalemia, Hypomagnesemia  - continue to supplement K and Mg    GERD  - on Pantoprazole    D/C planning back to SNF, will need Covid testing

## 2022-07-23 NOTE — PROGRESS NOTE ADULT - REASON FOR ADMISSION
acute sob, hypoxemia

## 2022-07-23 NOTE — CHART NOTE - NSCHARTNOTEFT_GEN_A_CORE
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 States he has sob at baseline, but noted his pulse ox to be in 80s consistently the past several days. Endorses baseline LE edema, could not give report of any worsening of it. Pt states he has afib and it has been uncontrolled recently, causing increased difficulty catching his breath. States he had one acute episode one night prior to admission but refused to come in d/t anxiety of staying too long in hospital. Denies chest pain, fever, chills, nausea, vomiting, abd pain, diarrhea, HA, dizziness, weakness.  Pt has multiple admissions for sob d/t various causes such as ILD exacerbations and CHF exacerbations. Pt is aox4.     in the ED,pt's VS T(C): 36.6 (07-10-22 @ 20:04), Max: 36.6 (07-10-22 @ 20:04)  HR: 86 (07-11-22 @ 02:03) (86 - 110)  BP: 157/91 (07-10-22 @ 23:06) (124/81 - 157/91)  RR: 22 (07-10-22 @ 23:06) (22 - 22)  SpO2: 91% (07-10-22 @ 23:06) (91% - 97%), labs done showed wbc 11.24 (similar to May '22), Hb 11, , plts 192, INR 2.18, sodium 137, potassium 3.4, chloride 97, Cr 1.3, BUN 29, Ca 8.7, albumin 3.4, gfr 53, Magnesium 1.6, troponin <0.01, bnp 5836 (2800 May '22); VBG lactate 2.3, VBG pco2 53, covid neg, influenza neg, rsp Syncitial virus neg  EKG: afib with RVR rate 103, CXR: bl opacities (pending official read)  pt received lasix 40mg IV, 2g magnesium  pt is admitted for workup/management CHF exacerbation vs ILD flare.    Impression  CHF exacerbation secondary to afib with RVR?  HO HFmrEF  HO ILD  HO GERD  afib on Eliquis  LLL mass suspicious for Cancer    #CHF/ #HFmrEF  #afib on eliquis  -bnp elevated 5800 compared to 2900 prior admission  -pulmonary congestion/b/l opacities on cxr  -3+ LE edema/ crackles/ JVD not significantly appreciable  -responded well to IV lasix in ED; increased UO  -states his afib has been acting up; uncontrolled; making it difficulty for him to breathe    PLAN  chf protocols  -diet dash/tlc, 1.2L fluid restriction  -continue with IV diuresis lasix 40mg BID  -strict I and O  -monitor bmp once daily, keep mag <2, K <4  -O2 support as needed; bipap at night; wean o2 as appropriate  -f/u CT chest  -f/u TTE  -c/w home meds: cardizem 120mg daily, lopressor 100 tid  -f/u ekg; monitor HR keep under 110  -consider HF c/s if pt does not improve on IV lasix BID  -dvt ppx  -gi ppx  -activity; AAT f/u PT eval    Other problems  #ILD/ #LLL mass suspicious for cancer  -f/u pulmonary  -goc were started last admission; never followed up  -c/w inhalers    #GERD  -c/w pantoprazole      ICU team was consulted. Dr. Huitron ICU fellow shared that Dr. Fernandez advised to move the pt to CEU for further management.
Limited Note:      consult received for salt restriction, fluid restriction, CHF. Patient ordered for diet (below)  Diet, DASH/TLC:   Sodium & Cholesterol Restricted  1200mL Fluid Restriction ([id number])     Special Instructions for Nursin milliLiter(s) to 2000 milliLiter(s) fluid restriction (22 @ 03:40) [Active]    Anthropometrics:  165.1cm  73.5kg  27kg/m2  *no recent changes in weight    Educated patient on low salt, fluid restriction in-house though declined education materials at time of visit. Patient seemingly disinterested in information at time of visit.  Reports good appetite/PO intake during admission, consuming % of meal trays-- prefers lactose free milk.    Patient has no nutrition risks at this time      RECOMMEND:  continue current diet order  + lactose free milk  can f/u with patient if he wants education prior to discharge        Hailey Ogden RD  #[id number]
PALLIATIVE MEDICINE INTERDISCIPLINARY TEAM NOTE    Provider:  [x   ]Social Work   [   ]          [   ] Initial visit [   ] Follow up  x  Family or contact name / phone #   Met with: [ x  ] Patient  [x   ] Family:  son, Lydia Azar, was at bedside  [   ] Other:    Primary Language: [   ] English [   ] Other*:                      *Interpretation provided by:    SUPPORT DIAGNOSES            (Check all that apply)  [   ] Psychosocial spiritual assessment (PSSA)  [   ] EOL issues  [   ] Cultural / spiritual concerns  [   x] Pain / suffering  [   ] Dementia / AMS  [   ] Other:  [  x ] AD issues  [   ] Grief / loss / sadness  [   ] Discharge issues  [ x  ] Distress / coping    PSYCHOSOCIAL ASSESSMENT OF PATIENT         (Check all that apply)  [ x  ] Initial Assessment            [   ] Reassessment          [   ] Not Applicable this visit    Pain/suffering acuity:  [  x ] None to mild (0-3)           [   ] Moderate (4-6)        [   ] High (7-10)    Mental Status:  [   ] Alert/oriented (x3)          [   ] Confused/Altered(x2/x1)         [   ] Non-resp    Functional status:  [ x  ] Independent w ADLs      [   ] Needs Assistance             [   ] Bedbound/Full Care    Coping:  [   ] Coping well                     [   x] Coping w/difficulty            [   ] Poor coping    Support system:  [  x ] Strong                              [   ] Adequate                        [   ] Inadequate      Past history and medications for: unknown    [ ] Anxiety       [ ] Depression    [ ] Sleep disorders     SPIRITUAL ASSESSMENT  B[place]ai/Spiritual practice: ___________________________    Role of organized Pentecostalism:  [   ] Important                     [   ] Some (fam tradition, cultural)               [   ] None    Effects on medical care:  [   ] Yes, _____________________________________                         [   ] None    Cultural/Spiritism need:  [   ] Yes, _____________________________________                         [   ] None    Refer to Pastoral Care:  [   ] Yes           [   ] No, not at this time    SERVICE PROVIDED  [   ]PSSA                                                                             [   ]Discharge support / facilitation  [  x ]AD / goals of care counseling                                  [   ]EOL / death / bereavement counseling  [x   ]Counseling / support                                                [   ] Family meeting  [   ]Prayer / sacrament / ritual                                      [   ] Referral   [   ]Other                                                                       NOTE and Plan of Care (PoC):    Patient is an 89 year old male.  Patient was admitted from [facility] on 7/11/22, dx:  SOB.      Patient was difficult to engage due to his hearing loss.  Patient refused to use hearing device.  Son, Lydia Azar, was at bedside.  Provided information regarding role of palliative Care and Ad.  son will discuss same with family  Support rendered.
Palliative following for GOC.   Patient declines hospice, and further discussion of GOC.   Attempted to reach family, no response.   Will sign off for now.   Please recall PRN 
bladder scan performed by RN. 73cc on scan. OK for dc today

## 2022-07-23 NOTE — PROGRESS NOTE ADULT - PROVIDER SPECIALTY LIST ADULT
Cardiology
Internal Medicine
Cardiology
Internal Medicine
Palliative Care

## 2022-07-23 NOTE — CHART NOTE - NSCHARTNOTESELECT_GEN_ALL_CORE
Event Note
Event Note
Palliative Care SW Initial Assessment/Event Note
Sign Off/Event Note
Transfer Note

## 2022-07-23 NOTE — DISCHARGE NOTE NURSING/CASE MANAGEMENT/SOCIAL WORK - NSDCPEFALRISK_GEN_ALL_CORE
For information on Fall & Injury Prevention, visit: [email or web address] OR  [email or web address] OR  https://www.cdc.gov/steadi/patient.html

## 2022-07-28 NOTE — ED ADULT TRIAGE NOTE - CHIEF COMPLAINT QUOTE
Pt BIBA from NH c/o SOB, hypoxic to 80% on room air. Pt placed on NRB @15L by EMS, Sat 86%. Denies chest pain

## 2022-07-28 NOTE — ED PROCEDURE NOTE - CPROC ED TIME OUT STATEMENT1
“Patient's name, , procedure and correct site were confirmed during the [place] Timeout.”
normal...

## 2022-07-28 NOTE — PROCEDURAL SAFETY CHECKLIST WITH OR WITHOUT SEDATION - NSSIDESITEMARKD_GEN_ALL_CORE
I reviewed with the resident the medical history and the resident's findings on the physical examination. I discussed with the resident the patient's diagnosis and concur with the plan. done

## 2022-07-28 NOTE — ED PROVIDER NOTE - CARE PLAN
Principal Discharge DX:	Sepsis  Secondary Diagnosis:	Hypotension  Secondary Diagnosis:	Respiratory distress  Secondary Diagnosis:	Pneumonia   1

## 2022-07-28 NOTE — CONSULT NOTE ADULT - ASSESSMENT
Impression   -  -  -      Recommendations   -  -  -  -   88 y/o M w/ pmhx of BPH, HTN, Afib (on Eliquis), GERD, COPD, LLL mass (suspicious for malignancy), ILD on Home O2, with recent admission to [facility] (7/10-7/23/2022) for CHF exacerbation BIBEMS from [facility] for sudden onset SOB on day of presentation.   Patient developed acute dyspnea w/ tachycardia up to 120/min & hypoxia down to 80%>> Placed on 6L NC & EMS called.   patient  Patient placed on BiPAP immediately on arrival & received 1L NS bolus VS: 104.4F, , BP 87/53,   Labs: WBC 31 (neutrophil predominant), Na 131, Cr 1.5 (baseline ~1), Mg 1.5, Tn 0.02, BNP 2.6K (5K in last admission), Lac 3.6, RSV(+)  patient admitted to MICU for septic shock        Impression   -Acute hypoxic respiratory failure secondary to viral pneumonia   -Septic shock secondary to RSV pneumonia   -VANESA  -COPD and ILD on O2 at baseline  -HFpEF  -Afib on Eliquis  -LLL mass ?  -BPH  -HTN  -----------------------------------------------  EKG afib w rvr      Recommendations   -MICU monitoring   -Monitor Intake and output   -Daily weight   -Monitor electrolytes and replace as needed , with goal of potassium above 4 and mag above 2  -goal is to keep Euvolemic balance   -hold diuresis for now and restart when the sepsis resolves  -Caution with IV hydration   -restart home lopressor as BP improves   -if HR uncontrolled with low BP consider amiodarone use   -cont with AC for Afib  -Rest of the care per the primary team

## 2022-07-28 NOTE — ED PROVIDER NOTE - PHYSICAL EXAMINATION
VITAL SIGNS: I have reviewed nursing notes and confirm.  CONSTITUTIONAL: ill appearing elderly male, tachypneic with increased WOB.  SKIN: Skin exam is warm and dry, no acute rash. No sacral ulcer noted  ENT: Dry MM  CARD: S1S2 tachycardic, 1+ symmetric pitting edema to b/l LE, DP pulses present and symmetric, no calf tenderness  RESP: Pt is tachypneic with increased WOB, sitting up, currently on BIPAP, has course lung sounds/crackles throughout b/l lung fields.   ABD:soft, NT/ND  EXT: moving all extremities  NEURO: Awake, alert, oriented to self only, appears confused.   PSYCH: Cooperative, appropriate. .

## 2022-07-28 NOTE — H&P ADULT - HISTORY OF PRESENT ILLNESS
88 y/o M w/ pmhx of BPH, HTN, Afib (on Eliquis), GERD, COPD, LLL mass (suspicious for malignancy), HFrEF (EF 46% 2/27/22), ILD, with recent admission to [facility] (7/10-7/23/2022) for CHF exacerbation BIBEMS from [facility] for sudden onset SOB on day of presentation.    Patient confused; HPI provided to us by ED from NH report: Patient developed acute dyspnea w/ tachycardia up to 120/min & hypoxia down to 80%>> Placed on 6L NC & EMS called.     ROS limited due to patient's AMS.     ED: Patient placed on BiPAP immediately on arrival & received 1L NS bolus  - VS: 104.4F, , BP 87/53,   - Labs: WBC 31 (neutrophil predominant), Na 131, Cr 1.5 (baseline ~1), Mg 1.5, Tn 0.02, BNP 2.6K (5K in last admission), Lac 3.6, RSV(+)  - CXR: Bilateral diffuse pulm congestion; LLL opacity; Rt tracheal deviation (unchanged from prior CXR) [Unofficial Read]    Admit to MICU

## 2022-07-28 NOTE — H&P ADULT - NSHPPHYSICALEXAM_GEN_ALL_CORE
GENERAL: In distress; Confused  EYES: Sclera clear  ENT: Dry mucous membranes  NECK: Supple, No JVD  CHEST/LUNG: Bilateral crackles   HEART: Irregular, Tachy  ABDOMEN: Soft, Nontender, Nondistended   EXTREMITIES: LE pitting edema  NERVOUS SYSTEM:  Alert & Oriented X3, speech clear. No deficits GENERAL: In distress; Confused  EYES: Sclera clear  ENT: Dry mucous membranes  NECK: Supple, No JVD  CHEST/LUNG: Bilateral crackles   HEART: Irregular, Tachy  ABDOMEN: Soft, Nontender, Nondistended   EXTREMITIES: LE pitting edema  NERVOUS SYSTEM:  non focal

## 2022-07-28 NOTE — H&P ADULT - ATTENDING COMMENTS
events noted, well known sp recent dc presented with fever, AMS, sepsis present on admission, RSV, highly bacterial superinfection, mulitple co morbidities, IV ABX, PANX, tape pressors, hold diuretics, steroids,  GOC, very poor prognosis

## 2022-07-28 NOTE — CONSULT NOTE ADULT - SUBJECTIVE AND OBJECTIVE BOX
HPI:  89 y/o M w/ pmhx of BPH, HTN, Afib (on Eliquis), GERD, COPD, LLL mass (suspicious for malignancy), ILD, with recent admission to [facility] (7/) for CHF exacerbation BIBEMS from [facility] for sudden onset SOB on day of presentation.  Patient confused; HPI provided to us by ED from NH report: Patient developed acute dyspnea w/ tachycardia up to 120/min & hypoxia down to 80%>> Placed on 6L NC & EMS called.   ROS limited due to patient's AMS.   ED: Patient placed on BiPAP immediately on arrival & received 1L NS bolus  - VS: 104.4F, , BP 87/53,   - Labs: WBC 31 (neutrophil predominant), Na 131, Cr 1.5 (baseline ~1), Mg 1.5, Tn 0.02, BNP 2.6K (5K in last admission), Lac 3.6, RSV(+)  - CXR: Bilateral diffuse pulm congestion; LLL opacity; Rt tracheal deviation (unchanged from prior CXR) [Unofficial Read]  Admitted to MICU  Cardiology consulted for further management     PAST MEDICAL & SURGICAL HISTORY  Hypertension    GERD (gastroesophageal reflux disease)    Lung interstitial disease    BPH (benign prostatic hyperplasia)    HFrEF (heart failure with reduced ejection fraction)    COPD (chronic obstructive pulmonary disease)    No significant past surgical history        FAMILY HISTORY:  FAMILY HISTORY:      SOCIAL HISTORY:  [x]ex-smoker  []Alcohol  []Drug    ALLERGIES:  No Known Allergies      MEDICATIONS:  MEDICATIONS  (STANDING):  apixaban 5 milliGRAM(s) Oral two times a day  cefepime   IVPB 2000 milliGRAM(s) IV Intermittent every 24 hours  cyanocobalamin 1000 MICROGram(s) Oral daily  ferrous    sulfate 325 milliGRAM(s) Oral daily  finasteride 5 milliGRAM(s) Oral daily  lactated ringers. 1000 milliLiter(s) (150 mL/Hr) IV Continuous <Continuous>  methylPREDNISolone sodium succinate Injectable 60 milliGRAM(s) IV Push every 6 hours  norepinephrine Infusion 0.1 MICROgram(s)/kG/Min (13.1 mL/Hr) IV Continuous <Continuous>  pantoprazole    Tablet 40 milliGRAM(s) Oral before breakfast  senna 2 Tablet(s) Oral at bedtime  tamsulosin 0.4 milliGRAM(s) Oral at bedtime  vancomycin  IVPB 1000 milliGRAM(s) IV Intermittent every 24 hours    MEDICATIONS  (PRN):      HOME MEDICATIONS:  Home Medications:  Eliquis 5 mg oral tablet: 1 tab(s) orally 2 times a day (2022 04:32)  HYDROmorphone 2 mg oral tablet: 1 tab(s) orally every 6 hours, As needed, dyspnea (2022 04:32)  VITAMIN B-12 1,000 MCG TABLET: 1 each orally once a day (2022 04:32)      VITALS:   T(F): 98.8 ( @ 07:34), Max: 104.4 ( @ 02:20)  HR: 75 ( @ 15:02) (55 - 180)  BP: 104/67 ( @ 15:02) (87/53 - 164/71)  BP(mean): 82 ( @ 15:02) (65 - 94)  RR: 24 ( @ 15:02) (19 - 30)  SpO2: 97% ( @ 15:02) (86% - 100%)    I&O's Summary      REVIEW OF SYSTEMS:  unable to be assessed due to confusion     PHYSICAL EXAM:  NEURO: patient is awake , alert and oriented  GEN: Not in acute distress  NECK: no thyroid enlargement, no JVD  LUNGS: Clear to auscultation bilaterally   CARDIOVASCULAR: S1/S2 present, RRR , no murmurs or rubs, no carotid bruits,  + PP bilaterally  ABD: Soft, non-tender, non-distended, +BS  EXT: No TONY  SKIN: Intact    LABS:                        11.8   30.71 )-----------( 170      ( 2022 02:21 )             36.3         131<L>  |  90<L>  |  41<H>  ----------------------------<  117<H>  4.5   |  27  |  1.5    Ca    8.7      2022 02:21  Mg     1.5         TPro  6.5  /  Alb  3.0<L>  /  TBili  0.6  /  DBili  x   /  AST  39  /  ALT  21  /  AlkPhos  80        Troponin T, Serum: 0.02 ng/mL *H* (22 @ 02:21)    CARDIAC MARKERS ( 2022 02:21 )  x     / 0.02 ng/mL / x     / x     / x            Troponin trend:    Serum Pro-Brain Natriuretic Peptide: 2670 pg/mL (22 @ 02:21)    07-11 Chol 127 LDL -- HDL 45 Trig 68      RADIOLOGY:  -CXR:  < from: Xray Chest 1 View- PORTABLE-Urgent (22 @ 02:53) >  IMPRESSION:    Low lung volumes. Stable bilateral opacities.    --- End of Report ---    < end of copied text >      -TTE:  < from: TTE Echo Complete w/ Contrast w/ Doppler (22 @ 10:12) >  Summary:   1. Left ventricular ejection fraction, by visual estimation, is 60 to   65%.   2. Normal global left ventricular systolic function.   3. Mild left ventricular hypertrophy.   4. Moderately enlarged left atrium.   5. Sclerotic aortic valve with normal opening.   6. Mild aortic regurgitation.   7. Mild-to-moderate mitral regurgitation.   8. Mild tricuspid regurgitation.    < end of copied text >      EC Lead ECG:   Ventricular Rate 199 BPM    Atrial Rate 191 BPM    QRS Duration 74 ms    Q-T Interval 246 ms    QTC Calculation(Bazett) 447 ms    R Axis 22 degrees    T Axis 232 degrees    Diagnosis Line Atrial fibrillation with rapid ventricular response  Nonspecific ST and T wave abnormality  Abnormal ECG    Confirmed by Tim Christie ([id number]) on 2022 10:23:43 AM (22 @ 02:14)       HPI:  89 y/o M w/ pmhx of BPH, HTN, Afib (on Eliquis), GERD, COPD, LLL mass (suspicious for malignancy), ILD on Home O2, with recent admission to [facility] (7/) for CHF exacerbation BIBEMS from [facility] for sudden onset SOB on day of presentation.  Patient confused; HPI provided to us by ED from NH report: Patient developed acute dyspnea w/ tachycardia up to 120/min & hypoxia down to 80%>> Placed on 6L NC & EMS called.   ROS limited due to patient's AMS.   ED: Patient placed on BiPAP immediately on arrival & received 1L NS bolus  - VS: 104.4F, , BP 87/53,   - Labs: WBC 31 (neutrophil predominant), Na 131, Cr 1.5 (baseline ~1), Mg 1.5, Tn 0.02, BNP 2.6K (5K in last admission), Lac 3.6, RSV(+)  - CXR: Bilateral diffuse pulm congestion; LLL opacity; Rt tracheal deviation (unchanged from prior CXR) [Unofficial Read]  Admitted to MICU  Cardiology consulted for further management     PAST MEDICAL & SURGICAL HISTORY  Hypertension    GERD (gastroesophageal reflux disease)    Lung interstitial disease    BPH (benign prostatic hyperplasia)    HFrEF (heart failure with reduced ejection fraction)    COPD (chronic obstructive pulmonary disease)    No significant past surgical history        FAMILY HISTORY:  FAMILY HISTORY:      SOCIAL HISTORY:  [x]ex-smoker  []Alcohol  []Drug    ALLERGIES:  No Known Allergies      MEDICATIONS:  MEDICATIONS  (STANDING):  apixaban 5 milliGRAM(s) Oral two times a day  cefepime   IVPB 2000 milliGRAM(s) IV Intermittent every 24 hours  cyanocobalamin 1000 MICROGram(s) Oral daily  ferrous    sulfate 325 milliGRAM(s) Oral daily  finasteride 5 milliGRAM(s) Oral daily  lactated ringers. 1000 milliLiter(s) (150 mL/Hr) IV Continuous <Continuous>  methylPREDNISolone sodium succinate Injectable 60 milliGRAM(s) IV Push every 6 hours  norepinephrine Infusion 0.1 MICROgram(s)/kG/Min (13.1 mL/Hr) IV Continuous <Continuous>  pantoprazole    Tablet 40 milliGRAM(s) Oral before breakfast  senna 2 Tablet(s) Oral at bedtime  tamsulosin 0.4 milliGRAM(s) Oral at bedtime  vancomycin  IVPB 1000 milliGRAM(s) IV Intermittent every 24 hours    MEDICATIONS  (PRN):      HOME MEDICATIONS:  Home Medications:  Eliquis 5 mg oral tablet: 1 tab(s) orally 2 times a day (2022 04:32)  HYDROmorphone 2 mg oral tablet: 1 tab(s) orally every 6 hours, As needed, dyspnea (2022 04:32)  VITAMIN B-12 1,000 MCG TABLET: 1 each orally once a day (2022 04:32)      VITALS:   T(F): 98.8 ( @ 07:34), Max: 104.4 ( @ 02:20)  HR: 75 ( @ 15:02) (55 - 180)  BP: 104/67 ( @ 15:02) (87/53 - 164/71)  BP(mean): 82 ( @ 15:02) (65 - 94)  RR: 24 ( @ 15:02) (19 - 30)  SpO2: 97% ( @ 15:02) (86% - 100%)    I&O's Summary      REVIEW OF SYSTEMS:  unable to be assessed due to confusion     PHYSICAL EXAM:  NEURO: patient is awake , alert and oriented with mild confusion   GEN: Not in acute distress  NECK: no thyroid enlargement, no JVD  LUNGS: decrease b/l breath sounds with crackles  CARDIOVASCULAR: S1/S2 present, Irregular, no murmurs or rubs, no carotid bruits,  + PP bilaterally  ABD: Soft, non-tender, non-distended, +BS  EXT: No TONY  SKIN: Intact    LABS:                        11.8   30.71 )-----------( 170      ( 2022 02:21 )             36.3         131<L>  |  90<L>  |  41<H>  ----------------------------<  117<H>  4.5   |  27  |  1.5    Ca    8.7      2022 02:21  Mg     1.5         TPro  6.5  /  Alb  3.0<L>  /  TBili  0.6  /  DBili  x   /  AST  39  /  ALT  21  /  AlkPhos  80        Troponin T, Serum: 0.02 ng/mL *H* (22 @ 02:21)    CARDIAC MARKERS ( 2022 02:21 )  x     / 0.02 ng/mL / x     / x     / x            Troponin trend:    Serum Pro-Brain Natriuretic Peptide: 2670 pg/mL (22 @ 02:21)    07-11 Chol 127 LDL -- HDL 45 Trig 68      RADIOLOGY:  -CXR:  < from: Xray Chest 1 View- PORTABLE-Urgent (22 @ 02:53) >  IMPRESSION:    Low lung volumes. Stable bilateral opacities.    --- End of Report ---    < end of copied text >      -TTE:  < from: TTE Echo Complete w/ Contrast w/ Doppler (22 @ 10:12) >  Summary:   1. Left ventricular ejection fraction, by visual estimation, is 60 to   65%.   2. Normal global left ventricular systolic function.   3. Mild left ventricular hypertrophy.   4. Moderately enlarged left atrium.   5. Sclerotic aortic valve with normal opening.   6. Mild aortic regurgitation.   7. Mild-to-moderate mitral regurgitation.   8. Mild tricuspid regurgitation.    < end of copied text >      EC Lead ECG:   Ventricular Rate 199 BPM    Atrial Rate 191 BPM    QRS Duration 74 ms    Q-T Interval 246 ms    QTC Calculation(Bazett) 447 ms    R Axis 22 degrees    T Axis 232 degrees    Diagnosis Line Atrial fibrillation with rapid ventricular response  Nonspecific ST and T wave abnormality  Abnormal ECG    Confirmed by Tim Christie ([id number]) on 2022 10:23:43 AM (22 @ 02:14)

## 2022-07-28 NOTE — ED PROVIDER NOTE - CLINICAL SUMMARY MEDICAL DECISION MAKING FREE TEXT BOX
89-year-old male history of hypertension, COPD/ILD on baseline home O2, CHF, A. fib on Eliquis brought in by EMS from nursing home for evaluation of acute onset shortness of breath getting progressively worse evening.  No known fevers at nursing home.  No other acute complaints by patient in ED.  Chronically ill-appearing, extremely tachypneic and anxious appearing. NCAT PERRLA EOMI neck supple non tender crackles and wheezing bilaterally irregular, tachycardic abdomen s nt nd no rebound no guarding WWPx4 neuro non focal.  Patient extremely hypoxic and tachypneic upon arrival.  Placed on BiPAP with improvement of work of breathing and oxygen saturation.  Patient subsequently began, becoming more hypotensive despite fluid boluses.  Unable to complete 30 cc/kg fluid bolus for sepsis as patient has a history of CHF.  Empiric antibiotics given.  Pressors started, central line placed.  Discussed with ICU for admission.

## 2022-07-28 NOTE — ED PROVIDER NOTE - OBJECTIVE STATEMENT
88 y/o M with PMH of BPH, HTN, Afib on Eliquis, GERD, CHF, COPD, LLL mass, CHF (EF 46% 2/27/22), interstitial lung disease, with recent admission to [facility] 7/10-7/23/2022 for CHF exacerbation, BIBEMS from [facility] for sudden onset SOB and increased WOB tonight. Note from NH reports pt with severe SOB, was tachycardic 120's, hypoxic in 80's so put on 6L NC and EMS was called. Pt denies CP, otherwise HPI and ROS limited due to pt's medical condition, agitation. Pt immediately put on BIPAP on arrival to critical care ED with O2 sats now >95%.

## 2022-07-28 NOTE — ED ADULT NURSE REASSESSMENT NOTE - NS ED NURSE REASSESS COMMENT FT1
Central line Insertion in the Right IJ performed at 0450.   Central line safety checklist completed and documented. Procedural safety checklist documented.   Performed by MD James  Assisted by MD Valdez.   Supervised by MD López Central line Insertion in the Right Femoral performed at 0450.   Central line safety checklist completed and documented. Procedural safety checklist documented.   Performed by MD James  Assisted by MD Valdez.   Supervised by MD López

## 2022-07-28 NOTE — ED PROVIDER NOTE - ADMIT DISPOSITION PRESENT ON ADMISSION SEPSIS Q1 - RE-EVALUATED PATIENT FLUID AND VITAL SIGNS
Fluid bolus in progress I have re-evaluated the patient's fluid status and reviewed vital signs. Clinical perfusion assessment was performed.

## 2022-07-28 NOTE — CONSULT NOTE ADULT - SUBJECTIVE AND OBJECTIVE BOX
NNEKA JOSEPH  89y, Male  Allergy: No Known Allergies    CHIEF COMPLAINT: COPD Exacerbation (2022 04:15)    HPI:  89yMale with a past medical history of COPD, HFrEF, BPH, lung interstitial disease, GERD, HTN; Pt cam into ED due to COPD exacerbation & SOB; last admission was 7/10/22 - 22 due to CHF exacerbation; ID consulted for RSV positive, pneumonia, COPD exacerbation SOB; eval pt w/ attending;     Infectious Diseases History:  Old Micro Data/Cultures:   FAMILY HISTORY:  PAST MEDICAL & SURGICAL HISTORY:  Hypertension  GERD (gastroesophageal reflux disease)  Lung interstitial disease  BPH (benign prostatic hyperplasia)  HFrEF (heart failure with reduced ejection fraction)  COPD (chronic obstructive pulmonary disease)    No significant past surgical history    SOCIAL HISTORY  Social History:  Substance Use (street drugs): ( x ) never used  (  ) other:  Tobacco Usage:  (  ) never smoked   ( X  ) former smoker - quit [id number]   (   ) current smoker  (     ) pack year  Alcohol Usage: occasional (2022 04:50)    Recent Travel:  Other Exposures:     ROS  General: Denies rigors, nightsweats  HEENT: Denies headache, rhinorrhea, sore throat, eye pain  CV: Denies CP, palpitations  PULM: Denies wheezing, hemoptysis  GI: Denies hematemesis, hematochezia, melena  : Denies discharge, hematuria  MSK: Denies arthralgias, myalgias  SKIN: Denies rash, lesions  NEURO: Denies paresthesias, weakness  PSYCH: Denies depression, anxiety    VITALS:  T(F): 98.8, Max: 104.4 (22 @ 02:20)  HR: 80  BP: 115/67  RR: 22Vital Signs Last 24 Hrs  T(C): 37.1 (2022 07:34), Max: 40.2 (2022 02:20)  T(F): 98.8 (2022 07:34), Max: 104.4 (2022 02:20)  HR: 80 (2022 14:06) (55 - 180)  BP: 115/67 (2022 14:06) (87/53 - 164/71)  BP(mean): 90 (2022 13:56) (65 - 94)  RR: 22 (2022 14:06) (19 - 30)  SpO2: 98% (2022 14:06) (86% - 100%)    Parameters below as of 2022 14:06  Patient On (Oxygen Delivery Method): BiPAP/CPAP    O2 Concentration (%): 40    PHYSICAL EXAM:  Gen: NAD, resting in bed  HEENT: Normocephalic, atraumatic  Neck: supple, no lymphadenopathy  CV: Regular rate & regular rhythm  Lungs: CTAB  Abdomen: Soft, BS present  Ext: Warm, well perfused  Neuro: non focal, awake  Skin: no rash, no lesions  Lines: no phlebitis    TESTS & MEASUREMENTS:                        11.8   30.71 )-----------( 170      ( 2022 02:21 )             36.3         131<L>  |  90<L>  |  41<H>  ----------------------------<  117<H>  4.5   |  27  |  1.5    Ca    8.7      2022 02:21  Mg     1.5         TPro  6.5  /  Alb  3.0<L>  /  TBili  0.6  /  DBili  x   /  AST  39  /  ALT  21  /  AlkPhos  80      LIVER FUNCTIONS - ( 2022 02:21 )  Alb: 3.0 g/dL / Pro: 6.5 g/dL / ALK PHOS: 80 U/L / ALT: 21 U/L / AST: 39 U/L / GGT: x           Urinalysis Basic - ( 2022 04:30 )    Color: Yellow / Appearance: Slightly Turbid / S.020 / pH: x  Gluc: x / Ketone: Negative  / Bili: Negative / Urobili: <2 mg/dL   Blood: x / Protein: 30 mg/dL / Nitrite: Negative   Leuk Esterase: Large / RBC: 4 /HPF / WBC 31 /HPF   Sq Epi: x / Non Sq Epi: 9 /HPF / Bacteria: Negative    Blood Gas Venous - Lactate: 1.90 mmol/L (22 @ 05:03)  Blood Gas Venous - Lactate: 3.60 mmol/L (22 @ 02:21)    INFECTIOUS DISEASES TESTING  MRSA PCR Result.: Negative (22 @ 11:30)  Fungitell: <31 pg/mL (22 @ 12:21)    RADIOLOGY & ADDITIONAL TESTS:  I have personally reviewed the last available Chest xray  CXR  Xray Chest 1 View- PORTABLE-Urgent:   ACC: [id number] EXAM:  XR CHEST PORTABLE URGENT 1V                          PROCEDURE DATE:  2022      INTERPRETATION:  CLINICAL HISTORY: Chest Pain.    COMPARISON: 7/15/2022.    TECHNIQUE: Portable frontal chest radiograph. Adequate positioning.    FINDINGS:    Support devices: None.    Cardiac/mediastinum/hilum: Stable enlarged cardiac silhouette.    Lung parenchyma/Pleura: Low lung volumes. Stable bilateral opacities. No   pneumothorax.    Skeleton/soft tissues: Stable.    IMPRESSION:    Low lung volumes. Stable bilateral opacities.    --- End of Report ---    FARIDA KRUGER MD; Attending Radiologist  This document has been electronically signed. 2022 12:21PM (22 @ 02:53)    CT    CARDIOLOGY TESTING  12 Lead ECG:   Ventricular Rate 199 BPM  Atrial Rate 191 BPM  QRS Duration 74 ms  Q-T Interval 246 ms  QTC Calculation(Bazett) 447 ms  R Axis 22 degrees  T Axis 232 degrees    Diagnosis Line Atrial fibrillation with rapid ventricular response  Nonspecific ST and T wave abnormality  Abnormal ECG    Confirmed by Tim Christie ([id number]) on 2022 10:23:43 AM (22 @ 02:14)    All available historical records have been reviewed    MEDICATIONS  apixaban 5  cefepime   IVPB 2000  cyanocobalamin 1000  ferrous    sulfate 325  finasteride 5  lactated ringers. 1000  methylPREDNISolone sodium succinate Injectable 60  norepinephrine Infusion 0.1  pantoprazole    Tablet 40  senna 2  tamsulosin 0.4  vancomycin  IVPB 1000    ANTIBIOTICS:  cefepime   IVPB 2000 milliGRAM(s) IV Intermittent every 24 hours  vancomycin  IVPB 1000 milliGRAM(s) IV Intermittent every 24 hours    All available historical data has been reviewed    ASSESSMENT  89yMale with a PMH of COPD, HFrEF, BPH, lung interstitial disease, GERD, HTN;    IMPRESSION  - Acute sepsis due to pyelonephritis and pneumonia;     RECOMMENDATIONS  - Recommend Avycaz 1.25g q8;  - Recommend  consult;     This is a pended note. All final recommendations to follow pending discussion with ID Attending    NNEKA JOSEPH  89y, Male  Allergy: No Known Allergies    CHIEF COMPLAINT: COPD Exacerbation (2022 04:15)    HPI:  89yMale with a past medical history of COPD, HFrEF, BPH, lung interstitial disease, GERD, HTN; Pt cam into ED due to COPD exacerbation & SOB; last admission was 7/10/22 - 22 due to CHF exacerbation; ID consulted for RSV positive, pneumonia, COPD exacerbation SOB; eval pt w/ attending;     Infectious Diseases History:  Old Micro Data/Cultures:   FAMILY HISTORY:  PAST MEDICAL & SURGICAL HISTORY:  Hypertension  GERD (gastroesophageal reflux disease)  Lung interstitial disease  BPH (benign prostatic hyperplasia)  HFrEF (heart failure with reduced ejection fraction)  COPD (chronic obstructive pulmonary disease)    No significant past surgical history    SOCIAL HISTORY  Social History:  Substance Use (street drugs): ( x ) never used  (  ) other:  Tobacco Usage:  (  ) never smoked   ( X  ) former smoker - quit [id number]   (   ) current smoker  (     ) pack year  Alcohol Usage: occasional (2022 04:50)    Recent Travel:  Other Exposures:     ROS  General: Denies rigors, nightsweats  HEENT: Denies headache, rhinorrhea, sore throat, eye pain  CV: Denies CP, palpitations  PULM: Denies wheezing, hemoptysis  GI: Denies hematemesis, hematochezia, melena  : Denies discharge, hematuria  MSK: Denies arthralgias, myalgias  SKIN: Denies rash, lesions  NEURO: Denies paresthesias, weakness  PSYCH: Denies depression, anxiety    VITALS:  T(F): 98.8, Max: 104.4 (22 @ 02:20)  HR: 80  BP: 115/67  RR: 22Vital Signs Last 24 Hrs  T(C): 37.1 (2022 07:34), Max: 40.2 (2022 02:20)  T(F): 98.8 (2022 07:34), Max: 104.4 (2022 02:20)  HR: 80 (2022 14:06) (55 - 180)  BP: 115/67 (2022 14:06) (87/53 - 164/71)  BP(mean): 90 (2022 13:56) (65 - 94)  RR: 22 (2022 14:06) (19 - 30)  SpO2: 98% (2022 14:06) (86% - 100%)    Parameters below as of 2022 14:06  Patient On (Oxygen Delivery Method): BiPAP/CPAP    O2 Concentration (%): 40    PHYSICAL EXAM:  Gen: NAD, resting in bed  HEENT: Normocephalic, atraumatic  Neck: supple, no lymphadenopathy  CV: Regular rate & regular rhythm  Lungs: CTAB  Abdomen: Soft, BS present  Ext: Warm, well perfused  Neuro: non focal, awake  Skin: no rash, no lesions  Lines: no phlebitis    TESTS & MEASUREMENTS:                        11.8   30.71 )-----------( 170      ( 2022 02:21 )             36.3         131<L>  |  90<L>  |  41<H>  ----------------------------<  117<H>  4.5   |  27  |  1.5    Ca    8.7      2022 02:21  Mg     1.5         TPro  6.5  /  Alb  3.0<L>  /  TBili  0.6  /  DBili  x   /  AST  39  /  ALT  21  /  AlkPhos  80      LIVER FUNCTIONS - ( 2022 02:21 )  Alb: 3.0 g/dL / Pro: 6.5 g/dL / ALK PHOS: 80 U/L / ALT: 21 U/L / AST: 39 U/L / GGT: x           Urinalysis Basic - ( 2022 04:30 )    Color: Yellow / Appearance: Slightly Turbid / S.020 / pH: x  Gluc: x / Ketone: Negative  / Bili: Negative / Urobili: <2 mg/dL   Blood: x / Protein: 30 mg/dL / Nitrite: Negative   Leuk Esterase: Large / RBC: 4 /HPF / WBC 31 /HPF   Sq Epi: x / Non Sq Epi: 9 /HPF / Bacteria: Negative    Blood Gas Venous - Lactate: 1.90 mmol/L (22 @ 05:03)  Blood Gas Venous - Lactate: 3.60 mmol/L (22 @ 02:21)    INFECTIOUS DISEASES TESTING  MRSA PCR Result.: Negative (22 @ 11:30)  Fungitell: <31 pg/mL (22 @ 12:21)    RADIOLOGY & ADDITIONAL TESTS:  I have personally reviewed the last available Chest xray  CXR  Xray Chest 1 View- PORTABLE-Urgent:   ACC: [id number] EXAM:  XR CHEST PORTABLE URGENT 1V                          PROCEDURE DATE:  2022      INTERPRETATION:  CLINICAL HISTORY: Chest Pain.    COMPARISON: 7/15/2022.    TECHNIQUE: Portable frontal chest radiograph. Adequate positioning.    FINDINGS:    Support devices: None.    Cardiac/mediastinum/hilum: Stable enlarged cardiac silhouette.    Lung parenchyma/Pleura: Low lung volumes. Stable bilateral opacities. No   pneumothorax.    Skeleton/soft tissues: Stable.    IMPRESSION:    Low lung volumes. Stable bilateral opacities.    --- End of Report ---    FARIDA KRUGER MD; Attending Radiologist  This document has been electronically signed. 2022 12:21PM (22 @ 02:53)    CT    CARDIOLOGY TESTING  12 Lead ECG:   Ventricular Rate 199 BPM  Atrial Rate 191 BPM  QRS Duration 74 ms  Q-T Interval 246 ms  QTC Calculation(Bazett) 447 ms  R Axis 22 degrees  T Axis 232 degrees    Diagnosis Line Atrial fibrillation with rapid ventricular response  Nonspecific ST and T wave abnormality  Abnormal ECG    Confirmed by Tim Christie ([id number]) on 2022 10:23:43 AM (22 @ 02:14)    All available historical records have been reviewed    MEDICATIONS  apixaban 5  cefepime   IVPB 2000  cyanocobalamin 1000  ferrous    sulfate 325  finasteride 5  lactated ringers. 1000  methylPREDNISolone sodium succinate Injectable 60  norepinephrine Infusion 0.1  pantoprazole    Tablet 40  senna 2  tamsulosin 0.4  vancomycin  IVPB 1000    ANTIBIOTICS:  cefepime   IVPB 2000 milliGRAM(s) IV Intermittent every 24 hours  vancomycin  IVPB 1000 milliGRAM(s) IV Intermittent every 24 hours    All available historical data has been reviewed    ASSESSMENT  89yMale with a PMH of COPD, HFrEF, BPH, lung interstitial disease, GERD, HTN;    IMPRESSION  Sepsis secondary to acute pyelonephritis  Bibasilar bacterial PNA     RECOMMENDATIONS  BCx  UCx  Nares ORSA  Avycaz 1.25g iv q8h

## 2022-07-28 NOTE — PROCEDURAL SAFETY CHECKLIST WITH OR WITHOUT SEDATION - NSPREANESCONSENT_GEN_ALL_CORE
Normal vision: sees adequately in most situations; can see medication labels, newsprint wear4s glasses/Partially impaired: cannot see medication labels or newsprint, but can see obstacles in path, and the surrounding layout; can count fingers at arm's length n/a

## 2022-07-28 NOTE — PHARMACOTHERAPY INTERVENTION NOTE - COMMENTS
Vancomycin 1gm q24h   PK: Peak 33   Trough 19.5
Apixaban 5 mg PO BID, pt is 89 years old with SCr 1.5 being treated for A. Fib.      As patient is in VANESA recommended to reduce dose to apixaban 2.5 mg PO BID until renal function returns to normal

## 2022-07-28 NOTE — ED ADULT NURSE NOTE - INTERVENTIONS DEFINITIONS
[place] to call system/Call bell, personal items and telephone within reach/Instruct patient to call for assistance/Room bathroom lighting operational/Non-slip footwear when patient is off stretcher/Physically safe environment: no spills, clutter or unnecessary equipment/Stretcher in lowest position, wheels locked, appropriate side rails in place/Monitor gait and stability/Monitor for mental status changes and reorient to person, place, and time/Review medications for side effects contributing to fall risk/Reinforce activity limits and safety measures with patient and family/Provide visual clues: red socks

## 2022-07-28 NOTE — ED ADULT NURSE NOTE - OBJECTIVE STATEMENT
Patient bibems from nursing home for respiratory distress. Patient arrived on non rebreather mask with SpO2 86%, patient noted tachypneic and anxious. MD at bedside for eval and RT present for BIPAP. Pt arrived with Joel catheter in place.

## 2022-07-28 NOTE — ED PROVIDER NOTE - PROGRESS NOTE DETAILS
YA: pt placed on BIPAP immediately on ED arrival. Agitated, attempting to remove mask. Versed ordered. Additionally rectal temp 104.5 in ED, blood cultures, urine cultures sent, IV ABX initiated. Bedside US showed B lines throughout all lung fields.

## 2022-07-29 NOTE — PROGRESS NOTE ADULT - SUBJECTIVE AND OBJECTIVE BOX
SUBJ: Patient seen and examined. No events overnight. Complains of SOB      MEDICATIONS  (STANDING):  ceftazidime/avibactam IVPB 1.25 Gram(s) IV Intermittent every 8 hours  chlorhexidine 2% Cloths 1 Application(s) Topical daily  cyanocobalamin 1000 MICROGram(s) Oral daily  dexMEDEtomidine Infusion 0.2 MICROgram(s)/kG/Hr (3.5 mL/Hr) IV Continuous <Continuous>  enoxaparin Injectable 70 milliGRAM(s) SubCutaneous every 12 hours  ferrous    sulfate 325 milliGRAM(s) Oral daily  finasteride 5 milliGRAM(s) Oral daily  methylPREDNISolone sodium succinate Injectable 60 milliGRAM(s) IV Push every 6 hours  metoprolol tartrate 50 milliGRAM(s) Oral two times a day  pantoprazole    Tablet 40 milliGRAM(s) Oral before breakfast  senna 2 Tablet(s) Oral at bedtime  tamsulosin 0.4 milliGRAM(s) Oral at bedtime    MEDICATIONS  (PRN):            Vital Signs Last 24 Hrs  T(C): 35.8 (29 Jul 2022 20:00), Max: 36 (29 Jul 2022 04:00)  T(F): 96.4 (29 Jul 2022 20:00), Max: 96.8 (29 Jul 2022 04:00)  HR: 104 (29 Jul 2022 20:00) (83 - 130)  BP: 140/91 (29 Jul 2022 20:00) (98/76 - 152/111)  BP(mean): 116 (29 Jul 2022 20:00) (83 - 130)  RR: 23 (29 Jul 2022 20:00) (14 - 56)  SpO2: 99% (29 Jul 2022 20:00) (79% - 100%)    Parameters below as of 29 Jul 2022 20:00  Patient On (Oxygen Delivery Method): BiPAP/CPAP  O2 Flow (L/min): 80       REVIEW OF SYSTEMS:  CONSTITUTIONAL: No fever  NECK: No pain or stiffness  CARDIOVASCULAR: patient denies chest pain, shortness of breath unchanged .  Repiratory: still SOB  NEUROLOGICAL: No focal deficits to report.  GI: no BRBPR, no N,V,diarrhea.    PSYCHIATRY: normal mood and affect  HEENT: no nasal discharge, no ecchymosis  SKIN: no ecchymosis, no breakdown  MUSCULOSKELETAL: Full range of motion x4.      PHYSICAL EXAM:  GENERAL: NAD  HEAD:  Atraumatic, Normocephalic  NECK: Supple, No JVD  NERVOUS SYSTEM:  Alert & Oriented X3, Good concentration  CHEST/LUNG: Clear to anterior auscultation bilaterally  HEART: Irregular rate and rhythm  ABDOMEN: Soft, Nontender, Nondistended;   EXTREMITIES:  +1 marium L.E  edema  SKIN: No rashes or lesions    	  TELEMETRY: Afib RVR      LABS:                        9.4    20.34 )-----------( 142      ( 29 Jul 2022 04:45 )             29.7     07-29    132<L>  |  98  |  30<H>  ----------------------------<  158<H>  4.4   |  27  |  1.0    Ca    8.4<L>      29 Jul 2022 04:45  Mg     1.8     07-29    TPro  5.0<L>  /  Alb  2.4<L>  /  TBili  0.5  /  DBili  x   /  AST  28  /  ALT  17  /  AlkPhos  64  07-29    CARDIAC MARKERS ( 28 Jul 2022 02:21 )  x     / 0.02 ng/mL / x     / x     / x              I&O's Summary    28 Jul 2022 07:01  -  29 Jul 2022 07:00  --------------------------------------------------------  IN: 1500 mL / OUT: 460 mL / NET: 1040 mL    29 Jul 2022 07:01  -  29 Jul 2022 21:08  --------------------------------------------------------  IN: 303.5 mL / OUT: 445 mL / NET: -141.5 mL      BNP  RADIOLOGY & ADDITIONAL STUDIES:    IMPRESSION AND PLAN:    Acute on chronic hypoxic resp failure   Afib RVR  H/O HFpEF/ clinically euvolemic   Protein calorie malnutrition/ hypoalbuminemia   - Management as per primary team  - Continue AC and Metoprolol   - Will follow as needed

## 2022-07-29 NOTE — PROGRESS NOTE ADULT - ASSESSMENT
IMPRESSION:    Sepsis present on admission  COPD Exacerbation, not tolerating BPAP  Acute on Chronic HFrEF Exacerbation  Septic Shock off Levophed  PNA - RSV(+) w/ likely bacterial superinfection  UTI  A.fib w/ RVR, resolved  VANESA resolved  Hyponatremia  Hypomagnesemia resolved  HO BPH, HTN, GERD, ILD   HO Afib - on Eliquis   HO LLL mass (suspicious for malignancy)       PLAN:     CNS: No CNS depressants.    HEENT: Oral care    PULMONARY:  HOB @ 45 degrees.  Aspiration precautions.   BIPAP PRN & HS. Solumedrol IV 60mg q12.  Target POx 88 - 92%.  On 4L NC    CARDIOVASCULAR:  Decrease LR to 75cc/hr for 12 hours.  Avoid volume overload.  FU Cardio, eval appreciated.  Low dose Beta blocker.    GI: GI prophylaxis.  Feeding as tolerated.  Bowel regimen.     RENAL:  Follow up lytes. Correct as needed.  Monitor UO.  Joel care (from nursing home), change Joel.  Monitor UO.    INFECTIOUS DISEASE:   ABX per ID.  MRSA nares.  Urine Strep & Legionella.  Follow up cultures.  FU ID    HEMATOLOGICAL:  LMWH TC.    ENDOCRINE:  Follow up FS.  Insulin protocol if needed.  TSH    Poor overall prognosis    FULL CODE    Monitor in MICU IMPRESSION:    Sepsis present on admission better  COPD Exacerbation, not tolerating BPAP  Acute on Chronic HFrEF Exacerbation  Septic Shock off Levophed  urosepsis  PNA - RSV(+) w/ likely bacterial superinfection  UTI  A.fib w/ RVR, resolved  VANESA resolved  Hyponatremia  Hypomagnesemia resolved  HO BPH, HTN, GERD, ILD   HO Afib - on Eliquis   HO LLL mass (suspicious for malignancy)       PLAN:     CNS: No CNS depressants.    HEENT: Oral care    PULMONARY:  HOB @ 45 degrees.  Aspiration precautions.   BIPAP PRN & HS. Solumedrol IV 60mg q12.  Target POx 88 - 92%.  On 4L NC    CARDIOVASCULAR:  Decrease LR to 75cc/hr for 12 hours.  Avoid volume overload.  FU Cardio, eval appreciated.  Low dose Beta blocker.    GI: GI prophylaxis.  Feeding as tolerated.  Bowel regimen.     RENAL:  Follow up lytes. Correct as needed.  Monitor UO.  Joel care (from nursing home), change Joel.  Monitor UO.    INFECTIOUS DISEASE:   ABX per ID.  MRSA nares.      HEMATOLOGICAL:  [facility]WH TC.    ENDOCRINE:  Follow up FS.  Insulin protocol if needed.  TSH    Poor overall prognosis    FULL CODE    Monitor in MICU

## 2022-07-29 NOTE — PATIENT PROFILE ADULT - FALL HARM RISK - HARM RISK INTERVENTIONS
Assistance with ambulation/Assistance OOB with selected safe patient handling equipment/Communicate Risk of Fall with Harm to all staff/Discuss with provider need for PT consult/Monitor gait and stability/Reinforce activity limits and safety measures with patient and family/Tailored Fall Risk Interventions/Visual Cue: Yellow wristband and red socks/Bed in lowest position, wheels locked, appropriate side rails in place/Call bell, personal items and telephone in reach/Instruct patient to call for assistance before getting out of bed or chair/Non-slip footwear when patient is out of bed/[place] to call system/Physically safe environment - no spills, clutter or unnecessary equipment/Purposeful Proactive Rounding/Room/bathroom lighting operational, light cord in reach

## 2022-07-29 NOTE — CHART NOTE - NSCHARTNOTEFT_GEN_A_CORE
Fem line requires removal since it's been in place >24hrs, however, patient refusing stating he wants to get some rest. I explained why it needs to be removed & he agreed to let us take it out in the morning.

## 2022-07-29 NOTE — PROGRESS NOTE ADULT - SUBJECTIVE AND OBJECTIVE BOX
NNEKA JOSEPH  89y, Male    All available historical data reviewed    OVERNIGHT EVENTS:  no fevers  feels well and has no new complaints   no pressors    ROS:  General: Denies rigors, nightsweats  HEENT: Denies headache, rhinorrhea, sore throat, eye pain  CV: Denies CP, palpitations  PULM: Denies wheezing, hemoptysis  GI: Denies hematemesis, hematochezia, melena  : Denies discharge, hematuria  MSK: Denies arthralgias, myalgias  SKIN: Denies rash, lesions  NEURO: Denies paresthesias, weakness  PSYCH: Denies depression, anxiety    VITALS:  T(F): 96.8, Max: 96.8 (22 @ 04:00)  HR: 98  BP: 111/79  RR: 40Vital Signs Last 24 Hrs  T(C): 36 (2022 04:00), Max: 36 (2022 04:00)  T(F): 96.8 (2022 04:00), Max: 96.8 (2022 04:00)  HR: 98 (2022 15:36) (78 - 130)  BP: 111/79 (2022 12:00) (98/76 - 135/85)  BP(mean): 90 (2022 12:00) (83 - 109)  RR: 40 (2022 15:00) (14 - 43)  SpO2: 100% (2022 15:36) (79% - 100%)    Parameters below as of 2022 06:00  Patient On (Oxygen Delivery Method): nasal cannula    O2 Concentration (%): 4    TESTS & MEASUREMENTS:                        9.4    20.34 )-----------( 142      ( 2022 04:45 )             29.7     07-29    132<L>  |  98  |  30<H>  ----------------------------<  158<H>  4.4   |  27  |  1.0    Ca    8.4<L>      2022 04:45  Mg     1.8     07-    TPro  5.0<L>  /  Alb  2.4<L>  /  TBili  0.5  /  DBili  x   /  AST  28  /  ALT  17  /  AlkPhos  64  07-29    LIVER FUNCTIONS - ( 2022 04:45 )  Alb: 2.4 g/dL / Pro: 5.0 g/dL / ALK PHOS: 64 U/L / ALT: 17 U/L / AST: 28 U/L / GGT: x             Urinalysis Basic - ( 2022 04:30 )    Color: Yellow / Appearance: Slightly Turbid / S.020 / pH: x  Gluc: x / Ketone: Negative  / Bili: Negative / Urobili: <2 mg/dL   Blood: x / Protein: 30 mg/dL / Nitrite: Negative   Leuk Esterase: Large / RBC: 4 /HPF / WBC 31 /HPF   Sq Epi: x / Non Sq Epi: 9 /HPF / Bacteria: Negative          RADIOLOGY & ADDITIONAL TESTS:  Personal review of radiological diagnostics performed  Echo and EKG results noted when applicable.     MEDICATIONS:  ceftazidime/avibactam IVPB 1.25 Gram(s) IV Intermittent every 8 hours  chlorhexidine 2% Cloths 1 Application(s) Topical daily  cyanocobalamin 1000 MICROGram(s) Oral daily  enoxaparin Injectable 70 milliGRAM(s) SubCutaneous every 12 hours  ferrous    sulfate 325 milliGRAM(s) Oral daily  finasteride 5 milliGRAM(s) Oral daily  methylPREDNISolone sodium succinate Injectable 60 milliGRAM(s) IV Push every 6 hours  metoprolol tartrate 50 milliGRAM(s) Oral two times a day  pantoprazole    Tablet 40 milliGRAM(s) Oral before breakfast  senna 2 Tablet(s) Oral at bedtime  tamsulosin 0.4 milliGRAM(s) Oral at bedtime      ANTIBIOTICS:  ceftazidime/avibactam IVPB 1.25 Gram(s) IV Intermittent every 8 hours

## 2022-07-29 NOTE — CHART NOTE - NSCHARTNOTEFT_GEN_A_CORE
MICU DOWN GRADE NOTE      Patient is a 89y old  Male who presents with a chief complaint of COPD Exacerbation (2022 08:45)      HPI:  88 y/o M w/ pmhx of BPH, HTN, Afib (on Eliquis), GERD, COPD, LLL mass (suspicious for malignancy), ILD on Home O2, with recent admission to [facility] (7/) for CHF exacerbation BIBEMS from [facility] for sudden onset SOB on day of presentation. Patient confused; HPI provided by ED from NH report: Patient developed acute dyspnea w/ tachycardia up to 120/min & hypoxia down to 80%>> Placed on 6L NC & EMS called. Placed on BiPAP immediately on arrival & received 1L NS bolus VS: 104.4F, , BP 87/53. Labs: WBC 31 (neutrophil predominant), Na 131, Cr 1.5 (baseline ~1), Mg 1.5, Tn 0.02, BNP 2.6K (5K in last admission), Lac 3.6, RSV(+). CXR with bilateral lung opacities.      INTERVAL HPI/OVERNIGHT EVENTS:  NAEON. Supposed to be on BiPAP overnight but refused. Breathing comfortably on 4L NC. Drips: abx and fluids. Ate breakfast this morning. After rounds patient c/o SOB and trouble breathing with -140s, BP 110s/70s --> EKG stat, increased metoprolol to 50mg BID (on home metoprolol 100mg TID), stopped fluids.      REVIEW OF SYSTEMS: negative except as in HPI    MEDICATIONS:  ceftazidime/avibactam IVPB 1.25 Gram(s) IV Intermittent every 8 hours  chlorhexidine 2% Cloths 1 Application(s) Topical daily  cyanocobalamin 1000 MICROGram(s) Oral daily  enoxaparin Injectable 70 milliGRAM(s) SubCutaneous every 12 hours  ferrous    sulfate 325 milliGRAM(s) Oral daily  finasteride 5 milliGRAM(s) Oral daily  lactated ringers. 1000 milliLiter(s) IV Continuous <Continuous>  methylPREDNISolone sodium succinate Injectable 60 milliGRAM(s) IV Push every 6 hours  metoprolol tartrate 12.5 milliGRAM(s) Oral two times a day  pantoprazole    Tablet 40 milliGRAM(s) Oral before breakfast  senna 2 Tablet(s) Oral at bedtime  tamsulosin 0.4 milliGRAM(s) Oral at bedtime      T(C): 36 (22 @ 04:00), Max: 36 (22 @ 04:00)  HR: 94 (22 @ 08:00) (71 - 110)  BP: 116/83 (22 @ 08:00) (98/76 - 128/80)  RR: 14 (22 @ 08:00) (14 - 33)  SpO2: 94% (22 @ 08:00) (91% - 100%)  Wt(kg): --Vital Signs Last 24 Hrs  T(C): 36 (2022 04:00), Max: 36 (2022 04:00)  T(F): 96.8 (2022 04:00), Max: 96.8 (2022 04:00)  HR: 94 (2022 08:00) (71 - 110)  BP: 116/83 (2022 08:00) (98/76 - 128/80)  BP(mean): 99 (2022 08:00) (82 - 99)  RR: 14 (2022 08:00) (14 - 33)  SpO2: 94% (2022 08:00) (91% - 100%) on 4L NC    PHYSICAL EXAM:  GENERAL: resting in bed in NAD   EYES: EOMI, PERRLA, conjunctiva and sclera clear  ENT: Moist mucous membranes  NECK: Supple, no LAD   CHEST/LUNG: decreased BS bilaterally  HEART: tachycardic, irregularly irregular rhythm; No murmurs, rubs, or gallops  ABDOMEN: Soft, Nontender, Nondistended, +BS  NEURO: CN 2-12 grossly intact, no focal neuro deficits, sensation intact throughout  PSYCH: AAOx3  EXTREMITIES: No LE edema, no calf tenderness    LABS:             9.4    20.34 )-----------( 142      ( 2022 04:45 )             29.7     132<L>  |  98  |  30<H>  ----------------------------<  158<H>  4.4   |  27  |  1.0    Ca    8.4<L>      2022 04:45  Mg     1.8         TPro  5.0<L>  /  Alb  2.4<L>  /  TBili  0.5  /  DBili  x   /  AST  28  /  ALT  17  /  AlkPhos  64      Urinalysis Basic - ( 2022 04:30 )  Color: Yellow / Appearance: Slightly Turbid / S.020 / pH: x  Gluc: x / Ketone: Negative  / Bili: Negative / Urobili: <2 mg/dL   Blood: x / Protein: 30 mg/dL / Nitrite: Negative   Leuk Esterase: Large / RBC: 4 /HPF / WBC 31 /HPF   Sq Epi: x / Non Sq Epi: 9 /HPF / Bacteria: Negative    ABG - ( 2022 21:50 )  pH, Arterial: 7.48  pH, Blood: x     /  pCO2: 39    /  pO2: 60    / HCO3: 29    / Base Excess: 5.1   /  SaO2: 93.5        ASSESSMENT  88 y/o M w/ pmhx of BPH, HTN, Afib (on Eliquis), GERD, COPD, LLL mass (suspicious for malignancy), ILD on Home O2, with recent admission to [facility] (7/) for CHF exacerbation, who was BIBEMS from [facility]  and admitted for septic shock 2/2 bacterial pneumonia and UTI, COPD exacerbation, and afib with RVR.    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  - off pressors  - ID eval: c/w ceftazidime/avibactam  - f/u urine culture    #Afib with RVR  - metoprolol 50mg BID  - cardiology eval: restart home metoprolol (100mg TID) as BP improves  - therapeutic lovenox 70mg q12h -- holding eliquis 5mg BID for Hgb 9.4 progressively decreasing from -12    #HFrEF with EF recovery (EF 40-45% --> 60-65%)  - d/c fluids  - cardiology eval: caution with IV hydration, hold diuresis until sepsis resolves    #COPD exacerbation  - c/w solumedrol 60mg q12h     Misc  - GI ppx pantoprazole 40mg PO qd   - DVT ppx lovenox 40  - diet: DASH  - chronic macedo from NH, last replaced 1 week ago, pt refuses change of macedo today   - dispo: transfer to CEU  - code status: FULL    SIGNOUT GIVEN TO: *** MICU DOWN GRADE NOTE      Patient is a 89y old  Male who presents with a chief complaint of COPD Exacerbation (2022 08:45)      HPI:  88 y/o M w/ pmhx of BPH, HTN, Afib (on Eliquis), GERD, COPD, LLL mass (suspicious for malignancy), ILD on Home O2, with recent admission to [facility] (7/) for CHF exacerbation BIBEMS from [facility] for sudden onset SOB on day of presentation. Patient confused; HPI provided by ED from NH report: Patient developed acute dyspnea w/ tachycardia up to 120/min & hypoxia down to 80%>> Placed on 6L NC & EMS called. Placed on BiPAP immediately on arrival & received 1L NS bolus VS: 104.4F, , BP 87/53. Labs: WBC 31 (neutrophil predominant), Na 131, Cr 1.5 (baseline ~1), Mg 1.5, Tn 0.02, BNP 2.6K (5K in last admission), Lac 3.6, RSV(+). CXR with bilateral lung opacities.      INTERVAL HPI/OVERNIGHT EVENTS:  NAEON. Supposed to be on BiPAP overnight but refused. Breathing comfortably on 4L NC. Drips: abx and fluids. Ate breakfast this morning. After rounds patient c/o SOB and trouble breathing with -140s, BP 110s/70s --> EKG stat, increased metoprolol to 50mg BID (on home metoprolol 100mg TID), stopped fluids.      REVIEW OF SYSTEMS: negative except as in HPI    MEDICATIONS:  ceftazidime/avibactam IVPB 1.25 Gram(s) IV Intermittent every 8 hours  chlorhexidine 2% Cloths 1 Application(s) Topical daily  cyanocobalamin 1000 MICROGram(s) Oral daily  enoxaparin Injectable 70 milliGRAM(s) SubCutaneous every 12 hours  ferrous    sulfate 325 milliGRAM(s) Oral daily  finasteride 5 milliGRAM(s) Oral daily  lactated ringers. 1000 milliLiter(s) IV Continuous <Continuous>  methylPREDNISolone sodium succinate Injectable 60 milliGRAM(s) IV Push every 6 hours  metoprolol tartrate 12.5 milliGRAM(s) Oral two times a day  pantoprazole    Tablet 40 milliGRAM(s) Oral before breakfast  senna 2 Tablet(s) Oral at bedtime  tamsulosin 0.4 milliGRAM(s) Oral at bedtime      T(C): 36 (22 @ 04:00), Max: 36 (22 @ 04:00)  HR: 94 (22 @ 08:00) (71 - 110)  BP: 116/83 (22 @ 08:00) (98/76 - 128/80)  RR: 14 (22 @ 08:00) (14 - 33)  SpO2: 94% (22 @ 08:00) (91% - 100%)  Wt(kg): --Vital Signs Last 24 Hrs  T(C): 36 (2022 04:00), Max: 36 (2022 04:00)  T(F): 96.8 (2022 04:00), Max: 96.8 (2022 04:00)  HR: 94 (2022 08:00) (71 - 110)  BP: 116/83 (2022 08:00) (98/76 - 128/80)  BP(mean): 99 (2022 08:00) (82 - 99)  RR: 14 (2022 08:00) (14 - 33)  SpO2: 94% (2022 08:00) (91% - 100%) on 4L NC    PHYSICAL EXAM:  GENERAL: resting in bed in NAD   EYES: EOMI, PERRLA, conjunctiva and sclera clear  ENT: Moist mucous membranes  NECK: Supple, no LAD   CHEST/LUNG: decreased BS bilaterally  HEART: tachycardic, irregularly irregular rhythm; No murmurs, rubs, or gallops  ABDOMEN: Soft, Nontender, Nondistended, +BS  NEURO: CN 2-12 grossly intact, no focal neuro deficits, sensation intact throughout  PSYCH: AAOx3  EXTREMITIES: No LE edema, no calf tenderness    LABS:             9.4    20.34 )-----------( 142      ( 2022 04:45 )             29.7     132<L>  |  98  |  30<H>  ----------------------------<  158<H>  4.4   |  27  |  1.0    Ca    8.4<L>      2022 04:45  Mg     1.8         TPro  5.0<L>  /  Alb  2.4<L>  /  TBili  0.5  /  DBili  x   /  AST  28  /  ALT  17  /  AlkPhos  64      Urinalysis Basic - ( 2022 04:30 )  Color: Yellow / Appearance: Slightly Turbid / S.020 / pH: x  Gluc: x / Ketone: Negative  / Bili: Negative / Urobili: <2 mg/dL   Blood: x / Protein: 30 mg/dL / Nitrite: Negative   Leuk Esterase: Large / RBC: 4 /HPF / WBC 31 /HPF   Sq Epi: x / Non Sq Epi: 9 /HPF / Bacteria: Negative    ABG - ( 2022 21:50 )  pH, Arterial: 7.48  pH, Blood: x     /  pCO2: 39    /  pO2: 60    / HCO3: 29    / Base Excess: 5.1   /  SaO2: 93.5        ASSESSMENT  88 y/o M w/ pmhx of BPH, HTN, Afib (on Eliquis), GERD, COPD, LLL mass (suspicious for malignancy), ILD on Home O2, with recent admission to [facility] (7/) for CHF exacerbation, who was BIBEMS from [facility]  and admitted for septic shock 2/2 bacterial pneumonia and UTI, COPD exacerbation, and afib with RVR.    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  - off pressors  - ID eval: c/w ceftazidime/avibactam  - f/u urine culture    #Afib with RVR  - metoprolol 50mg BID  - cardiology eval: restart home metoprolol (100mg TID) as BP improves  - therapeutic lovenox 70mg q12h -- holding eliquis 5mg BID for Hgb 9.4 progressively decreasing from -12    #HFrEF with recovered EF (EF 40-45% --> 60-65%)  - d/c fluids  - cardiology eval: caution with IV hydration, hold diuresis until sepsis resolves    #COPD exacerbation  - c/w solumedrol 60mg q12h     Misc  - GI ppx pantoprazole 40mg PO qd   - DVT ppx lovenox 40  - diet: DASH  - chronic macedo from NH, last replaced 1 week ago, pt refuses change of macedo today   - dispo: transfer to CEU  - code status: FULL    SIGNOUT GIVEN TO: *** MICU DOWN GRADE NOTE      Patient is a 89y old  Male who presents with a chief complaint of COPD Exacerbation (2022 08:45)      HPI:  88 y/o M w/ pmhx of BPH, HTN, Afib (on Eliquis), GERD, COPD, LLL mass (suspicious for malignancy), ILD on Home O2, with recent admission to [facility] (7/) for CHF exacerbation BIBEMS from [facility] for sudden onset SOB on day of presentation. Patient confused; HPI provided by ED from NH report: Patient developed acute dyspnea w/ tachycardia up to 120/min & hypoxia down to 80%>> Placed on 6L NC & EMS called. Placed on BiPAP immediately on arrival & received 1L NS bolus VS: 104.4F, , BP 87/53. Labs: WBC 31 (neutrophil predominant), Na 131, Cr 1.5 (baseline ~1), Mg 1.5, Tn 0.02, BNP 2.6K (5K in last admission), Lac 3.6, RSV(+). CXR with bilateral lung opacities.      INTERVAL HPI/OVERNIGHT EVENTS:  NAEON. Supposed to be on BiPAP overnight but refused. Breathing comfortably on 4L NC. Drips: abx and fluids. Ate breakfast this morning. After rounds patient c/o SOB and trouble breathing with -140s, BP 110s/70s --> EKG stat, increased metoprolol to 50mg BID (on home metoprolol 100mg TID), stopped fluids.      REVIEW OF SYSTEMS: negative except as in HPI    MEDICATIONS:  ceftazidime/avibactam IVPB 1.25 Gram(s) IV Intermittent every 8 hours  chlorhexidine 2% Cloths 1 Application(s) Topical daily  cyanocobalamin 1000 MICROGram(s) Oral daily  enoxaparin Injectable 70 milliGRAM(s) SubCutaneous every 12 hours  ferrous    sulfate 325 milliGRAM(s) Oral daily  finasteride 5 milliGRAM(s) Oral daily  lactated ringers. 1000 milliLiter(s) IV Continuous <Continuous>  methylPREDNISolone sodium succinate Injectable 60 milliGRAM(s) IV Push every 6 hours  metoprolol tartrate 12.5 milliGRAM(s) Oral two times a day  pantoprazole    Tablet 40 milliGRAM(s) Oral before breakfast  senna 2 Tablet(s) Oral at bedtime  tamsulosin 0.4 milliGRAM(s) Oral at bedtime      T(C): 36 (22 @ 04:00), Max: 36 (22 @ 04:00)  HR: 94 (22 @ 08:00) (71 - 110)  BP: 116/83 (22 @ 08:00) (98/76 - 128/80)  RR: 14 (22 @ 08:00) (14 - 33)  SpO2: 94% (22 @ 08:00) (91% - 100%)  Wt(kg): --Vital Signs Last 24 Hrs  T(C): 36 (2022 04:00), Max: 36 (2022 04:00)  T(F): 96.8 (2022 04:00), Max: 96.8 (2022 04:00)  HR: 94 (2022 08:00) (71 - 110)  BP: 116/83 (2022 08:00) (98/76 - 128/80)  BP(mean): 99 (2022 08:00) (82 - 99)  RR: 14 (2022 08:00) (14 - 33)  SpO2: 94% (2022 08:00) (91% - 100%) on 4L NC    PHYSICAL EXAM:  GENERAL: resting in bed in NAD   EYES: EOMI, PERRLA, conjunctiva and sclera clear  ENT: Moist mucous membranes  NECK: Supple, no LAD   CHEST/LUNG: decreased BS bilaterally  HEART: tachycardic, irregularly irregular rhythm; No murmurs, rubs, or gallops  ABDOMEN: Soft, Nontender, Nondistended, +BS  NEURO: CN 2-12 grossly intact, no focal neuro deficits, sensation intact throughout  PSYCH: AAOx3  EXTREMITIES: No LE edema, no calf tenderness    LABS:             9.4    20.34 )-----------( 142      ( 2022 04:45 )             29.7     132<L>  |  98  |  30<H>  ----------------------------<  158<H>  4.4   |  27  |  1.0    Ca    8.4<L>      2022 04:45  Mg     1.8         TPro  5.0<L>  /  Alb  2.4<L>  /  TBili  0.5  /  DBili  x   /  AST  28  /  ALT  17  /  AlkPhos  64      Urinalysis Basic - ( 2022 04:30 )  Color: Yellow / Appearance: Slightly Turbid / S.020 / pH: x  Gluc: x / Ketone: Negative  / Bili: Negative / Urobili: <2 mg/dL   Blood: x / Protein: 30 mg/dL / Nitrite: Negative   Leuk Esterase: Large / RBC: 4 /HPF / WBC 31 /HPF   Sq Epi: x / Non Sq Epi: 9 /HPF / Bacteria: Negative    ABG - ( 2022 21:50 )  pH, Arterial: 7.48  pH, Blood: x     /  pCO2: 39    /  pO2: 60    / HCO3: 29    / Base Excess: 5.1   /  SaO2: 93.5        ASSESSMENT  88 y/o M w/ pmhx of BPH, HTN, Afib (on Eliquis), GERD, COPD, LLL mass (suspicious for malignancy), ILD on Home O2, with recent admission to [facility] (7/) for CHF exacerbation, who was BIBEMS from [facility]  and admitted for septic shock 2/2 bacterial pneumonia and UTI, COPD exacerbation, and afib with RVR.    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  - off pressors  - ID eval: c/w ceftazidime/avibactam  - f/u urine culture    #Afib with RVR  - started on metoprolol 50mg BID, titrate as tolerated   - tele monitoring  - cardiology eval: restart home metoprolol (100mg TID) as BP improves  - therapeutic lovenox 70mg q12h -- holding eliquis 5mg BID for Hgb 9.4 progressively decreasing from -12    #HFrEF with recovered EF (EF 40-45% --> 60-65%)  - d/c fluids  - cardiology eval: caution with IV hydration, hold diuresis until sepsis resolves    #COPD exacerbation  - c/w solumedrol 60mg q12h     Misc  - GI ppx pantoprazole 40mg PO qd   - DVT ppx lovenox 40  - diet: DASH  - chronic macedo from NH, last replaced 1 week ago, pt refuses change of macedo today   - dispo: transfer to CEU  - code status: FULL    SIGNOUT GIVEN TO: *** MICU DOWN GRADE NOTE      Patient is a 89y old  Male who presents with a chief complaint of COPD Exacerbation (2022 08:45)      HPI:  88 y/o M w/ pmhx of BPH, HTN, Afib (on Eliquis), GERD, COPD, LLL mass (suspicious for malignancy), ILD on Home O2, with recent admission to [facility] (7/) for CHF exacerbation BIBEMS from [facility] for sudden onset SOB on day of presentation. Patient confused; HPI provided by ED from NH report: Patient developed acute dyspnea w/ tachycardia up to 120/min & hypoxia down to 80%>> Placed on 6L NC & EMS called. Placed on BiPAP immediately on arrival & received 1L NS bolus VS: 104.4F, , BP 87/53. Labs: WBC 31 (neutrophil predominant), Na 131, Cr 1.5 (baseline ~1), Mg 1.5, Tn 0.02, BNP 2.6K (5K in last admission), Lac 3.6, RSV(+). CXR with bilateral lung opacities.      INTERVAL HPI/OVERNIGHT EVENTS:  NAEON. Supposed to be on BiPAP overnight but refused. Breathing comfortably on 4L NC. Drips: abx and fluids. Ate breakfast this morning. After rounds patient c/o SOB and trouble breathing with -140s, BP 110s/70s --> EKG stat, increased metoprolol to 50mg BID (on home metoprolol 100mg TID), stopped fluids.      REVIEW OF SYSTEMS: negative except as in HPI    MEDICATIONS:  ceftazidime/avibactam IVPB 1.25 Gram(s) IV Intermittent every 8 hours  chlorhexidine 2% Cloths 1 Application(s) Topical daily  cyanocobalamin 1000 MICROGram(s) Oral daily  enoxaparin Injectable 70 milliGRAM(s) SubCutaneous every 12 hours  ferrous    sulfate 325 milliGRAM(s) Oral daily  finasteride 5 milliGRAM(s) Oral daily  lactated ringers. 1000 milliLiter(s) IV Continuous <Continuous>  methylPREDNISolone sodium succinate Injectable 60 milliGRAM(s) IV Push every 6 hours  metoprolol tartrate 12.5 milliGRAM(s) Oral two times a day  pantoprazole    Tablet 40 milliGRAM(s) Oral before breakfast  senna 2 Tablet(s) Oral at bedtime  tamsulosin 0.4 milliGRAM(s) Oral at bedtime      T(C): 36 (22 @ 04:00), Max: 36 (22 @ 04:00)  HR: 94 (22 @ 08:00) (71 - 110)  BP: 116/83 (22 @ 08:00) (98/76 - 128/80)  RR: 14 (22 @ 08:00) (14 - 33)  SpO2: 94% (22 @ 08:00) (91% - 100%)  Wt(kg): --Vital Signs Last 24 Hrs  T(C): 36 (2022 04:00), Max: 36 (2022 04:00)  T(F): 96.8 (2022 04:00), Max: 96.8 (2022 04:00)  HR: 94 (2022 08:00) (71 - 110)  BP: 116/83 (2022 08:00) (98/76 - 128/80)  BP(mean): 99 (2022 08:00) (82 - 99)  RR: 14 (2022 08:00) (14 - 33)  SpO2: 94% (2022 08:00) (91% - 100%) on 4L NC    PHYSICAL EXAM:  GENERAL: resting in bed in NAD   EYES: EOMI, PERRLA, conjunctiva and sclera clear  ENT: Moist mucous membranes  NECK: Supple, no LAD   CHEST/LUNG: decreased BS bilaterally  HEART: tachycardic, irregularly irregular rhythm; No murmurs, rubs, or gallops  ABDOMEN: Soft, Nontender, Nondistended, +BS  NEURO: CN 2-12 grossly intact, no focal neuro deficits, sensation intact throughout  PSYCH: AAOx3  EXTREMITIES: No LE edema, no calf tenderness    LABS:             9.4    20.34 )-----------( 142      ( 2022 04:45 )             29.7     132<L>  |  98  |  30<H>  ----------------------------<  158<H>  4.4   |  27  |  1.0    Ca    8.4<L>      2022 04:45  Mg     1.8         TPro  5.0<L>  /  Alb  2.4<L>  /  TBili  0.5  /  DBili  x   /  AST  28  /  ALT  17  /  AlkPhos  64      Urinalysis Basic - ( 2022 04:30 )  Color: Yellow / Appearance: Slightly Turbid / S.020 / pH: x  Gluc: x / Ketone: Negative  / Bili: Negative / Urobili: <2 mg/dL   Blood: x / Protein: 30 mg/dL / Nitrite: Negative   Leuk Esterase: Large / RBC: 4 /HPF / WBC 31 /HPF   Sq Epi: x / Non Sq Epi: 9 /HPF / Bacteria: Negative    ABG - ( 2022 21:50 )  pH, Arterial: 7.48  pH, Blood: x     /  pCO2: 39    /  pO2: 60    / HCO3: 29    / Base Excess: 5.1   /  SaO2: 93.5        ASSESSMENT  88 y/o M w/ pmhx of BPH, HTN, Afib (on Eliquis), GERD, COPD, LLL mass (suspicious for malignancy), ILD on Home O2, with recent admission to [facility] (7/) for CHF exacerbation, who was BIBEMS from [facility]  and admitted for septic shock 2/2 bacterial pneumonia and UTI, COPD exacerbation, and afib with RVR.    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  - off pressors  - ID eval: c/w ceftazidime/avibactam  - f/u urine culture    #Afib with RVR  - started on metoprolol 50mg BID, titrate as tolerated   - tele monitoring  - cardiology eval: restart home metoprolol (100mg TID) as BP improves  - therapeutic lovenox 70mg q12h -- holding eliquis 5mg BID for Hgb 9.4 progressively decreasing from -12    #HFrEF with recovered EF (EF 40-45% --> 60-65%)  - d/c fluids  - cardiology eval: caution with IV hydration, hold diuresis until sepsis resolves    #COPD exacerbation  - c/w solumedrol 60mg q12h     Misc  - GI ppx pantoprazole 40mg PO qd   - DVT ppx lovenox 40  - diet: DASH  - chronic macedo from NH, last replaced 1 week ago, pt refuses change of macedo today   - dispo: transfer to CEU  - code status: FULL    SIGNOUT GIVEN TO: Richi Whitlock MD PGY1

## 2022-07-29 NOTE — PROGRESS NOTE ADULT - SUBJECTIVE AND OBJECTIVE BOX
Over Night Events:  No overnight events.  Refuses BPAP.  Off pressors    PHYSICAL EXAM  ICU Vital Signs Last 24 Hrs  T(C): 36 (2022 04:00), Max: 36 (2022 04:00)  T(F): 96.8 (2022 04:00), Max: 96.8 (2022 04:00)  HR: 94 (2022 08:00) (71 - 110)  BP: 116/83 (2022 08:00) (98/76 - 128/80)  BP(mean): 99 (2022 08:00) (82 - 99)  RR: 14 (2022 08:00) (14 - 33)  SpO2: 94% (2022 08:00) (91% - 100%)    O2 Parameters below as of 2022 06:00  Patient On (Oxygen Delivery Method): nasal cannula    O2 Concentration (%): 4      General:  ill appearing  HEENT: ARCHANA             Lungs: Decreased BS b/l  Cardiovascular: Irregular   Abdomen: Soft, Positive BS  Extremities: No clubbing   Skin: Warm  Neurological: Non focal       22 @ 07:  -  22 @ 07:00  --------------------------------------------------------  IN:    Lactated Ringers: 1500 mL  Total IN: 1500 mL    OUT:    Indwelling Catheter - Urethral (mL): 460 mL  Total OUT: 460 mL    Total NET: 1040 mL      22 @ 07:01  -  22 @ 08:46  --------------------------------------------------------  IN:    Lactated Ringers: 300 mL  Total IN: 300 mL    OUT:    Indwelling Catheter - Urethral (mL): 30 mL  Total OUT: 30 mL    Total NET: 270 mL      LABS:               9.4    20.34 )-----------( 142      ( 2022 04:45 )             29.7     132<L>  |  98  |  30<H>  ----------------------------<  158<H>  4.4   |  27  |  1.0    Ca    8.4<L>      2022 04:45  Mg     1.8         TPro  5.0<L>  /  Alb  2.4<L>  /  TBili  0.5  /  DBili  x   /  AST  28  /  ALT  17  /  AlkPhos  64  07-    Urinalysis Basic - ( 2022 04:30 )  Color: Yellow / Appearance: Slightly Turbid / S.020 / pH: x  Gluc: x / Ketone: Negative  / Bili: Negative / Urobili: <2 mg/dL   Blood: x / Protein: 30 mg/dL / Nitrite: Negative   Leuk Esterase: Large / RBC: 4 /HPF / WBC 31 /HPF   Sq Epi: x / Non Sq Epi: 9 /HPF / Bacteria: Negative    CARDIAC MARKERS ( 2022 02:21 )  x     / 0.02 ng/mL / x     / x     / x        LIVER FUNCTIONS - ( 2022 04:45 )  Alb: 2.4 g/dL / Pro: 5.0 g/dL / ALK PHOS: 64 U/L / ALT: 17 U/L / AST: 28 U/L / GGT: x                                              ABG - ( 2022 21:50 )  pH, Arterial: 7.48  pH, Blood: x     /  pCO2: 39    /  pO2: 60    / HCO3: 29    / Base Excess: 5.1   /  SaO2: 93.5        MEDICATIONS  (STANDING):  apixaban 5 milliGRAM(s) Oral two times a day  ceftazidime/avibactam IVPB 1.25 Gram(s) IV Intermittent every 8 hours  chlorhexidine 2% Cloths 1 Application(s) Topical daily  cyanocobalamin 1000 MICROGram(s) Oral daily  ferrous    sulfate 325 milliGRAM(s) Oral daily  finasteride 5 milliGRAM(s) Oral daily  lactated ringers. 1000 milliLiter(s) (150 mL/Hr) IV Continuous <Continuous>  methylPREDNISolone sodium succinate Injectable 60 milliGRAM(s) IV Push every 6 hours  pantoprazole    Tablet 40 milliGRAM(s) Oral before breakfast  senna 2 Tablet(s) Oral at bedtime  tamsulosin 0.4 milliGRAM(s) Oral at bedtime    MEDICATIONS  (PRN): Over Night Events:  No overnight events.  Refuses BPAP.  Off pressors, on NC, ID reviewed    PHYSICAL EXAM  ICU Vital Signs Last 24 Hrs  T(C): 36 (2022 04:00), Max: 36 (2022 04:00)  T(F): 96.8 (2022 04:00), Max: 96.8 (2022 04:00)  HR: 94 (2022 08:00) (71 - 110)  BP: 116/83 (2022 08:00) (98/76 - 128/80)  BP(mean): 99 (2022 08:00) (82 - 99)  RR: 14 (2022 08:00) (14 - 33)  SpO2: 94% (2022 08:00) (91% - 100%)    O2 Parameters below as of 2022 06:00  Patient On (Oxygen Delivery Method): nasal cannula    O2 Concentration (%): 4      General:  ill appearing  HEENT: ARCHANA             Lungs: Decreased BS b/l  Cardiovascular: Irregular   Abdomen: Soft, Positive BS  Extremities: No clubbing   Neurological: Non focal       22 @ 07:01  -  22 @ 07:00  --------------------------------------------------------  IN:    Lactated Ringers: 1500 mL  Total IN: 1500 mL    OUT:    Indwelling Catheter - Urethral (mL): 460 mL  Total OUT: 460 mL    Total NET: 1040 mL      22 @ 07:01  -  22 @ 08:46  --------------------------------------------------------  IN:    Lactated Ringers: 300 mL  Total IN: 300 mL    OUT:    Indwelling Catheter - Urethral (mL): 30 mL  Total OUT: 30 mL    Total NET: 270 mL      LABS:               9.4    20.34 )-----------( 142      ( 2022 04:45 )             29.7     132<L>  |  98  |  30<H>  ----------------------------<  158<H>  4.4   |  27  |  1.0    Ca    8.4<L>      2022 04:45  Mg     1.8     07-    TPro  5.0<L>  /  Alb  2.4<L>  /  TBili  0.5  /  DBili  x   /  AST  28  /  ALT  17  /  AlkPhos  64  07-29    Urinalysis Basic - ( 2022 04:30 )  Color: Yellow / Appearance: Slightly Turbid / S.020 / pH: x  Gluc: x / Ketone: Negative  / Bili: Negative / Urobili: <2 mg/dL   Blood: x / Protein: 30 mg/dL / Nitrite: Negative   Leuk Esterase: Large / RBC: 4 /HPF / WBC 31 /HPF   Sq Epi: x / Non Sq Epi: 9 /HPF / Bacteria: Negative    CARDIAC MARKERS ( 2022 02:21 )  x     / 0.02 ng/mL / x     / x     / x        LIVER FUNCTIONS - ( 2022 04:45 )  Alb: 2.4 g/dL / Pro: 5.0 g/dL / ALK PHOS: 64 U/L / ALT: 17 U/L / AST: 28 U/L / GGT: x                                              ABG - ( 2022 21:50 )  pH, Arterial: 7.48  pH, Blood: x     /  pCO2: 39    /  pO2: 60    / HCO3: 29    / Base Excess: 5.1   /  SaO2: 93.5        MEDICATIONS  (STANDING):  apixaban 5 milliGRAM(s) Oral two times a day  ceftazidime/avibactam IVPB 1.25 Gram(s) IV Intermittent every 8 hours  chlorhexidine 2% Cloths 1 Application(s) Topical daily  cyanocobalamin 1000 MICROGram(s) Oral daily  ferrous    sulfate 325 milliGRAM(s) Oral daily  finasteride 5 milliGRAM(s) Oral daily  lactated ringers. 1000 milliLiter(s) (150 mL/Hr) IV Continuous <Continuous>  methylPREDNISolone sodium succinate Injectable 60 milliGRAM(s) IV Push every 6 hours  pantoprazole    Tablet 40 milliGRAM(s) Oral before breakfast  senna 2 Tablet(s) Oral at bedtime  tamsulosin 0.4 milliGRAM(s) Oral at bedtime    MEDICATIONS  (PRN):

## 2022-07-29 NOTE — PROGRESS NOTE ADULT - ASSESSMENT
ASSESSMENT  89yMale with a PMH of COPD, HFrEF, BPH, lung interstitial disease, GERD, HTN;    IMPRESSION  Sepsis secondary to acute pyelonephritis  Bibasilar bacterial PNA     RECOMMENDATIONS  BCx  UCx  Nares ORSA  D/c right femoral catheter ( this am refused to have it removed )  Change Avycaz 2.5 g iv q8h

## 2022-07-30 NOTE — CHART NOTE - NSCHARTNOTEFT_GEN_A_CORE
Pt hypoxic to 80% on BIPAP 100% FiO2, tachypnic and agitated with labored breathing.  Two sons at bedside, Elkin and Graham.  I explained that pt needs intubation and that without intubation pt would die.  Both sons verbalized understanding, expressed that pt has been suffering over the last 6 months and that his wishes were to not live on a machine.  STAT palliative consult, discussion held with family and palliative NP, family agreed upon themselves that pt be made DNR/DNI/CMO in honor of his wishes.

## 2022-07-30 NOTE — CHART NOTE - NSCHARTNOTEFT_GEN_A_CORE
Palliative Care called for a STAT     Spoke to ICU resident DR Colon. Case reviewed. Pt hypoxic and dyspneic nearing end of life. Family met with ICU team and palliative NP and they made him DNR/DNI/CMO. Plan for palliative BIPAP removal today when family arrives. Start symptom management asap.     Pt with h/o opioid use - Dilaudid 2mg PO q 6 hrs PRN for dyspnea - has used for months. Not opioid naive. Spoke to bedside nurse reposts pt very symptomatic of dyspnea and anxiety on BIPAP 100%     Recommendations  - DNR/DNI/CMO, remove BIPAP when family all have arrived  - Morphine 6mg IV q 15min PRN for dyspnea/pain x 1 day  - Ativan 1mg IV q 1/2hr PRN for anxiety/restlessness  - Glycopyrrolate 0.2mg IV q 6 hrs RTC  - Morphine 6mg IV gtt to start if patient using frequent PRNs. Order and have on stand by   - call x [phone] or send teams message to Joleen Archer NP from palliative for any questions    case d/w ICU resident/ICU RN/Pallaitive Care Attending MD Yeung

## 2022-07-30 NOTE — DISCHARGE NOTE FOR THE EXPIRED PATIENT - HOSPITAL COURSE
88 y/o M w/ pmhx of BPH, HTN, Afib (on Eliquis), GERD, COPD, LLL mass (suspicious for malignancy), HFrEF (EF 46% 2/27/22), ILD, with recent admission to [facility] (7/10-7/23/2022) for CHF exacerbation BIBEMS from [facility] for sudden onset SOB on day of presentation. Patient p/t the ED confused; HPI provided to us by ED from NH report: Patient developed acute dyspnea w/ tachycardia up to 120/min & hypoxia down to 80%>> Placed on 6L NC & EMS called.   In the ED the Patient was placed on BiPAP immediately on arrival & remained hypotensive despite multiple fluid bolus. VS in the ED were- 104.4F, , BP 87/53, Labs: WBC 31 (neutrophil predominant), Na 131, Cr 1.5 (baseline ~1), Mg 1.5, Tn 0.02, BNP 2.6K (5K in last admission), Lac 3.6, RSV(+), CXR: Bilateral diffuse pulm congestion; LLL opacity; Rt tracheal deviation. Patient was given empiric abx therapy as well as pressors and Central line was placed. The pt was subsequently upgraded to MICU for further management.     In the MICU, the pt continued to be hypoxic despite refusing BIPAP O/N, prefering 4L NC for support. The family met with the MICU team and palliative NP. The family was made aware that the pt had ILD which is not reversible. The family aggreed to make the patient code status- DNR/DNI/CMP and BIPAP was removed when the family got together at bedside.  88 y/o M w/ pmhx of BPH, HTN, Afib (on Eliquis), GERD, COPD, LLL mass (suspicious for malignancy), HFrEF (EF 46% 2/27/22), ILD, with recent admission to [facility] (7/10-7/23/2022) for CHF exacerbation BIBEMS from [facility] for sudden onset SOB on day of presentation. Patient p/t the ED confused; HPI provided to us by ED from NH report: Patient developed acute dyspnea w/ tachycardia up to 120/min & hypoxia down to 80%>> Placed on 6L NC & EMS called.   In the ED the Patient was placed on BiPAP immediately on arrival & remained hypotensive despite multiple fluid bolus. VS in the ED were- 104.4F, , BP 87/53, Labs: WBC 31 (neutrophil predominant), Na 131, Cr 1.5 (baseline ~1), Mg 1.5, Tn 0.02, BNP 2.6K (5K in last admission), Lac 3.6, RSV(+), CXR: Bilateral diffuse pulm congestion; LLL opacity; Rt tracheal deviation. Patient was given empiric abx therapy as well as pressors and Central line was placed. The pt was subsequently upgraded to MICU for further management.     In the MICU, the pt continued to be hypoxic despite refusing BIPAP O/N, prefering 4L NC for support. The family met with the MICU team and palliative NP. The family was made aware that the pt had ILD which is not reversible. The family agreed to make the patient code status- DNR/DNI/CMP.  BIPAP was removed when the family got together at bedside and comfort measures were commenced

## 2022-07-30 NOTE — PROVIDER CONTACT NOTE (OTHER) - SITUATION
Pt on bipap 60% fi02, restless, desaturating to 70s.
pt from nursing home with chronic macedo, refusing to have macedo changed.

## 2022-07-30 NOTE — DISCHARGE NOTE FOR THE EXPIRED PATIENT - NS PATIENT DEATH CRITERIA
Patient is dead based on Cardiopulmonary criteria including absent breath sounds, pulselessness and/or asystole Statement Selected

## 2022-07-30 NOTE — CHART NOTE - NSCHARTNOTEFT_GEN_A_CORE
Called by RN for desaturation down to 60's.    On assessment, patient's breathing appeared labored an he was pulling on bed rails. Auscultation revealed bilateral inspiratory crackles s/p x1 Lasix 60mg IV>> Patient's calmed down & saturation improved to high 80's.    I was informed by nurse that his urinary output was dropped to 15cc/hr prior to event which would explain pulmonary edema & respiratory distress.    Patient restarted on home Lasix 40mg Daily. Called by RN for desaturation down to 60's.    On assessment, patient's breathing appeared labored an he was pulling on bed rails. Auscultation revealed bilateral inspiratory crackles s/p x1 Lasix 60mg IV>> Patient's calmed down & saturation improved to high 80's.    I was informed by nurse that his urinary output was dropped to 15cc/hr prior to event which would explain pulmonary edema & respiratory distress.    Home Lasix 40mg Daily restarted and increased to BID. Called by RN for desaturation down to 60's.    On assessment, patient's breathing appeared labored an he was pulling on bed rails. Auscultation revealed bilateral inspiratory crackles s/p x2 Lasix 60mg IV>> Patient's calmed down & saturation improved to high 80's.    I was informed by nurse that his urinary output was dropped to 15cc/hr prior to event which would explain pulmonary edema & respiratory distress.    Home Lasix 40mg Daily restarted and increased to BID.

## 2022-07-30 NOTE — CHART NOTE - NSCHARTNOTEFT_GEN_A_CORE
Tried to contact his sons but was unsuccessful. Contact family to discuss goal of care. Pt may require intubation.

## 2022-07-30 NOTE — PROVIDER CONTACT NOTE (OTHER) - ASSESSMENT
tachycardic, 02 ranging 70s-80s, SOB, abdominal muscle use for breathing
explained to patient the need and importance of changing macedo to ensure sterility. pt refused.

## 2022-07-30 NOTE — PROGRESS NOTE ADULT - SUBJECTIVE AND OBJECTIVE BOX
HPI:  88 y/o M w/ pmhx of BPH, HTN, Afib (on Eliquis), GERD, COPD, LLL mass (suspicious for malignancy), HFrEF (EF 46% 2/27/22), ILD, with recent admission to [facility] (7/10-7/23/2022) for CHF exacerbation BIBEMS from [facility] for sudden onset SOB on day of presentation.    Patient confused; HPI provided to us by ED from NH report: Patient developed acute dyspnea w/ tachycardia up to 120/min & hypoxia down to 80%>> Placed on 6L NC & EMS called.     ROS limited due to patient's AMS.     ED: Patient placed on BiPAP immediately on arrival & received 1L NS bolus  - VS: 104.4F, , BP 87/53,   - Labs: WBC 31 (neutrophil predominant), Na 131, Cr 1.5 (baseline ~1), Mg 1.5, Tn 0.02, BNP 2.6K (5K in last admission), Lac 3.6, RSV(+)  - CXR: Bilateral diffuse pulm congestion; LLL opacity; Rt tracheal deviation (unchanged from prior CXR) [Unofficial Read]    Admit to MICU (28 Jul 2022 04:15)        Over Night Events:  Patient seen and examined.       ROS:  See HPI    PHYSICAL EXAM    ICU Vital Signs Last 24 Hrs  T(C): 34.9 (30 Jul 2022 04:00), Max: 35.8 (29 Jul 2022 20:00)  T(F): 94.8 (30 Jul 2022 04:00), Max: 96.4 (29 Jul 2022 20:00)  HR: 88 (30 Jul 2022 04:00) (82 - 130)  BP: 112/74 (30 Jul 2022 04:00) (104/83 - 152/111)  BP(mean): 83 (30 Jul 2022 04:00) (83 - 130)  ABP: --  ABP(mean): --  RR: 20 (30 Jul 2022 04:00) (14 - 56)  SpO2: 87% (30 Jul 2022 04:00) (75% - 100%)    O2 Parameters below as of 30 Jul 2022 04:00  Patient On (Oxygen Delivery Method): mask, Venturi    O2 Concentration (%): 50        General:  HEENT:                Lymph Nodes: NO cervical LN   Lungs: Bilateral BS  Cardiovascular: Regular   Abdomen: Soft, Positive BS  Extremities: No clubbing   Skin:   Neurological:     I&O's Detail    28 Jul 2022 07:01  -  29 Jul 2022 07:00  --------------------------------------------------------  IN:    Lactated Ringers: 1500 mL  Total IN: 1500 mL    OUT:    Indwelling Catheter - Urethral (mL): 460 mL  Total OUT: 460 mL    Total NET: 1040 mL      29 Jul 2022 07:01  -  30 Jul 2022 04:52  --------------------------------------------------------  IN:    Dexmedetomidine: 45.3 mL    Lactated Ringers: 300 mL  Total IN: 345.3 mL    OUT:    Indwelling Catheter - Urethral (mL): 595 mL  Total OUT: 595 mL    Total NET: -249.7 mL          LABS:                          9.4    20.34 )-----------( 142      ( 29 Jul 2022 04:45 )             29.7         29 Jul 2022 04:45    132    |  98     |  30     ----------------------------<  158    4.4     |  27     |  1.0      Ca    8.4        29 Jul 2022 04:45  Mg     1.8       29 Jul 2022 04:45                                                                                                                                                  Culture - Blood (collected 28 Jul 2022 02:35)  Source: .Blood Blood-Peripheral  Preliminary Report (29 Jul 2022 23:01):    No growth to date.    Culture - Blood (collected 28 Jul 2022 02:35)  Source: .Blood Blood-Peripheral  Preliminary Report (29 Jul 2022 23:01):    No growth to date.                                                                                       ABG - ( 30 Jul 2022 02:21 )  pH, Arterial: 7.40  pH, Blood: x     /  pCO2: 43    /  pO2: 34    / HCO3: 27    / Base Excess: 1.5   /  SaO2: 60.1                MEDICATIONS  (STANDING):  albuterol/ipratropium for Nebulization 3 milliLiter(s) Nebulizer every 6 hours  ceftazidime/avibactam IVPB 1.25 Gram(s) IV Intermittent every 8 hours  chlorhexidine 2% Cloths 1 Application(s) Topical daily  cyanocobalamin 1000 MICROGram(s) Oral daily  dexMEDEtomidine Infusion 0.2 MICROgram(s)/kG/Hr (3.5 mL/Hr) IV Continuous <Continuous>  enoxaparin Injectable 70 milliGRAM(s) SubCutaneous every 12 hours  ferrous    sulfate 325 milliGRAM(s) Oral daily  finasteride 5 milliGRAM(s) Oral daily  furosemide    Tablet 40 milliGRAM(s) Oral daily  methylPREDNISolone sodium succinate Injectable 60 milliGRAM(s) IV Push every 12 hours  metoprolol tartrate 50 milliGRAM(s) Oral two times a day  pantoprazole    Tablet 40 milliGRAM(s) Oral before breakfast  senna 2 Tablet(s) Oral at bedtime  tamsulosin 0.4 milliGRAM(s) Oral at bedtime    MEDICATIONS  (PRN):          Xrays:  TLC:  OG:  ET tube:                                                                                      < from: Xray Chest 1 View- PORTABLE-Urgent (07.28.22 @ 02:53) >  FINDINGS:    Support devices: None.    Cardiac/mediastinum/hilum: Stable enlarged cardiac silhouette.    Lung parenchyma/Pleura: Low lung volumes. Stable bilateral opacities. No   pneumothorax.    Skeleton/soft tissues: Stable.      IMPRESSION:    Low lung volumes. Stable bilateral opacities.    < end of copied text >       ECHO:    < from: TTE Echo Complete w/ Contrast w/ Doppler (07.18.22 @ 10:12) >  Summary:   1. Left ventricular ejection fraction, by visual estimation, is 60 to   65%.   2. Normal global left ventricular systolic function.   3. Mild left ventricular hypertrophy.   4. Moderately enlarged left atrium.   5. Sclerotic aortic valve with normal opening.   6. Mild aortic regurgitation.   7. Mild-to-moderate mitral regurgitation.   8. Mild tricuspid regurgitation.    < end of copied text >      Assessment and Plan:   · Assessment	  IMPRESSION:    Sepsis present on admission better  COPD Exacerbation, not tolerating BPAP  Acute on Chronic HFrEF Exacerbation  Septic Shock off Levophed  urosepsis  PNA - RSV(+) w/ likely bacterial superinfection  UTI  A.fib w/ RVR, resolved  VANESA resolved  Hyponatremia  Hypomagnesemia resolved  HO BPH, HTN, GERD, ILD   HO Afib - on Eliquis   HO LLL mass (suspicious for malignancy)       PLAN:     CNS: No CNS depressants.    HEENT: Oral care    PULMONARY:  HOB @ 45 degrees.  Aspiration precautions.   BIPAP PRN & HS. Solumedrol IV 60mg q12.  Target POx 88 - 92%.  On 4L NC    CARDIOVASCULAR:  Decrease LR to 75cc/hr for 12 hours.  Avoid volume overload.  FU Cardio, eval appreciated.  Low dose Beta blocker.    GI: GI prophylaxis.  Feeding as tolerated.  Bowel regimen.     RENAL:  Follow up lytes. Correct as needed.  Monitor UO.  Joel care (from nursing home), change Joel.  Monitor UO.    INFECTIOUS DISEASE:   ABX per ID.  MRSA nares.      HEMATOLOGICAL:  LMWH TC.    ENDOCRINE:  Follow up FS.  Insulin protocol if needed.  TSH    Poor overall prognosis    FULL CODE    Monitor in MICU        45 minutes of critical care time spent providing medical care for patient's acute illness/conditions that impairs at least one vital organ system and/or poses a high risk of imminent or life threatening deterioration in the patient's condition. It includes time spent evaluating and treating the patient's acute illness as well as time spent reviewing labs, radiology, discussing goals of care with patient and/or patient's family, and discussing the case with a multidisciplinary team in an effort to prevent further life threatening deterioration or end organ damage. This time is independent of any procedures performed.       HPI:  88 y/o M w/ pmhx of BPH, HTN, Afib (on Eliquis), GERD, COPD, LLL mass (suspicious for malignancy), HFrEF (EF 46% 2/27/22), ILD, with recent admission to [facility] (7/10-7/23/2022) for CHF exacerbation BIBEMS from [facility] for sudden onset SOB on day of presentation.    Patient confused; HPI provided to us by ED from NH report: Patient developed acute dyspnea w/ tachycardia up to 120/min & hypoxia down to 80%>> Placed on 6L NC & EMS called.     ROS limited due to patient's AMS.     ED: Patient placed on BiPAP immediately on arrival & received 1L NS bolus  - VS: 104.4F, , BP 87/53,   - Labs: WBC 31 (neutrophil predominant), Na 131, Cr 1.5 (baseline ~1), Mg 1.5, Tn 0.02, BNP 2.6K (5K in last admission), Lac 3.6, RSV(+)  - CXR: Bilateral diffuse pulm congestion; LLL opacity; Rt tracheal deviation (unchanged from prior CXR) [Unofficial Read]    Admit to MICU (28 Jul 2022 04:15)        Over Night Events:  Patient seen and examined.       ROS:  See HPI    PHYSICAL EXAM    ICU Vital Signs Last 24 Hrs  T(C): 34.9 (30 Jul 2022 04:00), Max: 35.8 (29 Jul 2022 20:00)  T(F): 94.8 (30 Jul 2022 04:00), Max: 96.4 (29 Jul 2022 20:00)  HR: 88 (30 Jul 2022 04:00) (82 - 130)  BP: 112/74 (30 Jul 2022 04:00) (104/83 - 152/111)  BP(mean): 83 (30 Jul 2022 04:00) (83 - 130)  ABP: --  ABP(mean): --  RR: 20 (30 Jul 2022 04:00) (14 - 56)  SpO2: 87% (30 Jul 2022 04:00) (75% - 100%)    O2 Parameters below as of 30 Jul 2022 04:00  Patient On (Oxygen Delivery Method): mask, Venturi    O2 Concentration (%): 50      I&O's Detail    28 Jul 2022 07:01  -  29 Jul 2022 07:00  --------------------------------------------------------  IN:    Lactated Ringers: 1500 mL  Total IN: 1500 mL    OUT:    Indwelling Catheter - Urethral (mL): 460 mL  Total OUT: 460 mL    Total NET: 1040 mL      29 Jul 2022 07:01  -  30 Jul 2022 04:52  --------------------------------------------------------  IN:    Dexmedetomidine: 45.3 mL    Lactated Ringers: 300 mL  Total IN: 345.3 mL    OUT:    Indwelling Catheter - Urethral (mL): 595 mL  Total OUT: 595 mL    Total NET: -249.7 mL          LABS:                          9.4    20.34 )-----------( 142      ( 29 Jul 2022 04:45 )             29.7         29 Jul 2022 04:45    132    |  98     |  30     ----------------------------<  158    4.4     |  27     |  1.0      Ca    8.4        29 Jul 2022 04:45  Mg     1.8       29 Jul 2022 04:45                                                                                                                                           Culture - Blood (collected 28 Jul 2022 02:35)  Source: .Blood Blood-Peripheral  Preliminary Report (29 Jul 2022 23:01):    No growth to date.    Culture - Blood (collected 28 Jul 2022 02:35)  Source: .Blood Blood-Peripheral  Preliminary Report (29 Jul 2022 23:01):    No growth to date.                                                                                       ABG - ( 30 Jul 2022 02:21 )  pH, Arterial: 7.40  pH, Blood: x     /  pCO2: 43    /  pO2: 34    / HCO3: 27    / Base Excess: 1.5   /  SaO2: 60.1                MEDICATIONS  (STANDING):  albuterol/ipratropium for Nebulization 3 milliLiter(s) Nebulizer every 6 hours  ceftazidime/avibactam IVPB 1.25 Gram(s) IV Intermittent every 8 hours  chlorhexidine 2% Cloths 1 Application(s) Topical daily  cyanocobalamin 1000 MICROGram(s) Oral daily  dexMEDEtomidine Infusion 0.2 MICROgram(s)/kG/Hr (3.5 mL/Hr) IV Continuous <Continuous>  enoxaparin Injectable 70 milliGRAM(s) SubCutaneous every 12 hours  ferrous    sulfate 325 milliGRAM(s) Oral daily  finasteride 5 milliGRAM(s) Oral daily  furosemide    Tablet 40 milliGRAM(s) Oral daily  methylPREDNISolone sodium succinate Injectable 60 milliGRAM(s) IV Push every 12 hours  metoprolol tartrate 50 milliGRAM(s) Oral two times a day  pantoprazole    Tablet 40 milliGRAM(s) Oral before breakfast  senna 2 Tablet(s) Oral at bedtime  tamsulosin 0.4 milliGRAM(s) Oral at bedtime    MEDICATIONS  (PRN):          Xrays:  worsening bilateral opacities                                                                             < from: Xray Chest 1 View- PORTABLE-Urgent (07.28.22 @ 02:53) >  FINDINGS:    Support devices: None.    Cardiac/mediastinum/hilum: Stable enlarged cardiac silhouette.    Lung parenchyma/Pleura: Low lung volumes. Stable bilateral opacities. No   pneumothorax.    Skeleton/soft tissues: Stable.      IMPRESSION:    Low lung volumes. Stable bilateral opacities.    < end of copied text >       ECHO:    < from: TTE Echo Complete w/ Contrast w/ Doppler (07.18.22 @ 10:12) >  Summary:   1. Left ventricular ejection fraction, by visual estimation, is 60 to   65%.   2. Normal global left ventricular systolic function.   3. Mild left ventricular hypertrophy.   4. Moderately enlarged left atrium.   5. Sclerotic aortic valve with normal opening.   6. Mild aortic regurgitation.   7. Mild-to-moderate mitral regurgitation.   8. Mild tricuspid regurgitation.    < end of copied text >      Assessment and Plan:   · Assessment	  IMPRESSION:    Sepsis present on admission better  COPD Exacerbation, not tolerating BPAP  Acute on Chronic HFrEF Exacerbation  Septic Shock off Levophed  urosepsis  PNA - RSV(+) w/ likely bacterial superinfection  UTI  A.fib w/ RVR, resolved  VANESA resolved  Hyponatremia  Hypomagnesemia resolved  HO BPH, HTN, GERD, ILD   HO Afib - on Eliquis   HO LLL mass (suspicious for malignancy)       PLAN:     CNS: No CNS depressants.    HEENT: Oral care    PULMONARY:  HOB @ 45 degrees.  Aspiration precautions.   BIPAP PRN & HS. Solumedrol IV 60mg q12.  Target POx 88 - 92%.  On 4L NC    CARDIOVASCULAR:  Decrease LR to 75cc/hr for 12 hours.  Avoid volume overload.  FU Cardio, eval appreciated.  Low dose Beta blocker.    GI: GI prophylaxis.  Feeding as tolerated.  Bowel regimen.     RENAL:  Follow up lytes. Correct as needed.  Monitor UO.  Joel care (from nursing home), change Joel.  Monitor UO.    INFECTIOUS DISEASE:   ABX per ID.  MRSA nares.      HEMATOLOGICAL:  LMWH TC.    ENDOCRINE:  Follow up FS.  Insulin protocol if needed.  TSH    Poor overall prognosis    FULL CODE    Monitor in MICU        45 minutes of critical care time spent providing medical care for patient's acute illness/conditions that impairs at least one vital organ system and/or poses a high risk of imminent or life threatening deterioration in the patient's condition. It includes time spent evaluating and treating the patient's acute illness as well as time spent reviewing labs, radiology, discussing goals of care with patient and/or patient's family, and discussing the case with a multidisciplinary team in an effort to prevent further life threatening deterioration or end organ damage. This time is independent of any procedures performed.

## 2022-07-30 NOTE — PROVIDER CONTACT NOTE (OTHER) - ACTION/TREATMENT ORDERED:
Md Gonzalez & jacinto switched pt from bipap to 50%VM. 60 mg ivp lasix ordered and given.
let patient calm down and try again later in shift.

## 2022-07-30 NOTE — GOALS OF CARE CONVERSATION - ADVANCED CARE PLANNING - CONVERSATION DETAILS
Palliative NP and ICU team w family at bedside    Pt actively declining, on BIPAP 100%. Family decided on DNR/DNI, aware he has ILD which is not reversible and has been ill for a long time. Sons don't want him to suffer. Comfort care discussed, and they verbalized understanding that patient is dying. Planning to bring in more family before BIPAP is removed.     Symptom management explained and will start now. MOLST to be filed out by ICU team

## 2022-07-30 NOTE — CHART NOTE - NSCHARTNOTESELECT_GEN_ALL_CORE
GOC discussion
Comfort measures only/Event Note
Event Note
Event Note
Palliative Care NP/Event Note
Respiratory distress
Transfer Note

## 2022-07-30 NOTE — CHART NOTE - NSCHARTNOTEFT_GEN_A_CORE
Palliative Care NP    Pt has h/o ILD, in ICU with hypoxia. Palliative care has seen patient extensively. Son has not been involved with meeting with palliative care - see notes. Palliative care has been introduced, as well as hospice. Son has declined hospice in past. Advanced directives introduced in past, pt has remained full code    Recommendations  - Full code status  - Palliative care available for family meeting as needed call [phone] for palliative care service  - Will see patient for follow up, no new consult needed. Palliative Care NP    Pt has h/o ILD, in ICU with hypoxia. Palliative care has seen patient extensively. Son has not been involved with meeting with palliative care - see notes. Palliative care has been introduced, as well as hospice. Son has declined hospice in past. Advanced directives introduced in past, pt has remained full code    Recommendations  - Full code status  - Palliative care available for family meeting as needed call x [phone] for palliative care service  - Will see patient for full consult

## 2022-07-30 NOTE — PROVIDER CONTACT NOTE (OTHER) - BACKGROUND
pt from nursing home, altered. chronic macedo from nursing home in place. macedo to be changed as per MD Carter.
pt admitted for septic shock

## 2022-08-01 LAB
-  AMIKACIN: SIGNIFICANT CHANGE UP
-  AZTREONAM: SIGNIFICANT CHANGE UP
-  CEFEPIME: SIGNIFICANT CHANGE UP
-  CEFTAZIDIME/AVIBACTAM: SIGNIFICANT CHANGE UP
-  CEFTAZIDIME: SIGNIFICANT CHANGE UP
-  CEFTOLOZANE/TAZOBACTAM: SIGNIFICANT CHANGE UP
-  CIPROFLOXACIN: SIGNIFICANT CHANGE UP
-  GENTAMICIN: SIGNIFICANT CHANGE UP
-  IMIPENEM: SIGNIFICANT CHANGE UP
-  LEVOFLOXACIN: SIGNIFICANT CHANGE UP
-  MEROPENEM: SIGNIFICANT CHANGE UP
-  PIPERACILLIN/TAZOBACTAM: SIGNIFICANT CHANGE UP
-  TOBRAMYCIN: SIGNIFICANT CHANGE UP
CULTURE RESULTS: SIGNIFICANT CHANGE UP
METHOD TYPE: SIGNIFICANT CHANGE UP
ORGANISM # SPEC MICROSCOPIC CNT: SIGNIFICANT CHANGE UP
ORGANISM # SPEC MICROSCOPIC CNT: SIGNIFICANT CHANGE UP
SPECIMEN SOURCE: SIGNIFICANT CHANGE UP

## 2022-08-02 DIAGNOSIS — I50.23 ACUTE ON CHRONIC SYSTOLIC (CONGESTIVE) HEART FAILURE: ICD-10-CM

## 2022-08-02 DIAGNOSIS — I11.0 HYPERTENSIVE HEART DISEASE WITH HEART FAILURE: ICD-10-CM

## 2022-08-02 DIAGNOSIS — H91.90 UNSPECIFIED HEARING LOSS, UNSPECIFIED EAR: ICD-10-CM

## 2022-08-02 DIAGNOSIS — J84.9 INTERSTITIAL PULMONARY DISEASE, UNSPECIFIED: ICD-10-CM

## 2022-08-02 DIAGNOSIS — Z79.01 LONG TERM (CURRENT) USE OF ANTICOAGULANTS: ICD-10-CM

## 2022-08-02 DIAGNOSIS — R39.12 POOR URINARY STREAM: ICD-10-CM

## 2022-08-02 DIAGNOSIS — J96.21 ACUTE AND CHRONIC RESPIRATORY FAILURE WITH HYPOXIA: ICD-10-CM

## 2022-08-02 DIAGNOSIS — C34.32 MALIGNANT NEOPLASM OF LOWER LOBE, LEFT BRONCHUS OR LUNG: ICD-10-CM

## 2022-08-02 DIAGNOSIS — Z20.822 CONTACT WITH AND (SUSPECTED) EXPOSURE TO COVID-19: ICD-10-CM

## 2022-08-02 DIAGNOSIS — Z87.891 PERSONAL HISTORY OF NICOTINE DEPENDENCE: ICD-10-CM

## 2022-08-02 DIAGNOSIS — Z79.52 LONG TERM (CURRENT) USE OF SYSTEMIC STEROIDS: ICD-10-CM

## 2022-08-02 DIAGNOSIS — R35.1 NOCTURIA: ICD-10-CM
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
## 2022-08-02 DIAGNOSIS — I48.20 CHRONIC ATRIAL FIBRILLATION, UNSPECIFIED: ICD-10-CM

## 2022-08-02 DIAGNOSIS — J18.9 PNEUMONIA, UNSPECIFIED ORGANISM: ICD-10-CM

## 2022-08-02 DIAGNOSIS — Z51.5 ENCOUNTER FOR PALLIATIVE CARE: ICD-10-CM

## 2022-08-02 DIAGNOSIS — R39.15 URGENCY OF URINATION: ICD-10-CM

## 2022-08-02 DIAGNOSIS — K21.9 GASTRO-ESOPHAGEAL REFLUX DISEASE WITHOUT ESOPHAGITIS: ICD-10-CM

## 2022-08-02 DIAGNOSIS — R35.0 FREQUENCY OF MICTURITION: ICD-10-CM

## 2022-08-02 DIAGNOSIS — Z79.2 LONG TERM (CURRENT) USE OF ANTIBIOTICS: ICD-10-CM

## 2022-08-02 DIAGNOSIS — N40.1 BENIGN PROSTATIC HYPERPLASIA WITH LOWER URINARY TRACT SYMPTOMS: ICD-10-CM

## 2022-08-02 DIAGNOSIS — Z99.81 DEPENDENCE ON SUPPLEMENTAL OXYGEN: ICD-10-CM

## 2022-08-02 DIAGNOSIS — R33.8 OTHER RETENTION OF URINE: ICD-10-CM

## 2022-08-02 DIAGNOSIS — Z86.16 PERSONAL HISTORY OF COVID-19: ICD-10-CM

## 2022-08-02 DIAGNOSIS — Z16.24 RESISTANCE TO MULTIPLE ANTIBIOTICS: ICD-10-CM

## 2022-08-02 LAB
CULTURE RESULTS: SIGNIFICANT CHANGE UP
CULTURE RESULTS: SIGNIFICANT CHANGE UP
SPECIMEN SOURCE: SIGNIFICANT CHANGE UP
SPECIMEN SOURCE: SIGNIFICANT CHANGE UP

## 2022-08-09 ENCOUNTER — APPOINTMENT (OUTPATIENT)
Dept: CARDIOLOGY | Facility: CLINIC | Age: 87
End: 2022-08-09

## 2022-08-10 DIAGNOSIS — I50.23 ACUTE ON CHRONIC SYSTOLIC (CONGESTIVE) HEART FAILURE: ICD-10-CM

## 2022-08-10 DIAGNOSIS — J84.9 INTERSTITIAL PULMONARY DISEASE, UNSPECIFIED: ICD-10-CM

## 2022-08-10 DIAGNOSIS — E88.09 OTHER DISORDERS OF PLASMA-PROTEIN METABOLISM, NOT ELSEWHERE CLASSIFIED: ICD-10-CM

## 2022-08-10 DIAGNOSIS — I11.0 HYPERTENSIVE HEART DISEASE WITH HEART FAILURE: ICD-10-CM

## 2022-08-10 DIAGNOSIS — R65.21 SEVERE SEPSIS WITH SEPTIC SHOCK: ICD-10-CM

## 2022-08-10 DIAGNOSIS — E87.1 HYPO-OSMOLALITY AND HYPONATREMIA: ICD-10-CM

## 2022-08-10 DIAGNOSIS — E83.42 HYPOMAGNESEMIA: ICD-10-CM

## 2022-08-10 DIAGNOSIS — Z87.891 PERSONAL HISTORY OF NICOTINE DEPENDENCE: ICD-10-CM

## 2022-08-10 DIAGNOSIS — A41.52 SEPSIS DUE TO PSEUDOMONAS: ICD-10-CM

## 2022-08-10 DIAGNOSIS — E46 UNSPECIFIED PROTEIN-CALORIE MALNUTRITION: ICD-10-CM

## 2022-08-10 DIAGNOSIS — F41.9 ANXIETY DISORDER, UNSPECIFIED: ICD-10-CM

## 2022-08-10 DIAGNOSIS — I48.91 UNSPECIFIED ATRIAL FIBRILLATION: ICD-10-CM

## 2022-08-10 DIAGNOSIS — Z79.01 LONG TERM (CURRENT) USE OF ANTICOAGULANTS: ICD-10-CM

## 2022-08-10 DIAGNOSIS — J44.1 CHRONIC OBSTRUCTIVE PULMONARY DISEASE WITH (ACUTE) EXACERBATION: ICD-10-CM

## 2022-08-10 DIAGNOSIS — R91.8 OTHER NONSPECIFIC ABNORMAL FINDING OF LUNG FIELD: ICD-10-CM

## 2022-08-10 DIAGNOSIS — N10 ACUTE PYELONEPHRITIS: ICD-10-CM

## 2022-08-10 DIAGNOSIS — R45.1 RESTLESSNESS AND AGITATION: ICD-10-CM

## 2022-08-10 DIAGNOSIS — B97.4 RESPIRATORY SYNCYTIAL VIRUS AS THE CAUSE OF DISEASES CLASSIFIED ELSEWHERE: ICD-10-CM

## 2022-08-10 DIAGNOSIS — N40.0 BENIGN PROSTATIC HYPERPLASIA WITHOUT LOWER URINARY TRACT SYMPTOMS: ICD-10-CM

## 2022-08-10 DIAGNOSIS — A41.9 SEPSIS, UNSPECIFIED ORGANISM: ICD-10-CM

## 2022-08-10 DIAGNOSIS — J15.9 UNSPECIFIED BACTERIAL PNEUMONIA: ICD-10-CM

## 2022-08-10 DIAGNOSIS — K21.9 GASTRO-ESOPHAGEAL REFLUX DISEASE WITHOUT ESOPHAGITIS: ICD-10-CM

## 2022-08-10 DIAGNOSIS — N17.9 ACUTE KIDNEY FAILURE, UNSPECIFIED: ICD-10-CM

## 2022-08-10 DIAGNOSIS — J96.21 ACUTE AND CHRONIC RESPIRATORY FAILURE WITH HYPOXIA: ICD-10-CM

## 2022-08-10 DIAGNOSIS — J44.0 CHRONIC OBSTRUCTIVE PULMONARY DISEASE WITH (ACUTE) LOWER RESPIRATORY INFECTION: ICD-10-CM

## 2022-08-10 DIAGNOSIS — Z99.81 DEPENDENCE ON SUPPLEMENTAL OXYGEN: ICD-10-CM

## 2022-08-10 DIAGNOSIS — A41.81 SEPSIS DUE TO ENTEROCOCCUS: ICD-10-CM

## 2022-08-10 DIAGNOSIS — Z66 DO NOT RESUSCITATE: ICD-10-CM

## 2022-08-10 DIAGNOSIS — Z51.5 ENCOUNTER FOR PALLIATIVE CARE: ICD-10-CM

## 2022-08-11 ENCOUNTER — APPOINTMENT (OUTPATIENT)
Dept: CARDIOLOGY | Facility: CLINIC | Age: 87
End: 2022-08-11

## 2022-08-31 ENCOUNTER — APPOINTMENT (OUTPATIENT)
Age: 87
End: 2022-08-31

## 2022-09-26 ENCOUNTER — APPOINTMENT (OUTPATIENT)
Dept: UROLOGY | Facility: CLINIC | Age: 87
End: 2022-09-26

## 2023-05-25 NOTE — PHYSICAL THERAPY INITIAL EVALUATION ADULT - ASSISTIVE DEVICE FOR TRANSFER: SIT/STAND, REHAB EVAL
What Type Of Note Output Would You Prefer (Optional)?: Standard Output How Severe Is Your Skin Lesion?: mild Has Your Skin Lesion Been Treated?: not been treated Is This A New Presentation, Or A Follow-Up?: Skin Lesion rolling walker

## 2023-07-12 NOTE — PROGRESS NOTE ADULT - ASSESSMENT
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      HEENT: oral care    PULMONARY: HOB >45. dec solumedrol 60 q 24, NC trial    CARDIOVASCULAR: keep o>I.  Diuresis as tolerated. lasix daily    GI: GI prophylaxis.  Feeding when off NIV    RENAL: Fu lytes. correct as needed    HEMATOLOGICAL:  AC with Eliquis.    ENDOCRINE:  Follow up FS.  Insulin protocol if needed.    MUSCULOSKELETAL: bedrest for now    SDU monitoring    GOC     Prognosis overall poor  Pt found to have LLE DVT in the setting of hx of ovarian cancer vs long flight from [place]   S/p heparin gtt  Now on eliquis 10mg bid   CTA negative for PE   CT Venogram obtained showing acute DVT extending from the left common iliac vein to the visualized left femoral vein  Heme eval appreciated; no further workup needed since DVT likely provoked from long flight; outpatient heme f/u at [facility] if insurance accepted   SW eval- pt does not have insurance; for medication assistance, pt to receive first 30 days free, then $600/month, family agreeable to pay  vascular recs noted; plan for venogram likely on Friday in OR; patient is medically optimized for planned procedure; no further testing indicated at this time  cardio consulted for cardiac clearance; EKG personally reviewed, appears normal with no ischemic changes noted; ECHO done, awaiting read IMPRESSION:    Acute on chronic hypoxemic respiratory failure slowly improving on 40%   ILD possible exacerbation   Volume overload improved  HO HFrEF  Syncope  Recent COVID illness 1/22  HO Afib on Eliquis  HO CKD    PLAN:    CNS: No sedation.      HEENT: oral care    PULMONARY: HOB >45. dec solumedrol 60 q 24, NC trial    CARDIOVASCULAR: keep o>I.  Diuresis as tolerated. lasix daily    GI: GI prophylaxis.  Feeding when off NIV    RENAL: Fu lytes. correct as needed    HEMATOLOGICAL:  AC with Eliquis.    ENDOCRINE:  Follow up FS.  Insulin protocol if needed.    MUSCULOSKELETAL: bedrest for now    SDU monitoring    GOC     Prognosis overall poor

## 2023-08-15 NOTE — ED PROVIDER NOTE - NS ED ROS FT
42.8
Review of Systems  Constitutional:  No fever, chills.  Eyes:  No visual changes, eye pain, or discharge.  ENMT:  No hearing changes, pain, or discharge. No nasal congestion, discharge, or bleeding. No throat pain, swelling, or difficulty swallowing.  Cardiac:  No chest pain, palpitations, syncope.  Respiratory:  No cough. No hemoptysis. (+) SOB  GI:  No nausea, vomiting, diarrhea, or abdominal pain.   :  No dysuria, hematuria, frequency, or burning.   MS:  No back pain.  Skin:  No skin rash, pruritis, jaundice, or lesions.  Neuro:  No headache, dizziness, loss of sensation, or focal weakness.

## 2023-08-19 NOTE — PROGRESS NOTE ADULT - ASSESSMENT
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    #Acute on chronic resp failure, likely 2/2 ILD exacerbation  #LLL Pulm mass, likely malignant  - is following w/pulm outpatient   - CXR w/ stable chronic b/l opacities  - CT chest prior to admission: 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, new mediastinal adenopathy, left pleural effusion, cardiomegaly with dilated ascending and descending aorta  - Off NIV. Wean O2 as tolerated.  Prednisone 40 daily with slow taper over 6 weeks  - Procal noted, lactate downtrending.  f/u Cx and monitor off abx  - Bactrim for PCP PPx  - continue OP Pulmonary Follow up for PET scan for 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 and initiation of new ILD medication    #Afib  #Elevated Trops 0.03, Denies CP  - CHADSVASC: 4  - RVR on admission now controlled  - continue Eliquis 5mg PO BID  - continue cardizem 120 mg CD QD and metoprolol tartrate 100mg TID  -Trop downtrending    #HFmrEF   - TTE 2/27: LVEF 46% w/ severe pHTN and moderate TR  - Serum Pro-BNP: 2892  - Not on ACEI 2/2 Hypotension  - Does not appear overloaded on exam  - Daily Weight  - Hold diuresis for now     #BPH  -Flomax + Finasteride    DVT prophylaxis: Eliquis 5mg PO  GI prophylaxis: Protonix 40mg PO  Diet: DASH/TLC  Activity: Ambulate as tolerated  Code status: Full  Disposition: DGTF DC planning  ambulatory

## 2023-09-08 NOTE — ED ADULT TRIAGE NOTE - PRO INTERPRETER NEED 2
[de-identified] : Patient is a 72-year-old male here for reevaluation of right knee and for Synvisc injection.  Denies adverse reactions with first injection English

## 2023-09-22 NOTE — H&P ADULT - ASSESSMENT
89 y/o M w/ pmhx of BPH, HTN, Afib (on Eliquis), GERD, COPD, LLL mass (suspicious for malignancy), HFrEF (EF 46% 2/27/22), ILD, with recent admission to [facility] (7/10-7/23/2022) for CHF exacerbation BIBEMS from [facility] for sudden onset SOB on day of presentation.    Patient confused; HPI provided to us by ED from NH report: Patient developed acute dyspnea w/ tachycardia up to 120/min & hypoxia down to 80%>> Placed on 6L NC & EMS called.     ROS limited due to patient's AMS.     ED: Patient placed on BiPAP immediately on arrival & received 1L NS bolus  - VS: 104.4F, , BP 87/53,   - Labs: WBC 31 (neutrophil predominant), Na 131, Cr 1.5 (baseline ~1), Mg 1.5, Tn 0.02, BNP 2.6K (5K in last admission), Lac 3.6, RSV(+)  - CXR: Bilateral diffuse pulm congestion; LLL opacity; Rt tracheal deviation (unchanged from prior CXR) [Unofficial Read]    Admit to MICU    A.fib w/ RVR  holding Lasix    IMPRESSION:  COPD Exacerbation - On BiPAP  Acute on Chronic HFrEF Exacerbation  Septic Shock on Admission - On Levophed  Community Acquired PNA - RSV(+) w/ likely bacterial superinfection  A.fib w/ RVR - Resolved  Acute Kidney Injury  Hyponatremia  Hypomagnesemia   HO BPH, HTN, GERD, ILD   HO Afib - on Eliquis   HO LLL mass (suspicious for malignancy)       PLAN:     CNS: Avoid Sedation (pt on BiPAP)    HEENT: Oral care    PULMONARY:  HOB @ 45 degrees.  Aspiration precautions.  BIPAP. Solumedrol IV 60mg q6.  FU ABG    CARDIOVASCULAR: Monitor I's & O's.  Daily weight.  Holding Lasix & anti-HTN meds.  Levophed.  FU Cardio    GI: GI prophylaxis.  NPO.  Bowel regimen.     RENAL:  Follow up lytes. Correct as needed.  IVF. Trend Cr.    INFECTIOUS DISEASE: Follow up cultures. Vanc/Cefepime/Azithromycin. FU ID    HEMATOLOGICAL:  DVT prophylaxis.    ENDOCRINE:  Follow up FS.  Insulin protocol if needed.      Poor prognosis    FULL CODE    DISPO: MICU 88 y/o M w/ pmhx of BPH, HTN, Afib (on Eliquis), GERD, COPD, LLL mass (suspicious for malignancy), HFrEF (EF 46% 2/27/22), ILD, with recent admission to [facility] (7/10-7/23/2022) for CHF exacerbation BIBEMS from [facility] for sudden onset SOB on day of presentation.      IMPRESSION:  COPD Exacerbation - On BiPAP  Acute on Chronic HFrEF Exacerbation  Septic Shock on Admission - On Levophed  Community Acquired PNA - RSV(+) w/ likely bacterial superinfection  A.fib w/ RVR - Resolved  Acute Kidney Injury  Hyponatremia  Hypomagnesemia   HO BPH, HTN, GERD, ILD   HO Afib - on Eliquis   HO LLL mass (suspicious for malignancy)       PLAN:     CNS: Avoid Sedation (pt on BiPAP)    HEENT: Oral care    PULMONARY:  HOB @ 45 degrees.  Aspiration precautions.  BIPAP. Solumedrol IV 60mg q6.  FU ABG    CARDIOVASCULAR: Monitor I's & O's.  Daily weight.  Holding Lasix & anti-HTN meds.  Levophed.  FU Cardio    GI: GI prophylaxis.  NPO.  Bowel regimen.     RENAL:  Follow up lytes. Correct as needed.  IVF. Trend Cr.    INFECTIOUS DISEASE: Follow up cultures. Vanc/Cefepime/Azithromycin. FU ID    HEMATOLOGICAL:  DVT prophylaxis.    ENDOCRINE:  Follow up FS.  Insulin protocol if needed.      Poor prognosis    FULL CODE    DISPO: MICU 88 y/o M w/ pmhx of BPH, HTN, Afib (on Eliquis), GERD, COPD, LLL mass (suspicious for malignancy), HFrEF (EF 46% 2/27/22), ILD, with recent admission to [facility] (7/10-7/23/2022) for CHF exacerbation BIBEMS from [facility] for sudden onset SOB on day of presentation.      IMPRESSION:  COPD Exacerbation - On BiPAP  Acute on Chronic HFrEF Exacerbation  Septic Shock on Admission - On Levophed   PNA - RSV(+) w/ likely bacterial superinfection  A.fib w/ RVR - Resolved  Acute Kidney Injury  Hyponatremia  Hypomagnesemia   HO BPH, HTN, GERD, ILD   HO Afib - on Eliquis   HO LLL mass (suspicious for malignancy)       PLAN:     CNS: Avoid Sedation (pt on BiPAP)    HEENT: Oral care    PULMONARY:  HOB @ 45 degrees.  Aspiration precautions.  BIPAP. Solumedrol IV 60mg q12.  FU ABG, keep Sao2 88 to 92%    CARDIOVASCULAR: Monitor I's & O's.  Daily weight.  Holding Lasix & anti-HTN meds.  Levophed.  FU Cardio    GI: GI prophylaxis.  NPO.  Bowel regimen.     RENAL:  Follow up lytes. Correct as needed.  IVF. Trend Cr.    INFECTIOUS DISEASE: Follow up cultures. Vanc/Cefepime. FU ID    HEMATOLOGICAL:  DVT prophylaxis.    ENDOCRINE:  Follow up FS.  Insulin protocol if needed.      Poor prognosis    FULL CODE    DISPO: MICU 23-Sep-2023

## 2023-09-27 NOTE — PROGRESS NOTE ADULT - SUBJECTIVE AND OBJECTIVE BOX
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 States he has sob at baseline, but noted his pulse ox to be in 80s consistently the past several days. Endorses baseline LE edema, could not give report of any worsening of it. Pt states he has afib and it has been uncontrolled recently, causing increased difficulty catching his breath. States he had one acute episode one night prior to admission but refused to come in d/t anxiety of staying too long in hospital. Denies chest pain, fever, chills, nausea, vomiting, abd pain, diarrhea, HA, dizziness, weakness.  Pt has multiple admissions for sob d/t various causes such as ILD exacerbations and CHF exacerbations. Pt is aox4.     in the ED,pt's VS T(C): 36.6 (07-10-22 @ 20:04), Max: 36.6 (07-10-22 @ 20:04)  HR: 86 (07-11-22 @ 02:03) (86 - 110)  BP: 157/91 (07-10-22 @ 23:06) (124/81 - 157/91)  RR: 22 (07-10-22 @ 23:06) (22 - 22)  SpO2: 91% (07-10-22 @ 23:06) (91% - 97%), labs done showed wbc 11.24 (similar to May '22), Hb 11, , plts 192, INR 2.18, sodium 137, potassium 3.4, chloride 97, Cr 1.3, BUN 29, Ca 8.7, albumin 3.4, gfr 53, Magnesium 1.6, troponin <0.01, bnp 5836 (2800 May '22); VBG lactate 2.3, VBG pco2 53, covid neg, influenza neg, rsp Syncitial virus neg  EKG: afib with RVR rate 103, CXR: bl opacities (pending official read)  pt received lasix 40mg IV, 2g magnesium  pt is admitted for workup/management CHF exacerbation vs ILD flare (11 Jul 2022 03:43)    MEDICATIONS  (STANDING):  albuterol/ipratropium for Nebulization 3 milliLiter(s) Nebulizer every 6 hours  apixaban 5 milliGRAM(s) Oral two times a day  cyanocobalamin 1000 MICROGram(s) Oral daily  diltiazem    milliGRAM(s) Oral daily  ferrous    sulfate 325 milliGRAM(s) Oral daily  finasteride 5 milliGRAM(s) Oral daily  furosemide   Injectable 40 milliGRAM(s) IV Push two times a day  methylPREDNISolone sodium succinate Injectable 60 milliGRAM(s) IV Push every 12 hours  metoprolol tartrate 100 milliGRAM(s) Oral three times a day  pantoprazole    Tablet 40 milliGRAM(s) Oral before breakfast  potassium chloride    Tablet ER 20 milliEquivalent(s) Oral once  tamsulosin 0.4 milliGRAM(s) Oral at bedtime    MEDICATIONS  (PRN):  acetaminophen     Tablet .. 650 milliGRAM(s) Oral every 6 hours PRN Temp greater or equal to 38C (100.4F), Mild Pain (1 - 3)  aluminum hydroxide/magnesium hydroxide/simethicone Suspension 30 milliLiter(s) Oral every 4 hours PRN Dyspepsia  melatonin 3 milliGRAM(s) Oral at bedtime PRN Insomnia  ondansetron Injectable 4 milliGRAM(s) IV Push every 8 hours PRN Nausea and/or Vomiting  zolpidem 5 milliGRAM(s) Oral at bedtime PRN Insomnia    INTERVAL EVENTS: Patient seen today feels better, remains on IV antibiotics    T(C): 35.7 (07-14-22 @ 05:00), Max: 36.1 (07-13-22 @ 20:40)  HR: 81 (07-14-22 @ 05:00) (81 - 103)  BP: 136/98 (07-14-22 @ 05:00) (132/72 - 146/88)  RR: 18 (07-14-22 @ 05:00) (18 - 18)  SpO2: 98% (07-14-22 @ 05:00) (94% - 100%)  Wt(kg): --Vital Signs Last 24 Hrs  T(C): 35.7 (14 Jul 2022 05:00), Max: 36.1 (13 Jul 2022 20:40)  T(F): 96.3 (14 Jul 2022 05:00), Max: 97 (13 Jul 2022 20:40)  HR: 81 (14 Jul 2022 05:00) (81 - 103)  BP: 136/98 (14 Jul 2022 05:00) (132/72 - 146/88)  BP(mean): --  RR: 18 (14 Jul 2022 05:00) (18 - 18)  SpO2: 98% (14 Jul 2022 05:00) (94% - 100%)    Parameters below as of 14 Jul 2022 05:00  Patient On (Oxygen Delivery Method): nasal cannula        PHYSICAL EXAM:  GENERAL: NAD  NECK: Supple, No JVD  CHEST/LUNG: Clear  HEART: S1, S2, Regular rate and rhythm  ABDOMEN: Soft, Nontender, Bowel sounds present  EXTREMITIES: Decreased edema  SKIN: No rashes or lesions    LABS:                        11.8   15.47 )-----------( 238      ( 14 Jul 2022 08:30 )             37.6             07-14    140  |  96<L>  |  41<H>  ----------------------------<  166<H>  3.7   |  35<H>  |  1.3    Ca    9.2      14 Jul 2022 08:30  Mg     1.8     07-14      ABG - ( 13 Jul 2022 09:16 )  pH, Arterial: 7.55  pH, Blood: x     /  pCO2: 39    /  pO2: 140   / HCO3: 34    / Base Excess: 10.8  /  SaO2: 98.0            RADIOLOGY & ADDITIONAL TESTS:  < from: Xray Chest 1 View- PORTABLE-Routine (Xray Chest 1 View- PORTABLE-Routine in AM.) (07.12.22 @ 06:18) >    INTERPRETATION:  Clinical History / Reason for exam: Follow-up CHF    Comparison : Chest radiograph July 11, 2022.    Technique/Positioning: Frontal chest radiograph.    Findings:    Support devices: None.    Cardiac/mediastinum/hilum: Unchanged.    Lung parenchyma/Pleura: Unchanged patchy bilateral interstitial and   airspace opacities. No pneumothorax.    Skeleton/soft tissues: Unchanged.    Impression:    Unchanged patchy bilateral interstitial and airspace opacities.    --- End of Report ---          < end of copied text >   Quality 110: Preventive Care And Screening: Influenza Immunization: Influenza Immunization Administered during Influenza season Quality 47: Advance Care Plan: Advance Care Planning discussed and documented in the medical record; patient did not wish or was not able to name a surrogate decision maker or provide an advance care plan. Quality 226: Preventive Care And Screening: Tobacco Use: Screening And Cessation Intervention: Patient screened for tobacco use and is an ex/non-smoker Quality 130: Documentation Of Current Medications In The Medical Record: Current Medications Documented Quality 154 Part A: Falls: Risk Assessment (Should Be Reported With Measure 155.): Falls risk assessment completed and documented in the past 12 months. Quality 111:Pneumonia Vaccination Status For Older Adults: Patient received any pneumococcal conjugate or polysaccharide vaccine on or after their 60th birthday and before the end of the measurement period Detail Level: Detailed Quality 431: Preventive Care And Screening: Unhealthy Alcohol Use - Screening: Patient not identified as an unhealthy alcohol user when screened for unhealthy alcohol use using a systematic screening method

## 2023-10-23 NOTE — ED ADULT NURSE NOTE - NSICDXPASTMEDICALHX_GEN_ALL_CORE_FT
PAST MEDICAL HISTORY:  BPH (benign prostatic hyperplasia)     COPD (chronic obstructive pulmonary disease)     GERD (gastroesophageal reflux disease)     HFrEF (heart failure with reduced ejection fraction)     Hypertension     Lung interstitial disease      Olanzapine Counseling- I discussed with the patient the common side effects of olanzapine including but are not limited to: lack of energy, dry mouth, increased appetite, sleepiness, tremor, constipation, dizziness, changes in behavior, or restlessness.  Explained that teenagers are more likely to experience headaches, abdominal pain, pain in the arms or legs, tiredness, and sleepiness.  Serious side effects include but are not limited: increased risk of death in elderly patients who are confused, have memory loss, or dementia-related psychosis; hyperglycemia; increased cholesterol and triglycerides; and weight gain.

## 2023-11-29 NOTE — ED ADULT TRIAGE NOTE - NSTRIAGECARE_GEN_A_ER
Quality 130: Documentation Of Current Medications In The Medical Record: Current Medications Documented Detail Level: Detailed EKG

## 2024-03-11 NOTE — ED PROVIDER NOTE - CHILD ABUSE FACILITY
[FreeTextEntry1] : Blood pressure is acceptable we will continue to monitor diet advised continue to monitor closely at home Check A1c levels advise ADA diet and exercise Check lipid panel today Lifestyle precautions for the reflux
[facility]

## 2024-06-06 NOTE — PATIENT PROFILE ADULT - FALL HARM RISK - DEVICES
Dear Derick  Your A1C (test that looks at the blood sugar over the last 3 months) was normal.   Please call or MyChart my office with any questions or concerns.   Elisha Gonzalez, PAC         None

## 2024-06-21 NOTE — ED ADULT TRIAGE NOTE - NSPATIENTFLAG_GEN_A_ER
Addended by: JEANNINE JIANG on: 6/21/2024 01:51 PM     Modules accepted: Orders     Purple DH (Discharge Huddle; Vulnerable Patient)

## 2024-07-25 NOTE — ED ADULT TRIAGE NOTE - HEIGHT IN INCHES
Additional Notes: Monitor for changes, possible Bx to be performed next visit. Measurement is .5cm X .4cm
Render Risk Assessment In Note?: no
Detail Level: Detailed
4

## 2024-08-24 NOTE — H&P ADULT - NSHPROSALLOTHERNEGRD_GEN_ALL_CORE
Late Entry:    This is a Code 44. Patient failed inpatient criteria. Second level of review completed and supports observation.  UR committee in agreement. Discussed with Dr. Patino  who approves observation status and will sign the observation order.  Verbal explanation provided to Rosita Lemus (patient)  via telephone at  [phone]  on 8/22/24 at [phone] who verbalized understanding.   Copy of MOON emailed to Medical Records for scanning.   Molly ALICEA RN, 08/24/24, 8:33 AM     All other review of systems negative, except as noted in HPI

## 2024-11-29 NOTE — ED PROVIDER NOTE - WR ORDER NAME 1
Indomethacin (Indocin) 25 mg    Last office visit 7/10/24.    Upcoming visit 12/9/24, mwv.    Last refill 11/18/24.    Per 11/15/24 telephone encounter, Indomethacin was prescribed for possible acute gout attack-- given 25 mg tid x 5 days & advised by RN to contact clinic if prescription did not provide relief.     Xray Chest 1 View-PORTABLE IMMEDIATE

## 2025-01-18 NOTE — H&P ADULT - NSHPLABSRESULTS_GEN_ALL_CORE
11.8   30.71 )-----------( 170      ( 28 Jul 2022 02:21 )             36.3       07-28    131<L>  |  90<L>  |  41<H>  ----------------------------<  117<H>  4.5   |  27  |  1.5    Ca    8.7      28 Jul 2022 02:21  Mg     1.5     07-28    TPro  6.5  /  Alb  3.0<L>  /  TBili  0.6  /  DBili  x   /  AST  39  /  ALT  21  /  AlkPhos  80  07-28 Patient

## 2025-02-19 NOTE — CONSULT NOTE ADULT - CONSULT REQUESTED BY NAME
-- DO NOT REPLY / DO NOT REPLY ALL --  -- This inbox is not monitored. If this was sent to the wrong provider or department, reroute message to P ECO Reroute pool. --  -- Message is from Engagement Center Operations (ECO) --      Message Type:  Medication Question or Concern    Medication Question:  [facility] pharmacy calling stating medication Ozempic will need a prior auth  Preferred pharmacy verified and selected.   [facility] [id number] [place] [facility] - 20 Lester Radha BERNARD wrote:  [facility] called with Question about medication during working hrs.   Selected 'Wrap Up CRM' and created new Telephone Encounter after clicking 'Convert to Clinical Call'. Selected reason for call 'Medication Issue'. Sent Med template and routed as routine priority per Care Site Dept KB page for Med Refill Working Hrs support to appropriate clinical pool. Readback full message.  
[facility]
ed team
ED Team
Medical Team